# Patient Record
Sex: MALE | Race: WHITE | NOT HISPANIC OR LATINO | Employment: OTHER | URBAN - METROPOLITAN AREA
[De-identification: names, ages, dates, MRNs, and addresses within clinical notes are randomized per-mention and may not be internally consistent; named-entity substitution may affect disease eponyms.]

---

## 2018-02-23 ENCOUNTER — HOSPITAL ENCOUNTER (OUTPATIENT)
Dept: CARDIOLOGY | Facility: HOSPITAL | Age: 58
Discharge: HOME | End: 2018-02-23
Attending: INTERNAL MEDICINE | Admitting: INTERNAL MEDICINE

## 2019-03-06 ENCOUNTER — HOSPITAL ENCOUNTER (OUTPATIENT)
Dept: OTHER | Facility: HOSPITAL | Age: 59
Discharge: HOME | End: 2019-03-06
Attending: INTERNAL MEDICINE | Admitting: INTERNAL MEDICINE

## 2019-03-06 ENCOUNTER — CONVERSION ENCOUNTER (OUTPATIENT)
Dept: CARDIOLOGY | Facility: CLINIC | Age: 59
End: 2019-03-06

## 2019-03-06 LAB
ALBUMIN SERPL-MCNC: 4.1 G/DL (ref 3.6–5.1)
ALBUMIN/GLOB SERPL: ABNORMAL {RATIO} (ref 1–2.5)
ALP SERPL-CCNC: 71 UNITS/L (ref 40–115)
ALT SERPL-CCNC: 23 UNITS/L (ref 9–46)
AST SERPL-CCNC: 23 UNITS/L (ref 10–35)
BASOPHILS # BLD AUTO: ABNORMAL 10*3/MM3 (ref 0–200)
BASOPHILS NFR BLD AUTO: 1 %
BILIRUB SERPL-MCNC: 0.4 MG/DL (ref 0.2–1.2)
BNP SERPL-MCNC: 339 PG/ML
BUN SERPL-MCNC: 17 MG/DL (ref 7–25)
BUN/CREAT SERPL: ABNORMAL (ref 6–22)
CALCIUM SERPL-MCNC: 9.4 MG/DL (ref 8.6–10.3)
CHLORIDE SERPL-SCNC: 103 MMOL/L (ref 98–110)
CO2 CONTENT VENOUS: 29 MMOL/L (ref 20–32)
CONV NEUTROPHILS/100 LEUKOCYTES IN BODY FLUID BY MANUAL COUNT: 69 %
CONV TOTAL PROTEIN: 6.7 G/DL (ref 6.1–8.1)
CREAT UR-MCNC: 1.03 MG/DL (ref 0.7–1.33)
EOSINOPHIL # BLD AUTO: 1 %
EOSINOPHIL # BLD AUTO: ABNORMAL 10*3/MM3 (ref 15–500)
ERYTHROCYTE [DISTWIDTH] IN BLOOD BY AUTOMATED COUNT: 13.1 % (ref 11–15)
GLOBULIN UR ELPH-MCNC: ABNORMAL G/DL (ref 1.9–3.7)
GLUCOSE SERPL-MCNC: 95 MG/DL (ref 65–99)
HCT VFR BLD AUTO: 40.8 % (ref 38.5–50)
HGB BLD-MCNC: 13.9 G/DL (ref 13.2–17.1)
LYMPHOCYTES # BLD AUTO: ABNORMAL 10*3/MM3 (ref 850–3900)
LYMPHOCYTES NFR BLD AUTO: 19 %
MAGNESIUM SERPL-MCNC: 2 MG/DL (ref 1.5–2.5)
MCH RBC QN AUTO: 28.9 PG (ref 27–33)
MCHC RBC AUTO-ENTMCNC: ABNORMAL % (ref 32–36)
MCV RBC AUTO: 84.8 FL (ref 80–100)
MONOCYTES # BLD AUTO: ABNORMAL 10*3/MICROLITER (ref 200–950)
MONOCYTES NFR BLD AUTO: 10 %
NEUTROPHILS # BLD AUTO: ABNORMAL 10*3/MM3 (ref 1500–7800)
PLATELET # BLD AUTO: ABNORMAL 10*3/MM3 (ref 140–400)
PMV BLD AUTO: 11.6 FL (ref 7.5–12.5)
POTASSIUM SERPL-SCNC: 4.3 MMOL/L (ref 3.5–5.3)
RBC # BLD AUTO: ABNORMAL 10*6/MM3 (ref 4.2–5.8)
SODIUM SERPL-SCNC: 139 MMOL/L (ref 135–146)
WBC # BLD AUTO: ABNORMAL K/UL (ref 3.8–10.8)

## 2019-08-07 ENCOUNTER — HOSPITAL ENCOUNTER (OUTPATIENT)
Facility: HOSPITAL | Age: 59
Setting detail: OBSERVATION
Discharge: HOME | End: 2019-08-09
Attending: INTERNAL MEDICINE | Admitting: INTERNAL MEDICINE

## 2019-08-07 PROBLEM — F41.9 ANXIETY: Status: ACTIVE | Noted: 2017-09-08

## 2019-08-07 PROBLEM — I10 ESSENTIAL HYPERTENSION: Chronic | Status: ACTIVE | Noted: 2019-08-07

## 2019-08-07 PROBLEM — I25.119 CORONARY ARTERY DISEASE INVOLVING NATIVE HEART WITH ANGINA PECTORIS (HCC): Chronic | Status: ACTIVE | Noted: 2019-08-07

## 2019-08-07 PROBLEM — K21.9 GASTROESOPHAGEAL REFLUX DISEASE: Status: ACTIVE | Noted: 2019-02-28

## 2019-08-07 PROBLEM — E66.01 MORBID OBESITY (HCC): Status: ACTIVE | Noted: 2018-04-09

## 2019-08-07 PROBLEM — F32.A DEPRESSION: Status: ACTIVE | Noted: 2019-08-07

## 2019-08-07 PROBLEM — I48.0 PAROXYSMAL ATRIAL FIBRILLATION (HCC): Chronic | Status: ACTIVE | Noted: 2019-08-07

## 2019-08-07 PROBLEM — I25.119 CORONARY ARTERY DISEASE INVOLVING NATIVE HEART WITH ANGINA PECTORIS (HCC): Status: ACTIVE | Noted: 2019-08-07

## 2019-08-07 PROBLEM — G47.30 SLEEP APNEA: Status: ACTIVE | Noted: 2019-08-07

## 2019-08-07 PROBLEM — I50.9 CONGESTIVE HEART FAILURE (HCC): Status: ACTIVE | Noted: 2017-09-08

## 2019-08-07 PROBLEM — G47.30 SLEEP APNEA: Chronic | Status: ACTIVE | Noted: 2019-08-07

## 2019-08-07 PROBLEM — E78.5 DYSLIPIDEMIA: Chronic | Status: ACTIVE | Noted: 2019-08-07

## 2019-08-07 PROBLEM — I42.0 CARDIOMYOPATHY, DILATED (HCC): Status: ACTIVE | Noted: 2019-08-07

## 2019-08-07 PROBLEM — I42.0 CARDIOMYOPATHY, DILATED (HCC): Chronic | Status: ACTIVE | Noted: 2019-08-07

## 2019-08-07 PROBLEM — E78.5 DYSLIPIDEMIA: Status: ACTIVE | Noted: 2019-08-07

## 2019-08-07 PROBLEM — I10 ESSENTIAL HYPERTENSION: Status: ACTIVE | Noted: 2019-08-07

## 2019-08-07 PROBLEM — Z98.890 S/P CARDIAC CATH: Status: ACTIVE | Noted: 2017-09-08

## 2019-08-07 PROBLEM — I48.0 PAROXYSMAL ATRIAL FIBRILLATION (HCC): Status: ACTIVE | Noted: 2019-08-07

## 2019-08-07 LAB
ALBUMIN SERPL-MCNC: 3.8 G/DL (ref 3.5–4.8)
ALBUMIN/GLOB SERPL: 1.1 G/DL (ref 1–1.7)
ALP SERPL-CCNC: 57 U/L (ref 32–91)
ALT SERPL W P-5'-P-CCNC: 23 U/L (ref 17–63)
ANION GAP SERPL CALCULATED.3IONS-SCNC: 16.5 MMOL/L (ref 5–15)
AST SERPL-CCNC: 34 U/L (ref 15–41)
BILIRUB SERPL-MCNC: 0.9 MG/DL (ref 0.3–1.2)
BNP SERPL-MCNC: 360 PG/ML
BUN BLD-MCNC: 14 MG/DL (ref 8–20)
BUN/CREAT SERPL: 14 (ref 6.2–20.3)
CALCIUM SPEC-SCNC: 8.7 MG/DL (ref 8.9–10.3)
CHLORIDE SERPL-SCNC: 104 MMOL/L (ref 101–111)
CO2 SERPL-SCNC: 23 MMOL/L (ref 22–32)
CREAT BLD-MCNC: 1 MG/DL (ref 0.7–1.2)
DEPRECATED RDW RBC AUTO: 48.1 FL (ref 37–54)
ERYTHROCYTE [DISTWIDTH] IN BLOOD BY AUTOMATED COUNT: 15.4 % (ref 12.3–15.4)
GFR SERPL CREATININE-BSD FRML MDRD: 77 ML/MIN/1.73
GLOBULIN UR ELPH-MCNC: 3.4 GM/DL (ref 2.5–3.8)
GLUCOSE BLD-MCNC: 138 MG/DL (ref 65–99)
HCT VFR BLD AUTO: 44.1 % (ref 37.5–51)
HGB BLD-MCNC: 14.3 G/DL (ref 13–17.7)
MCH RBC QN AUTO: 28.7 PG (ref 26.6–33)
MCHC RBC AUTO-ENTMCNC: 32.5 G/DL (ref 31.5–35.7)
MCV RBC AUTO: 88.4 FL (ref 79–97)
PLATELET # BLD AUTO: 259 10*3/MM3 (ref 140–450)
PMV BLD AUTO: 10.3 FL (ref 6–12)
POTASSIUM BLD-SCNC: 4.5 MMOL/L (ref 3.6–5.1)
PROT SERPL-MCNC: 7.2 G/DL (ref 6.1–7.9)
RBC # BLD AUTO: 4.99 10*6/MM3 (ref 4.14–5.8)
SODIUM BLD-SCNC: 139 MMOL/L (ref 136–144)
TROPONIN I SERPL-MCNC: 0.03 NG/ML (ref 0–0.03)
WBC NRBC COR # BLD: 8.8 10*3/MM3 (ref 3.4–10.8)

## 2019-08-07 PROCEDURE — 93005 ELECTROCARDIOGRAM TRACING: CPT | Performed by: NURSE PRACTITIONER

## 2019-08-07 PROCEDURE — 96376 TX/PRO/DX INJ SAME DRUG ADON: CPT

## 2019-08-07 PROCEDURE — 96374 THER/PROPH/DIAG INJ IV PUSH: CPT

## 2019-08-07 PROCEDURE — 25010000002 METHYLPREDNISOLONE PER 125 MG: Performed by: NURSE PRACTITIONER

## 2019-08-07 PROCEDURE — 99220 PR INITIAL OBSERVATION CARE/DAY 70 MINUTES: CPT | Performed by: INTERNAL MEDICINE

## 2019-08-07 PROCEDURE — 94640 AIRWAY INHALATION TREATMENT: CPT

## 2019-08-07 PROCEDURE — 94799 UNLISTED PULMONARY SVC/PX: CPT

## 2019-08-07 PROCEDURE — 25010000002 MORPHINE PER 10 MG: Performed by: INTERNAL MEDICINE

## 2019-08-07 PROCEDURE — 96375 TX/PRO/DX INJ NEW DRUG ADDON: CPT

## 2019-08-07 PROCEDURE — 80053 COMPREHEN METABOLIC PANEL: CPT | Performed by: NURSE PRACTITIONER

## 2019-08-07 PROCEDURE — 99213 OFFICE O/P EST LOW 20 MIN: CPT | Performed by: NURSE PRACTITIONER

## 2019-08-07 PROCEDURE — 85027 COMPLETE CBC AUTOMATED: CPT | Performed by: NURSE PRACTITIONER

## 2019-08-07 PROCEDURE — 84484 ASSAY OF TROPONIN QUANT: CPT | Performed by: NURSE PRACTITIONER

## 2019-08-07 PROCEDURE — 83880 ASSAY OF NATRIURETIC PEPTIDE: CPT | Performed by: NURSE PRACTITIONER

## 2019-08-07 PROCEDURE — G0378 HOSPITAL OBSERVATION PER HR: HCPCS

## 2019-08-07 RX ORDER — CRISABOROLE 20 MG/G
OINTMENT TOPICAL
Refills: 0 | COMMUNITY
Start: 2019-05-08 | End: 2019-08-09 | Stop reason: HOSPADM

## 2019-08-07 RX ORDER — CYCLOBENZAPRINE HCL 10 MG
5 TABLET ORAL NIGHTLY
Status: DISCONTINUED | OUTPATIENT
Start: 2019-08-07 | End: 2019-08-09 | Stop reason: HOSPADM

## 2019-08-07 RX ORDER — CHOLECALCIFEROL (VITAMIN D3) 125 MCG
5 CAPSULE ORAL NIGHTLY PRN
Status: DISCONTINUED | OUTPATIENT
Start: 2019-08-07 | End: 2019-08-09 | Stop reason: HOSPADM

## 2019-08-07 RX ORDER — ACETAMINOPHEN 650 MG/1
650 SUPPOSITORY RECTAL EVERY 4 HOURS PRN
Status: DISCONTINUED | OUTPATIENT
Start: 2019-08-07 | End: 2019-08-09 | Stop reason: HOSPADM

## 2019-08-07 RX ORDER — CLOPIDOGREL BISULFATE 75 MG/1
75 TABLET ORAL DAILY
COMMUNITY
End: 2020-09-17 | Stop reason: SDUPTHER

## 2019-08-07 RX ORDER — ALBUTEROL SULFATE 2.5 MG/3ML
2.5 SOLUTION RESPIRATORY (INHALATION)
Status: ON HOLD | COMMUNITY
End: 2019-08-09 | Stop reason: SDUPTHER

## 2019-08-07 RX ORDER — RANOLAZINE 500 MG/1
1000 TABLET, EXTENDED RELEASE ORAL 2 TIMES DAILY
Status: DISCONTINUED | OUTPATIENT
Start: 2019-08-07 | End: 2019-08-09 | Stop reason: HOSPADM

## 2019-08-07 RX ORDER — SODIUM CHLORIDE 0.9 % (FLUSH) 0.9 %
3-10 SYRINGE (ML) INJECTION AS NEEDED
Status: DISCONTINUED | OUTPATIENT
Start: 2019-08-07 | End: 2019-08-09 | Stop reason: HOSPADM

## 2019-08-07 RX ORDER — GUAIFENESIN 600 MG/1
1200 TABLET, EXTENDED RELEASE ORAL EVERY 12 HOURS
Status: DISCONTINUED | OUTPATIENT
Start: 2019-08-07 | End: 2019-08-09 | Stop reason: HOSPADM

## 2019-08-07 RX ORDER — RANITIDINE 150 MG/1
150 TABLET ORAL 2 TIMES DAILY
COMMUNITY
End: 2019-10-07 | Stop reason: SDUPTHER

## 2019-08-07 RX ORDER — ROFLUMILAST 500 UG/1
500 TABLET ORAL DAILY
COMMUNITY

## 2019-08-07 RX ORDER — ASPIRIN 81 MG/1
81 TABLET, CHEWABLE ORAL DAILY
Status: DISCONTINUED | OUTPATIENT
Start: 2019-08-07 | End: 2019-08-09 | Stop reason: HOSPADM

## 2019-08-07 RX ORDER — BUDESONIDE 0.5 MG/2ML
0.5 INHALANT ORAL
Status: DISCONTINUED | OUTPATIENT
Start: 2019-08-07 | End: 2019-08-09 | Stop reason: HOSPADM

## 2019-08-07 RX ORDER — RANOLAZINE 500 MG/1
1000 TABLET, EXTENDED RELEASE ORAL 2 TIMES DAILY
COMMUNITY
End: 2020-07-31 | Stop reason: SDUPTHER

## 2019-08-07 RX ORDER — AZELASTINE HYDROCHLORIDE, FLUTICASONE PROPIONATE 137; 50 UG/1; UG/1
137 SPRAY, METERED NASAL 2 TIMES DAILY
COMMUNITY
End: 2019-08-09 | Stop reason: HOSPADM

## 2019-08-07 RX ORDER — METHYLPREDNISOLONE SODIUM SUCCINATE 40 MG/ML
40 INJECTION, POWDER, LYOPHILIZED, FOR SOLUTION INTRAMUSCULAR; INTRAVENOUS EVERY 8 HOURS
Status: DISCONTINUED | OUTPATIENT
Start: 2019-08-08 | End: 2019-08-09 | Stop reason: HOSPADM

## 2019-08-07 RX ORDER — FLUTICASONE PROPIONATE 50 MCG
SPRAY, SUSPENSION (ML) NASAL
COMMUNITY
Start: 2017-05-12 | End: 2019-10-28 | Stop reason: SDUPTHER

## 2019-08-07 RX ORDER — SPIRONOLACTONE 25 MG/1
12.5 TABLET ORAL 2 TIMES DAILY
Status: ON HOLD | COMMUNITY
End: 2019-09-23 | Stop reason: HOSPADM

## 2019-08-07 RX ORDER — SPIRONOLACTONE 25 MG/1
12.5 TABLET ORAL 2 TIMES DAILY
Status: DISCONTINUED | OUTPATIENT
Start: 2019-08-07 | End: 2019-08-09 | Stop reason: HOSPADM

## 2019-08-07 RX ORDER — MONTELUKAST SODIUM 10 MG/1
10 TABLET ORAL DAILY
COMMUNITY

## 2019-08-07 RX ORDER — MORPHINE SULFATE 4 MG/ML
2 INJECTION, SOLUTION INTRAMUSCULAR; INTRAVENOUS EVERY 4 HOURS PRN
Status: DISCONTINUED | OUTPATIENT
Start: 2019-08-07 | End: 2019-08-08

## 2019-08-07 RX ORDER — ISOSORBIDE MONONITRATE 60 MG/1
60 TABLET, EXTENDED RELEASE ORAL 3 TIMES DAILY
Status: DISCONTINUED | OUTPATIENT
Start: 2019-08-07 | End: 2019-08-09 | Stop reason: HOSPADM

## 2019-08-07 RX ORDER — SODIUM CHLORIDE 0.9 % (FLUSH) 0.9 %
3 SYRINGE (ML) INJECTION EVERY 12 HOURS SCHEDULED
Status: DISCONTINUED | OUTPATIENT
Start: 2019-08-07 | End: 2019-08-09 | Stop reason: HOSPADM

## 2019-08-07 RX ORDER — CLOPIDOGREL BISULFATE 75 MG/1
75 TABLET ORAL DAILY
Status: DISCONTINUED | OUTPATIENT
Start: 2019-08-07 | End: 2019-08-09 | Stop reason: HOSPADM

## 2019-08-07 RX ORDER — ATORVASTATIN CALCIUM 40 MG/1
80 TABLET, FILM COATED ORAL DAILY
Status: DISCONTINUED | OUTPATIENT
Start: 2019-08-07 | End: 2019-08-09 | Stop reason: HOSPADM

## 2019-08-07 RX ORDER — IPRATROPIUM BROMIDE AND ALBUTEROL SULFATE 2.5; .5 MG/3ML; MG/3ML
3 SOLUTION RESPIRATORY (INHALATION)
Status: DISCONTINUED | OUTPATIENT
Start: 2019-08-07 | End: 2019-08-09 | Stop reason: HOSPADM

## 2019-08-07 RX ORDER — METHYLPREDNISOLONE SODIUM SUCCINATE 125 MG/2ML
80 INJECTION, POWDER, LYOPHILIZED, FOR SOLUTION INTRAMUSCULAR; INTRAVENOUS EVERY 8 HOURS
Status: DISCONTINUED | OUTPATIENT
Start: 2019-08-07 | End: 2019-08-07

## 2019-08-07 RX ORDER — METOPROLOL SUCCINATE 50 MG/1
50 TABLET, EXTENDED RELEASE ORAL DAILY
COMMUNITY
End: 2019-10-28 | Stop reason: DRUGHIGH

## 2019-08-07 RX ORDER — ROFLUMILAST 500 UG/1
500 TABLET ORAL DAILY
Status: DISCONTINUED | OUTPATIENT
Start: 2019-08-07 | End: 2019-08-09 | Stop reason: HOSPADM

## 2019-08-07 RX ORDER — HYDRALAZINE HYDROCHLORIDE 20 MG/ML
10 INJECTION INTRAMUSCULAR; INTRAVENOUS EVERY 6 HOURS PRN
Status: DISCONTINUED | OUTPATIENT
Start: 2019-08-07 | End: 2019-08-09 | Stop reason: HOSPADM

## 2019-08-07 RX ORDER — BUMETANIDE 1 MG/1
1 TABLET ORAL 2 TIMES DAILY
Status: DISCONTINUED | OUTPATIENT
Start: 2019-08-07 | End: 2019-08-09 | Stop reason: HOSPADM

## 2019-08-07 RX ORDER — MONTELUKAST SODIUM 10 MG/1
10 TABLET ORAL NIGHTLY
Status: DISCONTINUED | OUTPATIENT
Start: 2019-08-07 | End: 2019-08-09 | Stop reason: HOSPADM

## 2019-08-07 RX ORDER — FAMOTIDINE 20 MG/1
20 TABLET, FILM COATED ORAL
Status: DISCONTINUED | OUTPATIENT
Start: 2019-08-07 | End: 2019-08-09 | Stop reason: HOSPADM

## 2019-08-07 RX ORDER — DIAZEPAM 5 MG/1
TABLET ORAL
COMMUNITY
End: 2019-08-09 | Stop reason: HOSPADM

## 2019-08-07 RX ORDER — ONDANSETRON 2 MG/ML
4 INJECTION INTRAMUSCULAR; INTRAVENOUS EVERY 6 HOURS PRN
Status: DISCONTINUED | OUTPATIENT
Start: 2019-08-07 | End: 2019-08-09 | Stop reason: HOSPADM

## 2019-08-07 RX ORDER — ASPIRIN 81 MG/1
81 TABLET, CHEWABLE ORAL DAILY
Status: ON HOLD | COMMUNITY
End: 2019-09-20

## 2019-08-07 RX ORDER — ATORVASTATIN CALCIUM 80 MG/1
80 TABLET, FILM COATED ORAL DAILY
COMMUNITY
End: 2020-07-31 | Stop reason: SDUPTHER

## 2019-08-07 RX ORDER — BISACODYL 10 MG
10 SUPPOSITORY, RECTAL RECTAL DAILY PRN
Status: DISCONTINUED | OUTPATIENT
Start: 2019-08-07 | End: 2019-08-09 | Stop reason: HOSPADM

## 2019-08-07 RX ORDER — DULOXETIN HYDROCHLORIDE 60 MG/1
60 CAPSULE, DELAYED RELEASE ORAL DAILY
COMMUNITY
End: 2021-01-01 | Stop reason: SDDI

## 2019-08-07 RX ORDER — METOPROLOL SUCCINATE 50 MG/1
50 TABLET, EXTENDED RELEASE ORAL DAILY
Status: DISCONTINUED | OUTPATIENT
Start: 2019-08-07 | End: 2019-08-09 | Stop reason: HOSPADM

## 2019-08-07 RX ORDER — ACETAMINOPHEN 325 MG/1
650 TABLET ORAL EVERY 4 HOURS PRN
Status: DISCONTINUED | OUTPATIENT
Start: 2019-08-07 | End: 2019-08-09 | Stop reason: HOSPADM

## 2019-08-07 RX ORDER — NITROGLYCERIN 0.4 MG/1
0.4 TABLET SUBLINGUAL
COMMUNITY

## 2019-08-07 RX ORDER — DULOXETIN HYDROCHLORIDE 30 MG/1
60 CAPSULE, DELAYED RELEASE ORAL DAILY
Status: DISCONTINUED | OUTPATIENT
Start: 2019-08-07 | End: 2019-08-09 | Stop reason: HOSPADM

## 2019-08-07 RX ORDER — BENZONATATE 100 MG/1
200 CAPSULE ORAL 3 TIMES DAILY PRN
Status: DISCONTINUED | OUTPATIENT
Start: 2019-08-07 | End: 2019-08-09 | Stop reason: HOSPADM

## 2019-08-07 RX ORDER — ISOSORBIDE MONONITRATE 60 MG/1
60 TABLET, EXTENDED RELEASE ORAL 3 TIMES DAILY PRN
COMMUNITY
End: 2020-06-29 | Stop reason: HOSPADM

## 2019-08-07 RX ORDER — NITROGLYCERIN 0.4 MG/1
0.4 TABLET SUBLINGUAL
Status: DISCONTINUED | OUTPATIENT
Start: 2019-08-07 | End: 2019-08-08

## 2019-08-07 RX ORDER — BUMETANIDE 1 MG/1
1 TABLET ORAL 2 TIMES DAILY
COMMUNITY
End: 2019-10-28 | Stop reason: SDUPTHER

## 2019-08-07 RX ORDER — ALUMINA, MAGNESIA, AND SIMETHICONE 2400; 2400; 240 MG/30ML; MG/30ML; MG/30ML
15 SUSPENSION ORAL EVERY 6 HOURS PRN
Status: DISCONTINUED | OUTPATIENT
Start: 2019-08-07 | End: 2019-08-09 | Stop reason: HOSPADM

## 2019-08-07 RX ORDER — ONDANSETRON 4 MG/1
4 TABLET, FILM COATED ORAL EVERY 6 HOURS PRN
Status: DISCONTINUED | OUTPATIENT
Start: 2019-08-07 | End: 2019-08-09 | Stop reason: HOSPADM

## 2019-08-07 RX ORDER — FLUTICASONE PROPIONATE 50 MCG
2 SPRAY, SUSPENSION (ML) NASAL DAILY
Status: DISCONTINUED | OUTPATIENT
Start: 2019-08-07 | End: 2019-08-09 | Stop reason: HOSPADM

## 2019-08-07 RX ORDER — CYCLOBENZAPRINE HCL 5 MG
TABLET ORAL
COMMUNITY
End: 2019-10-07 | Stop reason: SDUPTHER

## 2019-08-07 RX ADMIN — ONDANSETRON 4 MG: 4 TABLET, FILM COATED ORAL at 18:07

## 2019-08-07 RX ADMIN — METHYLPREDNISOLONE SODIUM SUCCINATE 80 MG: 125 INJECTION, POWDER, FOR SOLUTION INTRAMUSCULAR; INTRAVENOUS at 18:11

## 2019-08-07 RX ADMIN — MORPHINE SULFATE 2 MG: 4 INJECTION INTRAVENOUS at 23:49

## 2019-08-07 RX ADMIN — BUMETANIDE 1 MG: 1 TABLET ORAL at 21:57

## 2019-08-07 RX ADMIN — FAMOTIDINE 20 MG: 20 TABLET, FILM COATED ORAL at 18:10

## 2019-08-07 RX ADMIN — GUAIFENESIN 1200 MG: 600 TABLET, EXTENDED RELEASE ORAL at 18:11

## 2019-08-07 RX ADMIN — IPRATROPIUM BROMIDE AND ALBUTEROL SULFATE 3 ML: .5; 3 SOLUTION RESPIRATORY (INHALATION) at 19:46

## 2019-08-07 RX ADMIN — ROFLUMILAST 500 MCG: 500 TABLET ORAL at 21:57

## 2019-08-07 RX ADMIN — BUDESONIDE 0.5 MG: 0.5 INHALANT RESPIRATORY (INHALATION) at 19:45

## 2019-08-07 RX ADMIN — MORPHINE SULFATE 2 MG: 4 INJECTION INTRAVENOUS at 18:28

## 2019-08-07 RX ADMIN — METOPROLOL SUCCINATE 50 MG: 50 TABLET, EXTENDED RELEASE ORAL at 18:10

## 2019-08-07 RX ADMIN — ATORVASTATIN CALCIUM 80 MG: 40 TABLET, FILM COATED ORAL at 18:09

## 2019-08-07 RX ADMIN — ISOSORBIDE MONONITRATE 60 MG: 60 TABLET, EXTENDED RELEASE ORAL at 21:58

## 2019-08-07 RX ADMIN — DULOXETINE 60 MG: 30 CAPSULE, DELAYED RELEASE ORAL at 21:58

## 2019-08-07 RX ADMIN — Medication 3 ML: at 21:59

## 2019-08-07 RX ADMIN — SPIRONOLACTONE 12.5 MG: 25 TABLET ORAL at 18:08

## 2019-08-07 RX ADMIN — MONTELUKAST SODIUM 10 MG: 10 TABLET, COATED ORAL at 18:10

## 2019-08-07 RX ADMIN — IPRATROPIUM BROMIDE AND ALBUTEROL SULFATE 3 ML: .5; 3 SOLUTION RESPIRATORY (INHALATION) at 17:13

## 2019-08-07 RX ADMIN — CYCLOBENZAPRINE HYDROCHLORIDE 5 MG: 10 TABLET, FILM COATED ORAL at 18:09

## 2019-08-07 RX ADMIN — RANOLAZINE 1000 MG: 500 TABLET, FILM COATED, EXTENDED RELEASE ORAL at 18:10

## 2019-08-07 RX ADMIN — CLOPIDOGREL BISULFATE 75 MG: 75 TABLET ORAL at 18:11

## 2019-08-07 NOTE — H&P
[facility] HOSPITALIST       PCP:  Jaylen Moss MD      CHIEF COMPLAINT:     Left groin pain      HISTORY OF PRESENT ILLNESS:     This is a 58-year-old  male with a past medical history of CAD status post CABG, hypertension, COPD, emphysema, pacemaker/defibrillator atrial fibrillation, cardiomyopathy, and back surgery who presented to [facility] on 8/7/2019 with complaints of chest pain.  Patient states he has chronic chest pain and recently got worse.  He stated he had a stent last October and then an MI in January he stated this is been the third trip to the hospital in the last month.  He was in [place] 3 weeks ago for an MI and ventricular fibrillation.  He was in [place] 2 weeks ago for COPD/angina attack.  Patient states this chest pain feels like when he had his previous MI.  He rates it a 7/10 on pain scale.  States he is also had accompanying nausea and shortness of breath.  Patient states that his legs have been swelling lately also.  He denies any recent fever chills.  He is seen by Dr. Giles outpatient.     Per Dr. Guardado at [facility] patient had a CT which is negative for PE.  EKG was unremarkable troponin  0.05.  .  D-dimer 1.0.  Patient received Eliquis this morning.  Patient was started on Nitro drip at [place] .        Past Medical History:   Diagnosis Date   • Asthma    • Atrial fibrillation (CMS/[facility])    • CAD (coronary artery disease)    • CHF (congestive heart failure) (CMS/[facility])    • COPD (chronic obstructive pulmonary disease) (CMS/[facility])    • Depression    • Hyperlipidemia    • Hypertension    • Myocardial infarction (CMS/[facility])    • Pacemaker     pacemaker / defib   • V tach (CMS/[facility])      Past Surgical History:   Procedure Laterality Date   • CARDIAC ABLATION  11/07/2017   • CARDIAC CATHETERIZATION     • CHOLECYSTECTOMY     • CORONARY ANGIOPLASTY WITH STENT PLACEMENT      2 stents   • PACEMAKER IMPLANTATION  07/08/2016    Pacemaker/ Defib  [facility]   • SINUS SURGERY      sinus surgery x 9     Family History   Problem Relation Age of Onset   • Diabetes Mother    • Heart disease Mother         MI age 46 - CHF   • Atrial fibrillation Mother    • COPD Mother    • Atrial fibrillation Father    • Heart disease Father         CHF     Social History     Tobacco Use   • Smoking status: Never Smoker   Substance Use Topics   • Alcohol use: No     Frequency: Never   • Drug use: No       Medications Prior to Admission   Medication Sig Dispense Refill Last Dose   • apixaban (ELIQUIS) 5 MG tablet tablet Take 5 mg by mouth 2 (Two) Times a Day.   8/7/2019 at Unknown time   • aspirin 81 MG chewable tablet Chew 81 mg Daily.   8/6/2019 at Unknown time   • atorvastatin (LIPITOR) 80 MG tablet Take 80 mg by mouth Daily.   8/6/2019 at Unknown time   • bumetanide (BUMEX) 1 MG tablet Take 1 mg by mouth 2 (Two) Times a Day.   8/6/2019 at Unknown time   • clopidogrel (PLAVIX) 75 MG tablet Take 75 mg by mouth Daily.   8/6/2019 at Unknown time   • DULoxetine (CYMBALTA) 60 MG capsule Take 60 mg by mouth Daily.   8/6/2019 at Unknown time   • fluticasone (FLONASE) 50 MCG/ACT nasal spray fluticasone propionate 50 mcg/actuation nasal spray,suspension      • isosorbide mononitrate (IMDUR) 60 MG 24 hr tablet Take 60 mg by mouth 3 (Three) Times a Day.   8/6/2019 at Unknown time   • metoprolol succinate XL (TOPROL-XL) 50 MG 24 hr tablet Take 50 mg by mouth Daily.   8/6/2019 at Unknown time   • montelukast (SINGULAIR) 10 MG tablet Take 10 mg by mouth Every Night.   8/6/2019 at Unknown time   • nitroglycerin (NITROSTAT) 0.4 MG SL tablet Place 0.4 mg under the tongue Every 5 (Five) Minutes As Needed for Chest Pain. Take no more than 3 doses in 15 minutes.   8/7/2019 at Unknown time   • raNITIdine (ZANTAC) 150 MG tablet Take 150 mg by mouth 2 (Two) Times a Day.   8/6/2019 at Unknown time   • ranolazine (RANEXA) 500 MG 12 hr tablet Take 1,000 mg by mouth 2 (Two) Times a Day.   8/6/2019  at Unknown time   • roflumilast (DALIRESP) 500 MCG tablet tablet Take 500 mcg by mouth Daily.   8/6/2019 at Unknown time   • spironolactone (ALDACTONE) 25 MG tablet Take 12.5 mg by mouth 2 (Two) Times a Day.   8/6/2019 at Unknown time   • albuterol (PROVENTIL) (2.5 MG/3ML) 0.083% nebulizer solution Inhale 2.5 mg.      • Azelastine-Fluticasone 137-50 MCG/ACT suspension 137 mcg into the nostril(s) as directed by provider 2 (Two) Times a Day.      • cyclobenzaprine (FLEXERIL) 5 MG tablet cyclobenzaprine 5 mg tablet      • diazePAM (VALIUM) 5 MG tablet diazepam 5 mg tablet      • EUCRISA 2 % ointment   0        Allergies:  Tramadol and Codeine      There is no immunization history on file for this patient.      REVIEW OF SYSTEMS:    Review of Systems   Constitutional: Negative.    HENT: Negative.    Respiratory: Positive for cough and shortness of breath.    Cardiovascular: Positive for chest pain and leg swelling. Negative for palpitations.   Gastrointestinal: Positive for nausea.   Genitourinary: Negative.    Musculoskeletal: Negative.    Skin: Negative.    Neurological: Negative.    Psychiatric/Behavioral: Negative.        Vital Signs  Temp:  [98.9 °F (37.2 °C)] 98.9 °F (37.2 °C)  Heart Rate:  [86] 86  Resp:  [18] 18  BP: (115-162)/(68-88) 162/88         Physical Exam:  Physical Exam   Constitutional: He is oriented to person, place, and time and well-developed, well-nourished, and in no distress.   HENT:   Head: Normocephalic and atraumatic.   Eyes: Conjunctivae and EOM are normal. Pupils are equal, round, and reactive to light.   Neck: Normal range of motion.   Cardiovascular: Normal rate, regular rhythm and normal heart sounds.   Pulmonary/Chest: Effort normal.   Expiratory wheezes noted    Abdominal: Soft. Bowel sounds are normal.   Musculoskeletal: Normal range of motion. He exhibits edema (slight LLE edema ).   Neurological: He is alert and oriented to person, place, and time.   Skin: Skin is warm and dry.    Psychiatric: Affect normal.         Results Review:   I reviewed the patient's new clinical results.    Lab Results (most recent)     None          Imaging Results (most recent)     None            ECG/EMG Results (most recent)     None               Assessment/Plan     Acute COPD exacerbation  - Patient currently on room air. States he is suppose to wear 2L NC at home as needed, but no longer has an oxygen machine.   - Lungs have expiratory wheezing. Patient having coughing and shortness of breath.   - Inhalers, mucinex, tesslon perles, and steroid taper ordered   - Continue home daliresp     Chest pain   - Troponin 0.05, trend  - D-dimer 1.0, CT PE protocol unremarkable at [facility]   - Placed on nitro gtt at [facility], Patient still having chest pain- continue for now  - Cardiology consulted   - Continuous cardiac monitoring   - Check BNP, EKG, and trend troponin   - Continue home aspirin, imdur, and ranexa    CAD S/P CABG  - Patient has pacemaker/defibrillator  - Continue aspirin, atorvastatin, plavix, and metoprolol     CHF  - Continue bumex and spironolactone  - EF EF 25-30% (2017)     Essential hypertension   - uncontrolled  - Continue home metoprolol  - IV hydralazine PRN ordered     GERD  - Continue pepcid     Chronic atrial fibrillation   - Continuous cardiac monitoring   - Patient received his eliquis at [facility]   - Continue eliquis     History of back surgery   - Continue flexeril     Depression  - Continue cymbalta     Seasonal allergies  - Continue singulair and flonase     Morbid obesity  - Encourage lifestyle modifications     VTE Prophylaxis - eliquis

## 2019-08-07 NOTE — CONSULTS
Cardiology Consult Note      Requesting Physician    Brandon Enrique MD    PATIENT IDENTIFICATION    Name: Jose Dixon Jr. Age: 58 y.o. Sex: male :  1960 MRN: [id number]    REASON FOR CONSULTATION:  58-year-old  male well-known to our practice with cardiac history that includes coronary artery disease with small vessel disease involving a marginal branch of circumflex artery being medically managed.  He has single-chamber ICD in situ-[employer].  Last hospitalization 2019 patient did have spontaneous symptomatic wide QRS tachycardia with associated diaphoresis, lightheadedness and chest discomfort.  He has history of cardiac ablation, ischemic cardiomyopathy with last known EF of 40%.  History of chronic class III systolic heart failure, COPD, small vessel coronary disease, obesity with sleep apnea and chronic sinus issues which precludes use use of positive pressure.      CC:  This of breath  Chest pain    HISTORY OF PRESENT ILLNESS:   Patient presented to [facility] with chief complaint of shortness of breath.  Patient states he did not feel well yesterday but had no specific complaints of pain or shortness of breath.  He awoke approximately 0200 today with shortness of breath.  He used his nebulizer with no significant improvement.  His symptoms worsened and he presented to the emergency department at [facility].  There, he underwent chest CT that was negative for PE.  , EKG with no acute findings.    Upon my evaluation, patient is sitting in bedside chair and reports chest pain that is retrosternal radiating through to his back as well rated 7-8/10.  Patient reports his shortness of air has improved some.  He also reports nausea.  He is currently on nitro drip at 10 mcg/min.  Telemetry shows sinus with occasional PVCs.  Patient reports he has follow-up appointment at [facility] with Dr. Roger Garcia on .      REVIEW OF  SYSTEMS:  Pertinent items are noted in HPI, all other systems reviewed and negative    OBJECTIVE   Sitting in chair, patient is very anxious but does not appear to be in any acute distress    ASSESSMENT/PLAN    Coronary artery disease involving native heart with angina pectoris (CMS/HCC)    Cardiomyopathy, dilated (CMS/HCC)    Sleep apnea    Essential hypertension    Dyslipidemia    Paroxysmal atrial fibrillation (CMS/HCC)    Plan  Plan at this time is to rule in/out for acute coronary syndrome with serial cardiac enzymes  Monitor rhythm closely for dysrhythmias  Patient was evaluated by Dr. Lopez 7/1/2019 at [facility]: ICD at prior hospital admission was reprogrammed to a lower rate to avoid unnecessary isolated right ventricular pacing and also program to include monitoring zone to monitor for any slower VT on this patient or slower tachycardia  Further recommendations per cardiologist            Vital Signs  Visit Vitals  /88 (BP Location: Left arm, Patient Position: Sitting)   Pulse 86   Temp 98.9 °F (37.2 °C) (Oral)   Resp 18   SpO2 90%     Oxygen Therapy  SpO2: 90 %  Pulse Oximetry Type: Intermittent  Device (Oxygen Therapy): room air    Intake & Output (last 3 days)     None        Lines, Drains & Airways    Active LDAs     None                Medical History    Past Medical History:   Diagnosis Date   • Asthma    • Atrial fibrillation (CMS/HCC)    • CAD (coronary artery disease)    • CHF (congestive heart failure) (CMS/HCC)    • COPD (chronic obstructive pulmonary disease) (CMS/HCC)    • Depression    • Hyperlipidemia    • Hypertension    • Myocardial infarction (CMS/HCC)    • Pacemaker     pacemaker / defib   • V tach (CMS/HCC)         Surgical History    Past Surgical History:   Procedure Laterality Date   • CARDIAC ABLATION  11/07/2017   • CARDIAC CATHETERIZATION     • CHOLECYSTECTOMY     • CORONARY ANGIOPLASTY WITH STENT PLACEMENT      2 stents   • PACEMAKER IMPLANTATION  07/08/2016    Pacemaker/  Defib implant - [place]   • SINUS SURGERY      sinus surgery x 9        Family History    Family History   Problem Relation Age of Onset   • Diabetes Mother    • Heart disease Mother         MI age 46 - CHF   • Atrial fibrillation Mother    • COPD Mother    • Atrial fibrillation Father    • Heart disease Father         CHF       Social History    Social History     Tobacco Use   • Smoking status: Never Smoker   Substance Use Topics   • Alcohol use: No     Frequency: Never        Allergies    Allergies   Allergen Reactions   • Tramadol Hives, Other (See Comments) and Rash   • Codeine Other (See Comments) and Unknown (See Comments)     Makes me feel like I'm in another world.   Cramps                 /88 (BP Location: Left arm, Patient Position: Sitting)   Pulse 86   Temp 98.9 °F (37.2 °C) (Oral)   Resp 18   SpO2 90%   Intake/Output last 3 shifts:  No intake/output data recorded.  Intake/Output this shift:  No intake/output data recorded.    PHYSICAL EXAM:    General: Alert, cooperative, no distress, appears stated age  Head:  Normocephalic, atraumatic, mucous membranes moist  Eyes:  Conjunctiva/corneas clear, EOM's intact     Neck:  Supple,  no adenopathy;      Lungs: Clear to auscultation bilaterally, no wheezes rhonchi rales are noted  Chest wall: No tenderness  Heart::  Regular rate and rhythm, S1 and S2 normal, no murmur, rub or gallop  Abdomen: Soft, non-tender, nondistended bowel sounds active  Extremities: No cyanosis, clubbing, or edema groin soft no hematoma.  Pulses: 2+ and symmetric all extremities  Skin:  No rashes or lesions  Neuro/psych: A&O x3. CN II through XII are grossly intact with appropriate affect.  Patient is very talkative, animated, anxious      Scheduled Meds:          Continuous Infusions:         PRN Meds:            Results Review:     I reviewed the patient's new clinical results.    CBC      Cr Clearance CrCl cannot be calculated (Patient's most recent lab result is older than  the maximum 30 days allowed.).  Coag     HbA1C   Lab Results   Component Value Date    HGBA1C 5.4 09/14/2018     Blood Glucose No results found for: POCGLU  Infection     CMP     ABG      UA      TATUM  No results found for: POCMETH, POCAMPHET, POCBARBITUR, POCBENZO, POCCOCAINE, POCOPIATES, POCOXYCODO, POCPHENCYC, POCPROPOXY, POCTHC, POCTRICYC  Lysis Labs     Radiology(recent) No radiology results for the last day            Xrays, labs reviewed personally by physician.    ECG/EMG Results (most recent)     None            Medication Review:   I have reviewed the patient's current medication list  Scheduled Meds:  Continuous Infusions:   PRN Meds:.    Imaging:  Imaging Results (last 72 hours)     ** No results found for the last 72 hours. **            ABHISHEK Peña  08/07/19  5:07 PM

## 2019-08-08 ENCOUNTER — APPOINTMENT (OUTPATIENT)
Dept: OTHER | Facility: HOSPITAL | Age: 59
End: 2019-08-08

## 2019-08-08 PROBLEM — R07.9 CHEST PAIN: Status: ACTIVE | Noted: 2019-08-08

## 2019-08-08 LAB
ANION GAP SERPL CALCULATED.3IONS-SCNC: 14.8 MMOL/L (ref 5–15)
B PERT DNA SPEC QL NAA+PROBE: NOT DETECTED
BUN BLD-MCNC: 20 MG/DL (ref 8–20)
BUN/CREAT SERPL: 18.2 (ref 6.2–20.3)
C PNEUM DNA NPH QL NAA+NON-PROBE: NOT DETECTED
CALCIUM SPEC-SCNC: 8.6 MG/DL (ref 8.9–10.3)
CHLORIDE SERPL-SCNC: 103 MMOL/L (ref 101–111)
CO2 SERPL-SCNC: 25 MMOL/L (ref 22–32)
CREAT BLD-MCNC: 1.1 MG/DL (ref 0.7–1.2)
DEPRECATED RDW RBC AUTO: 49.9 FL (ref 37–54)
ERYTHROCYTE [DISTWIDTH] IN BLOOD BY AUTOMATED COUNT: 16 % (ref 12.3–15.4)
FLUAV H1 2009 PAND RNA NPH QL NAA+PROBE: NOT DETECTED
FLUAV H1 HA GENE NPH QL NAA+PROBE: NOT DETECTED
FLUAV H3 RNA NPH QL NAA+PROBE: NOT DETECTED
FLUAV SUBTYP SPEC NAA+PROBE: NOT DETECTED
FLUBV RNA ISLT QL NAA+PROBE: NOT DETECTED
GFR SERPL CREATININE-BSD FRML MDRD: 69 ML/MIN/1.73
GLUCOSE BLD-MCNC: 149 MG/DL (ref 65–99)
GLUCOSE BLDC GLUCOMTR-MCNC: 134 MG/DL (ref 70–105)
GLUCOSE BLDC GLUCOMTR-MCNC: 135 MG/DL (ref 70–105)
HADV DNA SPEC NAA+PROBE: NOT DETECTED
HCOV 229E RNA SPEC QL NAA+PROBE: NOT DETECTED
HCOV HKU1 RNA SPEC QL NAA+PROBE: NOT DETECTED
HCOV NL63 RNA SPEC QL NAA+PROBE: NOT DETECTED
HCOV OC43 RNA SPEC QL NAA+PROBE: NOT DETECTED
HCT VFR BLD AUTO: 41.4 % (ref 37.5–51)
HGB BLD-MCNC: 13.4 G/DL (ref 13–17.7)
HMPV RNA NPH QL NAA+NON-PROBE: NOT DETECTED
HPIV1 RNA SPEC QL NAA+PROBE: NOT DETECTED
HPIV2 RNA SPEC QL NAA+PROBE: NOT DETECTED
HPIV3 RNA NPH QL NAA+PROBE: NOT DETECTED
HPIV4 P GENE NPH QL NAA+PROBE: NOT DETECTED
LYMPHOCYTES # BLD MANUAL: 0.98 10*3/MM3 (ref 0.7–3.1)
LYMPHOCYTES NFR BLD MANUAL: 9 % (ref 19.6–45.3)
M PNEUMO IGG SER IA-ACNC: NOT DETECTED
MCH RBC QN AUTO: 28.8 PG (ref 26.6–33)
MCHC RBC AUTO-ENTMCNC: 32.4 G/DL (ref 31.5–35.7)
MCV RBC AUTO: 88.8 FL (ref 79–97)
NEUTROPHILS # BLD AUTO: 9.92 10*3/MM3 (ref 1.7–7)
NEUTROPHILS NFR BLD MANUAL: 73 % (ref 42.7–76)
NEUTS BAND NFR BLD MANUAL: 18 % (ref 0–5)
PLAT MORPH BLD: NORMAL
PLATELET # BLD AUTO: 249 10*3/MM3 (ref 140–450)
PMV BLD AUTO: 10 FL (ref 6–12)
POTASSIUM BLD-SCNC: 4.8 MMOL/L (ref 3.6–5.1)
RBC # BLD AUTO: 4.66 10*6/MM3 (ref 4.14–5.8)
RBC MORPH BLD: NORMAL
RHINOVIRUS RNA SPEC NAA+PROBE: NOT DETECTED
RSV RNA NPH QL NAA+NON-PROBE: NOT DETECTED
SCAN SLIDE: NORMAL
SODIUM BLD-SCNC: 138 MMOL/L (ref 136–144)
TROPONIN I SERPL-MCNC: 0.03 NG/ML (ref 0–0.03)
WBC MORPH BLD: NORMAL
WBC NRBC COR # BLD: 10.9 10*3/MM3 (ref 3.4–10.8)

## 2019-08-08 PROCEDURE — 0099U HC BIOFIRE FILMARRAY RESP PANEL 1: CPT | Performed by: INTERNAL MEDICINE

## 2019-08-08 PROCEDURE — 99213 OFFICE O/P EST LOW 20 MIN: CPT | Performed by: NURSE PRACTITIONER

## 2019-08-08 PROCEDURE — 80048 BASIC METABOLIC PNL TOTAL CA: CPT | Performed by: NURSE PRACTITIONER

## 2019-08-08 PROCEDURE — G0378 HOSPITAL OBSERVATION PER HR: HCPCS

## 2019-08-08 PROCEDURE — 25010000002 ONDANSETRON PER 1 MG: Performed by: NURSE PRACTITIONER

## 2019-08-08 PROCEDURE — 82962 GLUCOSE BLOOD TEST: CPT

## 2019-08-08 PROCEDURE — 94799 UNLISTED PULMONARY SVC/PX: CPT

## 2019-08-08 PROCEDURE — 25010000002 METHYLPREDNISOLONE PER 40 MG: Performed by: INTERNAL MEDICINE

## 2019-08-08 PROCEDURE — 96375 TX/PRO/DX INJ NEW DRUG ADDON: CPT

## 2019-08-08 PROCEDURE — 25010000002 MORPHINE PER 10 MG: Performed by: INTERNAL MEDICINE

## 2019-08-08 PROCEDURE — 99226 PR SBSQ OBSERVATION CARE/DAY 35 MINUTES: CPT | Performed by: INTERNAL MEDICINE

## 2019-08-08 PROCEDURE — 96376 TX/PRO/DX INJ SAME DRUG ADON: CPT

## 2019-08-08 PROCEDURE — 85025 COMPLETE CBC W/AUTO DIFF WBC: CPT | Performed by: NURSE PRACTITIONER

## 2019-08-08 RX ORDER — HYDROCODONE BITARTRATE AND ACETAMINOPHEN 5; 325 MG/1; MG/1
1 TABLET ORAL EVERY 6 HOURS PRN
Status: DISCONTINUED | OUTPATIENT
Start: 2019-08-08 | End: 2019-08-09 | Stop reason: HOSPADM

## 2019-08-08 RX ADMIN — Medication 10 ML: at 20:09

## 2019-08-08 RX ADMIN — IPRATROPIUM BROMIDE AND ALBUTEROL SULFATE 3 ML: .5; 3 SOLUTION RESPIRATORY (INHALATION) at 19:32

## 2019-08-08 RX ADMIN — CYCLOBENZAPRINE HYDROCHLORIDE 5 MG: 10 TABLET, FILM COATED ORAL at 20:05

## 2019-08-08 RX ADMIN — IPRATROPIUM BROMIDE AND ALBUTEROL SULFATE 3 ML: .5; 3 SOLUTION RESPIRATORY (INHALATION) at 07:09

## 2019-08-08 RX ADMIN — MONTELUKAST SODIUM 10 MG: 10 TABLET, COATED ORAL at 20:04

## 2019-08-08 RX ADMIN — ONDANSETRON 4 MG: 2 INJECTION INTRAMUSCULAR; INTRAVENOUS at 15:04

## 2019-08-08 RX ADMIN — METHYLPREDNISOLONE SODIUM SUCCINATE 40 MG: 40 INJECTION, POWDER, FOR SOLUTION INTRAMUSCULAR; INTRAVENOUS at 17:45

## 2019-08-08 RX ADMIN — SPIRONOLACTONE 12.5 MG: 25 TABLET ORAL at 20:05

## 2019-08-08 RX ADMIN — GUAIFENESIN 1200 MG: 600 TABLET, EXTENDED RELEASE ORAL at 17:45

## 2019-08-08 RX ADMIN — ATORVASTATIN CALCIUM 80 MG: 40 TABLET, FILM COATED ORAL at 08:02

## 2019-08-08 RX ADMIN — ONDANSETRON 4 MG: 2 INJECTION INTRAMUSCULAR; INTRAVENOUS at 22:30

## 2019-08-08 RX ADMIN — ASPIRIN 81 MG 81 MG: 81 TABLET ORAL at 08:00

## 2019-08-08 RX ADMIN — FAMOTIDINE 20 MG: 20 TABLET, FILM COATED ORAL at 18:03

## 2019-08-08 RX ADMIN — ONDANSETRON 4 MG: 2 INJECTION INTRAMUSCULAR; INTRAVENOUS at 10:00

## 2019-08-08 RX ADMIN — FAMOTIDINE 20 MG: 20 TABLET, FILM COATED ORAL at 07:57

## 2019-08-08 RX ADMIN — ISOSORBIDE MONONITRATE 60 MG: 60 TABLET, EXTENDED RELEASE ORAL at 20:04

## 2019-08-08 RX ADMIN — BENZONATATE 200 MG: 100 CAPSULE ORAL at 05:16

## 2019-08-08 RX ADMIN — GUAIFENESIN 1200 MG: 600 TABLET, EXTENDED RELEASE ORAL at 05:16

## 2019-08-08 RX ADMIN — METOPROLOL SUCCINATE 50 MG: 50 TABLET, EXTENDED RELEASE ORAL at 08:00

## 2019-08-08 RX ADMIN — APIXABAN 5 MG: 5 TABLET, FILM COATED ORAL at 20:04

## 2019-08-08 RX ADMIN — Medication 10 ML: at 08:04

## 2019-08-08 RX ADMIN — METHYLPREDNISOLONE SODIUM SUCCINATE 40 MG: 40 INJECTION, POWDER, FOR SOLUTION INTRAMUSCULAR; INTRAVENOUS at 02:40

## 2019-08-08 RX ADMIN — ISOSORBIDE MONONITRATE 60 MG: 60 TABLET, EXTENDED RELEASE ORAL at 08:03

## 2019-08-08 RX ADMIN — DULOXETINE 60 MG: 30 CAPSULE, DELAYED RELEASE ORAL at 08:03

## 2019-08-08 RX ADMIN — RANOLAZINE 1000 MG: 500 TABLET, FILM COATED, EXTENDED RELEASE ORAL at 08:03

## 2019-08-08 RX ADMIN — CLOPIDOGREL BISULFATE 75 MG: 75 TABLET ORAL at 08:03

## 2019-08-08 RX ADMIN — METHYLPREDNISOLONE SODIUM SUCCINATE 40 MG: 40 INJECTION, POWDER, FOR SOLUTION INTRAMUSCULAR; INTRAVENOUS at 08:04

## 2019-08-08 RX ADMIN — MORPHINE SULFATE 2 MG: 4 INJECTION INTRAVENOUS at 15:05

## 2019-08-08 RX ADMIN — HYDROCODONE BITARTRATE AND ACETAMINOPHEN 1 TABLET: 5; 325 TABLET ORAL at 20:06

## 2019-08-08 RX ADMIN — ROFLUMILAST 500 MCG: 500 TABLET ORAL at 08:02

## 2019-08-08 RX ADMIN — BUMETANIDE 1 MG: 1 TABLET ORAL at 20:06

## 2019-08-08 RX ADMIN — IPRATROPIUM BROMIDE AND ALBUTEROL SULFATE 3 ML: .5; 3 SOLUTION RESPIRATORY (INHALATION) at 11:16

## 2019-08-08 RX ADMIN — BUDESONIDE 0.5 MG: 0.5 INHALANT RESPIRATORY (INHALATION) at 07:10

## 2019-08-08 RX ADMIN — MORPHINE SULFATE 2 MG: 4 INJECTION INTRAVENOUS at 09:59

## 2019-08-08 RX ADMIN — SPIRONOLACTONE 12.5 MG: 25 TABLET ORAL at 08:03

## 2019-08-08 RX ADMIN — MORPHINE SULFATE 2 MG: 4 INJECTION INTRAVENOUS at 05:16

## 2019-08-08 RX ADMIN — APIXABAN 5 MG: 5 TABLET, FILM COATED ORAL at 15:04

## 2019-08-08 RX ADMIN — RANOLAZINE 1000 MG: 500 TABLET, FILM COATED, EXTENDED RELEASE ORAL at 20:05

## 2019-08-08 RX ADMIN — IPRATROPIUM BROMIDE AND ALBUTEROL SULFATE 3 ML: .5; 3 SOLUTION RESPIRATORY (INHALATION) at 23:12

## 2019-08-08 RX ADMIN — BUDESONIDE 0.5 MG: 0.5 INHALANT RESPIRATORY (INHALATION) at 19:32

## 2019-08-08 RX ADMIN — IPRATROPIUM BROMIDE AND ALBUTEROL SULFATE 3 ML: .5; 3 SOLUTION RESPIRATORY (INHALATION) at 14:48

## 2019-08-08 RX ADMIN — BUMETANIDE 1 MG: 1 TABLET ORAL at 08:02

## 2019-08-08 RX ADMIN — ISOSORBIDE MONONITRATE 60 MG: 60 TABLET, EXTENDED RELEASE ORAL at 17:45

## 2019-08-08 NOTE — PROGRESS NOTES
Cardiology Progress  Note      Patient Care Team:  Jaylen Moss MD as PCP - General      PATIENT IDENTIFICATION    Name: Jose Dixon Jr. Age: 58 y.o. Sex: male :  1960 MRN: [id number]    REASON FOR FOLLOW-UP:  58-year-old  male well-known to our practice with cardiac history that includes coronary artery disease with small vessel disease involving a marginal branch of circumflex artery being medically managed.  He has single-chamber ICD in situ-[employer].  Last hospitalization 2019 patient did have spontaneous symptomatic wide QRS tachycardia with associated diaphoresis, lightheadedness and chest discomfort.  He has history of cardiac ablation, ischemic cardiomyopathy with last known EF of 40%.  History of chronic class III systolic heart failure, COPD, small vessel coronary disease, obesity with sleep apnea and chronic sinus issues which precludes use use of positive pressure.    Chest pain  Shortness of breath        SUBJECTIVE    Patient reports chest pain improved, still feels short of breath with exertion      REVIEW OF SYSTEMS:  Pertinent items are noted in HPI, all other systems reviewed and negative    OBJECTIVE   Sitting in chair watching television  Appears comfortable and in no acute distress    ASSESSMENT/PLAN    Coronary artery disease involving native heart with angina pectoris (CMS/HCC)    Cardiomyopathy, dilated (CMS/HCC)    Sleep apnea    Essential hypertension    Dyslipidemia    Paroxysmal atrial fibrillation (CMS/HCC)    Chest pain      Plan:  Patient had multiple failed attempted coronary intervention to a chronic total occlusion of the OM branch  History of ventricular arrhythmia status post ICD and also ablation  Multiple hospitalizations for symptoms of chest pain  Multiple prior invasive work-ups including a recent cardiac catheterization in the last few months at [facility]  Patient is currently being evaluated at [facility] for  second opinion for further treatment options for the obstructive coronary artery disease  Presented this time with symptoms of chest pain and shortness of breath  Plan to treat for underlying CHF COPD and also ischemic coronary artery disease  Try to manage patient conservatively as much as possible at this point  Patient has ruled out for acute coronary syndrome with negative serial cardiac enzymes.  Discontinue heparin, resume Eliquis  Blood pressure stable  Limitations of further treatment options discussed with the patient  Further recommendations based on patient hospital course        Vital Signs  Visit Vitals  /69   Pulse 87   Temp 97.5 °F (36.4 °C) (Oral)   Resp 11   Wt (!) 141 kg (310 lb 13.6 oz)   SpO2 95%     Oxygen Therapy  SpO2: 95 %  Pulse Oximetry Type: Intermittent  Device (Oxygen Therapy): room air  Flowsheet Rows      First Filed Value   Admission Height  --   Admission Weight  141 kg (310 lb 13.6 oz)  (Abnormal)  Documented at 08/08/2019 0654        Intake & Output (last 3 days)     None        Lines, Drains & Airways    Active LDAs     Name:   Placement date:   Placement time:   Site:   Days:    Peripheral IV 08/07/19 1200 Anterior;Distal;Right;Upper Arm   08/07/19    1200    Arm   less than 1                       /69   Pulse 87   Temp 97.5 °F (36.4 °C) (Oral)   Resp 11   Wt (!) 141 kg (310 lb 13.6 oz)   SpO2 95%   Intake/Output last 3 shifts:  No intake/output data recorded.  Intake/Output this shift:  No intake/output data recorded.    PHYSICAL EXAM:    General: Alert, cooperative, no distress, appears stated age  Head:  Normocephalic, atraumatic, mucous membranes moist  Eyes:  Conjunctiva/corneas clear, EOM's intact     Neck:  Supple,  no adenopathy;      Lungs: Slight expiratory wheeze in bases posteriorly  Chest wall: No tenderness  Heart::  Regular rate and rhythm, S1 and S2 normal, no murmur, rub or gallop  Abdomen: Soft, non-tender, nondistended bowel sounds  active  Extremities: No cyanosis, clubbing, or edema groin soft no hematoma.  Pulses: 2+ and symmetric all extremities  Skin:  No rashes or lesions  Neuro/psych: A&O x3. CN II through XII are grossly intact with appropriate affect      Scheduled Meds:        apixaban 5 mg Oral Q12H   aspirin 81 mg Oral Daily   atorvastatin 80 mg Oral Daily   budesonide 0.5 mg Nebulization BID - RT   bumetanide 1 mg Oral BID   clopidogrel 75 mg Oral Daily   cyclobenzaprine 5 mg Oral Nightly   DULoxetine 60 mg Oral Daily   famotidine 20 mg Oral BID AC   fluticasone 2 spray Each Nare Daily   guaiFENesin 1,200 mg Oral Q12H   ipratropium-albuterol 3 mL Nebulization Q4H - RT   isosorbide mononitrate 60 mg Oral TID   methylPREDNISolone sodium succinate 40 mg Intravenous Q8H   metoprolol succinate XL 50 mg Oral Daily   montelukast 10 mg Oral Nightly   ranolazine 1,000 mg Oral BID   roflumilast 500 mcg Oral Daily   sodium chloride 3 mL Intravenous Q12H   spironolactone 12.5 mg Oral BID       Continuous Infusions:         PRN Meds:    •  acetaminophen  •  acetaminophen  •  aluminum-magnesium hydroxide-simethicone  •  benzonatate  •  bisacodyl  •  hydrALAZINE  •  ipratropium-albuterol  •  magnesium hydroxide  •  melatonin  •  Morphine  •  nitroglycerin  •  ondansetron **OR** ondansetron  •  sodium chloride        Results Review:     I reviewed the patient's new clinical results.    CBC    Results from last 7 days   Lab Units 08/08/19  0250 08/07/19  1709   WBC 10*3/mm3 10.90* 8.80   HEMOGLOBIN g/dL 13.4 14.3   PLATELETS 10*3/mm3 249 259     Cr Clearance CrCl cannot be calculated (Unknown ideal weight.).  Coag     HbA1C   Lab Results   Component Value Date    HGBA1C 5.4 09/14/2018     Blood Glucose No results found for: POCGLU  Infection     CMP   Results from last 7 days   Lab Units 08/08/19  0250 08/07/19  1709   SODIUM mmol/L 138 139   POTASSIUM mmol/L 4.8 4.5   CHLORIDE mmol/L 103 104   CO2 mmol/L 25.0 23.0   BUN mg/dL 20 14   CREATININE  mg/dL 1.10 1.00   GLUCOSE mg/dL 149* 138*   ALBUMIN g/dL  --  3.80   BILIRUBIN mg/dL  --  0.9   ALK PHOS U/L  --  57   AST (SGOT) U/L  --  34   ALT (SGPT) U/L  --  23     ABG      UA      TATUM  No results found for: POCMETH, POCAMPHET, POCBARBITUR, POCBENZO, POCCOCAINE, POCOPIATES, POCOXYCODO, POCPHENCYC, POCPROPOXY, POCTHC, POCTRICYC  Lysis Labs   Results from last 7 days   Lab Units 08/08/19  0250 08/07/19  1709   HEMOGLOBIN g/dL 13.4 14.3   PLATELETS 10*3/mm3 249 259   CREATININE mg/dL 1.10 1.00     Radiology(recent) No radiology results for the last day      Results from last 7 days   Lab Units 08/07/19  2338   TROPONIN I ng/mL 0.030       Xrays, labs reviewed personally by physician.    ECG/EMG Results (most recent)     Procedure Component Value Units Date/Time    ECG 12 Lead [[id number]] Collected:  08/07/19 1717     Updated:  08/07/19 1725    Narrative:       HEART RATE= 82  bpm  RR Interval= 728  ms  NH Interval= 155  ms  P Horizontal Axis= -18  deg  P Front Axis= 68  deg  QRSD Interval= 121  ms  QT Interval= 396  ms  QRS Axis= -28  deg  T Wave Axis= 223  deg  - ABNORMAL ECG -  Sinus rhythm  Multiple premature complexes, vent & supraven  Left bundle branch block  Electronically Signed By:   Date and Time of Study: 2019-08-07 17:17:29            Medication Review:   I have reviewed the patient's current medication list  Scheduled Meds:    apixaban 5 mg Oral Q12H   aspirin 81 mg Oral Daily   atorvastatin 80 mg Oral Daily   budesonide 0.5 mg Nebulization BID - RT   bumetanide 1 mg Oral BID   clopidogrel 75 mg Oral Daily   cyclobenzaprine 5 mg Oral Nightly   DULoxetine 60 mg Oral Daily   famotidine 20 mg Oral BID AC   fluticasone 2 spray Each Nare Daily   guaiFENesin 1,200 mg Oral Q12H   ipratropium-albuterol 3 mL Nebulization Q4H - RT   isosorbide mononitrate 60 mg Oral TID   methylPREDNISolone sodium succinate 40 mg Intravenous Q8H   metoprolol succinate XL 50 mg Oral Daily   montelukast 10 mg Oral Nightly    ranolazine 1,000 mg Oral BID   roflumilast 500 mcg Oral Daily   sodium chloride 3 mL Intravenous Q12H   spironolactone 12.5 mg Oral BID     Continuous Infusions:   PRN Meds:.•  acetaminophen  •  acetaminophen  •  aluminum-magnesium hydroxide-simethicone  •  benzonatate  •  bisacodyl  •  hydrALAZINE  •  ipratropium-albuterol  •  magnesium hydroxide  •  melatonin  •  Morphine  •  nitroglycerin  •  ondansetron **OR** ondansetron  •  sodium chloride    Imaging:  Imaging Results (last 72 hours)     Procedure Component Value Units Date/Time    CT Outside Films [[id number]] Resulted:  08/08/19 0731     Updated:  08/08/19 0731    Narrative:       This procedure was auto-finalized with no dictation required.    CT Outside Films [[id number]] Resulted:  08/08/19 0713     Updated:  08/08/19 0713    Narrative:       This procedure was auto-finalized with no dictation required.    XR Outside Films [[id number]] Resulted:  08/08/19 0642     Updated:  08/08/19 0642    Narrative:       This procedure was auto-finalized with no dictation required.            ABHISHEK Peña  08/08/19  10:23 AM

## 2019-08-08 NOTE — PLAN OF CARE
Problem: Patient Care Overview  Goal: Plan of Care Review  Outcome: Ongoing (interventions implemented as appropriate)   08/08/19 0344   Coping/Psychosocial   Plan of Care Reviewed With patient   Plan of Care Review   Progress no change   OTHER   Outcome Summary Patient states that the pain is still there but that the morphine helps, and that he is starting to be able to breathe a bit easier but still gets short of breath at times     Goal: Discharge Needs Assessment  Outcome: Ongoing (interventions implemented as appropriate)   08/08/19 0344   Discharge Needs Assessment   Readmission Within the Last 30 Days no previous admission in last 30 days   Concerns to be Addressed no discharge needs identified;denies needs/concerns at this time   Patient/Family Anticipates Transition to home with family   Patient/Family Anticipated Services at Transition none   Transportation Concerns car, none   Transportation Anticipated car, drives self;family or friend will provide   Anticipated Changes Related to Illness none   Equipment Needed After Discharge none   Disability   Equipment Currently Used at Home none       Problem: Cardiac: ACS (Acute Coronary Syndrome) (Adult)  Goal: Signs and Symptoms of Listed Potential Problems Will be Absent, Minimized or Managed (Cardiac: ACS)  Outcome: Ongoing (interventions implemented as appropriate)   08/08/19 0344   Goal/Outcome Evaluation   Problems Assessed (Acute Coronary Syndrome) chest pain (angina)   Problems Present (Acute Coronary Syn) chest pain (angina)

## 2019-08-08 NOTE — PROGRESS NOTES
Discharge Planning Assessment   [facility]     Patient Name: Jose Dixon Jr.  MRN: [id number]  Today's Date: 8/8/2019    Admit Date: 8/7/2019    Discharge Needs Assessment     Row Name 08/08/19 1323       Living Environment    Lives With  spouse    Current Living Arrangements  home/apartment/condo    Primary Care Provided by  self    Able to Return to Prior Arrangements  yes       Resource/Environmental Concerns    Resource/Environmental Concerns  none    Transportation Concerns  car, none       Transition Planning    Patient/Family Anticipates Transition to  home with family    Patient/Family Anticipated Services at Transition  none    Transportation Anticipated  car, drives self;family or friend will provide       Discharge Needs Assessment    Readmission Within the Last 30 Days  no previous admission in last 30 days HAd a recent MI at [facility] about 6 weeks ago     Concerns to be Addressed  no discharge needs identified    Equipment Currently Used at Home  -- HAs a Trilogy from [employer] -  Had oxygen in the past     Anticipated Changes Related to Illness  none    Equipment Needed After Discharge  none        Discharge Plan     Row Name 08/08/19 1326       Plan    Plan  Routine d/c to home                   Demographic Summary     Row Name 08/08/19 1322       General Information    Admission Type  inpatient    Arrived From  emergency department    Required Notices Provided  Important Message from Medicare    Referral Source  admission list    Reason for Consult  discharge planning    Preferred Language  English        Functional Status     Row Name 08/08/19 1323       Functional Status, IADL    Medications  independent    Meal Preparation  independent    Housekeeping  independent    Laundry  independent    Shopping  independent        D/C Barriers - None     Margarita Zacarias RN

## 2019-08-08 NOTE — PROGRESS NOTES
[facility]  INPATIENT PROGRESS NOTE    Patient Name: Jose Dixon Jr.  Date of Admission: 8/7/2019  Today's Date: 08/08/19  Length of Stay: 1  Primary Care Physician: Jaylen Moss MD    Subjective   Chief Complaint: Shortness of breath/chest pain    Brief synopsis of the presenting complaints     58-year-old male with multiple comorbidities including CAD status post CABG, hypertension, COPD and ischemic cardiomyopathy status post AICD/pacemaker .  Presented to [facility] ED on 08/07/2019 with complaints of chest pain associated with shortness of breath.     Patient states he has chronic chest pain and recently got worse.  He stated he had a stent last October and then an MI in January he stated this is been the third trip to the hospital in the last month.  He was in [place] 3 weeks ago for an MI and ventricular fibrillation.  He was in [place] 2 weeks ago for COPD/angina attack.  Patient states this chest pain feels like when he had his previous MI.  He rates it a 7/10 on pain scale.  States he is also had accompanying nausea and shortness of breath.  Patient states that his legs have been swelling lately also.  He denies any recent fever chills.      CT PE protocol done at the [facility] ED was negative for PE   EKG was unremarkable. troponin  0.05.  .  D-dimer 1.0.    Was transferred to [facility] for further evaluation and management                 Review of Systems     Review of Systems   Constitutional: Negative.    HENT: Negative.    Respiratory: Positive for cough and shortness of breath.    Cardiovascular: Positive for chest pain and leg swelling. Negative for palpitations.   Gastrointestinal: Positive for nausea.   Genitourinary: Negative.    Musculoskeletal: Negative.    Skin: Negative.    Neurological: Negative.    Psychiatric/Behavioral: Negative            All pertinent negatives and positives are as above. All other systems have been reviewed  and are negative unless otherwise stated.     Objective    Temp:  [97.5 °F (36.4 °C)-98.9 °F (37.2 °C)] 97.5 °F (36.4 °C)  Heart Rate:  [66-91] 87  Resp:  [11-18] 11  BP: (106-162)/() 117/69  Physical Exam     Constitutional: He is oriented to person, place, and time and  in no distress.   HENT:   Head: Normocephalic and atraumatic.   Eyes: Conjunctivae and EOM are normal. Pupils are equal, round, and reactive to light.   Neck:  Supple   cardiovascular: Normal rate, regular rhythm and normal heart sounds.   Pulmonary/Chest: Effort normal.   Expiratory wheezes noted    Abdominal: Soft. Bowel sounds are normal.   Musculoskeletal: Normal range of motion. He exhibits edema (slight LLE edema ).   Neurological: He is alert and oriented to person, place, and time.   Skin: Skin is warm and dry.   Psychiatric: Affect normal.                  Results Review:  I have reviewed the labs, radiology results, and diagnostic studies.    Laboratory Data:   Results from last 7 days   Lab Units 08/08/19  0250 08/07/19  1709   WBC 10*3/mm3 10.90* 8.80   HEMOGLOBIN g/dL 13.4 14.3   HEMATOCRIT % 41.4 44.1   PLATELETS 10*3/mm3 249 259        Results from last 7 days   Lab Units 08/08/19  0250 08/07/19  1709   SODIUM mmol/L 138 139   POTASSIUM mmol/L 4.8 4.5   CHLORIDE mmol/L 103 104   CO2 mmol/L 25.0 23.0   BUN mg/dL 20 14   CREATININE mg/dL 1.10 1.00   CALCIUM mg/dL 8.6* 8.7*   BILIRUBIN mg/dL  --  0.9   ALK PHOS U/L  --  57   ALT (SGPT) U/L  --  23   AST (SGOT) U/L  --  34   GLUCOSE mg/dL 149* 138*       Culture Data:   [unfilled]    Radiology Data:   Imaging Results (last 24 hours)     Procedure Component Value Units Date/Time    CT Outside Films [[id number]] Resulted:  08/08/19 0731     Updated:  08/08/19 0731    Narrative:       This procedure was auto-finalized with no dictation required.    CT Outside Films [[id number]] Resulted:  08/08/19 0713     Updated:  08/08/19 0713    Narrative:       This procedure was auto-finalized  with no dictation required.    XR Outside Films [[id number]] Resulted:  08/08/19 0642     Updated:  08/08/19 0642    Narrative:       This procedure was auto-finalized with no dictation required.          I have reviewed the patient's current medications.     Assessment/Plan     Active Hospital Problems    Diagnosis   • Chest pain   • Coronary artery disease involving native heart with angina pectoris (CMS/HCC)   • Cardiomyopathy, dilated (CMS/HCC)   • Sleep apnea   • Essential hypertension   • Dyslipidemia   • Paroxysmal atrial fibrillation (CMS/HCC)       COPD exacerbation  Respiratory care with bronchodilators  On Solu-Medrol  Oxygen therapy and titration  Check respiratory viral panel      Chest pain  Has a history of CAD and cardiomyopathy with a cardiomyopathy out of proportion to obstructive CAD  Per cardiologist patient had failed attempted coronary intervention to chronic totally occluded OM branch  Has had multiple hospitalization due to chest pain  Patient is currently scheduled to be evaluated in [facility]  Continue medical management  Cardiologist following    Paroxysmal atrial fibrillation   history of ventricular arrhythmia  Status post ICD and also ablation   On beta-blocker and Eliquis      Chronic systolic heart failure  Compensated  Continue on diuretic    GERD-on PPI    Essential hypertension   Blood pressure fairly controlled  Continue present management        Depression- cymbalta        Morbid obesity   lifestyle modifications  advised     VTE Prophylaxis - on Eliquis                        Discharge Planning: pending clinical progress       Brandon Enrique MD   08/08/19   9:41 AM

## 2019-08-09 VITALS
DIASTOLIC BLOOD PRESSURE: 66 MMHG | HEIGHT: 72 IN | BODY MASS INDEX: 41.85 KG/M2 | HEART RATE: 81 BPM | WEIGHT: 309 LBS | RESPIRATION RATE: 18 BRPM | OXYGEN SATURATION: 95 % | SYSTOLIC BLOOD PRESSURE: 120 MMHG | TEMPERATURE: 97.8 F

## 2019-08-09 LAB
ANION GAP SERPL CALCULATED.3IONS-SCNC: 15.8 MMOL/L (ref 5–15)
ANISOCYTOSIS BLD QL: ABNORMAL
BUN BLD-MCNC: 29 MG/DL (ref 8–20)
BUN/CREAT SERPL: 24.2 (ref 6.2–20.3)
CALCIUM SPEC-SCNC: 8.9 MG/DL (ref 8.9–10.3)
CHLORIDE SERPL-SCNC: 100 MMOL/L (ref 101–111)
CO2 SERPL-SCNC: 29 MMOL/L (ref 22–32)
CREAT BLD-MCNC: 1.2 MG/DL (ref 0.7–1.2)
DEPRECATED RDW RBC AUTO: 48.1 FL (ref 37–54)
ERYTHROCYTE [DISTWIDTH] IN BLOOD BY AUTOMATED COUNT: 15.5 % (ref 12.3–15.4)
GFR SERPL CREATININE-BSD FRML MDRD: 63 ML/MIN/1.73
GLUCOSE BLD-MCNC: 116 MG/DL (ref 65–99)
GLUCOSE BLDC GLUCOMTR-MCNC: 110 MG/DL (ref 70–105)
GLUCOSE BLDC GLUCOMTR-MCNC: 158 MG/DL (ref 70–105)
GLUCOSE BLDC GLUCOMTR-MCNC: 167 MG/DL (ref 70–105)
HCT VFR BLD AUTO: 41.1 % (ref 37.5–51)
HGB BLD-MCNC: 13.1 G/DL (ref 13–17.7)
LYMPHOCYTES # BLD MANUAL: 0.76 10*3/MM3 (ref 0.7–3.1)
LYMPHOCYTES NFR BLD MANUAL: 4 % (ref 19.6–45.3)
LYMPHOCYTES NFR BLD MANUAL: 5 % (ref 5–12)
MCH RBC QN AUTO: 28.1 PG (ref 26.6–33)
MCHC RBC AUTO-ENTMCNC: 31.9 G/DL (ref 31.5–35.7)
MCV RBC AUTO: 88.2 FL (ref 79–97)
METAMYELOCYTES NFR BLD MANUAL: 1 % (ref 0–0)
MONOCYTES # BLD AUTO: 0.95 10*3/MM3 (ref 0.1–0.9)
NEUTROPHILS # BLD AUTO: 17.01 10*3/MM3 (ref 1.7–7)
NEUTROPHILS NFR BLD MANUAL: 73 % (ref 42.7–76)
NEUTS BAND NFR BLD MANUAL: 17 % (ref 0–5)
PLAT MORPH BLD: NORMAL
PLATELET # BLD AUTO: 266 10*3/MM3 (ref 140–450)
PMV BLD AUTO: 10.3 FL (ref 6–12)
POTASSIUM BLD-SCNC: 4.8 MMOL/L (ref 3.6–5.1)
RBC # BLD AUTO: 4.67 10*6/MM3 (ref 4.14–5.8)
SCAN SLIDE: NORMAL
SODIUM BLD-SCNC: 140 MMOL/L (ref 136–144)
WBC MORPH BLD: NORMAL
WBC NRBC COR # BLD: 18.9 10*3/MM3 (ref 3.4–10.8)

## 2019-08-09 PROCEDURE — G0378 HOSPITAL OBSERVATION PER HR: HCPCS

## 2019-08-09 PROCEDURE — 85025 COMPLETE CBC W/AUTO DIFF WBC: CPT | Performed by: INTERNAL MEDICINE

## 2019-08-09 PROCEDURE — 96376 TX/PRO/DX INJ SAME DRUG ADON: CPT

## 2019-08-09 PROCEDURE — 94799 UNLISTED PULMONARY SVC/PX: CPT

## 2019-08-09 PROCEDURE — 93010 ELECTROCARDIOGRAM REPORT: CPT | Performed by: INTERNAL MEDICINE

## 2019-08-09 PROCEDURE — 99213 OFFICE O/P EST LOW 20 MIN: CPT | Performed by: NURSE PRACTITIONER

## 2019-08-09 PROCEDURE — 99217 PR OBSERVATION CARE DISCHARGE MANAGEMENT: CPT | Performed by: INTERNAL MEDICINE

## 2019-08-09 PROCEDURE — 85007 BL SMEAR W/DIFF WBC COUNT: CPT | Performed by: INTERNAL MEDICINE

## 2019-08-09 PROCEDURE — 82962 GLUCOSE BLOOD TEST: CPT

## 2019-08-09 PROCEDURE — 94760 N-INVAS EAR/PLS OXIMETRY 1: CPT

## 2019-08-09 PROCEDURE — 25010000002 METHYLPREDNISOLONE PER 40 MG: Performed by: INTERNAL MEDICINE

## 2019-08-09 PROCEDURE — 80048 BASIC METABOLIC PNL TOTAL CA: CPT | Performed by: INTERNAL MEDICINE

## 2019-08-09 RX ORDER — ALBUTEROL SULFATE 2.5 MG/3ML
2.5 SOLUTION RESPIRATORY (INHALATION) EVERY 4 HOURS PRN
Refills: 12
Start: 2019-08-09 | End: 2020-06-03

## 2019-08-09 RX ORDER — PREDNISONE 20 MG/1
20 TABLET ORAL DAILY
Qty: 5 TABLET | Refills: 0 | Status: SHIPPED | OUTPATIENT
Start: 2019-08-09 | End: 2019-10-07 | Stop reason: SDUPTHER

## 2019-08-09 RX ORDER — BUDESONIDE AND FORMOTEROL FUMARATE DIHYDRATE 160; 4.5 UG/1; UG/1
2 AEROSOL RESPIRATORY (INHALATION)
Qty: 10.2 G | Refills: 0 | Status: SHIPPED | OUTPATIENT
Start: 2019-08-09 | End: 2021-01-01

## 2019-08-09 RX ADMIN — ISOSORBIDE MONONITRATE 60 MG: 60 TABLET, EXTENDED RELEASE ORAL at 08:18

## 2019-08-09 RX ADMIN — BUMETANIDE 1 MG: 1 TABLET ORAL at 08:18

## 2019-08-09 RX ADMIN — GUAIFENESIN 1200 MG: 600 TABLET, EXTENDED RELEASE ORAL at 05:48

## 2019-08-09 RX ADMIN — ROFLUMILAST 500 MCG: 500 TABLET ORAL at 08:18

## 2019-08-09 RX ADMIN — HYDROCODONE BITARTRATE AND ACETAMINOPHEN 1 TABLET: 5; 325 TABLET ORAL at 07:48

## 2019-08-09 RX ADMIN — Medication 3 ML: at 08:21

## 2019-08-09 RX ADMIN — IPRATROPIUM BROMIDE AND ALBUTEROL SULFATE 3 ML: .5; 3 SOLUTION RESPIRATORY (INHALATION) at 12:48

## 2019-08-09 RX ADMIN — FLUTICASONE PROPIONATE 2 SPRAY: 50 SPRAY, METERED NASAL at 08:21

## 2019-08-09 RX ADMIN — SPIRONOLACTONE 12.5 MG: 25 TABLET ORAL at 08:18

## 2019-08-09 RX ADMIN — APIXABAN 5 MG: 5 TABLET, FILM COATED ORAL at 08:18

## 2019-08-09 RX ADMIN — ATORVASTATIN CALCIUM 80 MG: 40 TABLET, FILM COATED ORAL at 08:18

## 2019-08-09 RX ADMIN — FAMOTIDINE 20 MG: 20 TABLET, FILM COATED ORAL at 08:17

## 2019-08-09 RX ADMIN — DULOXETINE 60 MG: 30 CAPSULE, DELAYED RELEASE ORAL at 08:17

## 2019-08-09 RX ADMIN — ASPIRIN 81 MG 81 MG: 81 TABLET ORAL at 08:18

## 2019-08-09 RX ADMIN — CLOPIDOGREL BISULFATE 75 MG: 75 TABLET ORAL at 08:18

## 2019-08-09 RX ADMIN — METHYLPREDNISOLONE SODIUM SUCCINATE 40 MG: 40 INJECTION, POWDER, FOR SOLUTION INTRAMUSCULAR; INTRAVENOUS at 10:02

## 2019-08-09 RX ADMIN — METHYLPREDNISOLONE SODIUM SUCCINATE 40 MG: 40 INJECTION, POWDER, FOR SOLUTION INTRAMUSCULAR; INTRAVENOUS at 02:08

## 2019-08-09 RX ADMIN — RANOLAZINE 1000 MG: 500 TABLET, FILM COATED, EXTENDED RELEASE ORAL at 08:18

## 2019-08-09 RX ADMIN — IPRATROPIUM BROMIDE AND ALBUTEROL SULFATE 3 ML: .5; 3 SOLUTION RESPIRATORY (INHALATION) at 02:40

## 2019-08-09 RX ADMIN — METOPROLOL SUCCINATE 50 MG: 50 TABLET, EXTENDED RELEASE ORAL at 08:17

## 2019-08-09 NOTE — DISCHARGE SUMMARY
Date of Admission: 8/7/2019    Date of Discharge:  8/9/2019    Length of stay:  LOS: 0 days     Discharge Diagnosis:     COPD exacerbation  Respiratory care with bronchodilators  On Solu-Medrol while inpatient   oxygen therapy and titration  respiratory viral panel is significant  Discharging home on 5 days course of prednisone 20 mg daily     chest pain  Has a history of CAD and cardiomyopathy with a cardiomyopathy out of proportion to obstructive CAD  Per cardiologist patient had failed attempted coronary intervention to chronic totally occluded OM branch  Has had multiple hospitalization due to chest pain  Patient is currently scheduled to be evaluated in [facility]  Continue medical management  Cardiologist followed while inpatient     Paroxysmal atrial fibrillation   history of ventricular arrhythmia  Status post ICD and also ablation   On beta-blocker and Eliquis      Chronic systolic heart failure  Compensated   on diuretic     GERD-on PPI     Essential hypertension   Blood pressure fairly controlled  Continue present management        Depression- cymbalta         Morbid obesity   lifestyle modifications  advised        Presenting Problem/History of Present illness  Active Hospital Problems    Diagnosis  POA   • Chest pain [R07.9]  Yes   • Coronary artery disease involving native heart with angina pectoris (CMS/HCC) [I25.119]  Yes   • Cardiomyopathy, dilated (CMS/HCC) [I42.0]  Yes   • Sleep apnea [G47.30]  Yes   • Essential hypertension [I10]  Yes   • Dyslipidemia [E78.5]  Yes   • Paroxysmal atrial fibrillation (CMS/HCC) [I48.0]  Unknown      Resolved Hospital Problems   No resolved problems to display.          Hospital Course    58-year-old male with multiple comorbidities including CAD status post CABG, hypertension, COPD and ischemic cardiomyopathy status post AICD/pacemaker .  Presented to [facility] ED on 08/07/2019 with complaints of chest pain associated with shortness of breath.     Patient  states he has chronic chest pain and recently got worse.  He stated he had a stent last October and then an MI in January he stated this is been the third trip to the hospital in the last month.  He was in [place] 3 weeks ago for an MI and ventricular fibrillation.  He was in [place] 2 weeks ago for COPD/angina attack.  Patient states this chest pain feels like when he had his previous MI.  He rates it a 7/10 on pain scale.  States he is also had accompanying nausea and shortness of breath.  Patient states that his legs have been swelling lately also.  He denies any recent fever chills.      CT PE protocol done at the [facility] ED was negative for PE   EKG was unremarkable. troponin  0.05.  .  D-dimer 1.0.    Was transferred to [facility] for further evaluation and management     Conditions addressed during this observation were as stated [id number]        COPD exacerbation  Respiratory care with bronchodilators  On Solu-Medrol while inpatient   oxygen therapy and titration  respiratory viral panel is significant  Discharging home on 5 days course of prednisone 20 mg daily     chest pain  Has a history of CAD and cardiomyopathy with a cardiomyopathy out of proportion to obstructive CAD  Per cardiologist patient had failed attempted coronary intervention to chronic totally occluded OM branch  Has had multiple hospitalization due to chest pain  Patient is currently scheduled to be evaluated in [facility]  Continue medical management  Cardiologist followed while inpatient     Paroxysmal atrial fibrillation   history of ventricular arrhythmia  Status post ICD and also ablation   On beta-blocker and Eliquis      Chronic systolic heart failure  Compensated   on diuretic     GERD-on PPI     Essential hypertension   Blood pressure fairly controlled  Continue present management        Depression- cymbalta         Morbid obesity   lifestyle modifications  advised            Past Medical History:    Diagnosis Date   • Asthma    • Atrial fibrillation (CMS/[facility])    • CAD (coronary artery disease)    • CHF (congestive heart failure) (CMS/HCC)    • COPD (chronic obstructive pulmonary disease) (CMS/HCC)    • Depression    • Hyperlipidemia    • Hypertension    • Myocardial infarction (CMS/HCC)    • Pacemaker     pacemaker / defib   • V tach (CMS/HCC)        Past Surgical History:   Procedure Laterality Date   • CARDIAC ABLATION  11/07/2017   • CARDIAC CATHETERIZATION     • CHOLECYSTECTOMY     • CORONARY ANGIOPLASTY WITH STENT PLACEMENT      2 stents   • PACEMAKER IMPLANTATION  07/08/2016    Pacemaker/ Defib implant - [place]   • SINUS SURGERY      sinus surgery x 9       Social History     Socioeconomic History   • Marital status:      Spouse name: Not on file   • Number of children: Not on file   • Years of education: Not on file   • Highest education level: Not on file   Tobacco Use   • Smoking status: Never Smoker   Substance and Sexual Activity   • Alcohol use: No     Frequency: Never   • Drug use: No   • Sexual activity: Defer       Procedures Performed         Consults:   Consults     Date and Time Order Name Status Description    8/7/2019 1502 Inpatient Cardiology Consult Completed           Pertinent Test Results:     Lab Results (last 72 hours)     Procedure Component Value Units Date/Time    CBC & Differential [[id number]] Collected:  08/09/19 0250    Specimen:  Blood Updated:  08/09/19 0626    Narrative:       The following orders were created for panel order CBC & Differential.  Procedure                               Abnormality         Status                     ---------                               -----------         ------                     Scan Slide[[id number]]                                       Final result               CBC Auto Differential[[id number]]        Abnormal            Final result                 Please view results for these tests on the individual orders.    CBC Auto  Differential [[id number]]  (Abnormal) Collected:  08/09/19 0250    Specimen:  Blood Updated:  08/09/19 0626     WBC 18.90 10*3/mm3      RBC 4.67 10*6/mm3      Hemoglobin 13.1 g/dL      Hematocrit 41.1 %      MCV 88.2 fL      MCH 28.1 pg      MCHC 31.9 g/dL      RDW 15.5 %      RDW-SD 48.1 fl      MPV 10.3 fL      Platelets 266 10*3/mm3     Narrative:       The previously reported component NRBC is no longer being reported.    Scan Slide [[id number]] Collected:  08/09/19 0250    Specimen:  Blood Updated:  08/09/19 0626     Scan Slide --     Comment: See Manual Differential Results       Manual Differential [[id number]]  (Abnormal) Collected:  08/09/19 0250    Specimen:  Blood Updated:  08/09/19 0626     Neutrophil % 73.0 %      Lymphocyte % 4.0 %      Monocyte % 5.0 %      Bands %  17.0 %      Metamyelocyte % 1.0 %      Neutrophils Absolute 17.01 10*3/mm3      Lymphocytes Absolute 0.76 10*3/mm3      Monocytes Absolute 0.95 10*3/mm3      Anisocytosis Slight/1+     WBC Morphology Normal     Platelet Morphology Normal    POC Glucose Once [[id number]]  (Abnormal) Collected:  08/09/19 0551    Specimen:  Blood Updated:  08/09/19 0552     Glucose 167 mg/dL      Comment: Serial Number: [id number]:  [id number]       Basic Metabolic Panel [[id number]]  (Abnormal) Collected:  08/09/19 0250    Specimen:  Blood Updated:  08/09/19 0504     Glucose 116 mg/dL      BUN 29 mg/dL      Creatinine 1.20 mg/dL      Sodium 140 mmol/L      Potassium 4.8 mmol/L      Chloride 100 mmol/L      CO2 29.0 mmol/L      Calcium 8.9 mg/dL      eGFR Non African Amer 63 mL/min/1.73      BUN/Creatinine Ratio 24.2     Anion Gap 15.8 mmol/L     POC Glucose Once [[id number]]  (Abnormal) Collected:  08/09/19 0002    Specimen:  Blood Updated:  08/09/19 0003     Glucose 158 mg/dL      Comment: Serial Number: [id number]:  [id number]       Respiratory Panel, PCR - Swab, Nasopharynx [[id number]]  (Normal) Collected:  08/08/19 1751    Specimen:  Swab from  Nasopharynx Updated:  08/08/19 2021     ADENOVIRUS, PCR Not Detected     Coronavirus 229E Not Detected     Coronavirus HKU1 Not Detected     Coronavirus NL63 Not Detected     Coronavirus OC43 Not Detected     Human Metapneumovirus Not Detected     Human Rhinovirus/Enterovirus Not Detected     Influenza B PCR Not Detected     Parainfluenza Virus 1 Not Detected     Parainfluenza Virus 2 Not Detected     Parainfluenza Virus 3 Not Detected     Parainfluenza Virus 4 Not Detected     Bordetella pertussis pcr Not Detected     Influenza A H1 2009 PCR Not Detected     Chlamydophila pneumoniae PCR Not Detected     Mycoplasma pneumo by PCR Not Detected     Influenza A PCR Not Detected     Influenza A H3 Not Detected     Influenza A H1 Not Detected     RSV, PCR Not Detected    POC Glucose Once [[id number]]  (Abnormal) Collected:  08/08/19 1849    Specimen:  Blood Updated:  08/08/19 1850     Glucose 134 mg/dL      Comment: Serial Number: [id number]:  [id number]       POC Glucose Once [[id number]]  (Abnormal) Collected:  08/08/19 1421    Specimen:  Blood Updated:  08/08/19 1422     Glucose 135 mg/dL      Comment: Serial Number: [id number]:  [id number]       Manual Differential [[id number]]  (Abnormal) Collected:  08/08/19 0250    Specimen:  Blood Updated:  08/08/19 0404     Neutrophil % 73.0 %      Lymphocyte % 9.0 %      Bands %  18.0 %      Neutrophils Absolute 9.92 10*3/mm3      Lymphocytes Absolute 0.98 10*3/mm3      RBC Morphology Normal     WBC Morphology Normal     Platelet Morphology Normal    CBC Auto Differential [[id number]]  (Abnormal) Collected:  08/08/19 0250    Specimen:  Blood Updated:  08/08/19 0404     WBC 10.90 10*3/mm3      RBC 4.66 10*6/mm3      Hemoglobin 13.4 g/dL      Hematocrit 41.4 %      MCV 88.8 fL      MCH 28.8 pg      MCHC 32.4 g/dL      RDW 16.0 %      RDW-SD 49.9 fl      MPV 10.0 fL      Platelets 249 10*3/mm3     Narrative:       The previously reported component NRBC is no longer being  reported.    Scan Slide [[id number]] Collected:  08/08/19 0250    Specimen:  Blood Updated:  08/08/19 0404     Scan Slide --     Comment: See Manual Differential Results       Basic Metabolic Panel [[id number]]  (Abnormal) Collected:  08/08/19 0250    Specimen:  Blood Updated:  08/08/19 0346     Glucose 149 mg/dL      BUN 20 mg/dL      Creatinine 1.10 mg/dL      Sodium 138 mmol/L      Potassium 4.8 mmol/L      Chloride 103 mmol/L      CO2 25.0 mmol/L      Calcium 8.6 mg/dL      eGFR Non African Amer 69 mL/min/1.73      BUN/Creatinine Ratio 18.2     Anion Gap 14.8 mmol/L     Troponin [[id number]]  (Normal) Collected:  08/07/19 2338    Specimen:  Blood Updated:  08/08/19 0030     Troponin I 0.030 ng/mL     Narrative:       Troponin I Reference Range:    0.00-0.03  Negative.  Repeat testing in 4-6 hours if clinically indicated.    0.04-0.29  Suspicious for myocardial injury. Serial measurements and clinical  correlation may be necessary to confirm or exclude diagnosis of acute  coronary syndrome.  Repeat testing in 4-6 hours if indicated.     >0.29 Consistent with myocardial injury.  Recommend clinical and laboratory correlation.     Results my be falsely decreased if patient taking Biotin.     BNP [[id number]]  (Abnormal) Collected:  08/07/19 1709    Specimen:  Blood Updated:  08/07/19 1920     .0 pg/mL      Comment: Results may be falsely decreased if patient taking Biotin.       Troponin [[id number]]  (Normal) Collected:  08/07/19 1709    Specimen:  Blood Updated:  08/07/19 1910     Troponin I 0.030 ng/mL     Narrative:       Troponin I Reference Range:    0.00-0.03  Negative.  Repeat testing in 4-6 hours if clinically indicated.    0.04-0.29  Suspicious for myocardial injury. Serial measurements and clinical  correlation may be necessary to confirm or exclude diagnosis of acute  coronary syndrome.  Repeat testing in 4-6 hours if indicated.     >0.29 Consistent with myocardial injury.  Recommend clinical and  laboratory correlation.     Results my be falsely decreased if patient taking Biotin.     Comprehensive Metabolic Panel [[id number]]  (Abnormal) Collected:  08/07/19 1709    Specimen:  Blood Updated:  08/07/19 1908     Glucose 138 mg/dL      BUN 14 mg/dL      Creatinine 1.00 mg/dL      Sodium 139 mmol/L      Potassium 4.5 mmol/L      Chloride 104 mmol/L      CO2 23.0 mmol/L      Calcium 8.7 mg/dL      Total Protein 7.2 g/dL      Albumin 3.80 g/dL      ALT (SGPT) 23 U/L      AST (SGOT) 34 U/L      Alkaline Phosphatase 57 U/L      Total Bilirubin 0.9 mg/dL      eGFR Non African Amer 77 mL/min/1.73      Globulin 3.4 gm/dL      A/G Ratio 1.1 g/dL      BUN/Creatinine Ratio 14.0     Anion Gap 16.5 mmol/L     CBC (No Diff) [[id number]]  (Normal) Collected:  08/07/19 1709    Specimen:  Blood Updated:  08/07/19 1842     WBC 8.80 10*3/mm3      RBC 4.99 10*6/mm3      Hemoglobin 14.3 g/dL      Hematocrit 44.1 %      MCV 88.4 fL      MCH 28.7 pg      MCHC 32.5 g/dL      RDW 15.4 %      RDW-SD 48.1 fl      MPV 10.3 fL      Platelets 259 10*3/mm3                      Imaging Results (all)     Procedure Component Value Units Date/Time    CT Outside Films [[id number]] Resulted:  08/08/19 0731     Updated:  08/08/19 0731    Narrative:       This procedure was auto-finalized with no dictation required.    CT Outside Films [[id number]] Resulted:  08/08/19 0713     Updated:  08/08/19 0713    Narrative:       This procedure was auto-finalized with no dictation required.    XR Outside Films [[id number]] Resulted:  08/08/19 0642     Updated:  08/08/19 0642    Narrative:       This procedure was auto-finalized with no dictation required.            Condition on Discharge: Hemodynamically stable    Vital Signs  Temp:  [97.8 °F (36.6 °C)-98.8 °F (37.1 °C)] 97.8 °F (36.6 °C)  Heart Rate:  [84-93] 90  Resp:  [13-20] 18  BP: (100-124)/(57-74) 120/66    Physical Exam:  Physical Exam     Constitutional: He is oriented to person, place, and time and  in  no distress.   HENT:   Head: Normocephalic and atraumatic.   Eyes: Conjunctivae and EOM are normal. Pupils are equal, round, and reactive to light.   Neck:  Supple   cardiovascular: Normal rate, regular rhythm and normal heart sounds.   Pulmonary/Chest: Decreased air entry to bases  Abdominal: Soft. Bowel sounds are normal.   Musculoskeletal: Degenerative changes neurological: He is alert and oriented to person, place, and time.   Skin: Skin is warm and dry.   Psychiatric: Affect normal.                Discharge Disposition  Home or Self Care    Discharge Medications     Discharge Medications      New Medications      Instructions Start Date   budesonide-formoterol 160-4.5 MCG/ACT inhaler  Commonly known as:  SYMBICORT   2 puffs, Inhalation, 2 Times Daily - RT      predniSONE 20 MG tablet  Commonly known as:  DELTASONE   20 mg, Oral, Daily         Changes to Medications      Instructions Start Date   albuterol (2.5 MG/3ML) 0.083% nebulizer solution  Commonly known as:  PROVENTIL  What changed:    · how to take this  · when to take this  · reasons to take this   2.5 mg, Nebulization, Every 4 Hours PRN         Continue These Medications      Instructions Start Date   aspirin 81 MG chewable tablet   81 mg, Oral, Daily      atorvastatin 80 MG tablet  Commonly known as:  LIPITOR   80 mg, Oral, Daily      bumetanide 1 MG tablet  Commonly known as:  BUMEX   1 mg, Oral, 2 Times Daily      clopidogrel 75 MG tablet  Commonly known as:  PLAVIX   75 mg, Oral, Daily      cyclobenzaprine 5 MG tablet  Commonly known as:  FLEXERIL   cyclobenzaprine 5 mg tablet      DULoxetine 60 MG capsule  Commonly known as:  CYMBALTA   60 mg, Oral, Daily      ELIQUIS 5 MG tablet tablet  Generic drug:  apixaban   5 mg, Oral, 2 Times Daily      fluticasone 50 MCG/ACT nasal spray  Commonly known as:  FLONASE   fluticasone propionate 50 mcg/actuation nasal spray,suspension      isosorbide mononitrate 60 MG 24 hr tablet  Commonly known as:  IMDUR   60  mg, Oral, 3 Times Daily      metoprolol succinate XL 50 MG 24 hr tablet  Commonly known as:  TOPROL-XL   50 mg, Oral, Daily      montelukast 10 MG tablet  Commonly known as:  SINGULAIR   10 mg, Oral, Nightly      nitroglycerin 0.4 MG SL tablet  Commonly known as:  NITROSTAT   0.4 mg, Sublingual, Every 5 Minutes PRN, Take no more than 3 doses in 15 minutes.       raNITIdine 150 MG tablet  Commonly known as:  ZANTAC   150 mg, Oral, 2 Times Daily      ranolazine 500 MG 12 hr tablet  Commonly known as:  RANEXA   1,000 mg, Oral, 2 Times Daily      roflumilast 500 MCG tablet tablet  Commonly known as:  DALIRESP   500 mcg, Oral, Daily      spironolactone 25 MG tablet  Commonly known as:  ALDACTONE   12.5 mg, Oral, 2 Times Daily         Stop These Medications    Azelastine-Fluticasone 137-50 MCG/ACT suspension     diazePAM 5 MG tablet  Commonly known as:  VALIUM     EUCRISA 2 % ointment  Generic drug:  Crisaborole            Discharge Diet:   Diet Instructions     Diet: Cardiac      Discharge Diet:  Cardiac          Activity at Discharge:   Activity Instructions     Activity as Tolerated            Follow-up Appointments  No future appointments.      Test Results Pending at Discharge       Risk for Readmission (LACE) Score: 7 (8/9/2019  6:01 AM)          Brandon Enrique MD  08/09/19  11:45 AM    Time: Discharge 32 min        z

## 2019-08-09 NOTE — PLAN OF CARE
Problem: Patient Care Overview  Goal: Plan of Care Review  Outcome: Ongoing (interventions implemented as appropriate)   08/09/19 4481   Coping/Psychosocial   Plan of Care Reviewed With patient   Plan of Care Review   Progress improving   OTHER   Outcome Summary Pt still complains of chest wall and scapula pain relieved with medication. Nausea relieved with medication See MAR        Problem: Cardiac: ACS (Acute Coronary Syndrome) (Adult)  Goal: Signs and Symptoms of Listed Potential Problems Will be Absent, Minimized or Managed (Cardiac: ACS)  Outcome: Ongoing (interventions implemented as appropriate)

## 2019-08-09 NOTE — PROGRESS NOTES
Cardiology Progress  Note      Patient Care Team:  Jaylen Moss MD as PCP - General      PATIENT IDENTIFICATION    Name: Jose Dixon Jr. Age: 58 y.o. Sex: male :  1960 MRN: [id number]    REASON FOR FOLLOW-UP:  58-year-old  male well-known to our practice with cardiac history that includes coronary artery disease with small vessel disease involving a marginal branch of circumflex artery being medically managed.  He has single-chamber ICD in situ-[employer].  Last hospitalization 2019 patient did have spontaneous symptomatic wide QRS tachycardia with associated diaphoresis, lightheadedness and chest discomfort.  He has history of cardiac ablation, ischemic cardiomyopathy with last known EF of 40%.  History of chronic class III systolic heart failure, COPD, small vessel coronary disease, obesity with sleep apnea and chronic sinus issues which precludes use use of positive pressure.    Chest pain  Shortness of breath        SUBJECTIVE    Patient reports chest pain improved, still feels short of breath with exertion      REVIEW OF SYSTEMS:  Pertinent items are noted in HPI, all other systems reviewed and negative    OBJECTIVE   In bed supine sleeping, appears comfortable and in no acute distress  Patient has ruled out for MI    ASSESSMENT/PLAN    Coronary artery disease involving native heart with angina pectoris (CMS/HCC)    Cardiomyopathy, dilated (CMS/HCC)    Sleep apnea    Essential hypertension    Dyslipidemia    Paroxysmal atrial fibrillation (CMS/HCC)    Chest pain      Plan:  Patient had multiple failed attempted coronary intervention to a chronic total occlusion of the OM branch  History of ventricular arrhythmia status post ICD and also ablation  Multiple hospitalizations for symptoms of chest pain  Multiple prior invasive work-ups including a recent cardiac catheterization in the last few months at [facility]  Patient is currently being evaluated at [facility] for second opinion for further treatment options for the obstructive coronary artery disease  Presented this time with symptoms of chest pain and shortness of breath  Plan to treat for underlying CHF COPD and also ischemic coronary artery disease  Try to manage patient conservatively as much as possible at this point  Patient has ruled out for acute coronary syndrome with negative serial cardiac enzymes.  Discontinue heparin, resume Eliquis  Blood pressure stable  Limitations of further treatment options discussed with the patient  CV status is stable for discharge home  Follow-up our office 2-3 weeks        Vital Signs  Visit Vitals  /66 (BP Location: Left arm, Patient Position: Sitting)   Pulse 90   Temp 97.8 °F (36.6 °C) (Oral)   Resp 18   Ht 182.9 cm (72\")   Wt (!) 140 kg (309 lb)   SpO2 93%   BMI 41.91 kg/m²     Oxygen Therapy  SpO2: 93 %  Pulse Oximetry Type: Intermittent  Device (Oxygen Therapy): room air  Flowsheet Rows      First Filed Value   Admission Height  --   Admission Weight  141 kg (310 lb 13.6 oz)  (Abnormal)  Documented at 08/08/2019 0654        Intake & Output (last 3 days)       08/06 0701 - 08/07 0700 08/07 0701 - 08/08 0700 08/08 0701 - 08/09 0700 08/09 0701 - 08/10 0700    P.O.    520    Total Intake(mL/kg)    520 (3.7)    Net    +520                Lines, Drains & Airways    Active LDAs     Name:   Placement date:   Placement time:   Site:   Days:    Peripheral IV 08/07/19 1200 Anterior;Distal;Right;Upper Arm   08/07/19    1200    Arm   less than 1                       /66 (BP Location: Left arm, Patient Position: Sitting)   Pulse 90   Temp 97.8 °F (36.6 °C) (Oral)   Resp 18   Ht 182.9 cm (72\")   Wt (!) 140 kg (309 lb)   SpO2 93%   BMI 41.91 kg/m²   Intake/Output last 3 shifts:  No intake/output data recorded.  Intake/Output this shift:  I/O this shift:  In: 520 [P.O.:520]  Out: -     PHYSICAL EXAM:    General: Well-developed, morbidly obese 57-year-old  " male with BMI 42 who is alert, cooperative, anxious, appears stated age  Head:  Normocephalic, atraumatic, mucous membranes moist  Eyes:  Conjunctiva/corneas clear, EOM's intact     Neck:  Supple,  no adenopathy;      Lungs: Slight expiratory wheeze in bases posteriorly  Chest wall: No tenderness  Heart::  Regular rate and rhythm, S1 and S2 normal, no murmur, rub or gallop  Abdomen: Soft, non-tender, nondistended bowel sounds active  Extremities: No cyanosis, clubbing, or edema groin soft no hematoma.  Pulses: 2+ and symmetric all extremities  Skin:  No rashes or lesions  Neuro/psych: A&O x3. CN II through XII are grossly intact with appropriate affect.      Scheduled Meds:        apixaban 5 mg Oral Q12H   aspirin 81 mg Oral Daily   atorvastatin 80 mg Oral Daily   budesonide 0.5 mg Nebulization BID - RT   bumetanide 1 mg Oral BID   clopidogrel 75 mg Oral Daily   cyclobenzaprine 5 mg Oral Nightly   DULoxetine 60 mg Oral Daily   famotidine 20 mg Oral BID AC   fluticasone 2 spray Each Nare Daily   guaiFENesin 1,200 mg Oral Q12H   ipratropium-albuterol 3 mL Nebulization Q4H - RT   isosorbide mononitrate 60 mg Oral TID   methylPREDNISolone sodium succinate 40 mg Intravenous Q8H   metoprolol succinate XL 50 mg Oral Daily   montelukast 10 mg Oral Nightly   ranolazine 1,000 mg Oral BID   roflumilast 500 mcg Oral Daily   sodium chloride 3 mL Intravenous Q12H   spironolactone 12.5 mg Oral BID       Continuous Infusions:         PRN Meds:    •  acetaminophen  •  acetaminophen  •  aluminum-magnesium hydroxide-simethicone  •  benzonatate  •  bisacodyl  •  hydrALAZINE  •  HYDROcodone-acetaminophen  •  ipratropium-albuterol  •  magnesium hydroxide  •  melatonin  •  ondansetron **OR** ondansetron  •  sodium chloride        Results Review:     I reviewed the patient's new clinical results.    CBC    Results from last 7 days   Lab Units 08/09/19  0250 08/08/19  0250 08/07/19  1709   WBC 10*3/mm3 18.90* 10.90* 8.80   HEMOGLOBIN g/dL  13.1 13.4 14.3   PLATELETS 10*3/mm3 266 249 259     Cr Clearance Estimated Creatinine Clearance: 100.1 mL/min (by C-G formula based on SCr of 1.2 mg/dL).  Coag     HbA1C   Lab Results   Component Value Date    HGBA1C 5.4 09/14/2018     Blood Glucose   Glucose   Date/Time Value Ref Range Status   08/09/2019 0551 167 (H) 70 - 105 mg/dL Final     Comment:     Serial Number: [id number]:  [id number]   08/09/2019 0002 158 (H) 70 - 105 mg/dL Final     Comment:     Serial Number: [id number]:  [id number]   08/08/2019 1849 134 (H) 70 - 105 mg/dL Final     Comment:     Serial Number: [id number]:  [id number]   08/08/2019 1421 135 (H) 70 - 105 mg/dL Final     Comment:     Serial Number: [id number]:  [id number]     Infection     CMP   Results from last 7 days   Lab Units 08/09/19  0250 08/08/19  0250 08/07/19  1709   SODIUM mmol/L 140 138 139   POTASSIUM mmol/L 4.8 4.8 4.5   CHLORIDE mmol/L 100* 103 104   CO2 mmol/L 29.0 25.0 23.0   BUN mg/dL 29* 20 14   CREATININE mg/dL 1.20 1.10 1.00   GLUCOSE mg/dL 116* 149* 138*   ALBUMIN g/dL  --   --  3.80   BILIRUBIN mg/dL  --   --  0.9   ALK PHOS U/L  --   --  57   AST (SGOT) U/L  --   --  34   ALT (SGPT) U/L  --   --  23     ABG      UA      TATUM  No results found for: POCMETH, POCAMPHET, POCBARBITUR, POCBENZO, POCCOCAINE, POCOPIATES, POCOXYCODO, POCPHENCYC, POCPROPOXY, POCTHC, POCTRICYC  Lysis Labs   Results from last 7 days   Lab Units 08/09/19  0250 08/08/19  0250 08/07/19  1709   HEMOGLOBIN g/dL 13.1 13.4 14.3   PLATELETS 10*3/mm3 266 249 259   CREATININE mg/dL 1.20 1.10 1.00     Radiology(recent) No radiology results for the last day      Results from last 7 days   Lab Units 08/07/19  2338   TROPONIN I ng/mL 0.030       Xrays, labs reviewed personally by physician.    ECG/EMG Results (most recent)     Procedure Component Value Units Date/Time    ECG 12 Lead [[id number]] Collected:  08/07/19 1717     Updated:  08/07/19 1725    Narrative:       HEART RATE= 82   bpm  RR Interval= 728  ms  CO Interval= 155  ms  P Horizontal Axis= -18  deg  P Front Axis= 68  deg  QRSD Interval= 121  ms  QT Interval= 396  ms  QRS Axis= -28  deg  T Wave Axis= 223  deg  - ABNORMAL ECG -  Sinus rhythm  Multiple premature complexes, vent & supraven  Left bundle branch block  Electronically Signed By:   Date and Time of Study: 2019-08-07 17:17:29            Medication Review:   I have reviewed the patient's current medication list  Scheduled Meds:    apixaban 5 mg Oral Q12H   aspirin 81 mg Oral Daily   atorvastatin 80 mg Oral Daily   budesonide 0.5 mg Nebulization BID - RT   bumetanide 1 mg Oral BID   clopidogrel 75 mg Oral Daily   cyclobenzaprine 5 mg Oral Nightly   DULoxetine 60 mg Oral Daily   famotidine 20 mg Oral BID AC   fluticasone 2 spray Each Nare Daily   guaiFENesin 1,200 mg Oral Q12H   ipratropium-albuterol 3 mL Nebulization Q4H - RT   isosorbide mononitrate 60 mg Oral TID   methylPREDNISolone sodium succinate 40 mg Intravenous Q8H   metoprolol succinate XL 50 mg Oral Daily   montelukast 10 mg Oral Nightly   ranolazine 1,000 mg Oral BID   roflumilast 500 mcg Oral Daily   sodium chloride 3 mL Intravenous Q12H   spironolactone 12.5 mg Oral BID     Continuous Infusions:   PRN Meds:.•  acetaminophen  •  acetaminophen  •  aluminum-magnesium hydroxide-simethicone  •  benzonatate  •  bisacodyl  •  hydrALAZINE  •  HYDROcodone-acetaminophen  •  ipratropium-albuterol  •  magnesium hydroxide  •  melatonin  •  ondansetron **OR** ondansetron  •  sodium chloride    Imaging:  Imaging Results (last 72 hours)     Procedure Component Value Units Date/Time    CT Outside Films [[id number]] Resulted:  08/08/19 0731     Updated:  08/08/19 0731    Narrative:       This procedure was auto-finalized with no dictation required.    CT Outside Films [[id number]] Resulted:  08/08/19 0713     Updated:  08/08/19 0713    Narrative:       This procedure was auto-finalized with no dictation required.    XR Outside Films  [[id number]] Resulted:  08/08/19 0642     Updated:  08/08/19 0642    Narrative:       This procedure was auto-finalized with no dictation required.            ABHISHEK Peña  08/09/19  11:17 AM

## 2019-08-10 ENCOUNTER — READMISSION MANAGEMENT (OUTPATIENT)
Dept: CALL CENTER | Facility: HOSPITAL | Age: 59
End: 2019-08-10

## 2019-08-10 NOTE — OUTREACH NOTE
Prep Survey      Responses   Facility patient discharged from?  [facility]   Is patient eligible?  Yes   Discharge diagnosis  COPD exacerbation, Chest Pain, Paroxysmal atrial fibrillation, Chronic systolic heart failure   Does the patient have one of the following disease processes/diagnoses(primary or secondary)?  COPD/Pneumonia   Does the patient have Home health ordered?  No   Is there a DME ordered?  No   Prep survey completed?  Yes          Tara Siu RN

## 2019-08-12 ENCOUNTER — READMISSION MANAGEMENT (OUTPATIENT)
Dept: CALL CENTER | Facility: HOSPITAL | Age: 59
End: 2019-08-12

## 2019-08-12 NOTE — OUTREACH NOTE
COPD/PN Week 1 Survey      Responses   Facility patient discharged from?  [facility]   Does the patient have one of the following disease processes/diagnoses(primary or secondary)?  COPD/Pneumonia   Is there a successful TCM telephone encounter documented?  No   Week 1 attempt successful?  Yes   Call start time  1620   Call end time  1629   Discharge diagnosis  COPD exacerbation, Chest Pain, Paroxysmal atrial fibrillation, Chronic systolic heart failure   Meds reviewed with patient/caregiver?  Yes   Is the patient having any side effects they believe may be caused by any medication additions or changes?  No   Does the patient have all medications ordered at discharge?  Yes   Is the patient taking all medications as directed (includes completed medication regime)?  Yes   Does the patient have a primary care provider?   Yes   Does the patient have an appointment with their PCP or pulmonologist within 7 days of discharge?  Yes   Has the patient kept scheduled appointments due by today?  Yes   Has home health visited the patient within 72 hours of discharge?  N/A   Psychosocial issues?  No   Comments  Recovering. Will go to [facility] soon next wk.   Nursing interventions  Reviewed instructions with patient   What is the patient's perception of their health status since discharge?  Improving   Nursing Interventions  Nurse provided patient education   Are the patient's immunizations up to date?   Yes   Is the patient/caregiver able to teach back the hierarchy of who to call/visit for symptoms/problems? PCP, Specialist, Home health nurse, Urgent Care, ED, 911  Yes   Is the patient able to teach back COPD zones?  Yes   Patient reports what zone on this call?  Green Zone   Green Zone  Reports doing well, Breathing without shortness of breath, Sleeping well, Appetite is good, Usual activity and exercise level   Green Zone interventions:  Take daily medications, Continue regular exercise/diet plan, Avoid indoor/outdoor  triggers   Week 1 call completed?  Yes          Jaylen Kelley RN

## 2019-08-20 ENCOUNTER — READMISSION MANAGEMENT (OUTPATIENT)
Dept: CALL CENTER | Facility: HOSPITAL | Age: 59
End: 2019-08-20

## 2019-08-20 NOTE — OUTREACH NOTE
COPD/PN Week 2 Survey      Responses   Facility patient discharged from?  [facility]   Does the patient have one of the following disease processes/diagnoses(primary or secondary)?  COPD/Pneumonia   Was the primary reason for admission:  COPD exacerbation   Week 2 attempt successful?  No   Unsuccessful attempts  Attempt 1          Bhavani Del Cid RN

## 2019-08-21 ENCOUNTER — READMISSION MANAGEMENT (OUTPATIENT)
Dept: CALL CENTER | Facility: HOSPITAL | Age: 59
End: 2019-08-21

## 2019-08-21 NOTE — OUTREACH NOTE
COPD/PN Week 2 Survey      Responses   Facility patient discharged from?  [facility]   Does the patient have one of the following disease processes/diagnoses(primary or secondary)?  COPD/Pneumonia   Was the primary reason for admission:  COPD exacerbation   Week 2 attempt successful?  No   Unsuccessful attempts  Attempt 2 [No answer/Left voicemail]          Shikha Braswell LPN

## 2019-08-22 ENCOUNTER — READMISSION MANAGEMENT (OUTPATIENT)
Dept: CALL CENTER | Facility: HOSPITAL | Age: 59
End: 2019-08-22

## 2019-08-22 NOTE — OUTREACH NOTE
COPD/PN Week 2 Survey      Responses   Facility patient discharged from?  [facility]   Does the patient have one of the following disease processes/diagnoses(primary or secondary)?  COPD/Pneumonia   Was the primary reason for admission:  COPD exacerbation   Week 2 attempt successful?  Yes   Call start time  1546   Call end time  1557   Discharge diagnosis  COPD exacerbation, Chest Pain, Paroxysmal atrial fibrillation, Chronic systolic heart failure   Is patient permission given to speak with other caregiver?  No   Meds reviewed with patient/caregiver?  Yes   Is the patient having any side effects they believe may be caused by any medication additions or changes?  No   Does the patient have all medications ordered at discharge?  Yes   Is the patient taking all medications as directed (includes completed medication regime)?  Yes   Does the patient have a primary care provider?   Yes   Comments regarding PCP   Jaylen Moss MD   Has the patient kept scheduled appointments due by today?  Yes   Comments  Patient states that he is being seen at [facility].    Has home health visited the patient within 72 hours of discharge?  N/A   Psychosocial issues?  No   Did the patient receive a copy of their discharge instructions?  Yes   Nursing interventions  Reviewed instructions with patient   What is the patient's perception of their health status since discharge?  Improving   Nursing Interventions  Nurse provided patient education   Is the patient/caregiver able to teach back the hierarchy of who to call/visit for symptoms/problems? PCP, Specialist, Home health nurse, Urgent Care, ED, 911  Yes   Is the patient able to teach back COPD zones?  Yes   Patient reports what zone on this call?  Green Zone   Green Zone  Usual activity and exercise level, Appetite is good   Green Zone interventions:  Take daily medications   Week 2 call completed?  Yes          Bhavani Del Cid RN

## 2019-08-29 ENCOUNTER — READMISSION MANAGEMENT (OUTPATIENT)
Dept: CALL CENTER | Facility: HOSPITAL | Age: 59
End: 2019-08-29

## 2019-08-29 NOTE — OUTREACH NOTE
COPD/PN Week 3 Survey      Responses   Facility patient discharged from?  [facility]   Does the patient have one of the following disease processes/diagnoses(primary or secondary)?  COPD/Pneumonia   Was the primary reason for admission:  COPD exacerbation   Week 3 attempt successful?  Yes   Call start time  1438   Call end time  1442   Discharge diagnosis  COPD exacerbation, Chest Pain, Paroxysmal atrial fibrillation, Chronic systolic heart failure   Meds reviewed with patient/caregiver?  Yes   Is the patient having any side effects they believe may be caused by any medication additions or changes?  No   Does the patient have all medications ordered at discharge?  Yes   Is the patient taking all medications as directed (includes completed medication regime)?  Yes   Does the patient have a primary care provider?   Yes   Does the patient have an appointment with their PCP or pulmonologist within 7 days of discharge?  Yes   Comments regarding PCP   Jaylen Moss MD   Has the patient kept scheduled appointments due by today?  Yes   Comments  PATIENT VOICES HAVING AN APPOINTMENT AT [facility] FOR AN MRI AND HEART CATH ON 9/9/19   Has home health visited the patient within 72 hours of discharge?  N/A   Did the patient receive a copy of their discharge instructions?  Yes   Nursing interventions  Reviewed instructions with patient   What is the patient's perception of their health status since discharge?  Improving   Nursing Interventions  Nurse provided patient education   Is the patient/caregiver able to teach back the hierarchy of who to call/visit for symptoms/problems? PCP, Specialist, Home health nurse, Urgent Care, ED, 911  Yes   Is the patient able to teach back COPD zones?  Yes   Patient reports what zone on this call?  Green Zone   Green Zone  Reports doing well, Breathing without shortness of breath, Usual activity and exercise level, Usual amount of phlegm/mucus without difficulty coughing up, Sleeping well,  Appetite is good   Green Zone interventions:  Take daily medications, Use oxygen as prescribed, Avoid indoor/outdoor triggers   Week 3 call completed?  Yes   Revoked  No further contact(revokes)-requires comment   Graduated/Revoked comments  [facility] CONTINUING FOLLOW UP AT [facility]          Lisa Lopez LPN

## 2019-09-20 ENCOUNTER — HOSPITAL ENCOUNTER (INPATIENT)
Facility: HOSPITAL | Age: 59
LOS: 7 days | Discharge: HOME | End: 2019-09-27
Attending: HOSPITALIST | Admitting: INTERNAL MEDICINE

## 2019-09-20 ENCOUNTER — APPOINTMENT (OUTPATIENT)
Dept: GENERAL RADIOLOGY | Facility: HOSPITAL | Age: 59
End: 2019-09-20

## 2019-09-20 DIAGNOSIS — I48.0 PAROXYSMAL ATRIAL FIBRILLATION (HCC): ICD-10-CM

## 2019-09-20 DIAGNOSIS — Z95.810 ICD (IMPLANTABLE CARDIOVERTER-DEFIBRILLATOR) IN PLACE: ICD-10-CM

## 2019-09-20 DIAGNOSIS — I42.0 CARDIOMYOPATHY, DILATED (HCC): ICD-10-CM

## 2019-09-20 DIAGNOSIS — I50.22 CHRONIC SYSTOLIC CONGESTIVE HEART FAILURE (HCC): ICD-10-CM

## 2019-09-20 DIAGNOSIS — I48.91 ATRIAL FIBRILLATION WITH RVR (HCC): Primary | ICD-10-CM

## 2019-09-20 LAB
ALBUMIN SERPL-MCNC: 3.3 G/DL (ref 3.5–4.8)
ALBUMIN/GLOB SERPL: 1.3 G/DL (ref 1–1.7)
ALP SERPL-CCNC: 44 U/L (ref 32–91)
ALT SERPL W P-5'-P-CCNC: 31 U/L (ref 17–63)
ANION GAP SERPL CALCULATED.3IONS-SCNC: 11.4 MMOL/L (ref 5–15)
AST SERPL-CCNC: 24 U/L (ref 15–41)
BILIRUB SERPL-MCNC: 1.1 MG/DL (ref 0.3–1.2)
BUN BLD-MCNC: 18 MG/DL (ref 8–20)
BUN/CREAT SERPL: 13.8 (ref 6.2–20.3)
CALCIUM SPEC-SCNC: 8.2 MG/DL (ref 8.9–10.3)
CHLORIDE SERPL-SCNC: 97 MMOL/L (ref 101–111)
CO2 SERPL-SCNC: 38 MMOL/L (ref 22–32)
CREAT BLD-MCNC: 1.3 MG/DL (ref 0.7–1.2)
D-LACTATE SERPL-SCNC: 1.1 MMOL/L (ref 0.5–2.2)
DEPRECATED RDW RBC AUTO: 50.8 FL (ref 37–54)
EOSINOPHIL # BLD MANUAL: 0.7 10*3/MM3 (ref 0–0.4)
EOSINOPHIL NFR BLD MANUAL: 5 % (ref 0.3–6.2)
ERYTHROCYTE [DISTWIDTH] IN BLOOD BY AUTOMATED COUNT: 16 % (ref 12.3–15.4)
GFR SERPL CREATININE-BSD FRML MDRD: 57 ML/MIN/1.73
GLOBULIN UR ELPH-MCNC: 2.6 GM/DL (ref 2.5–3.8)
GLUCOSE BLD-MCNC: 114 MG/DL (ref 65–99)
HCT VFR BLD AUTO: 42.3 % (ref 37.5–51)
HGB BLD-MCNC: 13.6 G/DL (ref 13–17.7)
LYMPHOCYTES # BLD MANUAL: 1.82 10*3/MM3 (ref 0.7–3.1)
LYMPHOCYTES NFR BLD MANUAL: 13 % (ref 19.6–45.3)
LYMPHOCYTES NFR BLD MANUAL: 5 % (ref 5–12)
MAGNESIUM SERPL-MCNC: 2 MG/DL (ref 1.8–2.5)
MCH RBC QN AUTO: 28.6 PG (ref 26.6–33)
MCHC RBC AUTO-ENTMCNC: 32 G/DL (ref 31.5–35.7)
MCV RBC AUTO: 89.4 FL (ref 79–97)
MONOCYTES # BLD AUTO: 0.7 10*3/MM3 (ref 0.1–0.9)
NEUTROPHILS # BLD AUTO: 10.5 10*3/MM3 (ref 1.7–7)
NEUTROPHILS NFR BLD MANUAL: 75 % (ref 42.7–76)
PHOSPHATE SERPL-MCNC: 5.4 MG/DL (ref 2.4–4.7)
PLAT MORPH BLD: NORMAL
PLATELET # BLD AUTO: 223 10*3/MM3 (ref 140–450)
PMV BLD AUTO: 9.5 FL (ref 6–12)
POTASSIUM BLD-SCNC: 4.4 MMOL/L (ref 3.6–5.1)
PROT SERPL-MCNC: 5.9 G/DL (ref 6.1–7.9)
RBC # BLD AUTO: 4.74 10*6/MM3 (ref 4.14–5.8)
RBC MORPH BLD: NORMAL
SODIUM BLD-SCNC: 142 MMOL/L (ref 136–144)
VARIANT LYMPHS NFR BLD MANUAL: 2 % (ref 0–5)
WBC MORPH BLD: NORMAL
WBC NRBC COR # BLD: 14 10*3/MM3 (ref 3.4–10.8)

## 2019-09-20 PROCEDURE — 85007 BL SMEAR W/DIFF WBC COUNT: CPT | Performed by: HOSPITALIST

## 2019-09-20 PROCEDURE — 94640 AIRWAY INHALATION TREATMENT: CPT

## 2019-09-20 PROCEDURE — 83735 ASSAY OF MAGNESIUM: CPT | Performed by: HOSPITALIST

## 2019-09-20 PROCEDURE — 99223 1ST HOSP IP/OBS HIGH 75: CPT | Performed by: HOSPITALIST

## 2019-09-20 PROCEDURE — 80053 COMPREHEN METABOLIC PANEL: CPT | Performed by: HOSPITALIST

## 2019-09-20 PROCEDURE — 84100 ASSAY OF PHOSPHORUS: CPT | Performed by: HOSPITALIST

## 2019-09-20 PROCEDURE — 25010000002 LORAZEPAM PER 2 MG

## 2019-09-20 PROCEDURE — 85027 COMPLETE CBC AUTOMATED: CPT | Performed by: HOSPITALIST

## 2019-09-20 PROCEDURE — 71045 X-RAY EXAM CHEST 1 VIEW: CPT

## 2019-09-20 PROCEDURE — 83605 ASSAY OF LACTIC ACID: CPT | Performed by: HOSPITALIST

## 2019-09-20 RX ORDER — DILTIAZEM HCL IN NACL,ISO-OSM 125 MG/125
5 PLASTIC BAG, INJECTION (ML) INTRAVENOUS
Status: DISCONTINUED | OUTPATIENT
Start: 2019-09-20 | End: 2019-09-26

## 2019-09-20 RX ORDER — DILTIAZEM HCL IN NACL,ISO-OSM 125 MG/125
5 PLASTIC BAG, INJECTION (ML) INTRAVENOUS
Status: CANCELLED | OUTPATIENT
Start: 2019-09-20

## 2019-09-20 RX ORDER — ACETAMINOPHEN 650 MG/1
650 SUPPOSITORY RECTAL EVERY 4 HOURS PRN
Status: DISCONTINUED | OUTPATIENT
Start: 2019-09-20 | End: 2019-09-27 | Stop reason: HOSPADM

## 2019-09-20 RX ORDER — NITROGLYCERIN 0.4 MG/1
0.4 TABLET SUBLINGUAL
Status: DISCONTINUED | OUTPATIENT
Start: 2019-09-20 | End: 2019-09-27 | Stop reason: HOSPADM

## 2019-09-20 RX ORDER — CYCLOBENZAPRINE HCL 10 MG
10 TABLET ORAL 3 TIMES DAILY PRN
Status: DISCONTINUED | OUTPATIENT
Start: 2019-09-20 | End: 2019-09-27 | Stop reason: HOSPADM

## 2019-09-20 RX ORDER — LORAZEPAM 1 MG/1
1 TABLET ORAL EVERY 8 HOURS PRN
Status: DISCONTINUED | OUTPATIENT
Start: 2019-09-20 | End: 2019-09-27 | Stop reason: HOSPADM

## 2019-09-20 RX ORDER — FAMOTIDINE 20 MG/1
20 TABLET, FILM COATED ORAL DAILY
Status: DISCONTINUED | OUTPATIENT
Start: 2019-09-20 | End: 2019-09-27 | Stop reason: HOSPADM

## 2019-09-20 RX ORDER — CLOPIDOGREL BISULFATE 75 MG/1
75 TABLET ORAL DAILY
Status: DISCONTINUED | OUTPATIENT
Start: 2019-09-20 | End: 2019-09-27 | Stop reason: HOSPADM

## 2019-09-20 RX ORDER — ACETAMINOPHEN 160 MG/5ML
650 SOLUTION ORAL EVERY 4 HOURS PRN
Status: DISCONTINUED | OUTPATIENT
Start: 2019-09-20 | End: 2019-09-27 | Stop reason: HOSPADM

## 2019-09-20 RX ORDER — BUMETANIDE 1 MG/1
1 TABLET ORAL 2 TIMES DAILY
Status: DISCONTINUED | OUTPATIENT
Start: 2019-09-20 | End: 2019-09-27 | Stop reason: HOSPADM

## 2019-09-20 RX ORDER — ATORVASTATIN CALCIUM 40 MG/1
80 TABLET, FILM COATED ORAL DAILY
Status: DISCONTINUED | OUTPATIENT
Start: 2019-09-20 | End: 2019-09-20

## 2019-09-20 RX ORDER — LORAZEPAM 2 MG/ML
1 INJECTION INTRAMUSCULAR ONCE
Status: COMPLETED | OUTPATIENT
Start: 2019-09-20 | End: 2019-09-20

## 2019-09-20 RX ORDER — ROFLUMILAST 500 UG/1
500 TABLET ORAL DAILY
Status: DISCONTINUED | OUTPATIENT
Start: 2019-09-20 | End: 2019-09-27 | Stop reason: HOSPADM

## 2019-09-20 RX ORDER — IPRATROPIUM BROMIDE AND ALBUTEROL SULFATE 2.5; .5 MG/3ML; MG/3ML
3 SOLUTION RESPIRATORY (INHALATION) EVERY 4 HOURS PRN
Status: DISCONTINUED | OUTPATIENT
Start: 2019-09-20 | End: 2019-09-27 | Stop reason: HOSPADM

## 2019-09-20 RX ORDER — MONTELUKAST SODIUM 10 MG/1
10 TABLET ORAL NIGHTLY
Status: DISCONTINUED | OUTPATIENT
Start: 2019-09-20 | End: 2019-09-27 | Stop reason: HOSPADM

## 2019-09-20 RX ORDER — DULOXETIN HYDROCHLORIDE 30 MG/1
60 CAPSULE, DELAYED RELEASE ORAL DAILY
Status: DISCONTINUED | OUTPATIENT
Start: 2019-09-20 | End: 2019-09-27 | Stop reason: HOSPADM

## 2019-09-20 RX ORDER — SPIRONOLACTONE 25 MG/1
12.5 TABLET ORAL 2 TIMES DAILY
Status: DISCONTINUED | OUTPATIENT
Start: 2019-09-20 | End: 2019-09-23

## 2019-09-20 RX ORDER — ACETAMINOPHEN 325 MG/1
650 TABLET ORAL EVERY 4 HOURS PRN
Status: DISCONTINUED | OUTPATIENT
Start: 2019-09-20 | End: 2019-09-27 | Stop reason: HOSPADM

## 2019-09-20 RX ORDER — ISOSORBIDE MONONITRATE 60 MG/1
60 TABLET, EXTENDED RELEASE ORAL 3 TIMES DAILY
Status: DISCONTINUED | OUTPATIENT
Start: 2019-09-20 | End: 2019-09-27 | Stop reason: HOSPADM

## 2019-09-20 RX ORDER — DOCUSATE SODIUM 100 MG/1
100 CAPSULE, LIQUID FILLED ORAL DAILY
Status: DISCONTINUED | OUTPATIENT
Start: 2019-09-20 | End: 2019-09-27 | Stop reason: HOSPADM

## 2019-09-20 RX ORDER — ATORVASTATIN CALCIUM 40 MG/1
80 TABLET, FILM COATED ORAL NIGHTLY
Status: DISCONTINUED | OUTPATIENT
Start: 2019-09-20 | End: 2019-09-27 | Stop reason: HOSPADM

## 2019-09-20 RX ORDER — FLUTICASONE PROPIONATE 50 MCG
1 SPRAY, SUSPENSION (ML) NASAL DAILY
Status: DISCONTINUED | OUTPATIENT
Start: 2019-09-20 | End: 2019-09-27 | Stop reason: HOSPADM

## 2019-09-20 RX ORDER — RANOLAZINE 500 MG/1
1000 TABLET, EXTENDED RELEASE ORAL 2 TIMES DAILY
Status: DISCONTINUED | OUTPATIENT
Start: 2019-09-20 | End: 2019-09-27 | Stop reason: HOSPADM

## 2019-09-20 RX ORDER — METOPROLOL SUCCINATE 50 MG/1
50 TABLET, EXTENDED RELEASE ORAL DAILY
Status: DISCONTINUED | OUTPATIENT
Start: 2019-09-20 | End: 2019-09-27 | Stop reason: HOSPADM

## 2019-09-20 RX ORDER — ONDANSETRON 4 MG/1
4 TABLET, FILM COATED ORAL EVERY 6 HOURS PRN
Status: DISCONTINUED | OUTPATIENT
Start: 2019-09-20 | End: 2019-09-27 | Stop reason: HOSPADM

## 2019-09-20 RX ORDER — LORAZEPAM 2 MG/ML
INJECTION INTRAMUSCULAR
Status: COMPLETED
Start: 2019-09-20 | End: 2019-09-20

## 2019-09-20 RX ORDER — SODIUM CHLORIDE 0.9 % (FLUSH) 0.9 %
10 SYRINGE (ML) INJECTION EVERY 12 HOURS SCHEDULED
Status: DISCONTINUED | OUTPATIENT
Start: 2019-09-20 | End: 2019-09-27 | Stop reason: SDUPTHER

## 2019-09-20 RX ORDER — BUDESONIDE AND FORMOTEROL FUMARATE DIHYDRATE 160; 4.5 UG/1; UG/1
2 AEROSOL RESPIRATORY (INHALATION)
Status: DISCONTINUED | OUTPATIENT
Start: 2019-09-20 | End: 2019-09-27 | Stop reason: HOSPADM

## 2019-09-20 RX ORDER — SODIUM CHLORIDE 0.9 % (FLUSH) 0.9 %
10 SYRINGE (ML) INJECTION AS NEEDED
Status: DISCONTINUED | OUTPATIENT
Start: 2019-09-20 | End: 2019-09-27 | Stop reason: SDUPTHER

## 2019-09-20 RX ADMIN — ACETAMINOPHEN 650 MG: 325 TABLET ORAL at 20:34

## 2019-09-20 RX ADMIN — LORAZEPAM 1 MG: 2 INJECTION INTRAMUSCULAR at 15:44

## 2019-09-20 RX ADMIN — LORAZEPAM 1 MG: 2 INJECTION INTRAMUSCULAR; INTRAVENOUS at 15:44

## 2019-09-20 RX ADMIN — CYCLOBENZAPRINE HYDROCHLORIDE 10 MG: 10 TABLET, FILM COATED ORAL at 20:34

## 2019-09-20 RX ADMIN — Medication 10 ML: at 20:40

## 2019-09-20 RX ADMIN — ISOSORBIDE MONONITRATE 60 MG: 60 TABLET, EXTENDED RELEASE ORAL at 20:43

## 2019-09-20 RX ADMIN — DILTIAZEM HYDROCHLORIDE 5 MG/HR: 5 INJECTION INTRAVENOUS at 15:54

## 2019-09-20 RX ADMIN — MONTELUKAST SODIUM 10 MG: 10 TABLET, FILM COATED ORAL at 20:38

## 2019-09-20 RX ADMIN — BUMETANIDE 1 MG: 1 TABLET ORAL at 20:38

## 2019-09-20 RX ADMIN — BUDESONIDE AND FORMOTEROL FUMARATE DIHYDRATE 2 PUFF: 160; 4.5 AEROSOL RESPIRATORY (INHALATION) at 19:47

## 2019-09-20 RX ADMIN — ONDANSETRON HYDROCHLORIDE 4 MG: 4 TABLET, FILM COATED ORAL at 20:36

## 2019-09-20 RX ADMIN — RANOLAZINE 1000 MG: 500 TABLET, FILM COATED, EXTENDED RELEASE ORAL at 20:37

## 2019-09-20 RX ADMIN — APIXABAN 5 MG: 5 TABLET, FILM COATED ORAL at 20:37

## 2019-09-20 RX ADMIN — LORAZEPAM 1 MG: 1 TABLET ORAL at 20:38

## 2019-09-20 RX ADMIN — ATORVASTATIN CALCIUM 80 MG: 40 TABLET, FILM COATED ORAL at 20:38

## 2019-09-21 LAB
ANION GAP SERPL CALCULATED.3IONS-SCNC: 10.2 MMOL/L (ref 5–15)
BUN BLD-MCNC: 18 MG/DL (ref 8–20)
BUN/CREAT SERPL: 15 (ref 6.2–20.3)
CALCIUM SPEC-SCNC: 7.9 MG/DL (ref 8.9–10.3)
CHLORIDE SERPL-SCNC: 97 MMOL/L (ref 101–111)
CO2 SERPL-SCNC: 39 MMOL/L (ref 22–32)
CREAT BLD-MCNC: 1.2 MG/DL (ref 0.7–1.2)
DEPRECATED RDW RBC AUTO: 48.6 FL (ref 37–54)
EOSINOPHIL # BLD MANUAL: 0.7 10*3/MM3 (ref 0–0.4)
EOSINOPHIL NFR BLD MANUAL: 6 % (ref 0.3–6.2)
ERYTHROCYTE [DISTWIDTH] IN BLOOD BY AUTOMATED COUNT: 15.7 % (ref 12.3–15.4)
GFR SERPL CREATININE-BSD FRML MDRD: 63 ML/MIN/1.73
GLUCOSE BLD-MCNC: 107 MG/DL (ref 65–99)
GLUCOSE BLDC GLUCOMTR-MCNC: 123 MG/DL (ref 70–105)
GLUCOSE BLDC GLUCOMTR-MCNC: 140 MG/DL (ref 70–105)
HCT VFR BLD AUTO: 38.9 % (ref 37.5–51)
HGB BLD-MCNC: 13.2 G/DL (ref 13–17.7)
LYMPHOCYTES # BLD MANUAL: 1.74 10*3/MM3 (ref 0.7–3.1)
LYMPHOCYTES NFR BLD MANUAL: 10 % (ref 5–12)
LYMPHOCYTES NFR BLD MANUAL: 15 % (ref 19.6–45.3)
MAGNESIUM SERPL-MCNC: 2 MG/DL (ref 1.8–2.5)
MCH RBC QN AUTO: 30 PG (ref 26.6–33)
MCHC RBC AUTO-ENTMCNC: 34 G/DL (ref 31.5–35.7)
MCV RBC AUTO: 88.3 FL (ref 79–97)
MONOCYTES # BLD AUTO: 1.16 10*3/MM3 (ref 0.1–0.9)
MYELOCYTES NFR BLD MANUAL: 1 % (ref 0–0)
NEUTROPHILS # BLD AUTO: 7.89 10*3/MM3 (ref 1.7–7)
NEUTROPHILS NFR BLD MANUAL: 67 % (ref 42.7–76)
NEUTS BAND NFR BLD MANUAL: 1 % (ref 0–5)
PHOSPHATE SERPL-MCNC: 5 MG/DL (ref 2.4–4.7)
PLAT MORPH BLD: NORMAL
PLATELET # BLD AUTO: 215 10*3/MM3 (ref 140–450)
PMV BLD AUTO: 8.6 FL (ref 6–12)
POTASSIUM BLD-SCNC: 4.2 MMOL/L (ref 3.6–5.1)
RBC # BLD AUTO: 4.41 10*6/MM3 (ref 4.14–5.8)
RBC MORPH BLD: NORMAL
SCAN SLIDE: NORMAL
SODIUM BLD-SCNC: 142 MMOL/L (ref 136–144)
WBC MORPH BLD: NORMAL
WBC NRBC COR # BLD: 11.6 10*3/MM3 (ref 3.4–10.8)

## 2019-09-21 PROCEDURE — 83735 ASSAY OF MAGNESIUM: CPT | Performed by: HOSPITALIST

## 2019-09-21 PROCEDURE — 99233 SBSQ HOSP IP/OBS HIGH 50: CPT | Performed by: INTERNAL MEDICINE

## 2019-09-21 PROCEDURE — 84100 ASSAY OF PHOSPHORUS: CPT | Performed by: HOSPITALIST

## 2019-09-21 PROCEDURE — 80048 BASIC METABOLIC PNL TOTAL CA: CPT | Performed by: HOSPITALIST

## 2019-09-21 PROCEDURE — 99232 SBSQ HOSP IP/OBS MODERATE 35: CPT | Performed by: HOSPITALIST

## 2019-09-21 PROCEDURE — 94799 UNLISTED PULMONARY SVC/PX: CPT

## 2019-09-21 PROCEDURE — 82962 GLUCOSE BLOOD TEST: CPT

## 2019-09-21 PROCEDURE — 85025 COMPLETE CBC W/AUTO DIFF WBC: CPT | Performed by: HOSPITALIST

## 2019-09-21 RX ADMIN — MONTELUKAST SODIUM 10 MG: 10 TABLET, FILM COATED ORAL at 20:52

## 2019-09-21 RX ADMIN — CLOPIDOGREL BISULFATE 75 MG: 75 TABLET ORAL at 09:15

## 2019-09-21 RX ADMIN — ISOSORBIDE MONONITRATE 60 MG: 60 TABLET, EXTENDED RELEASE ORAL at 09:15

## 2019-09-21 RX ADMIN — DOCUSATE SODIUM 100 MG: 100 CAPSULE, LIQUID FILLED ORAL at 09:15

## 2019-09-21 RX ADMIN — LORAZEPAM 1 MG: 1 TABLET ORAL at 09:18

## 2019-09-21 RX ADMIN — ROFLUMILAST 500 MCG: 500 TABLET ORAL at 09:15

## 2019-09-21 RX ADMIN — Medication 10 ML: at 09:18

## 2019-09-21 RX ADMIN — ONDANSETRON HYDROCHLORIDE 4 MG: 4 TABLET, FILM COATED ORAL at 23:18

## 2019-09-21 RX ADMIN — METOPROLOL SUCCINATE 50 MG: 50 TABLET, EXTENDED RELEASE ORAL at 09:15

## 2019-09-21 RX ADMIN — CYCLOBENZAPRINE HYDROCHLORIDE 10 MG: 10 TABLET, FILM COATED ORAL at 09:18

## 2019-09-21 RX ADMIN — RANOLAZINE 1000 MG: 500 TABLET, FILM COATED, EXTENDED RELEASE ORAL at 09:18

## 2019-09-21 RX ADMIN — APIXABAN 5 MG: 5 TABLET, FILM COATED ORAL at 20:52

## 2019-09-21 RX ADMIN — ACETAMINOPHEN 650 MG: 325 TABLET ORAL at 23:18

## 2019-09-21 RX ADMIN — ATORVASTATIN CALCIUM 80 MG: 40 TABLET, FILM COATED ORAL at 20:53

## 2019-09-21 RX ADMIN — FAMOTIDINE 20 MG: 20 TABLET ORAL at 09:15

## 2019-09-21 RX ADMIN — ISOSORBIDE MONONITRATE 60 MG: 60 TABLET, EXTENDED RELEASE ORAL at 20:53

## 2019-09-21 RX ADMIN — BUDESONIDE AND FORMOTEROL FUMARATE DIHYDRATE 2 PUFF: 160; 4.5 AEROSOL RESPIRATORY (INHALATION) at 20:35

## 2019-09-21 RX ADMIN — CYCLOBENZAPRINE HYDROCHLORIDE 10 MG: 10 TABLET, FILM COATED ORAL at 16:28

## 2019-09-21 RX ADMIN — RANOLAZINE 1000 MG: 500 TABLET, FILM COATED, EXTENDED RELEASE ORAL at 20:54

## 2019-09-21 RX ADMIN — DILTIAZEM HYDROCHLORIDE 5 MG/HR: 5 INJECTION INTRAVENOUS at 11:42

## 2019-09-21 RX ADMIN — CYCLOBENZAPRINE HYDROCHLORIDE 10 MG: 10 TABLET, FILM COATED ORAL at 23:17

## 2019-09-21 RX ADMIN — Medication 10 ML: at 20:54

## 2019-09-21 RX ADMIN — DULOXETINE 60 MG: 30 CAPSULE, DELAYED RELEASE ORAL at 09:15

## 2019-09-21 RX ADMIN — SPIRONOLACTONE 12.5 MG: 25 TABLET ORAL at 20:54

## 2019-09-21 RX ADMIN — LORAZEPAM 1 MG: 1 TABLET ORAL at 16:28

## 2019-09-21 RX ADMIN — LORAZEPAM 1 MG: 1 TABLET ORAL at 23:17

## 2019-09-21 RX ADMIN — BUMETANIDE 1 MG: 1 TABLET ORAL at 20:53

## 2019-09-21 RX ADMIN — BUMETANIDE 1 MG: 1 TABLET ORAL at 09:15

## 2019-09-21 RX ADMIN — APIXABAN 5 MG: 5 TABLET, FILM COATED ORAL at 09:15

## 2019-09-21 RX ADMIN — FLUTICASONE PROPIONATE 1 SPRAY: 50 SPRAY, METERED NASAL at 09:20

## 2019-09-21 RX ADMIN — BUDESONIDE AND FORMOTEROL FUMARATE DIHYDRATE 2 PUFF: 160; 4.5 AEROSOL RESPIRATORY (INHALATION) at 06:55

## 2019-09-22 LAB
ANION GAP SERPL CALCULATED.3IONS-SCNC: 12.1 MMOL/L (ref 5–15)
BUN BLD-MCNC: 13 MG/DL (ref 8–20)
BUN/CREAT SERPL: 11.8 (ref 6.2–20.3)
CALCIUM SPEC-SCNC: 8.5 MG/DL (ref 8.9–10.3)
CHLORIDE SERPL-SCNC: 99 MMOL/L (ref 101–111)
CO2 SERPL-SCNC: 33 MMOL/L (ref 22–32)
CREAT BLD-MCNC: 1.1 MG/DL (ref 0.7–1.2)
DEPRECATED RDW RBC AUTO: 48.1 FL (ref 37–54)
EOSINOPHIL # BLD MANUAL: 0.13 10*3/MM3 (ref 0–0.4)
EOSINOPHIL NFR BLD MANUAL: 1 % (ref 0.3–6.2)
ERYTHROCYTE [DISTWIDTH] IN BLOOD BY AUTOMATED COUNT: 15.5 % (ref 12.3–15.4)
GFR SERPL CREATININE-BSD FRML MDRD: 69 ML/MIN/1.73
GLUCOSE BLD-MCNC: 132 MG/DL (ref 65–99)
HCT VFR BLD AUTO: 40.9 % (ref 37.5–51)
HGB BLD-MCNC: 13.7 G/DL (ref 13–17.7)
LYMPHOCYTES # BLD MANUAL: 1.55 10*3/MM3 (ref 0.7–3.1)
LYMPHOCYTES NFR BLD MANUAL: 12 % (ref 19.6–45.3)
LYMPHOCYTES NFR BLD MANUAL: 7 % (ref 5–12)
MAGNESIUM SERPL-MCNC: 2.1 MG/DL (ref 1.8–2.5)
MCH RBC QN AUTO: 29.9 PG (ref 26.6–33)
MCHC RBC AUTO-ENTMCNC: 33.6 G/DL (ref 31.5–35.7)
MCV RBC AUTO: 89.1 FL (ref 79–97)
MONOCYTES # BLD AUTO: 0.9 10*3/MM3 (ref 0.1–0.9)
NEUTROPHILS # BLD AUTO: 10.19 10*3/MM3 (ref 1.7–7)
NEUTROPHILS NFR BLD MANUAL: 76 % (ref 42.7–76)
NEUTS BAND NFR BLD MANUAL: 3 % (ref 0–5)
PHOSPHATE SERPL-MCNC: 3 MG/DL (ref 2.4–4.7)
PLAT MORPH BLD: NORMAL
PLATELET # BLD AUTO: 235 10*3/MM3 (ref 140–450)
PMV BLD AUTO: 9.2 FL (ref 6–12)
POTASSIUM BLD-SCNC: 4.1 MMOL/L (ref 3.6–5.1)
RBC # BLD AUTO: 4.59 10*6/MM3 (ref 4.14–5.8)
RBC MORPH BLD: NORMAL
SCAN SLIDE: NORMAL
SODIUM BLD-SCNC: 140 MMOL/L (ref 136–144)
VARIANT LYMPHS NFR BLD MANUAL: 1 % (ref 0–5)
WBC MORPH BLD: NORMAL
WBC NRBC COR # BLD: 12.9 10*3/MM3 (ref 3.4–10.8)

## 2019-09-22 PROCEDURE — 84100 ASSAY OF PHOSPHORUS: CPT | Performed by: HOSPITALIST

## 2019-09-22 PROCEDURE — 83735 ASSAY OF MAGNESIUM: CPT | Performed by: HOSPITALIST

## 2019-09-22 PROCEDURE — 94799 UNLISTED PULMONARY SVC/PX: CPT

## 2019-09-22 PROCEDURE — 99232 SBSQ HOSP IP/OBS MODERATE 35: CPT | Performed by: HOSPITALIST

## 2019-09-22 PROCEDURE — 99232 SBSQ HOSP IP/OBS MODERATE 35: CPT | Performed by: INTERNAL MEDICINE

## 2019-09-22 PROCEDURE — 85007 BL SMEAR W/DIFF WBC COUNT: CPT | Performed by: HOSPITALIST

## 2019-09-22 PROCEDURE — 25010000002 ENOXAPARIN PER 10 MG: Performed by: HOSPITALIST

## 2019-09-22 PROCEDURE — 85025 COMPLETE CBC W/AUTO DIFF WBC: CPT | Performed by: HOSPITALIST

## 2019-09-22 PROCEDURE — 80048 BASIC METABOLIC PNL TOTAL CA: CPT | Performed by: HOSPITALIST

## 2019-09-22 RX ORDER — CETIRIZINE HYDROCHLORIDE 10 MG/1
10 TABLET ORAL DAILY
Status: DISCONTINUED | OUTPATIENT
Start: 2019-09-22 | End: 2019-09-27 | Stop reason: HOSPADM

## 2019-09-22 RX ORDER — HYDROXYZINE HYDROCHLORIDE 25 MG/1
50 TABLET, FILM COATED ORAL ONCE
Status: COMPLETED | OUTPATIENT
Start: 2019-09-22 | End: 2019-09-22

## 2019-09-22 RX ADMIN — RANOLAZINE 1000 MG: 500 TABLET, FILM COATED, EXTENDED RELEASE ORAL at 09:53

## 2019-09-22 RX ADMIN — DULOXETINE 60 MG: 30 CAPSULE, DELAYED RELEASE ORAL at 09:53

## 2019-09-22 RX ADMIN — ISOSORBIDE MONONITRATE 60 MG: 60 TABLET, EXTENDED RELEASE ORAL at 15:47

## 2019-09-22 RX ADMIN — HYDROXYZINE HYDROCHLORIDE 50 MG: 25 TABLET, FILM COATED ORAL at 05:37

## 2019-09-22 RX ADMIN — METOPROLOL SUCCINATE 50 MG: 50 TABLET, EXTENDED RELEASE ORAL at 09:52

## 2019-09-22 RX ADMIN — CETIRIZINE HYDROCHLORIDE 10 MG: 10 TABLET, FILM COATED ORAL at 18:03

## 2019-09-22 RX ADMIN — LORAZEPAM 1 MG: 1 TABLET ORAL at 05:36

## 2019-09-22 RX ADMIN — ENOXAPARIN SODIUM 140 MG: 150 INJECTION SUBCUTANEOUS at 21:48

## 2019-09-22 RX ADMIN — LORAZEPAM 1 MG: 1 TABLET ORAL at 14:56

## 2019-09-22 RX ADMIN — BUDESONIDE AND FORMOTEROL FUMARATE DIHYDRATE 2 PUFF: 160; 4.5 AEROSOL RESPIRATORY (INHALATION) at 18:40

## 2019-09-22 RX ADMIN — DILTIAZEM HYDROCHLORIDE 5 MG/HR: 5 INJECTION INTRAVENOUS at 15:47

## 2019-09-22 RX ADMIN — RANOLAZINE 1000 MG: 500 TABLET, FILM COATED, EXTENDED RELEASE ORAL at 21:48

## 2019-09-22 RX ADMIN — CLOPIDOGREL BISULFATE 75 MG: 75 TABLET ORAL at 09:52

## 2019-09-22 RX ADMIN — ATORVASTATIN CALCIUM 80 MG: 40 TABLET, FILM COATED ORAL at 21:48

## 2019-09-22 RX ADMIN — ROFLUMILAST 500 MCG: 500 TABLET ORAL at 09:52

## 2019-09-22 RX ADMIN — DOCUSATE SODIUM 100 MG: 100 CAPSULE, LIQUID FILLED ORAL at 09:52

## 2019-09-22 RX ADMIN — ISOSORBIDE MONONITRATE 60 MG: 60 TABLET, EXTENDED RELEASE ORAL at 09:53

## 2019-09-22 RX ADMIN — Medication 10 ML: at 09:53

## 2019-09-22 RX ADMIN — FAMOTIDINE 20 MG: 20 TABLET ORAL at 09:53

## 2019-09-22 RX ADMIN — BUDESONIDE AND FORMOTEROL FUMARATE DIHYDRATE 2 PUFF: 160; 4.5 AEROSOL RESPIRATORY (INHALATION) at 06:37

## 2019-09-22 RX ADMIN — CYCLOBENZAPRINE HYDROCHLORIDE 10 MG: 10 TABLET, FILM COATED ORAL at 14:56

## 2019-09-22 RX ADMIN — LORAZEPAM 1 MG: 1 TABLET ORAL at 23:26

## 2019-09-22 RX ADMIN — BUMETANIDE 1 MG: 1 TABLET ORAL at 21:48

## 2019-09-22 RX ADMIN — ISOSORBIDE MONONITRATE 60 MG: 60 TABLET, EXTENDED RELEASE ORAL at 21:48

## 2019-09-22 RX ADMIN — BUMETANIDE 1 MG: 1 TABLET ORAL at 09:52

## 2019-09-22 RX ADMIN — FLUTICASONE PROPIONATE 1 SPRAY: 50 SPRAY, METERED NASAL at 09:54

## 2019-09-22 RX ADMIN — APIXABAN 5 MG: 5 TABLET, FILM COATED ORAL at 09:52

## 2019-09-22 RX ADMIN — CYCLOBENZAPRINE HYDROCHLORIDE 10 MG: 10 TABLET, FILM COATED ORAL at 23:25

## 2019-09-22 RX ADMIN — MONTELUKAST SODIUM 10 MG: 10 TABLET, FILM COATED ORAL at 21:48

## 2019-09-23 LAB
ANION GAP SERPL CALCULATED.3IONS-SCNC: 13.9 MMOL/L (ref 5–15)
BUN BLD-MCNC: 17 MG/DL (ref 8–20)
BUN/CREAT SERPL: 13.1 (ref 6.2–20.3)
CALCIUM SPEC-SCNC: 8 MG/DL (ref 8.9–10.3)
CHLORIDE SERPL-SCNC: 97 MMOL/L (ref 101–111)
CO2 SERPL-SCNC: 29 MMOL/L (ref 22–32)
CREAT BLD-MCNC: 1.3 MG/DL (ref 0.7–1.2)
DEPRECATED RDW RBC AUTO: 47.3 FL (ref 37–54)
EOSINOPHIL # BLD MANUAL: 0.3 10*3/MM3 (ref 0–0.4)
EOSINOPHIL NFR BLD MANUAL: 2 % (ref 0.3–6.2)
ERYTHROCYTE [DISTWIDTH] IN BLOOD BY AUTOMATED COUNT: 15.3 % (ref 12.3–15.4)
GFR SERPL CREATININE-BSD FRML MDRD: 57 ML/MIN/1.73
GLUCOSE BLD-MCNC: 111 MG/DL (ref 65–99)
HCT VFR BLD AUTO: 40 % (ref 37.5–51)
HGB BLD-MCNC: 13.3 G/DL (ref 13–17.7)
LYMPHOCYTES # BLD MANUAL: 2.52 10*3/MM3 (ref 0.7–3.1)
LYMPHOCYTES NFR BLD MANUAL: 1 % (ref 5–12)
LYMPHOCYTES NFR BLD MANUAL: 17 % (ref 19.6–45.3)
MAGNESIUM SERPL-MCNC: 1.8 MG/DL (ref 1.8–2.5)
MCH RBC QN AUTO: 29.4 PG (ref 26.6–33)
MCHC RBC AUTO-ENTMCNC: 33.3 G/DL (ref 31.5–35.7)
MCV RBC AUTO: 88.3 FL (ref 79–97)
MONOCYTES # BLD AUTO: 0.15 10*3/MM3 (ref 0.1–0.9)
NEUTROPHILS # BLD AUTO: 11.84 10*3/MM3 (ref 1.7–7)
NEUTROPHILS NFR BLD MANUAL: 76 % (ref 42.7–76)
NEUTS BAND NFR BLD MANUAL: 4 % (ref 0–5)
PHOSPHATE SERPL-MCNC: 4.3 MG/DL (ref 2.4–4.7)
PLATELET # BLD AUTO: 231 10*3/MM3 (ref 140–450)
PMV BLD AUTO: 9.3 FL (ref 6–12)
POTASSIUM BLD-SCNC: 3.9 MMOL/L (ref 3.6–5.1)
RBC # BLD AUTO: 4.53 10*6/MM3 (ref 4.14–5.8)
RBC MORPH BLD: NORMAL
SCAN SLIDE: NORMAL
SMALL PLATELETS BLD QL SMEAR: ADEQUATE
SODIUM BLD-SCNC: 136 MMOL/L (ref 136–144)
WBC MORPH BLD: NORMAL
WBC NRBC COR # BLD: 14.8 10*3/MM3 (ref 3.4–10.8)

## 2019-09-23 PROCEDURE — 85007 BL SMEAR W/DIFF WBC COUNT: CPT | Performed by: HOSPITALIST

## 2019-09-23 PROCEDURE — 25010000002 ENOXAPARIN PER 10 MG: Performed by: HOSPITALIST

## 2019-09-23 PROCEDURE — 94799 UNLISTED PULMONARY SVC/PX: CPT

## 2019-09-23 PROCEDURE — 83735 ASSAY OF MAGNESIUM: CPT | Performed by: HOSPITALIST

## 2019-09-23 PROCEDURE — 25010000002 CEFTRIAXONE PER 250 MG: Performed by: INTERNAL MEDICINE

## 2019-09-23 PROCEDURE — 99232 SBSQ HOSP IP/OBS MODERATE 35: CPT | Performed by: INTERNAL MEDICINE

## 2019-09-23 PROCEDURE — 80048 BASIC METABOLIC PNL TOTAL CA: CPT | Performed by: HOSPITALIST

## 2019-09-23 PROCEDURE — 99222 1ST HOSP IP/OBS MODERATE 55: CPT | Performed by: INTERNAL MEDICINE

## 2019-09-23 PROCEDURE — 84100 ASSAY OF PHOSPHORUS: CPT | Performed by: HOSPITALIST

## 2019-09-23 PROCEDURE — 85025 COMPLETE CBC W/AUTO DIFF WBC: CPT | Performed by: HOSPITALIST

## 2019-09-23 RX ADMIN — Medication 10 ML: at 11:35

## 2019-09-23 RX ADMIN — ENOXAPARIN SODIUM 140 MG: 150 INJECTION SUBCUTANEOUS at 21:46

## 2019-09-23 RX ADMIN — CLOPIDOGREL BISULFATE 75 MG: 75 TABLET ORAL at 11:18

## 2019-09-23 RX ADMIN — CYCLOBENZAPRINE HYDROCHLORIDE 10 MG: 10 TABLET, FILM COATED ORAL at 23:10

## 2019-09-23 RX ADMIN — BUDESONIDE AND FORMOTEROL FUMARATE DIHYDRATE 2 PUFF: 160; 4.5 AEROSOL RESPIRATORY (INHALATION) at 06:53

## 2019-09-23 RX ADMIN — MONTELUKAST SODIUM 10 MG: 10 TABLET, FILM COATED ORAL at 21:47

## 2019-09-23 RX ADMIN — ROFLUMILAST 500 MCG: 500 TABLET ORAL at 11:34

## 2019-09-23 RX ADMIN — FAMOTIDINE 20 MG: 20 TABLET ORAL at 11:17

## 2019-09-23 RX ADMIN — FLUTICASONE PROPIONATE 1 SPRAY: 50 SPRAY, METERED NASAL at 11:26

## 2019-09-23 RX ADMIN — DOCUSATE SODIUM 100 MG: 100 CAPSULE, LIQUID FILLED ORAL at 11:21

## 2019-09-23 RX ADMIN — ENOXAPARIN SODIUM 140 MG: 150 INJECTION SUBCUTANEOUS at 11:26

## 2019-09-23 RX ADMIN — DILTIAZEM HYDROCHLORIDE 5 MG/HR: 5 INJECTION INTRAVENOUS at 15:39

## 2019-09-23 RX ADMIN — LORAZEPAM 1 MG: 1 TABLET ORAL at 15:04

## 2019-09-23 RX ADMIN — CEFTRIAXONE SODIUM 1 G: 1 INJECTION, POWDER, FOR SOLUTION INTRAMUSCULAR; INTRAVENOUS at 23:10

## 2019-09-23 RX ADMIN — CETIRIZINE HYDROCHLORIDE 10 MG: 10 TABLET, FILM COATED ORAL at 11:18

## 2019-09-23 RX ADMIN — RANOLAZINE 1000 MG: 500 TABLET, FILM COATED, EXTENDED RELEASE ORAL at 11:20

## 2019-09-23 RX ADMIN — RANOLAZINE 1000 MG: 500 TABLET, FILM COATED, EXTENDED RELEASE ORAL at 21:47

## 2019-09-23 RX ADMIN — ISOSORBIDE MONONITRATE 60 MG: 60 TABLET, EXTENDED RELEASE ORAL at 21:47

## 2019-09-23 RX ADMIN — ATORVASTATIN CALCIUM 80 MG: 40 TABLET, FILM COATED ORAL at 21:47

## 2019-09-23 RX ADMIN — METOPROLOL SUCCINATE 50 MG: 50 TABLET, EXTENDED RELEASE ORAL at 11:18

## 2019-09-23 RX ADMIN — ISOSORBIDE MONONITRATE 60 MG: 60 TABLET, EXTENDED RELEASE ORAL at 11:17

## 2019-09-23 RX ADMIN — BUMETANIDE 1 MG: 1 TABLET ORAL at 21:47

## 2019-09-23 RX ADMIN — LORAZEPAM 1 MG: 1 TABLET ORAL at 23:10

## 2019-09-23 RX ADMIN — ISOSORBIDE MONONITRATE 60 MG: 60 TABLET, EXTENDED RELEASE ORAL at 15:04

## 2019-09-23 RX ADMIN — CYCLOBENZAPRINE HYDROCHLORIDE 10 MG: 10 TABLET, FILM COATED ORAL at 15:04

## 2019-09-23 RX ADMIN — Medication 10 ML: at 23:11

## 2019-09-23 RX ADMIN — BUMETANIDE 1 MG: 1 TABLET ORAL at 11:16

## 2019-09-23 RX ADMIN — DULOXETINE 60 MG: 30 CAPSULE, DELAYED RELEASE ORAL at 11:18

## 2019-09-23 RX ADMIN — BUDESONIDE AND FORMOTEROL FUMARATE DIHYDRATE 2 PUFF: 160; 4.5 AEROSOL RESPIRATORY (INHALATION) at 20:13

## 2019-09-24 LAB
ANION GAP SERPL CALCULATED.3IONS-SCNC: 13.7 MMOL/L (ref 5–15)
BUN BLD-MCNC: 14 MG/DL (ref 8–20)
BUN/CREAT SERPL: 12.7 (ref 6.2–20.3)
CALCIUM SPEC-SCNC: 8.1 MG/DL (ref 8.9–10.3)
CHLORIDE SERPL-SCNC: 100 MMOL/L (ref 101–111)
CO2 SERPL-SCNC: 28 MMOL/L (ref 22–32)
CREAT BLD-MCNC: 1.1 MG/DL (ref 0.7–1.2)
DACRYOCYTES BLD QL SMEAR: ABNORMAL
DEPRECATED RDW RBC AUTO: 47.7 FL (ref 37–54)
EOSINOPHIL # BLD MANUAL: 0.38 10*3/MM3 (ref 0–0.4)
EOSINOPHIL NFR BLD MANUAL: 3 % (ref 0.3–6.2)
ERYTHROCYTE [DISTWIDTH] IN BLOOD BY AUTOMATED COUNT: 15.6 % (ref 12.3–15.4)
GFR SERPL CREATININE-BSD FRML MDRD: 69 ML/MIN/1.73
GLUCOSE BLD-MCNC: 136 MG/DL (ref 65–99)
HCT VFR BLD AUTO: 38 % (ref 37.5–51)
HGB BLD-MCNC: 12.8 G/DL (ref 13–17.7)
LARGE PLATELETS: ABNORMAL
LYMPHOCYTES # BLD MANUAL: 1.38 10*3/MM3 (ref 0.7–3.1)
LYMPHOCYTES NFR BLD MANUAL: 11 % (ref 19.6–45.3)
LYMPHOCYTES NFR BLD MANUAL: 8 % (ref 5–12)
MAGNESIUM SERPL-MCNC: 1.9 MG/DL (ref 1.8–2.5)
MCH RBC QN AUTO: 29.6 PG (ref 26.6–33)
MCHC RBC AUTO-ENTMCNC: 33.7 G/DL (ref 31.5–35.7)
MCV RBC AUTO: 87.9 FL (ref 79–97)
METAMYELOCYTES NFR BLD MANUAL: 6 % (ref 0–0)
MONOCYTES # BLD AUTO: 1 10*3/MM3 (ref 0.1–0.9)
MYELOCYTES NFR BLD MANUAL: 1 % (ref 0–0)
NEUTROPHILS # BLD AUTO: 8.88 10*3/MM3 (ref 1.7–7)
NEUTROPHILS NFR BLD MANUAL: 68 % (ref 42.7–76)
NEUTS BAND NFR BLD MANUAL: 3 % (ref 0–5)
PHOSPHATE SERPL-MCNC: 3.9 MG/DL (ref 2.4–4.7)
PLATELET # BLD AUTO: 245 10*3/MM3 (ref 140–450)
PMV BLD AUTO: 9.2 FL (ref 6–12)
POIKILOCYTOSIS BLD QL SMEAR: ABNORMAL
POLYCHROMASIA BLD QL SMEAR: ABNORMAL
POTASSIUM BLD-SCNC: 3.7 MMOL/L (ref 3.6–5.1)
PROCALCITONIN SERPL-MCNC: <0.05 NG/ML (ref 0–0.5)
RBC # BLD AUTO: 4.32 10*6/MM3 (ref 4.14–5.8)
SCAN SLIDE: NORMAL
SMALL PLATELETS BLD QL SMEAR: ADEQUATE
SODIUM BLD-SCNC: 138 MMOL/L (ref 136–144)
WBC MORPH BLD: NORMAL
WBC NRBC COR # BLD: 12.5 10*3/MM3 (ref 3.4–10.8)

## 2019-09-24 PROCEDURE — 25010000002 ENOXAPARIN PER 10 MG: Performed by: HOSPITALIST

## 2019-09-24 PROCEDURE — 84145 PROCALCITONIN (PCT): CPT | Performed by: INTERNAL MEDICINE

## 2019-09-24 PROCEDURE — 99232 SBSQ HOSP IP/OBS MODERATE 35: CPT | Performed by: INTERNAL MEDICINE

## 2019-09-24 PROCEDURE — 85025 COMPLETE CBC W/AUTO DIFF WBC: CPT | Performed by: HOSPITALIST

## 2019-09-24 PROCEDURE — 94799 UNLISTED PULMONARY SVC/PX: CPT

## 2019-09-24 PROCEDURE — 80048 BASIC METABOLIC PNL TOTAL CA: CPT | Performed by: HOSPITALIST

## 2019-09-24 PROCEDURE — 84100 ASSAY OF PHOSPHORUS: CPT | Performed by: HOSPITALIST

## 2019-09-24 PROCEDURE — 83735 ASSAY OF MAGNESIUM: CPT | Performed by: HOSPITALIST

## 2019-09-24 PROCEDURE — 85007 BL SMEAR W/DIFF WBC COUNT: CPT | Performed by: HOSPITALIST

## 2019-09-24 PROCEDURE — 25010000002 CEFTRIAXONE PER 250 MG: Performed by: INTERNAL MEDICINE

## 2019-09-24 RX ADMIN — METOPROLOL SUCCINATE 50 MG: 50 TABLET, EXTENDED RELEASE ORAL at 09:47

## 2019-09-24 RX ADMIN — CETIRIZINE HYDROCHLORIDE 10 MG: 10 TABLET, FILM COATED ORAL at 09:47

## 2019-09-24 RX ADMIN — ONDANSETRON HYDROCHLORIDE 4 MG: 4 TABLET, FILM COATED ORAL at 21:04

## 2019-09-24 RX ADMIN — DOCUSATE SODIUM 100 MG: 100 CAPSULE, LIQUID FILLED ORAL at 09:47

## 2019-09-24 RX ADMIN — BUDESONIDE AND FORMOTEROL FUMARATE DIHYDRATE 2 PUFF: 160; 4.5 AEROSOL RESPIRATORY (INHALATION) at 18:43

## 2019-09-24 RX ADMIN — LORAZEPAM 1 MG: 1 TABLET ORAL at 20:57

## 2019-09-24 RX ADMIN — DULOXETINE 60 MG: 30 CAPSULE, DELAYED RELEASE ORAL at 09:47

## 2019-09-24 RX ADMIN — Medication 10 ML: at 20:57

## 2019-09-24 RX ADMIN — FLUTICASONE PROPIONATE 1 SPRAY: 50 SPRAY, METERED NASAL at 09:48

## 2019-09-24 RX ADMIN — BUMETANIDE 1 MG: 1 TABLET ORAL at 09:47

## 2019-09-24 RX ADMIN — FAMOTIDINE 20 MG: 20 TABLET ORAL at 09:47

## 2019-09-24 RX ADMIN — RANOLAZINE 1000 MG: 500 TABLET, FILM COATED, EXTENDED RELEASE ORAL at 09:47

## 2019-09-24 RX ADMIN — ISOSORBIDE MONONITRATE 60 MG: 60 TABLET, EXTENDED RELEASE ORAL at 17:10

## 2019-09-24 RX ADMIN — DILTIAZEM HYDROCHLORIDE 5 MG/HR: 5 INJECTION INTRAVENOUS at 17:10

## 2019-09-24 RX ADMIN — LORAZEPAM 1 MG: 1 TABLET ORAL at 13:16

## 2019-09-24 RX ADMIN — RANOLAZINE 1000 MG: 500 TABLET, FILM COATED, EXTENDED RELEASE ORAL at 20:57

## 2019-09-24 RX ADMIN — CYCLOBENZAPRINE HYDROCHLORIDE 10 MG: 10 TABLET, FILM COATED ORAL at 20:57

## 2019-09-24 RX ADMIN — CYCLOBENZAPRINE HYDROCHLORIDE 10 MG: 10 TABLET, FILM COATED ORAL at 13:16

## 2019-09-24 RX ADMIN — ATORVASTATIN CALCIUM 80 MG: 40 TABLET, FILM COATED ORAL at 20:57

## 2019-09-24 RX ADMIN — CEFTRIAXONE SODIUM 1 G: 1 INJECTION, POWDER, FOR SOLUTION INTRAMUSCULAR; INTRAVENOUS at 21:05

## 2019-09-24 RX ADMIN — MONTELUKAST SODIUM 10 MG: 10 TABLET, FILM COATED ORAL at 20:59

## 2019-09-24 RX ADMIN — ENOXAPARIN SODIUM 140 MG: 150 INJECTION SUBCUTANEOUS at 09:47

## 2019-09-24 RX ADMIN — BUDESONIDE AND FORMOTEROL FUMARATE DIHYDRATE 2 PUFF: 160; 4.5 AEROSOL RESPIRATORY (INHALATION) at 07:24

## 2019-09-24 RX ADMIN — ISOSORBIDE MONONITRATE 60 MG: 60 TABLET, EXTENDED RELEASE ORAL at 09:47

## 2019-09-24 RX ADMIN — ENOXAPARIN SODIUM 140 MG: 150 INJECTION SUBCUTANEOUS at 21:04

## 2019-09-24 RX ADMIN — Medication 10 ML: at 09:48

## 2019-09-24 RX ADMIN — CLOPIDOGREL BISULFATE 75 MG: 75 TABLET ORAL at 09:47

## 2019-09-24 RX ADMIN — ROFLUMILAST 500 MCG: 500 TABLET ORAL at 09:47

## 2019-09-24 RX ADMIN — ONDANSETRON HYDROCHLORIDE 4 MG: 4 TABLET, FILM COATED ORAL at 13:17

## 2019-09-24 RX ADMIN — BUMETANIDE 1 MG: 1 TABLET ORAL at 20:57

## 2019-09-24 RX ADMIN — ISOSORBIDE MONONITRATE 60 MG: 60 TABLET, EXTENDED RELEASE ORAL at 21:09

## 2019-09-25 LAB
ANION GAP SERPL CALCULATED.3IONS-SCNC: 15 MMOL/L (ref 5–15)
BUN BLD-MCNC: 13 MG/DL (ref 8–20)
BUN/CREAT SERPL: 11.8 (ref 6.2–20.3)
CALCIUM SPEC-SCNC: 8.3 MG/DL (ref 8.9–10.3)
CHLORIDE SERPL-SCNC: 99 MMOL/L (ref 101–111)
CO2 SERPL-SCNC: 28 MMOL/L (ref 22–32)
CREAT BLD-MCNC: 1.1 MG/DL (ref 0.7–1.2)
DEPRECATED RDW RBC AUTO: 47.7 FL (ref 37–54)
EOSINOPHIL # BLD MANUAL: 0.11 10*3/MM3 (ref 0–0.4)
EOSINOPHIL NFR BLD MANUAL: 1 % (ref 0.3–6.2)
ERYTHROCYTE [DISTWIDTH] IN BLOOD BY AUTOMATED COUNT: 15.4 % (ref 12.3–15.4)
GFR SERPL CREATININE-BSD FRML MDRD: 69 ML/MIN/1.73
GLUCOSE BLD-MCNC: 95 MG/DL (ref 65–99)
HCT VFR BLD AUTO: 39.3 % (ref 37.5–51)
HGB BLD-MCNC: 13.1 G/DL (ref 13–17.7)
LYMPHOCYTES # BLD MANUAL: 3.05 10*3/MM3 (ref 0.7–3.1)
LYMPHOCYTES NFR BLD MANUAL: 27 % (ref 19.6–45.3)
LYMPHOCYTES NFR BLD MANUAL: 3 % (ref 5–12)
MAGNESIUM SERPL-MCNC: 1.9 MG/DL (ref 1.8–2.5)
MCH RBC QN AUTO: 29.4 PG (ref 26.6–33)
MCHC RBC AUTO-ENTMCNC: 33.4 G/DL (ref 31.5–35.7)
MCV RBC AUTO: 88 FL (ref 79–97)
MONOCYTES # BLD AUTO: 0.34 10*3/MM3 (ref 0.1–0.9)
NEUTROPHILS # BLD AUTO: 7.8 10*3/MM3 (ref 1.7–7)
NEUTROPHILS NFR BLD MANUAL: 62 % (ref 42.7–76)
NEUTS BAND NFR BLD MANUAL: 7 % (ref 0–5)
PHOSPHATE SERPL-MCNC: 4.7 MG/DL (ref 2.4–4.7)
PLATELET # BLD AUTO: 228 10*3/MM3 (ref 140–450)
PMV BLD AUTO: 9.3 FL (ref 6–12)
POTASSIUM BLD-SCNC: 4 MMOL/L (ref 3.6–5.1)
RBC # BLD AUTO: 4.46 10*6/MM3 (ref 4.14–5.8)
RBC MORPH BLD: NORMAL
SCAN SLIDE: NORMAL
SMALL PLATELETS BLD QL SMEAR: ADEQUATE
SODIUM BLD-SCNC: 138 MMOL/L (ref 136–144)
WBC MORPH BLD: NORMAL
WBC NRBC COR # BLD: 11.3 10*3/MM3 (ref 3.4–10.8)

## 2019-09-25 PROCEDURE — 94799 UNLISTED PULMONARY SVC/PX: CPT

## 2019-09-25 PROCEDURE — 25010000002 ENOXAPARIN PER 10 MG: Performed by: INTERNAL MEDICINE

## 2019-09-25 PROCEDURE — 99024 POSTOP FOLLOW-UP VISIT: CPT | Performed by: INTERNAL MEDICINE

## 2019-09-25 PROCEDURE — 85007 BL SMEAR W/DIFF WBC COUNT: CPT | Performed by: HOSPITALIST

## 2019-09-25 PROCEDURE — 80048 BASIC METABOLIC PNL TOTAL CA: CPT | Performed by: HOSPITALIST

## 2019-09-25 PROCEDURE — 85025 COMPLETE CBC W/AUTO DIFF WBC: CPT | Performed by: HOSPITALIST

## 2019-09-25 PROCEDURE — 84100 ASSAY OF PHOSPHORUS: CPT | Performed by: HOSPITALIST

## 2019-09-25 PROCEDURE — 25010000002 ENOXAPARIN PER 10 MG: Performed by: HOSPITALIST

## 2019-09-25 PROCEDURE — 83735 ASSAY OF MAGNESIUM: CPT | Performed by: HOSPITALIST

## 2019-09-25 PROCEDURE — 94760 N-INVAS EAR/PLS OXIMETRY 1: CPT

## 2019-09-25 PROCEDURE — 99231 SBSQ HOSP IP/OBS SF/LOW 25: CPT | Performed by: INTERNAL MEDICINE

## 2019-09-25 RX ADMIN — ATORVASTATIN CALCIUM 80 MG: 40 TABLET, FILM COATED ORAL at 21:22

## 2019-09-25 RX ADMIN — BUDESONIDE AND FORMOTEROL FUMARATE DIHYDRATE 2 PUFF: 160; 4.5 AEROSOL RESPIRATORY (INHALATION) at 07:06

## 2019-09-25 RX ADMIN — FLUTICASONE PROPIONATE 1 SPRAY: 50 SPRAY, METERED NASAL at 08:59

## 2019-09-25 RX ADMIN — RANOLAZINE 1000 MG: 500 TABLET, FILM COATED, EXTENDED RELEASE ORAL at 10:06

## 2019-09-25 RX ADMIN — Medication 10 ML: at 21:24

## 2019-09-25 RX ADMIN — RANOLAZINE 1000 MG: 500 TABLET, FILM COATED, EXTENDED RELEASE ORAL at 21:23

## 2019-09-25 RX ADMIN — Medication 10 ML: at 09:01

## 2019-09-25 RX ADMIN — CYCLOBENZAPRINE HYDROCHLORIDE 10 MG: 10 TABLET, FILM COATED ORAL at 14:51

## 2019-09-25 RX ADMIN — DOCUSATE SODIUM 100 MG: 100 CAPSULE, LIQUID FILLED ORAL at 09:01

## 2019-09-25 RX ADMIN — FAMOTIDINE 20 MG: 20 TABLET ORAL at 09:01

## 2019-09-25 RX ADMIN — ONDANSETRON HYDROCHLORIDE 4 MG: 4 TABLET, FILM COATED ORAL at 14:50

## 2019-09-25 RX ADMIN — CYCLOBENZAPRINE HYDROCHLORIDE 10 MG: 10 TABLET, FILM COATED ORAL at 09:07

## 2019-09-25 RX ADMIN — BUDESONIDE AND FORMOTEROL FUMARATE DIHYDRATE 2 PUFF: 160; 4.5 AEROSOL RESPIRATORY (INHALATION) at 18:30

## 2019-09-25 RX ADMIN — LORAZEPAM 1 MG: 1 TABLET ORAL at 21:24

## 2019-09-25 RX ADMIN — MONTELUKAST SODIUM 10 MG: 10 TABLET, FILM COATED ORAL at 21:23

## 2019-09-25 RX ADMIN — ISOSORBIDE MONONITRATE 60 MG: 60 TABLET, EXTENDED RELEASE ORAL at 09:01

## 2019-09-25 RX ADMIN — BUMETANIDE 1 MG: 1 TABLET ORAL at 09:01

## 2019-09-25 RX ADMIN — ONDANSETRON HYDROCHLORIDE 4 MG: 4 TABLET, FILM COATED ORAL at 21:25

## 2019-09-25 RX ADMIN — ENOXAPARIN SODIUM 140 MG: 150 INJECTION SUBCUTANEOUS at 21:23

## 2019-09-25 RX ADMIN — CYCLOBENZAPRINE HYDROCHLORIDE 10 MG: 10 TABLET, FILM COATED ORAL at 21:26

## 2019-09-25 RX ADMIN — ISOSORBIDE MONONITRATE 60 MG: 60 TABLET, EXTENDED RELEASE ORAL at 15:03

## 2019-09-25 RX ADMIN — METOPROLOL SUCCINATE 50 MG: 50 TABLET, EXTENDED RELEASE ORAL at 09:01

## 2019-09-25 RX ADMIN — BUMETANIDE 1 MG: 1 TABLET ORAL at 21:22

## 2019-09-25 RX ADMIN — ONDANSETRON HYDROCHLORIDE 4 MG: 4 TABLET, FILM COATED ORAL at 09:07

## 2019-09-25 RX ADMIN — ENOXAPARIN SODIUM 140 MG: 150 INJECTION SUBCUTANEOUS at 09:00

## 2019-09-25 RX ADMIN — ISOSORBIDE MONONITRATE 60 MG: 60 TABLET, EXTENDED RELEASE ORAL at 21:23

## 2019-09-25 RX ADMIN — ROFLUMILAST 500 MCG: 500 TABLET ORAL at 09:01

## 2019-09-25 RX ADMIN — LORAZEPAM 1 MG: 1 TABLET ORAL at 09:07

## 2019-09-25 RX ADMIN — CLOPIDOGREL BISULFATE 75 MG: 75 TABLET ORAL at 09:01

## 2019-09-25 RX ADMIN — DULOXETINE 60 MG: 30 CAPSULE, DELAYED RELEASE ORAL at 09:01

## 2019-09-25 RX ADMIN — CETIRIZINE HYDROCHLORIDE 10 MG: 10 TABLET, FILM COATED ORAL at 09:01

## 2019-09-26 ENCOUNTER — ANESTHESIA EVENT (OUTPATIENT)
Dept: CARDIOLOGY | Facility: HOSPITAL | Age: 59
End: 2019-09-26

## 2019-09-26 ENCOUNTER — ANESTHESIA (OUTPATIENT)
Dept: CARDIOLOGY | Facility: HOSPITAL | Age: 59
End: 2019-09-26

## 2019-09-26 ENCOUNTER — APPOINTMENT (OUTPATIENT)
Dept: GENERAL RADIOLOGY | Facility: HOSPITAL | Age: 59
End: 2019-09-26

## 2019-09-26 VITALS
HEART RATE: 79 BPM | OXYGEN SATURATION: 91 % | DIASTOLIC BLOOD PRESSURE: 70 MMHG | SYSTOLIC BLOOD PRESSURE: 104 MMHG | RESPIRATION RATE: 7 BRPM

## 2019-09-26 LAB
ACT BLD: 147 SECONDS (ref 89–137)
ANION GAP SERPL CALCULATED.3IONS-SCNC: 11.8 MMOL/L (ref 5–15)
APTT PPP: 28.8 SECONDS (ref 24–31)
BUN BLD-MCNC: 14 MG/DL (ref 8–20)
BUN/CREAT SERPL: 10 (ref 6.2–20.3)
CALCIUM SPEC-SCNC: 8.6 MG/DL (ref 8.9–10.3)
CHLORIDE SERPL-SCNC: 98 MMOL/L (ref 101–111)
CO2 SERPL-SCNC: 33 MMOL/L (ref 22–32)
CREAT BLD-MCNC: 1.4 MG/DL (ref 0.7–1.2)
DEPRECATED RDW RBC AUTO: 46.4 FL (ref 37–54)
EOSINOPHIL # BLD MANUAL: 0.57 10*3/MM3 (ref 0–0.4)
EOSINOPHIL NFR BLD MANUAL: 5 % (ref 0.3–6.2)
ERYTHROCYTE [DISTWIDTH] IN BLOOD BY AUTOMATED COUNT: 15.1 % (ref 12.3–15.4)
GFR SERPL CREATININE-BSD FRML MDRD: 52 ML/MIN/1.73
GLUCOSE BLD-MCNC: 99 MG/DL (ref 65–99)
HCT VFR BLD AUTO: 40.7 % (ref 37.5–51)
HGB BLD-MCNC: 13.5 G/DL (ref 13–17.7)
INR PPP: 1.03 (ref 0.9–1.1)
LYMPHOCYTES # BLD MANUAL: 2.94 10*3/MM3 (ref 0.7–3.1)
LYMPHOCYTES NFR BLD MANUAL: 26 % (ref 19.6–45.3)
LYMPHOCYTES NFR BLD MANUAL: 7 % (ref 5–12)
MAGNESIUM SERPL-MCNC: 1.9 MG/DL (ref 1.8–2.5)
MCH RBC QN AUTO: 29 PG (ref 26.6–33)
MCHC RBC AUTO-ENTMCNC: 33.1 G/DL (ref 31.5–35.7)
MCV RBC AUTO: 87.5 FL (ref 79–97)
MONOCYTES # BLD AUTO: 0.79 10*3/MM3 (ref 0.1–0.9)
NEUTROPHILS # BLD AUTO: 7.01 10*3/MM3 (ref 1.7–7)
NEUTROPHILS NFR BLD MANUAL: 54 % (ref 42.7–76)
NEUTS BAND NFR BLD MANUAL: 8 % (ref 0–5)
PHOSPHATE SERPL-MCNC: 4.3 MG/DL (ref 2.4–4.7)
PLAT MORPH BLD: NORMAL
PLATELET # BLD AUTO: 246 10*3/MM3 (ref 140–450)
PMV BLD AUTO: 9.2 FL (ref 6–12)
POIKILOCYTOSIS BLD QL SMEAR: ABNORMAL
POTASSIUM BLD-SCNC: 3.8 MMOL/L (ref 3.6–5.1)
PROTHROMBIN TIME: 10.7 SECONDS (ref 9.6–11.7)
RBC # BLD AUTO: 4.65 10*6/MM3 (ref 4.14–5.8)
SCAN SLIDE: NORMAL
SODIUM BLD-SCNC: 139 MMOL/L (ref 136–144)
WBC MORPH BLD: NORMAL
WBC NRBC COR # BLD: 11.3 10*3/MM3 (ref 3.4–10.8)

## 2019-09-26 PROCEDURE — 4A0234Z MEASUREMENT OF CARDIAC ELECTRICAL ACTIVITY, PERCUTANEOUS APPROACH: ICD-10-PCS | Performed by: INTERNAL MEDICINE

## 2019-09-26 PROCEDURE — C1882 AICD, OTHER THAN SING/DUAL: HCPCS | Performed by: INTERNAL MEDICINE

## 2019-09-26 PROCEDURE — C1732 CATH, EP, DIAG/ABL, 3D/VECT: HCPCS | Performed by: INTERNAL MEDICINE

## 2019-09-26 PROCEDURE — 33225 L VENTRIC PACING LEAD ADD-ON: CPT | Performed by: INTERNAL MEDICINE

## 2019-09-26 PROCEDURE — C1894 INTRO/SHEATH, NON-LASER: HCPCS | Performed by: INTERNAL MEDICINE

## 2019-09-26 PROCEDURE — 99232 SBSQ HOSP IP/OBS MODERATE 35: CPT | Performed by: INTERNAL MEDICINE

## 2019-09-26 PROCEDURE — 93640 EP EVAL 1/2CHMBR PACG CVDFB: CPT | Performed by: INTERNAL MEDICINE

## 2019-09-26 PROCEDURE — C1892 INTRO/SHEATH,FIXED,PEEL-AWAY: HCPCS | Performed by: INTERNAL MEDICINE

## 2019-09-26 PROCEDURE — 33264 RMVL & RPLCMT DFB GEN MLT LD: CPT | Performed by: INTERNAL MEDICINE

## 2019-09-26 PROCEDURE — C1769 GUIDE WIRE: HCPCS | Performed by: INTERNAL MEDICINE

## 2019-09-26 PROCEDURE — 85025 COMPLETE CBC W/AUTO DIFF WBC: CPT | Performed by: HOSPITALIST

## 2019-09-26 PROCEDURE — 71045 X-RAY EXAM CHEST 1 VIEW: CPT

## 2019-09-26 PROCEDURE — 4A023FZ MEASUREMENT OF CARDIAC RHYTHM, PERCUTANEOUS APPROACH: ICD-10-PCS | Performed by: INTERNAL MEDICINE

## 2019-09-26 PROCEDURE — 0JPT0PZ REMOVAL OF CARDIAC RHYTHM RELATED DEVICE FROM TRUNK SUBCUTANEOUS TISSUE AND FASCIA, OPEN APPROACH: ICD-10-PCS | Performed by: INTERNAL MEDICINE

## 2019-09-26 PROCEDURE — 0JH609Z INSERTION OF CARDIAC RESYNCHRONIZATION DEFIBRILLATOR PULSE GENERATOR INTO CHEST SUBCUTANEOUS TISSUE AND FASCIA, OPEN APPROACH: ICD-10-PCS | Performed by: INTERNAL MEDICINE

## 2019-09-26 PROCEDURE — 25010000002 ONDANSETRON PER 1 MG: Performed by: INTERNAL MEDICINE

## 2019-09-26 PROCEDURE — C1900 LEAD, CORONARY VENOUS: HCPCS | Performed by: INTERNAL MEDICINE

## 2019-09-26 PROCEDURE — 25010000002 HEPARIN (PORCINE) PER 1000 UNITS: Performed by: ANESTHESIOLOGY

## 2019-09-26 PROCEDURE — 85610 PROTHROMBIN TIME: CPT | Performed by: INTERNAL MEDICINE

## 2019-09-26 PROCEDURE — 02583ZZ DESTRUCTION OF CONDUCTION MECHANISM, PERCUTANEOUS APPROACH: ICD-10-PCS | Performed by: INTERNAL MEDICINE

## 2019-09-26 PROCEDURE — 93650 ICAR CATH ABLTJ AV NODE FUNC: CPT | Performed by: INTERNAL MEDICINE

## 2019-09-26 PROCEDURE — 85730 THROMBOPLASTIN TIME PARTIAL: CPT | Performed by: INTERNAL MEDICINE

## 2019-09-26 PROCEDURE — 25010000002 MORPHINE PER 10 MG: Performed by: INTERNAL MEDICINE

## 2019-09-26 PROCEDURE — 25010000002 VANCOMYCIN 10 G RECONSTITUTED SOLUTION: Performed by: NURSE PRACTITIONER

## 2019-09-26 PROCEDURE — 84100 ASSAY OF PHOSPHORUS: CPT | Performed by: HOSPITALIST

## 2019-09-26 PROCEDURE — 85347 COAGULATION TIME ACTIVATED: CPT

## 2019-09-26 PROCEDURE — 83735 ASSAY OF MAGNESIUM: CPT | Performed by: HOSPITALIST

## 2019-09-26 PROCEDURE — 25010000002 MIDAZOLAM PER 1 MG: Performed by: ANESTHESIOLOGY

## 2019-09-26 PROCEDURE — 25010000002 PROPOFOL 10 MG/ML EMULSION: Performed by: ANESTHESIOLOGY

## 2019-09-26 PROCEDURE — 02K83ZZ MAP CONDUCTION MECHANISM, PERCUTANEOUS APPROACH: ICD-10-PCS | Performed by: INTERNAL MEDICINE

## 2019-09-26 PROCEDURE — 80048 BASIC METABOLIC PNL TOTAL CA: CPT | Performed by: HOSPITALIST

## 2019-09-26 PROCEDURE — 02HL3KZ INSERTION OF DEFIBRILLATOR LEAD INTO LEFT VENTRICLE, PERCUTANEOUS APPROACH: ICD-10-PCS | Performed by: INTERNAL MEDICINE

## 2019-09-26 PROCEDURE — 94640 AIRWAY INHALATION TREATMENT: CPT

## 2019-09-26 PROCEDURE — 85007 BL SMEAR W/DIFF WBC COUNT: CPT | Performed by: HOSPITALIST

## 2019-09-26 DEVICE — CARDIAC RESYNCHRONIZATION THERAPY DEFIBRILLATOR
Type: IMPLANTABLE DEVICE | Status: FUNCTIONAL
Brand: VIGILANT™ X4 CRT-D

## 2019-09-26 DEVICE — PACE/SENSE LEAD
Type: IMPLANTABLE DEVICE | Status: FUNCTIONAL
Brand: ACUITY™ X4 STRAIGHT

## 2019-09-26 RX ORDER — ONDANSETRON 2 MG/ML
4 INJECTION INTRAMUSCULAR; INTRAVENOUS EVERY 6 HOURS PRN
Status: DISCONTINUED | OUTPATIENT
Start: 2019-09-26 | End: 2019-09-27 | Stop reason: HOSPADM

## 2019-09-26 RX ORDER — HEPARIN SODIUM 1000 [USP'U]/ML
INJECTION, SOLUTION INTRAVENOUS; SUBCUTANEOUS AS NEEDED
Status: DISCONTINUED | OUTPATIENT
Start: 2019-09-26 | End: 2019-09-26 | Stop reason: SURG

## 2019-09-26 RX ORDER — KETAMINE HYDROCHLORIDE 10 MG/ML
INJECTION INTRAMUSCULAR; INTRAVENOUS AS NEEDED
Status: DISCONTINUED | OUTPATIENT
Start: 2019-09-26 | End: 2019-09-26 | Stop reason: SURG

## 2019-09-26 RX ORDER — MORPHINE SULFATE 4 MG/ML
1 INJECTION, SOLUTION INTRAMUSCULAR; INTRAVENOUS EVERY 4 HOURS PRN
Status: DISCONTINUED | OUTPATIENT
Start: 2019-09-26 | End: 2019-09-27 | Stop reason: HOSPADM

## 2019-09-26 RX ORDER — SODIUM CHLORIDE 0.9 % (FLUSH) 0.9 %
3-10 SYRINGE (ML) INJECTION AS NEEDED
Status: CANCELLED | OUTPATIENT
Start: 2019-09-26

## 2019-09-26 RX ORDER — SODIUM CHLORIDE 0.9 % (FLUSH) 0.9 %
10 SYRINGE (ML) INJECTION AS NEEDED
Status: DISCONTINUED | OUTPATIENT
Start: 2019-09-26 | End: 2019-09-27 | Stop reason: HOSPADM

## 2019-09-26 RX ORDER — SODIUM CHLORIDE 9 MG/ML
9 INJECTION, SOLUTION INTRAVENOUS CONTINUOUS PRN
Status: CANCELLED | OUTPATIENT
Start: 2019-09-26

## 2019-09-26 RX ORDER — PHENYLEPHRINE HCL IN 0.9% NACL 0.5 MG/5ML
SYRINGE (ML) INTRAVENOUS AS NEEDED
Status: DISCONTINUED | OUTPATIENT
Start: 2019-09-26 | End: 2019-09-26 | Stop reason: SURG

## 2019-09-26 RX ORDER — SODIUM CHLORIDE 9 MG/ML
INJECTION, SOLUTION INTRAVENOUS CONTINUOUS PRN
Status: DISCONTINUED | OUTPATIENT
Start: 2019-09-26 | End: 2019-09-26 | Stop reason: SURG

## 2019-09-26 RX ORDER — VANCOMYCIN HYDROCHLORIDE
1500 ONCE
Status: COMPLETED | OUTPATIENT
Start: 2019-09-27 | End: 2019-09-27

## 2019-09-26 RX ORDER — SODIUM CHLORIDE 0.9 % (FLUSH) 0.9 %
3 SYRINGE (ML) INJECTION EVERY 12 HOURS SCHEDULED
Status: CANCELLED | OUTPATIENT
Start: 2019-09-26

## 2019-09-26 RX ORDER — VANCOMYCIN HYDROCHLORIDE
15 ONCE
Status: COMPLETED | OUTPATIENT
Start: 2019-09-26 | End: 2019-09-26

## 2019-09-26 RX ORDER — MIDAZOLAM HYDROCHLORIDE 1 MG/ML
INJECTION INTRAMUSCULAR; INTRAVENOUS AS NEEDED
Status: DISCONTINUED | OUTPATIENT
Start: 2019-09-26 | End: 2019-09-26 | Stop reason: SURG

## 2019-09-26 RX ORDER — SODIUM CHLORIDE 9 MG/ML
250 INJECTION, SOLUTION INTRAVENOUS ONCE AS NEEDED
Status: DISCONTINUED | OUTPATIENT
Start: 2019-09-26 | End: 2019-09-27 | Stop reason: HOSPADM

## 2019-09-26 RX ORDER — SODIUM CHLORIDE 0.9 % (FLUSH) 0.9 %
3 SYRINGE (ML) INJECTION EVERY 12 HOURS SCHEDULED
Status: DISCONTINUED | OUTPATIENT
Start: 2019-09-26 | End: 2019-09-27 | Stop reason: HOSPADM

## 2019-09-26 RX ORDER — ATROPINE SULFATE 1 MG/ML
.5-1 INJECTION, SOLUTION INTRAMUSCULAR; INTRAVENOUS; SUBCUTANEOUS
Status: DISCONTINUED | OUTPATIENT
Start: 2019-09-26 | End: 2019-09-27 | Stop reason: HOSPADM

## 2019-09-26 RX ORDER — NALOXONE HCL 0.4 MG/ML
0.4 VIAL (ML) INJECTION
Status: DISCONTINUED | OUTPATIENT
Start: 2019-09-26 | End: 2019-09-27 | Stop reason: HOSPADM

## 2019-09-26 RX ORDER — LIDOCAINE HYDROCHLORIDE 20 MG/ML
INJECTION, SOLUTION INFILTRATION; PERINEURAL AS NEEDED
Status: DISCONTINUED | OUTPATIENT
Start: 2019-09-26 | End: 2019-09-26 | Stop reason: HOSPADM

## 2019-09-26 RX ADMIN — KETAMINE HYDROCHLORIDE 10 MG: 10 INJECTION INTRAMUSCULAR; INTRAVENOUS at 18:45

## 2019-09-26 RX ADMIN — MIDAZOLAM HYDROCHLORIDE 2 MG: 1 INJECTION, SOLUTION INTRAMUSCULAR; INTRAVENOUS at 18:26

## 2019-09-26 RX ADMIN — MORPHINE SULFATE 1 MG: 4 INJECTION INTRAVENOUS at 22:31

## 2019-09-26 RX ADMIN — DOCUSATE SODIUM 100 MG: 100 CAPSULE, LIQUID FILLED ORAL at 09:37

## 2019-09-26 RX ADMIN — PROPOFOL 50 MCG/KG/MIN: 10 INJECTION, EMULSION INTRAVENOUS at 18:37

## 2019-09-26 RX ADMIN — DULOXETINE 60 MG: 30 CAPSULE, DELAYED RELEASE ORAL at 09:37

## 2019-09-26 RX ADMIN — FAMOTIDINE 20 MG: 20 TABLET ORAL at 09:37

## 2019-09-26 RX ADMIN — FLUTICASONE PROPIONATE 1 SPRAY: 50 SPRAY, METERED NASAL at 09:38

## 2019-09-26 RX ADMIN — HEPARIN SODIUM 2000 UNITS: 1000 INJECTION, SOLUTION INTRAVENOUS; SUBCUTANEOUS at 20:16

## 2019-09-26 RX ADMIN — KETAMINE HYDROCHLORIDE 10 MG: 10 INJECTION INTRAMUSCULAR; INTRAVENOUS at 18:40

## 2019-09-26 RX ADMIN — VANCOMYCIN HYDROCHLORIDE 1500 MG: 10 INJECTION, POWDER, LYOPHILIZED, FOR SOLUTION INTRAVENOUS at 18:12

## 2019-09-26 RX ADMIN — KETAMINE HYDROCHLORIDE 10 MG: 10 INJECTION INTRAMUSCULAR; INTRAVENOUS at 18:30

## 2019-09-26 RX ADMIN — KETAMINE HYDROCHLORIDE 10 MG: 10 INJECTION INTRAMUSCULAR; INTRAVENOUS at 18:50

## 2019-09-26 RX ADMIN — CLOPIDOGREL BISULFATE 75 MG: 75 TABLET ORAL at 09:37

## 2019-09-26 RX ADMIN — CETIRIZINE HYDROCHLORIDE 10 MG: 10 TABLET, FILM COATED ORAL at 09:37

## 2019-09-26 RX ADMIN — Medication 10 ML: at 09:36

## 2019-09-26 RX ADMIN — MIDAZOLAM HYDROCHLORIDE 2 MG: 1 INJECTION, SOLUTION INTRAMUSCULAR; INTRAVENOUS at 18:31

## 2019-09-26 RX ADMIN — CYCLOBENZAPRINE HYDROCHLORIDE 10 MG: 10 TABLET, FILM COATED ORAL at 22:31

## 2019-09-26 RX ADMIN — LORAZEPAM 1 MG: 1 TABLET ORAL at 22:31

## 2019-09-26 RX ADMIN — IPRATROPIUM BROMIDE AND ALBUTEROL SULFATE 3 ML: .5; 3 SOLUTION RESPIRATORY (INHALATION) at 16:38

## 2019-09-26 RX ADMIN — ONDANSETRON 4 MG: 2 INJECTION INTRAMUSCULAR; INTRAVENOUS at 18:04

## 2019-09-26 RX ADMIN — METOPROLOL SUCCINATE 50 MG: 50 TABLET, EXTENDED RELEASE ORAL at 09:37

## 2019-09-26 RX ADMIN — KETAMINE HYDROCHLORIDE 10 MG: 10 INJECTION INTRAMUSCULAR; INTRAVENOUS at 18:35

## 2019-09-26 RX ADMIN — SODIUM CHLORIDE: 0.9 INJECTION, SOLUTION INTRAVENOUS at 18:39

## 2019-09-26 RX ADMIN — ROFLUMILAST 500 MCG: 500 TABLET ORAL at 09:37

## 2019-09-26 RX ADMIN — ISOSORBIDE MONONITRATE 60 MG: 60 TABLET, EXTENDED RELEASE ORAL at 09:37

## 2019-09-26 RX ADMIN — MIDAZOLAM HYDROCHLORIDE 1 MG: 1 INJECTION, SOLUTION INTRAMUSCULAR; INTRAVENOUS at 18:36

## 2019-09-26 RX ADMIN — RANOLAZINE 1000 MG: 500 TABLET, FILM COATED, EXTENDED RELEASE ORAL at 09:37

## 2019-09-26 RX ADMIN — PHENYLEPHRINE HYDROCHLORIDE 100 MCG: 10 INJECTION INTRAVENOUS at 19:10

## 2019-09-26 NOTE — ANESTHESIA PREPROCEDURE EVALUATION
Anesthesia Evaluation     Patient summary reviewed and Nursing notes reviewed   NPO Solid Status: > 6 hours  NPO Liquid Status: > 6 hours           Airway   Mallampati: II  TM distance: >3 FB  Neck ROM: full  No difficulty expected and Possible difficult intubation  Dental    (+) poor dentition    Pulmonary - normal exam    breath sounds clear to auscultation  (+) sleep apnea,   Cardiovascular     ECG reviewed  Rhythm: irregular  Rate: abnormal    (+) hypertension, past MI , CAD, dysrhythmias, angina, CHF, hyperlipidemia,       Neuro/Psych- negative ROS  GI/Hepatic/Renal/Endo    (+) obesity,       Musculoskeletal (-) negative ROS    Abdominal  - normal exam   Substance History - negative use     OB/GYN          Other                 Cardiology Consult-EP      Requesting PROVIDER  No att. providers found      PATIENT IDENTIFICATION    Name: Jose Dixon Jr. Age: 58 y.o. Sex: male :  1960 MRN: [id number]    REASON  FOR  CONSULTATION  58-year-old  male with known history of coronary occlusive disease status post PCI x2 stents total, persistent and uncontrollable  atrial fibrillation status post prior ablation, history of MI, nonsustained VT, frequent PVCs, congestive heart failure, hypertension, dyslipidemia, COPD, permanent pacemaker implant 2016-[employer].    CC:  Atrial fibrillation with rapid ventricular response  ICD discharge    HPI:  Patient was recently seen and [facility]--for recurrent chest pain and recurrent congestive heart failure with persistent atrial fibrillation and was recommended biventricular ICD upgrade with AV node ablation to optimize therapy for congestive heart failure atrial arrhythmia rate control and underlying coronary artery disease--patient was discharged home and had multiple ICD therapies for conducted atrial fibrillation--placed on intravenous Cardizem drip and my consultation is requested for further AV node ablation and biventricular ICD  upgrade  Currently chest pain-free and has chronic class III shortness of breath and palpitations without any syncope  Has elevated white count without fever  Chronic medical problems include ischemic cardio myopathy chronic class III systolic heart failure, persistent atrial fibrillation and morbid obesity with single-chamber ICD in situ        Past Medical History:   Diagnosis Date   • Asthma    • Atrial fibrillation (CMS/[facility])    • CAD (coronary artery disease)    • CHF (congestive heart failure) (CMS/[facility])    • COPD (chronic obstructive pulmonary disease) (CMS/[facility])    • Depression    • Hyperlipidemia    • Hypertension    • Myocardial infarction (CMS/[facility])    • Pacemaker     pacemaker / defib   • V tach (CMS/[facility])      Past Surgical History:   Procedure Laterality Date   • CARDIAC ABLATION  11/07/2017   • CARDIAC CATHETERIZATION     • CHOLECYSTECTOMY     • CORONARY ANGIOPLASTY WITH STENT PLACEMENT      2 stents   • PACEMAKER IMPLANTATION  07/08/2016    Pacemaker/ Defib implant - [place]   • SINUS SURGERY      sinus surgery x 9     Family History   Problem Relation Age of Onset   • Diabetes Mother    • Heart disease Mother         MI age 46 - CHF   • Atrial fibrillation Mother    • COPD Mother    • Atrial fibrillation Father    • Heart disease Father         CHF     Social History     Tobacco Use   • Smoking status: Never Smoker   Substance Use Topics   • Alcohol use: No     Frequency: Never   • Drug use: No       (Not in a hospital admission)  Allergies:  Tramadol and Codeine    Review of Systems   General:  positive for fatigue and tiredness  Eyes: No redness  Cardiovascular: No chest pain, no palpitations  Respiratory:   positive for class 3 shortness of breath  Gastrointestinal: No nausea or vomiting, bleeding  Genitourinary: no hematuria or dysuria  Musculoskeletal: No arthralgia or myalgia  Skin: No rash  Neurologic: No numbness, tingling, syncope  Hematologic/Lymphatic: No abnormal bleeding      Physical  Exam  VITALS REVIEWED--blood pressure 123/70 pulse rate is 19 atrial fibrillation patient is afebrile respiration 12 times a minute    General:      well developed, well nourished, in no acute distress.    Head:      normocephalic and atraumatic.    Eyes:      PERRL/EOM intact, conjunctiva and sclera clear with out nystagmus.    Neck:      no masses, thyromegaly,  trachea central with normal respiratory effort and PMI displaced laterally  Lungs:      clear bilaterally to auscultation.    Heart:       underlying  atrial fibrillation with irregularly irregular rhythm and  without any murmurs gallops or rubs  Msk:      no deformity or scoliosis noted of thoracic or lumbar spine.    Pulses:      pulses normal in all 4 extremities.    Extremities:       no cyanosis or clubbing--trace left pedal edema and trace right pedal edema.    Neurologic:      no focal deficits.   alert oriented x3  Skin:      intact without lesions or rashes.    Psych:      alert and cooperative; normal mood and affect; normal attention span and concentration.          CBC    Results from last 7 days   Lab Units 09/26/19  0341 09/25/19  0217 09/24/19  0228 09/23/19  0214 09/22/19  1004 09/21/19  0416 09/20/19  1927   WBC 10*3/mm3 11.30* 11.30* 12.50* 14.80* 12.90* 11.60* 14.00*   HEMOGLOBIN g/dL 13.5 13.1 12.8* 13.3 13.7 13.2 13.6   PLATELETS 10*3/mm3 246 228 245 231 235 215 223     BMP   Results from last 7 days   Lab Units 09/26/19  0341 09/25/19  0217 09/24/19  0228 09/23/19  0214 09/22/19  1004 09/21/19  0416 09/20/19  1926   SODIUM mmol/L 139 138 138 136 140 142 142   POTASSIUM mmol/L 3.8 4.0 3.7 3.9 4.1 4.2 4.4   CHLORIDE mmol/L 98* 99* 100* 97* 99* 97* 97*   CO2 mmol/L 33.0* 28.0 28.0 29.0 33.0* 39.0* 38.0*   BUN mg/dL 14 13 14 17 13 18 18   CREATININE mg/dL 1.40* 1.10 1.10 1.30* 1.10 1.20 1.30*   GLUCOSE mg/dL 99 95 136* 111* 132* 107* 114*   MAGNESIUM mg/dL 1.9 1.9 1.9 1.8 2.1 2.0 2.0   PHOSPHORUS mg/dL 4.3 4.7 3.9 4.3 3.0 5.0* 5.4*      CMP   Results from last 7 days   Lab Units 09/26/19  0341 09/25/19  0217 09/24/19  0228 09/23/19  0214 09/22/19  1004 09/21/19  0416 09/20/19  1926   SODIUM mmol/L 139 138 138 136 140 142 142   POTASSIUM mmol/L 3.8 4.0 3.7 3.9 4.1 4.2 4.4   CHLORIDE mmol/L 98* 99* 100* 97* 99* 97* 97*   CO2 mmol/L 33.0* 28.0 28.0 29.0 33.0* 39.0* 38.0*   BUN mg/dL 14 13 14 17 13 18 18   CREATININE mg/dL 1.40* 1.10 1.10 1.30* 1.10 1.20 1.30*   GLUCOSE mg/dL 99 95 136* 111* 132* 107* 114*   ALBUMIN g/dL  --   --   --   --   --   --  3.30*   BILIRUBIN mg/dL  --   --   --   --   --   --  1.1   ALK PHOS U/L  --   --   --   --   --   --  44   AST (SGOT) U/L  --   --   --   --   --   --  24   ALT (SGPT) U/L  --   --   --   --   --   --  31     Radiology(recent) No radiology results for the last day    EKG shows atrial fibrillation with nonspecific ST-T wave changes      Assessment plan    Uncontrollable rapid atrial fibrillation  Single-chamber ICD in situ  Chronic class III systolic heart failure  Iatrogenic left bundle branch block  Ischemic cardiomyopathy with coronary artery disease  Elevated white count    Recommendations    Patient will benefit with ICD upgrade to biventricular system with AV node ablation  Elevated white count need to be evaluated prior to the procedure  Patient  and family educated      Kindly note the entire note from the nurse practitioner has been modified and rewritten personally               Anesthesia Plan    ASA 4     MAC     intravenous induction   Anesthetic plan, all risks, benefits, and alternatives have been provided, discussed and informed consent has been obtained with: patient.

## 2019-09-27 VITALS
OXYGEN SATURATION: 95 % | TEMPERATURE: 98.2 F | BODY MASS INDEX: 43.02 KG/M2 | DIASTOLIC BLOOD PRESSURE: 77 MMHG | SYSTOLIC BLOOD PRESSURE: 109 MMHG | HEART RATE: 85 BPM | WEIGHT: 300.49 LBS | RESPIRATION RATE: 15 BRPM | HEIGHT: 70 IN

## 2019-09-27 LAB
ANION GAP SERPL CALCULATED.3IONS-SCNC: 14.3 MMOL/L (ref 5–15)
BUN BLD-MCNC: 16 MG/DL (ref 8–20)
BUN/CREAT SERPL: 12.3 (ref 6.2–20.3)
CALCIUM SPEC-SCNC: 7.9 MG/DL (ref 8.9–10.3)
CHLORIDE SERPL-SCNC: 101 MMOL/L (ref 101–111)
CO2 SERPL-SCNC: 27 MMOL/L (ref 22–32)
CREAT BLD-MCNC: 1.3 MG/DL (ref 0.7–1.2)
DEPRECATED RDW RBC AUTO: 48.1 FL (ref 37–54)
EOSINOPHIL # BLD MANUAL: 0.13 10*3/MM3 (ref 0–0.4)
EOSINOPHIL NFR BLD MANUAL: 1 % (ref 0.3–6.2)
ERYTHROCYTE [DISTWIDTH] IN BLOOD BY AUTOMATED COUNT: 15.4 % (ref 12.3–15.4)
GFR SERPL CREATININE-BSD FRML MDRD: 57 ML/MIN/1.73
GLUCOSE BLD-MCNC: 102 MG/DL (ref 65–99)
HCT VFR BLD AUTO: 37.8 % (ref 37.5–51)
HGB BLD-MCNC: 12.2 G/DL (ref 13–17.7)
LYMPHOCYTES # BLD MANUAL: 2.16 10*3/MM3 (ref 0.7–3.1)
LYMPHOCYTES NFR BLD MANUAL: 13 % (ref 5–12)
LYMPHOCYTES NFR BLD MANUAL: 17 % (ref 19.6–45.3)
MAGNESIUM SERPL-MCNC: 1.9 MG/DL (ref 1.8–2.5)
MCH RBC QN AUTO: 28.4 PG (ref 26.6–33)
MCHC RBC AUTO-ENTMCNC: 32.2 G/DL (ref 31.5–35.7)
MCV RBC AUTO: 88.2 FL (ref 79–97)
MONOCYTES # BLD AUTO: 1.65 10*3/MM3 (ref 0.1–0.9)
MYELOCYTES NFR BLD MANUAL: 3 % (ref 0–0)
NEUTROPHILS # BLD AUTO: 8.38 10*3/MM3 (ref 1.7–7)
NEUTROPHILS NFR BLD MANUAL: 56 % (ref 42.7–76)
NEUTS BAND NFR BLD MANUAL: 10 % (ref 0–5)
PHOSPHATE SERPL-MCNC: 3.8 MG/DL (ref 2.4–4.7)
PLAT MORPH BLD: NORMAL
PLATELET # BLD AUTO: 199 10*3/MM3 (ref 140–450)
PMV BLD AUTO: 9.1 FL (ref 6–12)
POTASSIUM BLD-SCNC: 4.3 MMOL/L (ref 3.6–5.1)
RBC # BLD AUTO: 4.28 10*6/MM3 (ref 4.14–5.8)
RBC MORPH BLD: NORMAL
SCAN SLIDE: NORMAL
SODIUM BLD-SCNC: 138 MMOL/L (ref 136–144)
TOXIC GRANULATION: ABNORMAL
WBC NRBC COR # BLD: 12.7 10*3/MM3 (ref 3.4–10.8)

## 2019-09-27 PROCEDURE — 99024 POSTOP FOLLOW-UP VISIT: CPT | Performed by: NURSE PRACTITIONER

## 2019-09-27 PROCEDURE — 83735 ASSAY OF MAGNESIUM: CPT | Performed by: HOSPITALIST

## 2019-09-27 PROCEDURE — 99239 HOSP IP/OBS DSCHRG MGMT >30: CPT | Performed by: INTERNAL MEDICINE

## 2019-09-27 PROCEDURE — 93010 ELECTROCARDIOGRAM REPORT: CPT | Performed by: INTERNAL MEDICINE

## 2019-09-27 PROCEDURE — 93005 ELECTROCARDIOGRAM TRACING: CPT | Performed by: INTERNAL MEDICINE

## 2019-09-27 PROCEDURE — 94799 UNLISTED PULMONARY SVC/PX: CPT

## 2019-09-27 PROCEDURE — 25010000002 MORPHINE PER 10 MG: Performed by: INTERNAL MEDICINE

## 2019-09-27 PROCEDURE — 85025 COMPLETE CBC W/AUTO DIFF WBC: CPT | Performed by: HOSPITALIST

## 2019-09-27 PROCEDURE — 85007 BL SMEAR W/DIFF WBC COUNT: CPT | Performed by: HOSPITALIST

## 2019-09-27 PROCEDURE — 84100 ASSAY OF PHOSPHORUS: CPT | Performed by: HOSPITALIST

## 2019-09-27 PROCEDURE — 25010000002 VANCOMYCIN 10 G RECONSTITUTED SOLUTION: Performed by: INTERNAL MEDICINE

## 2019-09-27 PROCEDURE — 80048 BASIC METABOLIC PNL TOTAL CA: CPT | Performed by: HOSPITALIST

## 2019-09-27 RX ORDER — CETIRIZINE HYDROCHLORIDE 10 MG/1
10 TABLET ORAL DAILY
Qty: 30 TABLET | Refills: 0 | Status: SHIPPED | OUTPATIENT
Start: 2019-09-28 | End: 2019-10-28 | Stop reason: SDUPTHER

## 2019-09-27 RX ORDER — HYDROCODONE BITARTRATE AND ACETAMINOPHEN 5; 325 MG/1; MG/1
1 TABLET ORAL EVERY 6 HOURS PRN
Qty: 30 TABLET | Refills: 0 | Status: SHIPPED | OUTPATIENT
Start: 2019-09-27 | End: 2019-10-07 | Stop reason: SDUPTHER

## 2019-09-27 RX ORDER — AMOXICILLIN AND CLAVULANATE POTASSIUM 875; 125 MG/1; MG/1
1 TABLET, FILM COATED ORAL 2 TIMES DAILY
Qty: 10 TABLET | Refills: 0 | Status: SHIPPED | OUTPATIENT
Start: 2019-09-27 | End: 2019-10-02

## 2019-09-27 RX ADMIN — VANCOMYCIN HYDROCHLORIDE 1500 MG: 10 INJECTION, POWDER, LYOPHILIZED, FOR SOLUTION INTRAVENOUS at 06:13

## 2019-09-27 RX ADMIN — MORPHINE SULFATE 1 MG: 4 INJECTION INTRAVENOUS at 06:12

## 2019-09-27 RX ADMIN — BUDESONIDE AND FORMOTEROL FUMARATE DIHYDRATE 2 PUFF: 160; 4.5 AEROSOL RESPIRATORY (INHALATION) at 07:01

## 2019-09-27 RX ADMIN — DOCUSATE SODIUM 100 MG: 100 CAPSULE, LIQUID FILLED ORAL at 09:41

## 2019-09-27 RX ADMIN — CYCLOBENZAPRINE HYDROCHLORIDE 10 MG: 10 TABLET, FILM COATED ORAL at 06:12

## 2019-09-27 RX ADMIN — CLOPIDOGREL BISULFATE 75 MG: 75 TABLET ORAL at 09:41

## 2019-09-27 RX ADMIN — METOPROLOL SUCCINATE 50 MG: 50 TABLET, EXTENDED RELEASE ORAL at 09:41

## 2019-09-27 RX ADMIN — FAMOTIDINE 20 MG: 20 TABLET ORAL at 09:41

## 2019-09-27 RX ADMIN — BUMETANIDE 1 MG: 1 TABLET ORAL at 09:41

## 2019-09-27 RX ADMIN — ROFLUMILAST 500 MCG: 500 TABLET ORAL at 09:41

## 2019-09-27 RX ADMIN — Medication 3 ML: at 09:42

## 2019-09-27 RX ADMIN — DULOXETINE 60 MG: 30 CAPSULE, DELAYED RELEASE ORAL at 09:41

## 2019-09-27 RX ADMIN — ACETAMINOPHEN 650 MG: 325 TABLET ORAL at 01:48

## 2019-09-27 RX ADMIN — RANOLAZINE 1000 MG: 500 TABLET, FILM COATED, EXTENDED RELEASE ORAL at 09:41

## 2019-09-27 RX ADMIN — MORPHINE SULFATE 1 MG: 4 INJECTION INTRAVENOUS at 02:32

## 2019-09-27 RX ADMIN — LORAZEPAM 1 MG: 1 TABLET ORAL at 06:12

## 2019-09-27 RX ADMIN — ISOSORBIDE MONONITRATE 60 MG: 60 TABLET, EXTENDED RELEASE ORAL at 09:41

## 2019-09-27 RX ADMIN — FLUTICASONE PROPIONATE 1 SPRAY: 50 SPRAY, METERED NASAL at 09:42

## 2019-09-27 RX ADMIN — CETIRIZINE HYDROCHLORIDE 10 MG: 10 TABLET, FILM COATED ORAL at 09:41

## 2019-09-27 NOTE — ANESTHESIA POSTPROCEDURE EVALUATION
Patient: Jose Dixon Jr.    Procedure Summary     Date:  09/26/19 Room / Location:  [facility] [address] [place] [address] CATH INVASIVE LOCATION    Anesthesia Start:  1826 Anesthesia Stop:  2042    Procedures:       BIVENTRICULAR DEVICE UPGRADE (N/A )      EP/Ablation AV Node ablation (N/A ) Diagnosis:       Atrial fibrillation with RVR (CMS/HCC)      Cardiomyopathy, dilated (CMS/HCC)      Paroxysmal atrial fibrillation (CMS/HCC)      ICD (implantable cardioverter-defibrillator) in place      (Post biventricular ICD up gradation without complications  Successful radiofrequency ablation of compact AV node without complications)    Provider:  Jose Lopez MD Provider:  Harry Gonzalez MD    Anesthesia Type:  MAC ASA Status:  4          Anesthesia Type: MAC  Last vitals  BP   122/70 (09/26/19 1900)   Temp   97.9 °F (36.6 °C) (09/26/19 1900)   Pulse   95 (09/26/19 1900)   Resp   16 (09/26/19 1900)     SpO2   96 % (09/26/19 1900)     Post Anesthesia Care and Evaluation    Patient location during evaluation: bedside  Patient participation: complete - patient participated  Level of consciousness: awake and alert  Pain score: 1  Pain management: adequate  Airway patency: patent  Anesthetic complications: No anesthetic complications  PONV Status: none  Cardiovascular status: acceptable  Respiratory status: acceptable  Hydration status: acceptable  Post Neuraxial Block status: Motor and sensory function returned to baseline

## 2019-09-28 ENCOUNTER — READMISSION MANAGEMENT (OUTPATIENT)
Dept: CALL CENTER | Facility: HOSPITAL | Age: 59
End: 2019-09-28

## 2019-09-28 NOTE — OUTREACH NOTE
Prep Survey      Responses   Facility patient discharged from?  [facility]   Is patient eligible?  Yes   Discharge diagnosis  Paroxysmal atrial fibrillation    Does the patient have one of the following disease processes/diagnoses(primary or secondary)?  Other   Does the patient have Home health ordered?  No   Is there a DME ordered?  No   Prep survey completed?  Yes          Swapna Clancy RN

## 2019-10-01 ENCOUNTER — READMISSION MANAGEMENT (OUTPATIENT)
Dept: CALL CENTER | Facility: HOSPITAL | Age: 59
End: 2019-10-01

## 2019-10-01 NOTE — OUTREACH NOTE
Medical Week 1 Survey      Responses   Facility patient discharged from?  [facility]   Does the patient have one of the following disease processes/diagnoses(primary or secondary)?  Other   Is there a successful TCM telephone encounter documented?  No   Week 1 attempt successful?  No   Unsuccessful attempts  Attempt 4 [NO ANSWER, NO VM]          Lisa Lopez LPN

## 2019-10-07 ENCOUNTER — OFFICE VISIT (OUTPATIENT)
Dept: CARDIOLOGY | Facility: CLINIC | Age: 59
End: 2019-10-07

## 2019-10-07 VITALS
DIASTOLIC BLOOD PRESSURE: 77 MMHG | HEART RATE: 111 BPM | WEIGHT: 307.8 LBS | SYSTOLIC BLOOD PRESSURE: 134 MMHG | BODY MASS INDEX: 44.16 KG/M2 | OXYGEN SATURATION: 89 %

## 2019-10-07 DIAGNOSIS — I48.21 PERMANENT ATRIAL FIBRILLATION (HCC): ICD-10-CM

## 2019-10-07 DIAGNOSIS — I50.22 CHRONIC SYSTOLIC CONGESTIVE HEART FAILURE (HCC): Primary | ICD-10-CM

## 2019-10-07 DIAGNOSIS — Z95.810 PRESENCE OF BIVENTRICULAR IMPLANTABLE CARDIOVERTER-DEFIBRILLATOR (ICD): ICD-10-CM

## 2019-10-07 DIAGNOSIS — I25.5 ISCHEMIC CARDIOMYOPATHY: ICD-10-CM

## 2019-10-07 PROCEDURE — 93284 PRGRMG EVAL IMPLANTABLE DFB: CPT | Performed by: INTERNAL MEDICINE

## 2019-10-07 PROCEDURE — 93000 ELECTROCARDIOGRAM COMPLETE: CPT | Performed by: INTERNAL MEDICINE

## 2019-10-07 PROCEDURE — 99214 OFFICE O/P EST MOD 30 MIN: CPT | Performed by: INTERNAL MEDICINE

## 2019-10-07 RX ORDER — HYDROCHLOROTHIAZIDE 25 MG/1
25 TABLET ORAL DAILY
Qty: 30 TABLET | Refills: 3 | Status: SHIPPED | OUTPATIENT
Start: 2019-10-07 | End: 2019-10-28 | Stop reason: SDUPTHER

## 2019-10-07 RX ORDER — POTASSIUM CHLORIDE 750 MG/1
10 TABLET, FILM COATED, EXTENDED RELEASE ORAL DAILY
Qty: 30 TABLET | Refills: 3 | Status: SHIPPED | OUTPATIENT
Start: 2019-10-07 | End: 2020-07-31 | Stop reason: SDUPTHER

## 2019-10-07 RX ORDER — TAMSULOSIN HYDROCHLORIDE 0.4 MG/1
1 CAPSULE ORAL EVERY EVENING
Refills: 0 | COMMUNITY
Start: 2019-09-17 | End: 2020-07-15

## 2019-10-07 RX ORDER — LISINOPRIL 5 MG/1
5 TABLET ORAL DAILY
Qty: 30 TABLET | Refills: 3 | Status: SHIPPED | OUTPATIENT
Start: 2019-10-07 | End: 2020-07-21 | Stop reason: HOSPADM

## 2019-10-07 NOTE — PROGRESS NOTES
CC--worsening dyspnea with increasing symptoms of heart failure    Sub    [facility] was [facility]--for recurrent chest pain and recurrent congestive heart failure with persistent atrial fibrillation and was recommended biventricular ICD upgrade with AV node ablation to optimize therapy for congestive heart failure atrial arrhythmia rate control and underlying coronary artery disease--patient underwent biventricular ICD upgrade with AV node ablation less than 2 weeks ago --after having multiple inappropriate ICD therapies for conducted atrial fibrillation --presented to the office with increasing shortness of breath with class III dyspnea with mild wheezing without any significant help from his inhalers also with weight gain and intermittent leg edema --he also complains orthopnea without any angina   Chronic medical problems include ischemic cardio myopathy chronic class III systolic heart failure, persistent atrial fibrillation and morbid obesity with single-chamber ICD in situ        Past Medical History:   Diagnosis Date   • Asthma    • Atrial fibrillation (CMS/[facility])    • CAD (coronary artery disease)    • CHF (congestive heart failure) (CMS/[facility])    • COPD (chronic obstructive pulmonary disease) (CMS/[facility])    • Depression    • Hyperlipidemia    • Hypertension    • Myocardial infarction (CMS/[facility])    • Pacemaker     pacemaker / defib   • V tach (CMS/[facility])      Past Surgical History:   Procedure Laterality Date   • CARDIAC ABLATION  11/07/2017   • CARDIAC CATHETERIZATION     • CARDIAC ELECTROPHYSIOLOGY PROCEDURE N/A 9/26/2019    Procedure: BIVENTRICULAR DEVICE UPGRADE;  Surgeon: Jose Lopez MD;  Location: [facility] CATH INVASIVE LOCATION;  Service: Cardiovascular   • CARDIAC ELECTROPHYSIOLOGY PROCEDURE N/A 9/26/2019    Procedure: EP/Ablation AV Node ablation;  Surgeon: Jose Lopez MD;  Location: [facility] CATH INVASIVE LOCATION;  Service: Cardiology   • CHOLECYSTECTOMY     •  CORONARY ANGIOPLASTY WITH STENT PLACEMENT      2 stents   • PACEMAKER IMPLANTATION  07/08/2016    Pacemaker/ Defib implant - [place]   • SINUS SURGERY      sinus surgery x 9     Family History   Problem Relation Age of Onset   • Diabetes Mother    • Heart disease Mother         MI age 46 - CHF   • Atrial fibrillation Mother    • COPD Mother    • Atrial fibrillation Father    • Heart disease Father         CHF     Social History     Tobacco Use   • Smoking status: Never Smoker   Substance Use Topics   • Alcohol use: No     Frequency: Never   • Drug use: No       (Not in a hospital admission)  Allergies:  Tramadol and Codeine    Review of Systems   General:  positive for fatigue and tiredness  Eyes: No redness  Cardiovascular: No chest pain, no palpitations  Respiratory:   positive for class 3 shortness of breath  Gastrointestinal: No nausea or vomiting, bleeding  Genitourinary: no hematuria or dysuria  Musculoskeletal: No arthralgia or myalgia  Skin: No rash  Neurologic: No numbness, tingling, syncope  Hematologic/Lymphatic: No abnormal bleeding      Physical Exam  VITALS REVIEWED--blood pressure 134/77 pulse rate is 80 with intermittent PVCs patient is afebrile respiration 12 times a minute  ICD site is clean    General:      well developed, well nourished, in no acute distress.    Head:      normocephalic and atraumatic.    Eyes:      PERRL/EOM intact, conjunctiva and sclera clear with out nystagmus.    Neck:      no masses, thyromegaly,  trachea central with normal respiratory effort and PMI displaced laterally  Lungs:      clear bilaterally to auscultation.  Minimal expiratory wheezing noted heart:       Paced rhythm with PVCs  without any murmurs gallops or rubs  Msk:      no deformity or scoliosis noted of thoracic or lumbar spine.    Pulses:      pulses normal in all 4 extremities.    Extremities:       no cyanosis or clubbing--trace left pedal edema and trace right pedal edema.    Neurologic:      no focal  deficits.   alert oriented x3  Skin:      intact without lesions or rashes.    Psych:      alert and cooperative; normal mood and affect; normal attention span and concentration.      EKG shows underlying atrial fibrillation with paced rhythm with intermittent PVCs and pacing is consistent with biventricular pacing and no significant EKG changes prior to prior ECG with a heart rate of 80 and indication for EKG includes chronic worsening of his congestive heart failure    Assessment plan    Acute presentation to the clinic with worsening symptoms of congestive heart failure--biventricular ICD reprogrammed to the rate of 70 bpm and left ventricular offset 40 mm seconds earlier than right ventricular offset and patient was made to ambulate felt slightly better  Start low-dose of ACE inhibitors at nighttime  Add additional diuretic in the form of hydrochlorothiazide and low-dose of potassium to supplement with it  Follow renal panel closely  Severe ischemic cardiomyopathy with EF less than 20%  Frequent PVCs  Prior history of mild cardiorenal syndrome with a last creatinine of 1.3  We will follow renal functions closely  Follow-up in office closely in 2 weeks to optimize heart failure therapy  Not indicated to increase beta-blocker dosing at this point because of mild wheezing  Stop Cardizem  Patient extensively educated

## 2019-10-09 ENCOUNTER — READMISSION MANAGEMENT (OUTPATIENT)
Dept: CALL CENTER | Facility: HOSPITAL | Age: 59
End: 2019-10-09

## 2019-10-09 NOTE — OUTREACH NOTE
Medical Week 2 Survey      Responses   Facility patient discharged from?  [facility]   Does the patient have one of the following disease processes/diagnoses(primary or secondary)?  Other   Week 2 attempt successful?  No   Rescheduled  Revoked   Revoke  Decline to participate [NO ANSWER, NO VM]          Lisa Lopez LPN

## 2019-10-21 ENCOUNTER — OUTSIDE FACILITY SERVICE (OUTPATIENT)
Dept: CARDIOLOGY | Facility: CLINIC | Age: 59
End: 2019-10-21

## 2019-10-21 PROCEDURE — 99222 1ST HOSP IP/OBS MODERATE 55: CPT | Performed by: INTERNAL MEDICINE

## 2019-10-22 ENCOUNTER — OUTSIDE FACILITY SERVICE (OUTPATIENT)
Dept: CARDIOLOGY | Facility: CLINIC | Age: 59
End: 2019-10-22

## 2019-10-22 PROCEDURE — 99232 SBSQ HOSP IP/OBS MODERATE 35: CPT | Performed by: INTERNAL MEDICINE

## 2019-10-28 ENCOUNTER — CLINICAL SUPPORT NO REQUIREMENTS (OUTPATIENT)
Dept: CARDIOLOGY | Facility: CLINIC | Age: 59
End: 2019-10-28

## 2019-10-28 ENCOUNTER — OFFICE VISIT (OUTPATIENT)
Dept: CARDIOLOGY | Facility: CLINIC | Age: 59
End: 2019-10-28

## 2019-10-28 VITALS
DIASTOLIC BLOOD PRESSURE: 87 MMHG | WEIGHT: 299 LBS | HEART RATE: 70 BPM | OXYGEN SATURATION: 95 % | BODY MASS INDEX: 42.9 KG/M2 | SYSTOLIC BLOOD PRESSURE: 162 MMHG

## 2019-10-28 DIAGNOSIS — I10 ESSENTIAL HYPERTENSION: ICD-10-CM

## 2019-10-28 DIAGNOSIS — E11.22 TYPE 2 DIABETES MELLITUS WITH CHRONIC KIDNEY DISEASE, WITHOUT LONG-TERM CURRENT USE OF INSULIN, UNSPECIFIED CKD STAGE (HCC): Primary | ICD-10-CM

## 2019-10-28 DIAGNOSIS — I42.0 CARDIOMYOPATHY, DILATED (HCC): Primary | Chronic | ICD-10-CM

## 2019-10-28 DIAGNOSIS — I42.0 CARDIOMYOPATHY, DILATED (HCC): Chronic | ICD-10-CM

## 2019-10-28 PROCEDURE — 99024 POSTOP FOLLOW-UP VISIT: CPT | Performed by: NURSE PRACTITIONER

## 2019-10-28 PROCEDURE — 93000 ELECTROCARDIOGRAM COMPLETE: CPT | Performed by: NURSE PRACTITIONER

## 2019-10-28 RX ORDER — METOPROLOL SUCCINATE 100 MG/1
100 TABLET, EXTENDED RELEASE ORAL DAILY
Qty: 30 TABLET | Refills: 5 | Status: SHIPPED | OUTPATIENT
Start: 2019-10-28 | End: 2020-06-29 | Stop reason: HOSPADM

## 2019-10-28 RX ORDER — CETIRIZINE HYDROCHLORIDE 10 MG/1
10 TABLET ORAL DAILY
Qty: 30 TABLET | Refills: 11 | Status: SHIPPED | OUTPATIENT
Start: 2019-10-28 | End: 2020-09-17 | Stop reason: SDUPTHER

## 2019-10-28 RX ORDER — HYDROCHLOROTHIAZIDE 25 MG/1
25 TABLET ORAL DAILY
Qty: 30 TABLET | Refills: 6 | Status: ON HOLD | OUTPATIENT
Start: 2019-10-28 | End: 2020-06-29 | Stop reason: SDUPTHER

## 2019-10-28 RX ORDER — FLUTICASONE PROPIONATE 50 MCG
2 SPRAY, SUSPENSION (ML) NASAL DAILY
Qty: 1 BOTTLE | Refills: 11 | Status: SHIPPED | OUTPATIENT
Start: 2019-10-28 | End: 2021-01-01 | Stop reason: SDDI

## 2019-10-28 RX ORDER — BUMETANIDE 1 MG/1
1 TABLET ORAL 3 TIMES DAILY
Qty: 90 TABLET | Refills: 5 | Status: ON HOLD | OUTPATIENT
Start: 2019-10-28 | End: 2020-07-21 | Stop reason: SDUPTHER

## 2019-10-28 RX ORDER — ALBUTEROL SULFATE 90 UG/1
2 AEROSOL, METERED RESPIRATORY (INHALATION) EVERY 4 HOURS PRN
Qty: 1 INHALER | Refills: 11 | Status: SHIPPED | OUTPATIENT
Start: 2019-10-28 | End: 2021-01-01

## 2019-10-28 RX ORDER — PREDNISONE 10 MG/1
10 TABLET ORAL DAILY
COMMUNITY
End: 2020-01-20

## 2019-10-28 NOTE — PROGRESS NOTES
Cardiology Office Follow Up Visit      Primary Care Provider:  Jaylen Moss MD    Reason for f/u: Dilated cardiomyopathy, 1 month post BiV implant and AV node ablation       Jaylen Moss MD    History of Present Illness     It was nice to see Jose Dixon JrGianluca in follow-up for ischemic cardiomyopathy, persistent atrial fibrillation.  He is a 58 y.o. male with a history of multiple inappropriate ICD therapies for conducted atrial fibrillation.  He underwent AV node ablation, explant of old single-chamber ICD generator and implant of [place] scientific BiV ICD generator with implant of new left ventricular lead, old RV lead connected to the new generator on 9/26/2019.  He comes in today for routine follow-up for 1 month post implant and AV node ablation.  The device was interrogated and has normal functionality, see attached for specifics.  He denies shortness of breath, chest/back pain, syncopal or near syncopal events since the last interrogation.  In fact, he states that he is feeling better than he has in a long time.    Primary history: Ischemic cardiomyopathy, chronic class III systolic heart failure, persistent atrial fibrillation status post AV node ablation 9/26/2019, morbid obesity, COPD, hypertension, CAD status post PCI 7/2016, previous cardiorenal syndrome      Past Medical History:   Diagnosis Date   • Asthma    • Atrial fibrillation (CMS/HCC)    • CAD (coronary artery disease)    • CHF (congestive heart failure) (CMS/HCC)    • COPD (chronic obstructive pulmonary disease) (CMS/HCC)    • Depression    • Hyperlipidemia    • Hypertension    • Myocardial infarction (CMS/HCC)    • Pacemaker     pacemaker / defib   • V tach (CMS/HCC)        Past Surgical History:   Procedure Laterality Date   • CARDIAC ABLATION  11/07/2017   • CARDIAC CATHETERIZATION     • CARDIAC ELECTROPHYSIOLOGY PROCEDURE N/A 9/26/2019    Procedure: BIVENTRICULAR DEVICE UPGRADE;  Surgeon: Jose Lopez MD;  Location: [facility]  Patient presents for a reading of [place] Tuberculin Skin Test INVASIVE LOCATION;  Service: Cardiovascular   • CARDIAC ELECTROPHYSIOLOGY PROCEDURE N/A 9/26/2019    Procedure: EP/Ablation AV Node ablation;  Surgeon: Jose Lopez MD;  Location:  INVASIVE LOCATION;  Service: Cardiology   • CHOLECYSTECTOMY     • CORONARY ANGIOPLASTY WITH STENT PLACEMENT      2 stents   • PACEMAKER IMPLANTATION  07/08/2016    Pacemaker/ Defib implant - [place]   • SINUS SURGERY      sinus surgery x 9         Current Outpatient Medications:   •  albuterol (PROVENTIL) (2.5 MG/3ML) 0.083% nebulizer solution, Take 2.5 mg by nebulization Every 4 (Four) Hours As Needed for Wheezing., Disp: , Rfl: 12  •  apixaban (ELIQUIS) 5 MG tablet tablet, Take 1 tablet by mouth 2 (Two) Times a Day., Disp: 60 tablet, Rfl: 5  •  atorvastatin (LIPITOR) 80 MG tablet, Take 80 mg by mouth Daily., Disp: , Rfl:   •  budesonide-formoterol (SYMBICORT) 160-4.5 MCG/ACT inhaler, Inhale 2 puffs 2 (Two) Times a Day., Disp: 10.2 g, Rfl: 0  •  bumetanide (BUMEX) 1 MG tablet, Take 1 tablet by mouth 3 (Three) Times a Day., Disp: 90 tablet, Rfl: 5  •  cetirizine (zyrTEC) 10 MG tablet, Take 1 tablet by mouth Daily., Disp: 30 tablet, Rfl: 11  •  clopidogrel (PLAVIX) 75 MG tablet, Take 75 mg by mouth Daily., Disp: , Rfl:   •  DULoxetine (CYMBALTA) 60 MG capsule, Take 60 mg by mouth Daily., Disp: , Rfl:   •  fluticasone (FLONASE) 50 MCG/ACT nasal spray, 2 sprays into the nostril(s) as directed by provider Daily., Disp: 1 bottle, Rfl: 11  •  hydroCHLOROthiazide (HYDRODIURIL) 25 MG tablet, Take 1 tablet by mouth Daily., Disp: 30 tablet, Rfl: 6  •  isosorbide mononitrate (IMDUR) 60 MG 24 hr tablet, Take 60 mg by mouth 3 (Three) Times a Day., Disp: , Rfl:   •  lisinopril (PRINIVIL,ZESTRIL) 5 MG tablet, Take 1 tablet by mouth Daily., Disp: 30 tablet, Rfl: 3  •  montelukast (SINGULAIR) 10 MG tablet, Take 10 mg by mouth Every Night., Disp: , Rfl:   •  nitroglycerin (NITROSTAT) 0.4 MG SL tablet, Place 0.4 mg under the tongue Every 5  (Five) Minutes As Needed for Chest Pain. Take no more than 3 doses in 15 minutes., Disp: , Rfl:   •  potassium chloride (K-DUR) 10 MEQ CR tablet, Take 1 tablet by mouth Daily., Disp: 30 tablet, Rfl: 3  •  predniSONE (DELTASONE) 10 MG tablet, Take 10 mg by mouth Daily., Disp: , Rfl:   •  ranolazine (RANEXA) 500 MG 12 hr tablet, Take 1,000 mg by mouth 2 (Two) Times a Day., Disp: , Rfl:   •  roflumilast (DALIRESP) 500 MCG tablet tablet, Take 500 mcg by mouth Daily., Disp: , Rfl:   •  tamsulosin (FLOMAX) 0.4 MG capsule 24 hr capsule, Take 1 capsule by mouth Every Evening., Disp: , Rfl: 0  •  albuterol sulfate  (90 Base) MCG/ACT inhaler, Inhale 2 puffs Every 4 (Four) Hours As Needed for Wheezing., Disp: 1 inhaler, Rfl: 11  •  metoprolol succinate XL (TOPROL XL) 100 MG 24 hr tablet, Take 1 tablet by mouth Daily., Disp: 30 tablet, Rfl: 5    Social History     Socioeconomic History   • Marital status:      Spouse name: Not on file   • Number of children: Not on file   • Years of education: Not on file   • Highest education level: Not on file   Tobacco Use   • Smoking status: Never Smoker   Substance and Sexual Activity   • Alcohol use: No     Frequency: Never   • Drug use: No   • Sexual activity: Defer       Family History   Problem Relation Age of Onset   • Diabetes Mother    • Heart disease Mother         MI age 46 - CHF   • Atrial fibrillation Mother    • COPD Mother    • Atrial fibrillation Father    • Heart disease Father         CHF       The following portions of the patient's history were reviewed and updated as appropriate: allergies, current medications, past family history, past medical history, past social history, past surgical history and problem list.        Review of Systems   Constitution: Negative for diaphoresis, malaise/fatigue, weight gain and weight loss.   Cardiovascular: Negative for chest pain, dyspnea on exertion, leg swelling, near-syncope, orthopnea, palpitations and syncope.    Respiratory: Negative for hemoptysis, shortness of breath, sputum production and wheezing.    Skin: Negative for rash.   Gastrointestinal: Negative for abdominal pain, hematemesis, hematochezia, nausea and vomiting.   Neurological: Negative for dizziness, light-headedness, numbness and seizures.   Psychiatric/Behavioral: Negative.    All other systems reviewed and are negative.    /87   Pulse 70   Wt 136 kg (299 lb)   SpO2 95%   BMI 42.90 kg/m² .  Objective     Physical Exam   Constitutional: He is oriented to person, place, and time. He appears well-developed and well-nourished. He is cooperative.   Neck: Normal carotid pulses and no JVD present. Carotid bruit is not present.   Cardiovascular: Normal rate, regular rhythm, normal heart sounds and intact distal pulses. Exam reveals no gallop and no friction rub.   No murmur heard.  Pulses:       Carotid pulses are 2+ on the right side, and 2+ on the left side.       Radial pulses are 2+ on the right side, and 2+ on the left side.        Posterior tibial pulses are 2+ on the right side, and 2+ on the left side.   Pulmonary/Chest: Effort normal and breath sounds normal. He has no decreased breath sounds. He has no wheezes. He has no rhonchi. He has no rales.   Abdominal: Soft. Bowel sounds are normal. He exhibits no distension and no mass. There is no hepatosplenomegaly. There is no tenderness. There is no guarding.   Musculoskeletal: He exhibits edema (trace pretibial).   Neurological: He is alert and oriented to person, place, and time.   Skin: Skin is warm and dry. No rash noted. No erythema. No pallor.   Psychiatric: He has a normal mood and affect. His speech is normal and behavior is normal. Judgment and thought content normal.           ECG 12 Lead  Date/Time: 10/28/2019 12:01 PM  Performed by: Juli Boudreaux APRN  Authorized by: Juli Boudreaux APRN   Comparison: not compared with previous ECG   Rhythm: sinus rhythm and  paced            Assessment/Plan:    1.  Atrial fibrillation with RVR status post AV node ablation 9/26/2019-----intermittent episodes lasting less than 20 seconds by interrogation today, no therapies delivered, continue Eliquis  2.  Ischemic cardiomyopathy status post single generator explant and BiV generator implant with LV lead 9/26/2019----normal function by interrogation today   3.  Hypertension-----not optimally controlled  4.  Chronic class III systolic heart failure, EF 20%----stable, continue Bumex 3 times daily  4.  COPD----no acute exacerbation, on steroids  5.  CAD, history PCI 2016--- on Plavix, ACE/BB/statin    6.  Cardiorenal syndrome----creatinine 0.9 and potassium 4.8 on 10/17/2019 labs    Increase Toprol to 100 mg daily, repeat BMP in 2 weeks.  Discussion regarding decreasing salt intake to assist with blood pressure control.  Patient states that he cannot get his remote monitor to work even with the assistance of [employer] help desk, patient will need to be seen in the office every 3 months for biventricular ICD surveillance.  Potential patient for cardiac rehab, will discuss at next visit.    ABHISHEK Cordero  EP cardiology

## 2019-10-30 PROCEDURE — 93283 PRGRMG EVAL IMPLANTABLE DFB: CPT | Performed by: INTERNAL MEDICINE

## 2019-11-14 ENCOUNTER — TELEPHONE (OUTPATIENT)
Dept: CARDIOLOGY | Facility: CLINIC | Age: 59
End: 2019-11-14

## 2019-11-22 ENCOUNTER — TELEPHONE (OUTPATIENT)
Dept: CARDIOLOGY | Facility: CLINIC | Age: 59
End: 2019-11-22

## 2019-11-22 NOTE — TELEPHONE ENCOUNTER
Spoke with Raissa at [facility], requested pt bmp results.  She stated she would send asap.

## 2019-12-07 PROCEDURE — 93010 ELECTROCARDIOGRAM REPORT: CPT | Performed by: INTERNAL MEDICINE

## 2019-12-08 ENCOUNTER — OUTSIDE FACILITY SERVICE (OUTPATIENT)
Dept: CARDIOLOGY | Facility: CLINIC | Age: 59
End: 2019-12-08

## 2019-12-08 PROCEDURE — 99232 SBSQ HOSP IP/OBS MODERATE 35: CPT | Performed by: INTERNAL MEDICINE

## 2019-12-09 PROCEDURE — 99232 SBSQ HOSP IP/OBS MODERATE 35: CPT | Performed by: INTERNAL MEDICINE

## 2019-12-10 ENCOUNTER — OUTSIDE FACILITY SERVICE (OUTPATIENT)
Dept: CARDIOLOGY | Facility: CLINIC | Age: 59
End: 2019-12-10

## 2019-12-10 PROCEDURE — 99232 SBSQ HOSP IP/OBS MODERATE 35: CPT | Performed by: INTERNAL MEDICINE

## 2020-01-20 ENCOUNTER — OFFICE VISIT (OUTPATIENT)
Dept: CARDIOLOGY | Facility: CLINIC | Age: 60
End: 2020-01-20

## 2020-01-20 ENCOUNTER — CLINICAL SUPPORT NO REQUIREMENTS (OUTPATIENT)
Dept: CARDIOLOGY | Facility: CLINIC | Age: 60
End: 2020-01-20

## 2020-01-20 VITALS
HEIGHT: 70 IN | DIASTOLIC BLOOD PRESSURE: 75 MMHG | BODY MASS INDEX: 43.86 KG/M2 | OXYGEN SATURATION: 98 % | HEART RATE: 70 BPM | WEIGHT: 306.4 LBS | SYSTOLIC BLOOD PRESSURE: 136 MMHG

## 2020-01-20 DIAGNOSIS — Z95.810 PRESENCE OF BIVENTRICULAR IMPLANTABLE CARDIOVERTER-DEFIBRILLATOR (ICD): ICD-10-CM

## 2020-01-20 DIAGNOSIS — I48.21 PERMANENT ATRIAL FIBRILLATION (HCC): Primary | ICD-10-CM

## 2020-01-20 DIAGNOSIS — I42.0 CARDIOMYOPATHY, DILATED (HCC): Primary | Chronic | ICD-10-CM

## 2020-01-20 DIAGNOSIS — I50.22 CHRONIC SYSTOLIC CONGESTIVE HEART FAILURE (HCC): ICD-10-CM

## 2020-01-20 DIAGNOSIS — I42.0 CARDIOMYOPATHY, DILATED (HCC): ICD-10-CM

## 2020-01-20 DIAGNOSIS — I10 ESSENTIAL HYPERTENSION: ICD-10-CM

## 2020-01-20 PROCEDURE — 93000 ELECTROCARDIOGRAM COMPLETE: CPT | Performed by: INTERNAL MEDICINE

## 2020-01-20 PROCEDURE — 99214 OFFICE O/P EST MOD 30 MIN: CPT | Performed by: INTERNAL MEDICINE

## 2020-01-20 NOTE — PROGRESS NOTES
CC--systolic heart failure and permanent atrial fibrillation with biventricular ICD in situ     Sub--59-year-old male patient came for follow-up appointment.  Patient had uncontrollable atrial fibrillation and underwent AV node ablation with biventricular ICD upgrade--unfortunately coronary sinus lead was placed in anterobasal vein since there was no posterior and lateral veins for placement of coronary sinus lead--he was in refractory heart failure which has improved to class III chronic systolic heart failure and is been out of hospital without any admissions for heart failure in the last 2 months.  Patient has known coronary artery disease with ischemic cardiomyopathy and has significant issues with obesity and prior history of COPD.  Patient had prior multiple ICD therapies for conducted atrial fibrillation needing AV node ablation .  He remains in functional class III and denies any leg edema  Chronic medical problems include ischemic cardio myopathy chronic class III systolic heart failure, persistent atrial fibrillation and morbid obesity       Past Medical History:   Diagnosis Date   • Asthma    • Atrial fibrillation (CMS/[facility])    • CAD (coronary artery disease)    • CHF (congestive heart failure) (CMS/[facility])    • COPD (chronic obstructive pulmonary disease) (CMS/[facility])    • Depression    • Hyperlipidemia    • Hypertension    • Myocardial infarction (CMS/[facility])    • Pacemaker     pacemaker / defib   • V tach (CMS/[facility])      Past Surgical History:   Procedure Laterality Date   • CARDIAC ABLATION  11/07/2017   • CARDIAC CATHETERIZATION     • CARDIAC ELECTROPHYSIOLOGY PROCEDURE N/A 9/26/2019    Procedure: BIVENTRICULAR DEVICE UPGRADE;  Surgeon: Jose Lopez MD;  Location: [facility] CATH INVASIVE LOCATION;  Service: Cardiovascular   • CARDIAC ELECTROPHYSIOLOGY PROCEDURE N/A 9/26/2019    Procedure: EP/Ablation AV Node ablation;  Surgeon: Jose Lopez MD;  Location: [facility] CATH INVASIVE LOCATION;  Service:  Cardiology   • CHOLECYSTECTOMY     • CORONARY ANGIOPLASTY WITH STENT PLACEMENT      2 stents   • PACEMAKER IMPLANTATION  07/08/2016    Pacemaker/ Defib implant - [place]   • SINUS SURGERY      sinus surgery x 9     Family History   Problem Relation Age of Onset   • Diabetes Mother    • Heart disease Mother         MI age 46 - CHF   • Atrial fibrillation Mother    • COPD Mother    • Atrial fibrillation Father    • Heart disease Father         CHF     Social History     Tobacco Use   • Smoking status: Never Smoker   • Smokeless tobacco: Never Used   Substance Use Topics   • Alcohol use: No     Frequency: Never   • Drug use: No       (Not in a hospital admission)  Allergies:  Tramadol and Codeine    Review of Systems   General:  positive for fatigue and tiredness  Eyes: No redness  Cardiovascular: No chest pain, no palpitations  Respiratory:   positive for class 3 shortness of breath  Gastrointestinal: No nausea or vomiting, bleeding  Genitourinary: no hematuria or dysuria  Musculoskeletal: No arthralgia or myalgia  Skin: No rash  Neurologic: No numbness, tingling, syncope  Hematologic/Lymphatic: No abnormal bleeding      Physical Exam  VITALS REVIEWED--blood pressure 136/75 pulse rate is 70  afebrile respiration 12 times a minute  ICD site is clean    General:      well developed, well nourished, in no acute distress.    Head:      normocephalic and atraumatic.    Eyes:      PERRL/EOM intact, conjunctiva and sclera clear with out nystagmus.    Neck:      no masses, thyromegaly,  trachea central with normal respiratory effort and PMI displaced laterally  Lungs:      clear bilaterally to auscultation.  No wheezing    heart:       Paced rhythm   without any murmurs gallops or rubs  Msk:      no deformity or scoliosis noted of thoracic or lumbar spine.    Pulses:      pulses normal in all 4 extremities.    Extremities:       no cyanosis or clubbing--trace left pedal edema and trace right pedal edema.    Neurologic:      no  focal deficits.   alert oriented x3  Skin:      intact without lesions or rashes.    Psych:      alert and cooperative; normal mood and affect; normal attention span and concentration.      EKG shows underlying atrial fibrillation with paced rhythm and pacing is consistent with biventricular pacing and no significant EKG changes prior to prior ECG with a heart rate of 70 and indication for EKG includes ventricular and atrial arrhythmias     Assessment plan    Severe ischemic cardiomyopathy with EF less than 20%  Frequent PVCs and episodes of nonsustained VT with appropriate therapy  Prior history of mild cardiorenal syndrome with a last creatinine of 1.3  Check CMP  Chronic class III systolic heart failure  Chronic coronary artery disease and chronic chest pain in the past  Permanent atrial fibrillation status post AV node ablation  Follow-up closely in few months and patient was educated about his ICD status  Biventricular ICD in situ which was interrogated and interrogation attached to chart    Electronically signed by Jose Lopez MD, 01/20/20, 5:48 PM.

## 2020-01-21 PROCEDURE — 93284 PRGRMG EVAL IMPLANTABLE DFB: CPT | Performed by: INTERNAL MEDICINE

## 2020-04-22 ENCOUNTER — OFFICE VISIT (OUTPATIENT)
Dept: CARDIOLOGY | Facility: CLINIC | Age: 60
End: 2020-04-22

## 2020-04-22 ENCOUNTER — CLINICAL SUPPORT NO REQUIREMENTS (OUTPATIENT)
Dept: CARDIOLOGY | Facility: CLINIC | Age: 60
End: 2020-04-22

## 2020-04-22 VITALS — WEIGHT: 300 LBS | BODY MASS INDEX: 43.05 KG/M2

## 2020-04-22 DIAGNOSIS — I48.0 PAROXYSMAL ATRIAL FIBRILLATION (HCC): Chronic | ICD-10-CM

## 2020-04-22 DIAGNOSIS — I50.22 CHRONIC SYSTOLIC CONGESTIVE HEART FAILURE (HCC): ICD-10-CM

## 2020-04-22 DIAGNOSIS — I10 ESSENTIAL HYPERTENSION: ICD-10-CM

## 2020-04-22 DIAGNOSIS — Z95.810 PRESENCE OF BIVENTRICULAR IMPLANTABLE CARDIOVERTER-DEFIBRILLATOR (ICD): ICD-10-CM

## 2020-04-22 DIAGNOSIS — I48.91 ATRIAL FIBRILLATION WITH RVR (HCC): ICD-10-CM

## 2020-04-22 DIAGNOSIS — I47.20 VT (VENTRICULAR TACHYCARDIA) (HCC): ICD-10-CM

## 2020-04-22 DIAGNOSIS — I48.21 PERMANENT ATRIAL FIBRILLATION (HCC): ICD-10-CM

## 2020-04-22 DIAGNOSIS — I42.0 CARDIOMYOPATHY, DILATED (HCC): Primary | Chronic | ICD-10-CM

## 2020-04-22 DIAGNOSIS — I25.5 ISCHEMIC CARDIOMYOPATHY: Primary | ICD-10-CM

## 2020-04-22 PROCEDURE — 99215 OFFICE O/P EST HI 40 MIN: CPT | Performed by: INTERNAL MEDICINE

## 2020-04-22 PROCEDURE — 93284 PRGRMG EVAL IMPLANTABLE DFB: CPT | Performed by: INTERNAL MEDICINE

## 2020-04-22 RX ORDER — AMIODARONE HYDROCHLORIDE 200 MG/1
200 TABLET ORAL DAILY
Qty: 100 TABLET | Refills: 1 | Status: SHIPPED | OUTPATIENT
Start: 2020-04-22 | End: 2020-07-21 | Stop reason: HOSPADM

## 2020-04-22 RX ORDER — GABAPENTIN 300 MG/1
300 CAPSULE ORAL 2 TIMES DAILY
COMMUNITY
End: 2021-01-01

## 2020-04-22 NOTE — PROGRESS NOTES
CC--systolic heart failure and permanent atrial fibrillation with biventricular ICD in situ     Sub--59-year-old male patient came for follow-up with recurrent symptoms of palpitations and intermittent chest pain .  Patient had uncontrollable atrial fibrillation and underwent AV node ablation with biventricular ICD upgrade--unfortunately coronary sinus lead was placed in anterobasal vein since there was no posterior and lateral veins for placement of coronary sinus lead--he was in refractory heart failure which has improved to class III chronic systolic heart failure and is been out of hospital without any admissions for heart failure in the last 2 months.  Patient has known coronary artery disease with ischemic cardiomyopathy and has significant issues with obesity and prior history of COPD.  Patient had prior multiple ICD therapies for conducted atrial fibrillation needing AV node ablation .  He remains in functional class III and denies any leg edema  Patient has noticed increased number of palpitations associated with chest discomfort in the last few weeks  He continues to remain in class III functional class without any syncope  Chronic medical problems include ischemic cardio myopathy chronic class III systolic heart failure, persistent atrial fibrillation and morbid obesity       Past Medical History:   Diagnosis Date   • Asthma    • Atrial fibrillation (CMS/[facility])    • CAD (coronary artery disease)    • CHF (congestive heart failure) (CMS/[facility])    • COPD (chronic obstructive pulmonary disease) (CMS/[facility])    • Depression    • Hyperlipidemia    • Hypertension    • Myocardial infarction (CMS/HCC)    • Pacemaker     pacemaker / defib   • V tach (CMS/[facility])      Past Surgical History:   Procedure Laterality Date   • CARDIAC ABLATION  11/07/2017   • CARDIAC CATHETERIZATION     • CARDIAC ELECTROPHYSIOLOGY PROCEDURE N/A 9/26/2019    Procedure: BIVENTRICULAR DEVICE UPGRADE;  Surgeon: Jose Lopez MD;  Location:   [facility] CATH INVASIVE LOCATION;  Service: Cardiovascular   • CARDIAC ELECTROPHYSIOLOGY PROCEDURE N/A 9/26/2019    Procedure: EP/Ablation AV Node ablation;  Surgeon: Jose Lopez MD;  Location: [facility] CATH INVASIVE LOCATION;  Service: Cardiology   • CHOLECYSTECTOMY     • CORONARY ANGIOPLASTY WITH STENT PLACEMENT      2 stents   • PACEMAKER IMPLANTATION  07/08/2016    Pacemaker/ Defib implant - [place]   • SINUS SURGERY      sinus surgery x 9     Family History   Problem Relation Age of Onset   • Diabetes Mother    • Heart disease Mother         MI age 46 - CHF   • Atrial fibrillation Mother    • COPD Mother    • Atrial fibrillation Father    • Heart disease Father         CHF     Social History     Tobacco Use   • Smoking status: Never Smoker   • Smokeless tobacco: Never Used   Substance Use Topics   • Alcohol use: No     Frequency: Never   • Drug use: No     Allergies:  Tramadol and Codeine    Review of Systems   General:  positive for fatigue and tiredness  Eyes: No redness  Cardiovascular: Positive for chest pain and palpitations   Respiratory:   positive for class 3 shortness of breath  Gastrointestinal: No nausea or vomiting, bleeding  Genitourinary: no hematuria or dysuria  Musculoskeletal: No arthralgia or myalgia  Skin: No rash  Neurologic: No numbness, tingling, syncope  Hematologic/Lymphatic: No abnormal bleeding      Physical Exam  VITALS REVIEWED-- pulse rate is 70  afebrile respiration 12 times a minute  ICD site is clean    General:      well developed, well nourished, in no acute distress.    Head:      normocephalic and atraumatic.    Eyes:      PERRL/EOM intact, conjunctiva and sclera clear with out nystagmus.    Neck:    thyromegaly,  trachea central with normal respiratory effort    heart:       Paced rhythm     Msk:      no deformity or scoliosis noted of thoracic or lumbar spine.    Pulses:      pulses normal in all 4 extremities.    Extremities:       no cyanosis or clubbing--trace left pedal  edema and trace right pedal edema.    Neurologic:      no focal deficits.   alert oriented x3  Skin:      intact without lesions or rashes.    Psych:      alert and cooperative; normal mood and affect; normal attention span and concentration.      Assessment plan  New clinical problem of multiple episodes of VT--noted on ICD interrogation correlating to patient's symptoms in the last few weeks terminated by antitachycardia pacing--check CMP, thyroid panel and magnesium--different therapeutic options for VT educated--start amiodarone 200 mg 3 times a day for 10 days followed by once daily dosing--close clinical follow-up in 10 days--if patient continues to have refractory arrhythmias despite medical treatment VT ablation will be warranted and patient is educated  Patient symptom correlation is consistent with intermittent fast VT  Severe ischemic cardiomyopathy with EF less than 20%  Prior history of mild cardiorenal syndrome   Chronic class III systolic heart failure  Chronic coronary artery disease and chronic chest pain in the past--no other further coronary intervention is warranted as per Dr. Frye  Permanent atrial fibrillation status post AV node ablation  Biventricular ICD in situ which was interrogated and interrogation attached to chart    Electronically signed by Jose Lopez MD, 04/22/20, 11:01 AM.

## 2020-05-13 ENCOUNTER — PREP FOR SURGERY (OUTPATIENT)
Dept: OTHER | Facility: HOSPITAL | Age: 60
End: 2020-05-13

## 2020-05-13 ENCOUNTER — OFFICE VISIT (OUTPATIENT)
Dept: CARDIOLOGY | Facility: CLINIC | Age: 60
End: 2020-05-13

## 2020-05-13 ENCOUNTER — CLINICAL SUPPORT NO REQUIREMENTS (OUTPATIENT)
Dept: CARDIOLOGY | Facility: CLINIC | Age: 60
End: 2020-05-13

## 2020-05-13 VITALS
SYSTOLIC BLOOD PRESSURE: 143 MMHG | HEART RATE: 70 BPM | WEIGHT: 315 LBS | BODY MASS INDEX: 45.48 KG/M2 | OXYGEN SATURATION: 96 % | DIASTOLIC BLOOD PRESSURE: 84 MMHG

## 2020-05-13 DIAGNOSIS — Z95.810 PRESENCE OF BIVENTRICULAR IMPLANTABLE CARDIOVERTER-DEFIBRILLATOR (ICD): ICD-10-CM

## 2020-05-13 DIAGNOSIS — I50.22 CHRONIC SYSTOLIC CONGESTIVE HEART FAILURE (HCC): ICD-10-CM

## 2020-05-13 DIAGNOSIS — I48.91 ATRIAL FIBRILLATION WITH RVR (HCC): ICD-10-CM

## 2020-05-13 DIAGNOSIS — I47.20 VT (VENTRICULAR TACHYCARDIA) (HCC): Primary | ICD-10-CM

## 2020-05-13 DIAGNOSIS — I25.5 ISCHEMIC CARDIOMYOPATHY: ICD-10-CM

## 2020-05-13 DIAGNOSIS — I48.0 PAROXYSMAL ATRIAL FIBRILLATION (HCC): Primary | Chronic | ICD-10-CM

## 2020-05-13 DIAGNOSIS — I42.0 CARDIOMYOPATHY, DILATED (HCC): Chronic | ICD-10-CM

## 2020-05-13 PROCEDURE — 93000 ELECTROCARDIOGRAM COMPLETE: CPT | Performed by: INTERNAL MEDICINE

## 2020-05-13 PROCEDURE — 99215 OFFICE O/P EST HI 40 MIN: CPT | Performed by: INTERNAL MEDICINE

## 2020-05-13 PROCEDURE — 93284 PRGRMG EVAL IMPLANTABLE DFB: CPT | Performed by: INTERNAL MEDICINE

## 2020-05-13 RX ORDER — SODIUM CHLORIDE 0.9 % (FLUSH) 0.9 %
3 SYRINGE (ML) INJECTION EVERY 12 HOURS SCHEDULED
Status: CANCELLED | OUTPATIENT
Start: 2020-05-13

## 2020-05-13 RX ORDER — SODIUM CHLORIDE 0.9 % (FLUSH) 0.9 %
10 SYRINGE (ML) INJECTION AS NEEDED
Status: CANCELLED | OUTPATIENT
Start: 2020-05-13

## 2020-05-13 NOTE — PROGRESS NOTES
CC--systolic heart failure and permanent atrial fibrillation with biventricular ICD in situ     Sub--59-year-old male patient came for follow-up with recurrent symptoms of palpitations and intermittent chest pain .  Patient had uncontrollable atrial fibrillation and underwent AV node ablation with biventricular ICD upgrade--unfortunately coronary sinus lead was placed in anterobasal vein since there was no posterior and lateral veins for placement of coronary sinus lead--he was in refractory heart failure which has improved to class III chronic systolic heart failure and is been out of hospital without any admissions for heart failure in the last 2 months.  Patient has known coronary artery disease with ischemic cardiomyopathy and has significant issues with obesity and prior history of COPD.  Patient had prior multiple ICD therapies for conducted atrial fibrillation needing AV node ablation .  He remains in functional class III and denies any leg edema  Patient has noticed increased number of palpitations associated with chest discomfort in the last few weeks  He continues to remain in class III functional class without any syncope  Chronic medical problems include ischemic cardio myopathy chronic class III systolic heart failure, persistent atrial fibrillation and morbid obesity   Was started on amiodarone with last visit--continues to have episodes of VT needing antitachycardia pacing  Back in 2017 patient underwent an unsuccessful VT ablation with PVC ablation coming from outflow tract which was felt to be possible epicardial origin--ablation from RVOT site was unsuccessful--VT during the time was inferior axis with left bundle and transition V3  Continues to feel poorly with class III systolic heart failure with multiple episodes of VT since last visit    Past history is reviewed below and attached and unchanged  Past Medical History:   Diagnosis Date   • Asthma    • Atrial fibrillation (CMS/HCC)    • CAD  (coronary artery disease)    • CHF (congestive heart failure) (CMS/[facility])    • COPD (chronic obstructive pulmonary disease) (CMS/[facility])    • Depression    • Hyperlipidemia    • Hypertension    • Myocardial infarction (CMS/[facility])    • Pacemaker     pacemaker / defib   • V tach (CMS/[facility])      Past Surgical History:   Procedure Laterality Date   • CARDIAC ABLATION  11/07/2017   • CARDIAC CATHETERIZATION     • CARDIAC ELECTROPHYSIOLOGY PROCEDURE N/A 9/26/2019    Procedure: BIVENTRICULAR DEVICE UPGRADE;  Surgeon: Jose Lopez MD;  Location: [facility] CATH INVASIVE LOCATION;  Service: Cardiovascular   • CARDIAC ELECTROPHYSIOLOGY PROCEDURE N/A 9/26/2019    Procedure: EP/Ablation AV Node ablation;  Surgeon: Jose Lopez MD;  Location:  [facility] CATH INVASIVE LOCATION;  Service: Cardiology   • CHOLECYSTECTOMY     • CORONARY ANGIOPLASTY WITH STENT PLACEMENT      2 stents   • PACEMAKER IMPLANTATION  07/08/2016    Pacemaker/ Defib implant - [place]   • SINUS SURGERY      sinus surgery x 9     Family History   Problem Relation Age of Onset   • Diabetes Mother    • Heart disease Mother         MI age 46 - CHF   • Atrial fibrillation Mother    • COPD Mother    • Atrial fibrillation Father    • Heart disease Father         CHF     Social History     Tobacco Use   • Smoking status: Never Smoker   • Smokeless tobacco: Never Used   Substance Use Topics   • Alcohol use: No     Frequency: Never   • Drug use: No     Allergies:  Tramadol and Codeine    Review of Systems   General:  positive for fatigue and tiredness  Eyes: No redness  Cardiovascular: Positive for chest pain and palpitations   Respiratory:   positive for class 3 shortness of breath  Gastrointestinal: No nausea or vomiting, bleeding  Genitourinary: no hematuria or dysuria  Musculoskeletal: No arthralgia or myalgia  Skin: No rash  Neurologic: No numbness, tingling, syncope  Hematologic/Lymphatic: No abnormal bleeding      Physical Exam  VITALS REVIEWED-- pulse rate is 70   afebrile respiration 12 times a minute, blood pressure 143/84  ICD site is clean    General:      well developed, well nourished, in no acute distress.    Head:      normocephalic and atraumatic.    Eyes:      PERRL/EOM intact, conjunctiva and sclera clear with out nystagmus.    Neck:    thyromegaly,  trachea central with normal respiratory effort    heart:       Paced rhythm     Msk:      no deformity or scoliosis noted of thoracic or lumbar spine.    Pulses:      pulses normal in all 4 extremities.    Extremities:       no cyanosis or clubbing--trace left pedal edema and trace right pedal edema.    Neurologic:      no focal deficits.   alert oriented x3  Skin:      intact without lesions or rashes.    Psych:      alert and cooperative; normal mood and affect; normal attention span and concentration.    Lung exam shows reduced breath sounds in bilateral bases without wheezing    Assessment plan  Recurrent and symptomatic  episodes of VT--noted on ICD interrogation despite amiodarone --schedule patient for EP study and possible VT ablation --prior VT ablation notes reviewed from [facility] in 2017 --patient is having worsening heart failure symptoms--will need transplant evaluation which was discussed with the patient  Overall prognosis guarded  Orders placed  Patient symptom correlation is consistent with intermittent fast VT  Severe ischemic cardiomyopathy with EF less than 20%  Prior history of mild cardiorenal syndrome   Chronic class III systolic heart failure  Chronic coronary artery disease and chronic chest pain in the past--no other further coronary intervention is warranted as per Dr. Frye  Permanent atrial fibrillation status post AV node ablation  Biventricular ICD in situ which was interrogated and interrogation attached to chart    EKG shows underlying atrial fibrillation with biventricular pacing with a heart rate of 78 no significant EKG changes compared to prior EKG and indication for EKG  includes VT    Electronically signed by Jose Lopez MD, 05/13/20, 9:39 AM.    Electronically signed by Jose Lopez MD, 04/22/20, 11:01 AM.

## 2020-05-15 PROBLEM — I25.5 ISCHEMIC CARDIOMYOPATHY: Status: ACTIVE | Noted: 2020-05-15

## 2020-05-15 PROBLEM — I47.20 VT (VENTRICULAR TACHYCARDIA) (HCC): Status: ACTIVE | Noted: 2020-05-15

## 2020-05-21 ENCOUNTER — TELEPHONE (OUTPATIENT)
Dept: CARDIOLOGY | Facility: CLINIC | Age: 60
End: 2020-05-21

## 2020-05-21 NOTE — TELEPHONE ENCOUNTER
Called patient back regarding questions about pre-procedure steps for his ablation on the 27th. Patient did not answer so a VM was left to call the office.

## 2020-05-23 ENCOUNTER — LAB (OUTPATIENT)
Dept: LAB | Facility: HOSPITAL | Age: 60
End: 2020-05-23
Attending: INTERNAL MEDICINE

## 2020-05-23 DIAGNOSIS — Z01.818 PRE-OP TESTING: Primary | ICD-10-CM

## 2020-05-23 DIAGNOSIS — I25.5 ISCHEMIC CARDIOMYOPATHY: ICD-10-CM

## 2020-05-23 DIAGNOSIS — I47.20 VT (VENTRICULAR TACHYCARDIA) (HCC): ICD-10-CM

## 2020-05-23 DIAGNOSIS — I50.22 CHRONIC SYSTOLIC CONGESTIVE HEART FAILURE (HCC): ICD-10-CM

## 2020-05-23 LAB
ALBUMIN SERPL-MCNC: 4.2 G/DL (ref 3.5–5.2)
ALBUMIN/GLOB SERPL: 1.4 G/DL
ALP SERPL-CCNC: 70 U/L (ref 39–117)
ALT SERPL W P-5'-P-CCNC: 20 U/L (ref 1–41)
ANION GAP SERPL CALCULATED.3IONS-SCNC: 10.9 MMOL/L (ref 5–15)
AST SERPL-CCNC: 27 U/L (ref 1–40)
BASOPHILS # BLD AUTO: 0.1 10*3/MM3 (ref 0–0.2)
BASOPHILS NFR BLD AUTO: 1.1 % (ref 0–1.5)
BILIRUB SERPL-MCNC: 0.5 MG/DL (ref 0.2–1.2)
BUN BLD-MCNC: 16 MG/DL (ref 6–20)
BUN/CREAT SERPL: 13.8 (ref 7–25)
CALCIUM SPEC-SCNC: 9.1 MG/DL (ref 8.6–10.5)
CHLORIDE SERPL-SCNC: 100 MMOL/L (ref 98–107)
CO2 SERPL-SCNC: 30.1 MMOL/L (ref 22–29)
CREAT BLD-MCNC: 1.16 MG/DL (ref 0.76–1.27)
DEPRECATED RDW RBC AUTO: 44.9 FL (ref 37–54)
EOSINOPHIL # BLD AUTO: 0.19 10*3/MM3 (ref 0–0.4)
EOSINOPHIL NFR BLD AUTO: 2 % (ref 0.3–6.2)
ERYTHROCYTE [DISTWIDTH] IN BLOOD BY AUTOMATED COUNT: 14.6 % (ref 12.3–15.4)
GFR SERPL CREATININE-BSD FRML MDRD: 65 ML/MIN/1.73
GLOBULIN UR ELPH-MCNC: 2.9 GM/DL
GLUCOSE BLD-MCNC: 99 MG/DL (ref 65–99)
HCT VFR BLD AUTO: 45.3 % (ref 37.5–51)
HGB BLD-MCNC: 15.3 G/DL (ref 13–17.7)
IMM GRANULOCYTES # BLD AUTO: 0.09 10*3/MM3 (ref 0–0.05)
IMM GRANULOCYTES NFR BLD AUTO: 1 % (ref 0–0.5)
LYMPHOCYTES # BLD AUTO: 1.63 10*3/MM3 (ref 0.7–3.1)
LYMPHOCYTES NFR BLD AUTO: 17.5 % (ref 19.6–45.3)
MAGNESIUM SERPL-MCNC: 2.2 MG/DL (ref 1.6–2.6)
MCH RBC QN AUTO: 28.7 PG (ref 26.6–33)
MCHC RBC AUTO-ENTMCNC: 33.8 G/DL (ref 31.5–35.7)
MCV RBC AUTO: 85 FL (ref 79–97)
MONOCYTES # BLD AUTO: 1.31 10*3/MM3 (ref 0.1–0.9)
MONOCYTES NFR BLD AUTO: 14.1 % (ref 5–12)
NEUTROPHILS # BLD AUTO: 5.98 10*3/MM3 (ref 1.7–7)
NEUTROPHILS NFR BLD AUTO: 64.3 % (ref 42.7–76)
NRBC BLD AUTO-RTO: 0 /100 WBC (ref 0–0.2)
PLATELET # BLD AUTO: 256 10*3/MM3 (ref 140–450)
PMV BLD AUTO: 12 FL (ref 6–12)
POTASSIUM BLD-SCNC: 4.7 MMOL/L (ref 3.5–5.2)
PROT SERPL-MCNC: 7.1 G/DL (ref 6–8.5)
RBC # BLD AUTO: 5.33 10*6/MM3 (ref 4.14–5.8)
SODIUM BLD-SCNC: 141 MMOL/L (ref 136–145)
T-UPTAKE NFR SERPL: 1.01 TBI (ref 0.8–1.3)
T4 SERPL-MCNC: 7.7 MCG/DL (ref 4.5–11.7)
TSH SERPL DL<=0.05 MIU/L-ACNC: 1.19 UIU/ML (ref 0.27–4.2)
WBC NRBC COR # BLD: 9.3 10*3/MM3 (ref 3.4–10.8)

## 2020-05-23 PROCEDURE — 83735 ASSAY OF MAGNESIUM: CPT

## 2020-05-23 PROCEDURE — 84436 ASSAY OF TOTAL THYROXINE: CPT

## 2020-05-23 PROCEDURE — 80053 COMPREHEN METABOLIC PANEL: CPT

## 2020-05-23 PROCEDURE — U0004 COV-19 TEST NON-CDC HGH THRU: HCPCS

## 2020-05-23 PROCEDURE — 36415 COLL VENOUS BLD VENIPUNCTURE: CPT

## 2020-05-23 PROCEDURE — 84443 ASSAY THYROID STIM HORMONE: CPT

## 2020-05-23 PROCEDURE — 84479 ASSAY OF THYROID (T3 OR T4): CPT

## 2020-05-23 PROCEDURE — 85025 COMPLETE CBC W/AUTO DIFF WBC: CPT

## 2020-05-25 LAB
REF LAB TEST METHOD: NORMAL
SARS-COV-2 RNA RESP QL NAA+PROBE: NOT DETECTED

## 2020-05-26 RX ORDER — SODIUM CHLORIDE 0.9 % (FLUSH) 0.9 %
10 SYRINGE (ML) INJECTION EVERY 12 HOURS SCHEDULED
Status: CANCELLED | OUTPATIENT
Start: 2020-05-26

## 2020-05-26 RX ORDER — SODIUM CHLORIDE 0.9 % (FLUSH) 0.9 %
10 SYRINGE (ML) INJECTION AS NEEDED
Status: CANCELLED | OUTPATIENT
Start: 2020-05-26

## 2020-05-26 RX ORDER — SODIUM CHLORIDE 9 MG/ML
9 INJECTION, SOLUTION INTRAVENOUS CONTINUOUS PRN
Status: CANCELLED | OUTPATIENT
Start: 2020-05-26

## 2020-06-03 ENCOUNTER — APPOINTMENT (OUTPATIENT)
Dept: GENERAL RADIOLOGY | Facility: HOSPITAL | Age: 60
End: 2020-06-03

## 2020-06-03 ENCOUNTER — HOSPITAL ENCOUNTER (INPATIENT)
Facility: HOSPITAL | Age: 60
LOS: 2 days | Discharge: HOME | End: 2020-06-06
Attending: INTERNAL MEDICINE | Admitting: INTERNAL MEDICINE

## 2020-06-03 DIAGNOSIS — R07.9 CHEST PAIN, UNSPECIFIED TYPE: Primary | ICD-10-CM

## 2020-06-03 DIAGNOSIS — R06.02 SHORTNESS OF BREATH: ICD-10-CM

## 2020-06-03 PROBLEM — I25.10 CORONARY ARTERY DISEASE: Status: ACTIVE | Noted: 2020-06-03

## 2020-06-03 LAB
ALBUMIN SERPL-MCNC: 4.1 G/DL (ref 3.5–5.2)
ALBUMIN/GLOB SERPL: 1.3 G/DL
ALP SERPL-CCNC: 71 U/L (ref 39–117)
ALT SERPL W P-5'-P-CCNC: 25 U/L (ref 1–41)
ANION GAP SERPL CALCULATED.3IONS-SCNC: 10 MMOL/L (ref 5–15)
APTT PPP: 23.6 SECONDS (ref 61–76.5)
AST SERPL-CCNC: 34 U/L (ref 1–40)
BASOPHILS # BLD AUTO: 0.1 10*3/MM3 (ref 0–0.2)
BASOPHILS NFR BLD AUTO: 1.2 % (ref 0–1.5)
BILIRUB SERPL-MCNC: 0.5 MG/DL (ref 0.2–1.2)
BILIRUB UR QL STRIP: NEGATIVE
BUN BLD-MCNC: 16 MG/DL (ref 6–20)
BUN BLD-MCNC: ABNORMAL MG/DL
BUN/CREAT SERPL: ABNORMAL
CALCIUM SPEC-SCNC: 9.2 MG/DL (ref 8.6–10.5)
CHLORIDE SERPL-SCNC: 101 MMOL/L (ref 98–107)
CLARITY UR: CLEAR
CO2 SERPL-SCNC: 28 MMOL/L (ref 22–29)
COLOR UR: YELLOW
CREAT BLD-MCNC: 0.89 MG/DL (ref 0.76–1.27)
DEPRECATED RDW RBC AUTO: 48.6 FL (ref 37–54)
EOSINOPHIL # BLD AUTO: 0.2 10*3/MM3 (ref 0–0.4)
EOSINOPHIL NFR BLD AUTO: 1.9 % (ref 0.3–6.2)
ERYTHROCYTE [DISTWIDTH] IN BLOOD BY AUTOMATED COUNT: 16.2 % (ref 12.3–15.4)
GFR SERPL CREATININE-BSD FRML MDRD: 88 ML/MIN/1.73
GLOBULIN UR ELPH-MCNC: 3.1 GM/DL
GLUCOSE BLD-MCNC: 121 MG/DL (ref 65–99)
GLUCOSE UR STRIP-MCNC: NEGATIVE MG/DL
HCT VFR BLD AUTO: 44.3 % (ref 37.5–51)
HGB BLD-MCNC: 14.6 G/DL (ref 13–17.7)
HGB UR QL STRIP.AUTO: NEGATIVE
HOLD SPECIMEN: NORMAL
INR PPP: 1.01 (ref 0.9–1.1)
KETONES UR QL STRIP: NEGATIVE
LEUKOCYTE ESTERASE UR QL STRIP.AUTO: NEGATIVE
LIPASE SERPL-CCNC: 23 U/L (ref 13–60)
LYMPHOCYTES # BLD AUTO: 1.6 10*3/MM3 (ref 0.7–3.1)
LYMPHOCYTES NFR BLD AUTO: 16 % (ref 19.6–45.3)
MCH RBC QN AUTO: 27.9 PG (ref 26.6–33)
MCHC RBC AUTO-ENTMCNC: 33 G/DL (ref 31.5–35.7)
MCV RBC AUTO: 84.4 FL (ref 79–97)
MONOCYTES # BLD AUTO: 1 10*3/MM3 (ref 0.1–0.9)
MONOCYTES NFR BLD AUTO: 10.1 % (ref 5–12)
NEUTROPHILS # BLD AUTO: 7 10*3/MM3 (ref 1.7–7)
NEUTROPHILS NFR BLD AUTO: 70.8 % (ref 42.7–76)
NITRITE UR QL STRIP: NEGATIVE
NRBC BLD AUTO-RTO: 0.1 /100 WBC (ref 0–0.2)
NT-PROBNP SERPL-MCNC: 2137 PG/ML (ref 5–900)
PH UR STRIP.AUTO: 5.5 [PH] (ref 5–8)
PLATELET # BLD AUTO: 222 10*3/MM3 (ref 140–450)
PMV BLD AUTO: 9.6 FL (ref 6–12)
POTASSIUM BLD-SCNC: 4.5 MMOL/L (ref 3.5–5.2)
PROT SERPL-MCNC: 7.2 G/DL (ref 6–8.5)
PROT UR QL STRIP: NEGATIVE
PROTHROMBIN TIME: 10.6 SECONDS (ref 9.6–11.7)
RBC # BLD AUTO: 5.25 10*6/MM3 (ref 4.14–5.8)
SODIUM BLD-SCNC: 139 MMOL/L (ref 136–145)
SP GR UR STRIP: 1.02 (ref 1–1.03)
TROPONIN T SERPL-MCNC: <0.01 NG/ML (ref 0–0.03)
UROBILINOGEN UR QL STRIP: NORMAL
WBC NRBC COR # BLD: 9.9 10*3/MM3 (ref 3.4–10.8)
WHOLE BLOOD HOLD SPECIMEN: NORMAL

## 2020-06-03 PROCEDURE — 99220 PR INITIAL OBSERVATION CARE/DAY 70 MINUTES: CPT | Performed by: STUDENT IN AN ORGANIZED HEALTH CARE EDUCATION/TRAINING PROGRAM

## 2020-06-03 PROCEDURE — 25010000002 MORPHINE PER 10 MG: Performed by: INTERNAL MEDICINE

## 2020-06-03 PROCEDURE — 71045 X-RAY EXAM CHEST 1 VIEW: CPT

## 2020-06-03 PROCEDURE — 84484 ASSAY OF TROPONIN QUANT: CPT | Performed by: PHYSICIAN ASSISTANT

## 2020-06-03 PROCEDURE — 80053 COMPREHEN METABOLIC PANEL: CPT | Performed by: PHYSICIAN ASSISTANT

## 2020-06-03 PROCEDURE — 93005 ELECTROCARDIOGRAM TRACING: CPT | Performed by: INTERNAL MEDICINE

## 2020-06-03 PROCEDURE — 81003 URINALYSIS AUTO W/O SCOPE: CPT | Performed by: PHYSICIAN ASSISTANT

## 2020-06-03 PROCEDURE — 94640 AIRWAY INHALATION TREATMENT: CPT

## 2020-06-03 PROCEDURE — 99284 EMERGENCY DEPT VISIT MOD MDM: CPT

## 2020-06-03 PROCEDURE — 93005 ELECTROCARDIOGRAM TRACING: CPT

## 2020-06-03 PROCEDURE — 93005 ELECTROCARDIOGRAM TRACING: CPT | Performed by: PHYSICIAN ASSISTANT

## 2020-06-03 PROCEDURE — 83690 ASSAY OF LIPASE: CPT | Performed by: PHYSICIAN ASSISTANT

## 2020-06-03 PROCEDURE — 85610 PROTHROMBIN TIME: CPT | Performed by: INTERNAL MEDICINE

## 2020-06-03 PROCEDURE — 85025 COMPLETE CBC W/AUTO DIFF WBC: CPT | Performed by: PHYSICIAN ASSISTANT

## 2020-06-03 PROCEDURE — 85730 THROMBOPLASTIN TIME PARTIAL: CPT | Performed by: INTERNAL MEDICINE

## 2020-06-03 PROCEDURE — G0378 HOSPITAL OBSERVATION PER HR: HCPCS

## 2020-06-03 PROCEDURE — 83880 ASSAY OF NATRIURETIC PEPTIDE: CPT | Performed by: PHYSICIAN ASSISTANT

## 2020-06-03 PROCEDURE — 25010000002 ONDANSETRON PER 1 MG: Performed by: INTERNAL MEDICINE

## 2020-06-03 PROCEDURE — 25010000002 FUROSEMIDE PER 20 MG: Performed by: PHYSICIAN ASSISTANT

## 2020-06-03 PROCEDURE — 25010000002 ONDANSETRON PER 1 MG: Performed by: EMERGENCY MEDICINE

## 2020-06-03 PROCEDURE — 94799 UNLISTED PULMONARY SVC/PX: CPT

## 2020-06-03 RX ORDER — BUDESONIDE AND FORMOTEROL FUMARATE DIHYDRATE 160; 4.5 UG/1; UG/1
2 AEROSOL RESPIRATORY (INHALATION)
Status: DISCONTINUED | OUTPATIENT
Start: 2020-06-03 | End: 2020-06-06 | Stop reason: HOSPADM

## 2020-06-03 RX ORDER — ONDANSETRON 4 MG/1
4 TABLET, FILM COATED ORAL EVERY 6 HOURS PRN
Status: DISCONTINUED | OUTPATIENT
Start: 2020-06-03 | End: 2020-06-06 | Stop reason: HOSPADM

## 2020-06-03 RX ORDER — ROFLUMILAST 500 UG/1
500 TABLET ORAL DAILY
Status: DISCONTINUED | OUTPATIENT
Start: 2020-06-04 | End: 2020-06-06 | Stop reason: HOSPADM

## 2020-06-03 RX ORDER — ACETAMINOPHEN 325 MG/1
650 TABLET ORAL EVERY 4 HOURS PRN
Status: DISCONTINUED | OUTPATIENT
Start: 2020-06-03 | End: 2020-06-06 | Stop reason: HOSPADM

## 2020-06-03 RX ORDER — CLOPIDOGREL BISULFATE 75 MG/1
75 TABLET ORAL DAILY
Status: DISCONTINUED | OUTPATIENT
Start: 2020-06-04 | End: 2020-06-04

## 2020-06-03 RX ORDER — CHOLECALCIFEROL (VITAMIN D3) 125 MCG
5 CAPSULE ORAL NIGHTLY PRN
Status: DISCONTINUED | OUTPATIENT
Start: 2020-06-03 | End: 2020-06-06 | Stop reason: HOSPADM

## 2020-06-03 RX ORDER — TAMSULOSIN HYDROCHLORIDE 0.4 MG/1
0.4 CAPSULE ORAL EVERY EVENING
Status: DISCONTINUED | OUTPATIENT
Start: 2020-06-03 | End: 2020-06-06 | Stop reason: HOSPADM

## 2020-06-03 RX ORDER — ASPIRIN 81 MG/1
324 TABLET, CHEWABLE ORAL ONCE
Status: DISCONTINUED | OUTPATIENT
Start: 2020-06-03 | End: 2020-06-06 | Stop reason: HOSPADM

## 2020-06-03 RX ORDER — RANOLAZINE 500 MG/1
1000 TABLET, EXTENDED RELEASE ORAL 2 TIMES DAILY
Status: DISCONTINUED | OUTPATIENT
Start: 2020-06-03 | End: 2020-06-06 | Stop reason: HOSPADM

## 2020-06-03 RX ORDER — DULOXETIN HYDROCHLORIDE 30 MG/1
60 CAPSULE, DELAYED RELEASE ORAL DAILY
Status: DISCONTINUED | OUTPATIENT
Start: 2020-06-04 | End: 2020-06-06 | Stop reason: HOSPADM

## 2020-06-03 RX ORDER — CETIRIZINE HYDROCHLORIDE 10 MG/1
10 TABLET ORAL DAILY
Status: DISCONTINUED | OUTPATIENT
Start: 2020-06-04 | End: 2020-06-06 | Stop reason: HOSPADM

## 2020-06-03 RX ORDER — AMIODARONE HYDROCHLORIDE 200 MG/1
200 TABLET ORAL DAILY
Status: DISCONTINUED | OUTPATIENT
Start: 2020-06-04 | End: 2020-06-06 | Stop reason: HOSPADM

## 2020-06-03 RX ORDER — ALBUTEROL SULFATE 2.5 MG/3ML
2.5 SOLUTION RESPIRATORY (INHALATION)
Status: DISCONTINUED | OUTPATIENT
Start: 2020-06-03 | End: 2020-06-06 | Stop reason: HOSPADM

## 2020-06-03 RX ORDER — BUMETANIDE 1 MG/1
1 TABLET ORAL EVERY 8 HOURS SCHEDULED
Status: DISCONTINUED | OUTPATIENT
Start: 2020-06-03 | End: 2020-06-04

## 2020-06-03 RX ORDER — BISACODYL 10 MG
10 SUPPOSITORY, RECTAL RECTAL DAILY PRN
Status: DISCONTINUED | OUTPATIENT
Start: 2020-06-03 | End: 2020-06-06 | Stop reason: HOSPADM

## 2020-06-03 RX ORDER — ONDANSETRON 2 MG/ML
4 INJECTION INTRAMUSCULAR; INTRAVENOUS ONCE
Status: COMPLETED | OUTPATIENT
Start: 2020-06-03 | End: 2020-06-03

## 2020-06-03 RX ORDER — ACETAMINOPHEN 160 MG/5ML
650 SOLUTION ORAL EVERY 4 HOURS PRN
Status: DISCONTINUED | OUTPATIENT
Start: 2020-06-03 | End: 2020-06-06 | Stop reason: HOSPADM

## 2020-06-03 RX ORDER — ASPIRIN 81 MG/1
81 TABLET ORAL DAILY
Status: DISCONTINUED | OUTPATIENT
Start: 2020-06-04 | End: 2020-06-06 | Stop reason: HOSPADM

## 2020-06-03 RX ORDER — LISINOPRIL 5 MG/1
5 TABLET ORAL DAILY
Status: DISCONTINUED | OUTPATIENT
Start: 2020-06-04 | End: 2020-06-06 | Stop reason: HOSPADM

## 2020-06-03 RX ORDER — NITROGLYCERIN 20 MG/100ML
10-50 INJECTION INTRAVENOUS
Status: DISCONTINUED | OUTPATIENT
Start: 2020-06-03 | End: 2020-06-04

## 2020-06-03 RX ORDER — SODIUM CHLORIDE 0.9 % (FLUSH) 0.9 %
10 SYRINGE (ML) INJECTION AS NEEDED
Status: DISCONTINUED | OUTPATIENT
Start: 2020-06-03 | End: 2020-06-06 | Stop reason: HOSPADM

## 2020-06-03 RX ORDER — HYDROCHLOROTHIAZIDE 25 MG/1
25 TABLET ORAL DAILY
Status: DISCONTINUED | OUTPATIENT
Start: 2020-06-04 | End: 2020-06-04

## 2020-06-03 RX ORDER — ACETAMINOPHEN 650 MG/1
650 SUPPOSITORY RECTAL EVERY 4 HOURS PRN
Status: DISCONTINUED | OUTPATIENT
Start: 2020-06-03 | End: 2020-06-06 | Stop reason: HOSPADM

## 2020-06-03 RX ORDER — FUROSEMIDE 10 MG/ML
40 INJECTION INTRAMUSCULAR; INTRAVENOUS ONCE
Status: COMPLETED | OUTPATIENT
Start: 2020-06-03 | End: 2020-06-03

## 2020-06-03 RX ORDER — HEPARIN SODIUM 10000 [USP'U]/100ML
6.9 INJECTION, SOLUTION INTRAVENOUS
Status: DISCONTINUED | OUTPATIENT
Start: 2020-06-03 | End: 2020-06-04

## 2020-06-03 RX ORDER — ATORVASTATIN CALCIUM 40 MG/1
80 TABLET, FILM COATED ORAL DAILY
Status: DISCONTINUED | OUTPATIENT
Start: 2020-06-04 | End: 2020-06-06 | Stop reason: HOSPADM

## 2020-06-03 RX ORDER — ONDANSETRON 2 MG/ML
4 INJECTION INTRAMUSCULAR; INTRAVENOUS EVERY 6 HOURS PRN
Status: DISCONTINUED | OUTPATIENT
Start: 2020-06-03 | End: 2020-06-06 | Stop reason: HOSPADM

## 2020-06-03 RX ORDER — DOCUSATE SODIUM 100 MG/1
100 CAPSULE, LIQUID FILLED ORAL 2 TIMES DAILY PRN
Status: DISCONTINUED | OUTPATIENT
Start: 2020-06-03 | End: 2020-06-06 | Stop reason: HOSPADM

## 2020-06-03 RX ORDER — MORPHINE SULFATE 4 MG/ML
2 INJECTION, SOLUTION INTRAMUSCULAR; INTRAVENOUS ONCE
Status: COMPLETED | OUTPATIENT
Start: 2020-06-03 | End: 2020-06-03

## 2020-06-03 RX ORDER — MONTELUKAST SODIUM 10 MG/1
10 TABLET ORAL NIGHTLY
Status: DISCONTINUED | OUTPATIENT
Start: 2020-06-03 | End: 2020-06-06 | Stop reason: HOSPADM

## 2020-06-03 RX ADMIN — ONDANSETRON 4 MG: 2 INJECTION INTRAMUSCULAR; INTRAVENOUS at 20:00

## 2020-06-03 RX ADMIN — ALBUTEROL SULFATE 2.5 MG: 2.5 SOLUTION RESPIRATORY (INHALATION) at 23:55

## 2020-06-03 RX ADMIN — MORPHINE SULFATE 2 MG: 4 INJECTION INTRAVENOUS at 23:41

## 2020-06-03 RX ADMIN — MONTELUKAST SODIUM 10 MG: 10 TABLET, COATED ORAL at 23:38

## 2020-06-03 RX ADMIN — NITROGLYCERIN 1 INCH: 20 OINTMENT TOPICAL at 20:00

## 2020-06-03 RX ADMIN — BUDESONIDE AND FORMOTEROL FUMARATE DIHYDRATE 2 PUFF: 160; 4.5 AEROSOL RESPIRATORY (INHALATION) at 23:55

## 2020-06-03 RX ADMIN — RANOLAZINE 1000 MG: 500 TABLET, FILM COATED, EXTENDED RELEASE ORAL at 23:38

## 2020-06-03 RX ADMIN — BUMETANIDE 1 MG: 1 TABLET ORAL at 23:38

## 2020-06-03 RX ADMIN — ACETAMINOPHEN 650 MG: 325 TABLET, FILM COATED ORAL at 22:53

## 2020-06-03 RX ADMIN — FUROSEMIDE 40 MG: 10 INJECTION, SOLUTION INTRAMUSCULAR; INTRAVENOUS at 19:51

## 2020-06-03 RX ADMIN — ONDANSETRON 4 MG: 2 INJECTION INTRAMUSCULAR; INTRAVENOUS at 23:41

## 2020-06-03 RX ADMIN — NITROGLYCERIN 10 MCG/MIN: 20 INJECTION INTRAVENOUS at 23:48

## 2020-06-03 NOTE — ED PROVIDER NOTES
Subjective   History:  59-year-old male with a past medical history significant for CHF and CAD presents to the ER with worsening chest pain and dyspnea.  He reports it became significantly worse over the last 24 hours he is been taking extra Bumex daily for CHF but does not believe it is helping.  He is scheduled for an ablation later this month.    Onset: several days  Location: chest and legs  Duration: constant  Character: dyspnea and edema, chest pain  Aggravating/Alleviating factors: None  Radiation None  Severity: moderate            Review of Systems   Constitutional: Negative for chills, diaphoresis, fatigue and fever.   HENT: Negative.    Respiratory: Positive for shortness of breath. Negative for cough and choking.    Cardiovascular: Positive for chest pain and leg swelling. Negative for palpitations.   Gastrointestinal: Negative for abdominal pain, nausea and vomiting.   Genitourinary: Negative.    Musculoskeletal: Negative.    Neurological: Negative.    Psychiatric/Behavioral: Negative.        Past Medical History:   Diagnosis Date   • Asthma    • Atrial fibrillation (CMS/HCC)    • CAD (coronary artery disease)    • CHF (congestive heart failure) (CMS/[facility])    • COPD (chronic obstructive pulmonary disease) (CMS/HCC)    • Depression    • Hyperlipidemia    • Hypertension    • Myocardial infarction (CMS/HCC)    • Pacemaker 2019    [employer]    • V tach (CMS/[facility])        Allergies   Allergen Reactions   • Codeine Anaphylaxis          • Tramadol Anaphylaxis and Hives       Past Surgical History:   Procedure Laterality Date   • CARDIAC ABLATION  11/07/2017   • CARDIAC CATHETERIZATION      6-8 stents, last heart cath 2019   • CARDIAC ELECTROPHYSIOLOGY PROCEDURE N/A 9/26/2019    Procedure: BIVENTRICULAR DEVICE UPGRADE;  Surgeon: Jose Lopez MD;  Location: [facility] CATH INVASIVE LOCATION;  Service: Cardiovascular   • CARDIAC ELECTROPHYSIOLOGY PROCEDURE N/A 9/26/2019    Procedure: EP/Ablation AV Node  ablation;  Surgeon: Jose Lopez MD;  Location: [facility] CATH INVASIVE LOCATION;  Service: Cardiology   • CHOLECYSTECTOMY     • CORONARY ANGIOPLASTY WITH STENT PLACEMENT      2 stents   • PACEMAKER IMPLANTATION  07/08/2016    Pacemaker/ Defib implant - [place]   • SINUS SURGERY      sinus surgery x 9       Family History   Problem Relation Age of Onset   • Diabetes Mother    • Heart disease Mother         MI age 46 - CHF   • Atrial fibrillation Mother    • COPD Mother    • Atrial fibrillation Father    • Heart disease Father         CHF       Social History     Socioeconomic History   • Marital status:      Spouse name: Not on file   • Number of children: Not on file   • Years of education: Not on file   • Highest education level: Not on file   Tobacco Use   • Smoking status: Never Smoker   • Smokeless tobacco: Never Used   Substance and Sexual Activity   • Alcohol use: Yes     Frequency: Never     Comment: socially   • Drug use: No   • Sexual activity: Yes     Partners: Female           Objective   Physical Exam   Constitutional: He is oriented to person, place, and time. He appears well-developed and well-nourished.   HENT:   Head: Normocephalic and atraumatic.   Eyes: Pupils are equal, round, and reactive to light.   Neck: Normal range of motion.   Cardiovascular: Normal rate and regular rhythm.   Pulmonary/Chest: Effort normal and breath sounds normal.   Musculoskeletal: Normal range of motion.        Right lower leg: He exhibits edema.        Left lower leg: He exhibits edema.   Mild edema noted to patients ankles   Neurological: He is alert and oriented to person, place, and time.   Skin: Skin is warm and dry.   Psychiatric: He has a normal mood and affect. His behavior is normal.       Procedures           ED Course  ED Course as of Jun 03 1950 Wed Jun 03, 2020   1827 EKG interpreted by ER physician reviewed myself.  Rate of 70 A. fib/flutter with ventricular paced rhythm.  No significant change  from previous.    [MG]      ED Course User Index  [MG] Bhavani Nicole CLARITA, CECILIA           Xr Chest 1 View    Result Date: 6/3/2020  1. Stable exam, with cardiomegaly and no evidence of active lung disease.  Electronically Signed By-Delicia Blakely On:6/3/2020 7:07 PM This report was finalized on [id number] by  Delicia Blakely, .    Labs Reviewed   COMPREHENSIVE METABOLIC PANEL - Abnormal; Notable for the following components:       Result Value    Glucose 121 (*)     All other components within normal limits    Narrative:     GFR Normal >60  Chronic Kidney Disease <60  Kidney Failure <15     BNP (IN-HOUSE) - Abnormal; Notable for the following components:    proBNP 2,137.0 (*)     All other components within normal limits    Narrative:     Among patients with dyspnea, NT-proBNP is highly sensitive for the detection of acute congestive heart failure. In addition NT-proBNP of <300 pg/ml effectively rules out acute congestive heart failure with 99% negative predictive value.    Results may be falsely decreased if patient taking Biotin.     CBC WITH AUTO DIFFERENTIAL - Abnormal; Notable for the following components:    RDW 16.2 (*)     Lymphocyte % 16.0 (*)     Monocytes, Absolute 1.00 (*)     All other components within normal limits   LIPASE - Normal   TROPONIN (IN-HOUSE) - Normal    Narrative:     Troponin T Reference Range:  <= 0.03 ng/mL-   Negative for AMI  >0.03 ng/mL-     Abnormal for myocardial necrosis.  Clinicians would have to utilize clinical acumen, EKG, Troponin and serial changes to determine if it is an Acute Myocardial Infarction or myocardial injury due to an underlying chronic condition.       Results may be falsely decreased if patient taking Biotin.     BUN - Normal   URINALYSIS W/ CULTURE IF INDICATED   CBC AND DIFFERENTIAL    Narrative:     The following orders were created for panel order CBC & Differential.  Procedure                               Abnormality         Status                      ---------                               -----------         ------                     CBC Auto Differential[[id number]]        Abnormal            Final result                 Please view results for these tests on the individual orders.   EXTRA TUBES    Narrative:     The following orders were created for panel order Extra Tubes.  Procedure                               Abnormality         Status                     ---------                               -----------         ------                     Light Blue Top[[id number]]                                   In process                 Gold Top - SST[[id number]]                                   In process                   Please view results for these tests on the individual orders.   LIGHT BLUE TOP   GOLD TOP - SST     Medications   sodium chloride 0.9 % flush 10 mL (has no administration in time range)   furosemide (LASIX) injection 40 mg (has no administration in time range)   ondansetron (ZOFRAN) injection 4 mg (has no administration in time range)   nitroglycerin (NITROSTAT) ointment 1 inch (has no administration in time range)                                     MDM  Number of Diagnoses or Management Options  Chest pain, unspecified type:   Shortness of breath:   Diagnosis management comments: I examined the patient using the appropriate personal protective equipment.      DISPOSITION:   Chart Review:  Comorbidity:  has a past medical history of Asthma, Atrial fibrillation (CMS/[facility]), CAD (coronary artery disease), CHF (congestive heart failure) (CMS/[facility]), COPD (chronic obstructive pulmonary disease) (CMS/[facility]), Depression, Hyperlipidemia, Hypertension, Myocardial infarction (CMS/[facility]), Pacemaker (2019), and V tach (CMS/[facility]).  Differentials:this list is not all inclusive and does not constitute the entirety of considered causes --> CHF, CAD, pneumonia, COPD  ECG: interpreted by ER physician and reviewed by myself: A. fib/flutter rate of 70  Labs: CMP fairly  unremarkable, lipase normal, troponin negative, BNP 2000    Imaging: Was interpreted by physician and reviewed by myself:  Xr Chest 1 View    Result Date: 6/3/2020  1. Stable exam, with cardiomegaly and no evidence of active lung disease.  Electronically Signed By-Delicia Blakely On:6/3/2020 7:07 PM This report was finalized on [id number] by  Delicia Blakely, .      Disposition/Treatment:  Patient is a 59-year-old male presents to the ER with chest pain shortness of breath leg edema.  He reports he is CHF and his worries exacerbation.  He is increased his Bumex to 3 times a day when normally takes twice a day.  He sees cardiology.  Reports that over the last 24 hours his chest pain shortness breath has become acutely worse.  He was given Lasix and a slightly elevated BNP.  He was also given Nitropaste.  He was admitted to the hospitalist for further management he was stable and in agreement with plan.         Amount and/or Complexity of Data Reviewed  Clinical lab tests: reviewed  Tests in the radiology section of CPT®: reviewed  Tests in the medicine section of CPT®: reviewed    Patient Progress  Patient progress: stable      Final diagnoses:   Chest pain, unspecified type   Shortness of breath            Bhavani Nicole PA-C  06/03/20 1953

## 2020-06-03 NOTE — H&P
[facility] Medicine Services      Patient Name: Jose Dixon Jr.  : 1960  MRN: [id number]  Primary Care Physician: Jaylen Moss MD  Date of admission: 6/3/2020    Patient Care Team:  Jaylen Moss MD as PCP - General          Subjective   History Present Illness     Chief Complaint:   Chief Complaint   Patient presents with   • Shortness of Breath       Mr. Dixon is a 59 y.o. male who presents to [facility] ED with a history of ischemic cardiomyopathy, systolic heart failure, permanent atrial fibrillation, episodic ventricular tachycardia, COPD, sleep apnea, and morbid obesity complaining of left-sided chest pain and shortness of breath.         Mr. Dixon is a 59 y.o. male who presents to [facility] ED with a history of ischemic cardiomyopathy, systolic heart failure, permanent atrial fibrillation, episodic ventricular tachycardia, COPD, sleep apnea, and morbid obesity complaining of left-sided chest pain and shortness of breath.  Patient states he has had continuous chest pain for about 2 years that is normally a dull ache at a 3/10.  Yesterday afternoon, he began to have left-sided, sharp, stabbing, pain between an 8 and 9 out of 10 while he was at rest.  He took sublingual nitroglycerin 4 times with no relief. Patient denies diaphoresis, nausea, or vomiting.  He states he can feel when his heart is in ventricular tachycardia which is what it felt like all day yesterday and up until the point he came to the ER today.  He states the shortness of breath also started today and his feet are much more swollen than normal.  He states he took his home Bumex 3 times prior to coming to the ER.  Patient denies any relief.    In the ED,Troponin negative, BNP 2137.0, creatinine 0.89, BUN 16, lipase 23, PT 10.6, INR 1.01, PTT 23.6.  EKG shows rate of 70, atrial fibrillation and ventricular paced.  Chest x-ray shows stable cardiomegaly no evidence of active lung  disease.  UA negative.  Patient admitted for further evaluation and treatment.      Review of Systems   Constitution: Negative for chills, diaphoresis and fever.   Cardiovascular: Positive for chest pain, dyspnea on exertion, irregular heartbeat and leg swelling.   Respiratory: Positive for shortness of breath. Negative for cough.    Skin:        Eczema flareup   Gastrointestinal: Negative for diarrhea, nausea and vomiting.   Genitourinary: Negative for dysuria, frequency and urgency.   All other systems reviewed and are negative.        Personal History     Past Medical History:   Past Medical History:   Diagnosis Date   • Asthma    • Atrial fibrillation (CMS/[facility])    • CAD (coronary artery disease)    • CHF (congestive heart failure) (CMS/HCC)    • COPD (chronic obstructive pulmonary disease) (CMS/[facility])    • Depression    • Hyperlipidemia    • Hypertension    • Myocardial infarction (CMS/HCC)    • Pacemaker 2019    [employer]    • V tach (CMS/[facility])        Surgical History:      Past Surgical History:   Procedure Laterality Date   • CARDIAC ABLATION  11/07/2017   • CARDIAC CATHETERIZATION      6-8 stents, last heart cath 2019   • CARDIAC ELECTROPHYSIOLOGY PROCEDURE N/A 9/26/2019    Procedure: BIVENTRICULAR DEVICE UPGRADE;  Surgeon: Jose Lopez MD;  Location: [facility] CATH INVASIVE LOCATION;  Service: Cardiovascular   • CARDIAC ELECTROPHYSIOLOGY PROCEDURE N/A 9/26/2019    Procedure: EP/Ablation AV Node ablation;  Surgeon: Jose Lopez MD;  Location: [facility] CATH INVASIVE LOCATION;  Service: Cardiology   • CHOLECYSTECTOMY     • CORONARY ANGIOPLASTY WITH STENT PLACEMENT      2 stents   • PACEMAKER IMPLANTATION  07/08/2016    Pacemaker/ Defib implant - [place]   • SINUS SURGERY      sinus surgery x 9       Family History: family history includes Atrial fibrillation in his father and mother; COPD in his mother; Diabetes in his mother; Heart disease in his father and mother. Otherwise pertinent FHx was  reviewed and unremarkable.     Social History:  reports that he has never smoked. He has never used smokeless tobacco. He reports that he drinks alcohol. He reports that he does not use drugs.      Medications:  Prior to Admission medications    Medication Sig Start Date End Date Taking? Authorizing Provider   albuterol (PROVENTIL) (2.5 MG/3ML) 0.083% nebulizer solution Take 2.5 mg by nebulization Every 4 (Four) Hours As Needed for Wheezing. 8/9/19   Brandon Enrique MD   albuterol sulfate  (90 Base) MCG/ACT inhaler Inhale 2 puffs Every 4 (Four) Hours As Needed for Wheezing. 10/28/19   Juli Boudreaux APRN   amiodarone (PACERONE) 200 MG tablet Take 1 tablet by mouth Daily. 4/22/20   Jose Lopez MD   apixaban (ELIQUIS) 5 MG tablet tablet Take 1 tablet by mouth 2 (Two) Times a Day. 10/28/19   Juli Boudreaux APRN   atorvastatin (LIPITOR) 80 MG tablet Take 80 mg by mouth Daily.    ProviderKian MD   budesonide-formoterol (SYMBICORT) 160-4.5 MCG/ACT inhaler Inhale 2 puffs 2 (Two) Times a Day. 8/9/19   Brandon Enrique MD   bumetanide (BUMEX) 1 MG tablet Take 1 tablet by mouth 3 (Three) Times a Day. 10/28/19   Juli Boudreaux APRN   cetirizine (zyrTEC) 10 MG tablet Take 1 tablet by mouth Daily. 10/28/19   Juli Boudreaux APRN   clopidogrel (PLAVIX) 75 MG tablet Take 75 mg by mouth Daily.    ProviderKian MD   DULoxetine (CYMBALTA) 60 MG capsule Take 60 mg by mouth Daily.    Kian Kraus MD   fluticasone (FLONASE) 50 MCG/ACT nasal spray 2 sprays into the nostril(s) as directed by provider Daily. 10/28/19   Juli Boudreaux APRN   gabapentin (NEURONTIN) 300 MG capsule Take 300 mg by mouth 2 (Two) Times a Day.    Kian Kraus MD   hydroCHLOROthiazide (HYDRODIURIL) 25 MG tablet Take 1 tablet by mouth Daily. 10/28/19   Juli Boudreaux APRN   isosorbide mononitrate (IMDUR) 60 MG 24 hr tablet Take 60 mg by mouth 3 (Three) Times a Day.    Provider, MD Kian   lisinopril  (PRINIVIL,ZESTRIL) 5 MG tablet Take 1 tablet by mouth Daily. 10/7/19   Jose Lopez MD   metoprolol succinate XL (TOPROL XL) 100 MG 24 hr tablet Take 1 tablet by mouth Daily. 10/28/19   Juli Boudreaux APRN   montelukast (SINGULAIR) 10 MG tablet Take 10 mg by mouth Daily.    Kian Kraus MD   nitroglycerin (NITROSTAT) 0.4 MG SL tablet Place 0.4 mg under the tongue Every 5 (Five) Minutes As Needed for Chest Pain. Take no more than 3 doses in 15 minutes.    Kian Kraus MD   potassium chloride (K-DUR) 10 MEQ CR tablet Take 1 tablet by mouth Daily. 10/7/19   Jose Lopez MD   ranolazine (RANEXA) 500 MG 12 hr tablet Take 1,000 mg by mouth 2 (Two) Times a Day.    Kian Kraus MD   roflumilast (DALIRESP) 500 MCG tablet tablet Take 500 mcg by mouth Daily.    Kian Kraus MD   tamsulosin (FLOMAX) 0.4 MG capsule 24 hr capsule Take 1 capsule by mouth Every Evening. 9/17/19   Kian Kraus MD       Allergies:    Allergies   Allergen Reactions   • Codeine Anaphylaxis          • Tramadol Anaphylaxis and Hives       Objective   Objective     Vital Signs  Temp:  [98.2 °F (36.8 °C)] 98.2 °F (36.8 °C)  Heart Rate:  [72-82] 75  Resp:  [24] 24  BP: (166-187)/(84-97) 166/97  SpO2:  [93 %-99 %] 98 %  on  Flow (L/min):  [2] 2;   Device (Oxygen Therapy): nasal cannula  Body mass index is 47.27 kg/m².    Physical Exam   Constitutional: He is oriented to person, place, and time. He appears well-developed. He appears distressed.   HENT:   Head: Normocephalic and atraumatic.   Mouth/Throat: Oropharynx is clear and moist.   Eyes: Pupils are equal, round, and reactive to light. Conjunctivae and EOM are normal.   Neck: Normal range of motion. Neck supple.   Cardiovascular: Normal rate.   Irregular rhythm, heart sounds soft, distal pulses weak   Pulmonary/Chest: Breath sounds normal. He is in respiratory distress.   Mild accessory muscle use, better with nasal cannula in place   Abdominal:  Soft. Bowel sounds are normal. He exhibits no distension.   Musculoskeletal: Normal range of motion. He exhibits edema.   3+ edema to lower extremities   Neurological: He is alert and oriented to person, place, and time.   Skin: Skin is warm and dry. Capillary refill takes 2 to 3 seconds.   Scattered erythematous cracked rough areas noted to upper arms chest face and head   Psychiatric: His behavior is normal. Judgment and thought content normal.   Upset at situation, pssimistic       Results Review:  I have personally reviewed most recent cardiac tracings, lab results and radiology images and interpretations and agree with findings, most notably: BNP.    Results from last 7 days   Lab Units 06/03/20  1849   WBC 10*3/mm3 9.90   HEMOGLOBIN g/dL 14.6   HEMATOCRIT % 44.3   PLATELETS 10*3/mm3 222     Results from last 7 days   Lab Units 06/03/20  1849   SODIUM mmol/L 139   POTASSIUM mmol/L 4.5   CHLORIDE mmol/L 101   CO2 mmol/L 28.0   BUN  16   CREATININE mg/dL 0.89   GLUCOSE mg/dL 121*   CALCIUM mg/dL 9.2   ALT (SGPT) U/L 25   AST (SGOT) U/L 34   TROPONIN T ng/mL <0.010   PROBNP pg/mL 2,137.0*     Estimated Creatinine Clearance: 129.5 mL/min (by C-G formula based on SCr of 0.89 mg/dL).  Brief Urine Lab Results     None          Microbiology Results (last 10 days)     ** No results found for the last 240 hours. **          ECG/EMG Results (most recent)     Procedure Component Value Units Date/Time    ECG 12 Lead [[id number]] Collected:  06/03/20 1823     Updated:  06/03/20 1825    Narrative:       HEART RATE= 70  bpm  RR Interval= 856  ms  NC Interval=   ms  P Horizontal Axis= 239  deg  P Front Axis=   deg  QRSD Interval= 162  ms  QT Interval= 435  ms  QRS Axis= 145  deg  T Wave Axis= -49  deg  - ABNORMAL ECG -  Afib/flutter and ventricular-paced rhythm  Biventricular paced rhythm  Electronically Signed By:   Date and Time of Study: 2020-06-03 18:23:21              Xr Chest 1 View    Result Date: 6/3/2020  1. Stable  exam, with cardiomegaly and no evidence of active lung disease.  Electronically Signed By-Delicia Blakely On:6/3/2020 7:07 PM This report was finalized on [id number] by  Delicia Blakely, .        Estimated Creatinine Clearance: 129.5 mL/min (by C-G formula based on SCr of 0.89 mg/dL).    Assessment/Plan   Assessment/Plan       Active Hospital Problems    Diagnosis  POA   • **Chest pain [R07.9]  Yes     Priority: High   • Ischemic cardiomyopathy [I25.5]  Yes     Priority: Medium   • Paroxysmal atrial fibrillation (CMS/HCC) [I48.0]  Yes     Priority: Medium   • Congestive heart failure (CMS/HCC) [I50.9]  Yes     Priority: Medium   • ICD (implantable cardioverter-defibrillator) in place [Z95.810]  Yes     Priority: Medium   • Sleep apnea [G47.30]  Yes   • Essential hypertension [I10]  Yes   • Morbid obesity (CMS/HCC) [E66.01]  Yes   • Chronic obstructive pulmonary disease (CMS/HCC) [J44.9]  Yes   • Hyperlipidemia [E78.5]  Yes      Resolved Hospital Problems   No resolved problems to display.       Chest pain, HFr EF,  chronic with ischemic cardiomyopathy, permanent atrial fibrillation post AV node ablation and episodic V. tach with EP ablation scheduled June 18, 2020    --Chronic coronary artery disease       -Received nitro paste ER, with no relief    - EF < 20%, per cardiology note 10/2019  -CXR and EKG reviewed  -Troponin negative, trend  -proBNP 2137.0  -Nitro gtt  -Remove nitro paste, before gtt started  -Heparin gtt  -Morphine X 1 for pain  -Continue cardiac monitoring  -Cardiology Consult  -Continuous Pulse Ox  -NPO at MN  -Continue NTG SL PRN for CP if systolic bp > 90  -Continue ASA  -IV Lasix 40 mg BID X 3 doses  -Continue home diuretics when ok by cardiology  -Monitor I&O q shift  -Daily weights  -Continue statin, amiodarone, Ranexa when appropriate  -Hold plavix for now    COPD, not in exacerbation  -Continuous pulse ox  -Continue albuterol, Brio Ellipta with hospital substitution of Symbicort  -Continue  Singulair and Daliresp when appropriate    Essential Hypertension, Chronic, Controlled  -Continue home lisinopril  -Hold   - Monitor with routine vital signs     DON  -Okay to use home or hospital CPAP for sleep    Depression  -Continue Cymbalta when appropriate    Urinary retention  - Continue Flomax when appropriate    Seasonal allergies/allergic rhinitis  -Continue cetirizine when appropriate  -Hold Flonase    Dietary supplementation  -Hold dietary supplements for now    Obesity, morbid  -encourage lifestyle modifications      VTE Prophylaxis - heparin gtt  Mechanical Order History:     None      Pharmalogical Order History:     None          CODE STATUS:    Code Status and Medical Interventions:   Ordered at: 06/03/20 2211     Code Status:    CPR     Medical Interventions (Level of Support Prior to Arrest):    Full       This patient has been examined wearing appropriate Personal Protective Equipment. 06/03/20      I discussed the patient's findings and my recommendations with patient and nursing staff.        Electronically signed by ABHISHEK Reyna, 06/03/20, 8:00 PM.  [facility] Hospitalist Team

## 2020-06-04 PROBLEM — E66.01 MORBID OBESITY (HCC): Chronic | Status: ACTIVE | Noted: 2018-04-09

## 2020-06-04 PROBLEM — I25.5 ISCHEMIC CARDIOMYOPATHY: Chronic | Status: ACTIVE | Noted: 2020-05-15

## 2020-06-04 PROBLEM — I50.9 CONGESTIVE HEART FAILURE (HCC): Chronic | Status: ACTIVE | Noted: 2017-09-08

## 2020-06-04 LAB
ANION GAP SERPL CALCULATED.3IONS-SCNC: 14 MMOL/L (ref 5–15)
ANISOCYTOSIS BLD QL: ABNORMAL
APTT PPP: 24.2 SECONDS (ref 61–76.5)
BUN BLD-MCNC: 15 MG/DL (ref 6–20)
BUN BLD-MCNC: ABNORMAL MG/DL
BUN/CREAT SERPL: ABNORMAL
CALCIUM SPEC-SCNC: 9.1 MG/DL (ref 8.6–10.5)
CHLORIDE SERPL-SCNC: 97 MMOL/L (ref 98–107)
CO2 SERPL-SCNC: 28 MMOL/L (ref 22–29)
CREAT BLD-MCNC: 0.93 MG/DL (ref 0.76–1.27)
DEPRECATED RDW RBC AUTO: 48.6 FL (ref 37–54)
EOSINOPHIL # BLD MANUAL: 0.36 10*3/MM3 (ref 0–0.4)
EOSINOPHIL NFR BLD MANUAL: 4 % (ref 0.3–6.2)
ERYTHROCYTE [DISTWIDTH] IN BLOOD BY AUTOMATED COUNT: 16.2 % (ref 12.3–15.4)
GFR SERPL CREATININE-BSD FRML MDRD: 84 ML/MIN/1.73
GLUCOSE BLD-MCNC: 111 MG/DL (ref 65–99)
HCT VFR BLD AUTO: 45.2 % (ref 37.5–51)
HGB BLD-MCNC: 15 G/DL (ref 13–17.7)
LARGE PLATELETS: ABNORMAL
LYMPHOCYTES # BLD MANUAL: 1.18 10*3/MM3 (ref 0.7–3.1)
LYMPHOCYTES NFR BLD MANUAL: 10 % (ref 5–12)
LYMPHOCYTES NFR BLD MANUAL: 13 % (ref 19.6–45.3)
MCH RBC QN AUTO: 28.1 PG (ref 26.6–33)
MCHC RBC AUTO-ENTMCNC: 33.1 G/DL (ref 31.5–35.7)
MCV RBC AUTO: 84.7 FL (ref 79–97)
MONOCYTES # BLD AUTO: 0.91 10*3/MM3 (ref 0.1–0.9)
NEUTROPHILS # BLD AUTO: 6.64 10*3/MM3 (ref 1.7–7)
NEUTROPHILS NFR BLD MANUAL: 71 % (ref 42.7–76)
NEUTS BAND NFR BLD MANUAL: 2 % (ref 0–5)
PLATELET # BLD AUTO: 209 10*3/MM3 (ref 140–450)
PMV BLD AUTO: 9.3 FL (ref 6–12)
POTASSIUM BLD-SCNC: 4 MMOL/L (ref 3.5–5.2)
RBC # BLD AUTO: 5.34 10*6/MM3 (ref 4.14–5.8)
SCAN SLIDE: NORMAL
SODIUM BLD-SCNC: 139 MMOL/L (ref 136–145)
TROPONIN T SERPL-MCNC: <0.01 NG/ML (ref 0–0.03)
TROPONIN T SERPL-MCNC: <0.01 NG/ML (ref 0–0.03)
WBC MORPH BLD: NORMAL
WBC NRBC COR # BLD: 9.1 10*3/MM3 (ref 3.4–10.8)

## 2020-06-04 PROCEDURE — 25010000002 FUROSEMIDE PER 20 MG: Performed by: STUDENT IN AN ORGANIZED HEALTH CARE EDUCATION/TRAINING PROGRAM

## 2020-06-04 PROCEDURE — 85730 THROMBOPLASTIN TIME PARTIAL: CPT | Performed by: INTERNAL MEDICINE

## 2020-06-04 PROCEDURE — 85007 BL SMEAR W/DIFF WBC COUNT: CPT | Performed by: INTERNAL MEDICINE

## 2020-06-04 PROCEDURE — 85025 COMPLETE CBC W/AUTO DIFF WBC: CPT | Performed by: INTERNAL MEDICINE

## 2020-06-04 PROCEDURE — 99223 1ST HOSP IP/OBS HIGH 75: CPT | Performed by: INTERNAL MEDICINE

## 2020-06-04 PROCEDURE — 94799 UNLISTED PULMONARY SVC/PX: CPT

## 2020-06-04 PROCEDURE — 94640 AIRWAY INHALATION TREATMENT: CPT

## 2020-06-04 PROCEDURE — 80048 BASIC METABOLIC PNL TOTAL CA: CPT | Performed by: INTERNAL MEDICINE

## 2020-06-04 PROCEDURE — 84484 ASSAY OF TROPONIN QUANT: CPT | Performed by: INTERNAL MEDICINE

## 2020-06-04 PROCEDURE — 99222 1ST HOSP IP/OBS MODERATE 55: CPT | Performed by: THORACIC SURGERY (CARDIOTHORACIC VASCULAR SURGERY)

## 2020-06-04 PROCEDURE — 84520 ASSAY OF UREA NITROGEN: CPT | Performed by: INTERNAL MEDICINE

## 2020-06-04 PROCEDURE — 25010000002 MORPHINE PER 10 MG: Performed by: STUDENT IN AN ORGANIZED HEALTH CARE EDUCATION/TRAINING PROGRAM

## 2020-06-04 PROCEDURE — 83036 HEMOGLOBIN GLYCOSYLATED A1C: CPT | Performed by: STUDENT IN AN ORGANIZED HEALTH CARE EDUCATION/TRAINING PROGRAM

## 2020-06-04 PROCEDURE — 25010000002 ONDANSETRON PER 1 MG: Performed by: INTERNAL MEDICINE

## 2020-06-04 PROCEDURE — 25010000002 HEPARIN (PORCINE) PER 1000 UNITS: Performed by: INTERNAL MEDICINE

## 2020-06-04 PROCEDURE — 99232 SBSQ HOSP IP/OBS MODERATE 35: CPT | Performed by: INTERNAL MEDICINE

## 2020-06-04 RX ORDER — MORPHINE SULFATE 4 MG/ML
2 INJECTION, SOLUTION INTRAMUSCULAR; INTRAVENOUS ONCE
Status: COMPLETED | OUTPATIENT
Start: 2020-06-04 | End: 2020-06-04

## 2020-06-04 RX ORDER — ECHINACEA PURPUREA EXTRACT 125 MG
1 TABLET ORAL AS NEEDED
Status: DISCONTINUED | OUTPATIENT
Start: 2020-06-04 | End: 2020-06-06 | Stop reason: HOSPADM

## 2020-06-04 RX ORDER — FUROSEMIDE 10 MG/ML
40 INJECTION INTRAMUSCULAR; INTRAVENOUS EVERY 12 HOURS
Status: COMPLETED | OUTPATIENT
Start: 2020-06-04 | End: 2020-06-05

## 2020-06-04 RX ORDER — METOPROLOL SUCCINATE 50 MG/1
100 TABLET, EXTENDED RELEASE ORAL
Status: DISCONTINUED | OUTPATIENT
Start: 2020-06-04 | End: 2020-06-06 | Stop reason: HOSPADM

## 2020-06-04 RX ADMIN — METOPROLOL SUCCINATE 100 MG: 50 TABLET, EXTENDED RELEASE ORAL at 13:44

## 2020-06-04 RX ADMIN — MELATONIN TAB 5 MG 5 MG: 5 TAB at 21:05

## 2020-06-04 RX ADMIN — ACETAMINOPHEN 650 MG: 325 TABLET, FILM COATED ORAL at 07:26

## 2020-06-04 RX ADMIN — FUROSEMIDE 40 MG: 10 INJECTION, SOLUTION INTRAMUSCULAR; INTRAVENOUS at 05:10

## 2020-06-04 RX ADMIN — ONDANSETRON 4 MG: 2 INJECTION INTRAMUSCULAR; INTRAVENOUS at 09:27

## 2020-06-04 RX ADMIN — CETIRIZINE HYDROCHLORIDE 10 MG: 10 TABLET, FILM COATED ORAL at 11:35

## 2020-06-04 RX ADMIN — AMIODARONE HYDROCHLORIDE 200 MG: 200 TABLET ORAL at 11:34

## 2020-06-04 RX ADMIN — HEPARIN SODIUM AND DEXTROSE 6.9 UNITS/KG/HR: 10000; 5 INJECTION INTRAVENOUS at 00:04

## 2020-06-04 RX ADMIN — ALBUTEROL SULFATE 2.5 MG: 2.5 SOLUTION RESPIRATORY (INHALATION) at 18:41

## 2020-06-04 RX ADMIN — MORPHINE SULFATE 2 MG: 4 INJECTION INTRAVENOUS at 04:35

## 2020-06-04 RX ADMIN — BUDESONIDE AND FORMOTEROL FUMARATE DIHYDRATE 2 PUFF: 160; 4.5 AEROSOL RESPIRATORY (INHALATION) at 06:54

## 2020-06-04 RX ADMIN — NITROGLYCERIN 50 MCG/MIN: 20 INJECTION INTRAVENOUS at 09:01

## 2020-06-04 RX ADMIN — Medication 10 ML: at 21:05

## 2020-06-04 RX ADMIN — ALBUTEROL SULFATE 2.5 MG: 2.5 SOLUTION RESPIRATORY (INHALATION) at 15:01

## 2020-06-04 RX ADMIN — ALBUTEROL SULFATE 2.5 MG: 2.5 SOLUTION RESPIRATORY (INHALATION) at 11:09

## 2020-06-04 RX ADMIN — ATORVASTATIN CALCIUM 80 MG: 40 TABLET, FILM COATED ORAL at 11:35

## 2020-06-04 RX ADMIN — TAMSULOSIN HYDROCHLORIDE 0.4 MG: 0.4 CAPSULE ORAL at 17:48

## 2020-06-04 RX ADMIN — ALBUTEROL SULFATE 2.5 MG: 2.5 SOLUTION RESPIRATORY (INHALATION) at 03:49

## 2020-06-04 RX ADMIN — FUROSEMIDE 40 MG: 10 INJECTION, SOLUTION INTRAMUSCULAR; INTRAVENOUS at 17:48

## 2020-06-04 RX ADMIN — LISINOPRIL 5 MG: 5 TABLET ORAL at 11:35

## 2020-06-04 RX ADMIN — ROFLUMILAST 500 MCG: 500 TABLET ORAL at 11:35

## 2020-06-04 RX ADMIN — ALBUTEROL SULFATE 2.5 MG: 2.5 SOLUTION RESPIRATORY (INHALATION) at 06:54

## 2020-06-04 RX ADMIN — BUDESONIDE AND FORMOTEROL FUMARATE DIHYDRATE 2 PUFF: 160; 4.5 AEROSOL RESPIRATORY (INHALATION) at 18:41

## 2020-06-04 RX ADMIN — APIXABAN 5 MG: 5 TABLET, FILM COATED ORAL at 21:05

## 2020-06-04 RX ADMIN — MONTELUKAST SODIUM 10 MG: 10 TABLET, COATED ORAL at 21:05

## 2020-06-04 RX ADMIN — RANOLAZINE 1000 MG: 500 TABLET, FILM COATED, EXTENDED RELEASE ORAL at 11:35

## 2020-06-04 RX ADMIN — RANOLAZINE 1000 MG: 500 TABLET, FILM COATED, EXTENDED RELEASE ORAL at 21:05

## 2020-06-04 RX ADMIN — ASPIRIN 81 MG: 81 TABLET, COATED ORAL at 11:35

## 2020-06-04 RX ADMIN — DULOXETINE HYDROCHLORIDE 60 MG: 30 CAPSULE, DELAYED RELEASE ORAL at 11:35

## 2020-06-04 NOTE — H&P (VIEW-ONLY)
Consult requested by hospitalist    Indication for consultation chest pain and worsening heart failure      Sub--59-year-old male patient came for follow-up with recurrent symptoms of palpitations and  chest pain .  Patient had uncontrollable atrial fibrillation and underwent AV node ablation with biventricular ICD upgrade--unfortunately coronary sinus lead was placed in anterobasal vein since there was no posterior and lateral veins for placement of coronary sinus lead--he was in refractory heart failure which has improved to class III chronic systolic heart failure and is been out of hospital without any admissions for heart failure in the last few months.  Patient has known coronary artery disease with ischemic cardiomyopathy and has significant issues with obesity and prior history of COPD.  Patient had prior multiple ICD therapies for conducted atrial fibrillation needing AV node ablation .  He remains in functional class III .Patient has noticed increased number of palpitations associated with chest discomfort in the last few weeks  He continues to remain in class III functional class without any syncope  Chronic medical problems include ischemic cardio myopathy chronic class III systolic heart failure, persistent atrial fibrillation and morbid obesity   Was started on amiodarone with last visit--continues to have episodes of VT needing antitachycardia pacing  Back in 2017 patient underwent an unsuccessful VT ablation with PVC ablation coming from outflow tract which was felt to be possible epicardial origin--ablation from RVOT site was unsuccessful--VT during the time was inferior axis with left bundle and transition V3  Continues to feel poorly with class III systolic heart failure with multiple episodes of VT   Patient presented to the hospital with chronic angina and chronic class III systolic heart failure symptoms with  Decompensation  Patient started on intravenous heparin and nitroglycerin and my  consultation requested  Patient comfortably sitting in the chair when I saw the patient and complains of chronic angina without syncope        Medical History        Past Medical History:   Diagnosis Date   • Asthma     • Atrial fibrillation (CMS/[facility])     • CAD (coronary artery disease)     • CHF (congestive heart failure) (CMS/[facility])     • COPD (chronic obstructive pulmonary disease) (CMS/[facility])     • Depression     • Hyperlipidemia     • Hypertension     • Myocardial infarction (CMS/[facility])     • Pacemaker       pacemaker / defib   • V tach (CMS/[facility])           Surgical History         Past Surgical History:   Procedure Laterality Date   • CARDIAC ABLATION   11/07/2017   • CARDIAC CATHETERIZATION       • CARDIAC ELECTROPHYSIOLOGY PROCEDURE N/A 9/26/2019     Procedure: BIVENTRICULAR DEVICE UPGRADE;  Surgeon: Jose Lopez MD;  Location:  [facility] CATH INVASIVE LOCATION;  Service: Cardiovascular   • CARDIAC ELECTROPHYSIOLOGY PROCEDURE N/A 9/26/2019     Procedure: EP/Ablation AV Node ablation;  Surgeon: Jose Lopez MD;  Location:  [facility] CATH INVASIVE LOCATION;  Service: Cardiology   • CHOLECYSTECTOMY       • CORONARY ANGIOPLASTY WITH STENT PLACEMENT         2 stents   • PACEMAKER IMPLANTATION   07/08/2016     Pacemaker/ Defib implant - [employer]   • SINUS SURGERY         sinus surgery x 9               Family History   Problem Relation Age of Onset   • Diabetes Mother     • Heart disease Mother           MI age 46 - CHF   • Atrial fibrillation Mother     • COPD Mother     • Atrial fibrillation Father     • Heart disease Father           CHF      Social History            Tobacco Use   • Smoking status: Never Smoker   • Smokeless tobacco: Never Used   Substance Use Topics   • Alcohol use: No       Frequency: Never   • Drug use: No      Allergies:  Tramadol and Codeine     Review of Systems   General:  positive for fatigue and tiredness  Eyes: No redness  Cardiovascular: Positive for chest pain and palpitations    Respiratory:   positive for class 3 shortness of breath  Gastrointestinal: No nausea or vomiting, bleeding  Genitourinary: no hematuria or dysuria  Musculoskeletal: No arthralgia or myalgia  Skin: No rash  Neurologic: No numbness, tingling, syncope  Hematologic/Lymphatic: No abnormal bleeding        Physical Exam  VITALS REVIEWED-- pulse rate is 70  afebrile respiration 12 times a minute, blood pressure 123/84  ICD site is clean     General:      well developed, well nourished, in no acute distress.    Head:      normocephalic and atraumatic.    Eyes:      PERRL/EOM intact, conjunctiva and sclera clear with out nystagmus.    Neck:    thyromegaly,  trachea central with normal respiratory effort    heart:       Paced rhythm     Msk:      no deformity or scoliosis noted of thoracic or lumbar spine.    Pulses:      pulses normal in all 4 extremities.    Extremities:       no cyanosis or clubbing--mild 1+ edema noted bilaterally     Neurologic:      no focal deficits.   alert oriented x3  Skin:      intact without lesions or rashes.    Psych:      alert and cooperative; normal mood and affect; normal attention span and concentration.    Lung exam shows reduced breath sounds in bilateral bases without wheezing      CBC    Results from last 7 days   Lab Units 06/04/20  0616 06/03/20  1849   WBC 10*3/mm3 9.10 9.90   HEMOGLOBIN g/dL 15.0 14.6   PLATELETS 10*3/mm3 209 222     BMP   Results from last 7 days   Lab Units 06/04/20  0616 06/03/20  1849   SODIUM mmol/L 139 139   POTASSIUM mmol/L 4.0 4.5   CHLORIDE mmol/L 97* 101   CO2 mmol/L 28.0 28.0   BUN  15 16   CREATININE mg/dL 0.93 0.89   GLUCOSE mg/dL 111* 121*     Infection     CMP   Results from last 7 days   Lab Units 06/04/20  0616 06/03/20  1849   SODIUM mmol/L 139 139   POTASSIUM mmol/L 4.0 4.5   CHLORIDE mmol/L 97* 101   CO2 mmol/L 28.0 28.0   BUN  15 16   CREATININE mg/dL 0.93 0.89   GLUCOSE mg/dL 111* 121*   ALBUMIN g/dL  --  4.10   BILIRUBIN mg/dL  --  0.5   ALK  PHOS U/L  --  71   AST (SGOT) U/L  --  34   ALT (SGPT) U/L  --  25   LIPASE U/L  --  23     Radiology(recent) Xr Chest 1 View    Result Date: 6/3/2020  1. Stable exam, with cardiomegaly and no evidence of active lung disease.  Electronically Signed By-Delicia Blakely On:6/3/2020 7:07 PM This report was finalized on [id number] by  Delicia Blakely, .    EKG reviewed shows underlying atrial fibrillation with biventricular pacing which is appropriate    Assessment plan  Recurrent and symptomatic  episodes of VT--noted on ICD interrogation despite amiodarone --multiple episodes of VT noted terminated by antitachycardia pacing on ICD interrogation with appropriate ICD function  --prior VT ablation notes reviewed from [facility] in 2017 --patient is having worsening heart failure symptoms--VT ablation scheduled in the next few weeks and because of increasing episodes of VT, VT ablation to be preformed early next week   Patient will benefit with CT surgery consultation for placement of left ventricle lead in the mid posterior lateral wall for optimization of biventricular pacing to alleviate symptoms of chronically decompensated class III heart failure symptoms--- discussed with CT surgery  if the symptoms of heart failure remain refractory despite VT ablation and epicardial LV lead placement patient may end up needing  need transplant evaluation which was discussed with the patient  Overall prognosis guarded  Severe ischemic cardiomyopathy with EF less than 20%  Prior history of mild cardiorenal syndrome   Chronic class III systolic heart failure with a decompensation with recurrent VT  Chronic coronary artery disease and chronic chest pain in the past--no other further coronary intervention is warranted as per Dr. Frye  Permanent atrial fibrillation status post AV node ablation  Biventricular ICD in situ which was interrogated        Plan    Stop IV heparin  Stop nitroglycerin  Start metoprolol 100 mg a  day  Continue intravenous Lasix for few doses to alleviate fluid accumulation  EP study and VT ablation scheduled on June 9  CT surgery consultation  Potential discharge in the morning  Avoid opiates for chronic chest pain  Continue current medical treatment  Discussed with the patient and the nurse and CT surgery

## 2020-06-04 NOTE — PROGRESS NOTES
"Heart Failure Program  Nurse Navigator  Discharge Planning    Patient Name:Jose Dixon Jr.  :1960  Cardiologist: Shane  Current Admission Date: 6/3/2020   Previous Admission:   Admission frequency: 1admissions in 6 months    Heart Failure history per record:    Symptoms on admission:Chest pain, SOA, edema to feet and legs       Admission Weight:  Flowsheet Rows      First Filed Value   Admission Height  175.3 cm (69\") Documented at 2020   Admission Weight  (!) 145 kg (320 lb 1.7 oz) Documented at 2020            Current Home Medications:  Prior to Admission medications    Medication Sig Start Date End Date Taking? Authorizing Provider   albuterol sulfate  (90 Base) MCG/ACT inhaler Inhale 2 puffs Every 4 (Four) Hours As Needed for Wheezing. 10/28/19  Yes Juli Boudreaux APRN   amiodarone (PACERONE) 200 MG tablet Take 1 tablet by mouth Daily. 20  Yes Jose Lopez MD   apixaban (ELIQUIS) 5 MG tablet tablet Take 1 tablet by mouth 2 (Two) Times a Day. 10/28/19  Yes Juli Boudreaux APRN   atorvastatin (LIPITOR) 80 MG tablet Take 80 mg by mouth Daily.   Yes Kian Kraus MD   budesonide-formoterol (SYMBICORT) 160-4.5 MCG/ACT inhaler Inhale 2 puffs 2 (Two) Times a Day. 19  Yes Brandon Enrique MD   bumetanide (BUMEX) 1 MG tablet Take 1 tablet by mouth 3 (Three) Times a Day. 10/28/19  Yes Juli Boudreaux APRN   cetirizine (zyrTEC) 10 MG tablet Take 1 tablet by mouth Daily. 10/28/19  Yes Juli Boudreaux APRN   clopidogrel (PLAVIX) 75 MG tablet Take 75 mg by mouth Daily.   Yes Kian Kraus MD   DULoxetine (CYMBALTA) 60 MG capsule Take 60 mg by mouth Daily.   Yes Kian Kraus MD   fluticasone (FLONASE) 50 MCG/ACT nasal spray 2 sprays into the nostril(s) as directed by provider Daily. 10/28/19  Yes Juli Boudreaux APRN   gabapentin (NEURONTIN) 300 MG capsule Take 300 mg by mouth 2 (Two) Times a Day.   Yes Provider, MD Kian "   hydroCHLOROthiazide (HYDRODIURIL) 25 MG tablet Take 1 tablet by mouth Daily. 10/28/19  Yes Juli Boudreaux APRN   isosorbide mononitrate (IMDUR) 60 MG 24 hr tablet Take 60 mg by mouth 3 (Three) Times a Day As Needed (Patient takes at least one QD up to 2 additional as needed).   Yes Kian Kraus MD   lisinopril (PRINIVIL,ZESTRIL) 5 MG tablet Take 1 tablet by mouth Daily. 10/7/19  Yes Jose Lopez MD   metoprolol succinate XL (TOPROL XL) 100 MG 24 hr tablet Take 1 tablet by mouth Daily. 10/28/19  Yes Juli Boudreaux APRN   montelukast (SINGULAIR) 10 MG tablet Take 10 mg by mouth Daily.   Yes Kian Kraus MD   nitroglycerin (NITROSTAT) 0.4 MG SL tablet Place 0.4 mg under the tongue Every 5 (Five) Minutes As Needed for Chest Pain. Take no more than 3 doses in 15 minutes.   Yes Kian Kraus MD   potassium chloride (K-DUR) 10 MEQ CR tablet Take 1 tablet by mouth Daily. 10/7/19  Yes Jose Lopez MD   ranolazine (RANEXA) 500 MG 12 hr tablet Take 1,000 mg by mouth 2 (Two) Times a Day.   Yes Kian Kraus MD   roflumilast (DALIRESP) 500 MCG tablet tablet Take 500 mcg by mouth Daily.   Yes Kian Kraus MD   tamsulosin (FLOMAX) 0.4 MG capsule 24 hr capsule Take 1 capsule by mouth Every Evening. 9/17/19  Yes Kian Kraus MD       Social history:   Patient lives with wife, he reports that he has been to [facility] and a [facility] in [place] for his HF,  He report that he follows low sodium diet, fluid restriction, and takes medications as ordered,     Smoking status: NA     Diagnostics Testing:  proBNP level: 2137    Echocardiogram:       Patient Assessment:   Patient was resting in the chair he is complaining of pain in his chest pain that he has been dealing with for a while now he reports that he his swelling in his feet and legs has decreased some but still swollen     Current O2:2L NC   Home O2: RA     Education provided to patient:  yes-  Heart Failure disease education  yes -Symptom identification/management  yes -Daily Weights  yes- Diet education  yes- Fluid restriction (if ordered)  yes- Medication education  NA- Smoking cessation    Acceptance of learning:  Patient was accepting of information provided     Identified needs/barriers:       Intervention:   Education provided     Patient goal:

## 2020-06-04 NOTE — CONSULTS
Patient Care Team:  Jaylen Moss MD as PCP - General  Referring Provider:  Dr. Lopez  Reason for consultation:  LV lead placement    Chief complaint:  SOA/ left chest pain    Subjective     History of Present Illness:  58 y/o gentleman presents to [facility] ED with c/o SOA and left sided chest pain for couple days.  PMHx is significant for ICM, HFrEF, CAD--s/p coronary stenting, permanent atrial fib, VT, COPD, DON, and morbid obesity.  He reports several year hx of chest pains but day before admission it changed in nature.  He took several SL NTG without relief.  Associated symptoms included worsening SOA, worsening pedal edema, diaphoresis, n/v, and palpitations.  He increased his diuretics without relief as well.  Notable labs in ED--BNP 2137, cr .89.  EKG was atrial fib with pacing.  He reports feeling much better since coming to the hospital and that his edema has greatly improved.  In Sept 2019, he underwent upgrage to biV ICD but the LV lead was not able to be placed.  He is having VT and reports plans for VT ablation next week.  He has been evaluated at [facility] and [facility].  Dr. Richmond was consulted for surgical evaluation of LV lead placement.    Review of Systems   Constitutional: Positive for fatigue.   HENT: Positive for postnasal drip. Negative for rhinorrhea.    Respiratory: Positive for cough, shortness of breath and wheezing.    Cardiovascular: Positive for chest pain, palpitations and leg swelling.   Gastrointestinal: Positive for constipation, nausea and vomiting. Negative for abdominal pain and diarrhea.   Musculoskeletal: Positive for arthralgias and back pain.   Skin: Negative for rash and wound.        Dry skin   Allergic/Immunologic: Negative for immunocompromised state.   Neurological: Negative for dizziness, seizures, syncope, speech difficulty, weakness, light-headedness, numbness and headaches.   Hematological: Does not bruise/bleed easily.   Psychiatric/Behavioral: Negative for  sleep disturbance. The patient is not nervous/anxious.    All other systems reviewed and are negative.       Past Medical History:   Diagnosis Date   • Asthma    • Atrial fibrillation (CMS/[facility])    • CAD (coronary artery disease)    • CHF (congestive heart failure) (CMS/[facility])    • COPD (chronic obstructive pulmonary disease) (CMS/[facility])    • Depression    • Hyperlipidemia    • Hypertension    • Myocardial infarction (CMS/HCC)    • Pacemaker 2019    [employer]    • V tach (CMS/[facility])      Past Surgical History:   Procedure Laterality Date   • CARDIAC ABLATION  11/07/2017   • CARDIAC CATHETERIZATION      6-8 stents, last heart cath 2019   • CARDIAC ELECTROPHYSIOLOGY PROCEDURE N/A 9/26/2019    Procedure: BIVENTRICULAR DEVICE UPGRADE;  Surgeon: Jose Lopez MD;  Location:  [facility] CATH INVASIVE LOCATION;  Service: Cardiovascular   • CARDIAC ELECTROPHYSIOLOGY PROCEDURE N/A 9/26/2019    Procedure: EP/Ablation AV Node ablation;  Surgeon: Jose Lopez MD;  Location:  [facility] CATH INVASIVE LOCATION;  Service: Cardiology   • CHOLECYSTECTOMY     • CORONARY ANGIOPLASTY WITH STENT PLACEMENT      2 stents   • PACEMAKER IMPLANTATION  07/08/2016    Pacemaker/ Defib implant - [employer]   • SINUS SURGERY      sinus surgery x 9     Family History   Problem Relation Age of Onset   • Diabetes Mother    • Heart disease Mother         MI age 46 - CHF   • Atrial fibrillation Mother    • COPD Mother    • Atrial fibrillation Father    • Heart disease Father         CHF     Social History     Tobacco Use   • Smoking status: Never Smoker   • Smokeless tobacco: Never Used   Substance Use Topics   • Alcohol use: Yes     Frequency: Never     Comment: socially   • Drug use: No     Medications Prior to Admission   Medication Sig Dispense Refill Last Dose   • albuterol sulfate  (90 Base) MCG/ACT inhaler Inhale 2 puffs Every 4 (Four) Hours As Needed for Wheezing. 1 inhaler 11 Taking   • amiodarone (PACERONE) 200 MG tablet Take 1 tablet  by mouth Daily. 100 tablet 1 Taking   • apixaban (ELIQUIS) 5 MG tablet tablet Take 1 tablet by mouth 2 (Two) Times a Day. 60 tablet 5 Taking   • atorvastatin (LIPITOR) 80 MG tablet Take 80 mg by mouth Daily.   Taking   • budesonide-formoterol (SYMBICORT) 160-4.5 MCG/ACT inhaler Inhale 2 puffs 2 (Two) Times a Day. 10.2 g 0 Taking   • bumetanide (BUMEX) 1 MG tablet Take 1 tablet by mouth 3 (Three) Times a Day. 90 tablet 5 Taking   • cetirizine (zyrTEC) 10 MG tablet Take 1 tablet by mouth Daily. 30 tablet 11 Taking   • clopidogrel (PLAVIX) 75 MG tablet Take 75 mg by mouth Daily.   Taking   • DULoxetine (CYMBALTA) 60 MG capsule Take 60 mg by mouth Daily.   Taking   • fluticasone (FLONASE) 50 MCG/ACT nasal spray 2 sprays into the nostril(s) as directed by provider Daily. 1 bottle 11 Taking   • gabapentin (NEURONTIN) 300 MG capsule Take 300 mg by mouth 2 (Two) Times a Day.   Taking   • hydroCHLOROthiazide (HYDRODIURIL) 25 MG tablet Take 1 tablet by mouth Daily. 30 tablet 6 Taking   • isosorbide mononitrate (IMDUR) 60 MG 24 hr tablet Take 60 mg by mouth 3 (Three) Times a Day As Needed (Patient takes at least one QD up to 2 additional as needed).   Taking   • lisinopril (PRINIVIL,ZESTRIL) 5 MG tablet Take 1 tablet by mouth Daily. 30 tablet 3 Taking   • metoprolol succinate XL (TOPROL XL) 100 MG 24 hr tablet Take 1 tablet by mouth Daily. 30 tablet 5 Taking   • montelukast (SINGULAIR) 10 MG tablet Take 10 mg by mouth Daily.   Taking   • nitroglycerin (NITROSTAT) 0.4 MG SL tablet Place 0.4 mg under the tongue Every 5 (Five) Minutes As Needed for Chest Pain. Take no more than 3 doses in 15 minutes.   Taking   • potassium chloride (K-DUR) 10 MEQ CR tablet Take 1 tablet by mouth Daily. 30 tablet 3 Taking   • ranolazine (RANEXA) 500 MG 12 hr tablet Take 1,000 mg by mouth 2 (Two) Times a Day.   Taking   • roflumilast (DALIRESP) 500 MCG tablet tablet Take 500 mcg by mouth Daily.   Taking   • tamsulosin (FLOMAX) 0.4 MG capsule 24 hr  "capsule Take 1 capsule by mouth Every Evening.  0 Taking       albuterol 2.5 mg Nebulization Q4H - RT   amiodarone 200 mg Oral Daily   apixaban 5 mg Oral Q12H   aspirin 324 mg Oral Once   And      aspirin 81 mg Oral Daily   atorvastatin 80 mg Oral Daily   budesonide-formoterol 2 puff Inhalation BID - RT   cetirizine 10 mg Oral Daily   DULoxetine 60 mg Oral Daily   furosemide 40 mg Intravenous Q12H   lisinopril 5 mg Oral Daily   metoprolol succinate  mg Oral Q24H   montelukast 10 mg Oral Nightly   ranolazine 1,000 mg Oral BID   roflumilast 500 mcg Oral Daily   tamsulosin 0.4 mg Oral Q PM     Allergies:  Codeine and Tramadol    Objective      Vital Signs  Temp:  [97.3 °F (36.3 °C)-98.3 °F (36.8 °C)] 97.8 °F (36.6 °C)  Heart Rate:  [70-82] 70  Resp:  [16-24] 18  BP: (118-187)/() 118/62    Flowsheet Rows      First Filed Value   Admission Height  175.3 cm (69\") Documented at 06/03/2020 1812   Admission Weight  (!) 145 kg (320 lb 1.7 oz) Documented at 06/03/2020 1812        176.5 cm (69.5\")    Physical Exam   Constitutional: He is oriented to person, place, and time. Vital signs are normal. He appears well-developed and well-nourished. He is active and cooperative.   Seen in U 2105, no family present   HENT:   Head: Normocephalic and atraumatic.   Nose: Nose normal.   Mouth/Throat: Uvula is midline, oropharynx is clear and moist and mucous membranes are normal.   Eyes: Pupils are equal, round, and reactive to light. Conjunctivae, EOM and lids are normal. No scleral icterus.   Neck: Trachea normal. Neck supple. Normal carotid pulses, no hepatojugular reflux and no JVD present. Carotid bruit is not present. No thyroid mass and no thyromegaly present.   Cardiovascular: Normal rate, normal heart sounds and intact distal pulses. An irregularly irregular rhythm present.   No murmur heard.  Pulses:       Carotid pulses are 1+ on the right side, and 1+ on the left side.       Radial pulses are 1+ on the right side, " and 1+ on the left side.        Femoral pulses are 1+ on the right side, and 1+ on the left side.       Popliteal pulses are 1+ on the right side, and 1+ on the left side.        Dorsalis pedis pulses are 1+ on the right side, and 1+ on the left side.        Posterior tibial pulses are 1+ on the right side, and 1+ on the left side.   Tele: afib, paced   Pulmonary/Chest: Effort normal and breath sounds normal.   Noted upper airway audible wheezing with deep breath   Abdominal: Normal appearance and bowel sounds are normal. There is no hepatosplenomegaly. There is no tenderness.   abd slightly tight   Musculoskeletal:   Gait steady and strong without use of assistive devices   Lymphadenopathy:     He has no cervical adenopathy.        Right: No supraclavicular adenopathy present.        Left: No supraclavicular adenopathy present.   Neurological: He is alert and oriented to person, place, and time. GCS eye subscore is 4. GCS verbal subscore is 5. GCS motor subscore is 6.   Skin: Skin is warm, dry and intact. Capillary refill takes less than 2 seconds. No rash noted. No cyanosis or erythema. Nails show no clubbing.        Device noted   Psychiatric: He has a normal mood and affect. His speech is normal and behavior is normal. Judgment and thought content normal. Cognition and memory are normal.   Nursing note and vitals reviewed.      Results Review:   Lab Results (last 24 hours)     Procedure Component Value Units Date/Time    BUN [[id number]]  (Normal) Collected:  06/04/20 0616    Specimen:  Blood Updated:  06/04/20 1103     BUN 15 mg/dL     Manual Differential [[id number]]  (Abnormal) Collected:  06/04/20 0616    Specimen:  Blood Updated:  06/04/20 0737     Neutrophil % 71.0 %      Lymphocyte % 13.0 %      Monocyte % 10.0 %      Eosinophil % 4.0 %      Bands %  2.0 %      Neutrophils Absolute 6.64 10*3/mm3      Lymphocytes Absolute 1.18 10*3/mm3      Monocytes Absolute 0.91 10*3/mm3      Eosinophils Absolute 0.36  10*3/mm3      Anisocytosis Slight/1+     WBC Morphology Normal     Large Platelets Slight/1+    CBC Auto Differential [[id number]]  (Abnormal) Collected:  06/04/20 0616    Specimen:  Blood Updated:  06/04/20 0737     WBC 9.10 10*3/mm3      RBC 5.34 10*6/mm3      Hemoglobin 15.0 g/dL      Hematocrit 45.2 %      MCV 84.7 fL      MCH 28.1 pg      MCHC 33.1 g/dL      RDW 16.2 %      RDW-SD 48.6 fl      MPV 9.3 fL      Platelets 209 10*3/mm3     Scan Slide [[id number]] Collected:  06/04/20 0616    Specimen:  Blood Updated:  06/04/20 0737     Scan Slide --     Comment: See Manual Differential Results       Troponin [[id number]]  (Normal) Collected:  06/04/20 0616    Specimen:  Blood Updated:  06/04/20 0710     Troponin T <0.010 ng/mL     Narrative:       Troponin T Reference Range:  <= 0.03 ng/mL-   Negative for AMI  >0.03 ng/mL-     Abnormal for myocardial necrosis.  Clinicians would have to utilize clinical acumen, EKG, Troponin and serial changes to determine if it is an Acute Myocardial Infarction or myocardial injury due to an underlying chronic condition.       Results may be falsely decreased if patient taking Biotin.      Basic Metabolic Panel [[id number]]  (Abnormal) Collected:  06/04/20 0616    Specimen:  Blood Updated:  06/04/20 0710     Glucose 111 mg/dL      BUN --     Comment: Testing performed by alternate method        Creatinine 0.93 mg/dL      Sodium 139 mmol/L      Potassium 4.0 mmol/L      Chloride 97 mmol/L      CO2 28.0 mmol/L      Calcium 9.1 mg/dL      eGFR Non African Amer 84 mL/min/1.73      BUN/Creatinine Ratio --     Comment: Testing not performed.        Anion Gap 14.0 mmol/L     Narrative:       GFR Normal >60  Chronic Kidney Disease <60  Kidney Failure <15      aPTT [[id number]]  (Abnormal) Collected:  06/04/20 0616    Specimen:  Blood Updated:  06/04/20 0700     PTT 24.2 seconds     Hemoglobin A1c [[id number]] Collected:  06/04/20 0616    Specimen:  Blood Updated:  06/04/20 0645    Troponin  [[id number]]  (Normal) Collected:  06/04/20 0038    Specimen:  Blood Updated:  06/04/20 0113     Troponin T <0.010 ng/mL     Narrative:       Troponin T Reference Range:  <= 0.03 ng/mL-   Negative for AMI  >0.03 ng/mL-     Abnormal for myocardial necrosis.  Clinicians would have to utilize clinical acumen, EKG, Troponin and serial changes to determine if it is an Acute Myocardial Infarction or myocardial injury due to an underlying chronic condition.       Results may be falsely decreased if patient taking Biotin.      Protime-INR [[id number]]  (Normal) Collected:  06/03/20 1849    Specimen:  Blood Updated:  06/03/20 2211     Protime 10.6 Seconds      INR 1.01    aPTT [[id number]]  (Abnormal) Collected:  06/03/20 1849    Specimen:  Blood Updated:  06/03/20 2211     PTT 23.6 seconds     Urinalysis With Culture If Indicated - Urine, Clean Catch [[id number]]  (Normal) Collected:  06/03/20 1951    Specimen:  Urine, Clean Catch Updated:  06/03/20 2003     Color, UA Yellow     Appearance, UA Clear     pH, UA 5.5     Specific Gravity, UA 1.018     Glucose, UA Negative     Ketones, UA Negative     Bilirubin, UA Negative     Blood, UA Negative     Protein, UA Negative     Leuk Esterase, UA Negative     Nitrite, UA Negative     Urobilinogen, UA 0.2 E.U./dL    Narrative:       Urine microscopic not indicated.    Extra Tubes [[id number]] Collected:  06/03/20 1849    Specimen:  Blood Updated:  06/03/20 2001    Narrative:       The following orders were created for panel order Extra Tubes.  Procedure                               Abnormality         Status                     ---------                               -----------         ------                     Light Blue Top[[id number]]                                   Final result               Gold Top - SST[[id number]]                                   Final result                 Please view results for these tests on the individual orders.    Light Blue Top [[id number]] Collected:   06/03/20 1849    Specimen:  Blood Updated:  06/03/20 2001     Extra Tube hold for add-on     Comment: Auto resulted       Gold Top - SST [[id number]] Collected:  06/03/20 1849    Specimen:  Blood Updated:  06/03/20 2001     Extra Tube Hold for add-ons.     Comment: Auto resulted.       BUN [[id number]]  (Normal) Collected:  06/03/20 1849    Specimen:  Blood Updated:  06/03/20 1937     BUN 16 mg/dL     Comprehensive Metabolic Panel [[id number]]  (Abnormal) Collected:  06/03/20 1849    Specimen:  Blood Updated:  06/03/20 1932     Glucose 121 mg/dL      BUN --     Comment: Testing performed by alternate method        Creatinine 0.89 mg/dL      Sodium 139 mmol/L      Potassium 4.5 mmol/L      Chloride 101 mmol/L      CO2 28.0 mmol/L      Calcium 9.2 mg/dL      Total Protein 7.2 g/dL      Albumin 4.10 g/dL      ALT (SGPT) 25 U/L      AST (SGOT) 34 U/L      Alkaline Phosphatase 71 U/L      Total Bilirubin 0.5 mg/dL      eGFR Non African Amer 88 mL/min/1.73      Globulin 3.1 gm/dL      A/G Ratio 1.3 g/dL      BUN/Creatinine Ratio --     Comment: Testing not performed.        Anion Gap 10.0 mmol/L     Narrative:       GFR Normal >60  Chronic Kidney Disease <60  Kidney Failure <15      Lipase [[id number]]  (Normal) Collected:  06/03/20 1849    Specimen:  Blood Updated:  06/03/20 1932     Lipase 23 U/L     Troponin [[id number]]  (Normal) Collected:  06/03/20 1849    Specimen:  Blood Updated:  06/03/20 1932     Troponin T <0.010 ng/mL     Narrative:       Troponin T Reference Range:  <= 0.03 ng/mL-   Negative for AMI  >0.03 ng/mL-     Abnormal for myocardial necrosis.  Clinicians would have to utilize clinical acumen, EKG, Troponin and serial changes to determine if it is an Acute Myocardial Infarction or myocardial injury due to an underlying chronic condition.       Results may be falsely decreased if patient taking Biotin.      BNP [[id number]]  (Abnormal) Collected:  06/03/20 1849    Specimen:  Blood Updated:  06/03/20 1931      proBNP 2,137.0 pg/mL     Narrative:       Among patients with dyspnea, NT-proBNP is highly sensitive for the detection of acute congestive heart failure. In addition NT-proBNP of <300 pg/ml effectively rules out acute congestive heart failure with 99% negative predictive value.    Results may be falsely decreased if patient taking Biotin.      CBC & Differential [[id number]] Collected:  06/03/20 1849    Specimen:  Blood Updated:  06/03/20 1901    Narrative:       The following orders were created for panel order CBC & Differential.  Procedure                               Abnormality         Status                     ---------                               -----------         ------                     CBC Auto Differential[[id number]]        Abnormal            Final result                 Please view results for these tests on the individual orders.    CBC Auto Differential [[id number]]  (Abnormal) Collected:  06/03/20 1849    Specimen:  Blood Updated:  06/03/20 1901     WBC 9.90 10*3/mm3      RBC 5.25 10*6/mm3      Hemoglobin 14.6 g/dL      Hematocrit 44.3 %      MCV 84.4 fL      MCH 27.9 pg      MCHC 33.0 g/dL      RDW 16.2 %      RDW-SD 48.6 fl      MPV 9.6 fL      Platelets 222 10*3/mm3      Neutrophil % 70.8 %      Lymphocyte % 16.0 %      Monocyte % 10.1 %      Eosinophil % 1.9 %      Basophil % 1.2 %      Neutrophils, Absolute 7.00 10*3/mm3      Lymphocytes, Absolute 1.60 10*3/mm3      Monocytes, Absolute 1.00 10*3/mm3      Eosinophils, Absolute 0.20 10*3/mm3      Basophils, Absolute 0.10 10*3/mm3      nRBC 0.1 /100 WBC               Assessment/Plan       Chest pain    Sleep apnea    Essential hypertension    Paroxysmal atrial fibrillation (CMS/HCC)    Congestive heart failure (CMS/HCC)    Chronic obstructive pulmonary disease (CMS/HCC)    Hyperlipidemia    ICD (implantable cardioverter-defibrillator) in place    Morbid obesity (CMS/[facility])    Ischemic cardiomyopathy      Assessment & Plan     ICM/HFrEF, EF 18%,  NYHA class III--optimize meds as can  Biventricular ICD upgrade ([employer]) and AV node ablation--Sept 2019  Permanent atrial fib s/p AV zurdo ablation/ multiple episodes of VT--on po amio, Eliquis  HTN--stable  HLD--statin  DON on trilogy--[facility] (Dr. Gutierrez)  Morbid obesity, stage 3--BMI 46.5  Premature CAD, s/p stenting and chronic refractory chest pain--first stents at age 48, on Ranexa  Strong family hx of CAD/HF      Thank you for allowing us to participate in the care of this patient.    Nice gentleman with long standing hx of ICM and arrhythmias.  HF class III despite optimal medical mmgt.  Failed LV lead placement in September.    Maya Dawson, ABHISHEK  06/04/20  15:49    **all problems new to this examiner  **EKG and CXR independently reviewed and interpreted    Addendum  Patient was seen and examined, note was verified by me, agree with findings.  He has congestive heart failure and advanced cardiomyopathy.  He has aggressive atrial and ventricular arrhythmias.  He is awaiting LV ablation.  I would favor completion of his procedure and recovery of ventricular injuries before performing a procedure on potential retained ventricular walls.  I recommend an open LV placement at a later time hopefully in the next 2 weeks.  I discussed the findings with the patient and with Dr. Lopez.  Christiano Richmond M.D.

## 2020-06-04 NOTE — PLAN OF CARE
Pt continues to complain of CP which had increased since moved to PCU and received MS. NTG infusion at 40 mcg/min having little effect on CP. NP for hospitalist gave orders to repeat 2mg MS one time which was effective in relieving pain. Will monitor.   Problem: Patient Care Overview  Goal: Plan of Care Review  6/4/2020 0457 by Estrada Parrish RN  Outcome: Ongoing (interventions implemented as appropriate)  6/4/2020 0456 by Estrada Parrish RN  Outcome: Ongoing (interventions implemented as appropriate)  Goal: Individualization and Mutuality  6/4/2020 0457 by Estrada Parrish RN  Outcome: Ongoing (interventions implemented as appropriate)  6/4/2020 0456 by Estrada Parrish RN  Outcome: Ongoing (interventions implemented as appropriate)  Goal: Discharge Needs Assessment  6/4/2020 0457 by Estrada Parrish RN  Outcome: Ongoing (interventions implemented as appropriate)  6/4/2020 0456 by Estrada Parrish RN  Outcome: Ongoing (interventions implemented as appropriate)  Goal: Interprofessional Rounds/Family Conf  6/4/2020 0457 by Estrada Parrish RN  Outcome: Ongoing (interventions implemented as appropriate)  6/4/2020 0456 by Estrada Parrish RN  Outcome: Ongoing (interventions implemented as appropriate)     Problem: Pain, Chronic (Adult)  Goal: Identify Related Risk Factors and Signs and Symptoms  6/4/2020 0457 by Estrada Parrish RN  Outcome: Ongoing (interventions implemented as appropriate)  6/4/2020 0456 by Estrada Parrish RN  Outcome: Ongoing (interventions implemented as appropriate)  Goal: Acceptable Pain/Comfort Level and Functional Ability  6/4/2020 0457 by Estrada Parrish RN  Outcome: Ongoing (interventions implemented as appropriate)  6/4/2020 0456 by Estrada Parrish RN  Outcome: Ongoing (interventions implemented as appropriate)     Problem: Cardiac: ACS (Acute Coronary Syndrome) (Adult)  Goal: Signs and Symptoms of Listed Potential Problems Will be Absent, Minimized  or Managed (Cardiac: ACS)  6/4/2020 0457 by Estrada Parrish RN  Outcome: Ongoing (interventions implemented as appropriate)  6/4/2020 0456 by Estrada Parrish RN  Outcome: Ongoing (interventions implemented as appropriate)

## 2020-06-04 NOTE — NURSING NOTE
Orders received to start patient on nitro drip and transfer to PCU. Report called to JM Dorado. Transport notified. Will continue to monitor.

## 2020-06-04 NOTE — NURSING NOTE
Pt transferred to PCU from [facility] d/t CP with orders for NTG and Heparin infusion. Pt arrived to unit via wheel chair A&Ox4 c/o substernal CP rated 9/10 radiating to neck and back. Pt states he has had this pain since coming to ED. Recent EKG showing Afib/Flutter paced and troponin negative. No IV in place. IV established and pt given MS as ordered and NTG and Heparin initiated. With medications CP down to 5/10. Consult called for Dr. Lopez. Will monitor.

## 2020-06-05 LAB
ANION GAP SERPL CALCULATED.3IONS-SCNC: 11 MMOL/L (ref 5–15)
BASOPHILS # BLD AUTO: 0.1 10*3/MM3 (ref 0–0.2)
BASOPHILS NFR BLD AUTO: 1.2 % (ref 0–1.5)
BUN BLD-MCNC: 23 MG/DL (ref 6–20)
BUN BLD-MCNC: ABNORMAL MG/DL
BUN/CREAT SERPL: ABNORMAL
CALCIUM SPEC-SCNC: 8.8 MG/DL (ref 8.6–10.5)
CHLORIDE SERPL-SCNC: 96 MMOL/L (ref 98–107)
CO2 SERPL-SCNC: 30 MMOL/L (ref 22–29)
CREAT BLD-MCNC: 0.89 MG/DL (ref 0.76–1.27)
DEPRECATED RDW RBC AUTO: 46.4 FL (ref 37–54)
EOSINOPHIL # BLD AUTO: 0.3 10*3/MM3 (ref 0–0.4)
EOSINOPHIL NFR BLD AUTO: 3 % (ref 0.3–6.2)
ERYTHROCYTE [DISTWIDTH] IN BLOOD BY AUTOMATED COUNT: 15.8 % (ref 12.3–15.4)
GFR SERPL CREATININE-BSD FRML MDRD: 88 ML/MIN/1.73
GLUCOSE BLD-MCNC: 117 MG/DL (ref 65–99)
HBA1C MFR BLD: 5.9 % (ref 3.5–5.6)
HCT VFR BLD AUTO: 42.2 % (ref 37.5–51)
HGB BLD-MCNC: 14.1 G/DL (ref 13–17.7)
LARGE PLATELETS: NORMAL
LYMPHOCYTES # BLD AUTO: 1.5 10*3/MM3 (ref 0.7–3.1)
LYMPHOCYTES NFR BLD AUTO: 15.3 % (ref 19.6–45.3)
MCH RBC QN AUTO: 28.4 PG (ref 26.6–33)
MCHC RBC AUTO-ENTMCNC: 33.5 G/DL (ref 31.5–35.7)
MCV RBC AUTO: 84.7 FL (ref 79–97)
MONOCYTES # BLD AUTO: 1.3 10*3/MM3 (ref 0.1–0.9)
MONOCYTES NFR BLD AUTO: 13.2 % (ref 5–12)
NEUTROPHILS # BLD AUTO: 6.5 10*3/MM3 (ref 1.7–7)
NEUTROPHILS NFR BLD AUTO: 67.3 % (ref 42.7–76)
NRBC BLD AUTO-RTO: 0.3 /100 WBC (ref 0–0.2)
PLATELET # BLD AUTO: 228 10*3/MM3 (ref 140–450)
PMV BLD AUTO: 10.3 FL (ref 6–12)
POTASSIUM BLD-SCNC: 4.5 MMOL/L (ref 3.5–5.2)
RBC # BLD AUTO: 4.98 10*6/MM3 (ref 4.14–5.8)
RBC MORPH BLD: NORMAL
SODIUM BLD-SCNC: 137 MMOL/L (ref 136–145)
WBC MORPH BLD: NORMAL
WBC NRBC COR # BLD: 9.7 10*3/MM3 (ref 3.4–10.8)

## 2020-06-05 PROCEDURE — 85007 BL SMEAR W/DIFF WBC COUNT: CPT | Performed by: INTERNAL MEDICINE

## 2020-06-05 PROCEDURE — 99232 SBSQ HOSP IP/OBS MODERATE 35: CPT | Performed by: NURSE PRACTITIONER

## 2020-06-05 PROCEDURE — 94799 UNLISTED PULMONARY SVC/PX: CPT

## 2020-06-05 PROCEDURE — 99232 SBSQ HOSP IP/OBS MODERATE 35: CPT | Performed by: INTERNAL MEDICINE

## 2020-06-05 PROCEDURE — 80048 BASIC METABOLIC PNL TOTAL CA: CPT | Performed by: INTERNAL MEDICINE

## 2020-06-05 PROCEDURE — 85025 COMPLETE CBC W/AUTO DIFF WBC: CPT | Performed by: INTERNAL MEDICINE

## 2020-06-05 PROCEDURE — 25010000002 FUROSEMIDE PER 20 MG: Performed by: STUDENT IN AN ORGANIZED HEALTH CARE EDUCATION/TRAINING PROGRAM

## 2020-06-05 PROCEDURE — 99232 SBSQ HOSP IP/OBS MODERATE 35: CPT | Performed by: THORACIC SURGERY (CARDIOTHORACIC VASCULAR SURGERY)

## 2020-06-05 RX ORDER — ZOLPIDEM TARTRATE 5 MG/1
5 TABLET ORAL NIGHTLY PRN
Status: CANCELLED | OUTPATIENT
Start: 2020-06-05 | End: 2020-06-15

## 2020-06-05 RX ORDER — ZOLPIDEM TARTRATE 5 MG/1
5 TABLET ORAL NIGHTLY
Status: DISCONTINUED | OUTPATIENT
Start: 2020-06-05 | End: 2020-06-06 | Stop reason: HOSPADM

## 2020-06-05 RX ADMIN — BUDESONIDE AND FORMOTEROL FUMARATE DIHYDRATE 2 PUFF: 160; 4.5 AEROSOL RESPIRATORY (INHALATION) at 18:56

## 2020-06-05 RX ADMIN — ATORVASTATIN CALCIUM 80 MG: 40 TABLET, FILM COATED ORAL at 08:38

## 2020-06-05 RX ADMIN — FUROSEMIDE 40 MG: 10 INJECTION, SOLUTION INTRAMUSCULAR; INTRAVENOUS at 06:23

## 2020-06-05 RX ADMIN — ALBUTEROL SULFATE 2.5 MG: 2.5 SOLUTION RESPIRATORY (INHALATION) at 00:08

## 2020-06-05 RX ADMIN — DULOXETINE HYDROCHLORIDE 60 MG: 30 CAPSULE, DELAYED RELEASE ORAL at 08:38

## 2020-06-05 RX ADMIN — RANOLAZINE 1000 MG: 500 TABLET, FILM COATED, EXTENDED RELEASE ORAL at 08:38

## 2020-06-05 RX ADMIN — METOPROLOL SUCCINATE 100 MG: 50 TABLET, EXTENDED RELEASE ORAL at 08:38

## 2020-06-05 RX ADMIN — LISINOPRIL 5 MG: 5 TABLET ORAL at 08:38

## 2020-06-05 RX ADMIN — BUDESONIDE AND FORMOTEROL FUMARATE DIHYDRATE 2 PUFF: 160; 4.5 AEROSOL RESPIRATORY (INHALATION) at 07:00

## 2020-06-05 RX ADMIN — CETIRIZINE HYDROCHLORIDE 10 MG: 10 TABLET, FILM COATED ORAL at 08:38

## 2020-06-05 RX ADMIN — ASPIRIN 81 MG: 81 TABLET, COATED ORAL at 08:38

## 2020-06-05 RX ADMIN — APIXABAN 5 MG: 5 TABLET, FILM COATED ORAL at 20:43

## 2020-06-05 RX ADMIN — ROFLUMILAST 500 MCG: 500 TABLET ORAL at 08:38

## 2020-06-05 RX ADMIN — ZOLPIDEM TARTRATE 5 MG: 5 TABLET, COATED ORAL at 20:53

## 2020-06-05 RX ADMIN — ALBUTEROL SULFATE 2.5 MG: 2.5 SOLUTION RESPIRATORY (INHALATION) at 07:00

## 2020-06-05 RX ADMIN — ALBUTEROL SULFATE 2.5 MG: 2.5 SOLUTION RESPIRATORY (INHALATION) at 23:00

## 2020-06-05 RX ADMIN — ALBUTEROL SULFATE 2.5 MG: 2.5 SOLUTION RESPIRATORY (INHALATION) at 11:10

## 2020-06-05 RX ADMIN — ALBUTEROL SULFATE 2.5 MG: 2.5 SOLUTION RESPIRATORY (INHALATION) at 18:51

## 2020-06-05 RX ADMIN — ALBUTEROL SULFATE 2.5 MG: 2.5 SOLUTION RESPIRATORY (INHALATION) at 04:08

## 2020-06-05 RX ADMIN — RANOLAZINE 1000 MG: 500 TABLET, FILM COATED, EXTENDED RELEASE ORAL at 20:41

## 2020-06-05 RX ADMIN — TAMSULOSIN HYDROCHLORIDE 0.4 MG: 0.4 CAPSULE ORAL at 16:44

## 2020-06-05 RX ADMIN — APIXABAN 5 MG: 5 TABLET, FILM COATED ORAL at 08:38

## 2020-06-05 RX ADMIN — MONTELUKAST SODIUM 10 MG: 10 TABLET, COATED ORAL at 20:43

## 2020-06-05 RX ADMIN — AMIODARONE HYDROCHLORIDE 200 MG: 200 TABLET ORAL at 08:38

## 2020-06-05 NOTE — PROGRESS NOTES
Heart Failure Program  Nurse Navigator  Discharge Planning: Follow-up Note    Patient Name:Jose Dixon Jr.  :1960  Current Admission Date: 6/3/2020       Last 3 Weights:  Wt Readings from Last 3 Encounters:   20 (!) 143 kg (315 lb 7.7 oz)   20 (!) 144 kg (317 lb)   20 136 kg (300 lb)       Intake and Output totals: I/O last 3 completed shifts:  In: 240 [P.O.:240]  Out: 1100 [Urine:1100]  I/O this shift:  In: 720 [P.O.:720]  Out: -                 Patient Assessment:  Patient looks and feels much better today he reports that his swelling has gone down some and he was able to get some sleep and the pain has resolved     Patient Education:   reviewed with patient the plan and education provided yesterday       Acceptance of learning:  Patient was accepting       Identified needs/barriers:       Intervention follow-up:

## 2020-06-05 NOTE — PLAN OF CARE
Problem: Patient Care Overview  Goal: Plan of Care Review  Outcome: Ongoing (interventions implemented as appropriate)  Flowsheets (Taken 6/4/2020 1400)  Progress: no change  Outcome Summary: Patient admitted 6/3 with complaints of chest pain.  Patient with extensive cardiac hx; positive for CAD, HTN, A-fib with permanent pacemaker placement, CHF; EF < 20%; MI, and ischemic cardiomyopathy.  Patient was on a Nitroglycerin and Heparin gtt which were both discontinued after seen by Dr. Lopez.  Patient follows with Dr. Lopez outpatient.  Ablation previously planned for later this month.  Per Dr. Lopez changed to 6/9.  Dr. Lopez to see tomorrow and discuss further with patient.  Patient informed.  BNP slightly elevated on admission.  2137, IV Lasix administered per MD order.  CXR obtained and showed cardiomegaly.  Cardiothoracic sx consulted.  Patient aware with current plan.  May possibly discharge tomorrow.

## 2020-06-05 NOTE — PROGRESS NOTES
"            Cardiology Progress Note-EP      Patient Care Team:  Jaylen Moss MD as PCP - General    PATIENT IDENTIFICATION  Name: Jose Dixon Jr.  Age: 59 y.o.  Sex: male  :  1960  MRN: [id number]             REASON FOR FOLLOW-UP  Chest pain  Palpitations  Class III chronic systolic heart failure      SUBJECTIVE    Patient reports breathing is better, edema has improved.  He denies any chest discomfort      REVIEW OF SYSTEMS:  Pertinent items are noted in HPI, all other systems reviewed and negative    OBJECTIVE   Sitting in chair, talkative with no conversational dyspnea.  Rhythm is paced.    ASSESSMENT/PLAN    Chest pain    Sleep apnea    Essential hypertension    Paroxysmal atrial fibrillation (CMS/HCC)    Congestive heart failure (CMS/HCC)    Chronic obstructive pulmonary disease (CMS/HCC)    Hyperlipidemia    ICD (implantable cardioverter-defibrillator) in place    Morbid obesity (CMS/HCC)    Ischemic cardiomyopathy    Plan  Plan for VT ablation Tuesday of next week.  Continue amiodarone, Eliquis, Ranexa, lisinopril, beta-blocker, aspirin.  Okay to discharge patient per EP  We will arrange for procedure through our office.      Vital Signs  Visit Vitals  /56   Pulse 70   Temp 97.5 °F (36.4 °C) (Oral)   Resp 16   Ht 176.5 cm (69.5\")   Wt (!) 143 kg (315 lb 7.7 oz)   SpO2 100%   BMI 45.92 kg/m²     Oxygen Therapy  SpO2: 100 %  Pulse Oximetry Type: Continuous  Device (Oxygen Therapy): room air  Flow (L/min): 2  Oximetry Probe Site Changed: No  Flowsheet Rows      First Filed Value   Admission Height  175.3 cm (69\") Documented at 2020 1812   Admission Weight  (!) 145 kg (320 lb 1.7 oz) Documented at 2020 1812        Intake & Output (last 3 days)        07 -  0700  07 -  0700 / 07 -  0700 / 07 -  0700    P.O.   240 720    Total Intake(mL/kg)   240 (1.7) 720 (5)    Urine (mL/kg/hr)  900 200 (0.1)     Total Output  900 200     Net  -900 +40 " "+720            Urine Unmeasured Occurrence  1 x          Lines, Drains & Airways    Active LDAs     Name:   Placement date:   Placement time:   Site:   Days:    Peripheral IV 06/03/20 2330 Anterior;Distal;Left Wrist   06/03/20 2330    Wrist   1    Peripheral IV 06/03/20 2330 Anterior;Right Forearm   06/03/20 2330    Forearm   1                       /56   Pulse 70   Temp 97.5 °F (36.4 °C) (Oral)   Resp 16   Ht 176.5 cm (69.5\")   Wt (!) 143 kg (315 lb 7.7 oz)   SpO2 100%   BMI 45.92 kg/m²   Intake/Output last 3 shifts:  I/O last 3 completed shifts:  In: 240 [P.O.:240]  Out: 1100 [Urine:1100]  Intake/Output this shift:  I/O this shift:  In: 720 [P.O.:720]  Out: -     PHYSICAL EXAM:    General: Alert, cooperative, no distress, appears stated age  Head:  Normocephalic, atraumatic, mucous membranes moist  Eyes:  Conjunctiva/corneas clear, EOM's intact     Neck:  Supple,  no adenopathy;      Lungs: Clear to auscultation bilaterally, no wheezes rhonchi rales are noted  Chest wall: No tenderness  Heart::  Regular rate and rhythm, S1 and S2 normal, no murmur, rub or gallop  Abdomen: Soft, non-tender, nondistended bowel sounds active  Extremities: No cyanosis, clubbing 1+ pitting edema to lower extremities   pulses: 2+ and symmetric all extremities  Skin:  No rashes or lesions  Neuro/psych: A&O x3. CN II through XII are grossly intact with appropriate affect      Scheduled Meds:        albuterol 2.5 mg Nebulization Q4H - RT   amiodarone 200 mg Oral Daily   apixaban 5 mg Oral Q12H   aspirin 324 mg Oral Once   And      aspirin 81 mg Oral Daily   atorvastatin 80 mg Oral Daily   budesonide-formoterol 2 puff Inhalation BID - RT   cetirizine 10 mg Oral Daily   DULoxetine 60 mg Oral Daily   lisinopril 5 mg Oral Daily   metoprolol succinate  mg Oral Q24H   montelukast 10 mg Oral Nightly   ranolazine 1,000 mg Oral BID   roflumilast 500 mcg Oral Daily   tamsulosin 0.4 mg Oral Q PM       Continuous " Infusions:         PRN Meds:    •  acetaminophen **OR** acetaminophen **OR** acetaminophen  •  bisacodyl  •  docusate sodium  •  melatonin  •  ondansetron **OR** ondansetron  •  [COMPLETED] Insert peripheral IV **AND** sodium chloride  •  sodium chloride        Results Review:     I reviewed the patient's new clinical results.    CBC    Results from last 7 days   Lab Units 06/05/20  0301 06/04/20  0616 06/03/20  1849   WBC 10*3/mm3 9.70 9.10 9.90   HEMOGLOBIN g/dL 14.1 15.0 14.6   PLATELETS 10*3/mm3 228 209 222     Cr Clearance Estimated Creatinine Clearance: 129.5 mL/min (by C-G formula based on SCr of 0.89 mg/dL).  Coag   Results from last 7 days   Lab Units 06/04/20  0616 06/03/20  1849   INR   --  1.01   APTT seconds 24.2* 23.6*     HbA1C   Lab Results   Component Value Date    HGBA1C 5.9 (H) 06/04/2020    HGBA1C 5.4 09/14/2018     Blood Glucose No results found for: POCGLU  Infection     CMP Results from last 7 days   Lab Units 06/05/20  0428 06/04/20  0616 06/03/20  1849   SODIUM mmol/L 137 139 139   POTASSIUM mmol/L 4.5 4.0 4.5   CHLORIDE mmol/L 96* 97* 101   CO2 mmol/L 30.0* 28.0 28.0   BUN  23* 15 16   CREATININE mg/dL 0.89 0.93 0.89   GLUCOSE mg/dL 117* 111* 121*   ALBUMIN g/dL  --   --  4.10   BILIRUBIN mg/dL  --   --  0.5   ALK PHOS U/L  --   --  71   AST (SGOT) U/L  --   --  34   ALT (SGPT) U/L  --   --  25   LIPASE U/L  --   --  23     ABG      UA  Results from last 7 days   Lab Units 06/03/20 1951   NITRITE UA  Negative     TATUM  No results found for: POCMETH, POCAMPHET, POCBARBITUR, POCBENZO, POCCOCAINE, POCOPIATES, POCOXYCODO, POCPHENCYC, POCPROPOXY, POCTHC, POCTRICYC  Lysis Labs Results from last 7 days   Lab Units 06/05/20  0428 06/05/20  0301 06/04/20  0616 06/03/20  1849   INR   --   --   --  1.01   APTT seconds  --   --  24.2* 23.6*   HEMOGLOBIN g/dL  --  14.1 15.0 14.6   PLATELETS 10*3/mm3  --  228 209 222   CREATININE mg/dL 0.89  --  0.93 0.89     Radiology(recent) Xr Chest 1 View    Result  Date: 6/3/2020  1. Stable exam, with cardiomegaly and no evidence of active lung disease.  Electronically Signed By-Delicia Blakely On:6/3/2020 7:07 PM This report was finalized on [id number] by  Delicia Blakely, .        Results from last 7 days   Lab Units 06/04/20  0616   TROPONIN T ng/mL <0.010       Xrays, labs reviewed personally by physician.    ECG/EMG Results (most recent)     Procedure Component Value Units Date/Time    ECG 12 Lead [[id number]] Collected:  06/03/20 1823     Updated:  06/03/20 1825    Narrative:       HEART RATE= 70  bpm  RR Interval= 856  ms  KS Interval=   ms  P Horizontal Axis= 239  deg  P Front Axis=   deg  QRSD Interval= 162  ms  QT Interval= 435  ms  QRS Axis= 145  deg  T Wave Axis= -49  deg  - ABNORMAL ECG -  Afib/flutter and ventricular-paced rhythm  Biventricular paced rhythm  Electronically Signed By:   Date and Time of Study: 2020-06-03 18:23:21            Medication Review:   I have reviewed the patient's current medication list  Scheduled Meds:  albuterol 2.5 mg Nebulization Q4H - RT   amiodarone 200 mg Oral Daily   apixaban 5 mg Oral Q12H   aspirin 324 mg Oral Once   And      aspirin 81 mg Oral Daily   atorvastatin 80 mg Oral Daily   budesonide-formoterol 2 puff Inhalation BID - RT   cetirizine 10 mg Oral Daily   DULoxetine 60 mg Oral Daily   lisinopril 5 mg Oral Daily   metoprolol succinate  mg Oral Q24H   montelukast 10 mg Oral Nightly   ranolazine 1,000 mg Oral BID   roflumilast 500 mcg Oral Daily   tamsulosin 0.4 mg Oral Q PM     Continuous Infusions:   PRN Meds:.•  acetaminophen **OR** acetaminophen **OR** acetaminophen  •  bisacodyl  •  docusate sodium  •  melatonin  •  ondansetron **OR** ondansetron  •  [COMPLETED] Insert peripheral IV **AND** sodium chloride  •  sodium chloride    Imaging:  Imaging Results (Last 72 Hours)     Procedure Component Value Units Date/Time    XR Chest 1 View [[id number]] Collected:  06/03/20 1905     Updated:  06/03/20 1909    Narrative:    "    Examination: XR CHEST 1 VW-     Date of Exam: 6/3/2020 6:35 PM     Indication: dyspnea.       Comparison: 09/26/2019     Technique: 1 view of the chest      FINDINGS:  There is mild cardiomegaly. There is a pacer,. There is what is probably  a pacer wire with its tip in the SVC, unchanged. There is a stable  bandlike opacity in the left midlung. There is no airspace consolidation  or pleural effusion. No bone lesion is seen.       Impression:       1. Stable exam, with cardiomegaly and no evidence of active lung  disease.     Electronically Signed By-Delicia Blakely On:6/3/2020 7:07 PM  This report was finalized on [id number] by  Delicia Blakely, .            ABHISHEK Peña  06/05/20  11:09      EMR Dragon/Transcription:   \"Dictated utilizing Dragon dictation\".            Electronically signed by ABHISHEK Peña, 06/05/20, 11:09 AM.    "

## 2020-06-05 NOTE — PLAN OF CARE
Problem: Patient Care Overview  Goal: Plan of Care Review  Outcome: Ongoing (interventions implemented as appropriate)  Flowsheets (Taken 6/5/2020 0321)  Progress: improving  Plan of Care Reviewed With: patient  Note:   Pt on 2liters of oxygen. EP study and ablation planned for Tuesday. Off of IV nitro and heparin. Basilio will see him today. Will cont. To monitor  Goal: Individualization and Mutuality  Outcome: Ongoing (interventions implemented as appropriate)  Goal: Discharge Needs Assessment  Outcome: Ongoing (interventions implemented as appropriate)  Goal: Interprofessional Rounds/Family Conf  Outcome: Ongoing (interventions implemented as appropriate)     Problem: Pain, Chronic (Adult)  Goal: Identify Related Risk Factors and Signs and Symptoms  Outcome: Ongoing (interventions implemented as appropriate)  Goal: Acceptable Pain/Comfort Level and Functional Ability  Outcome: Ongoing (interventions implemented as appropriate)     Problem: Cardiac: ACS (Acute Coronary Syndrome) (Adult)  Goal: Signs and Symptoms of Listed Potential Problems Will be Absent, Minimized or Managed (Cardiac: ACS)  Outcome: Ongoing (interventions implemented as appropriate)

## 2020-06-05 NOTE — PLAN OF CARE
Problem: Patient Care Overview  Goal: Plan of Care Review  Outcome: Ongoing (interventions implemented as appropriate)  Flowsheets  Taken 6/5/2020 0321 by Jessie Leung RN  Progress: improving  Taken 6/5/2020 1240 by Marlyn Marquez RN  Plan of Care Reviewed With: patient  Taken 6/5/2020 1807 by Marlyn Marquez RN  Outcome Summary: Patient admitted 6/3 with complaints of chest pain.  Since admission chest pain has been determined to be refractory s/p stenting r/t premature CAD.  Patient with decreased complaints of pain today.  Hx positive for CHF, EF 18%; patient diuresed during course of admission.  Patient felt chest pain may in part be related to fluid secondary to CHF.  PLan is for patient to remain in hospital one more night, receive Covid testing for ablation scheduled 6/9 and then discharge home; return 6/9 for ablation.

## 2020-06-05 NOTE — PROGRESS NOTES
F/U--LV lead placement Basilio  EF 18% (echo)    Subjective:  Reports he feels some better today in regards to edema    No events overnight  Plans for d/c home today with return for VT ablation on Tues      Intake/Output Summary (Last 24 hours) at 6/5/2020 1235  Last data filed at 6/5/2020 1200  Gross per 24 hour   Intake 1440 ml   Output 200 ml   Net 1240 ml     Temp:  [97.3 °F (36.3 °C)-98 °F (36.7 °C)] 98 °F (36.7 °C)  Heart Rate:  [70-75] 70  Resp:  [12-20] 16  BP: ()/(53-73) 96/60      Results from last 7 days   Lab Units 06/05/20  0301 06/04/20  0616 06/03/20  1849   WBC 10*3/mm3 9.70 9.10 9.90   HEMOGLOBIN g/dL 14.1 15.0 14.6   HEMATOCRIT % 42.2 45.2 44.3   PLATELETS 10*3/mm3 228 209 222   INR   --   --  1.01     Results from last 7 days   Lab Units 06/05/20  0428   CREATININE mg/dL 0.89   POTASSIUM mmol/L 4.5   SODIUM mmol/L 137       Physical Exam:  Neuro intact, nad, up in chair  Tele:  Paced 70  Trace edema    Assessment/Plan:  Principal Problem:    Chest pain  Active Problems:    Sleep apnea    Essential hypertension    Paroxysmal atrial fibrillation (CMS/HCC)    Congestive heart failure (CMS/HCC)    Chronic obstructive pulmonary disease (CMS/HCC)    Hyperlipidemia    ICD (implantable cardioverter-defibrillator) in place    Morbid obesity (CMS/HCC)    Ischemic cardiomyopathy    ICM/HFrEF, EF 18%, NYHA class III  Biventricular ICD upgrade ([employer]) and AV node ablation--Sept 2019  Permanent atrial fib s/p AV zurdo ablation/ multiple episodes of VT--on po amio, Eliquis  HTN--stable  HLD--statin  DON on trilogy--[facility] (Dr. Gutierrez)  Morbid obesity, stage 3--BMI 46.5  Premature CAD, s/p stenting and chronic refractory chest pain--first stents at age 48, on Ranexa  Strong family hx of CAD/HF    Plans for discharge today and return for VT ablation on Tues with Dr. Lopez.  Dr. Richmond evaluated pt.  Plans for pt to have ablation and return at later date for LV lead placement.  Pt had been on  Eliquis and Plavix upon this admission.    Maya Dawson, ABHISHEK  6/5/2020  12:35

## 2020-06-05 NOTE — NURSING NOTE
Received patient sitting on chair. No voiced complaint. Call light in reach, will continue to monitor.

## 2020-06-06 VITALS
HEIGHT: 70 IN | TEMPERATURE: 97.8 F | DIASTOLIC BLOOD PRESSURE: 62 MMHG | SYSTOLIC BLOOD PRESSURE: 94 MMHG | BODY MASS INDEX: 45.1 KG/M2 | HEART RATE: 80 BPM | RESPIRATION RATE: 12 BRPM | WEIGHT: 315 LBS | OXYGEN SATURATION: 98 %

## 2020-06-06 PROBLEM — R07.9 CHEST PAIN: Status: RESOLVED | Noted: 2019-08-08 | Resolved: 2020-06-06

## 2020-06-06 LAB
ANION GAP SERPL CALCULATED.3IONS-SCNC: 8 MMOL/L (ref 5–15)
BASOPHILS # BLD AUTO: 0.1 10*3/MM3 (ref 0–0.2)
BASOPHILS NFR BLD AUTO: 0.9 % (ref 0–1.5)
BUN BLD-MCNC: 24 MG/DL (ref 6–20)
BUN BLD-MCNC: ABNORMAL MG/DL
BUN/CREAT SERPL: ABNORMAL
CALCIUM SPEC-SCNC: 9.3 MG/DL (ref 8.6–10.5)
CHLORIDE SERPL-SCNC: 95 MMOL/L (ref 98–107)
CO2 SERPL-SCNC: 30 MMOL/L (ref 22–29)
CREAT BLD-MCNC: 0.94 MG/DL (ref 0.76–1.27)
DEPRECATED RDW RBC AUTO: 47.7 FL (ref 37–54)
EOSINOPHIL # BLD AUTO: 0.2 10*3/MM3 (ref 0–0.4)
EOSINOPHIL NFR BLD AUTO: 2.1 % (ref 0.3–6.2)
ERYTHROCYTE [DISTWIDTH] IN BLOOD BY AUTOMATED COUNT: 16 % (ref 12.3–15.4)
GFR SERPL CREATININE-BSD FRML MDRD: 83 ML/MIN/1.73
GLUCOSE BLD-MCNC: 95 MG/DL (ref 65–99)
HCT VFR BLD AUTO: 43.4 % (ref 37.5–51)
HGB BLD-MCNC: 14.5 G/DL (ref 13–17.7)
LYMPHOCYTES # BLD AUTO: 1.6 10*3/MM3 (ref 0.7–3.1)
LYMPHOCYTES NFR BLD AUTO: 16.6 % (ref 19.6–45.3)
MAGNESIUM SERPL-MCNC: 2.1 MG/DL (ref 1.6–2.6)
MCH RBC QN AUTO: 28.2 PG (ref 26.6–33)
MCHC RBC AUTO-ENTMCNC: 33.3 G/DL (ref 31.5–35.7)
MCV RBC AUTO: 84.5 FL (ref 79–97)
MONOCYTES # BLD AUTO: 1 10*3/MM3 (ref 0.1–0.9)
MONOCYTES NFR BLD AUTO: 10.9 % (ref 5–12)
NEUTROPHILS # BLD AUTO: 6.6 10*3/MM3 (ref 1.7–7)
NEUTROPHILS NFR BLD AUTO: 69.5 % (ref 42.7–76)
NRBC BLD AUTO-RTO: 0.1 /100 WBC (ref 0–0.2)
PLATELET # BLD AUTO: 227 10*3/MM3 (ref 140–450)
PMV BLD AUTO: 9.3 FL (ref 6–12)
POTASSIUM BLD-SCNC: 4.4 MMOL/L (ref 3.5–5.2)
RBC # BLD AUTO: 5.14 10*6/MM3 (ref 4.14–5.8)
SARS-COV-2 RNA PNL SPEC NAA+PROBE: NOT DETECTED
SODIUM BLD-SCNC: 133 MMOL/L (ref 136–145)
WBC NRBC COR # BLD: 9.6 10*3/MM3 (ref 3.4–10.8)

## 2020-06-06 PROCEDURE — 85025 COMPLETE CBC W/AUTO DIFF WBC: CPT | Performed by: INTERNAL MEDICINE

## 2020-06-06 PROCEDURE — 99239 HOSP IP/OBS DSCHRG MGMT >30: CPT | Performed by: INTERNAL MEDICINE

## 2020-06-06 PROCEDURE — 94799 UNLISTED PULMONARY SVC/PX: CPT

## 2020-06-06 PROCEDURE — 80048 BASIC METABOLIC PNL TOTAL CA: CPT | Performed by: INTERNAL MEDICINE

## 2020-06-06 PROCEDURE — 87635 SARS-COV-2 COVID-19 AMP PRB: CPT | Performed by: INTERNAL MEDICINE

## 2020-06-06 PROCEDURE — 83735 ASSAY OF MAGNESIUM: CPT | Performed by: STUDENT IN AN ORGANIZED HEALTH CARE EDUCATION/TRAINING PROGRAM

## 2020-06-06 RX ADMIN — RANOLAZINE 1000 MG: 500 TABLET, FILM COATED, EXTENDED RELEASE ORAL at 08:43

## 2020-06-06 RX ADMIN — MELATONIN TAB 5 MG 5 MG: 5 TAB at 00:38

## 2020-06-06 RX ADMIN — BUDESONIDE AND FORMOTEROL FUMARATE DIHYDRATE 2 PUFF: 160; 4.5 AEROSOL RESPIRATORY (INHALATION) at 08:09

## 2020-06-06 RX ADMIN — CETIRIZINE HYDROCHLORIDE 10 MG: 10 TABLET, FILM COATED ORAL at 08:44

## 2020-06-06 RX ADMIN — APIXABAN 5 MG: 5 TABLET, FILM COATED ORAL at 08:44

## 2020-06-06 RX ADMIN — ROFLUMILAST 500 MCG: 500 TABLET ORAL at 08:44

## 2020-06-06 RX ADMIN — ALBUTEROL SULFATE 2.5 MG: 2.5 SOLUTION RESPIRATORY (INHALATION) at 08:07

## 2020-06-06 RX ADMIN — ASPIRIN 81 MG: 81 TABLET, COATED ORAL at 08:44

## 2020-06-06 RX ADMIN — Medication 10 ML: at 08:43

## 2020-06-06 RX ADMIN — ATORVASTATIN CALCIUM 80 MG: 40 TABLET, FILM COATED ORAL at 08:44

## 2020-06-06 RX ADMIN — LISINOPRIL 5 MG: 5 TABLET ORAL at 08:44

## 2020-06-06 RX ADMIN — DULOXETINE HYDROCHLORIDE 60 MG: 30 CAPSULE, DELAYED RELEASE ORAL at 08:44

## 2020-06-06 RX ADMIN — ALBUTEROL SULFATE 2.5 MG: 2.5 SOLUTION RESPIRATORY (INHALATION) at 02:59

## 2020-06-06 RX ADMIN — METOPROLOL SUCCINATE 100 MG: 50 TABLET, EXTENDED RELEASE ORAL at 08:43

## 2020-06-06 RX ADMIN — AMIODARONE HYDROCHLORIDE 200 MG: 200 TABLET ORAL at 08:44

## 2020-06-06 RX ADMIN — ALBUTEROL SULFATE 2.5 MG: 2.5 SOLUTION RESPIRATORY (INHALATION) at 12:44

## 2020-06-06 NOTE — PROGRESS NOTES
F/U--LV lead placement Basilio  EF 18% (echo)    Subjective:  Reports he feels some better today in regards to edema    No events overnight  Plans for d/c home today with return for VT ablation on Tues      Intake/Output Summary (Last 24 hours) at 6/6/2020 1024  Last data filed at 6/6/2020 0039  Gross per 24 hour   Intake 1260 ml   Output --   Net 1260 ml     Temp:  [97.4 °F (36.3 °C)-98.1 °F (36.7 °C)] 97.4 °F (36.3 °C)  Heart Rate:  [64-75] 71  Resp:  [16-18] 18  BP: ()/(46-77) 100/57      Results from last 7 days   Lab Units 06/06/20  0116 06/05/20  0301  06/03/20  1849   WBC 10*3/mm3 9.60 9.70   < > 9.90   HEMOGLOBIN g/dL 14.5 14.1   < > 14.6   HEMATOCRIT % 43.4 42.2   < > 44.3   PLATELETS 10*3/mm3 227 228   < > 222   INR   --   --   --  1.01    < > = values in this interval not displayed.     Results from last 7 days   Lab Units 06/06/20  0116   CREATININE mg/dL 0.94   POTASSIUM mmol/L 4.4   SODIUM mmol/L 133*   MAGNESIUM mg/dL 2.1       Physical Exam:  Neuro intact, nad, up in chair  Tele:  Paced 70  Trace edema    Assessment/Plan:  Principal Problem:    Chest pain  Active Problems:    Sleep apnea    Essential hypertension    Paroxysmal atrial fibrillation (CMS/HCC)    Congestive heart failure (CMS/HCC)    Chronic obstructive pulmonary disease (CMS/HCC)    Hyperlipidemia    ICD (implantable cardioverter-defibrillator) in place    Morbid obesity (CMS/HCC)    Ischemic cardiomyopathy    ICM/HFrEF, EF 18%, NYHA class III  Biventricular ICD upgrade ([employer]) and AV node ablation--Sept 2019  Permanent atrial fib s/p AV zurdo ablation/ multiple episodes of VT--on po amio, Eliquis  HTN--stable  HLD--statin  DON on trilogy--[facility] (Dr. Gutierrez)  Morbid obesity, stage 3--BMI 46.5  Premature CAD, s/p stenting and chronic refractory chest pain--first stents at age 48, on Ranexa  Strong family hx of CAD/HF    nsg reports pt kept over night d/t needing COVID-19 testing for ablation on Tues.    He did have VT this  morning and resolved without intervention.  Dr. Richmond evaluated pt.  Plans for pt to have ablation and return at later date for LV lead placement--our office  is aware.  Pt had been on Eliquis and Plavix upon this admission.  Will sign off--call if needed.    Maya Dawson, ABHISHEK  6/6/2020  10:24

## 2020-06-06 NOTE — PLAN OF CARE
Problem: Patient Care Overview  Goal: Plan of Care Review  Outcome: Ongoing (interventions implemented as appropriate)  Flowsheets (Taken 6/6/2020 0407 by Kerry Motley RN)  Plan of Care Reviewed With: patient  Note:   Pt had multiple runs of vtach this a.m. Stable now. No distress noted. Covid test done. Plan to discharge today and come back for an ablation on Tuesday. Will continue to monitor.

## 2020-06-06 NOTE — DISCHARGE SUMMARY
[facility] Medicine Services  DISCHARGE SUMMARY        Prepared For PCP:  Jaylen Moss MD    Patient Name: Jose Dixon Jr.  : 1960  MRN: [id number]      Date of Admission:   6/3/2020    Date of Discharge:  2020    Length of stay:  LOS: 2 days     Hospital Course     Presenting Problem:   Shortness of breath [R06.02]  Chest pain, unspecified type [R07.9]  Chest pain, unspecified type [R07.9]      Active Hospital Problems    Diagnosis  POA   • Ischemic cardiomyopathy [I25.5]  Yes   • Sleep apnea [G47.30]  Yes   • Essential hypertension [I10]  Yes   • Paroxysmal atrial fibrillation (CMS/HCC) [I48.0]  Yes   • Morbid obesity (CMS/HCC) [E66.01]  Yes   • Congestive heart failure (CMS/HCC) [I50.9]  Yes   • ICD (implantable cardioverter-defibrillator) in place [Z95.810]  Yes   • Chronic obstructive pulmonary disease (CMS/HCC) [J44.9]  Yes   • Hyperlipidemia [E78.5]  Yes      Resolved Hospital Problems    Diagnosis Date Resolved POA   • **Chest pain [R07.9] 2020 Yes           Hospital Course:  Jose Dixon Jr. is a 59 y.o. male who presents to [facility] ED with a history of ischemic cardiomyopathy, systolic heart failure, permanent atrial fibrillation, episodic ventricular tachycardia, COPD, sleep apnea, and morbid obesity complaining of left-sided chest pain and shortness of breath.  Patient states he has had continuous chest pain for about 2 years that is normally a dull ache at a 3/10.  Yesterday afternoon, he began to have left-sided, sharp, stabbing, pain between an 8 and 9 out of 10 while he was at rest.  He took sublingual nitroglycerin 4 times with no relief. Patient denies diaphoresis, nausea, or vomiting.  He states he can feel when his heart is in ventricular tachycardia which is what it felt like all day yesterday and up until the point he came to the ER today.  He states the shortness of breath also started today and his feet are much more swollen than  normal.  He states he took his home Bumex 3 times prior to coming to the ER.  Patient denies any relief.     In the ED,Troponin negative, BNP 2137.0, creatinine 0.89, BUN 16, lipase 23, PT 10.6, INR 1.01, PTT 23.6.  EKG shows rate of 70, atrial fibrillation and ventricular paced.  Chest x-ray shows stable cardiomegaly no evidence of active lung disease.  UA negative.    Patient was evaluated by EP cardiology as he is well known to their service.  He has had recurrent and symptomatic episodes of VT per ICD interrogation despite being on amiodarone.  He has had prior VT ablation in 2017 at [facility].  His heart failure is worsening and he was scheduled to have repeat VT ablation in a few weeks, but it is being moved up to next week.  He was started in Metoprolol 100mg a day, and diuresed with furosemide to reduce his fluid accumulation.  CT surgery was consulted for surgical evaluation of the Left ventricle lead placement.  He recommended that the patient undergo the VT ablation prior to the lead placement due to the recurrent runs of Vtach the patient displayed during his admission.  The patient will be discharged home in stable condition at this time and return on Tuesday 6/9/2020 for the ablation procedure with the EP cardiologist.    Recommendation for Outpatient Providers:             Reasons For Change In Medications and Indications for New Medications:        Day of Discharge     HPI: No new complaints or acute events.  He continued to have several runs of Vtach, but he will be returning on Tuesday 6/9/2020 for VT ablation.    Vital Signs:   Temp:  [97.4 °F (36.3 °C)-98.1 °F (36.7 °C)] 97.4 °F (36.3 °C)  Heart Rate:  [64-75] 71  Resp:  [16-18] 18  BP: ()/(46-77) 100/57     Physical Exam:  General: well-developed and well-nourished, NAD  HEENT: NC/AT, EOMI, PERRLA  Heart: RRR. No murmur   Chest: CTAB, no w/r/r, normal respiratory effort  Abdominal: Soft. NT/ND. Bowel sounds  present  Musculoskeletal: Normal ROM. + BLE edema. No calf tenderness.  Neurological: AAOx3, no focal deficits  Skin: Skin is warm and dry. No rash  Psychiatric: Normal mood and affect.    Pertinent  and/or Most Recent Results     Results from last 7 days   Lab Units 06/06/20  0116 06/05/20  0428 06/05/20  0301 06/04/20  0616 06/03/20  1849   WBC 10*3/mm3 9.60  --  9.70 9.10 9.90   HEMOGLOBIN g/dL 14.5  --  14.1 15.0 14.6   HEMATOCRIT % 43.4  --  42.2 45.2 44.3   PLATELETS 10*3/mm3 227  --  228 209 222   SODIUM mmol/L 133* 137  --  139 139   POTASSIUM mmol/L 4.4 4.5  --  4.0 4.5   CHLORIDE mmol/L 95* 96*  --  97* 101   CO2 mmol/L 30.0* 30.0*  --  28.0 28.0   BUN  24* 23*  --  15 16   CREATININE mg/dL 0.94 0.89  --  0.93 0.89   GLUCOSE mg/dL 95 117*  --  111* 121*   CALCIUM mg/dL 9.3 8.8  --  9.1 9.2     Results from last 7 days   Lab Units 06/04/20  0616 06/03/20  1849   BILIRUBIN mg/dL  --  0.5   ALK PHOS U/L  --  71   ALT (SGPT) U/L  --  25   AST (SGOT) U/L  --  34   PROTIME Seconds  --  10.6   INR   --  1.01   APTT seconds 24.2* 23.6*           Invalid input(s): TG, LDLCALC, LDLREALC  Results from last 7 days   Lab Units 06/04/20  0616 06/04/20  0038 06/03/20  1849   HEMOGLOBIN A1C % 5.9*  --   --    PROBNP pg/mL  --   --  2,137.0*   TROPONIN T ng/mL <0.010 <0.010 <0.010       Brief Urine Lab Results  (Last result in the past 365 days)      Color   Clarity   Blood   Leuk Est   Nitrite   Protein   CREAT   Urine HCG        06/03/20 1951 Yellow Clear Negative Negative Negative Negative               Microbiology Results Abnormal     None          Xr Chest 1 View    Result Date: 6/3/2020  Impression: 1. Stable exam, with cardiomegaly and no evidence of active lung disease.  Electronically Signed By-Delicia Blakely On:6/3/2020 7:07 PM This report was finalized on [id number] by  Delicia Blakely, .                             Test Results Pending at Discharge   Order Current Status    COVID-19, MOE IN-HOUSE, NP SWAB IN  TRANSPORT MEDIA 8-12 HR TAT - Swab, Nasopharynx In process            Procedures Performed           Consults:   Consults     Date and Time Order Name Status Description    6/4/2020 1549 Inpatient Cardiothoracic Surgery Consult Completed     6/3/2020 2211 Inpatient Cardiology Consult      6/3/2020 1940 Hospitalist (on-call MD unless specified) Completed             Discharge Details        Discharge Medications      Continue These Medications      Instructions Start Date   albuterol sulfate  (90 Base) MCG/ACT inhaler  Commonly known as:  PROVENTIL HFA;VENTOLIN HFA;PROAIR HFA   2 puffs, Inhalation, Every 4 Hours PRN      amiodarone 200 MG tablet  Commonly known as:  PACERONE   200 mg, Oral, Daily      apixaban 5 MG tablet tablet  Commonly known as:  Eliquis   5 mg, Oral, 2 Times Daily      atorvastatin 80 MG tablet  Commonly known as:  LIPITOR   80 mg, Oral, Daily      budesonide-formoterol 160-4.5 MCG/ACT inhaler  Commonly known as:  Symbicort   2 puffs, Inhalation, 2 Times Daily - RT      bumetanide 1 MG tablet  Commonly known as:  BUMEX   1 mg, Oral, 3 Times Daily      cetirizine 10 MG tablet  Commonly known as:  zyrTEC   10 mg, Oral, Daily      clopidogrel 75 MG tablet  Commonly known as:  PLAVIX   75 mg, Oral, Daily      DULoxetine 60 MG capsule  Commonly known as:  CYMBALTA   60 mg, Oral, Daily      fluticasone 50 MCG/ACT nasal spray  Commonly known as:  FLONASE   2 sprays, Nasal, Daily      gabapentin 300 MG capsule  Commonly known as:  NEURONTIN   300 mg, Oral, 2 Times Daily      hydroCHLOROthiazide 25 MG tablet  Commonly known as:  HYDRODIURIL   25 mg, Oral, Daily      isosorbide mononitrate 60 MG 24 hr tablet  Commonly known as:  IMDUR   60 mg, Oral, 3 Times Daily PRN      lisinopril 5 MG tablet  Commonly known as:  PRINIVIL,ZESTRIL   5 mg, Oral, Daily      metoprolol succinate  MG 24 hr tablet  Commonly known as:  Toprol XL   100 mg, Oral, Daily      montelukast 10 MG tablet  Commonly known  as:  SINGULAIR   10 mg, Oral, Daily      nitroglycerin 0.4 MG SL tablet  Commonly known as:  NITROSTAT   0.4 mg, Sublingual, Every 5 Minutes PRN, Take no more than 3 doses in 15 minutes.       potassium chloride 10 MEQ CR tablet  Commonly known as:  K-DUR   10 mEq, Oral, Daily      ranolazine 500 MG 12 hr tablet  Commonly known as:  RANEXA   1,000 mg, Oral, 2 Times Daily      roflumilast 500 MCG tablet tablet  Commonly known as:  DALIRESP   500 mcg, Oral, Daily      tamsulosin 0.4 MG capsule 24 hr capsule  Commonly known as:  FLOMAX   1 capsule, Oral, Every Evening             Allergies   Allergen Reactions   • Codeine Anaphylaxis          • Tramadol Anaphylaxis and Hives         Discharge Disposition:  Home or Self Care    Diet:  Hospital:  Diet Order   Procedures   • Diet Cardiac; 2gm Na+         Discharge Activity:   Activity Instructions     Activity as Tolerated              CODE STATUS:    Code Status and Medical Interventions:   Ordered at: 06/03/20 6518     Code Status:    CPR     Medical Interventions (Level of Support Prior to Arrest):    Full         Follow-up Appointments  No future appointments.    Additional Instructions for the Follow-ups that You Need to Schedule     Call MD With Problems / Concerns   As directed      Instructions: Call [phone] or email [email or web address] for problems or concerns.    Order Comments:  Instructions: Call [phone] or email [email or web address] for problems or concerns.          Discharge Follow-up with PCP   As directed       Currently Documented PCP:    Jaylen Moss MD    PCP Phone Number:    [phone]     Follow Up Details:  2 weeks         Discharge Follow-up with Specialty: EP Cadiology - -Dr. Lopez -- [facility] procedure; 3 Days   As directed      Specialty:  EP Cadiology - -Dr. Lopez -- [facility] procedure    Follow Up:  3 Days    Follow Up Details:  June 6, 2020 VT ablation                 Condition on Discharge:      Stable      This  patient has been examined wearing appropriate Personal Protective Equipment 06/06/20      Electronically signed by Kay Lopez MD, 06/06/20, 10:31 AM.      Time: I spent  30  minutes on this discharge activity which included face-to-face encounter with the patient/reviewing the data in the system/coordination of the care with the nursing staff as well as consultants/documentation/entering orders.

## 2020-06-06 NOTE — PLAN OF CARE
Patient has no voiced complaint- but he has had several runs of Vtach, Mag and K levels are with normal limits. Will continue to monitor.

## 2020-06-06 NOTE — SIGNIFICANT NOTE
Pt discharge to home with plans of ablation on Tuesday. No distress noted. IVs removed. Pt transported to lobby in wheelchair. Wife waiting outside. All belongings packed up by patient.

## 2020-06-06 NOTE — PROGRESS NOTES
[facility] Medicine Services Daily Progress Note          Hospitalist Team  LOS 1 days      Patient Care Team:  Jaylen Moss MD as PCP - General    Patient Location: 2105/1      Subjective   Subjective     Chief Complaint / Subjective  Chief Complaint   Patient presents with   • Shortness of Breath         Brief Synopsis of Hospital Course/HPI  Mr. Dixon is a 59 y.o. male who presents to [facility] ED with a history of ischemic cardiomyopathy, systolic heart failure, permanent atrial fibrillation, episodic ventricular tachycardia, COPD, sleep apnea, and morbid obesity complaining of left-sided chest pain and shortness of breath.  Patient states he has had continuous chest pain for about 2 years that is normally a dull ache at a 3/10.  Yesterday afternoon, he began to have left-sided, sharp, stabbing, pain between an 8 and 9 out of 10 while he was at rest.  He took sublingual nitroglycerin 4 times with no relief. Patient denies diaphoresis, nausea, or vomiting.  He states he can feel when his heart is in ventricular tachycardia which is what it felt like all day yesterday and up until the point he came to the ER today.  He states the shortness of breath also started today and his feet are much more swollen than normal.  He states he took his home Bumex 3 times prior to coming to the ER.  Patient denies any relief.     In the ED,Troponin negative, BNP 2137.0, creatinine 0.89, BUN 16, lipase 23, PT 10.6, INR 1.01, PTT 23.6.  EKG shows rate of 70, atrial fibrillation and ventricular paced.  Chest x-ray shows stable cardiomegaly no evidence of active lung disease.  UA negative.  Patient admitted for further evaluation and treatment    Date::    6/4/2020:  Patient is anxious and short of air, still chest discomfort/pain.      Review of Systems   Constitution: Negative. Negative for chills, diaphoresis and fever.   HENT: Negative.    Cardiovascular: Positive for chest pain, dyspnea on  "exertion and leg swelling. Negative for syncope.   Respiratory: Positive for shortness of breath and wheezing. Negative for cough, hemoptysis and sputum production.    Skin: Negative.    Musculoskeletal: Negative.    Gastrointestinal: Negative for abdominal pain, nausea and vomiting.   Genitourinary: Negative.    Neurological: Negative.    Psychiatric/Behavioral: The patient is nervous/anxious.          Objective   Objective      Vital Signs  Temp:  [97.3 °F (36.3 °C)-98.1 °F (36.7 °C)] 97.6 °F (36.4 °C)  Heart Rate:  [70-75] 70  Resp:  [12-18] 16  BP: ()/(46-73) 115/63  Oxygen Therapy  SpO2: 98 %  Pulse Oximetry Type: Continuous  Device (Oxygen Therapy): room air  Flow (L/min): 2  Oximetry Probe Site Changed: No  Flowsheet Rows      First Filed Value   Admission Height  175.3 cm (69\") Documented at 06/03/2020 1812   Admission Weight  (!) 145 kg (320 lb 1.7 oz) Documented at 06/03/2020 1812        Intake & Output (last 3 days)       06/03 0701 - 06/04 0700 06/04 0701 - 06/05 0700 06/05 0701 - 06/06 0700    P.O.  240 1680    Total Intake(mL/kg)  240 (1.7) 1680 (11.7)    Urine (mL/kg/hr) 900 200 (0.1)     Total Output 900 200     Net -900 +40 +1680           Urine Unmeasured Occurrence 1 x  2 x        Lines, Drains & Airways    Active LDAs     Name:   Placement date:   Placement time:   Site:   Days:    Peripheral IV 06/03/20 2330 Anterior;Distal;Left Wrist   06/03/20 2330    Wrist   1    Peripheral IV 06/03/20 2330 Anterior;Right Forearm   06/03/20 2330    Forearm   1                  Physical Exam:  General: well-developed and well-nourished, NAD  HEENT: NC/AT, EOMI, PERRLA  Heart: RRR. No murmur   Chest: bilateral wheezing no rhonchi, respiratory effort mildly increased  Abdominal: Soft. NT/ND. Bowel sounds present  Musculoskeletal: Normal ROM.  No edema. No calf tenderness.  Neurological: AAOx3, no focal deficits  Skin: Skin is warm and dry. No rash  Psychiatric: Normal mood and " affect.        Procedures:           Results Review:     I reviewed the patient's new clinical results.    Results from last 7 days   Lab Units 06/05/20  0301 06/04/20  0616 06/03/20  1849   WBC 10*3/mm3 9.70 9.10 9.90   HEMOGLOBIN g/dL 14.1 15.0 14.6   HEMATOCRIT % 42.2 45.2 44.3   PLATELETS 10*3/mm3 228 209 222     Results from last 7 days   Lab Units 06/05/20  0428 06/04/20  0616 06/03/20  1849   SODIUM mmol/L 137 139 139   POTASSIUM mmol/L 4.5 4.0 4.5   CHLORIDE mmol/L 96* 97* 101   CO2 mmol/L 30.0* 28.0 28.0   BUN  23* 15 16   CREATININE mg/dL 0.89 0.93 0.89   GLUCOSE mg/dL 117* 111* 121*   ALBUMIN g/dL  --   --  4.10   BILIRUBIN mg/dL  --   --  0.5   ALK PHOS U/L  --   --  71   AST (SGOT) U/L  --   --  34   ALT (SGPT) U/L  --   --  25   LIPASE U/L  --   --  23   CALCIUM mg/dL 8.8 9.1 9.2     Cr Clearance Estimated Creatinine Clearance: 129.5 mL/min (by C-G formula based on SCr of 0.89 mg/dL).    Coag   Results from last 7 days   Lab Units 06/04/20  0616 06/03/20  1849   INR   --  1.01   APTT seconds 24.2* 23.6*       HbA1C   Lab Results   Component Value Date    HGBA1C 5.9 (H) 06/04/2020    HGBA1C 5.4 09/14/2018     Blood Glucose       Troponin Results from last 7 days   Lab Units 06/04/20  0616 06/04/20  0038 06/03/20  1849   TROPONIN T ng/mL <0.010 <0.010 <0.010   PROBNP pg/mL  --   --  2,137.0*     Lipids  Lab Results   Component Value Date    CHLPL 201 (H) 09/14/2018    TRIG 108 09/14/2018    HDL 46 09/14/2018     (H) 09/14/2018       UA    Results from last 7 days   Lab Units 06/03/20  1951   NITRITE UA  Negative       Microbiology       ABG        EKG  ECG 12 Lead   Final Result   HEART RATE= 70  bpm   RR Interval= 856  ms   MS Interval=   ms   P Horizontal Axis= 239  deg   P Front Axis=   deg   QRSD Interval= 162  ms   QT Interval= 435  ms   QRS Axis= 145  deg   T Wave Axis= -49  deg   - ABNORMAL ECG -   Afib/flutter and ventricular-paced rhythm   Biventricular paced rhythm   When compared with  ECG of 27-Sep-2019 6:36:38,   No significant change   Electronically Signed By: Harry Galarza (ISH) 05-Jun-2020 16:16:23   Date and Time of Study: 2020-06-03 18:23:21      ECG 12 Lead    (Results Pending)   ECG 12 Lead    (Results Pending)   ECG 12 Lead    (Results Pending)       Imaging:  Xr Chest 1 View    Result Date: 6/3/2020  1. Stable exam, with cardiomegaly and no evidence of active lung disease.  Electronically Signed By-Delicia Blakely On:6/3/2020 7:07 PM This report was finalized on [id number] by  Delicia Blakely .            Xrays, labs reviewed personally by physician.    Medication Review:   I have reviewed the patient's current medication list      Scheduled Meds    albuterol 2.5 mg Nebulization Q4H - RT   amiodarone 200 mg Oral Daily   apixaban 5 mg Oral Q12H   aspirin 324 mg Oral Once   And      aspirin 81 mg Oral Daily   atorvastatin 80 mg Oral Daily   budesonide-formoterol 2 puff Inhalation BID - RT   cetirizine 10 mg Oral Daily   DULoxetine 60 mg Oral Daily   lisinopril 5 mg Oral Daily   metoprolol succinate  mg Oral Q24H   montelukast 10 mg Oral Nightly   ranolazine 1,000 mg Oral BID   roflumilast 500 mcg Oral Daily   tamsulosin 0.4 mg Oral Q PM   zolpidem 5 mg Oral Nightly       Meds Infusions       Meds PRN  •  acetaminophen **OR** acetaminophen **OR** acetaminophen  •  bisacodyl  •  docusate sodium  •  melatonin  •  ondansetron **OR** ondansetron  •  [COMPLETED] Insert peripheral IV **AND** sodium chloride  •  sodium chloride      Assessment/Plan   Assessment/Plan     Active Hospital Problems    Diagnosis  POA   • **Chest pain [R07.9]  Yes   • Ischemic cardiomyopathy [I25.5]  Yes   • Sleep apnea [G47.30]  Yes   • Essential hypertension [I10]  Yes   • Paroxysmal atrial fibrillation (CMS/HCC) [I48.0]  Yes   • Morbid obesity (CMS/HCC) [E66.01]  Yes   • Congestive heart failure (CMS/HCC) [I50.9]  Yes   • ICD (implantable cardioverter-defibrillator) in place [Z95.810]  Yes   • Chronic  obstructive pulmonary disease (CMS/[facility]) [J44.9]  Yes   • Hyperlipidemia [E78.5]  Yes      Resolved Hospital Problems   No resolved problems to display.       MEDICAL DECISION MAKING COMPLEXITY BY PROBLEM:     Chest pain, HFr EF,  chronic with ischemic cardiomyopathy, permanent atrial fibrillation post AV node ablation and episodic V. tach with EP ablation scheduled June 18, 2020    --Chronic coronary artery disease       -Received nitro paste ER, with no relief    - EF < 20%, per cardiology note 10/2019  -- chronic angina present  -- Cardiology following     -- IV heparin and NTG drip ordered overnight, then dc'd     -- metoprolol started     -- EP study/VT ablation on Tuesday June 9     -- recurrent VT despite ICD/amiodarone     -- consult CT surgery -- may need transplant  -- continue IV Lasix in limited doses  -- serial troponin negative - ruled out ACS       COPD, not in exacerbation  -- Continuous pulse ox  -- Continue albuterol, Brio Ellipta with hospital substitution of Symbicort  -- Continue Singulair and Daliresp when appropriate     Essential Hypertension, Chronic, Controlled  -Continue home lisinopril  -Hold   - Monitor with routine vital signs      DON  -Okay to use home or hospital CPAP for sleep     Depression  -Continue Cymbalta when appropriate     Urinary retention  - Continue Flomax when appropriate     Seasonal allergies/allergic rhinitis  -Continue cetirizine when appropriate  -Hold Flonase     Dietary supplementation  -Hold dietary supplements for now     Obesity, morbid  -encourage lifestyle modifications    VTE Prophylaxis  Mechanical Order History:      Ordered        06/03/20 2211  Place Sequential Compression Device  Once         06/03/20 2211  Maintain Sequential Compression Device  Continuous                 Pharmalogical Order History:     Ordered     Dose Route Frequency Stop    06/04/20 1338  apixaban (ELIQUIS) tablet 5 mg      5 mg PO Every 12 Hours Scheduled --    06/04/20 2190   heparin bolus from bag 2,500 Units  Status:  Discontinued      2,500 Units IV Every 6 Hours PRN 06/04/20 1315    06/04/20 0728  heparin bolus from bag 5,000 Units  Status:  Discontinued      5,000 Units IV Every 6 Hours PRN 06/04/20 1315    06/03/20 2147  heparin 44470 units/250 ml (100 units/ml) in D5W  10 mL/hr,   Status:  Discontinued      6.9 Units/kg/hr IV Titrated 06/04/20 1312          Code Status  Code Status and Medical Interventions:   Ordered at: 06/03/20 2211     Code Status:    CPR     Medical Interventions (Level of Support Prior to Arrest):    Full       This patient has been examined wearing appropriate Personal Protective Equipment. 06/05/20      Discharge Planning    To be determined pending cardiac work-up.      Destination      Coordination has not been started for this encounter.      Durable Medical Equipment      Coordination has not been started for this encounter.      Dialysis/Infusion      Coordination has not been started for this encounter.      Home Medical Care      Coordination has not been started for this encounter.      Therapy      Coordination has not been started for this encounter.      Community Resources      Coordination has not been started for this encounter.            Electronically signed by Kay Lopez MD, 06/05/20, 20:41.    [facility] Hospitalist Team

## 2020-06-06 NOTE — PROGRESS NOTES
[facility] Medicine Services Daily Progress Note          Hospitalist Team  LOS 1 days      Patient Care Team:  Jaylen Moss MD as PCP - General    Patient Location: 2105/1      Subjective   Subjective     Chief Complaint / Subjective  Chief Complaint   Patient presents with   • Shortness of Breath         Brief Synopsis of Hospital Course/HPI  Mr. Dixon is a 59 y.o. male who presents to [facility] ED with a history of ischemic cardiomyopathy, systolic heart failure, permanent atrial fibrillation, episodic ventricular tachycardia, COPD, sleep apnea, and morbid obesity complaining of left-sided chest pain and shortness of breath.  Patient states he has had continuous chest pain for about 2 years that is normally a dull ache at a 3/10.  Yesterday afternoon, he began to have left-sided, sharp, stabbing, pain between an 8 and 9 out of 10 while he was at rest.  He took sublingual nitroglycerin 4 times with no relief. Patient denies diaphoresis, nausea, or vomiting.  He states he can feel when his heart is in ventricular tachycardia which is what it felt like all day yesterday and up until the point he came to the ER today.  He states the shortness of breath also started today and his feet are much more swollen than normal.  He states he took his home Bumex 3 times prior to coming to the ER.  Patient denies any relief.     In the ED,Troponin negative, BNP 2137.0, creatinine 0.89, BUN 16, lipase 23, PT 10.6, INR 1.01, PTT 23.6.  EKG shows rate of 70, atrial fibrillation and ventricular paced.  Chest x-ray shows stable cardiomegaly no evidence of active lung disease.  UA negative.  Patient admitted for further evaluation and treatment     Date::    6/4/2020:  Patient is anxious and short of air, still chest discomfort/pain.      Review of Systems   Cardiovascular: Positive for chest pain.   Respiratory: Positive for shortness of breath.    All other systems reviewed and are  "negative.        Objective   Objective      Vital Signs  Temp:  [97.3 °F (36.3 °C)-98.1 °F (36.7 °C)] 97.6 °F (36.4 °C)  Heart Rate:  [70-75] 70  Resp:  [12-18] 16  BP: ()/(46-73) 115/63  Oxygen Therapy  SpO2: 98 %  Pulse Oximetry Type: Continuous  Device (Oxygen Therapy): room air  Flow (L/min): 2  Oximetry Probe Site Changed: No  Flowsheet Rows      First Filed Value   Admission Height  175.3 cm (69\") Documented at 06/03/2020 1812   Admission Weight  (!) 145 kg (320 lb 1.7 oz) Documented at 06/03/2020 1812        Intake & Output (last 3 days)       06/03 0701 - 06/04 0700 06/04 0701 - 06/05 0700 06/05 0701 - 06/06 0700    P.O.  240 1680    Total Intake(mL/kg)  240 (1.7) 1680 (11.7)    Urine (mL/kg/hr) 900 200 (0.1)     Total Output 900 200     Net -900 +40 +1680           Urine Unmeasured Occurrence 1 x  2 x        Lines, Drains & Airways    Active LDAs     Name:   Placement date:   Placement time:   Site:   Days:    Peripheral IV 06/03/20 2330 Anterior;Distal;Left Wrist   06/03/20    2330    Wrist   1    Peripheral IV 06/03/20 2330 Anterior;Right Forearm   06/03/20    2330    Forearm   1                  Physical Exam:  General: well-developed and well-nourished, NAD  HEENT: NC/AT, EOMI, PERRLA  Heart: RRR. No murmur   Chest: bilateral wheezing, normal respiratory effort  Abdominal: Soft. NT/ND. Bowel sounds present  Musculoskeletal: Normal ROM.  + BLE edema. No calf tenderness.  Neurological: AAOx3, no focal deficits  Skin: Skin is warm and dry. No rash  Psychiatric: Normal mood and affect.        Procedures:           Results Review:     I reviewed the patient's new clinical results.    Results from last 7 days   Lab Units 06/05/20  0301 06/04/20  0616 06/03/20  1849   WBC 10*3/mm3 9.70 9.10 9.90   HEMOGLOBIN g/dL 14.1 15.0 14.6   HEMATOCRIT % 42.2 45.2 44.3   PLATELETS 10*3/mm3 228 209 222     Results from last 7 days   Lab Units 06/05/20  0428 06/04/20  0616 06/03/20  1849   SODIUM mmol/L 137 139 139 "   POTASSIUM mmol/L 4.5 4.0 4.5   CHLORIDE mmol/L 96* 97* 101   CO2 mmol/L 30.0* 28.0 28.0   BUN  23* 15 16   CREATININE mg/dL 0.89 0.93 0.89   GLUCOSE mg/dL 117* 111* 121*   ALBUMIN g/dL  --   --  4.10   BILIRUBIN mg/dL  --   --  0.5   ALK PHOS U/L  --   --  71   AST (SGOT) U/L  --   --  34   ALT (SGPT) U/L  --   --  25   LIPASE U/L  --   --  23   CALCIUM mg/dL 8.8 9.1 9.2     Cr Clearance Estimated Creatinine Clearance: 129.5 mL/min (by C-G formula based on SCr of 0.89 mg/dL).    Coag   Results from last 7 days   Lab Units 06/04/20  0616 06/03/20  1849   INR   --  1.01   APTT seconds 24.2* 23.6*       HbA1C   Lab Results   Component Value Date    HGBA1C 5.9 (H) 06/04/2020    HGBA1C 5.4 09/14/2018     Blood Glucose       Troponin Results from last 7 days   Lab Units 06/04/20  0616 06/04/20  0038 06/03/20  1849   TROPONIN T ng/mL <0.010 <0.010 <0.010   PROBNP pg/mL  --   --  2,137.0*     Lipids  Lab Results   Component Value Date    CHLPL 201 (H) 09/14/2018    TRIG 108 09/14/2018    HDL 46 09/14/2018     (H) 09/14/2018       UA    Results from last 7 days   Lab Units 06/03/20  1951   NITRITE UA  Negative       Microbiology       ABG        EKG  ECG 12 Lead   Final Result   HEART RATE= 70  bpm   RR Interval= 856  ms   MA Interval=   ms   P Horizontal Axis= 239  deg   P Front Axis=   deg   QRSD Interval= 162  ms   QT Interval= 435  ms   QRS Axis= 145  deg   T Wave Axis= -49  deg   - ABNORMAL ECG -   Afib/flutter and ventricular-paced rhythm   Biventricular paced rhythm   When compared with ECG of 27-Sep-2019 6:36:38,   No significant change   Electronically Signed By: Harry Galarza (ISH) 05-Jun-2020 16:16:23   Date and Time of Study: 2020-06-03 18:23:21      ECG 12 Lead    (Results Pending)   ECG 12 Lead    (Results Pending)   ECG 12 Lead    (Results Pending)       Imaging:  Xr Chest 1 View    Result Date: 6/3/2020  1. Stable exam, with cardiomegaly and no evidence of active lung disease.  Electronically Signed  By-Delicia Blakely On:6/3/2020 7:07 PM This report was finalized on [id number] by  Delicia Blakely .            Xrays, labs reviewed personally by physician.    Medication Review:   I have reviewed the patient's current medication list      Scheduled Meds    albuterol 2.5 mg Nebulization Q4H - RT   amiodarone 200 mg Oral Daily   apixaban 5 mg Oral Q12H   aspirin 324 mg Oral Once   And      aspirin 81 mg Oral Daily   atorvastatin 80 mg Oral Daily   budesonide-formoterol 2 puff Inhalation BID - RT   cetirizine 10 mg Oral Daily   DULoxetine 60 mg Oral Daily   lisinopril 5 mg Oral Daily   metoprolol succinate  mg Oral Q24H   montelukast 10 mg Oral Nightly   ranolazine 1,000 mg Oral BID   roflumilast 500 mcg Oral Daily   tamsulosin 0.4 mg Oral Q PM   zolpidem 5 mg Oral Nightly       Meds Infusions       Meds PRN  •  acetaminophen **OR** acetaminophen **OR** acetaminophen  •  bisacodyl  •  docusate sodium  •  melatonin  •  ondansetron **OR** ondansetron  •  [COMPLETED] Insert peripheral IV **AND** sodium chloride  •  sodium chloride      Assessment/Plan   Assessment/Plan     Active Hospital Problems    Diagnosis  POA   • **Chest pain [R07.9]  Yes   • Ischemic cardiomyopathy [I25.5]  Yes   • Sleep apnea [G47.30]  Yes   • Essential hypertension [I10]  Yes   • Paroxysmal atrial fibrillation (CMS/HCC) [I48.0]  Yes   • Morbid obesity (CMS/HCC) [E66.01]  Yes   • Congestive heart failure (CMS/HCC) [I50.9]  Yes   • ICD (implantable cardioverter-defibrillator) in place [Z95.810]  Yes   • Chronic obstructive pulmonary disease (CMS/HCC) [J44.9]  Yes   • Hyperlipidemia [E78.5]  Yes      Resolved Hospital Problems   No resolved problems to display.       MEDICAL DECISION MAKING COMPLEXITY BY PROBLEM:     Chest pain, HFr EF,  chronic with ischemic cardiomyopathy, permanent atrial fibrillation post AV node ablation and episodic V. tach with EP ablation scheduled June 18, 2020    --Chronic coronary artery disease       -Received  nitro paste ER, with no relief    - EF < 20%, per cardiology note 10/2019  -- chronic angina present  -- Cardiology following     -- IV heparin and NTG drip ordered overnight, then dc'd     -- metoprolol started     -- EP study/VT ablation on Tuesday June 9     -- recurrent VT despite ICD/amiodarone     -- continue amiodarone, Eliquis, Ranexa, lisinopril, metoprolol, ASA     -- consult CT surgery for Lead placement  -- continue IV Lasix in limited doses  -- serial troponin negative - ruled out ACS  -- patient will need to have COVID-19 screen 72h prior to ablation on June 9th     -- he agreeable to stay until tomorrow morning to make sure test is done on time and discharge tomorrow      COPD, not in exacerbation  -- Continuous pulse ox  -- Continue albuterol, Brio Ellipta with hospital substitution of Symbicort  -- Continue Singulair and Daliresp when appropriate     Essential Hypertension, Chronic, Controlled  -Continue home lisinopril  -Hold   - Monitor with routine vital signs      DON  -Okay to use home or hospital CPAP for sleep     Depression  -Continue Cymbalta when appropriate     Urinary retention  - Continue Flomax when appropriate     Seasonal allergies/allergic rhinitis  -Continue cetirizine when appropriate  -Hold Flonase     Dietary supplementation  -Hold dietary supplements for now     Obesity, morbid  -encourage lifestyle modifications    VTE Prophylaxis  Mechanical Order History:      Ordered        06/03/20 2211  Place Sequential Compression Device  Once         06/03/20 2211  Maintain Sequential Compression Device  Continuous                 Pharmalogical Order History:     Ordered     Dose Route Frequency Stop    06/04/20 1338  apixaban (ELIQUIS) tablet 5 mg      5 mg PO Every 12 Hours Scheduled --    06/04/20 0728  heparin bolus from bag 2,500 Units  Status:  Discontinued      2,500 Units IV Every 6 Hours PRN 06/04/20 1315    06/04/20 0728  heparin bolus from bag 5,000 Units  Status:   Discontinued      5,000 Units IV Every 6 Hours PRN 06/04/20 1315    06/03/20 2147  heparin 47013 units/250 ml (100 units/ml) in D5W  10 mL/hr,   Status:  Discontinued      6.9 Units/kg/hr IV Titrated 06/04/20 1312          Code Status  Code Status and Medical Interventions:   Ordered at: 06/03/20 2211     Code Status:    CPR     Medical Interventions (Level of Support Prior to Arrest):    Full       This patient has been examined wearing appropriate Personal Protective Equipment. 06/05/20      Discharge Planning    Will discharge home tomorrow after getting COVID screening for his Tuesday procedure.      Destination      Coordination has not been started for this encounter.      Durable Medical Equipment      Coordination has not been started for this encounter.      Dialysis/Infusion      Coordination has not been started for this encounter.      Home Medical Care      Coordination has not been started for this encounter.      Therapy      Coordination has not been started for this encounter.      Community Resources      Coordination has not been started for this encounter.            Electronically signed by Kay Lopez MD, 06/05/20, 20:42.    [facility] Hospitalist Team

## 2020-06-07 ENCOUNTER — READMISSION MANAGEMENT (OUTPATIENT)
Dept: CALL CENTER | Facility: HOSPITAL | Age: 60
End: 2020-06-07

## 2020-06-07 NOTE — OUTREACH NOTE
Prep Survey      Responses   [facility]?  [facility]   Is LACE score < 7 ?  No   Eligibility  Readm Mgmt   Discharge diagnosis  Chest pain, ischemic cardiomyopathy, morbid obesity, ICD, HLD, COPD, CHF, PAF, essential HTN,    COVID-19 Test Status  Negative   Does the patient have one of the following disease processes/diagnoses(primary or secondary)?  CHF   Does the patient have Home health ordered?  No   Is there a DME ordered?  No   Comments regarding appointments  Pt to schedule F/U with PCP   Medication alerts for this patient  see AVS   Prep survey completed?  Yes          Bhavani Pettit, RN

## 2020-06-08 ENCOUNTER — ANESTHESIA EVENT (OUTPATIENT)
Dept: CARDIOLOGY | Facility: HOSPITAL | Age: 60
End: 2020-06-08

## 2020-06-08 RX ORDER — SODIUM CHLORIDE 0.9 % (FLUSH) 0.9 %
10 SYRINGE (ML) INJECTION AS NEEDED
Status: CANCELLED | OUTPATIENT
Start: 2020-06-08

## 2020-06-08 RX ORDER — SODIUM CHLORIDE 0.9 % (FLUSH) 0.9 %
10 SYRINGE (ML) INJECTION EVERY 12 HOURS SCHEDULED
Status: CANCELLED | OUTPATIENT
Start: 2020-06-08

## 2020-06-08 RX ORDER — SODIUM CHLORIDE 9 MG/ML
9 INJECTION, SOLUTION INTRAVENOUS CONTINUOUS PRN
Status: CANCELLED | OUTPATIENT
Start: 2020-06-08

## 2020-06-09 ENCOUNTER — READMISSION MANAGEMENT (OUTPATIENT)
Dept: CALL CENTER | Facility: HOSPITAL | Age: 60
End: 2020-06-09

## 2020-06-09 ENCOUNTER — ANESTHESIA (OUTPATIENT)
Dept: CARDIOLOGY | Facility: HOSPITAL | Age: 60
End: 2020-06-09

## 2020-06-09 ENCOUNTER — HOSPITAL ENCOUNTER (OUTPATIENT)
Facility: HOSPITAL | Age: 60
Discharge: HOME | End: 2020-06-10
Attending: INTERNAL MEDICINE | Admitting: INTERNAL MEDICINE

## 2020-06-09 DIAGNOSIS — I47.20 VT (VENTRICULAR TACHYCARDIA) (HCC): ICD-10-CM

## 2020-06-09 DIAGNOSIS — I25.5 ISCHEMIC CARDIOMYOPATHY: ICD-10-CM

## 2020-06-09 LAB
ACT BLD: 136 SECONDS (ref 89–137)
ACT BLD: 142 SECONDS (ref 89–137)
ACT BLD: 169 SECONDS (ref 89–137)
ACT BLD: 224 SECONDS (ref 89–137)
ACT BLD: 252 SECONDS (ref 89–137)
ACT BLD: 274 SECONDS (ref 89–137)
ACT BLD: 279 SECONDS (ref 89–137)

## 2020-06-09 PROCEDURE — C1894 INTRO/SHEATH, NON-LASER: HCPCS | Performed by: INTERNAL MEDICINE

## 2020-06-09 PROCEDURE — 25010000002 DEXAMETHASONE PER 1 MG: Performed by: NURSE ANESTHETIST, CERTIFIED REGISTERED

## 2020-06-09 PROCEDURE — 25010000002 MIDAZOLAM PER 1 MG: Performed by: NURSE ANESTHETIST, CERTIFIED REGISTERED

## 2020-06-09 PROCEDURE — 94799 UNLISTED PULMONARY SVC/PX: CPT

## 2020-06-09 PROCEDURE — 93662 INTRACARDIAC ECG (ICE): CPT | Performed by: INTERNAL MEDICINE

## 2020-06-09 PROCEDURE — 93654 COMPRE EP EVAL TX VT: CPT | Performed by: INTERNAL MEDICINE

## 2020-06-09 PROCEDURE — 25010000002 PHENYLEPHRINE 10 MG/ML SOLUTION 5 ML VIAL: Performed by: NURSE ANESTHETIST, CERTIFIED REGISTERED

## 2020-06-09 PROCEDURE — 25010000002 FENTANYL CITRATE (PF) 100 MCG/2ML SOLUTION: Performed by: NURSE ANESTHETIST, CERTIFIED REGISTERED

## 2020-06-09 PROCEDURE — 85347 COAGULATION TIME ACTIVATED: CPT

## 2020-06-09 PROCEDURE — 25010000002 ONDANSETRON PER 1 MG: Performed by: INTERNAL MEDICINE

## 2020-06-09 PROCEDURE — C1769 GUIDE WIRE: HCPCS | Performed by: INTERNAL MEDICINE

## 2020-06-09 PROCEDURE — 93454 CORONARY ARTERY ANGIO S&I: CPT | Performed by: INTERNAL MEDICINE

## 2020-06-09 PROCEDURE — C1732 CATH, EP, DIAG/ABL, 3D/VECT: HCPCS | Performed by: INTERNAL MEDICINE

## 2020-06-09 PROCEDURE — 0 IOPAMIDOL PER 1 ML: Performed by: INTERNAL MEDICINE

## 2020-06-09 PROCEDURE — 25010000002 PROTAMINE SULFATE PER 10 MG: Performed by: NURSE ANESTHETIST, CERTIFIED REGISTERED

## 2020-06-09 PROCEDURE — C1730 CATH, EP, 19 OR FEW ELECT: HCPCS | Performed by: INTERNAL MEDICINE

## 2020-06-09 PROCEDURE — G0378 HOSPITAL OBSERVATION PER HR: HCPCS

## 2020-06-09 PROCEDURE — C1759 CATH, INTRA ECHOCARDIOGRAPHY: HCPCS | Performed by: INTERNAL MEDICINE

## 2020-06-09 PROCEDURE — 25010000002 HEPARIN (PORCINE) PER 1000 UNITS: Performed by: NURSE ANESTHETIST, CERTIFIED REGISTERED

## 2020-06-09 PROCEDURE — 94640 AIRWAY INHALATION TREATMENT: CPT

## 2020-06-09 RX ORDER — ATORVASTATIN CALCIUM 40 MG/1
80 TABLET, FILM COATED ORAL NIGHTLY
Status: DISCONTINUED | OUTPATIENT
Start: 2020-06-09 | End: 2020-06-10 | Stop reason: HOSPADM

## 2020-06-09 RX ORDER — EPHEDRINE SULFATE 50 MG/ML
5 INJECTION, SOLUTION INTRAVENOUS ONCE AS NEEDED
Status: DISCONTINUED | OUTPATIENT
Start: 2020-06-09 | End: 2020-06-09 | Stop reason: HOSPADM

## 2020-06-09 RX ORDER — ONDANSETRON 2 MG/ML
4 INJECTION INTRAMUSCULAR; INTRAVENOUS EVERY 6 HOURS PRN
Status: DISCONTINUED | OUTPATIENT
Start: 2020-06-09 | End: 2020-06-10 | Stop reason: HOSPADM

## 2020-06-09 RX ORDER — ROCURONIUM BROMIDE 10 MG/ML
INJECTION, SOLUTION INTRAVENOUS AS NEEDED
Status: DISCONTINUED | OUTPATIENT
Start: 2020-06-09 | End: 2020-06-09 | Stop reason: SURG

## 2020-06-09 RX ORDER — LISINOPRIL 5 MG/1
5 TABLET ORAL DAILY
Status: DISCONTINUED | OUTPATIENT
Start: 2020-06-09 | End: 2020-06-10 | Stop reason: HOSPADM

## 2020-06-09 RX ORDER — HYDROCHLOROTHIAZIDE 25 MG/1
25 TABLET ORAL DAILY
Status: DISCONTINUED | OUTPATIENT
Start: 2020-06-09 | End: 2020-06-10 | Stop reason: HOSPADM

## 2020-06-09 RX ORDER — MONTELUKAST SODIUM 10 MG/1
10 TABLET ORAL NIGHTLY
Status: DISCONTINUED | OUTPATIENT
Start: 2020-06-09 | End: 2020-06-10 | Stop reason: HOSPADM

## 2020-06-09 RX ORDER — BUDESONIDE AND FORMOTEROL FUMARATE DIHYDRATE 160; 4.5 UG/1; UG/1
2 AEROSOL RESPIRATORY (INHALATION)
Status: DISCONTINUED | OUTPATIENT
Start: 2020-06-09 | End: 2020-06-10 | Stop reason: HOSPADM

## 2020-06-09 RX ORDER — HEPARIN SODIUM 1000 [USP'U]/ML
INJECTION, SOLUTION INTRAVENOUS; SUBCUTANEOUS AS NEEDED
Status: DISCONTINUED | OUTPATIENT
Start: 2020-06-09 | End: 2020-06-09 | Stop reason: SURG

## 2020-06-09 RX ORDER — TAMSULOSIN HYDROCHLORIDE 0.4 MG/1
0.4 CAPSULE ORAL NIGHTLY
Status: DISCONTINUED | OUTPATIENT
Start: 2020-06-09 | End: 2020-06-10 | Stop reason: HOSPADM

## 2020-06-09 RX ORDER — AMIODARONE HYDROCHLORIDE 200 MG/1
200 TABLET ORAL DAILY
Status: DISCONTINUED | OUTPATIENT
Start: 2020-06-09 | End: 2020-06-10 | Stop reason: HOSPADM

## 2020-06-09 RX ORDER — SODIUM CHLORIDE 9 MG/ML
50 INJECTION, SOLUTION INTRAVENOUS CONTINUOUS
Status: DISCONTINUED | OUTPATIENT
Start: 2020-06-09 | End: 2020-06-10 | Stop reason: HOSPADM

## 2020-06-09 RX ORDER — ROFLUMILAST 500 UG/1
500 TABLET ORAL DAILY
Status: DISCONTINUED | OUTPATIENT
Start: 2020-06-09 | End: 2020-06-10 | Stop reason: HOSPADM

## 2020-06-09 RX ORDER — PHENYLEPHRINE HCL IN 0.9% NACL 0.5 MG/5ML
.5-3 SYRINGE (ML) INTRAVENOUS
Status: DISCONTINUED | OUTPATIENT
Start: 2020-06-09 | End: 2020-06-09 | Stop reason: HOSPADM

## 2020-06-09 RX ORDER — PROMETHAZINE HYDROCHLORIDE 25 MG/ML
6.25 INJECTION, SOLUTION INTRAMUSCULAR; INTRAVENOUS ONCE AS NEEDED
Status: DISCONTINUED | OUTPATIENT
Start: 2020-06-09 | End: 2020-06-09 | Stop reason: HOSPADM

## 2020-06-09 RX ORDER — LIDOCAINE HYDROCHLORIDE 20 MG/ML
INJECTION, SOLUTION INFILTRATION; PERINEURAL AS NEEDED
Status: DISCONTINUED | OUTPATIENT
Start: 2020-06-09 | End: 2020-06-09 | Stop reason: HOSPADM

## 2020-06-09 RX ORDER — DEXAMETHASONE SODIUM PHOSPHATE 4 MG/ML
INJECTION, SOLUTION INTRA-ARTICULAR; INTRALESIONAL; INTRAMUSCULAR; INTRAVENOUS; SOFT TISSUE AS NEEDED
Status: DISCONTINUED | OUTPATIENT
Start: 2020-06-09 | End: 2020-06-09 | Stop reason: SURG

## 2020-06-09 RX ORDER — ETOMIDATE 2 MG/ML
INJECTION INTRAVENOUS AS NEEDED
Status: DISCONTINUED | OUTPATIENT
Start: 2020-06-09 | End: 2020-06-09 | Stop reason: SURG

## 2020-06-09 RX ORDER — LIDOCAINE HYDROCHLORIDE 20 MG/ML
INJECTION, SOLUTION EPIDURAL; INFILTRATION; INTRACAUDAL; PERINEURAL AS NEEDED
Status: DISCONTINUED | OUTPATIENT
Start: 2020-06-09 | End: 2020-06-09 | Stop reason: SURG

## 2020-06-09 RX ORDER — CLOPIDOGREL BISULFATE 75 MG/1
75 TABLET ORAL DAILY
Status: DISCONTINUED | OUTPATIENT
Start: 2020-06-09 | End: 2020-06-10 | Stop reason: HOSPADM

## 2020-06-09 RX ORDER — BUMETANIDE 1 MG/1
1 TABLET ORAL 3 TIMES DAILY
Status: DISCONTINUED | OUTPATIENT
Start: 2020-06-09 | End: 2020-06-10 | Stop reason: HOSPADM

## 2020-06-09 RX ORDER — POTASSIUM CHLORIDE 750 MG/1
10 TABLET, FILM COATED, EXTENDED RELEASE ORAL DAILY
Status: DISCONTINUED | OUTPATIENT
Start: 2020-06-10 | End: 2020-06-10 | Stop reason: HOSPADM

## 2020-06-09 RX ORDER — NALOXONE HCL 0.4 MG/ML
0.4 VIAL (ML) INJECTION
Status: DISCONTINUED | OUTPATIENT
Start: 2020-06-09 | End: 2020-06-10 | Stop reason: HOSPADM

## 2020-06-09 RX ORDER — PROMETHAZINE HYDROCHLORIDE 25 MG/1
25 SUPPOSITORY RECTAL ONCE AS NEEDED
Status: DISCONTINUED | OUTPATIENT
Start: 2020-06-09 | End: 2020-06-09 | Stop reason: HOSPADM

## 2020-06-09 RX ORDER — IPRATROPIUM BROMIDE AND ALBUTEROL SULFATE 2.5; .5 MG/3ML; MG/3ML
3 SOLUTION RESPIRATORY (INHALATION) ONCE AS NEEDED
Status: DISCONTINUED | OUTPATIENT
Start: 2020-06-09 | End: 2020-06-09 | Stop reason: HOSPADM

## 2020-06-09 RX ORDER — GABAPENTIN 300 MG/1
300 CAPSULE ORAL 2 TIMES DAILY
Status: DISCONTINUED | OUTPATIENT
Start: 2020-06-09 | End: 2020-06-10 | Stop reason: HOSPADM

## 2020-06-09 RX ORDER — MORPHINE SULFATE 4 MG/ML
1 INJECTION, SOLUTION INTRAMUSCULAR; INTRAVENOUS EVERY 4 HOURS PRN
Status: DISCONTINUED | OUTPATIENT
Start: 2020-06-09 | End: 2020-06-10 | Stop reason: HOSPADM

## 2020-06-09 RX ORDER — ACETAMINOPHEN 325 MG/1
650 TABLET ORAL ONCE AS NEEDED
Status: DISCONTINUED | OUTPATIENT
Start: 2020-06-09 | End: 2020-06-09 | Stop reason: HOSPADM

## 2020-06-09 RX ORDER — PROTAMINE SULFATE 10 MG/ML
INJECTION, SOLUTION INTRAVENOUS AS NEEDED
Status: DISCONTINUED | OUTPATIENT
Start: 2020-06-09 | End: 2020-06-09 | Stop reason: SURG

## 2020-06-09 RX ORDER — MORPHINE SULFATE 4 MG/ML
2 INJECTION, SOLUTION INTRAMUSCULAR; INTRAVENOUS
Status: DISCONTINUED | OUTPATIENT
Start: 2020-06-09 | End: 2020-06-09 | Stop reason: HOSPADM

## 2020-06-09 RX ORDER — ISOSORBIDE MONONITRATE 60 MG/1
60 TABLET, EXTENDED RELEASE ORAL
Status: DISCONTINUED | OUTPATIENT
Start: 2020-06-10 | End: 2020-06-10 | Stop reason: HOSPADM

## 2020-06-09 RX ORDER — KETAMINE HYDROCHLORIDE 10 MG/ML
INJECTION INTRAMUSCULAR; INTRAVENOUS AS NEEDED
Status: DISCONTINUED | OUTPATIENT
Start: 2020-06-09 | End: 2020-06-09 | Stop reason: SURG

## 2020-06-09 RX ORDER — MEPERIDINE HYDROCHLORIDE 25 MG/ML
12.5 INJECTION INTRAMUSCULAR; INTRAVENOUS; SUBCUTANEOUS
Status: DISCONTINUED | OUTPATIENT
Start: 2020-06-09 | End: 2020-06-09 | Stop reason: HOSPADM

## 2020-06-09 RX ORDER — RANOLAZINE 500 MG/1
1000 TABLET, EXTENDED RELEASE ORAL 2 TIMES DAILY
Status: DISCONTINUED | OUTPATIENT
Start: 2020-06-09 | End: 2020-06-10 | Stop reason: HOSPADM

## 2020-06-09 RX ORDER — ACETAMINOPHEN 650 MG/1
650 SUPPOSITORY RECTAL EVERY 4 HOURS PRN
Status: DISCONTINUED | OUTPATIENT
Start: 2020-06-09 | End: 2020-06-10 | Stop reason: HOSPADM

## 2020-06-09 RX ORDER — METOPROLOL SUCCINATE 50 MG/1
100 TABLET, EXTENDED RELEASE ORAL DAILY
Status: DISCONTINUED | OUTPATIENT
Start: 2020-06-09 | End: 2020-06-10 | Stop reason: HOSPADM

## 2020-06-09 RX ORDER — LABETALOL HYDROCHLORIDE 5 MG/ML
5 INJECTION, SOLUTION INTRAVENOUS
Status: DISCONTINUED | OUTPATIENT
Start: 2020-06-09 | End: 2020-06-09 | Stop reason: HOSPADM

## 2020-06-09 RX ORDER — ONDANSETRON 2 MG/ML
4 INJECTION INTRAMUSCULAR; INTRAVENOUS ONCE AS NEEDED
Status: DISCONTINUED | OUTPATIENT
Start: 2020-06-09 | End: 2020-06-09 | Stop reason: HOSPADM

## 2020-06-09 RX ORDER — ACETAMINOPHEN 650 MG/1
650 SUPPOSITORY RECTAL ONCE AS NEEDED
Status: DISCONTINUED | OUTPATIENT
Start: 2020-06-09 | End: 2020-06-09 | Stop reason: HOSPADM

## 2020-06-09 RX ORDER — PROMETHAZINE HYDROCHLORIDE 25 MG/1
25 TABLET ORAL ONCE AS NEEDED
Status: DISCONTINUED | OUTPATIENT
Start: 2020-06-09 | End: 2020-06-09 | Stop reason: HOSPADM

## 2020-06-09 RX ORDER — ACETAMINOPHEN 325 MG/1
650 TABLET ORAL EVERY 4 HOURS PRN
Status: DISCONTINUED | OUTPATIENT
Start: 2020-06-09 | End: 2020-06-10 | Stop reason: HOSPADM

## 2020-06-09 RX ORDER — DULOXETIN HYDROCHLORIDE 30 MG/1
60 CAPSULE, DELAYED RELEASE ORAL DAILY
Status: DISCONTINUED | OUTPATIENT
Start: 2020-06-09 | End: 2020-06-10 | Stop reason: HOSPADM

## 2020-06-09 RX ORDER — NITROGLYCERIN 0.4 MG/1
0.4 TABLET SUBLINGUAL
Status: DISCONTINUED | OUTPATIENT
Start: 2020-06-09 | End: 2020-06-10 | Stop reason: HOSPADM

## 2020-06-09 RX ORDER — IPRATROPIUM BROMIDE AND ALBUTEROL SULFATE 2.5; .5 MG/3ML; MG/3ML
3 SOLUTION RESPIRATORY (INHALATION) EVERY 4 HOURS PRN
Status: DISCONTINUED | OUTPATIENT
Start: 2020-06-09 | End: 2020-06-10 | Stop reason: HOSPADM

## 2020-06-09 RX ORDER — ALBUTEROL SULFATE 2.5 MG/3ML
2.5 SOLUTION RESPIRATORY (INHALATION) EVERY 6 HOURS PRN
Status: DISCONTINUED | OUTPATIENT
Start: 2020-06-09 | End: 2020-06-10 | Stop reason: HOSPADM

## 2020-06-09 RX ORDER — PHENYLEPHRINE HCL IN 0.9% NACL 0.5 MG/5ML
SYRINGE (ML) INTRAVENOUS AS NEEDED
Status: DISCONTINUED | OUTPATIENT
Start: 2020-06-09 | End: 2020-06-09 | Stop reason: SURG

## 2020-06-09 RX ORDER — HYDRALAZINE HYDROCHLORIDE 20 MG/ML
5 INJECTION INTRAMUSCULAR; INTRAVENOUS
Status: DISCONTINUED | OUTPATIENT
Start: 2020-06-09 | End: 2020-06-09 | Stop reason: HOSPADM

## 2020-06-09 RX ORDER — CETIRIZINE HYDROCHLORIDE 10 MG/1
10 TABLET ORAL DAILY
Status: DISCONTINUED | OUTPATIENT
Start: 2020-06-09 | End: 2020-06-10 | Stop reason: HOSPADM

## 2020-06-09 RX ORDER — MIDAZOLAM HYDROCHLORIDE 1 MG/ML
INJECTION INTRAMUSCULAR; INTRAVENOUS AS NEEDED
Status: DISCONTINUED | OUTPATIENT
Start: 2020-06-09 | End: 2020-06-09 | Stop reason: SURG

## 2020-06-09 RX ORDER — FENTANYL CITRATE 50 UG/ML
INJECTION, SOLUTION INTRAMUSCULAR; INTRAVENOUS AS NEEDED
Status: DISCONTINUED | OUTPATIENT
Start: 2020-06-09 | End: 2020-06-09 | Stop reason: SURG

## 2020-06-09 RX ADMIN — CEFAZOLIN SODIUM 2 G: 1 INJECTION, POWDER, FOR SOLUTION INTRAMUSCULAR; INTRAVENOUS at 11:36

## 2020-06-09 RX ADMIN — RANOLAZINE 1000 MG: 500 TABLET, FILM COATED, EXTENDED RELEASE ORAL at 22:15

## 2020-06-09 RX ADMIN — ETOMIDATE 14 MG: 2 INJECTION, SOLUTION INTRAVENOUS at 11:21

## 2020-06-09 RX ADMIN — SODIUM CHLORIDE 50 ML/HR: 900 INJECTION, SOLUTION INTRAVENOUS at 09:46

## 2020-06-09 RX ADMIN — DULOXETINE HYDROCHLORIDE 60 MG: 30 CAPSULE, DELAYED RELEASE ORAL at 17:25

## 2020-06-09 RX ADMIN — ONDANSETRON 4 MG: 2 INJECTION INTRAMUSCULAR; INTRAVENOUS at 13:25

## 2020-06-09 RX ADMIN — HEPARIN SODIUM 4000 UNITS: 1000 INJECTION INTRAVENOUS; SUBCUTANEOUS at 12:34

## 2020-06-09 RX ADMIN — BUMETANIDE 1 MG: 1 TABLET ORAL at 22:16

## 2020-06-09 RX ADMIN — FENTANYL CITRATE 50 MCG: 50 INJECTION, SOLUTION INTRAMUSCULAR; INTRAVENOUS at 11:53

## 2020-06-09 RX ADMIN — FENTANYL CITRATE 50 MCG: 50 INJECTION, SOLUTION INTRAMUSCULAR; INTRAVENOUS at 11:16

## 2020-06-09 RX ADMIN — FENTANYL CITRATE 50 MCG: 50 INJECTION, SOLUTION INTRAMUSCULAR; INTRAVENOUS at 12:54

## 2020-06-09 RX ADMIN — ROCURONIUM BROMIDE 50 MG: 10 INJECTION, SOLUTION INTRAVENOUS at 11:21

## 2020-06-09 RX ADMIN — SUGAMMADEX 200 MG: 100 INJECTION, SOLUTION INTRAVENOUS at 13:44

## 2020-06-09 RX ADMIN — FENTANYL CITRATE 50 MCG: 50 INJECTION, SOLUTION INTRAMUSCULAR; INTRAVENOUS at 11:46

## 2020-06-09 RX ADMIN — IPRATROPIUM BROMIDE AND ALBUTEROL SULFATE 3 ML: .5; 3 SOLUTION RESPIRATORY (INHALATION) at 10:00

## 2020-06-09 RX ADMIN — CETIRIZINE HYDROCHLORIDE 10 MG: 10 TABLET, FILM COATED ORAL at 17:24

## 2020-06-09 RX ADMIN — CLOPIDOGREL BISULFATE 75 MG: 75 TABLET ORAL at 17:25

## 2020-06-09 RX ADMIN — MIDAZOLAM 1 MG: 1 INJECTION INTRAMUSCULAR; INTRAVENOUS at 11:28

## 2020-06-09 RX ADMIN — ACETAMINOPHEN 650 MG: 325 TABLET, FILM COATED ORAL at 19:57

## 2020-06-09 RX ADMIN — ATORVASTATIN CALCIUM 80 MG: 40 TABLET, FILM COATED ORAL at 22:16

## 2020-06-09 RX ADMIN — MIDAZOLAM 1 MG: 1 INJECTION INTRAMUSCULAR; INTRAVENOUS at 11:16

## 2020-06-09 RX ADMIN — LIDOCAINE HYDROCHLORIDE 50 MG: 20 INJECTION, SOLUTION EPIDURAL; INFILTRATION; INTRACAUDAL; PERINEURAL at 11:21

## 2020-06-09 RX ADMIN — TAMSULOSIN HYDROCHLORIDE 0.4 MG: 0.4 CAPSULE ORAL at 22:15

## 2020-06-09 RX ADMIN — KETAMINE HYDROCHLORIDE 30 MG: 10 INJECTION INTRAMUSCULAR; INTRAVENOUS at 11:16

## 2020-06-09 RX ADMIN — PROTAMINE SULFATE 100 MG: 10 INJECTION, SOLUTION INTRAVENOUS at 11:36

## 2020-06-09 RX ADMIN — PHENYLEPHRINE HYDROCHLORIDE 0.3 MCG: 10 INJECTION INTRAVENOUS at 12:12

## 2020-06-09 RX ADMIN — KETAMINE HYDROCHLORIDE 20 MG: 10 INJECTION INTRAMUSCULAR; INTRAVENOUS at 12:06

## 2020-06-09 RX ADMIN — METOPROLOL SUCCINATE 100 MG: 50 TABLET, EXTENDED RELEASE ORAL at 17:25

## 2020-06-09 RX ADMIN — HEPARIN SODIUM 2000 UNITS: 1000 INJECTION INTRAVENOUS; SUBCUTANEOUS at 12:52

## 2020-06-09 RX ADMIN — LISINOPRIL 5 MG: 5 TABLET ORAL at 17:26

## 2020-06-09 RX ADMIN — ROFLUMILAST 500 MCG: 500 TABLET ORAL at 18:46

## 2020-06-09 RX ADMIN — HEPARIN SODIUM 10000 UNITS: 1000 INJECTION INTRAVENOUS; SUBCUTANEOUS at 12:03

## 2020-06-09 RX ADMIN — DEXAMETHASONE SODIUM PHOSPHATE 4 MG: 4 INJECTION, SOLUTION INTRAMUSCULAR; INTRAVENOUS at 11:35

## 2020-06-09 RX ADMIN — HEPARIN SODIUM 5000 UNITS: 1000 INJECTION INTRAVENOUS; SUBCUTANEOUS at 12:18

## 2020-06-09 RX ADMIN — MONTELUKAST SODIUM 10 MG: 10 TABLET, COATED ORAL at 22:15

## 2020-06-09 RX ADMIN — AMIODARONE HYDROCHLORIDE 200 MG: 200 TABLET ORAL at 17:25

## 2020-06-09 RX ADMIN — ONDANSETRON 4 MG: 2 INJECTION INTRAMUSCULAR; INTRAVENOUS at 09:59

## 2020-06-09 RX ADMIN — GABAPENTIN 300 MG: 300 CAPSULE ORAL at 22:16

## 2020-06-09 RX ADMIN — PHENYLEPHRINE HYDROCHLORIDE 200 MCG: 10 INJECTION INTRAVENOUS at 12:06

## 2020-06-09 RX ADMIN — BUDESONIDE AND FORMOTEROL FUMARATE DIHYDRATE 2 PUFF: 160; 4.5 AEROSOL RESPIRATORY (INHALATION) at 21:56

## 2020-06-09 NOTE — ANESTHESIA PREPROCEDURE EVALUATION
Anesthesia Evaluation     Patient summary reviewed and Nursing notes reviewed   NPO Solid Status: > 8 hours  NPO Liquid Status: > 8 hours           Airway   Mallampati: II  TM distance: >3 FB  Neck ROM: full  No difficulty expected  Dental - normal exam     Pulmonary - normal exam   (+) COPD, asthma,sleep apnea,   Cardiovascular - normal exam    (+) hypertension, past MI  >12 months, CAD, cardiac stents more than 12 months ago dysrhythmias Atrial Fib, CHF Systolic <55%, hyperlipidemia,       Neuro/Psych  (+) psychiatric history Depression and Anxiety,     GI/Hepatic/Renal/Endo    (+) morbid obesity, GERD,      Musculoskeletal (-) negative ROS    Abdominal  - normal exam    Bowel sounds: normal.   Substance History - negative use     OB/GYN negative ob/gyn ROS         Other                        Anesthesia Plan    ASA 4     general     intravenous induction     Anesthetic plan, all risks, benefits, and alternatives have been provided, discussed and informed consent has been obtained with: patient.    Plan discussed with CRNA.

## 2020-06-09 NOTE — ANESTHESIA PROCEDURE NOTES
Airway  Urgency: elective    Date/Time: 6/9/2020 11:23 AM  Difficult airway    General Information and Staff    Patient location during procedure: OR    Indications and Patient Condition  Indications for airway management: airway protection    Preoxygenated: yes  MILS maintained throughout  Mask difficulty assessment: 1 - vent by mask    Final Airway Details  Final airway type: endotracheal airway      Successful airway: ETT  Cuffed: yes   Successful intubation technique: direct laryngoscopy  Endotracheal tube insertion site: oral  Blade: Hanna  Blade size: 4  ETT size (mm): 7.5  Cormack-Lehane Classification: grade IIa - partial view of glottis  Placement verified by: chest auscultation and capnometry   Measured from: lips  ETT/EBT  to lips (cm): 22  Number of attempts at approach: 1  Assessment: lips, teeth, and gum same as pre-op and atraumatic intubation

## 2020-06-09 NOTE — ANESTHESIA POSTPROCEDURE EVALUATION
Patient: Jose Dixon Jr.    Procedure Summary     Date:  06/09/20 Room / Location:  [place] CATH LAB 3 / [facility] CATH INVASIVE LOCATION    Anesthesia Start:  1114 Anesthesia Stop:  1357    Procedures:       EP/Ablation (N/A )      Coronary angiography (N/A ) Diagnosis:       VT (ventricular tachycardia) (CMS/HCC)      Ischemic cardiomyopathy      Chronic systolic heart failure (CMS/HCC)      Permanent atrial fibrillation (CMS/HCC)      AV block, complete (CMS/HCC)      Presence of biventricular implantable cardioverter-defibrillator (ICD)      (Post VT ablation without any complications with post ablation coronary geography as dictated)    Provider:  Jose Lopez MD Provider:  Gino Gilmore MD    Anesthesia Type:  general ASA Status:  4          Anesthesia Type: general    Vitals  Vitals Value Taken Time   /69 6/9/2020  2:42 PM   Temp 97.8 °F (36.6 °C) 6/9/2020  2:42 PM   Pulse 70 6/9/2020  2:42 PM   Resp 15 6/9/2020  2:42 PM   SpO2 96 % 6/9/2020  2:42 PM           Post Anesthesia Care and Evaluation    Patient location during evaluation: PACU  Patient participation: complete - patient participated  Level of consciousness: awake  Pain scale: See nurse's notes for pain score.  Pain management: adequate  Airway patency: patent  Anesthetic complications: No anesthetic complications  PONV Status: none  Cardiovascular status: acceptable  Respiratory status: acceptable  Hydration status: acceptable    Comments: Patient seen and examined postoperatively; vital signs stable; SpO2 greater than or equal to 90%; cardiopulmonary status stable; nausea/vomiting adequately controlled; pain adequately controlled; no apparent anesthesia complications; patient discharged from anesthesia care when discharge criteria were met

## 2020-06-09 NOTE — PROGRESS NOTES
Discharge Planning Assessment  [facility]     Patient Name: Jose Dixon Jr.  MRN: [id number]  Today's Date: 6/9/2020    Admit Date: 6/9/2020    Discharge Needs Assessment     Row Name 06/09/20 1611       Living Environment    Lives With  spouse    Name(s) of Who Lives With Patient  Ruma     Current Living Arrangements  home/apartment/condo    Primary Care Provided by  self    Provides Primary Care For  no one    Family Caregiver if Needed  spouse    Family Caregiver Names  Ruma     Quality of Family Relationships  supportive;involved    Able to Return to Prior Arrangements  yes       Resource/Environmental Concerns    Resource/Environmental Concerns  none    Transportation Concerns  car, none       Transition Planning    Patient/Family Anticipates Transition to  home with family    Patient/Family Anticipated Services at Transition  none    Transportation Anticipated  family or friend will provide       Discharge Needs Assessment    Readmission Within the Last 30 Days  planned readmission    Concerns to be Addressed  denies needs/concerns at this time    Equipment Currently Used at Home  other (see comments);cane, straight;oxygen Trilogy machine - pt unsure of which company it is through    Anticipated Changes Related to Illness  none    Equipment Needed After Discharge  none        Discharge Plan     Row Name 06/09/20 1612       Plan    Plan  Anticipate routine home     Patient/Family in Agreement with Plan  yes    Plan Comments  Due to Covid 19, CM working off site.  Spoke to patient via telephone.  Patient stsates he lives with spouse.  He reports that he shares the driving with his spouse.  Denies any issues obtaining medications.  Reports he has home o2 and a trilogy machine although he cannot remember through which company.  Denies any needs at this time.  [facility]P: [facility] [place]           Demographic Summary     Row Name 06/09/20 1610       General Information    Admission Type  observation     Arrived From  home    Referral Source  case finding    Reason for Consult  discharge planning     Used During This Interaction  no        Functional Status     Row Name 06/09/20 1610       Functional Status    Usual Activity Tolerance  good    Current Activity Tolerance  good       Functional Status, IADL    Medications  independent    Meal Preparation  independent    Housekeeping  independent    Laundry  independent    Shopping  independent       Mental Status Summary    Recent Changes in Mental Status/Cognitive Functioning  no changes        Rina Gilmore,RN    /[place] [address]    [phone] office  [phone] fax  [email or web address]  [email or web address]    Hospital NPI:   Hospital Tax ID: [id number]

## 2020-06-10 VITALS
TEMPERATURE: 97.8 F | RESPIRATION RATE: 15 BRPM | HEART RATE: 76 BPM | DIASTOLIC BLOOD PRESSURE: 72 MMHG | BODY MASS INDEX: 45.81 KG/M2 | HEIGHT: 69 IN | OXYGEN SATURATION: 91 % | WEIGHT: 309.31 LBS | SYSTOLIC BLOOD PRESSURE: 114 MMHG

## 2020-06-10 LAB
ANION GAP SERPL CALCULATED.3IONS-SCNC: 8 MMOL/L (ref 5–15)
BUN BLD-MCNC: 16 MG/DL (ref 6–20)
BUN BLD-MCNC: ABNORMAL MG/DL
BUN/CREAT SERPL: ABNORMAL
CALCIUM SPEC-SCNC: 8.6 MG/DL (ref 8.6–10.5)
CHLORIDE SERPL-SCNC: 102 MMOL/L (ref 98–107)
CO2 SERPL-SCNC: 29 MMOL/L (ref 22–29)
CREAT BLD-MCNC: 0.86 MG/DL (ref 0.76–1.27)
GFR SERPL CREATININE-BSD FRML MDRD: 92 ML/MIN/1.73
GLUCOSE BLD-MCNC: 114 MG/DL (ref 65–99)
POTASSIUM BLD-SCNC: 4.8 MMOL/L (ref 3.5–5.2)
SODIUM BLD-SCNC: 139 MMOL/L (ref 136–145)

## 2020-06-10 PROCEDURE — 94799 UNLISTED PULMONARY SVC/PX: CPT

## 2020-06-10 PROCEDURE — 93005 ELECTROCARDIOGRAM TRACING: CPT | Performed by: INTERNAL MEDICINE

## 2020-06-10 PROCEDURE — G0378 HOSPITAL OBSERVATION PER HR: HCPCS

## 2020-06-10 PROCEDURE — 80048 BASIC METABOLIC PNL TOTAL CA: CPT | Performed by: INTERNAL MEDICINE

## 2020-06-10 PROCEDURE — 94640 AIRWAY INHALATION TREATMENT: CPT

## 2020-06-10 PROCEDURE — 99217 PR OBSERVATION CARE DISCHARGE MANAGEMENT: CPT | Performed by: INTERNAL MEDICINE

## 2020-06-10 RX ADMIN — APIXABAN 5 MG: 5 TABLET, FILM COATED ORAL at 07:52

## 2020-06-10 RX ADMIN — ROFLUMILAST 500 MCG: 500 TABLET ORAL at 07:50

## 2020-06-10 RX ADMIN — CETIRIZINE HYDROCHLORIDE 10 MG: 10 TABLET, FILM COATED ORAL at 07:51

## 2020-06-10 RX ADMIN — HYDROCHLOROTHIAZIDE 25 MG: 25 TABLET ORAL at 07:51

## 2020-06-10 RX ADMIN — BUMETANIDE 1 MG: 1 TABLET ORAL at 07:53

## 2020-06-10 RX ADMIN — GABAPENTIN 300 MG: 300 CAPSULE ORAL at 07:52

## 2020-06-10 RX ADMIN — DULOXETINE HYDROCHLORIDE 60 MG: 30 CAPSULE, DELAYED RELEASE ORAL at 07:50

## 2020-06-10 RX ADMIN — ISOSORBIDE MONONITRATE 60 MG: 60 TABLET, EXTENDED RELEASE ORAL at 07:52

## 2020-06-10 RX ADMIN — METOPROLOL SUCCINATE 100 MG: 50 TABLET, EXTENDED RELEASE ORAL at 07:53

## 2020-06-10 RX ADMIN — BUDESONIDE AND FORMOTEROL FUMARATE DIHYDRATE 2 PUFF: 160; 4.5 AEROSOL RESPIRATORY (INHALATION) at 07:14

## 2020-06-10 RX ADMIN — LISINOPRIL 5 MG: 5 TABLET ORAL at 07:53

## 2020-06-10 RX ADMIN — AMIODARONE HYDROCHLORIDE 200 MG: 200 TABLET ORAL at 07:51

## 2020-06-10 RX ADMIN — POTASSIUM CHLORIDE 10 MEQ: 750 TABLET, EXTENDED RELEASE ORAL at 07:51

## 2020-06-10 RX ADMIN — RANOLAZINE 1000 MG: 500 TABLET, FILM COATED, EXTENDED RELEASE ORAL at 07:50

## 2020-06-10 NOTE — PLAN OF CARE
MD in to see pt, Discharged home. Pt doing well, 0 s/s bleeding, hematoma.  0 c/o pain.  Discharge education complete.

## 2020-06-10 NOTE — OUTREACH NOTE
CHF Week 1 Survey      Responses   [facility] patient discharged from?  [facility]   Does the patient have one of the following disease processes/diagnoses(primary or secondary)?  CHF   Is there a successful TCM telephone encounter documented?  No   CHF Week 1 attempt successful?  No   Revoke  Readmitted          Rina Hurst RN

## 2020-06-10 NOTE — DISCHARGE SUMMARY
Admitting diagnosis  Recurrent symptomatic VT  Dilated cardiomyopathy  Associated coronary artery disease  Chronic class III systolic heart failure  Morbid obesity  Essential hypertension  Chronic angina  Biventricular ICD in situ  Permanent atrial fibrillation status post AV node ablation       discharging diagnosis  Post VT ablation without complications and progressive coronary artery disease in the ramus artery       procedures done--VT ablation     condition at discharge  Fair    Physical Exam  VITALS REVIEWED--blood pressure 116/70 pulse rate is 70 patient afebrile respiration 12 times a minute    General:      well developed, well nourished, in no acute distress.    Head:      normocephalic and atraumatic.    Eyes:      PERRL/EOM intact, conjunctiva and sclera clear with out nystagmus.    Neck:      no masses, thyromegaly,  trachea central with normal respiratory effort and PMI displaced laterally  Lungs:      clear bilaterally to auscultation.    Heart:       Paced rhythm without any gallops and right groin is soft without hematoma   Msk:      no deformity or scoliosis noted of thoracic or lumbar spine.    Pulses:      pulses normal in all 4 extremities.    Extremities:       no cyanosis or clubbing--trace left pedal edema and trace right pedal edema.    Neurologic:      no focal deficits.   alert oriented x3  Skin:      intact without lesions or rashes.    Psych:      alert and cooperative; normal mood and affect; normal attention span and concentration.       Hospital course--  Patient had multiple episodes of VT with a prior unsuccessful VT ablation in [place] and came for repeat EP study and patient was found to have significant scarring in the outflow track in the basal left ventricle which was ablated and during the ablation langiography was performed which revealed ramus artery stenosis up to 80% in its proximal portion and the films were reviewed with interventional cardiologist.  Overnight  observation was done patient did well without any postprocedure complications and was sitting up in the chair without any angina or shortness of breath  Outcome of the ablation and angiography findings educated the patient and patient will be followed in the office and further follow-up with interventional cardiologist will be made as an outpatient and medications reviewed with the patient and nurse     medications and allergies reviewed     post procedure instructions given     discharge care discussed with the nurse and the patient     post procedure appointments made    Electronically signed by Jose Lopez MD, 06/10/20, 8:17 AM.

## 2020-06-12 ENCOUNTER — PREP FOR SURGERY (OUTPATIENT)
Dept: OTHER | Facility: HOSPITAL | Age: 60
End: 2020-06-12

## 2020-06-12 ENCOUNTER — HOSPITAL ENCOUNTER (OUTPATIENT)
Facility: HOSPITAL | Age: 60
Setting detail: SURGERY ADMIT
End: 2020-06-12
Attending: THORACIC SURGERY (CARDIOTHORACIC VASCULAR SURGERY) | Admitting: THORACIC SURGERY (CARDIOTHORACIC VASCULAR SURGERY)

## 2020-06-12 ENCOUNTER — TELEPHONE (OUTPATIENT)
Dept: CARDIOLOGY | Facility: CLINIC | Age: 60
End: 2020-06-12

## 2020-06-12 DIAGNOSIS — I42.9 CARDIOMYOPATHY (HCC): Primary | ICD-10-CM

## 2020-06-12 RX ORDER — CHLORHEXIDINE GLUCONATE 0.12 MG/ML
15 RINSE ORAL ONCE
Status: CANCELLED | OUTPATIENT
Start: 2020-06-12 | End: 2020-06-12

## 2020-06-12 RX ORDER — CHLORHEXIDINE GLUCONATE 0.12 MG/ML
15 RINSE ORAL EVERY 12 HOURS
Status: CANCELLED | OUTPATIENT
Start: 2020-06-12 | End: 2020-06-13

## 2020-06-12 RX ORDER — CHLORHEXIDINE GLUCONATE 500 MG/1
1 CLOTH TOPICAL EVERY 12 HOURS PRN
Status: CANCELLED | OUTPATIENT
Start: 2020-06-12

## 2020-06-12 RX ORDER — CEFAZOLIN SODIUM IN 0.9 % NACL 3 G/100 ML
2 INTRAVENOUS SOLUTION, PIGGYBACK (ML) INTRAVENOUS
Status: CANCELLED | OUTPATIENT
Start: 2020-06-13 | End: 2020-06-14

## 2020-06-12 NOTE — TELEPHONE ENCOUNTER
Spoke with patient and gave him all info re: surgery being on 6/22/20 and preadmit on 6/21 to [facility].  Was also told to stop taking his eliquis and plavix  6/17.  Pt expressed verbal unstanding and was told to call with any other questions.

## 2020-06-15 PROCEDURE — 93010 ELECTROCARDIOGRAM REPORT: CPT | Performed by: INTERNAL MEDICINE

## 2020-06-16 RX ORDER — ALBUTEROL SULFATE 90 UG/1
AEROSOL, METERED RESPIRATORY (INHALATION)
Qty: 18 G | OUTPATIENT
Start: 2020-06-16

## 2020-06-19 ENCOUNTER — APPOINTMENT (OUTPATIENT)
Dept: GENERAL RADIOLOGY | Facility: HOSPITAL | Age: 60
End: 2020-06-19

## 2020-06-19 ENCOUNTER — ANESTHESIA EVENT (OUTPATIENT)
Dept: PERIOP | Facility: HOSPITAL | Age: 60
End: 2020-06-19

## 2020-06-19 ENCOUNTER — LAB (OUTPATIENT)
Dept: LAB | Facility: HOSPITAL | Age: 60
End: 2020-06-19

## 2020-06-19 ENCOUNTER — HOSPITAL ENCOUNTER (OUTPATIENT)
Dept: CARDIOLOGY | Facility: HOSPITAL | Age: 60
Discharge: HOME | End: 2020-06-19
Admitting: NURSE PRACTITIONER

## 2020-06-19 ENCOUNTER — HOSPITAL ENCOUNTER (OUTPATIENT)
Dept: GENERAL RADIOLOGY | Facility: HOSPITAL | Age: 60
Discharge: HOME | End: 2020-06-19
Admitting: NURSE PRACTITIONER

## 2020-06-19 ENCOUNTER — APPOINTMENT (OUTPATIENT)
Dept: CARDIOLOGY | Facility: HOSPITAL | Age: 60
End: 2020-06-19

## 2020-06-19 DIAGNOSIS — I42.9 CARDIOMYOPATHY (HCC): ICD-10-CM

## 2020-06-19 DIAGNOSIS — Z01.818 PRE-OP TESTING: Primary | ICD-10-CM

## 2020-06-19 LAB — MRSA DNA SPEC QL NAA+PROBE: NORMAL

## 2020-06-19 PROCEDURE — C9803 HOPD COVID-19 SPEC COLLECT: HCPCS

## 2020-06-19 PROCEDURE — U0002 COVID-19 LAB TEST NON-CDC: HCPCS

## 2020-06-19 PROCEDURE — 87641 MR-STAPH DNA AMP PROBE: CPT

## 2020-06-19 RX ORDER — SODIUM CHLORIDE 0.9 % (FLUSH) 0.9 %
10 SYRINGE (ML) INJECTION EVERY 12 HOURS SCHEDULED
Status: CANCELLED | OUTPATIENT
Start: 2020-06-19

## 2020-06-19 RX ORDER — SODIUM CHLORIDE 0.9 % (FLUSH) 0.9 %
10 SYRINGE (ML) INJECTION AS NEEDED
Status: CANCELLED | OUTPATIENT
Start: 2020-06-19

## 2020-06-19 RX ORDER — SODIUM CHLORIDE 9 MG/ML
9 INJECTION, SOLUTION INTRAVENOUS CONTINUOUS PRN
Status: CANCELLED | OUTPATIENT
Start: 2020-06-19

## 2020-06-20 LAB
REF LAB TEST METHOD: NORMAL
SARS-COV-2 RNA RESP QL NAA+PROBE: NOT DETECTED

## 2020-06-21 ENCOUNTER — HOSPITAL ENCOUNTER (INPATIENT)
Facility: HOSPITAL | Age: 60
LOS: 8 days | Discharge: HOME HEALTH | End: 2020-06-29
Attending: THORACIC SURGERY (CARDIOTHORACIC VASCULAR SURGERY) | Admitting: THORACIC SURGERY (CARDIOTHORACIC VASCULAR SURGERY)

## 2020-06-21 DIAGNOSIS — J96.22 ACUTE ON CHRONIC RESPIRATORY FAILURE WITH HYPOXIA AND HYPERCAPNIA (HCC): Primary | ICD-10-CM

## 2020-06-21 DIAGNOSIS — J96.21 ACUTE ON CHRONIC RESPIRATORY FAILURE WITH HYPOXIA AND HYPERCAPNIA (HCC): Primary | ICD-10-CM

## 2020-06-21 DIAGNOSIS — I42.0 CARDIOMYOPATHY, DILATED (HCC): Chronic | ICD-10-CM

## 2020-06-21 DIAGNOSIS — Z95.0 S/P BIVENTRICULAR CARDIAC PACEMAKER PROCEDURE: ICD-10-CM

## 2020-06-21 DIAGNOSIS — I25.119 CORONARY ARTERY DISEASE INVOLVING NATIVE CORONARY ARTERY OF NATIVE HEART WITH ANGINA PECTORIS (HCC): Chronic | ICD-10-CM

## 2020-06-21 DIAGNOSIS — I42.9 CARDIOMYOPATHY (HCC): ICD-10-CM

## 2020-06-21 LAB
ABO GROUP BLD: NORMAL
ANION GAP SERPL CALCULATED.3IONS-SCNC: 9 MMOL/L (ref 5–15)
APTT PPP: 22.5 SECONDS (ref 24–31)
BASOPHILS # BLD AUTO: 0.1 10*3/MM3 (ref 0–0.2)
BASOPHILS NFR BLD AUTO: 1.2 % (ref 0–1.5)
BILIRUB UR QL STRIP: NEGATIVE
BLD GP AB SCN SERPL QL: NEGATIVE
BUN BLD-MCNC: 18 MG/DL (ref 6–20)
BUN BLD-MCNC: ABNORMAL MG/DL
BUN/CREAT SERPL: ABNORMAL
CALCIUM SPEC-SCNC: 8.9 MG/DL (ref 8.6–10.5)
CHLORIDE SERPL-SCNC: 101 MMOL/L (ref 98–107)
CLARITY UR: CLEAR
CLOSE TME COLL+ADP + EPINEP PNL BLD: 98 % (ref 86–100)
CO2 SERPL-SCNC: 32 MMOL/L (ref 22–29)
COLOR UR: YELLOW
CREAT BLD-MCNC: 0.99 MG/DL (ref 0.76–1.27)
DEPRECATED RDW RBC AUTO: 46.8 FL (ref 37–54)
EOSINOPHIL # BLD AUTO: 0.2 10*3/MM3 (ref 0–0.4)
EOSINOPHIL NFR BLD AUTO: 2.2 % (ref 0.3–6.2)
ERYTHROCYTE [DISTWIDTH] IN BLOOD BY AUTOMATED COUNT: 15.6 % (ref 12.3–15.4)
GFR SERPL CREATININE-BSD FRML MDRD: 78 ML/MIN/1.73
GLUCOSE BLD-MCNC: 111 MG/DL (ref 65–99)
GLUCOSE UR STRIP-MCNC: NEGATIVE MG/DL
HCT VFR BLD AUTO: 44.2 % (ref 37.5–51)
HGB BLD-MCNC: 14.7 G/DL (ref 13–17.7)
HGB UR QL STRIP.AUTO: NEGATIVE
INR PPP: 1.06 (ref 0.9–1.1)
KETONES UR QL STRIP: NEGATIVE
LEUKOCYTE ESTERASE UR QL STRIP.AUTO: NEGATIVE
LYMPHOCYTES # BLD AUTO: 1.7 10*3/MM3 (ref 0.7–3.1)
LYMPHOCYTES NFR BLD AUTO: 15.9 % (ref 19.6–45.3)
MCH RBC QN AUTO: 28.2 PG (ref 26.6–33)
MCHC RBC AUTO-ENTMCNC: 33.2 G/DL (ref 31.5–35.7)
MCV RBC AUTO: 84.9 FL (ref 79–97)
MONOCYTES # BLD AUTO: 1.2 10*3/MM3 (ref 0.1–0.9)
MONOCYTES NFR BLD AUTO: 10.7 % (ref 5–12)
NEUTROPHILS # BLD AUTO: 7.6 10*3/MM3 (ref 1.7–7)
NEUTROPHILS NFR BLD AUTO: 70 % (ref 42.7–76)
NITRITE UR QL STRIP: NEGATIVE
NRBC BLD AUTO-RTO: 0.1 /100 WBC (ref 0–0.2)
PH UR STRIP.AUTO: 5.5 [PH] (ref 5–8)
PLATELET # BLD AUTO: 291 10*3/MM3 (ref 140–450)
PMV BLD AUTO: 8.4 FL (ref 6–12)
POTASSIUM BLD-SCNC: 5 MMOL/L (ref 3.5–5.2)
PROT UR QL STRIP: NEGATIVE
PROTHROMBIN TIME: 11 SECONDS (ref 9.6–11.7)
RBC # BLD AUTO: 5.2 10*6/MM3 (ref 4.14–5.8)
RH BLD: NEGATIVE
SODIUM BLD-SCNC: 142 MMOL/L (ref 136–145)
SP GR UR STRIP: 1.02 (ref 1–1.03)
T&S EXPIRATION DATE: NORMAL
UROBILINOGEN UR QL STRIP: NORMAL
WBC NRBC COR # BLD: 10.9 10*3/MM3 (ref 3.4–10.8)

## 2020-06-21 PROCEDURE — 81003 URINALYSIS AUTO W/O SCOPE: CPT | Performed by: THORACIC SURGERY (CARDIOTHORACIC VASCULAR SURGERY)

## 2020-06-21 PROCEDURE — 86900 BLOOD TYPING SEROLOGIC ABO: CPT | Performed by: THORACIC SURGERY (CARDIOTHORACIC VASCULAR SURGERY)

## 2020-06-21 PROCEDURE — 85576 BLOOD PLATELET AGGREGATION: CPT | Performed by: THORACIC SURGERY (CARDIOTHORACIC VASCULAR SURGERY)

## 2020-06-21 PROCEDURE — 85025 COMPLETE CBC W/AUTO DIFF WBC: CPT | Performed by: THORACIC SURGERY (CARDIOTHORACIC VASCULAR SURGERY)

## 2020-06-21 PROCEDURE — 93010 ELECTROCARDIOGRAM REPORT: CPT | Performed by: INTERNAL MEDICINE

## 2020-06-21 PROCEDURE — 86901 BLOOD TYPING SEROLOGIC RH(D): CPT | Performed by: THORACIC SURGERY (CARDIOTHORACIC VASCULAR SURGERY)

## 2020-06-21 PROCEDURE — 93005 ELECTROCARDIOGRAM TRACING: CPT | Performed by: THORACIC SURGERY (CARDIOTHORACIC VASCULAR SURGERY)

## 2020-06-21 PROCEDURE — 86850 RBC ANTIBODY SCREEN: CPT | Performed by: THORACIC SURGERY (CARDIOTHORACIC VASCULAR SURGERY)

## 2020-06-21 PROCEDURE — 86901 BLOOD TYPING SEROLOGIC RH(D): CPT

## 2020-06-21 PROCEDURE — 85610 PROTHROMBIN TIME: CPT | Performed by: THORACIC SURGERY (CARDIOTHORACIC VASCULAR SURGERY)

## 2020-06-21 PROCEDURE — 85730 THROMBOPLASTIN TIME PARTIAL: CPT | Performed by: THORACIC SURGERY (CARDIOTHORACIC VASCULAR SURGERY)

## 2020-06-21 PROCEDURE — 80048 BASIC METABOLIC PNL TOTAL CA: CPT | Performed by: THORACIC SURGERY (CARDIOTHORACIC VASCULAR SURGERY)

## 2020-06-21 PROCEDURE — 86900 BLOOD TYPING SEROLOGIC ABO: CPT

## 2020-06-21 RX ORDER — CHLORHEXIDINE GLUCONATE 500 MG/1
1 CLOTH TOPICAL EVERY 12 HOURS PRN
Status: DISCONTINUED | OUTPATIENT
Start: 2020-06-21 | End: 2020-06-22

## 2020-06-21 RX ORDER — CEFAZOLIN SODIUM IN 0.9 % NACL 3 G/100 ML
2 INTRAVENOUS SOLUTION, PIGGYBACK (ML) INTRAVENOUS
Status: DISCONTINUED | OUTPATIENT
Start: 2020-06-22 | End: 2020-06-22

## 2020-06-21 RX ORDER — HYDROCHLOROTHIAZIDE 25 MG/1
25 TABLET ORAL DAILY
Status: DISCONTINUED | OUTPATIENT
Start: 2020-06-22 | End: 2020-06-23

## 2020-06-21 RX ORDER — CHOLECALCIFEROL (VITAMIN D3) 125 MCG
5 CAPSULE ORAL NIGHTLY PRN
Status: DISCONTINUED | OUTPATIENT
Start: 2020-06-21 | End: 2020-06-22

## 2020-06-21 RX ORDER — POTASSIUM CHLORIDE 750 MG/1
10 TABLET, FILM COATED, EXTENDED RELEASE ORAL DAILY
Status: DISCONTINUED | OUTPATIENT
Start: 2020-06-22 | End: 2020-06-23

## 2020-06-21 RX ORDER — CHLORHEXIDINE GLUCONATE 0.12 MG/ML
15 RINSE ORAL EVERY 12 HOURS
Status: COMPLETED | OUTPATIENT
Start: 2020-06-21 | End: 2020-06-22

## 2020-06-21 RX ORDER — SODIUM CHLORIDE 0.9 % (FLUSH) 0.9 %
10 SYRINGE (ML) INJECTION EVERY 12 HOURS SCHEDULED
Status: DISCONTINUED | OUTPATIENT
Start: 2020-06-21 | End: 2020-06-22 | Stop reason: HOSPADM

## 2020-06-21 RX ORDER — CHLORHEXIDINE GLUCONATE 0.12 MG/ML
15 RINSE ORAL ONCE
Status: COMPLETED | OUTPATIENT
Start: 2020-06-22 | End: 2020-06-22

## 2020-06-21 RX ORDER — METOPROLOL SUCCINATE 50 MG/1
100 TABLET, EXTENDED RELEASE ORAL DAILY
Status: DISCONTINUED | OUTPATIENT
Start: 2020-06-22 | End: 2020-06-23

## 2020-06-21 RX ORDER — RANOLAZINE 500 MG/1
1000 TABLET, EXTENDED RELEASE ORAL 2 TIMES DAILY
Status: DISCONTINUED | OUTPATIENT
Start: 2020-06-21 | End: 2020-06-23

## 2020-06-21 RX ORDER — ATORVASTATIN CALCIUM 40 MG/1
80 TABLET, FILM COATED ORAL DAILY
Status: DISCONTINUED | OUTPATIENT
Start: 2020-06-22 | End: 2020-06-29 | Stop reason: HOSPADM

## 2020-06-21 RX ORDER — NITROGLYCERIN 0.4 MG/1
0.4 TABLET SUBLINGUAL
Status: DISCONTINUED | OUTPATIENT
Start: 2020-06-21 | End: 2020-06-22

## 2020-06-21 RX ORDER — CETIRIZINE HYDROCHLORIDE 10 MG/1
10 TABLET ORAL DAILY
Status: DISCONTINUED | OUTPATIENT
Start: 2020-06-22 | End: 2020-06-29 | Stop reason: HOSPADM

## 2020-06-21 RX ORDER — BUDESONIDE AND FORMOTEROL FUMARATE DIHYDRATE 160; 4.5 UG/1; UG/1
2 AEROSOL RESPIRATORY (INHALATION)
Status: DISCONTINUED | OUTPATIENT
Start: 2020-06-21 | End: 2020-06-23

## 2020-06-21 RX ORDER — CHLORHEXIDINE GLUCONATE 500 MG/1
1 CLOTH TOPICAL EVERY 12 HOURS PRN
Status: DISCONTINUED | OUTPATIENT
Start: 2020-06-21 | End: 2020-06-22 | Stop reason: HOSPADM

## 2020-06-21 RX ORDER — TAMSULOSIN HYDROCHLORIDE 0.4 MG/1
0.4 CAPSULE ORAL EVERY EVENING
Status: DISCONTINUED | OUTPATIENT
Start: 2020-06-21 | End: 2020-06-29 | Stop reason: HOSPADM

## 2020-06-21 RX ORDER — ALPRAZOLAM 0.25 MG/1
0.25 TABLET ORAL 4 TIMES DAILY PRN
Status: DISCONTINUED | OUTPATIENT
Start: 2020-06-21 | End: 2020-06-22

## 2020-06-21 RX ORDER — SODIUM CHLORIDE 0.9 % (FLUSH) 0.9 %
10 SYRINGE (ML) INJECTION EVERY 12 HOURS SCHEDULED
Status: DISCONTINUED | OUTPATIENT
Start: 2020-06-21 | End: 2020-06-22

## 2020-06-21 RX ORDER — SODIUM CHLORIDE 9 MG/ML
9 INJECTION, SOLUTION INTRAVENOUS CONTINUOUS PRN
Status: DISCONTINUED | OUTPATIENT
Start: 2020-06-21 | End: 2020-06-22

## 2020-06-21 RX ORDER — AMIODARONE HYDROCHLORIDE 200 MG/1
200 TABLET ORAL DAILY
Status: DISCONTINUED | OUTPATIENT
Start: 2020-06-22 | End: 2020-06-29 | Stop reason: HOSPADM

## 2020-06-21 RX ORDER — MONTELUKAST SODIUM 10 MG/1
10 TABLET ORAL DAILY
Status: DISCONTINUED | OUTPATIENT
Start: 2020-06-22 | End: 2020-06-29 | Stop reason: HOSPADM

## 2020-06-21 RX ORDER — ROFLUMILAST 500 UG/1
500 TABLET ORAL DAILY
Status: DISCONTINUED | OUTPATIENT
Start: 2020-06-22 | End: 2020-06-22

## 2020-06-21 RX ORDER — ALBUTEROL SULFATE 2.5 MG/3ML
2.5 SOLUTION RESPIRATORY (INHALATION) EVERY 6 HOURS PRN
Status: DISCONTINUED | OUTPATIENT
Start: 2020-06-21 | End: 2020-06-23

## 2020-06-21 RX ORDER — LISINOPRIL 5 MG/1
5 TABLET ORAL DAILY
Status: DISCONTINUED | OUTPATIENT
Start: 2020-06-22 | End: 2020-06-23

## 2020-06-21 RX ORDER — SODIUM CHLORIDE 0.9 % (FLUSH) 0.9 %
10 SYRINGE (ML) INJECTION AS NEEDED
Status: DISCONTINUED | OUTPATIENT
Start: 2020-06-21 | End: 2020-06-22 | Stop reason: HOSPADM

## 2020-06-21 RX ORDER — SODIUM CHLORIDE 0.9 % (FLUSH) 0.9 %
10 SYRINGE (ML) INJECTION AS NEEDED
Status: DISCONTINUED | OUTPATIENT
Start: 2020-06-21 | End: 2020-06-22

## 2020-06-21 RX ORDER — ISOSORBIDE MONONITRATE 60 MG/1
60 TABLET, EXTENDED RELEASE ORAL 3 TIMES DAILY PRN
Status: DISCONTINUED | OUTPATIENT
Start: 2020-06-21 | End: 2020-06-23

## 2020-06-21 RX ORDER — GABAPENTIN 300 MG/1
300 CAPSULE ORAL 2 TIMES DAILY
Status: DISCONTINUED | OUTPATIENT
Start: 2020-06-21 | End: 2020-06-29 | Stop reason: HOSPADM

## 2020-06-21 RX ORDER — BUMETANIDE 1 MG/1
1 TABLET ORAL 3 TIMES DAILY
Status: DISCONTINUED | OUTPATIENT
Start: 2020-06-21 | End: 2020-06-23

## 2020-06-21 RX ORDER — DULOXETIN HYDROCHLORIDE 30 MG/1
60 CAPSULE, DELAYED RELEASE ORAL DAILY
Status: DISCONTINUED | OUTPATIENT
Start: 2020-06-22 | End: 2020-06-29 | Stop reason: HOSPADM

## 2020-06-21 RX ADMIN — GABAPENTIN 300 MG: 300 CAPSULE ORAL at 22:34

## 2020-06-21 RX ADMIN — Medication 10 ML: at 22:41

## 2020-06-21 RX ADMIN — MUPIROCIN 1 APPLICATION: 20 OINTMENT TOPICAL at 22:35

## 2020-06-21 RX ADMIN — BUMETANIDE 1 MG: 1 TABLET ORAL at 22:34

## 2020-06-21 RX ADMIN — ALPRAZOLAM 0.25 MG: 0.25 TABLET ORAL at 22:34

## 2020-06-21 RX ADMIN — CHLORHEXIDINE GLUCONATE 0.12% ORAL RINSE 15 ML: 1.2 LIQUID ORAL at 22:34

## 2020-06-21 RX ADMIN — MUPIROCIN 1 APPLICATION: 20 OINTMENT TOPICAL at 22:33

## 2020-06-21 RX ADMIN — MELATONIN TAB 5 MG 5 MG: 5 TAB at 22:33

## 2020-06-21 RX ADMIN — RANOLAZINE 1000 MG: 500 TABLET, FILM COATED, EXTENDED RELEASE ORAL at 22:33

## 2020-06-21 RX ADMIN — TAMSULOSIN HYDROCHLORIDE 0.4 MG: 0.4 CAPSULE ORAL at 22:34

## 2020-06-22 ENCOUNTER — APPOINTMENT (OUTPATIENT)
Dept: GENERAL RADIOLOGY | Facility: HOSPITAL | Age: 60
End: 2020-06-22

## 2020-06-22 ENCOUNTER — ANESTHESIA (OUTPATIENT)
Dept: PERIOP | Facility: HOSPITAL | Age: 60
End: 2020-06-22

## 2020-06-22 LAB
ALBUMIN SERPL-MCNC: 3.9 G/DL (ref 3.5–5.2)
ALBUMIN SERPL-MCNC: 4 G/DL (ref 3.5–5.2)
ANION GAP SERPL CALCULATED.3IONS-SCNC: 11 MMOL/L (ref 5–15)
ANION GAP SERPL CALCULATED.3IONS-SCNC: 11 MMOL/L (ref 5–15)
ANION GAP SERPL CALCULATED.3IONS-SCNC: 14 MMOL/L (ref 5–15)
APTT PPP: 23.4 SECONDS (ref 24–31)
ARTERIAL PATENCY WRIST A: ABNORMAL
ARTERIAL PATENCY WRIST A: ABNORMAL
ARTERIAL PATENCY WRIST A: POSITIVE
ATMOSPHERIC PRESS: ABNORMAL MM[HG]
BASE EXCESS BLDA CALC-SCNC: 2.2 MMOL/L (ref 0–3)
BASE EXCESS BLDA CALC-SCNC: 3.3 MMOL/L (ref 0–3)
BASE EXCESS BLDA CALC-SCNC: 6.6 MMOL/L (ref 0–3)
BASOPHILS # BLD AUTO: 0.1 10*3/MM3 (ref 0–0.2)
BASOPHILS NFR BLD AUTO: 0.7 % (ref 0–1.5)
BDY SITE: ABNORMAL
BUN BLD-MCNC: 17 MG/DL (ref 6–20)
BUN BLD-MCNC: 20 MG/DL (ref 6–20)
BUN BLD-MCNC: 20 MG/DL (ref 6–20)
BUN BLD-MCNC: ABNORMAL MG/DL
BUN/CREAT SERPL: ABNORMAL
CA-I BLDA-SCNC: 1.12 MMOL/L (ref 1.15–1.33)
CA-I SERPL ISE-MCNC: 1.15 MMOL/L (ref 1.2–1.3)
CALCIUM SPEC-SCNC: 8.3 MG/DL (ref 8.6–10.5)
CALCIUM SPEC-SCNC: 8.5 MG/DL (ref 8.6–10.5)
CALCIUM SPEC-SCNC: 8.9 MG/DL (ref 8.6–10.5)
CHLORIDE SERPL-SCNC: 100 MMOL/L (ref 98–107)
CHLORIDE SERPL-SCNC: 99 MMOL/L (ref 98–107)
CHLORIDE SERPL-SCNC: 99 MMOL/L (ref 98–107)
CO2 BLDA-SCNC: 33.2 MMOL/L (ref 22–29)
CO2 BLDA-SCNC: 34 MMOL/L (ref 22–29)
CO2 BLDA-SCNC: 34.3 MMOL/L (ref 22–29)
CO2 SERPL-SCNC: 29 MMOL/L (ref 22–29)
CO2 SERPL-SCNC: 29 MMOL/L (ref 22–29)
CO2 SERPL-SCNC: 30 MMOL/L (ref 22–29)
CREAT BLD-MCNC: 1.1 MG/DL (ref 0.76–1.27)
CREAT BLD-MCNC: 1.17 MG/DL (ref 0.76–1.27)
CREAT BLD-MCNC: 1.19 MG/DL (ref 0.76–1.27)
DEPRECATED RDW RBC AUTO: 47.7 FL (ref 37–54)
DEPRECATED RDW RBC AUTO: 47.7 FL (ref 37–54)
EOSINOPHIL # BLD AUTO: 0.2 10*3/MM3 (ref 0–0.4)
EOSINOPHIL NFR BLD AUTO: 1.5 % (ref 0.3–6.2)
ERYTHROCYTE [DISTWIDTH] IN BLOOD BY AUTOMATED COUNT: 15.8 % (ref 12.3–15.4)
ERYTHROCYTE [DISTWIDTH] IN BLOOD BY AUTOMATED COUNT: 15.8 % (ref 12.3–15.4)
FIBRINOGEN PPP-MCNC: 420 MG/DL (ref 210–450)
GFR SERPL CREATININE-BSD FRML MDRD: 63 ML/MIN/1.73
GFR SERPL CREATININE-BSD FRML MDRD: 64 ML/MIN/1.73
GFR SERPL CREATININE-BSD FRML MDRD: 69 ML/MIN/1.73
GLUCOSE BLD-MCNC: 104 MG/DL (ref 65–99)
GLUCOSE BLD-MCNC: 143 MG/DL (ref 65–99)
GLUCOSE BLD-MCNC: 147 MG/DL (ref 65–99)
GLUCOSE BLDC GLUCOMTR-MCNC: 123 MG/DL (ref 74–100)
HCO3 BLDA-SCNC: 31.3 MMOL/L (ref 21–28)
HCO3 BLDA-SCNC: 31.8 MMOL/L (ref 21–28)
HCO3 BLDA-SCNC: 32.7 MMOL/L (ref 21–28)
HCT VFR BLD AUTO: 42.2 % (ref 37.5–51)
HCT VFR BLD AUTO: 43.2 % (ref 37.5–51)
HCT VFR BLDA CALC: 45 % (ref 38–51)
HEMODILUTION: NO
HGB BLD-MCNC: 14 G/DL (ref 13–17.7)
HGB BLD-MCNC: 14.3 G/DL (ref 13–17.7)
HGB BLDA-MCNC: 15.4 G/DL (ref 12–17)
HOLD SPECIMEN: NORMAL
HOROWITZ INDEX BLD+IHG-RTO: 21 %
HOROWITZ INDEX BLD+IHG-RTO: 40 %
HOROWITZ INDEX BLD+IHG-RTO: 40 %
INR PPP: 1.11 (ref 0.9–1.1)
LYMPHOCYTES # BLD AUTO: 1.3 10*3/MM3 (ref 0.7–3.1)
LYMPHOCYTES NFR BLD AUTO: 7.9 % (ref 19.6–45.3)
MAGNESIUM SERPL-MCNC: 2 MG/DL (ref 1.6–2.6)
MAGNESIUM SERPL-MCNC: 2.2 MG/DL (ref 1.6–2.6)
MCH RBC QN AUTO: 28.2 PG (ref 26.6–33)
MCH RBC QN AUTO: 28.7 PG (ref 26.6–33)
MCHC RBC AUTO-ENTMCNC: 33.1 G/DL (ref 31.5–35.7)
MCHC RBC AUTO-ENTMCNC: 33.2 G/DL (ref 31.5–35.7)
MCV RBC AUTO: 85.1 FL (ref 79–97)
MCV RBC AUTO: 86.4 FL (ref 79–97)
MODALITY: ABNORMAL
MONOCYTES # BLD AUTO: 1.2 10*3/MM3 (ref 0.1–0.9)
MONOCYTES NFR BLD AUTO: 7 % (ref 5–12)
NEUTROPHILS # BLD AUTO: 13.7 10*3/MM3 (ref 1.7–7)
NEUTROPHILS NFR BLD AUTO: 82.9 % (ref 42.7–76)
NRBC BLD AUTO-RTO: 0 /100 WBC (ref 0–0.2)
PCO2 BLDA: 50.7 MM HG (ref 35–48)
PCO2 BLDA: 60.6 MM HG (ref 35–48)
PCO2 BLDA: 71.4 MM HG (ref 35–48)
PEEP RESPIRATORY: 5 CM[H2O]
PEEP RESPIRATORY: 5 CM[H2O]
PH BLDA: 7.26 PH UNITS (ref 7.35–7.45)
PH BLDA: 7.32 PH UNITS (ref 7.35–7.45)
PH BLDA: 7.42 PH UNITS (ref 7.35–7.45)
PHOSPHATE SERPL-MCNC: 4.6 MG/DL (ref 2.5–4.5)
PHOSPHATE SERPL-MCNC: 4.7 MG/DL (ref 2.5–4.5)
PLATELET # BLD AUTO: 274 10*3/MM3 (ref 140–450)
PLATELET # BLD AUTO: 278 10*3/MM3 (ref 140–450)
PMV BLD AUTO: 8.8 FL (ref 6–12)
PMV BLD AUTO: 8.8 FL (ref 6–12)
PO2 BLDA: 189.1 MM HG (ref 83–108)
PO2 BLDA: 79.3 MM HG (ref 83–108)
PO2 BLDA: 91.7 MM HG (ref 83–108)
POTASSIUM BLD-SCNC: 4.2 MMOL/L (ref 3.5–5.2)
POTASSIUM BLD-SCNC: 4.7 MMOL/L (ref 3.5–5.2)
POTASSIUM BLD-SCNC: 4.7 MMOL/L (ref 3.5–5.2)
POTASSIUM BLDA-SCNC: 4.7 MMOL/L (ref 3.5–4.5)
PROTHROMBIN TIME: 11.4 SECONDS (ref 9.6–11.7)
PSV: 10 CMH2O
PSV: 10 CMH2O
RBC # BLD AUTO: 4.89 10*6/MM3 (ref 4.14–5.8)
RBC # BLD AUTO: 5.08 10*6/MM3 (ref 4.14–5.8)
SAO2 % BLDCOA: 94.1 % (ref 94–98)
SAO2 % BLDCOA: 95.2 % (ref 94–98)
SAO2 % BLDCOA: 99.7 % (ref 94–98)
SODIUM BLD-SCNC: 139 MMOL/L (ref 136–145)
SODIUM BLD-SCNC: 141 MMOL/L (ref 136–145)
SODIUM BLD-SCNC: 142 MMOL/L (ref 136–145)
SODIUM BLDA-SCNC: 143 MMOL/L (ref 138–146)
VENTILATOR MODE: ABNORMAL
VENTILATOR MODE: ABNORMAL
WBC NRBC COR # BLD: 16.5 10*3/MM3 (ref 3.4–10.8)
WBC NRBC COR # BLD: 20.6 10*3/MM3 (ref 3.4–10.8)

## 2020-06-22 PROCEDURE — 94799 UNLISTED PULMONARY SVC/PX: CPT

## 2020-06-22 PROCEDURE — 94002 VENT MGMT INPAT INIT DAY: CPT

## 2020-06-22 PROCEDURE — 85025 COMPLETE CBC W/AUTO DIFF WBC: CPT | Performed by: NURSE PRACTITIONER

## 2020-06-22 PROCEDURE — C1882 AICD, OTHER THAN SING/DUAL: HCPCS | Performed by: THORACIC SURGERY (CARDIOTHORACIC VASCULAR SURGERY)

## 2020-06-22 PROCEDURE — 25010000003 CEFAZOLIN PER 500 MG: Performed by: ANESTHESIOLOGY

## 2020-06-22 PROCEDURE — C1898 LEAD, PMKR, OTHER THAN TRANS: HCPCS | Performed by: THORACIC SURGERY (CARDIOTHORACIC VASCULAR SURGERY)

## 2020-06-22 PROCEDURE — 85384 FIBRINOGEN ACTIVITY: CPT | Performed by: NURSE PRACTITIONER

## 2020-06-22 PROCEDURE — 80048 BASIC METABOLIC PNL TOTAL CA: CPT | Performed by: THORACIC SURGERY (CARDIOTHORACIC VASCULAR SURGERY)

## 2020-06-22 PROCEDURE — 94640 AIRWAY INHALATION TREATMENT: CPT

## 2020-06-22 PROCEDURE — 83735 ASSAY OF MAGNESIUM: CPT | Performed by: NURSE PRACTITIONER

## 2020-06-22 PROCEDURE — 85730 THROMBOPLASTIN TIME PARTIAL: CPT | Performed by: NURSE PRACTITIONER

## 2020-06-22 PROCEDURE — 82330 ASSAY OF CALCIUM: CPT

## 2020-06-22 PROCEDURE — 25010000002 ONDANSETRON PER 1 MG: Performed by: THORACIC SURGERY (CARDIOTHORACIC VASCULAR SURGERY)

## 2020-06-22 PROCEDURE — 71045 X-RAY EXAM CHEST 1 VIEW: CPT

## 2020-06-22 PROCEDURE — 25010000002 VANCOMYCIN 1 G RECONSTITUTED SOLUTION 1 EACH VIAL: Performed by: THORACIC SURGERY (CARDIOTHORACIC VASCULAR SURGERY)

## 2020-06-22 PROCEDURE — 82330 ASSAY OF CALCIUM: CPT | Performed by: NURSE PRACTITIONER

## 2020-06-22 PROCEDURE — 36600 WITHDRAWAL OF ARTERIAL BLOOD: CPT

## 2020-06-22 PROCEDURE — 25010000002 METOCLOPRAMIDE PER 10 MG: Performed by: NURSE PRACTITIONER

## 2020-06-22 PROCEDURE — 93005 ELECTROCARDIOGRAM TRACING: CPT | Performed by: NURSE PRACTITIONER

## 2020-06-22 PROCEDURE — 02HN0KZ INSERTION OF DEFIBRILLATOR LEAD INTO PERICARDIUM, OPEN APPROACH: ICD-10-PCS | Performed by: THORACIC SURGERY (CARDIOTHORACIC VASCULAR SURGERY)

## 2020-06-22 PROCEDURE — 82803 BLOOD GASES ANY COMBINATION: CPT

## 2020-06-22 PROCEDURE — 25010000002 FUROSEMIDE PER 20 MG: Performed by: NURSE PRACTITIONER

## 2020-06-22 PROCEDURE — 25010000003 BUPIVACAINE LIPOSOME 1.3 % SUSPENSION 10 ML VIAL: Performed by: THORACIC SURGERY (CARDIOTHORACIC VASCULAR SURGERY)

## 2020-06-22 PROCEDURE — 80069 RENAL FUNCTION PANEL: CPT | Performed by: NURSE PRACTITIONER

## 2020-06-22 PROCEDURE — 25010000002 HYDROMORPHONE PER 4 MG: Performed by: NURSE PRACTITIONER

## 2020-06-22 PROCEDURE — 25010000002 MAGNESIUM SULFATE IN D5W 1G/100ML (PREMIX) 1-5 GM/100ML-% SOLUTION: Performed by: NURSE PRACTITIONER

## 2020-06-22 PROCEDURE — 85018 HEMOGLOBIN: CPT

## 2020-06-22 PROCEDURE — C1751 CATH, INF, PER/CENT/MIDLINE: HCPCS | Performed by: ANESTHESIOLOGY

## 2020-06-22 PROCEDURE — 25010000002 AMIODARONE PER 30 MG: Performed by: NURSE PRACTITIONER

## 2020-06-22 PROCEDURE — 85027 COMPLETE CBC AUTOMATED: CPT | Performed by: NURSE PRACTITIONER

## 2020-06-22 PROCEDURE — 25010000002 FENTANYL CITRATE (PF) 250 MCG/5ML SOLUTION: Performed by: ANESTHESIOLOGY

## 2020-06-22 PROCEDURE — 25010000002 MIDAZOLAM PER 1 MG: Performed by: ANESTHESIOLOGY

## 2020-06-22 PROCEDURE — 0JH609Z INSERTION OF CARDIAC RESYNCHRONIZATION DEFIBRILLATOR PULSE GENERATOR INTO CHEST SUBCUTANEOUS TISSUE AND FASCIA, OPEN APPROACH: ICD-10-PCS | Performed by: THORACIC SURGERY (CARDIOTHORACIC VASCULAR SURGERY)

## 2020-06-22 PROCEDURE — C9290 INJ, BUPIVACAINE LIPOSOME: HCPCS | Performed by: THORACIC SURGERY (CARDIOTHORACIC VASCULAR SURGERY)

## 2020-06-22 PROCEDURE — 80051 ELECTROLYTE PANEL: CPT

## 2020-06-22 PROCEDURE — 0JPT0PZ REMOVAL OF CARDIAC RHYTHM RELATED DEVICE FROM TRUNK SUBCUTANEOUS TISSUE AND FASCIA, OPEN APPROACH: ICD-10-PCS | Performed by: THORACIC SURGERY (CARDIOTHORACIC VASCULAR SURGERY)

## 2020-06-22 PROCEDURE — 85610 PROTHROMBIN TIME: CPT | Performed by: NURSE PRACTITIONER

## 2020-06-22 PROCEDURE — 33202 INSERT EPICARD ELTRD OPEN: CPT | Performed by: THORACIC SURGERY (CARDIOTHORACIC VASCULAR SURGERY)

## 2020-06-22 PROCEDURE — 33264 RMVL & RPLCMT DFB GEN MLT LD: CPT | Performed by: THORACIC SURGERY (CARDIOTHORACIC VASCULAR SURGERY)

## 2020-06-22 PROCEDURE — 02PA0MZ REMOVAL OF CARDIAC LEAD FROM HEART, OPEN APPROACH: ICD-10-PCS | Performed by: THORACIC SURGERY (CARDIOTHORACIC VASCULAR SURGERY)

## 2020-06-22 DEVICE — LD EPICARD MYOPORE BIPOL 54CM: Type: IMPLANTABLE DEVICE | Site: CHEST | Status: FUNCTIONAL

## 2020-06-22 DEVICE — CARDIAC RESYNCHRONIZATION THERAPY DEFIBRILLATOR
Type: IMPLANTABLE DEVICE | Site: CHEST | Status: FUNCTIONAL
Brand: MOMENTUM™ CRT-D

## 2020-06-22 DEVICE — DEV CONTRL TISS STRATAFIX SPIRAL MNCRYL UD 3/0 PLS 30CM: Type: IMPLANTABLE DEVICE | Site: CHEST | Status: FUNCTIONAL

## 2020-06-22 RX ORDER — PANTOPRAZOLE SODIUM 40 MG/1
40 TABLET, DELAYED RELEASE ORAL DAILY
Status: DISCONTINUED | OUTPATIENT
Start: 2020-06-23 | End: 2020-06-23

## 2020-06-22 RX ORDER — MAGNESIUM SULFATE 1 G/100ML
1 INJECTION INTRAVENOUS EVERY 8 HOURS
Status: COMPLETED | OUTPATIENT
Start: 2020-06-22 | End: 2020-06-23

## 2020-06-22 RX ORDER — ACETAMINOPHEN 160 MG/5ML
650 SOLUTION ORAL EVERY 4 HOURS
Status: COMPLETED | OUTPATIENT
Start: 2020-06-22 | End: 2020-06-23

## 2020-06-22 RX ORDER — CHLORHEXIDINE GLUCONATE 0.12 MG/ML
15 RINSE ORAL EVERY 12 HOURS
Status: DISCONTINUED | OUTPATIENT
Start: 2020-06-22 | End: 2020-06-26

## 2020-06-22 RX ORDER — DEXMEDETOMIDINE HYDROCHLORIDE 4 UG/ML
.2-1.5 INJECTION, SOLUTION INTRAVENOUS
Status: DISCONTINUED | OUTPATIENT
Start: 2020-06-22 | End: 2020-06-23

## 2020-06-22 RX ORDER — PHENYLEPHRINE HCL IN 0.9% NACL 0.5 MG/5ML
SYRINGE (ML) INTRAVENOUS AS NEEDED
Status: DISCONTINUED | OUTPATIENT
Start: 2020-06-22 | End: 2020-06-22 | Stop reason: SURG

## 2020-06-22 RX ORDER — CEFAZOLIN SODIUM IN 0.9 % NACL 3 G/100 ML
3 INTRAVENOUS SOLUTION, PIGGYBACK (ML) INTRAVENOUS ONCE
Status: COMPLETED | OUTPATIENT
Start: 2020-06-22 | End: 2020-06-22

## 2020-06-22 RX ORDER — ACETAMINOPHEN 325 MG/1
650 TABLET ORAL EVERY 4 HOURS PRN
Status: DISCONTINUED | OUTPATIENT
Start: 2020-06-23 | End: 2020-06-29 | Stop reason: HOSPADM

## 2020-06-22 RX ORDER — CEFAZOLIN SODIUM IN 0.9 % NACL 3 G/100 ML
3 INTRAVENOUS SOLUTION, PIGGYBACK (ML) INTRAVENOUS EVERY 8 HOURS
Status: COMPLETED | OUTPATIENT
Start: 2020-06-22 | End: 2020-06-24

## 2020-06-22 RX ORDER — ACETAMINOPHEN 650 MG/1
650 SUPPOSITORY RECTAL EVERY 4 HOURS
Status: COMPLETED | OUTPATIENT
Start: 2020-06-22 | End: 2020-06-23

## 2020-06-22 RX ORDER — HYDRALAZINE HYDROCHLORIDE 20 MG/ML
10 INJECTION INTRAMUSCULAR; INTRAVENOUS EVERY 6 HOURS PRN
Status: DISCONTINUED | OUTPATIENT
Start: 2020-06-22 | End: 2020-06-29 | Stop reason: HOSPADM

## 2020-06-22 RX ORDER — MAGNESIUM HYDROXIDE 1200 MG/15ML
LIQUID ORAL AS NEEDED
Status: DISCONTINUED | OUTPATIENT
Start: 2020-06-22 | End: 2020-06-22 | Stop reason: HOSPADM

## 2020-06-22 RX ORDER — FENTANYL CITRATE 50 UG/ML
INJECTION, SOLUTION INTRAMUSCULAR; INTRAVENOUS AS NEEDED
Status: DISCONTINUED | OUTPATIENT
Start: 2020-06-22 | End: 2020-06-22 | Stop reason: SURG

## 2020-06-22 RX ORDER — ACETAMINOPHEN 650 MG/1
650 SUPPOSITORY RECTAL EVERY 4 HOURS PRN
Status: DISCONTINUED | OUTPATIENT
Start: 2020-06-23 | End: 2020-06-29 | Stop reason: HOSPADM

## 2020-06-22 RX ORDER — MEPERIDINE HYDROCHLORIDE 25 MG/ML
25 INJECTION INTRAMUSCULAR; INTRAVENOUS; SUBCUTANEOUS EVERY 4 HOURS PRN
Status: ACTIVE | OUTPATIENT
Start: 2020-06-22 | End: 2020-06-23

## 2020-06-22 RX ORDER — ACETAMINOPHEN 160 MG/5ML
650 SOLUTION ORAL EVERY 4 HOURS PRN
Status: DISCONTINUED | OUTPATIENT
Start: 2020-06-23 | End: 2020-06-29 | Stop reason: HOSPADM

## 2020-06-22 RX ORDER — METOCLOPRAMIDE HYDROCHLORIDE 5 MG/ML
10 INJECTION INTRAMUSCULAR; INTRAVENOUS EVERY 6 HOURS
Status: COMPLETED | OUTPATIENT
Start: 2020-06-22 | End: 2020-06-23

## 2020-06-22 RX ORDER — AMIODARONE HCL/D5W 450 MG/250
0.5 PLASTIC BAG, INJECTION (ML) INTRAVENOUS CONTINUOUS
Status: DISCONTINUED | OUTPATIENT
Start: 2020-06-22 | End: 2020-06-23

## 2020-06-22 RX ORDER — MIDAZOLAM HYDROCHLORIDE 1 MG/ML
INJECTION INTRAMUSCULAR; INTRAVENOUS AS NEEDED
Status: DISCONTINUED | OUTPATIENT
Start: 2020-06-22 | End: 2020-06-22 | Stop reason: SURG

## 2020-06-22 RX ORDER — EPHEDRINE SULFATE/0.9% NACL/PF 25 MG/5 ML
SYRINGE (ML) INTRAVENOUS AS NEEDED
Status: DISCONTINUED | OUTPATIENT
Start: 2020-06-22 | End: 2020-06-22 | Stop reason: SURG

## 2020-06-22 RX ORDER — ROCURONIUM BROMIDE 10 MG/ML
INJECTION, SOLUTION INTRAVENOUS AS NEEDED
Status: DISCONTINUED | OUTPATIENT
Start: 2020-06-22 | End: 2020-06-22 | Stop reason: SURG

## 2020-06-22 RX ORDER — BISACODYL 5 MG/1
10 TABLET, DELAYED RELEASE ORAL DAILY PRN
Status: DISCONTINUED | OUTPATIENT
Start: 2020-06-22 | End: 2020-06-29 | Stop reason: HOSPADM

## 2020-06-22 RX ORDER — HYDROCODONE BITARTRATE AND ACETAMINOPHEN 5; 325 MG/1; MG/1
1 TABLET ORAL EVERY 4 HOURS PRN
Status: DISCONTINUED | OUTPATIENT
Start: 2020-06-22 | End: 2020-06-23

## 2020-06-22 RX ORDER — METOPROLOL TARTRATE 50 MG/1
50 TABLET, FILM COATED ORAL ONCE
Status: COMPLETED | OUTPATIENT
Start: 2020-06-22 | End: 2020-06-22

## 2020-06-22 RX ORDER — ACETAMINOPHEN 325 MG/1
650 TABLET ORAL EVERY 4 HOURS
Status: COMPLETED | OUTPATIENT
Start: 2020-06-22 | End: 2020-06-23

## 2020-06-22 RX ORDER — FUROSEMIDE 10 MG/ML
40 INJECTION INTRAMUSCULAR; INTRAVENOUS ONCE
Status: COMPLETED | OUTPATIENT
Start: 2020-06-22 | End: 2020-06-22

## 2020-06-22 RX ORDER — BISACODYL 10 MG
10 SUPPOSITORY, RECTAL RECTAL DAILY PRN
Status: DISCONTINUED | OUTPATIENT
Start: 2020-06-23 | End: 2020-06-29 | Stop reason: HOSPADM

## 2020-06-22 RX ORDER — ONDANSETRON 2 MG/ML
4 INJECTION INTRAMUSCULAR; INTRAVENOUS EVERY 4 HOURS PRN
Status: DISCONTINUED | OUTPATIENT
Start: 2020-06-22 | End: 2020-06-22

## 2020-06-22 RX ORDER — POTASSIUM CHLORIDE 7.45 MG/ML
10 INJECTION INTRAVENOUS
Status: DISCONTINUED | OUTPATIENT
Start: 2020-06-22 | End: 2020-06-29 | Stop reason: HOSPADM

## 2020-06-22 RX ORDER — OXYCODONE HYDROCHLORIDE 5 MG/1
10 TABLET ORAL EVERY 4 HOURS PRN
Status: DISCONTINUED | OUTPATIENT
Start: 2020-06-22 | End: 2020-06-23

## 2020-06-22 RX ORDER — MAGNESIUM SULFATE HEPTAHYDRATE 40 MG/ML
2 INJECTION, SOLUTION INTRAVENOUS AS NEEDED
Status: DISCONTINUED | OUTPATIENT
Start: 2020-06-22 | End: 2020-06-29 | Stop reason: HOSPADM

## 2020-06-22 RX ORDER — AMOXICILLIN 250 MG
2 CAPSULE ORAL 2 TIMES DAILY
Status: DISCONTINUED | OUTPATIENT
Start: 2020-06-23 | End: 2020-06-29 | Stop reason: HOSPADM

## 2020-06-22 RX ORDER — POLYETHYLENE GLYCOL 3350 17 G/17G
17 POWDER, FOR SOLUTION ORAL DAILY PRN
Status: DISCONTINUED | OUTPATIENT
Start: 2020-06-22 | End: 2020-06-29 | Stop reason: HOSPADM

## 2020-06-22 RX ORDER — NALOXONE HCL 0.4 MG/ML
0.4 VIAL (ML) INJECTION
Status: DISCONTINUED | OUTPATIENT
Start: 2020-06-22 | End: 2020-06-29 | Stop reason: HOSPADM

## 2020-06-22 RX ORDER — HYDROMORPHONE HCL 110MG/55ML
0.5 PATIENT CONTROLLED ANALGESIA SYRINGE INTRAVENOUS
Status: DISCONTINUED | OUTPATIENT
Start: 2020-06-22 | End: 2020-06-29 | Stop reason: HOSPADM

## 2020-06-22 RX ORDER — CEFAZOLIN SODIUM 1 G/3ML
INJECTION, POWDER, FOR SOLUTION INTRAMUSCULAR; INTRAVENOUS AS NEEDED
Status: DISCONTINUED | OUTPATIENT
Start: 2020-06-22 | End: 2020-06-22 | Stop reason: SURG

## 2020-06-22 RX ORDER — AMIODARONE HCL/D5W 450 MG/250
1 PLASTIC BAG, INJECTION (ML) INTRAVENOUS CONTINUOUS
Status: DISPENSED | OUTPATIENT
Start: 2020-06-22 | End: 2020-06-22

## 2020-06-22 RX ORDER — LIDOCAINE HYDROCHLORIDE 10 MG/ML
INJECTION, SOLUTION EPIDURAL; INFILTRATION; INTRACAUDAL; PERINEURAL AS NEEDED
Status: DISCONTINUED | OUTPATIENT
Start: 2020-06-22 | End: 2020-06-22 | Stop reason: SURG

## 2020-06-22 RX ORDER — MAGNESIUM SULFATE HEPTAHYDRATE 40 MG/ML
4 INJECTION, SOLUTION INTRAVENOUS AS NEEDED
Status: DISCONTINUED | OUTPATIENT
Start: 2020-06-22 | End: 2020-06-29 | Stop reason: HOSPADM

## 2020-06-22 RX ORDER — CYCLOBENZAPRINE HCL 10 MG
10 TABLET ORAL EVERY 8 HOURS PRN
Status: DISCONTINUED | OUTPATIENT
Start: 2020-06-23 | End: 2020-06-29 | Stop reason: HOSPADM

## 2020-06-22 RX ORDER — ONDANSETRON 2 MG/ML
4 INJECTION INTRAMUSCULAR; INTRAVENOUS EVERY 6 HOURS PRN
Status: DISCONTINUED | OUTPATIENT
Start: 2020-06-22 | End: 2020-06-29 | Stop reason: HOSPADM

## 2020-06-22 RX ADMIN — METOPROLOL SUCCINATE 100 MG: 50 TABLET, EXTENDED RELEASE ORAL at 08:40

## 2020-06-22 RX ADMIN — SODIUM CHLORIDE: 0.9 INJECTION, SOLUTION INTRAVENOUS at 12:51

## 2020-06-22 RX ADMIN — METOPROLOL TARTRATE 50 MG: 50 TABLET, FILM COATED ORAL at 00:43

## 2020-06-22 RX ADMIN — ACETAMINOPHEN 650 MG: 650 SUPPOSITORY RECTAL at 16:16

## 2020-06-22 RX ADMIN — ONDANSETRON 4 MG: 2 INJECTION INTRAMUSCULAR; INTRAVENOUS at 07:11

## 2020-06-22 RX ADMIN — CHLORHEXIDINE GLUCONATE 0.12% ORAL RINSE 15 ML: 1.2 LIQUID ORAL at 20:02

## 2020-06-22 RX ADMIN — PHENYLEPHRINE HYDROCHLORIDE 150 MCG: 10 INJECTION INTRAVENOUS at 14:16

## 2020-06-22 RX ADMIN — BUDESONIDE AND FORMOTEROL FUMARATE DIHYDRATE 2 PUFF: 160; 4.5 AEROSOL RESPIRATORY (INHALATION) at 07:20

## 2020-06-22 RX ADMIN — MIDAZOLAM HYDROCHLORIDE 4 MG: 1 INJECTION, SOLUTION INTRAMUSCULAR; INTRAVENOUS at 13:19

## 2020-06-22 RX ADMIN — DEXMEDETOMIDINE 0.5 MCG/KG/HR: 100 INJECTION, SOLUTION, CONCENTRATE INTRAVENOUS at 16:09

## 2020-06-22 RX ADMIN — ALPRAZOLAM 0.25 MG: 0.25 TABLET ORAL at 11:21

## 2020-06-22 RX ADMIN — Medication 10 ML: at 08:43

## 2020-06-22 RX ADMIN — BUMETANIDE 1 MG: 1 TABLET ORAL at 20:01

## 2020-06-22 RX ADMIN — OXYCODONE HYDROCHLORIDE 10 MG: 5 TABLET ORAL at 19:59

## 2020-06-22 RX ADMIN — Medication 10 ML: at 08:44

## 2020-06-22 RX ADMIN — FUROSEMIDE 40 MG: 10 INJECTION, SOLUTION INTRAMUSCULAR; INTRAVENOUS at 16:09

## 2020-06-22 RX ADMIN — ROCURONIUM BROMIDE 30 MG: 10 INJECTION, SOLUTION INTRAVENOUS at 13:35

## 2020-06-22 RX ADMIN — PHENYLEPHRINE HYDROCHLORIDE 150 MCG: 10 INJECTION INTRAVENOUS at 14:27

## 2020-06-22 RX ADMIN — AMIODARONE HYDROCHLORIDE 1 MG/MIN: 50 INJECTION, SOLUTION INTRAVENOUS at 16:52

## 2020-06-22 RX ADMIN — CETIRIZINE HYDROCHLORIDE 10 MG: 10 TABLET, FILM COATED ORAL at 08:39

## 2020-06-22 RX ADMIN — GABAPENTIN 300 MG: 300 CAPSULE ORAL at 20:01

## 2020-06-22 RX ADMIN — FENTANYL CITRATE 50 MCG: 50 INJECTION, SOLUTION INTRAMUSCULAR; INTRAVENOUS at 13:19

## 2020-06-22 RX ADMIN — MUPIROCIN 1 APPLICATION: 20 OINTMENT TOPICAL at 20:02

## 2020-06-22 RX ADMIN — RANOLAZINE 1000 MG: 500 TABLET, FILM COATED, EXTENDED RELEASE ORAL at 08:38

## 2020-06-22 RX ADMIN — METOCLOPRAMIDE 10 MG: 5 INJECTION, SOLUTION INTRAMUSCULAR; INTRAVENOUS at 22:47

## 2020-06-22 RX ADMIN — MUPIROCIN 1 APPLICATION: 20 OINTMENT TOPICAL at 08:42

## 2020-06-22 RX ADMIN — PHENYLEPHRINE HYDROCHLORIDE 200 MCG: 10 INJECTION INTRAVENOUS at 13:49

## 2020-06-22 RX ADMIN — FENTANYL CITRATE 50 MCG: 50 INJECTION, SOLUTION INTRAMUSCULAR; INTRAVENOUS at 13:21

## 2020-06-22 RX ADMIN — FENTANYL CITRATE 50 MCG: 50 INJECTION, SOLUTION INTRAMUSCULAR; INTRAVENOUS at 14:02

## 2020-06-22 RX ADMIN — SODIUM CHLORIDE 3 G: 9 INJECTION, SOLUTION INTRAVENOUS at 21:01

## 2020-06-22 RX ADMIN — MAGNESIUM SULFATE HEPTAHYDRATE 1 G: 1 INJECTION, SOLUTION INTRAVENOUS at 16:09

## 2020-06-22 RX ADMIN — CHLORHEXIDINE GLUCONATE 0.12% ORAL RINSE 15 ML: 1.2 LIQUID ORAL at 13:03

## 2020-06-22 RX ADMIN — METOCLOPRAMIDE 10 MG: 5 INJECTION, SOLUTION INTRAMUSCULAR; INTRAVENOUS at 16:09

## 2020-06-22 RX ADMIN — SODIUM CHLORIDE: 0.9 INJECTION, SOLUTION INTRAVENOUS at 14:58

## 2020-06-22 RX ADMIN — PHENYLEPHRINE HYDROCHLORIDE 150 MCG: 10 INJECTION INTRAVENOUS at 13:47

## 2020-06-22 RX ADMIN — RANOLAZINE 1000 MG: 500 TABLET, FILM COATED, EXTENDED RELEASE ORAL at 20:01

## 2020-06-22 RX ADMIN — SUGAMMADEX 200 MG: 100 INJECTION, SOLUTION INTRAVENOUS at 14:57

## 2020-06-22 RX ADMIN — ALBUTEROL SULFATE 2.5 MG: 2.5 SOLUTION RESPIRATORY (INHALATION) at 00:50

## 2020-06-22 RX ADMIN — Medication 10 MG: at 14:28

## 2020-06-22 RX ADMIN — ONDANSETRON 4 MG: 2 INJECTION INTRAMUSCULAR; INTRAVENOUS at 11:21

## 2020-06-22 RX ADMIN — HYDROMORPHONE HYDROCHLORIDE 0.5 MG: 2 INJECTION, SOLUTION INTRAMUSCULAR; INTRAVENOUS; SUBCUTANEOUS at 18:33

## 2020-06-22 RX ADMIN — ONDANSETRON 4 MG: 2 INJECTION INTRAMUSCULAR; INTRAVENOUS at 00:43

## 2020-06-22 RX ADMIN — BUDESONIDE AND FORMOTEROL FUMARATE DIHYDRATE 2 PUFF: 160; 4.5 AEROSOL RESPIRATORY (INHALATION) at 00:50

## 2020-06-22 RX ADMIN — ROFLUMILAST 500 MCG: 500 TABLET ORAL at 08:40

## 2020-06-22 RX ADMIN — PHENYLEPHRINE HYDROCHLORIDE 250 MCG: 10 INJECTION INTRAVENOUS at 13:56

## 2020-06-22 RX ADMIN — LIDOCAINE HYDROCHLORIDE 5 ML: 10 INJECTION, SOLUTION EPIDURAL; INFILTRATION; INTRACAUDAL; PERINEURAL at 13:19

## 2020-06-22 RX ADMIN — PHENYLEPHRINE HYDROCHLORIDE 200 MCG: 10 INJECTION INTRAVENOUS at 13:38

## 2020-06-22 RX ADMIN — CHLORHEXIDINE GLUCONATE 0.12% ORAL RINSE 15 ML: 1.2 LIQUID ORAL at 08:42

## 2020-06-22 RX ADMIN — CEFAZOLIN 3 G: 1 INJECTION, POWDER, FOR SOLUTION INTRAMUSCULAR; INTRAVENOUS at 13:36

## 2020-06-22 RX ADMIN — MONTELUKAST SODIUM 10 MG: 10 TABLET, COATED ORAL at 08:40

## 2020-06-22 RX ADMIN — ALBUTEROL SULFATE 2.5 MG: 2.5 SOLUTION RESPIRATORY (INHALATION) at 04:49

## 2020-06-22 RX ADMIN — CEFAZOLIN 3 G: 1 INJECTION, POWDER, FOR SOLUTION INTRAMUSCULAR; INTRAVENOUS; PARENTERAL at 13:31

## 2020-06-22 RX ADMIN — PHENYLEPHRINE HYDROCHLORIDE 150 MCG: 10 INJECTION INTRAVENOUS at 13:43

## 2020-06-22 RX ADMIN — PHENYLEPHRINE HYDROCHLORIDE 100 MCG: 10 INJECTION INTRAVENOUS at 13:33

## 2020-06-22 RX ADMIN — ALBUTEROL SULFATE 2.5 MG: 2.5 SOLUTION RESPIRATORY (INHALATION) at 07:19

## 2020-06-22 RX ADMIN — ACETAMINOPHEN 650 MG: 325 TABLET ORAL at 20:06

## 2020-06-22 RX ADMIN — HYDROCODONE BITARTRATE AND ACETAMINOPHEN 1 TABLET: 5; 325 TABLET ORAL at 21:56

## 2020-06-22 RX ADMIN — GABAPENTIN 300 MG: 300 CAPSULE ORAL at 08:39

## 2020-06-22 RX ADMIN — FENTANYL CITRATE 100 MCG: 50 INJECTION, SOLUTION INTRAMUSCULAR; INTRAVENOUS at 14:05

## 2020-06-22 RX ADMIN — LISINOPRIL 5 MG: 5 TABLET ORAL at 08:38

## 2020-06-22 RX ADMIN — MUPIROCIN 1 APPLICATION: 20 OINTMENT TOPICAL at 08:40

## 2020-06-22 RX ADMIN — HYDROMORPHONE HYDROCHLORIDE 0.5 MG: 2 INJECTION, SOLUTION INTRAMUSCULAR; INTRAVENOUS; SUBCUTANEOUS at 22:47

## 2020-06-22 RX ADMIN — ATORVASTATIN CALCIUM 80 MG: 40 TABLET, FILM COATED ORAL at 08:39

## 2020-06-22 RX ADMIN — ROCURONIUM BROMIDE 20 MG: 10 INJECTION, SOLUTION INTRAVENOUS at 14:05

## 2020-06-22 NOTE — ANESTHESIA POSTPROCEDURE EVALUATION
Patient: Jose Dixon Jr.    Procedure Summary     Date:  06/22/20 Room / Location:  [facility] CVOR 02 / [facility] CVOR    Anesthesia Start:  1311 Anesthesia Stop:  1513    Procedure:  LEFT VENTRICULAR LEAD PLACEMENT (N/A Chest) Diagnosis:       Cardiomyopathy (CMS/HCC)      (Cardiomyopathy (CMS/HCC) [I42.9])    Surgeon:  Christiano Richmond MD Provider:  Salo Adamson MD    Anesthesia Type:  general ASA Status:  4          Anesthesia Type: general    Vitals  No vitals data found for the desired time range.          Post Anesthesia Care and Evaluation    Patient location during evaluation: ICU  Patient participation: complete - patient cannot participate  Level of consciousness: obtunded/minimal responses  Pain scale: See nurse's notes for pain score.  Pain management: adequate  Airway patency: patent  Anesthetic complications: No anesthetic complications  PONV Status: none  Cardiovascular status: acceptable  Respiratory status: acceptable  Hydration status: acceptable    Comments: Patient seen and examined postoperatively; vital signs stable; SpO2 greater than or equal to 90%; cardiopulmonary status stable; nausea/vomiting adequately controlled; pain adequately controlled; no apparent anesthesia complications; patient discharged from anesthesia care when discharge criteria were met

## 2020-06-22 NOTE — ANESTHESIA PROCEDURE NOTES
Central Line      Patient reassessed immediately prior to procedure    Start time: 6/22/2020 1:34 PM  Stop Time:6/22/2020 1:46 PM  Staff  Anesthesiologist: Salo Adamson MD  Preanesthetic Checklist  Completed: patient identified, site marked, surgical consent, pre-op evaluation, timeout performed, IV checked, risks and benefits discussed and monitors and equipment checked  Central Line Prep  Sterile Tech:gloves, cap, gown, mask and sterile barriers  Prep: chloraprep  Patient monitoring: blood pressure monitoring, continuous pulse oximetry and EKG  Central Line Procedure  Laterality:right  Location:internal jugular  Catheter Type:Cordis  Catheter Size:9 Fr  Guidance:landmark technique and palpation technique  Assessment  Post procedure:biopatch applied, line sutured and occlusive dressing applied  Assessement:blood return through all ports, free fluid flow, chest x-ray ordered and Pelon Test  Complications:no  Patient Tolerance:patient tolerated the procedure well with no apparent complications  Additional Notes  Sterile prep, drape, gown and gloves.  Negative pelon negative carotid, no difficulties.  Tolerated well.  Post . Induction yana perkins

## 2020-06-22 NOTE — ANESTHESIA PROCEDURE NOTES
Airway  Urgency: elective    Date/Time: 6/22/2020 1:31 PM  Airway not difficult    General Information and Staff    Patient location during procedure: OR  Anesthesiologist: Salo Adamson MD    Indications and Patient Condition  Indications for airway management: airway protection    Preoxygenated: yes  MILS not maintained throughout  Mask difficulty assessment: 2 - vent by mask + OA or adjuvant +/- NMBA    Final Airway Details  Final airway type: endotracheal airway      Successful airway: ETT  Cuffed: yes   Successful intubation technique: direct laryngoscopy  Endotracheal tube insertion site: oral  Blade: Hanna  Blade size: 4  ETT size (mm): 8.0  Cormack-Lehane Classification: grade I - full view of glottis  Placement verified by: capnometry and palpation of cuff   Measured from: lips  ETT/EBT  to lips (cm): 23  Number of attempts at approach: 1  Assessment: lips, teeth, and gum same as pre-op and atraumatic intubation    Additional Comments  ASA monitors applied; preoxygenated with 100% FiO2 via anesthesia face mask; induction of general anesthesia; bag-mask ventilation; patient's position optimized; laryngoscopy; cuffed ETT lubricated with lidocaine jelly and placed into the trachea; cuff inflated to seal with minimally occlusive airway cuff pressure; ETT connected to anesthesia circuit; atraumatic/dentition in preoperative condition; ETT secured in place; correct placement in the trachea confirmed by bilateral chest rise, tube condensation, and return of EtCO2 > 30 mmHg x3

## 2020-06-22 NOTE — ANESTHESIA PROCEDURE NOTES
Arterial Line      Patient reassessed immediately prior to procedure    Patient location during procedure: OR  Start time: 6/22/2020 1:22 PM  Stop Time:6/22/2020 1:27 PM       Line placed for hemodynamic monitoring and ABGs/Labs/ISTAT.  Performed By   Anesthesiologist: Salo Adamson MD  Preanesthetic Checklist  Completed: patient identified, site marked, surgical consent, pre-op evaluation, timeout performed, IV checked, risks and benefits discussed and monitors and equipment checked  Arterial Line Prep   Sterile Tech: cap, gloves and mask  Prep: ChloraPrep  Patient monitoring: blood pressure monitoring, continuous pulse oximetry and EKG  Arterial Line Procedure   Laterality:left  Location:  radial artery  Catheter size: 20 G   Guidance: landmark technique and palpation technique  Number of attempts: 1  Successful placement: yes  Post Assessment   Dressing Type: occlusive dressing applied, secured with tape and wrist guard applied.   Complications no  Circ/Move/Sens Assessment: unchanged.   Patient Tolerance: patient tolerated the procedure well with no apparent complications  Additional Notes  Pre-procedure:  Patient identified; pre-procedure vital signs, all relevant labs/studies, complete medical/surgical/anesthetic history, full medication list, full allergy list, and NPO status obtained/reviewed; physical assessment performed; anesthetic options, side effects, potential complications, risks, and benefits discussed; questions answered; written anesthesia procedure consent obtained; patient cleared for procedure; IV access in situ    Procedure:  Arterial line placed under anesthesia time, but before induction of general anesthesia; ASA monitor placed; patient positioned; hand hygiene performed; sterile technique maintained throughout the procedure; sterile prep and drape applied; insertion site determined by anatomical landmarks and palpation; skin and subcutaneous tissues numbed by injection of 1% lidocaine;  needle placed into the skin and advanced into the target artery with correct placement confirmed by return of arterial blood; wire slide into the target artery; arterial catheter threaded into the target artery over the needle/wire; needle/wire removed; correct catheter placement confirmed by transducing systemic arterial blood pressure; catheter secured with occlusive dressing and tape    Post-procedure:  Arterial catheter placed successfully; no apparent complications; minimal estimated blood loss; vital signs stable throughout; general anesthesia induced

## 2020-06-23 ENCOUNTER — APPOINTMENT (OUTPATIENT)
Dept: CARDIOLOGY | Facility: HOSPITAL | Age: 60
End: 2020-06-23

## 2020-06-23 ENCOUNTER — APPOINTMENT (OUTPATIENT)
Dept: GENERAL RADIOLOGY | Facility: HOSPITAL | Age: 60
End: 2020-06-23

## 2020-06-23 LAB
ALBUMIN SERPL-MCNC: 4.1 G/DL (ref 3.5–5.2)
ANION GAP SERPL CALCULATED.3IONS-SCNC: 13 MMOL/L (ref 5–15)
ARTERIAL PATENCY WRIST A: ABNORMAL
ARTERIAL PATENCY WRIST A: POSITIVE
ATMOSPHERIC PRESS: ABNORMAL MM[HG]
B PERT DNA SPEC QL NAA+PROBE: NOT DETECTED
BASE EXCESS BLDA CALC-SCNC: -0.3 MMOL/L (ref 0–3)
BASE EXCESS BLDA CALC-SCNC: 0.9 MMOL/L (ref 0–3)
BASE EXCESS BLDA CALC-SCNC: 3.4 MMOL/L (ref 0–3)
BASE EXCESS BLDA CALC-SCNC: 3.9 MMOL/L (ref 0–3)
BASOPHILS # BLD AUTO: 0.2 10*3/MM3 (ref 0–0.2)
BASOPHILS NFR BLD AUTO: 1 % (ref 0–1.5)
BDY SITE: ABNORMAL
BH CV ECHO MEAS - AO ROOT AREA (BSA CORRECTED): 1.3
BH CV ECHO MEAS - AO ROOT AREA: 8 CM^2
BH CV ECHO MEAS - AO ROOT DIAM: 3.2 CM
BH CV ECHO MEAS - BSA(HAYCOCK): 2.7 M^2
BH CV ECHO MEAS - BSA: 2.5 M^2
BH CV ECHO MEAS - BZI_BMI: 45.3 KILOGRAMS/M^2
BH CV ECHO MEAS - BZI_METRIC_HEIGHT: 177.8 CM
BH CV ECHO MEAS - BZI_METRIC_WEIGHT: 143.3 KG
BH CV ECHO MEAS - EDV(CUBED): 331.6 ML
BH CV ECHO MEAS - EDV(TEICH): 249 ML
BH CV ECHO MEAS - EF(CUBED): 18.3 %
BH CV ECHO MEAS - EF(TEICH): 14.1 %
BH CV ECHO MEAS - ESV(CUBED): 271 ML
BH CV ECHO MEAS - ESV(TEICH): 213.8 ML
BH CV ECHO MEAS - FS: 6.5 %
BH CV ECHO MEAS - IVS/LVPW: 1
BH CV ECHO MEAS - IVSD: 1.5 CM
BH CV ECHO MEAS - LA DIMENSION(2D): 5.2 CM
BH CV ECHO MEAS - LV MASS(C)D: 519.3 GRAMS
BH CV ECHO MEAS - LV MASS(C)DI: 204.8 GRAMS/M^2
BH CV ECHO MEAS - LVIDD: 6.9 CM
BH CV ECHO MEAS - LVIDS: 6.5 CM
BH CV ECHO MEAS - LVOT AREA: 5.3 CM^2
BH CV ECHO MEAS - LVOT DIAM: 2.6 CM
BH CV ECHO MEAS - LVPWD: 1.5 CM
BH CV ECHO MEAS - RVDD: 3.1 CM
BH CV ECHO MEAS - SI(CUBED): 23.9 ML/M^2
BH CV ECHO MEAS - SI(TEICH): 13.9 ML/M^2
BH CV ECHO MEAS - SV(CUBED): 60.6 ML
BH CV ECHO MEAS - SV(TEICH): 35.2 ML
BUN BLD-MCNC: 23 MG/DL (ref 6–20)
BUN BLD-MCNC: ABNORMAL MG/DL
BUN/CREAT SERPL: ABNORMAL
C PNEUM DNA NPH QL NAA+NON-PROBE: NOT DETECTED
CA-I SERPL ISE-MCNC: 1.14 MMOL/L (ref 1.2–1.3)
CA-I SERPL ISE-MCNC: 1.22 MMOL/L (ref 1.2–1.3)
CALCIUM SPEC-SCNC: 8.2 MG/DL (ref 8.6–10.5)
CHLORIDE SERPL-SCNC: 99 MMOL/L (ref 98–107)
CO2 BLDA-SCNC: 32.2 MMOL/L (ref 22–29)
CO2 BLDA-SCNC: 32.2 MMOL/L (ref 22–29)
CO2 BLDA-SCNC: 33.9 MMOL/L (ref 22–29)
CO2 BLDA-SCNC: 36.7 MMOL/L (ref 22–29)
CO2 SERPL-SCNC: 28 MMOL/L (ref 22–29)
CREAT BLD-MCNC: 1.38 MG/DL (ref 0.76–1.27)
DEPRECATED RDW RBC AUTO: 47.3 FL (ref 37–54)
EOSINOPHIL # BLD AUTO: 0.1 10*3/MM3 (ref 0–0.4)
EOSINOPHIL NFR BLD AUTO: 0.3 % (ref 0.3–6.2)
ERYTHROCYTE [DISTWIDTH] IN BLOOD BY AUTOMATED COUNT: 15.6 % (ref 12.3–15.4)
FLUAV H1 2009 PAND RNA NPH QL NAA+PROBE: NOT DETECTED
FLUAV H1 HA GENE NPH QL NAA+PROBE: NOT DETECTED
FLUAV H3 RNA NPH QL NAA+PROBE: NOT DETECTED
FLUAV SUBTYP SPEC NAA+PROBE: NOT DETECTED
FLUBV RNA ISLT QL NAA+PROBE: NOT DETECTED
GFR SERPL CREATININE-BSD FRML MDRD: 53 ML/MIN/1.73
GLUCOSE BLD-MCNC: 128 MG/DL (ref 65–99)
GLUCOSE BLDC GLUCOMTR-MCNC: 114 MG/DL (ref 70–105)
HADV DNA SPEC NAA+PROBE: NOT DETECTED
HCO3 BLDA-SCNC: 29.9 MMOL/L (ref 21–28)
HCO3 BLDA-SCNC: 30.1 MMOL/L (ref 21–28)
HCO3 BLDA-SCNC: 31.9 MMOL/L (ref 21–28)
HCO3 BLDA-SCNC: 34.3 MMOL/L (ref 21–28)
HCOV 229E RNA SPEC QL NAA+PROBE: NOT DETECTED
HCOV HKU1 RNA SPEC QL NAA+PROBE: NOT DETECTED
HCOV NL63 RNA SPEC QL NAA+PROBE: NOT DETECTED
HCOV OC43 RNA SPEC QL NAA+PROBE: NOT DETECTED
HCT VFR BLD AUTO: 42.5 % (ref 37.5–51)
HEMODILUTION: NO
HGB BLD-MCNC: 14.1 G/DL (ref 13–17.7)
HMPV RNA NPH QL NAA+NON-PROBE: NOT DETECTED
HOROWITZ INDEX BLD+IHG-RTO: 40 %
HPIV1 RNA SPEC QL NAA+PROBE: NOT DETECTED
HPIV2 RNA SPEC QL NAA+PROBE: NOT DETECTED
HPIV3 RNA NPH QL NAA+PROBE: NOT DETECTED
HPIV4 P GENE NPH QL NAA+PROBE: NOT DETECTED
INR PPP: 1.06 (ref 0.9–1.1)
INSPIRATORY TIME: 1
L PNEUMO1 AG UR QL IA: NEGATIVE
LV EF 2D ECHO EST: 30 %
LYMPHOCYTES # BLD AUTO: 0.9 10*3/MM3 (ref 0.7–3.1)
LYMPHOCYTES NFR BLD AUTO: 4.5 % (ref 19.6–45.3)
M PNEUMO IGG SER IA-ACNC: NOT DETECTED
MAGNESIUM SERPL-MCNC: 2.3 MG/DL (ref 1.6–2.6)
MAXIMAL PREDICTED HEART RATE: 163 BPM
MCH RBC QN AUTO: 28.2 PG (ref 26.6–33)
MCHC RBC AUTO-ENTMCNC: 33.1 G/DL (ref 31.5–35.7)
MCV RBC AUTO: 85 FL (ref 79–97)
MODALITY: ABNORMAL
MONOCYTES # BLD AUTO: 1.8 10*3/MM3 (ref 0.1–0.9)
MONOCYTES NFR BLD AUTO: 8.9 % (ref 5–12)
NEUTROPHILS # BLD AUTO: 17 10*3/MM3 (ref 1.7–7)
NEUTROPHILS NFR BLD AUTO: 85.3 % (ref 42.7–76)
NRBC BLD AUTO-RTO: 0 /100 WBC (ref 0–0.2)
PCO2 BLDA: 64.4 MM HG (ref 35–48)
PCO2 BLDA: 68.1 MM HG (ref 35–48)
PCO2 BLDA: 74.9 MM HG (ref 35–48)
PCO2 BLDA: 79.7 MM HG (ref 35–48)
PEEP RESPIRATORY: 5 CM[H2O]
PEEP RESPIRATORY: 7 CM[H2O]
PEEP RESPIRATORY: 7 CM[H2O]
PEEP RESPIRATORY: 8 CM[H2O]
PH BLDA: 7.21 PH UNITS (ref 7.35–7.45)
PH BLDA: 7.24 PH UNITS (ref 7.35–7.45)
PH BLDA: 7.25 PH UNITS (ref 7.35–7.45)
PH BLDA: 7.3 PH UNITS (ref 7.35–7.45)
PHOSPHATE SERPL-MCNC: 5.5 MG/DL (ref 2.5–4.5)
PLATELET # BLD AUTO: 279 10*3/MM3 (ref 140–450)
PMV BLD AUTO: 8.5 FL (ref 6–12)
PO2 BLDA: 70.1 MM HG (ref 83–108)
PO2 BLDA: 73.5 MM HG (ref 83–108)
PO2 BLDA: 76.2 MM HG (ref 83–108)
PO2 BLDA: 82 MM HG (ref 83–108)
POTASSIUM BLD-SCNC: 5.2 MMOL/L (ref 3.5–5.2)
PROCALCITONIN SERPL-MCNC: 0.1 NG/ML (ref 0.1–0.25)
PROTHROMBIN TIME: 11 SECONDS (ref 9.6–11.7)
RBC # BLD AUTO: 5 10*6/MM3 (ref 4.14–5.8)
RESPIRATORY RATE: 14
RESPIRATORY RATE: 26
RESPIRATORY RATE: 26
RESPIRATORY RATE: 30
RHINOVIRUS RNA SPEC NAA+PROBE: NOT DETECTED
RSV RNA NPH QL NAA+NON-PROBE: NOT DETECTED
S PNEUM AG SPEC QL LA: NEGATIVE
SAO2 % BLDCOA: 89.4 % (ref 94–98)
SAO2 % BLDCOA: 91.2 % (ref 94–98)
SAO2 % BLDCOA: 92.5 % (ref 94–98)
SAO2 % BLDCOA: 93 % (ref 94–98)
SODIUM BLD-SCNC: 140 MMOL/L (ref 136–145)
STRESS TARGET HR: 139 BPM
VENTILATOR MODE: ABNORMAL
VT ON VENT VENT: 550 ML
VT ON VENT VENT: 550 ML
WBC NRBC COR # BLD: 20 10*3/MM3 (ref 3.4–10.8)

## 2020-06-23 PROCEDURE — 94799 UNLISTED PULMONARY SVC/PX: CPT

## 2020-06-23 PROCEDURE — 99024 POSTOP FOLLOW-UP VISIT: CPT | Performed by: THORACIC SURGERY (CARDIOTHORACIC VASCULAR SURGERY)

## 2020-06-23 PROCEDURE — 82962 GLUCOSE BLOOD TEST: CPT

## 2020-06-23 PROCEDURE — 71045 X-RAY EXAM CHEST 1 VIEW: CPT

## 2020-06-23 PROCEDURE — 84145 PROCALCITONIN (PCT): CPT | Performed by: NURSE PRACTITIONER

## 2020-06-23 PROCEDURE — 97530 THERAPEUTIC ACTIVITIES: CPT

## 2020-06-23 PROCEDURE — 87899 AGENT NOS ASSAY W/OPTIC: CPT | Performed by: NURSE PRACTITIONER

## 2020-06-23 PROCEDURE — 25010000002 MIDAZOLAM PER 1 MG

## 2020-06-23 PROCEDURE — 87040 BLOOD CULTURE FOR BACTERIA: CPT | Performed by: NURSE PRACTITIONER

## 2020-06-23 PROCEDURE — 99233 SBSQ HOSP IP/OBS HIGH 50: CPT | Performed by: INTERNAL MEDICINE

## 2020-06-23 PROCEDURE — 94640 AIRWAY INHALATION TREATMENT: CPT

## 2020-06-23 PROCEDURE — 82330 ASSAY OF CALCIUM: CPT | Performed by: NURSE PRACTITIONER

## 2020-06-23 PROCEDURE — 31500 INSERT EMERGENCY AIRWAY: CPT | Performed by: NURSE PRACTITIONER

## 2020-06-23 PROCEDURE — 25010000002 HYDROMORPHONE PER 4 MG: Performed by: NURSE PRACTITIONER

## 2020-06-23 PROCEDURE — 82803 BLOOD GASES ANY COMBINATION: CPT

## 2020-06-23 PROCEDURE — 97166 OT EVAL MOD COMPLEX 45 MIN: CPT

## 2020-06-23 PROCEDURE — 25010000002 METOCLOPRAMIDE PER 10 MG: Performed by: NURSE PRACTITIONER

## 2020-06-23 PROCEDURE — 94002 VENT MGMT INPAT INIT DAY: CPT

## 2020-06-23 PROCEDURE — 25010000002 ONDANSETRON PER 1 MG: Performed by: NURSE PRACTITIONER

## 2020-06-23 PROCEDURE — 82533 TOTAL CORTISOL: CPT | Performed by: NURSE PRACTITIONER

## 2020-06-23 PROCEDURE — 25010000002 CEFAZOLIN PER 500 MG: Performed by: NURSE PRACTITIONER

## 2020-06-23 PROCEDURE — 93308 TTE F-UP OR LMTD: CPT | Performed by: INTERNAL MEDICINE

## 2020-06-23 PROCEDURE — 85025 COMPLETE CBC W/AUTO DIFF WBC: CPT | Performed by: NURSE PRACTITIONER

## 2020-06-23 PROCEDURE — 99024 POSTOP FOLLOW-UP VISIT: CPT | Performed by: NURSE PRACTITIONER

## 2020-06-23 PROCEDURE — 93325 DOPPLER ECHO COLOR FLOW MAPG: CPT | Performed by: INTERNAL MEDICINE

## 2020-06-23 PROCEDURE — 97116 GAIT TRAINING THERAPY: CPT

## 2020-06-23 PROCEDURE — 93010 ELECTROCARDIOGRAM REPORT: CPT | Performed by: INTERNAL MEDICINE

## 2020-06-23 PROCEDURE — 94660 CPAP INITIATION&MGMT: CPT

## 2020-06-23 PROCEDURE — 93308 TTE F-UP OR LMTD: CPT

## 2020-06-23 PROCEDURE — 85610 PROTHROMBIN TIME: CPT | Performed by: NURSE PRACTITIONER

## 2020-06-23 PROCEDURE — 93325 DOPPLER ECHO COLOR FLOW MAPG: CPT

## 2020-06-23 PROCEDURE — 87086 URINE CULTURE/COLONY COUNT: CPT | Performed by: NURSE PRACTITIONER

## 2020-06-23 PROCEDURE — 25010000002 CALCIUM GLUCONATE 2-0.675 GM/100ML-% SOLUTION: Performed by: NURSE PRACTITIONER

## 2020-06-23 PROCEDURE — 81001 URINALYSIS AUTO W/SCOPE: CPT | Performed by: NURSE PRACTITIONER

## 2020-06-23 PROCEDURE — 0099U HC BIOFIRE FILMARRAY RESP PANEL 1: CPT | Performed by: NURSE PRACTITIONER

## 2020-06-23 PROCEDURE — 25010000003 MILRINONE LACTATE IN DEXTROSE 20-5 MG/100ML-% SOLUTION: Performed by: NURSE PRACTITIONER

## 2020-06-23 PROCEDURE — 97162 PT EVAL MOD COMPLEX 30 MIN: CPT

## 2020-06-23 PROCEDURE — 80069 RENAL FUNCTION PANEL: CPT | Performed by: NURSE PRACTITIONER

## 2020-06-23 PROCEDURE — 36600 WITHDRAWAL OF ARTERIAL BLOOD: CPT

## 2020-06-23 PROCEDURE — 5A1935Z RESPIRATORY VENTILATION, LESS THAN 24 CONSECUTIVE HOURS: ICD-10-PCS | Performed by: NURSE PRACTITIONER

## 2020-06-23 PROCEDURE — 0BH17EZ INSERTION OF ENDOTRACHEAL AIRWAY INTO TRACHEA, VIA NATURAL OR ARTIFICIAL OPENING: ICD-10-PCS | Performed by: NURSE PRACTITIONER

## 2020-06-23 PROCEDURE — 25010000002 MAGNESIUM SULFATE IN D5W 1G/100ML (PREMIX) 1-5 GM/100ML-% SOLUTION: Performed by: NURSE PRACTITIONER

## 2020-06-23 PROCEDURE — 25010000002 HYDROCORTISONE SODIUM SUCCINATE 100 MG RECONSTITUTED SOLUTION: Performed by: THORACIC SURGERY (CARDIOTHORACIC VASCULAR SURGERY)

## 2020-06-23 PROCEDURE — 99222 1ST HOSP IP/OBS MODERATE 55: CPT | Performed by: INTERNAL MEDICINE

## 2020-06-23 PROCEDURE — 25010000002 AMIODARONE PER 30 MG: Performed by: NURSE PRACTITIONER

## 2020-06-23 PROCEDURE — 83735 ASSAY OF MAGNESIUM: CPT | Performed by: NURSE PRACTITIONER

## 2020-06-23 RX ORDER — CALCIUM GLUCONATE 20 MG/ML
2 INJECTION, SOLUTION INTRAVENOUS ONCE
Status: COMPLETED | OUTPATIENT
Start: 2020-06-23 | End: 2020-06-23

## 2020-06-23 RX ORDER — ETOMIDATE 2 MG/ML
20 INJECTION INTRAVENOUS ONCE
Status: COMPLETED | OUTPATIENT
Start: 2020-06-23 | End: 2020-06-23

## 2020-06-23 RX ORDER — PANTOPRAZOLE SODIUM 40 MG/10ML
40 INJECTION, POWDER, LYOPHILIZED, FOR SOLUTION INTRAVENOUS
Status: DISCONTINUED | OUTPATIENT
Start: 2020-06-24 | End: 2020-06-25

## 2020-06-23 RX ORDER — MILRINONE LACTATE 0.2 MG/ML
0.25 INJECTION, SOLUTION INTRAVENOUS
Status: DISCONTINUED | OUTPATIENT
Start: 2020-06-23 | End: 2020-06-25

## 2020-06-23 RX ORDER — ETOMIDATE 2 MG/ML
INJECTION INTRAVENOUS
Status: COMPLETED
Start: 2020-06-23 | End: 2020-06-23

## 2020-06-23 RX ORDER — IPRATROPIUM BROMIDE AND ALBUTEROL SULFATE 2.5; .5 MG/3ML; MG/3ML
3 SOLUTION RESPIRATORY (INHALATION)
Status: DISCONTINUED | OUTPATIENT
Start: 2020-06-23 | End: 2020-06-29 | Stop reason: HOSPADM

## 2020-06-23 RX ORDER — MIDAZOLAM HYDROCHLORIDE 1 MG/ML
INJECTION INTRAMUSCULAR; INTRAVENOUS
Status: COMPLETED
Start: 2020-06-23 | End: 2020-06-23

## 2020-06-23 RX ORDER — FENTANYL CITRATE 50 UG/ML
INJECTION, SOLUTION INTRAMUSCULAR; INTRAVENOUS
Status: DISPENSED
Start: 2020-06-23 | End: 2020-06-24

## 2020-06-23 RX ORDER — GABAPENTIN 100 MG/1
100 CAPSULE ORAL 3 TIMES DAILY
Status: DISCONTINUED | OUTPATIENT
Start: 2020-06-23 | End: 2020-06-23

## 2020-06-23 RX ORDER — HYDROCODONE BITARTRATE AND ACETAMINOPHEN 5; 325 MG/1; MG/1
2 TABLET ORAL EVERY 6 HOURS PRN
Status: DISCONTINUED | OUTPATIENT
Start: 2020-06-23 | End: 2020-06-25

## 2020-06-23 RX ORDER — HYDROCODONE BITARTRATE AND ACETAMINOPHEN 5; 325 MG/1; MG/1
1 TABLET ORAL EVERY 6 HOURS PRN
Status: DISCONTINUED | OUTPATIENT
Start: 2020-06-23 | End: 2020-06-25

## 2020-06-23 RX ORDER — MIDAZOLAM HYDROCHLORIDE 1 MG/ML
2 INJECTION INTRAMUSCULAR; INTRAVENOUS ONCE
Status: COMPLETED | OUTPATIENT
Start: 2020-06-23 | End: 2020-06-23

## 2020-06-23 RX ORDER — BUDESONIDE 0.5 MG/2ML
0.5 INHALANT ORAL
Status: DISCONTINUED | OUTPATIENT
Start: 2020-06-23 | End: 2020-06-29 | Stop reason: HOSPADM

## 2020-06-23 RX ORDER — NOREPINEPHRINE BIT/0.9 % NACL 8 MG/250ML
.02-.3 INFUSION BOTTLE (ML) INTRAVENOUS
Status: DISCONTINUED | OUTPATIENT
Start: 2020-06-23 | End: 2020-06-25

## 2020-06-23 RX ORDER — BUMETANIDE 0.25 MG/ML
2 INJECTION INTRAMUSCULAR; INTRAVENOUS ONCE
Status: COMPLETED | OUTPATIENT
Start: 2020-06-23 | End: 2020-06-23

## 2020-06-23 RX ORDER — DEXMEDETOMIDINE HYDROCHLORIDE 4 UG/ML
.2-1.5 INJECTION, SOLUTION INTRAVENOUS
Status: DISCONTINUED | OUTPATIENT
Start: 2020-06-23 | End: 2020-06-25

## 2020-06-23 RX ADMIN — SENNOSIDES AND DOCUSATE SODIUM 2 TABLET: 8.6; 5 TABLET ORAL at 09:19

## 2020-06-23 RX ADMIN — Medication 50 MEQ: at 19:57

## 2020-06-23 RX ADMIN — MUPIROCIN: 20 OINTMENT TOPICAL at 09:22

## 2020-06-23 RX ADMIN — BUDESONIDE AND FORMOTEROL FUMARATE DIHYDRATE 2 PUFF: 160; 4.5 AEROSOL RESPIRATORY (INHALATION) at 08:30

## 2020-06-23 RX ADMIN — Medication 0.1 MCG/KG/MIN: at 20:52

## 2020-06-23 RX ADMIN — ATORVASTATIN CALCIUM 80 MG: 40 TABLET, FILM COATED ORAL at 09:21

## 2020-06-23 RX ADMIN — HYDROCHLOROTHIAZIDE 25 MG: 25 TABLET ORAL at 09:20

## 2020-06-23 RX ADMIN — ONDANSETRON 4 MG: 2 INJECTION INTRAMUSCULAR; INTRAVENOUS at 05:53

## 2020-06-23 RX ADMIN — DULOXETINE HYDROCHLORIDE 60 MG: 30 CAPSULE, DELAYED RELEASE ORAL at 09:21

## 2020-06-23 RX ADMIN — MONTELUKAST SODIUM 10 MG: 10 TABLET, COATED ORAL at 09:21

## 2020-06-23 RX ADMIN — SODIUM BICARBONATE 100 MEQ: 84 INJECTION, SOLUTION INTRAVENOUS at 21:34

## 2020-06-23 RX ADMIN — BUMETANIDE 2 MG: 0.25 INJECTION INTRAMUSCULAR; INTRAVENOUS at 16:06

## 2020-06-23 RX ADMIN — TAMSULOSIN HYDROCHLORIDE 0.4 MG: 0.4 CAPSULE ORAL at 18:39

## 2020-06-23 RX ADMIN — HYDROCODONE BITARTRATE AND ACETAMINOPHEN 1 TABLET: 5; 325 TABLET ORAL at 05:53

## 2020-06-23 RX ADMIN — METOCLOPRAMIDE 10 MG: 5 INJECTION, SOLUTION INTRAMUSCULAR; INTRAVENOUS at 12:25

## 2020-06-23 RX ADMIN — SENNOSIDES AND DOCUSATE SODIUM 2 TABLET: 8.6; 5 TABLET ORAL at 20:48

## 2020-06-23 RX ADMIN — MAGNESIUM SULFATE HEPTAHYDRATE 1 G: 1 INJECTION, SOLUTION INTRAVENOUS at 01:38

## 2020-06-23 RX ADMIN — SODIUM CHLORIDE 3 G: 9 INJECTION, SOLUTION INTRAVENOUS at 04:34

## 2020-06-23 RX ADMIN — DEXMEDETOMIDINE 0.7 MCG/KG/HR: 100 INJECTION, SOLUTION, CONCENTRATE INTRAVENOUS at 22:40

## 2020-06-23 RX ADMIN — POTASSIUM CHLORIDE 10 MEQ: 750 TABLET, EXTENDED RELEASE ORAL at 09:19

## 2020-06-23 RX ADMIN — ETOMIDATE 20 MG: 2 INJECTION INTRAVENOUS at 22:36

## 2020-06-23 RX ADMIN — HYDROCORTISONE SODIUM SUCCINATE 100 MG: 100 INJECTION, POWDER, FOR SOLUTION INTRAMUSCULAR; INTRAVENOUS at 20:48

## 2020-06-23 RX ADMIN — MUPIROCIN: 20 OINTMENT TOPICAL at 20:52

## 2020-06-23 RX ADMIN — SODIUM CHLORIDE 3 G: 9 INJECTION, SOLUTION INTRAVENOUS at 14:45

## 2020-06-23 RX ADMIN — HYDROCODONE BITARTRATE AND ACETAMINOPHEN 1 TABLET: 5; 325 TABLET ORAL at 10:44

## 2020-06-23 RX ADMIN — RANOLAZINE 1000 MG: 500 TABLET, FILM COATED, EXTENDED RELEASE ORAL at 09:20

## 2020-06-23 RX ADMIN — PANTOPRAZOLE SODIUM 40 MG: 40 TABLET, DELAYED RELEASE ORAL at 09:21

## 2020-06-23 RX ADMIN — IPRATROPIUM BROMIDE AND ALBUTEROL SULFATE 3 ML: .5; 3 SOLUTION RESPIRATORY (INHALATION) at 22:57

## 2020-06-23 RX ADMIN — OXYCODONE HYDROCHLORIDE 10 MG: 5 TABLET ORAL at 07:04

## 2020-06-23 RX ADMIN — ACETAMINOPHEN 650 MG: 325 TABLET ORAL at 01:38

## 2020-06-23 RX ADMIN — GABAPENTIN 300 MG: 300 CAPSULE ORAL at 09:20

## 2020-06-23 RX ADMIN — SODIUM BICARBONATE 50 MEQ: 84 INJECTION, SOLUTION INTRAVENOUS at 20:47

## 2020-06-23 RX ADMIN — CYCLOBENZAPRINE HYDROCHLORIDE 10 MG: 10 TABLET, FILM COATED ORAL at 05:56

## 2020-06-23 RX ADMIN — Medication 50 MEQ: at 20:47

## 2020-06-23 RX ADMIN — BUMETANIDE 1 MG: 1 TABLET ORAL at 09:19

## 2020-06-23 RX ADMIN — HYDROMORPHONE HYDROCHLORIDE 0.5 MG: 2 INJECTION, SOLUTION INTRAMUSCULAR; INTRAVENOUS; SUBCUTANEOUS at 20:47

## 2020-06-23 RX ADMIN — MIDAZOLAM HYDROCHLORIDE 2 MG: 1 INJECTION INTRAMUSCULAR; INTRAVENOUS at 22:36

## 2020-06-23 RX ADMIN — BUDESONIDE 0.5 MG: 0.5 INHALANT RESPIRATORY (INHALATION) at 20:19

## 2020-06-23 RX ADMIN — CHLORHEXIDINE GLUCONATE 0.12% ORAL RINSE 15 ML: 1.2 LIQUID ORAL at 20:48

## 2020-06-23 RX ADMIN — CALCIUM CHLORIDE 1 G: 100 INJECTION INTRAVENOUS; INTRAVENTRICULAR at 15:46

## 2020-06-23 RX ADMIN — CALCIUM GLUCONATE 2 G: 20 INJECTION, SOLUTION INTRAVENOUS at 12:25

## 2020-06-23 RX ADMIN — AMIODARONE HYDROCHLORIDE 200 MG: 200 TABLET ORAL at 09:21

## 2020-06-23 RX ADMIN — Medication 0.02 MCG/KG/MIN: at 01:39

## 2020-06-23 RX ADMIN — METOCLOPRAMIDE 10 MG: 5 INJECTION, SOLUTION INTRAMUSCULAR; INTRAVENOUS at 04:34

## 2020-06-23 RX ADMIN — CETIRIZINE HYDROCHLORIDE 10 MG: 10 TABLET, FILM COATED ORAL at 09:21

## 2020-06-23 RX ADMIN — MAGNESIUM SULFATE HEPTAHYDRATE 1 G: 1 INJECTION, SOLUTION INTRAVENOUS at 09:22

## 2020-06-23 RX ADMIN — SODIUM BICARBONATE 50 MEQ: 84 INJECTION, SOLUTION INTRAVENOUS at 19:57

## 2020-06-23 RX ADMIN — ONDANSETRON 4 MG: 2 INJECTION INTRAMUSCULAR; INTRAVENOUS at 18:39

## 2020-06-23 RX ADMIN — IPRATROPIUM BROMIDE AND ALBUTEROL SULFATE 3 ML: .5; 3 SOLUTION RESPIRATORY (INHALATION) at 20:19

## 2020-06-23 RX ADMIN — SODIUM CHLORIDE 3 G: 9 INJECTION, SOLUTION INTRAVENOUS at 20:52

## 2020-06-23 RX ADMIN — MILRINONE LACTATE IN DEXTROSE 0.25 MCG/KG/MIN: 200 INJECTION, SOLUTION INTRAVENOUS at 16:07

## 2020-06-23 RX ADMIN — CHLORHEXIDINE GLUCONATE 0.12% ORAL RINSE 15 ML: 1.2 LIQUID ORAL at 09:22

## 2020-06-23 RX ADMIN — ETOMIDATE 20 MG: 40 INJECTION, SOLUTION INTRAVENOUS at 22:36

## 2020-06-23 RX ADMIN — AMIODARONE HYDROCHLORIDE 0.5 MG/MIN: 50 INJECTION, SOLUTION INTRAVENOUS at 01:40

## 2020-06-23 RX ADMIN — ACETAMINOPHEN 650 MG: 325 TABLET ORAL at 04:20

## 2020-06-23 RX ADMIN — Medication 100 MEQ: at 21:34

## 2020-06-23 RX ADMIN — MILRINONE LACTATE IN DEXTROSE 0.25 MCG/KG/MIN: 200 INJECTION, SOLUTION INTRAVENOUS at 23:52

## 2020-06-23 RX ADMIN — Medication 0.02 MCG/KG/MIN: at 01:40

## 2020-06-23 RX ADMIN — HYDROMORPHONE HYDROCHLORIDE 0.5 MG: 2 INJECTION, SOLUTION INTRAMUSCULAR; INTRAVENOUS; SUBCUTANEOUS at 23:52

## 2020-06-23 RX ADMIN — GABAPENTIN 300 MG: 300 CAPSULE ORAL at 20:48

## 2020-06-23 RX ADMIN — MIDAZOLAM 2 MG: 1 INJECTION INTRAMUSCULAR; INTRAVENOUS at 22:36

## 2020-06-24 ENCOUNTER — APPOINTMENT (OUTPATIENT)
Dept: GENERAL RADIOLOGY | Facility: HOSPITAL | Age: 60
End: 2020-06-24

## 2020-06-24 LAB
ANION GAP SERPL CALCULATED.3IONS-SCNC: 13 MMOL/L (ref 5–15)
ARTERIAL PATENCY WRIST A: ABNORMAL
ARTERIAL PATENCY WRIST A: POSITIVE
ATMOSPHERIC PRESS: ABNORMAL MM[HG]
BACTERIA UR QL AUTO: ABNORMAL /HPF
BASE EXCESS BLDA CALC-SCNC: 3.1 MMOL/L (ref 0–3)
BASE EXCESS BLDA CALC-SCNC: 3.7 MMOL/L (ref 0–3)
BASE EXCESS BLDA CALC-SCNC: 4.2 MMOL/L (ref 0–3)
BASE EXCESS BLDA CALC-SCNC: 4.9 MMOL/L (ref 0–3)
BDY SITE: ABNORMAL
BILIRUB UR QL STRIP: NEGATIVE
BUN BLD-MCNC: 36 MG/DL (ref 6–20)
BUN BLD-MCNC: ABNORMAL MG/DL
BUN/CREAT SERPL: ABNORMAL
CA-I SERPL ISE-MCNC: 1.14 MMOL/L (ref 1.2–1.3)
CALCIUM SPEC-SCNC: 8.5 MG/DL (ref 8.6–10.5)
CHLORIDE SERPL-SCNC: 94 MMOL/L (ref 98–107)
CLARITY UR: ABNORMAL
CO2 BLDA-SCNC: 29.5 MMOL/L (ref 22–29)
CO2 BLDA-SCNC: 30.4 MMOL/L (ref 22–29)
CO2 BLDA-SCNC: 33.3 MMOL/L (ref 22–29)
CO2 BLDA-SCNC: 33.6 MMOL/L (ref 22–29)
CO2 SERPL-SCNC: 28 MMOL/L (ref 22–29)
COLOR UR: ABNORMAL
CORTIS SERPL-MCNC: 21.78 MCG/DL
CREAT BLD-MCNC: 2.13 MG/DL (ref 0.76–1.27)
DEPRECATED RDW RBC AUTO: 45.9 FL (ref 37–54)
ERYTHROCYTE [DISTWIDTH] IN BLOOD BY AUTOMATED COUNT: 15.6 % (ref 12.3–15.4)
GFR SERPL CREATININE-BSD FRML MDRD: 32 ML/MIN/1.73
GLUCOSE BLD-MCNC: 138 MG/DL (ref 65–99)
GLUCOSE UR STRIP-MCNC: NEGATIVE MG/DL
HCO3 BLDA-SCNC: 28.3 MMOL/L (ref 21–28)
HCO3 BLDA-SCNC: 28.9 MMOL/L (ref 21–28)
HCO3 BLDA-SCNC: 31.6 MMOL/L (ref 21–28)
HCO3 BLDA-SCNC: 31.7 MMOL/L (ref 21–28)
HCT VFR BLD AUTO: 36.6 % (ref 37.5–51)
HEMODILUTION: NO
HGB BLD-MCNC: 12.6 G/DL (ref 13–17.7)
HGB UR QL STRIP.AUTO: ABNORMAL
HOROWITZ INDEX BLD+IHG-RTO: 40 %
HOROWITZ INDEX BLD+IHG-RTO: <21 %
HYALINE CASTS UR QL AUTO: ABNORMAL /LPF
KETONES UR QL STRIP: ABNORMAL
LEUKOCYTE ESTERASE UR QL STRIP.AUTO: ABNORMAL
MCH RBC QN AUTO: 28.4 PG (ref 26.6–33)
MCHC RBC AUTO-ENTMCNC: 34.3 G/DL (ref 31.5–35.7)
MCV RBC AUTO: 82.8 FL (ref 79–97)
MODALITY: ABNORMAL
NITRITE UR QL STRIP: NEGATIVE
PCO2 BLDA: 39.3 MM HG (ref 35–48)
PCO2 BLDA: 47.7 MM HG (ref 35–48)
PCO2 BLDA: 54.3 MM HG (ref 35–48)
PCO2 BLDA: 61.5 MM HG (ref 35–48)
PEEP RESPIRATORY: 5 CM[H2O]
PH BLDA: 7.32 PH UNITS (ref 7.35–7.45)
PH BLDA: 7.37 PH UNITS (ref 7.35–7.45)
PH BLDA: 7.39 PH UNITS (ref 7.35–7.45)
PH BLDA: 7.46 PH UNITS (ref 7.35–7.45)
PH UR STRIP.AUTO: <=5 [PH] (ref 5–8)
PLATELET # BLD AUTO: 215 10*3/MM3 (ref 140–450)
PMV BLD AUTO: 8.5 FL (ref 6–12)
PO2 BLDA: 64.6 MM HG (ref 83–108)
PO2 BLDA: 66.4 MM HG (ref 83–108)
PO2 BLDA: 74.6 MM HG (ref 83–108)
PO2 BLDA: 99.4 MM HG (ref 83–108)
POTASSIUM BLD-SCNC: 4.3 MMOL/L (ref 3.5–5.2)
PROT UR QL STRIP: ABNORMAL
PSV: 8 CMH2O
RBC # BLD AUTO: 4.42 10*6/MM3 (ref 4.14–5.8)
RBC # UR: ABNORMAL /HPF
REF LAB TEST METHOD: ABNORMAL
RESPIRATORY RATE: 28
RESPIRATORY RATE: 28
SAO2 % BLDCOA: 91.8 % (ref 94–98)
SAO2 % BLDCOA: 94 % (ref 94–98)
SAO2 % BLDCOA: 94 % (ref 94–98)
SAO2 % BLDCOA: 96.9 % (ref 94–98)
SODIUM BLD-SCNC: 135 MMOL/L (ref 136–145)
SP GR UR STRIP: >=1.03 (ref 1–1.03)
SQUAMOUS #/AREA URNS HPF: ABNORMAL /HPF
UROBILINOGEN UR QL STRIP: ABNORMAL
VENTILATOR MODE: ABNORMAL
VT ON VENT VENT: 600 ML
VT ON VENT VENT: 600 ML
WBC NRBC COR # BLD: 18.5 10*3/MM3 (ref 3.4–10.8)
WBC UR QL AUTO: ABNORMAL /HPF

## 2020-06-24 PROCEDURE — 94799 UNLISTED PULMONARY SVC/PX: CPT

## 2020-06-24 PROCEDURE — 25010000002 CALCIUM GLUCONATE 2-0.675 GM/100ML-% SOLUTION: Performed by: NURSE PRACTITIONER

## 2020-06-24 PROCEDURE — 99024 POSTOP FOLLOW-UP VISIT: CPT | Performed by: NURSE PRACTITIONER

## 2020-06-24 PROCEDURE — 82330 ASSAY OF CALCIUM: CPT | Performed by: NURSE PRACTITIONER

## 2020-06-24 PROCEDURE — 25010000002 CEFAZOLIN PER 500 MG: Performed by: NURSE PRACTITIONER

## 2020-06-24 PROCEDURE — 82803 BLOOD GASES ANY COMBINATION: CPT

## 2020-06-24 PROCEDURE — 71045 X-RAY EXAM CHEST 1 VIEW: CPT

## 2020-06-24 PROCEDURE — 99233 SBSQ HOSP IP/OBS HIGH 50: CPT | Performed by: INTERNAL MEDICINE

## 2020-06-24 PROCEDURE — 85027 COMPLETE CBC AUTOMATED: CPT | Performed by: NURSE PRACTITIONER

## 2020-06-24 PROCEDURE — 80048 BASIC METABOLIC PNL TOTAL CA: CPT | Performed by: NURSE PRACTITIONER

## 2020-06-24 PROCEDURE — 25010000003 MILRINONE LACTATE IN DEXTROSE 20-5 MG/100ML-% SOLUTION: Performed by: NURSE PRACTITIONER

## 2020-06-24 PROCEDURE — 94003 VENT MGMT INPAT SUBQ DAY: CPT

## 2020-06-24 PROCEDURE — 25010000002 ENOXAPARIN PER 10 MG: Performed by: NURSE PRACTITIONER

## 2020-06-24 PROCEDURE — 25010000002 HYDROCORTISONE SODIUM SUCCINATE 100 MG RECONSTITUTED SOLUTION: Performed by: THORACIC SURGERY (CARDIOTHORACIC VASCULAR SURGERY)

## 2020-06-24 PROCEDURE — 25010000002 FUROSEMIDE PER 20 MG: Performed by: INTERNAL MEDICINE

## 2020-06-24 PROCEDURE — 25010000002 ONDANSETRON PER 1 MG: Performed by: NURSE PRACTITIONER

## 2020-06-24 PROCEDURE — 99232 SBSQ HOSP IP/OBS MODERATE 35: CPT | Performed by: INTERNAL MEDICINE

## 2020-06-24 PROCEDURE — 25010000002 HYDROMORPHONE PER 4 MG: Performed by: NURSE PRACTITIONER

## 2020-06-24 PROCEDURE — 74018 RADEX ABDOMEN 1 VIEW: CPT

## 2020-06-24 PROCEDURE — 25010000002 CEFTRIAXONE PER 250 MG: Performed by: INTERNAL MEDICINE

## 2020-06-24 RX ORDER — CALCIUM GLUCONATE 20 MG/ML
2 INJECTION, SOLUTION INTRAVENOUS 2 TIMES DAILY
Status: COMPLETED | OUTPATIENT
Start: 2020-06-24 | End: 2020-06-24

## 2020-06-24 RX ORDER — FUROSEMIDE 10 MG/ML
40 INJECTION INTRAMUSCULAR; INTRAVENOUS
Status: DISCONTINUED | OUTPATIENT
Start: 2020-06-24 | End: 2020-06-26

## 2020-06-24 RX ADMIN — HYDROCODONE BITARTRATE AND ACETAMINOPHEN 1 TABLET: 5; 325 TABLET ORAL at 11:55

## 2020-06-24 RX ADMIN — MONTELUKAST SODIUM 10 MG: 10 TABLET, COATED ORAL at 10:10

## 2020-06-24 RX ADMIN — HYDROMORPHONE HYDROCHLORIDE 0.5 MG: 2 INJECTION, SOLUTION INTRAMUSCULAR; INTRAVENOUS; SUBCUTANEOUS at 07:29

## 2020-06-24 RX ADMIN — SENNOSIDES AND DOCUSATE SODIUM 2 TABLET: 8.6; 5 TABLET ORAL at 10:10

## 2020-06-24 RX ADMIN — HYDROCORTISONE SODIUM SUCCINATE 100 MG: 100 INJECTION, POWDER, FOR SOLUTION INTRAMUSCULAR; INTRAVENOUS at 05:29

## 2020-06-24 RX ADMIN — HYDROMORPHONE HYDROCHLORIDE 0.5 MG: 2 INJECTION, SOLUTION INTRAMUSCULAR; INTRAVENOUS; SUBCUTANEOUS at 13:57

## 2020-06-24 RX ADMIN — ENOXAPARIN SODIUM 40 MG: 40 INJECTION SUBCUTANEOUS at 20:05

## 2020-06-24 RX ADMIN — CHLORHEXIDINE GLUCONATE 0.12% ORAL RINSE 15 ML: 1.2 LIQUID ORAL at 20:05

## 2020-06-24 RX ADMIN — MUPIROCIN: 20 OINTMENT TOPICAL at 10:11

## 2020-06-24 RX ADMIN — BUDESONIDE 0.5 MG: 0.5 INHALANT RESPIRATORY (INHALATION) at 19:34

## 2020-06-24 RX ADMIN — MUPIROCIN: 20 OINTMENT TOPICAL at 20:08

## 2020-06-24 RX ADMIN — SODIUM CHLORIDE 3 G: 9 INJECTION, SOLUTION INTRAVENOUS at 05:30

## 2020-06-24 RX ADMIN — HYDROCODONE BITARTRATE AND ACETAMINOPHEN 1 TABLET: 5; 325 TABLET ORAL at 23:47

## 2020-06-24 RX ADMIN — GABAPENTIN 300 MG: 300 CAPSULE ORAL at 20:05

## 2020-06-24 RX ADMIN — CHLORHEXIDINE GLUCONATE 0.12% ORAL RINSE 15 ML: 1.2 LIQUID ORAL at 10:11

## 2020-06-24 RX ADMIN — ONDANSETRON 4 MG: 2 INJECTION INTRAMUSCULAR; INTRAVENOUS at 19:38

## 2020-06-24 RX ADMIN — SENNOSIDES AND DOCUSATE SODIUM 2 TABLET: 8.6; 5 TABLET ORAL at 20:05

## 2020-06-24 RX ADMIN — CALCIUM GLUCONATE 2 G: 20 INJECTION, SOLUTION INTRAVENOUS at 11:55

## 2020-06-24 RX ADMIN — CALCIUM GLUCONATE 2 G: 20 INJECTION, SOLUTION INTRAVENOUS at 20:05

## 2020-06-24 RX ADMIN — IPRATROPIUM BROMIDE AND ALBUTEROL SULFATE 3 ML: .5; 3 SOLUTION RESPIRATORY (INHALATION) at 22:57

## 2020-06-24 RX ADMIN — TAMSULOSIN HYDROCHLORIDE 0.4 MG: 0.4 CAPSULE ORAL at 17:53

## 2020-06-24 RX ADMIN — AMIODARONE HYDROCHLORIDE 200 MG: 200 TABLET ORAL at 10:10

## 2020-06-24 RX ADMIN — DEXMEDETOMIDINE 0.2 MCG/KG/HR: 100 INJECTION, SOLUTION, CONCENTRATE INTRAVENOUS at 10:58

## 2020-06-24 RX ADMIN — HYDROCORTISONE SODIUM SUCCINATE 100 MG: 100 INJECTION, POWDER, FOR SOLUTION INTRAMUSCULAR; INTRAVENOUS at 11:55

## 2020-06-24 RX ADMIN — Medication 0.04 MCG/KG/MIN: at 10:59

## 2020-06-24 RX ADMIN — GABAPENTIN 300 MG: 300 CAPSULE ORAL at 10:10

## 2020-06-24 RX ADMIN — ACETAMINOPHEN 650 MG: 325 TABLET, FILM COATED ORAL at 20:05

## 2020-06-24 RX ADMIN — CEFTRIAXONE SODIUM 1 G: 1 INJECTION, POWDER, FOR SOLUTION INTRAMUSCULAR; INTRAVENOUS at 10:09

## 2020-06-24 RX ADMIN — ACETAMINOPHEN 650 MG: 325 TABLET, FILM COATED ORAL at 16:08

## 2020-06-24 RX ADMIN — IPRATROPIUM BROMIDE AND ALBUTEROL SULFATE 3 ML: .5; 3 SOLUTION RESPIRATORY (INHALATION) at 07:56

## 2020-06-24 RX ADMIN — CETIRIZINE HYDROCHLORIDE 10 MG: 10 TABLET, FILM COATED ORAL at 10:11

## 2020-06-24 RX ADMIN — IPRATROPIUM BROMIDE AND ALBUTEROL SULFATE 3 ML: .5; 3 SOLUTION RESPIRATORY (INHALATION) at 03:19

## 2020-06-24 RX ADMIN — FUROSEMIDE 40 MG: 10 INJECTION, SOLUTION INTRAMUSCULAR; INTRAVENOUS at 16:08

## 2020-06-24 RX ADMIN — PANTOPRAZOLE SODIUM 40 MG: 40 INJECTION, POWDER, FOR SOLUTION INTRAVENOUS at 05:29

## 2020-06-24 RX ADMIN — IPRATROPIUM BROMIDE AND ALBUTEROL SULFATE 3 ML: .5; 3 SOLUTION RESPIRATORY (INHALATION) at 10:00

## 2020-06-24 RX ADMIN — IPRATROPIUM BROMIDE AND ALBUTEROL SULFATE 3 ML: .5; 3 SOLUTION RESPIRATORY (INHALATION) at 19:34

## 2020-06-24 RX ADMIN — HYDROMORPHONE HYDROCHLORIDE 0.5 MG: 2 INJECTION, SOLUTION INTRAMUSCULAR; INTRAVENOUS; SUBCUTANEOUS at 10:09

## 2020-06-24 RX ADMIN — MILRINONE LACTATE IN DEXTROSE 0.12 MCG/KG/MIN: 200 INJECTION, SOLUTION INTRAVENOUS at 19:35

## 2020-06-24 RX ADMIN — ATORVASTATIN CALCIUM 80 MG: 40 TABLET, FILM COATED ORAL at 10:10

## 2020-06-24 RX ADMIN — HYDROMORPHONE HYDROCHLORIDE 0.5 MG: 2 INJECTION, SOLUTION INTRAMUSCULAR; INTRAVENOUS; SUBCUTANEOUS at 02:50

## 2020-06-24 RX ADMIN — HYDROCODONE BITARTRATE AND ACETAMINOPHEN 1 TABLET: 5; 325 TABLET ORAL at 17:53

## 2020-06-24 RX ADMIN — DEXMEDETOMIDINE 0.7 MCG/KG/HR: 100 INJECTION, SOLUTION, CONCENTRATE INTRAVENOUS at 05:30

## 2020-06-24 RX ADMIN — DEXMEDETOMIDINE 0.7 MCG/KG/HR: 100 INJECTION, SOLUTION, CONCENTRATE INTRAVENOUS at 01:58

## 2020-06-24 RX ADMIN — BUDESONIDE 0.5 MG: 0.5 INHALANT RESPIRATORY (INHALATION) at 07:55

## 2020-06-25 LAB
ALBUMIN SERPL-MCNC: 3.4 G/DL (ref 3.5–5.2)
ANION GAP SERPL CALCULATED.3IONS-SCNC: 10 MMOL/L (ref 5–15)
ARTERIAL PATENCY WRIST A: ABNORMAL
ATMOSPHERIC PRESS: ABNORMAL MM[HG]
BACTERIA SPEC AEROBE CULT: NO GROWTH
BASE EXCESS BLDA CALC-SCNC: 5.3 MMOL/L (ref 0–3)
BDY SITE: ABNORMAL
BUN BLD-MCNC: 33 MG/DL (ref 6–20)
BUN BLD-MCNC: ABNORMAL MG/DL
BUN/CREAT SERPL: ABNORMAL
CA-I SERPL ISE-MCNC: 1.18 MMOL/L (ref 1.2–1.3)
CALCIUM SPEC-SCNC: 8.7 MG/DL (ref 8.6–10.5)
CHLORIDE SERPL-SCNC: 96 MMOL/L (ref 98–107)
CO2 BLDA-SCNC: 33.8 MMOL/L (ref 22–29)
CO2 SERPL-SCNC: 30 MMOL/L (ref 22–29)
CREAT BLD-MCNC: 1.2 MG/DL (ref 0.76–1.27)
DEPRECATED RDW RBC AUTO: 46.8 FL (ref 37–54)
ERYTHROCYTE [DISTWIDTH] IN BLOOD BY AUTOMATED COUNT: 15.9 % (ref 12.3–15.4)
GFR SERPL CREATININE-BSD FRML MDRD: 62 ML/MIN/1.73
GLUCOSE BLD-MCNC: 123 MG/DL (ref 65–99)
HCO3 BLDA-SCNC: 32.1 MMOL/L (ref 21–28)
HCT VFR BLD AUTO: 34.5 % (ref 37.5–51)
HEMODILUTION: NO
HGB BLD-MCNC: 11.8 G/DL (ref 13–17.7)
HOROWITZ INDEX BLD+IHG-RTO: 28 %
MAGNESIUM SERPL-MCNC: 2.1 MG/DL (ref 1.6–2.6)
MCH RBC QN AUTO: 28.5 PG (ref 26.6–33)
MCHC RBC AUTO-ENTMCNC: 34.2 G/DL (ref 31.5–35.7)
MCV RBC AUTO: 83.5 FL (ref 79–97)
MODALITY: ABNORMAL
PCO2 BLDA: 55.7 MM HG (ref 35–48)
PH BLDA: 7.37 PH UNITS (ref 7.35–7.45)
PHOSPHATE SERPL-MCNC: 2.4 MG/DL (ref 2.5–4.5)
PLATELET # BLD AUTO: 199 10*3/MM3 (ref 140–450)
PMV BLD AUTO: 9 FL (ref 6–12)
PO2 BLDA: 95.1 MM HG (ref 83–108)
POTASSIUM BLD-SCNC: 4.1 MMOL/L (ref 3.5–5.2)
RBC # BLD AUTO: 4.13 10*6/MM3 (ref 4.14–5.8)
SAO2 % BLDCOA: 96.9 % (ref 94–98)
SODIUM BLD-SCNC: 136 MMOL/L (ref 136–145)
WBC NRBC COR # BLD: 13.4 10*3/MM3 (ref 3.4–10.8)

## 2020-06-25 PROCEDURE — 93005 ELECTROCARDIOGRAM TRACING: CPT | Performed by: NURSE PRACTITIONER

## 2020-06-25 PROCEDURE — 85027 COMPLETE CBC AUTOMATED: CPT | Performed by: NURSE PRACTITIONER

## 2020-06-25 PROCEDURE — 25010000002 ENOXAPARIN PER 10 MG: Performed by: NURSE PRACTITIONER

## 2020-06-25 PROCEDURE — 25010000002 CEFTRIAXONE PER 250 MG: Performed by: INTERNAL MEDICINE

## 2020-06-25 PROCEDURE — 25010000002 HYDROMORPHONE PER 4 MG: Performed by: NURSE PRACTITIONER

## 2020-06-25 PROCEDURE — 99232 SBSQ HOSP IP/OBS MODERATE 35: CPT | Performed by: INTERNAL MEDICINE

## 2020-06-25 PROCEDURE — 82803 BLOOD GASES ANY COMBINATION: CPT

## 2020-06-25 PROCEDURE — 83735 ASSAY OF MAGNESIUM: CPT | Performed by: INTERNAL MEDICINE

## 2020-06-25 PROCEDURE — 25010000002 CALCIUM GLUCONATE 2-0.675 GM/100ML-% SOLUTION: Performed by: NURSE PRACTITIONER

## 2020-06-25 PROCEDURE — 97530 THERAPEUTIC ACTIVITIES: CPT

## 2020-06-25 PROCEDURE — 80069 RENAL FUNCTION PANEL: CPT | Performed by: INTERNAL MEDICINE

## 2020-06-25 PROCEDURE — 97112 NEUROMUSCULAR REEDUCATION: CPT

## 2020-06-25 PROCEDURE — 99233 SBSQ HOSP IP/OBS HIGH 50: CPT | Performed by: INTERNAL MEDICINE

## 2020-06-25 PROCEDURE — 25010000002 FUROSEMIDE PER 20 MG: Performed by: INTERNAL MEDICINE

## 2020-06-25 PROCEDURE — 94799 UNLISTED PULMONARY SVC/PX: CPT

## 2020-06-25 PROCEDURE — 97110 THERAPEUTIC EXERCISES: CPT

## 2020-06-25 PROCEDURE — 82330 ASSAY OF CALCIUM: CPT | Performed by: NURSE PRACTITIONER

## 2020-06-25 PROCEDURE — 99024 POSTOP FOLLOW-UP VISIT: CPT | Performed by: NURSE PRACTITIONER

## 2020-06-25 PROCEDURE — 99024 POSTOP FOLLOW-UP VISIT: CPT | Performed by: THORACIC SURGERY (CARDIOTHORACIC VASCULAR SURGERY)

## 2020-06-25 PROCEDURE — 97116 GAIT TRAINING THERAPY: CPT

## 2020-06-25 RX ORDER — ASPIRIN 81 MG/1
81 TABLET ORAL DAILY
Status: DISCONTINUED | OUTPATIENT
Start: 2020-06-26 | End: 2020-06-29 | Stop reason: HOSPADM

## 2020-06-25 RX ORDER — METOPROLOL SUCCINATE 50 MG/1
100 TABLET, EXTENDED RELEASE ORAL
Status: DISCONTINUED | OUTPATIENT
Start: 2020-06-25 | End: 2020-06-27

## 2020-06-25 RX ORDER — PANTOPRAZOLE SODIUM 40 MG/1
40 TABLET, DELAYED RELEASE ORAL
Status: DISCONTINUED | OUTPATIENT
Start: 2020-06-26 | End: 2020-06-27

## 2020-06-25 RX ORDER — HYDROCODONE BITARTRATE AND ACETAMINOPHEN 5; 325 MG/1; MG/1
1 TABLET ORAL EVERY 6 HOURS PRN
Status: DISCONTINUED | OUTPATIENT
Start: 2020-06-25 | End: 2020-06-25

## 2020-06-25 RX ORDER — CALCIUM GLUCONATE 20 MG/ML
2 INJECTION, SOLUTION INTRAVENOUS ONCE
Status: COMPLETED | OUTPATIENT
Start: 2020-06-25 | End: 2020-06-25

## 2020-06-25 RX ORDER — HYDROCODONE BITARTRATE AND ACETAMINOPHEN 5; 325 MG/1; MG/1
2 TABLET ORAL EVERY 4 HOURS PRN
Status: DISCONTINUED | OUTPATIENT
Start: 2020-06-25 | End: 2020-06-29 | Stop reason: HOSPADM

## 2020-06-25 RX ORDER — HYDROCODONE BITARTRATE AND ACETAMINOPHEN 5; 325 MG/1; MG/1
2 TABLET ORAL EVERY 4 HOURS PRN
Status: DISCONTINUED | OUTPATIENT
Start: 2020-06-25 | End: 2020-06-25

## 2020-06-25 RX ORDER — HYDROCODONE BITARTRATE AND ACETAMINOPHEN 5; 325 MG/1; MG/1
1 TABLET ORAL EVERY 4 HOURS PRN
Status: DISCONTINUED | OUTPATIENT
Start: 2020-06-25 | End: 2020-06-29 | Stop reason: HOSPADM

## 2020-06-25 RX ADMIN — ACETAMINOPHEN 650 MG: 325 TABLET, FILM COATED ORAL at 03:13

## 2020-06-25 RX ADMIN — AMIODARONE HYDROCHLORIDE 200 MG: 200 TABLET ORAL at 08:09

## 2020-06-25 RX ADMIN — BUDESONIDE 0.5 MG: 0.5 INHALANT RESPIRATORY (INHALATION) at 06:40

## 2020-06-25 RX ADMIN — SENNOSIDES AND DOCUSATE SODIUM 2 TABLET: 8.6; 5 TABLET ORAL at 21:21

## 2020-06-25 RX ADMIN — METOPROLOL SUCCINATE 100 MG: 50 TABLET, EXTENDED RELEASE ORAL at 17:35

## 2020-06-25 RX ADMIN — IPRATROPIUM BROMIDE AND ALBUTEROL SULFATE 3 ML: .5; 3 SOLUTION RESPIRATORY (INHALATION) at 04:31

## 2020-06-25 RX ADMIN — HYDROCODONE BITARTRATE AND ACETAMINOPHEN 2 TABLET: 5; 325 TABLET ORAL at 21:27

## 2020-06-25 RX ADMIN — CALCIUM GLUCONATE 2 G: 20 INJECTION, SOLUTION INTRAVENOUS at 13:40

## 2020-06-25 RX ADMIN — HYDROMORPHONE HYDROCHLORIDE 0.5 MG: 2 INJECTION, SOLUTION INTRAMUSCULAR; INTRAVENOUS; SUBCUTANEOUS at 12:21

## 2020-06-25 RX ADMIN — IPRATROPIUM BROMIDE AND ALBUTEROL SULFATE 3 ML: .5; 3 SOLUTION RESPIRATORY (INHALATION) at 19:07

## 2020-06-25 RX ADMIN — METOPROLOL TARTRATE 12.5 MG: 25 TABLET, FILM COATED ORAL at 12:14

## 2020-06-25 RX ADMIN — TAMSULOSIN HYDROCHLORIDE 0.4 MG: 0.4 CAPSULE ORAL at 16:55

## 2020-06-25 RX ADMIN — SENNOSIDES AND DOCUSATE SODIUM 2 TABLET: 8.6; 5 TABLET ORAL at 08:09

## 2020-06-25 RX ADMIN — FUROSEMIDE 40 MG: 10 INJECTION, SOLUTION INTRAMUSCULAR; INTRAVENOUS at 17:35

## 2020-06-25 RX ADMIN — ATORVASTATIN CALCIUM 80 MG: 40 TABLET, FILM COATED ORAL at 08:09

## 2020-06-25 RX ADMIN — MUPIROCIN: 20 OINTMENT TOPICAL at 08:11

## 2020-06-25 RX ADMIN — HYDROCODONE BITARTRATE AND ACETAMINOPHEN 1 TABLET: 5; 325 TABLET ORAL at 11:14

## 2020-06-25 RX ADMIN — CHLORHEXIDINE GLUCONATE 0.12% ORAL RINSE 15 ML: 1.2 LIQUID ORAL at 21:22

## 2020-06-25 RX ADMIN — CYCLOBENZAPRINE HYDROCHLORIDE 10 MG: 10 TABLET, FILM COATED ORAL at 17:34

## 2020-06-25 RX ADMIN — MUPIROCIN: 20 OINTMENT TOPICAL at 21:22

## 2020-06-25 RX ADMIN — HYDROMORPHONE HYDROCHLORIDE 0.5 MG: 2 INJECTION, SOLUTION INTRAMUSCULAR; INTRAVENOUS; SUBCUTANEOUS at 08:08

## 2020-06-25 RX ADMIN — IPRATROPIUM BROMIDE AND ALBUTEROL SULFATE 3 ML: .5; 3 SOLUTION RESPIRATORY (INHALATION) at 23:07

## 2020-06-25 RX ADMIN — ENOXAPARIN SODIUM 40 MG: 40 INJECTION SUBCUTANEOUS at 21:21

## 2020-06-25 RX ADMIN — HYDROCODONE BITARTRATE AND ACETAMINOPHEN 2 TABLET: 5; 325 TABLET ORAL at 05:58

## 2020-06-25 RX ADMIN — GABAPENTIN 300 MG: 300 CAPSULE ORAL at 21:21

## 2020-06-25 RX ADMIN — PANTOPRAZOLE SODIUM 40 MG: 40 INJECTION, POWDER, FOR SOLUTION INTRAVENOUS at 05:58

## 2020-06-25 RX ADMIN — DULOXETINE HYDROCHLORIDE 60 MG: 30 CAPSULE, DELAYED RELEASE ORAL at 08:09

## 2020-06-25 RX ADMIN — CYCLOBENZAPRINE HYDROCHLORIDE 10 MG: 10 TABLET, FILM COATED ORAL at 08:08

## 2020-06-25 RX ADMIN — IPRATROPIUM BROMIDE AND ALBUTEROL SULFATE 3 ML: .5; 3 SOLUTION RESPIRATORY (INHALATION) at 10:45

## 2020-06-25 RX ADMIN — BUDESONIDE 0.5 MG: 0.5 INHALANT RESPIRATORY (INHALATION) at 19:07

## 2020-06-25 RX ADMIN — CEFTRIAXONE SODIUM 1 G: 1 INJECTION, POWDER, FOR SOLUTION INTRAMUSCULAR; INTRAVENOUS at 10:32

## 2020-06-25 RX ADMIN — ENOXAPARIN SODIUM 40 MG: 40 INJECTION SUBCUTANEOUS at 08:09

## 2020-06-25 RX ADMIN — CETIRIZINE HYDROCHLORIDE 10 MG: 10 TABLET, FILM COATED ORAL at 08:09

## 2020-06-25 RX ADMIN — CHLORHEXIDINE GLUCONATE 0.12% ORAL RINSE 15 ML: 1.2 LIQUID ORAL at 08:09

## 2020-06-25 RX ADMIN — FUROSEMIDE 40 MG: 10 INJECTION, SOLUTION INTRAMUSCULAR; INTRAVENOUS at 08:10

## 2020-06-25 RX ADMIN — GABAPENTIN 300 MG: 300 CAPSULE ORAL at 08:09

## 2020-06-25 RX ADMIN — MONTELUKAST SODIUM 10 MG: 10 TABLET, COATED ORAL at 08:09

## 2020-06-25 RX ADMIN — IPRATROPIUM BROMIDE AND ALBUTEROL SULFATE 3 ML: .5; 3 SOLUTION RESPIRATORY (INHALATION) at 06:40

## 2020-06-26 LAB
ALBUMIN SERPL-MCNC: 3.3 G/DL (ref 3.5–5.2)
ANION GAP SERPL CALCULATED.3IONS-SCNC: 7 MMOL/L (ref 5–15)
BUN BLD-MCNC: 26 MG/DL (ref 6–20)
BUN BLD-MCNC: ABNORMAL MG/DL
BUN/CREAT SERPL: ABNORMAL
CA-I SERPL ISE-MCNC: 1.17 MMOL/L (ref 1.2–1.3)
CALCIUM SPEC-SCNC: 8.5 MG/DL (ref 8.6–10.5)
CHLORIDE SERPL-SCNC: 97 MMOL/L (ref 98–107)
CO2 SERPL-SCNC: 32 MMOL/L (ref 22–29)
CREAT BLD-MCNC: 0.89 MG/DL (ref 0.76–1.27)
DEPRECATED RDW RBC AUTO: 47.7 FL (ref 37–54)
ERYTHROCYTE [DISTWIDTH] IN BLOOD BY AUTOMATED COUNT: 16 % (ref 12.3–15.4)
GFR SERPL CREATININE-BSD FRML MDRD: 88 ML/MIN/1.73
GLUCOSE BLD-MCNC: 103 MG/DL (ref 65–99)
HCT VFR BLD AUTO: 35.5 % (ref 37.5–51)
HGB BLD-MCNC: 12 G/DL (ref 13–17.7)
MCH RBC QN AUTO: 28.4 PG (ref 26.6–33)
MCHC RBC AUTO-ENTMCNC: 33.7 G/DL (ref 31.5–35.7)
MCV RBC AUTO: 84.1 FL (ref 79–97)
PHOSPHATE SERPL-MCNC: 3.3 MG/DL (ref 2.5–4.5)
PLATELET # BLD AUTO: 210 10*3/MM3 (ref 140–450)
PMV BLD AUTO: 9.1 FL (ref 6–12)
POTASSIUM BLD-SCNC: 4.2 MMOL/L (ref 3.5–5.2)
RBC # BLD AUTO: 4.22 10*6/MM3 (ref 4.14–5.8)
SODIUM BLD-SCNC: 136 MMOL/L (ref 136–145)
WBC NRBC COR # BLD: 10.3 10*3/MM3 (ref 3.4–10.8)

## 2020-06-26 PROCEDURE — 97530 THERAPEUTIC ACTIVITIES: CPT

## 2020-06-26 PROCEDURE — 25010000002 CEFTRIAXONE PER 250 MG: Performed by: INTERNAL MEDICINE

## 2020-06-26 PROCEDURE — 25010000002 ENOXAPARIN PER 10 MG: Performed by: NURSE PRACTITIONER

## 2020-06-26 PROCEDURE — 99024 POSTOP FOLLOW-UP VISIT: CPT | Performed by: THORACIC SURGERY (CARDIOTHORACIC VASCULAR SURGERY)

## 2020-06-26 PROCEDURE — 82330 ASSAY OF CALCIUM: CPT | Performed by: NURSE PRACTITIONER

## 2020-06-26 PROCEDURE — 85027 COMPLETE CBC AUTOMATED: CPT | Performed by: NURSE PRACTITIONER

## 2020-06-26 PROCEDURE — 25010000002 CALCIUM GLUCONATE 2-0.675 GM/100ML-% SOLUTION: Performed by: NURSE PRACTITIONER

## 2020-06-26 PROCEDURE — 94799 UNLISTED PULMONARY SVC/PX: CPT

## 2020-06-26 PROCEDURE — 25010000002 FUROSEMIDE PER 20 MG: Performed by: INTERNAL MEDICINE

## 2020-06-26 PROCEDURE — 80069 RENAL FUNCTION PANEL: CPT | Performed by: INTERNAL MEDICINE

## 2020-06-26 PROCEDURE — 97116 GAIT TRAINING THERAPY: CPT

## 2020-06-26 PROCEDURE — 99232 SBSQ HOSP IP/OBS MODERATE 35: CPT | Performed by: INTERNAL MEDICINE

## 2020-06-26 PROCEDURE — 25010000002 ONDANSETRON PER 1 MG: Performed by: NURSE PRACTITIONER

## 2020-06-26 PROCEDURE — 99024 POSTOP FOLLOW-UP VISIT: CPT | Performed by: NURSE PRACTITIONER

## 2020-06-26 RX ORDER — LIDOCAINE 50 MG/G
1 PATCH TOPICAL
Status: DISCONTINUED | OUTPATIENT
Start: 2020-06-26 | End: 2020-06-29 | Stop reason: HOSPADM

## 2020-06-26 RX ORDER — CALCIUM GLUCONATE 20 MG/ML
2 INJECTION, SOLUTION INTRAVENOUS ONCE
Status: COMPLETED | OUTPATIENT
Start: 2020-06-26 | End: 2020-06-26

## 2020-06-26 RX ORDER — CEPHALEXIN 500 MG/1
500 CAPSULE ORAL EVERY 12 HOURS SCHEDULED
Status: COMPLETED | OUTPATIENT
Start: 2020-06-27 | End: 2020-06-28

## 2020-06-26 RX ORDER — LACTULOSE 10 G/15ML
30 SOLUTION ORAL DAILY
Status: DISCONTINUED | OUTPATIENT
Start: 2020-06-26 | End: 2020-06-29 | Stop reason: HOSPADM

## 2020-06-26 RX ORDER — ACETAMINOPHEN 650 MG
TABLET, EXTENDED RELEASE ORAL 2 TIMES DAILY
Status: DISCONTINUED | OUTPATIENT
Start: 2020-06-26 | End: 2020-06-29 | Stop reason: HOSPADM

## 2020-06-26 RX ORDER — FUROSEMIDE 40 MG/1
40 TABLET ORAL
Status: DISCONTINUED | OUTPATIENT
Start: 2020-06-26 | End: 2020-06-29 | Stop reason: HOSPADM

## 2020-06-26 RX ADMIN — ASPIRIN 81 MG: 81 TABLET, COATED ORAL at 08:34

## 2020-06-26 RX ADMIN — GABAPENTIN 300 MG: 300 CAPSULE ORAL at 20:09

## 2020-06-26 RX ADMIN — ENOXAPARIN SODIUM 40 MG: 40 INJECTION SUBCUTANEOUS at 08:34

## 2020-06-26 RX ADMIN — IPRATROPIUM BROMIDE AND ALBUTEROL SULFATE 3 ML: .5; 3 SOLUTION RESPIRATORY (INHALATION) at 07:59

## 2020-06-26 RX ADMIN — BUDESONIDE 0.5 MG: 0.5 INHALANT RESPIRATORY (INHALATION) at 18:41

## 2020-06-26 RX ADMIN — CALCIUM GLUCONATE 2 G: 20 INJECTION, SOLUTION INTRAVENOUS at 11:52

## 2020-06-26 RX ADMIN — IPRATROPIUM BROMIDE AND ALBUTEROL SULFATE 3 ML: .5; 3 SOLUTION RESPIRATORY (INHALATION) at 11:18

## 2020-06-26 RX ADMIN — MONTELUKAST SODIUM 10 MG: 10 TABLET, COATED ORAL at 08:34

## 2020-06-26 RX ADMIN — MUPIROCIN: 20 OINTMENT TOPICAL at 20:09

## 2020-06-26 RX ADMIN — SENNOSIDES AND DOCUSATE SODIUM 2 TABLET: 8.6; 5 TABLET ORAL at 20:09

## 2020-06-26 RX ADMIN — FUROSEMIDE 40 MG: 40 TABLET ORAL at 17:39

## 2020-06-26 RX ADMIN — IPRATROPIUM BROMIDE AND ALBUTEROL SULFATE 3 ML: .5; 3 SOLUTION RESPIRATORY (INHALATION) at 23:00

## 2020-06-26 RX ADMIN — TAMSULOSIN HYDROCHLORIDE 0.4 MG: 0.4 CAPSULE ORAL at 17:39

## 2020-06-26 RX ADMIN — METOPROLOL SUCCINATE 100 MG: 50 TABLET, EXTENDED RELEASE ORAL at 08:34

## 2020-06-26 RX ADMIN — POLYETHYLENE GLYCOL 3350 17 G: 17 POWDER, FOR SOLUTION ORAL at 10:23

## 2020-06-26 RX ADMIN — POVIDONE-IODINE: 10 SOLUTION TOPICAL at 16:56

## 2020-06-26 RX ADMIN — FUROSEMIDE 40 MG: 10 INJECTION, SOLUTION INTRAMUSCULAR; INTRAVENOUS at 08:34

## 2020-06-26 RX ADMIN — PANTOPRAZOLE SODIUM 40 MG: 40 TABLET, DELAYED RELEASE ORAL at 07:18

## 2020-06-26 RX ADMIN — IPRATROPIUM BROMIDE AND ALBUTEROL SULFATE 3 ML: .5; 3 SOLUTION RESPIRATORY (INHALATION) at 18:41

## 2020-06-26 RX ADMIN — ONDANSETRON 4 MG: 2 INJECTION INTRAMUSCULAR; INTRAVENOUS at 15:44

## 2020-06-26 RX ADMIN — GABAPENTIN 300 MG: 300 CAPSULE ORAL at 08:34

## 2020-06-26 RX ADMIN — HYDROCODONE BITARTRATE AND ACETAMINOPHEN 2 TABLET: 5; 325 TABLET ORAL at 01:50

## 2020-06-26 RX ADMIN — DULOXETINE HYDROCHLORIDE 60 MG: 30 CAPSULE, DELAYED RELEASE ORAL at 08:34

## 2020-06-26 RX ADMIN — CHLORHEXIDINE GLUCONATE 0.12% ORAL RINSE 15 ML: 1.2 LIQUID ORAL at 08:34

## 2020-06-26 RX ADMIN — ATORVASTATIN CALCIUM 80 MG: 40 TABLET, FILM COATED ORAL at 08:34

## 2020-06-26 RX ADMIN — ONDANSETRON 4 MG: 2 INJECTION INTRAMUSCULAR; INTRAVENOUS at 01:49

## 2020-06-26 RX ADMIN — CETIRIZINE HYDROCHLORIDE 10 MG: 10 TABLET, FILM COATED ORAL at 08:34

## 2020-06-26 RX ADMIN — MUPIROCIN: 20 OINTMENT TOPICAL at 08:36

## 2020-06-26 RX ADMIN — HYDROCODONE BITARTRATE AND ACETAMINOPHEN 2 TABLET: 5; 325 TABLET ORAL at 07:18

## 2020-06-26 RX ADMIN — LIDOCAINE 1 PATCH: 50 PATCH TOPICAL at 11:52

## 2020-06-26 RX ADMIN — SENNOSIDES AND DOCUSATE SODIUM 2 TABLET: 8.6; 5 TABLET ORAL at 08:41

## 2020-06-26 RX ADMIN — HYDROCODONE BITARTRATE AND ACETAMINOPHEN 2 TABLET: 5; 325 TABLET ORAL at 15:32

## 2020-06-26 RX ADMIN — AMIODARONE HYDROCHLORIDE 200 MG: 200 TABLET ORAL at 08:34

## 2020-06-26 RX ADMIN — BUDESONIDE 0.5 MG: 0.5 INHALANT RESPIRATORY (INHALATION) at 07:59

## 2020-06-26 RX ADMIN — ENOXAPARIN SODIUM 40 MG: 40 INJECTION SUBCUTANEOUS at 20:09

## 2020-06-26 RX ADMIN — ONDANSETRON 4 MG: 2 INJECTION INTRAMUSCULAR; INTRAVENOUS at 21:46

## 2020-06-26 RX ADMIN — IPRATROPIUM BROMIDE AND ALBUTEROL SULFATE 3 ML: .5; 3 SOLUTION RESPIRATORY (INHALATION) at 02:37

## 2020-06-26 RX ADMIN — LACTULOSE 30 G: 10 SOLUTION ORAL at 10:24

## 2020-06-26 RX ADMIN — CEFTRIAXONE SODIUM 1 G: 1 INJECTION, POWDER, FOR SOLUTION INTRAMUSCULAR; INTRAVENOUS at 10:23

## 2020-06-26 RX ADMIN — HYDROCODONE BITARTRATE AND ACETAMINOPHEN 1 TABLET: 5; 325 TABLET ORAL at 23:52

## 2020-06-26 RX ADMIN — HYDROCODONE BITARTRATE AND ACETAMINOPHEN 2 TABLET: 5; 325 TABLET ORAL at 20:09

## 2020-06-26 RX ADMIN — CYCLOBENZAPRINE HYDROCHLORIDE 10 MG: 10 TABLET, FILM COATED ORAL at 08:34

## 2020-06-27 LAB
ALBUMIN SERPL-MCNC: 3.2 G/DL (ref 3.5–5.2)
ANION GAP SERPL CALCULATED.3IONS-SCNC: 9 MMOL/L (ref 5–15)
BUN BLD-MCNC: 20 MG/DL (ref 6–20)
BUN BLD-MCNC: ABNORMAL MG/DL
BUN/CREAT SERPL: ABNORMAL
CA-I SERPL ISE-MCNC: 1.16 MMOL/L (ref 1.2–1.3)
CALCIUM SPEC-SCNC: 8.8 MG/DL (ref 8.6–10.5)
CHLORIDE SERPL-SCNC: 96 MMOL/L (ref 98–107)
CO2 SERPL-SCNC: 33 MMOL/L (ref 22–29)
CREAT BLD-MCNC: 0.8 MG/DL (ref 0.76–1.27)
GFR SERPL CREATININE-BSD FRML MDRD: 100 ML/MIN/1.73
GLUCOSE BLD-MCNC: 103 MG/DL (ref 65–99)
PHOSPHATE SERPL-MCNC: 3.5 MG/DL (ref 2.5–4.5)
POTASSIUM BLD-SCNC: 3.8 MMOL/L (ref 3.5–5.2)
SODIUM BLD-SCNC: 138 MMOL/L (ref 136–145)

## 2020-06-27 PROCEDURE — 94799 UNLISTED PULMONARY SVC/PX: CPT

## 2020-06-27 PROCEDURE — 99024 POSTOP FOLLOW-UP VISIT: CPT | Performed by: THORACIC SURGERY (CARDIOTHORACIC VASCULAR SURGERY)

## 2020-06-27 PROCEDURE — 80069 RENAL FUNCTION PANEL: CPT | Performed by: INTERNAL MEDICINE

## 2020-06-27 PROCEDURE — 25010000002 ONDANSETRON PER 1 MG: Performed by: NURSE PRACTITIONER

## 2020-06-27 PROCEDURE — 25010000002 ENOXAPARIN PER 10 MG: Performed by: NURSE PRACTITIONER

## 2020-06-27 PROCEDURE — 97530 THERAPEUTIC ACTIVITIES: CPT

## 2020-06-27 PROCEDURE — 82330 ASSAY OF CALCIUM: CPT | Performed by: NURSE PRACTITIONER

## 2020-06-27 PROCEDURE — 97116 GAIT TRAINING THERAPY: CPT

## 2020-06-27 PROCEDURE — 99232 SBSQ HOSP IP/OBS MODERATE 35: CPT | Performed by: INTERNAL MEDICINE

## 2020-06-27 RX ORDER — METOPROLOL SUCCINATE 50 MG/1
50 TABLET, EXTENDED RELEASE ORAL
Status: DISCONTINUED | OUTPATIENT
Start: 2020-06-28 | End: 2020-06-29 | Stop reason: HOSPADM

## 2020-06-27 RX ORDER — ECHINACEA PURPUREA EXTRACT 125 MG
1 TABLET ORAL AS NEEDED
Status: DISCONTINUED | OUTPATIENT
Start: 2020-06-27 | End: 2020-06-29 | Stop reason: HOSPADM

## 2020-06-27 RX ADMIN — ONDANSETRON 4 MG: 2 INJECTION INTRAMUSCULAR; INTRAVENOUS at 19:31

## 2020-06-27 RX ADMIN — CYCLOBENZAPRINE HYDROCHLORIDE 10 MG: 10 TABLET, FILM COATED ORAL at 08:03

## 2020-06-27 RX ADMIN — IPRATROPIUM BROMIDE AND ALBUTEROL SULFATE 3 ML: .5; 3 SOLUTION RESPIRATORY (INHALATION) at 12:00

## 2020-06-27 RX ADMIN — IPRATROPIUM BROMIDE AND ALBUTEROL SULFATE 3 ML: .5; 3 SOLUTION RESPIRATORY (INHALATION) at 07:20

## 2020-06-27 RX ADMIN — CETIRIZINE HYDROCHLORIDE 10 MG: 10 TABLET, FILM COATED ORAL at 08:02

## 2020-06-27 RX ADMIN — LIDOCAINE 1 PATCH: 50 PATCH TOPICAL at 08:02

## 2020-06-27 RX ADMIN — ONDANSETRON 4 MG: 2 INJECTION INTRAMUSCULAR; INTRAVENOUS at 08:05

## 2020-06-27 RX ADMIN — BUDESONIDE 0.5 MG: 0.5 INHALANT RESPIRATORY (INHALATION) at 07:20

## 2020-06-27 RX ADMIN — HYDROCODONE BITARTRATE AND ACETAMINOPHEN 1 TABLET: 5; 325 TABLET ORAL at 04:04

## 2020-06-27 RX ADMIN — FUROSEMIDE 40 MG: 40 TABLET ORAL at 17:02

## 2020-06-27 RX ADMIN — IPRATROPIUM BROMIDE AND ALBUTEROL SULFATE 3 ML: .5; 3 SOLUTION RESPIRATORY (INHALATION) at 03:12

## 2020-06-27 RX ADMIN — PANTOPRAZOLE SODIUM 40 MG: 40 TABLET, DELAYED RELEASE ORAL at 05:20

## 2020-06-27 RX ADMIN — SALINE NASAL SPRAY 1 SPRAY: 1.5 SOLUTION NASAL at 20:59

## 2020-06-27 RX ADMIN — IPRATROPIUM BROMIDE AND ALBUTEROL SULFATE 3 ML: .5; 3 SOLUTION RESPIRATORY (INHALATION) at 22:41

## 2020-06-27 RX ADMIN — CEPHALEXIN 500 MG: 500 CAPSULE ORAL at 20:59

## 2020-06-27 RX ADMIN — SALINE NASAL SPRAY 1 SPRAY: 1.5 SOLUTION NASAL at 03:41

## 2020-06-27 RX ADMIN — POVIDONE-IODINE 1 APPLICATION: 10 SOLUTION TOPICAL at 20:59

## 2020-06-27 RX ADMIN — APIXABAN 5 MG: 5 TABLET, FILM COATED ORAL at 20:59

## 2020-06-27 RX ADMIN — IPRATROPIUM BROMIDE AND ALBUTEROL SULFATE 3 ML: .5; 3 SOLUTION RESPIRATORY (INHALATION) at 19:09

## 2020-06-27 RX ADMIN — DULOXETINE HYDROCHLORIDE 60 MG: 30 CAPSULE, DELAYED RELEASE ORAL at 08:02

## 2020-06-27 RX ADMIN — SENNOSIDES AND DOCUSATE SODIUM 2 TABLET: 8.6; 5 TABLET ORAL at 08:02

## 2020-06-27 RX ADMIN — ATORVASTATIN CALCIUM 80 MG: 40 TABLET, FILM COATED ORAL at 08:02

## 2020-06-27 RX ADMIN — ASPIRIN 81 MG: 81 TABLET, COATED ORAL at 08:00

## 2020-06-27 RX ADMIN — GABAPENTIN 300 MG: 300 CAPSULE ORAL at 21:00

## 2020-06-27 RX ADMIN — APIXABAN 5 MG: 5 TABLET, FILM COATED ORAL at 12:21

## 2020-06-27 RX ADMIN — TAMSULOSIN HYDROCHLORIDE 0.4 MG: 0.4 CAPSULE ORAL at 17:02

## 2020-06-27 RX ADMIN — MUPIROCIN: 20 OINTMENT TOPICAL at 08:03

## 2020-06-27 RX ADMIN — MONTELUKAST SODIUM 10 MG: 10 TABLET, COATED ORAL at 08:02

## 2020-06-27 RX ADMIN — CEPHALEXIN 500 MG: 500 CAPSULE ORAL at 08:01

## 2020-06-27 RX ADMIN — METOPROLOL SUCCINATE 100 MG: 50 TABLET, EXTENDED RELEASE ORAL at 08:01

## 2020-06-27 RX ADMIN — HYDROCODONE BITARTRATE AND ACETAMINOPHEN 1 TABLET: 5; 325 TABLET ORAL at 14:11

## 2020-06-27 RX ADMIN — CYCLOBENZAPRINE HYDROCHLORIDE 10 MG: 10 TABLET, FILM COATED ORAL at 19:28

## 2020-06-27 RX ADMIN — AMIODARONE HYDROCHLORIDE 200 MG: 200 TABLET ORAL at 08:03

## 2020-06-27 RX ADMIN — FUROSEMIDE 40 MG: 40 TABLET ORAL at 08:02

## 2020-06-27 RX ADMIN — BUDESONIDE 0.5 MG: 0.5 INHALANT RESPIRATORY (INHALATION) at 19:09

## 2020-06-27 RX ADMIN — POVIDONE-IODINE: 10 SOLUTION TOPICAL at 08:05

## 2020-06-27 RX ADMIN — GABAPENTIN 300 MG: 300 CAPSULE ORAL at 08:02

## 2020-06-27 RX ADMIN — ENOXAPARIN SODIUM 40 MG: 40 INJECTION SUBCUTANEOUS at 08:03

## 2020-06-28 ENCOUNTER — APPOINTMENT (OUTPATIENT)
Dept: GENERAL RADIOLOGY | Facility: HOSPITAL | Age: 60
End: 2020-06-28

## 2020-06-28 LAB
ALBUMIN SERPL-MCNC: 3.5 G/DL (ref 3.5–5.2)
ANION GAP SERPL CALCULATED.3IONS-SCNC: 9 MMOL/L (ref 5–15)
BACTERIA SPEC AEROBE CULT: NORMAL
BACTERIA SPEC AEROBE CULT: NORMAL
BUN BLD-MCNC: 18 MG/DL (ref 6–20)
BUN BLD-MCNC: ABNORMAL MG/DL
BUN/CREAT SERPL: ABNORMAL
CA-I SERPL ISE-MCNC: 1.18 MMOL/L (ref 1.2–1.3)
CALCIUM SPEC-SCNC: 8.7 MG/DL (ref 8.6–10.5)
CHLORIDE SERPL-SCNC: 95 MMOL/L (ref 98–107)
CO2 SERPL-SCNC: 35 MMOL/L (ref 22–29)
CREAT BLD-MCNC: 0.98 MG/DL (ref 0.76–1.27)
GFR SERPL CREATININE-BSD FRML MDRD: 79 ML/MIN/1.73
GLUCOSE BLD-MCNC: 116 MG/DL (ref 65–99)
PHOSPHATE SERPL-MCNC: 3.3 MG/DL (ref 2.5–4.5)
POTASSIUM BLD-SCNC: 3.9 MMOL/L (ref 3.5–5.2)
SODIUM BLD-SCNC: 139 MMOL/L (ref 136–145)

## 2020-06-28 PROCEDURE — 25010000002 ONDANSETRON PER 1 MG: Performed by: NURSE PRACTITIONER

## 2020-06-28 PROCEDURE — 80069 RENAL FUNCTION PANEL: CPT | Performed by: INTERNAL MEDICINE

## 2020-06-28 PROCEDURE — 94799 UNLISTED PULMONARY SVC/PX: CPT

## 2020-06-28 PROCEDURE — 82330 ASSAY OF CALCIUM: CPT | Performed by: NURSE PRACTITIONER

## 2020-06-28 PROCEDURE — 99024 POSTOP FOLLOW-UP VISIT: CPT | Performed by: THORACIC SURGERY (CARDIOTHORACIC VASCULAR SURGERY)

## 2020-06-28 PROCEDURE — 99232 SBSQ HOSP IP/OBS MODERATE 35: CPT | Performed by: INTERNAL MEDICINE

## 2020-06-28 RX ADMIN — CYCLOBENZAPRINE HYDROCHLORIDE 10 MG: 10 TABLET, FILM COATED ORAL at 20:15

## 2020-06-28 RX ADMIN — CYCLOBENZAPRINE HYDROCHLORIDE 10 MG: 10 TABLET, FILM COATED ORAL at 08:21

## 2020-06-28 RX ADMIN — ONDANSETRON 4 MG: 2 INJECTION INTRAMUSCULAR; INTRAVENOUS at 02:20

## 2020-06-28 RX ADMIN — IPRATROPIUM BROMIDE AND ALBUTEROL SULFATE 3 ML: .5; 3 SOLUTION RESPIRATORY (INHALATION) at 23:54

## 2020-06-28 RX ADMIN — FUROSEMIDE 40 MG: 40 TABLET ORAL at 17:00

## 2020-06-28 RX ADMIN — POVIDONE-IODINE: 10 SOLUTION TOPICAL at 08:24

## 2020-06-28 RX ADMIN — METOPROLOL SUCCINATE 50 MG: 50 TABLET, EXTENDED RELEASE ORAL at 08:23

## 2020-06-28 RX ADMIN — GABAPENTIN 300 MG: 300 CAPSULE ORAL at 08:23

## 2020-06-28 RX ADMIN — HYDROCODONE BITARTRATE AND ACETAMINOPHEN 2 TABLET: 5; 325 TABLET ORAL at 20:15

## 2020-06-28 RX ADMIN — ONDANSETRON 4 MG: 2 INJECTION INTRAMUSCULAR; INTRAVENOUS at 20:21

## 2020-06-28 RX ADMIN — HYDROCODONE BITARTRATE AND ACETAMINOPHEN 1 TABLET: 5; 325 TABLET ORAL at 08:22

## 2020-06-28 RX ADMIN — DULOXETINE HYDROCHLORIDE 60 MG: 30 CAPSULE, DELAYED RELEASE ORAL at 08:23

## 2020-06-28 RX ADMIN — ATORVASTATIN CALCIUM 80 MG: 40 TABLET, FILM COATED ORAL at 08:23

## 2020-06-28 RX ADMIN — CEPHALEXIN 500 MG: 500 CAPSULE ORAL at 20:15

## 2020-06-28 RX ADMIN — GABAPENTIN 300 MG: 300 CAPSULE ORAL at 20:15

## 2020-06-28 RX ADMIN — SENNOSIDES AND DOCUSATE SODIUM 2 TABLET: 8.6; 5 TABLET ORAL at 20:15

## 2020-06-28 RX ADMIN — ASPIRIN 81 MG: 81 TABLET, COATED ORAL at 08:23

## 2020-06-28 RX ADMIN — AMIODARONE HYDROCHLORIDE 200 MG: 200 TABLET ORAL at 08:23

## 2020-06-28 RX ADMIN — FUROSEMIDE 40 MG: 40 TABLET ORAL at 08:23

## 2020-06-28 RX ADMIN — APIXABAN 5 MG: 5 TABLET, FILM COATED ORAL at 20:15

## 2020-06-28 RX ADMIN — HYDROCODONE BITARTRATE AND ACETAMINOPHEN 1 TABLET: 5; 325 TABLET ORAL at 02:20

## 2020-06-28 RX ADMIN — CEPHALEXIN 500 MG: 500 CAPSULE ORAL at 08:22

## 2020-06-28 RX ADMIN — CETIRIZINE HYDROCHLORIDE 10 MG: 10 TABLET, FILM COATED ORAL at 08:23

## 2020-06-28 RX ADMIN — BUDESONIDE 0.5 MG: 0.5 INHALANT RESPIRATORY (INHALATION) at 18:38

## 2020-06-28 RX ADMIN — IPRATROPIUM BROMIDE AND ALBUTEROL SULFATE 3 ML: .5; 3 SOLUTION RESPIRATORY (INHALATION) at 03:17

## 2020-06-28 RX ADMIN — IPRATROPIUM BROMIDE AND ALBUTEROL SULFATE 3 ML: .5; 3 SOLUTION RESPIRATORY (INHALATION) at 18:38

## 2020-06-28 RX ADMIN — POVIDONE-IODINE: 10 SOLUTION TOPICAL at 20:17

## 2020-06-28 RX ADMIN — MONTELUKAST SODIUM 10 MG: 10 TABLET, COATED ORAL at 08:23

## 2020-06-28 RX ADMIN — APIXABAN 5 MG: 5 TABLET, FILM COATED ORAL at 08:23

## 2020-06-28 RX ADMIN — LIDOCAINE 1 PATCH: 50 PATCH TOPICAL at 08:22

## 2020-06-28 RX ADMIN — TAMSULOSIN HYDROCHLORIDE 0.4 MG: 0.4 CAPSULE ORAL at 17:00

## 2020-06-29 VITALS
WEIGHT: 315 LBS | RESPIRATION RATE: 16 BRPM | DIASTOLIC BLOOD PRESSURE: 71 MMHG | HEART RATE: 70 BPM | HEIGHT: 70 IN | SYSTOLIC BLOOD PRESSURE: 146 MMHG | BODY MASS INDEX: 45.1 KG/M2 | TEMPERATURE: 97.6 F | OXYGEN SATURATION: 93 %

## 2020-06-29 LAB
ALBUMIN SERPL-MCNC: 3.4 G/DL (ref 3.5–5.2)
ANION GAP SERPL CALCULATED.3IONS-SCNC: 10 MMOL/L (ref 5–15)
BUN BLD-MCNC: 14 MG/DL (ref 6–20)
BUN BLD-MCNC: ABNORMAL MG/DL
BUN/CREAT SERPL: ABNORMAL
CA-I SERPL ISE-MCNC: 1.16 MMOL/L (ref 1.2–1.3)
CALCIUM SPEC-SCNC: 8.7 MG/DL (ref 8.6–10.5)
CHLORIDE SERPL-SCNC: 96 MMOL/L (ref 98–107)
CO2 SERPL-SCNC: 34 MMOL/L (ref 22–29)
CREAT BLD-MCNC: 0.81 MG/DL (ref 0.76–1.27)
GFR SERPL CREATININE-BSD FRML MDRD: 98 ML/MIN/1.73
GLUCOSE BLD-MCNC: 120 MG/DL (ref 65–99)
PHOSPHATE SERPL-MCNC: 3.9 MG/DL (ref 2.5–4.5)
POTASSIUM BLD-SCNC: 4.1 MMOL/L (ref 3.5–5.2)
SODIUM BLD-SCNC: 140 MMOL/L (ref 136–145)

## 2020-06-29 PROCEDURE — 80069 RENAL FUNCTION PANEL: CPT | Performed by: INTERNAL MEDICINE

## 2020-06-29 PROCEDURE — 82330 ASSAY OF CALCIUM: CPT | Performed by: NURSE PRACTITIONER

## 2020-06-29 PROCEDURE — 94799 UNLISTED PULMONARY SVC/PX: CPT

## 2020-06-29 PROCEDURE — 97530 THERAPEUTIC ACTIVITIES: CPT

## 2020-06-29 PROCEDURE — 25010000002 CALCIUM GLUCONATE 2-0.675 GM/100ML-% SOLUTION: Performed by: NURSE PRACTITIONER

## 2020-06-29 PROCEDURE — 25010000002 ONDANSETRON PER 1 MG: Performed by: NURSE PRACTITIONER

## 2020-06-29 RX ORDER — CALCIUM GLUCONATE 20 MG/ML
2 INJECTION, SOLUTION INTRAVENOUS ONCE
Status: COMPLETED | OUTPATIENT
Start: 2020-06-29 | End: 2020-06-29

## 2020-06-29 RX ORDER — METOPROLOL SUCCINATE 50 MG/1
50 TABLET, EXTENDED RELEASE ORAL
Qty: 30 TABLET | Refills: 3 | Status: SHIPPED | OUTPATIENT
Start: 2020-06-30 | End: 2020-07-21 | Stop reason: HOSPADM

## 2020-06-29 RX ORDER — HYDROCHLOROTHIAZIDE 25 MG/1
12.5 TABLET ORAL DAILY
Qty: 30 TABLET | Refills: 6 | Status: SHIPPED | OUTPATIENT
Start: 2020-06-29 | End: 2020-07-21 | Stop reason: HOSPADM

## 2020-06-29 RX ORDER — ACETAMINOPHEN 650 MG
TABLET, EXTENDED RELEASE ORAL 2 TIMES DAILY
Start: 2020-06-29 | End: 2020-07-15

## 2020-06-29 RX ORDER — HYDROCODONE BITARTRATE AND ACETAMINOPHEN 5; 325 MG/1; MG/1
1 TABLET ORAL EVERY 6 HOURS PRN
Qty: 30 TABLET | Refills: 0 | Status: SHIPPED | OUTPATIENT
Start: 2020-06-29 | End: 2020-07-15

## 2020-06-29 RX ORDER — CYCLOBENZAPRINE HCL 10 MG
10 TABLET ORAL EVERY 8 HOURS PRN
Qty: 20 TABLET | Refills: 0 | Status: SHIPPED | OUTPATIENT
Start: 2020-06-29 | End: 2020-07-15

## 2020-06-29 RX ADMIN — FUROSEMIDE 40 MG: 40 TABLET ORAL at 08:24

## 2020-06-29 RX ADMIN — HYDROCODONE BITARTRATE AND ACETAMINOPHEN 2 TABLET: 5; 325 TABLET ORAL at 01:57

## 2020-06-29 RX ADMIN — CALCIUM GLUCONATE 2 G: 20 INJECTION, SOLUTION INTRAVENOUS at 11:27

## 2020-06-29 RX ADMIN — ASPIRIN 81 MG: 81 TABLET, COATED ORAL at 08:24

## 2020-06-29 RX ADMIN — IPRATROPIUM BROMIDE AND ALBUTEROL SULFATE 3 ML: .5; 3 SOLUTION RESPIRATORY (INHALATION) at 10:55

## 2020-06-29 RX ADMIN — HYDROCODONE BITARTRATE AND ACETAMINOPHEN 1 TABLET: 5; 325 TABLET ORAL at 13:40

## 2020-06-29 RX ADMIN — BUDESONIDE 0.5 MG: 0.5 INHALANT RESPIRATORY (INHALATION) at 06:30

## 2020-06-29 RX ADMIN — AMIODARONE HYDROCHLORIDE 200 MG: 200 TABLET ORAL at 08:24

## 2020-06-29 RX ADMIN — LIDOCAINE 1 PATCH: 50 PATCH TOPICAL at 08:24

## 2020-06-29 RX ADMIN — SENNOSIDES AND DOCUSATE SODIUM 2 TABLET: 8.6; 5 TABLET ORAL at 08:24

## 2020-06-29 RX ADMIN — ONDANSETRON 4 MG: 2 INJECTION INTRAMUSCULAR; INTRAVENOUS at 06:41

## 2020-06-29 RX ADMIN — IPRATROPIUM BROMIDE AND ALBUTEROL SULFATE 3 ML: .5; 3 SOLUTION RESPIRATORY (INHALATION) at 06:30

## 2020-06-29 RX ADMIN — POVIDONE-IODINE: 10 SOLUTION TOPICAL at 08:25

## 2020-06-29 RX ADMIN — ONDANSETRON 4 MG: 2 INJECTION INTRAMUSCULAR; INTRAVENOUS at 13:40

## 2020-06-29 RX ADMIN — GABAPENTIN 300 MG: 300 CAPSULE ORAL at 08:24

## 2020-06-29 RX ADMIN — ATORVASTATIN CALCIUM 80 MG: 40 TABLET, FILM COATED ORAL at 08:24

## 2020-06-29 RX ADMIN — CYCLOBENZAPRINE HYDROCHLORIDE 10 MG: 10 TABLET, FILM COATED ORAL at 13:41

## 2020-06-29 RX ADMIN — CYCLOBENZAPRINE HYDROCHLORIDE 10 MG: 10 TABLET, FILM COATED ORAL at 06:41

## 2020-06-29 RX ADMIN — METOPROLOL SUCCINATE 50 MG: 50 TABLET, EXTENDED RELEASE ORAL at 08:24

## 2020-06-29 RX ADMIN — MONTELUKAST SODIUM 10 MG: 10 TABLET, COATED ORAL at 08:24

## 2020-06-29 RX ADMIN — APIXABAN 5 MG: 5 TABLET, FILM COATED ORAL at 08:24

## 2020-06-29 RX ADMIN — CETIRIZINE HYDROCHLORIDE 10 MG: 10 TABLET, FILM COATED ORAL at 08:24

## 2020-06-29 RX ADMIN — HYDROCODONE BITARTRATE AND ACETAMINOPHEN 2 TABLET: 5; 325 TABLET ORAL at 06:41

## 2020-06-29 RX ADMIN — DULOXETINE HYDROCHLORIDE 60 MG: 30 CAPSULE, DELAYED RELEASE ORAL at 08:24

## 2020-06-30 ENCOUNTER — READMISSION MANAGEMENT (OUTPATIENT)
Dept: CALL CENTER | Facility: HOSPITAL | Age: 60
End: 2020-06-30

## 2020-06-30 NOTE — OUTREACH NOTE
Prep Survey      Responses   [facility] facility patient discharged from?  [facility]   Is LACE score < 7 ?  No   Eligibility  Readm Mgmt   Discharge diagnosis  Biventricular ICD upgrade and AV node ablation s/pleft ant. thoracotomy with LV lead placement, PAF, VT, postop PETER, HTN, HLD, DON on trilogy, morbid obesity, premature CAD   COVID-19 Test Status  Negative   Does the patient have one of the following disease processes/diagnoses(primary or secondary)?  Cardiothoracic surgery   Does the patient have Home health ordered?  Yes   What is the Home health agency?   [facility]   Is there a DME ordered?  No   Comments regarding appointments  Pt to schedule F/U with PCP   Medication alerts for this patient  see AVS   Prep survey completed?  Yes          Bhavani Pettit RN

## 2020-07-01 ENCOUNTER — READMISSION MANAGEMENT (OUTPATIENT)
Dept: CALL CENTER | Facility: HOSPITAL | Age: 60
End: 2020-07-01

## 2020-07-01 NOTE — OUTREACH NOTE
CT Surgery Week 1 Survey      Responses   [facility] patient discharged from?  [facility]   Does the patient have one of the following disease processes/diagnoses(primary or secondary)?  Cardiothoracic surgery   Is there a successful TCM telephone encounter documented?  No   Week 1 attempt successful?  Yes   Call start time  1111   Call end time  1113   Discharge diagnosis  Biventricular ICD upgrade and AV node ablation s/pleft ant. thoracotomy with LV lead placement, PAF, VT, postop PETER, HTN, HLD, DON on trilogy, morbid obesity, premature CAD   Is patient permission given to speak with other caregiver?  Yes   Person spoke with today (if not patient) and relationship  Spouse   Meds reviewed with patient/caregiver?  Yes   Is the patient having any side effects they believe may be caused by any medication additions or changes?  No   Does the patient have all medications related to this admission filled (includes all antibiotics, pain medications, cardiac medications, etc.)  Yes   Is the patient taking all medications as directed (includes completed medication regime)?  Yes   Does the patient have a primary care provider?   Yes   Does the patient have an appointment scheduled with their C/T surgeon?  Yes   Has the patient kept scheduled appointments due by today?  Yes   What is the Home health agency?   [facility]   Psychosocial issues?  No   Did the patient receive a copy of their discharge instructions?  Yes   Nursing interventions  Reviewed instructions with patient   What is the patient's perception of their health status since discharge?  Improving   Nursing interventions  Nurse provided patient education   Is the patient/caregiver able to teach back normal signs of recovery?  Pain or discomfort at incisional site   Nursing interventions  Reassured on normal signs of recovery   Is the patient /caregiver able to teach back basic post-op care?  Take showers only when approved by MD-sponge bathe until then, No tub bath,  swimming, or hot tub until instructed by MD, Lifting as instructed by MD in discharge instructions, Keep incision areas clean, dry and protected   Is the patient/caregiver able to teach back signs and symptoms of incisional infection?  Incisional warmth, Increased drainage or bleeding, Increased redness, swelling or pain at the incisonal site, Fever   Is the patient/caregiver able to teach back steps to recovery at home?  Set small, achievable goals for return to baseline health, Rest and rebuild strength, gradually increase activity   Is the patient/caregiver able to teach back the hierarchy of who to call/visit for symptoms/problems? PCP, Specialist, Home health nurse, Urgent Care, ED, 911  Yes   Week 1 call completed?  Yes            Jaylen Kelley RN

## 2020-07-09 ENCOUNTER — READMISSION MANAGEMENT (OUTPATIENT)
Dept: CALL CENTER | Facility: HOSPITAL | Age: 60
End: 2020-07-09

## 2020-07-09 NOTE — OUTREACH NOTE
CT Surgery Week 2 Survey      Responses   [facility] [place] patient discharged from?  [facility]   Does the patient have one of the following disease processes/diagnoses(primary or secondary)?  Cardiothoracic surgery   Week 2 attempt successful?  Yes   Call start time  1536   Call end time  1542   Discharge diagnosis  Biventricular ICD upgrade and AV node ablation s/pleft ant. thoracotomy with LV lead placement, PAF, VT, postop PETER, HTN, HLD, DON on trilogy, morbid obesity, premature CAD   Is the patient having any side effects they believe may be caused by any medication additions or changes?  No   Is the patient taking all medications as directed (includes completed medication regime)?  Yes   Comments  Rescheduled PCP appt to next week due to it being so close to d/c, had swelling in feet and could not get shoes on   What is the Home health agency?   [facility]   Psychosocial issues?  No   Nursing interventions  Reviewed instructions with patient   What is the patient's perception of their health status since discharge?  Improving [Does have increased swelling in feet and legs, no breathing issues]   Nursing interventions  Nurse provided patient education   Is the patient/caregiver able to teach back normal signs of recovery?  Pain or discomfort at incisional site   Nursing interventions  Reassured on normal signs of recovery   Is the patient/caregiver able to teach back steps to recovery at home?  Set small, achievable goals for return to baseline health   Week 2 call completed?  Yes          Bhavani Hernandez RN

## 2020-07-15 ENCOUNTER — READMISSION MANAGEMENT (OUTPATIENT)
Dept: CALL CENTER | Facility: HOSPITAL | Age: 60
End: 2020-07-15

## 2020-07-15 ENCOUNTER — APPOINTMENT (OUTPATIENT)
Dept: GENERAL RADIOLOGY | Facility: HOSPITAL | Age: 60
End: 2020-07-15

## 2020-07-15 ENCOUNTER — HOSPITAL ENCOUNTER (INPATIENT)
Facility: HOSPITAL | Age: 60
LOS: 4 days | Discharge: HOME HEALTH | End: 2020-07-21
Attending: EMERGENCY MEDICINE | Admitting: HOSPITALIST

## 2020-07-15 DIAGNOSIS — R07.9 CHEST PAIN, UNSPECIFIED TYPE: ICD-10-CM

## 2020-07-15 DIAGNOSIS — I50.20 SYSTOLIC CONGESTIVE HEART FAILURE, UNSPECIFIED HF CHRONICITY (HCC): Primary | Chronic | ICD-10-CM

## 2020-07-15 PROBLEM — F41.9 ANXIETY: Chronic | Status: ACTIVE | Noted: 2017-09-08

## 2020-07-15 PROBLEM — F32.A DEPRESSION: Chronic | Status: ACTIVE | Noted: 2019-08-07

## 2020-07-15 LAB
ALBUMIN SERPL-MCNC: 4.2 G/DL (ref 3.5–5.2)
ALBUMIN/GLOB SERPL: 1.2 G/DL
ALP SERPL-CCNC: 92 U/L (ref 39–117)
ALT SERPL W P-5'-P-CCNC: 16 U/L (ref 1–41)
ANION GAP SERPL CALCULATED.3IONS-SCNC: 15 MMOL/L (ref 5–15)
ARTERIAL PATENCY WRIST A: POSITIVE
AST SERPL-CCNC: 37 U/L (ref 1–40)
ATMOSPHERIC PRESS: ABNORMAL MM[HG]
BASE EXCESS BLDA CALC-SCNC: 4.7 MMOL/L (ref 0–3)
BASOPHILS # BLD AUTO: 0.1 10*3/MM3 (ref 0–0.2)
BASOPHILS NFR BLD AUTO: 0.5 % (ref 0–1.5)
BDY SITE: ABNORMAL
BILIRUB SERPL-MCNC: 0.5 MG/DL (ref 0–1.2)
BUN SERPL-MCNC: 13 MG/DL (ref 6–20)
BUN SERPL-MCNC: ABNORMAL MG/DL
BUN/CREAT SERPL: ABNORMAL
CALCIUM SPEC-SCNC: 9 MG/DL (ref 8.6–10.5)
CHLORIDE SERPL-SCNC: 100 MMOL/L (ref 98–107)
CO2 BLDA-SCNC: 33.8 MMOL/L (ref 22–29)
CO2 SERPL-SCNC: 25 MMOL/L (ref 22–29)
CREAT SERPL-MCNC: 0.99 MG/DL (ref 0.76–1.27)
DEPRECATED RDW RBC AUTO: 46.8 FL (ref 37–54)
EOSINOPHIL # BLD AUTO: 0.2 10*3/MM3 (ref 0–0.4)
EOSINOPHIL NFR BLD AUTO: 2.3 % (ref 0.3–6.2)
ERYTHROCYTE [DISTWIDTH] IN BLOOD BY AUTOMATED COUNT: 15.4 % (ref 12.3–15.4)
GFR SERPL CREATININE-BSD FRML MDRD: 78 ML/MIN/1.73
GLOBULIN UR ELPH-MCNC: 3.5 GM/DL
GLUCOSE SERPL-MCNC: 119 MG/DL (ref 65–99)
HCO3 BLDA-SCNC: 32 MMOL/L (ref 21–28)
HCT VFR BLD AUTO: 44.4 % (ref 37.5–51)
HEMODILUTION: NO
HGB BLD-MCNC: 14.4 G/DL (ref 13–17.7)
INHALED O2 CONCENTRATION: 36 %
LYMPHOCYTES # BLD AUTO: 1.9 10*3/MM3 (ref 0.7–3.1)
LYMPHOCYTES NFR BLD AUTO: 18.3 % (ref 19.6–45.3)
MCH RBC QN AUTO: 27.6 PG (ref 26.6–33)
MCHC RBC AUTO-ENTMCNC: 32.4 G/DL (ref 31.5–35.7)
MCV RBC AUTO: 85.2 FL (ref 79–97)
MODALITY: ABNORMAL
MONOCYTES # BLD AUTO: 1.6 10*3/MM3 (ref 0.1–0.9)
MONOCYTES NFR BLD AUTO: 15.4 % (ref 5–12)
NEUTROPHILS NFR BLD AUTO: 6.7 10*3/MM3 (ref 1.7–7)
NEUTROPHILS NFR BLD AUTO: 63.5 % (ref 42.7–76)
NRBC BLD AUTO-RTO: 0 /100 WBC (ref 0–0.2)
PCO2 BLDA: 57.4 MM HG (ref 35–48)
PH BLDA: 7.36 PH UNITS (ref 7.35–7.45)
PLATELET # BLD AUTO: 320 10*3/MM3 (ref 140–450)
PMV BLD AUTO: 9.1 FL (ref 6–12)
PO2 BLDA: 103.5 MM HG (ref 83–108)
POTASSIUM SERPL-SCNC: 4.2 MMOL/L (ref 3.5–5.2)
PROT SERPL-MCNC: 7.7 G/DL (ref 6–8.5)
RBC # BLD AUTO: 5.21 10*6/MM3 (ref 4.14–5.8)
SAO2 % BLDCOA: 97.5 % (ref 94–98)
SODIUM SERPL-SCNC: 140 MMOL/L (ref 136–145)
TROPONIN T SERPL-MCNC: 0.18 NG/ML (ref 0–0.03)
WBC # BLD AUTO: 10.5 10*3/MM3 (ref 3.4–10.8)
WHOLE BLOOD HOLD SPECIMEN: NORMAL

## 2020-07-15 PROCEDURE — 25010000002 MORPHINE PER 10 MG: Performed by: EMERGENCY MEDICINE

## 2020-07-15 PROCEDURE — 99284 EMERGENCY DEPT VISIT MOD MDM: CPT

## 2020-07-15 PROCEDURE — 25010000002 ONDANSETRON PER 1 MG: Performed by: EMERGENCY MEDICINE

## 2020-07-15 PROCEDURE — 36600 WITHDRAWAL OF ARTERIAL BLOOD: CPT

## 2020-07-15 PROCEDURE — 85025 COMPLETE CBC W/AUTO DIFF WBC: CPT | Performed by: EMERGENCY MEDICINE

## 2020-07-15 PROCEDURE — 94640 AIRWAY INHALATION TREATMENT: CPT

## 2020-07-15 PROCEDURE — G0378 HOSPITAL OBSERVATION PER HR: HCPCS

## 2020-07-15 PROCEDURE — 94799 UNLISTED PULMONARY SVC/PX: CPT

## 2020-07-15 PROCEDURE — 84484 ASSAY OF TROPONIN QUANT: CPT | Performed by: EMERGENCY MEDICINE

## 2020-07-15 PROCEDURE — 82803 BLOOD GASES ANY COMBINATION: CPT

## 2020-07-15 PROCEDURE — 80053 COMPREHEN METABOLIC PANEL: CPT | Performed by: EMERGENCY MEDICINE

## 2020-07-15 PROCEDURE — 93005 ELECTROCARDIOGRAM TRACING: CPT | Performed by: EMERGENCY MEDICINE

## 2020-07-15 PROCEDURE — 83735 ASSAY OF MAGNESIUM: CPT | Performed by: PHYSICIAN ASSISTANT

## 2020-07-15 PROCEDURE — 71045 X-RAY EXAM CHEST 1 VIEW: CPT

## 2020-07-15 PROCEDURE — 93005 ELECTROCARDIOGRAM TRACING: CPT

## 2020-07-15 PROCEDURE — 99219 PR INITIAL OBSERVATION CARE/DAY 50 MINUTES: CPT | Performed by: INTERNAL MEDICINE

## 2020-07-15 RX ORDER — MONTELUKAST SODIUM 10 MG/1
10 TABLET ORAL DAILY
Status: DISCONTINUED | OUTPATIENT
Start: 2020-07-16 | End: 2020-07-21 | Stop reason: HOSPADM

## 2020-07-15 RX ORDER — ALUMINA, MAGNESIA, AND SIMETHICONE 2400; 2400; 240 MG/30ML; MG/30ML; MG/30ML
15 SUSPENSION ORAL EVERY 6 HOURS PRN
Status: DISCONTINUED | OUTPATIENT
Start: 2020-07-15 | End: 2020-07-21 | Stop reason: HOSPADM

## 2020-07-15 RX ORDER — MAGNESIUM SULFATE HEPTAHYDRATE 40 MG/ML
2 INJECTION, SOLUTION INTRAVENOUS AS NEEDED
Status: DISCONTINUED | OUTPATIENT
Start: 2020-07-15 | End: 2020-07-21 | Stop reason: HOSPADM

## 2020-07-15 RX ORDER — ONDANSETRON 2 MG/ML
4 INJECTION INTRAMUSCULAR; INTRAVENOUS EVERY 6 HOURS PRN
Status: DISCONTINUED | OUTPATIENT
Start: 2020-07-15 | End: 2020-07-21 | Stop reason: HOSPADM

## 2020-07-15 RX ORDER — NITROGLYCERIN 0.4 MG/1
0.4 TABLET SUBLINGUAL
Status: DISCONTINUED | OUTPATIENT
Start: 2020-07-15 | End: 2020-07-21 | Stop reason: HOSPADM

## 2020-07-15 RX ORDER — ECHINACEA PURPUREA EXTRACT 125 MG
1 TABLET ORAL AS NEEDED
COMMUNITY
End: 2020-10-02 | Stop reason: HOSPADM

## 2020-07-15 RX ORDER — DULOXETIN HYDROCHLORIDE 30 MG/1
60 CAPSULE, DELAYED RELEASE ORAL DAILY
Status: DISCONTINUED | OUTPATIENT
Start: 2020-07-16 | End: 2020-07-21 | Stop reason: HOSPADM

## 2020-07-15 RX ORDER — SODIUM CHLORIDE 0.9 % (FLUSH) 0.9 %
10 SYRINGE (ML) INJECTION AS NEEDED
Status: DISCONTINUED | OUTPATIENT
Start: 2020-07-15 | End: 2020-07-21 | Stop reason: HOSPADM

## 2020-07-15 RX ORDER — BUMETANIDE 1 MG/1
1 TABLET ORAL 3 TIMES DAILY
Status: DISCONTINUED | OUTPATIENT
Start: 2020-07-15 | End: 2020-07-21 | Stop reason: HOSPADM

## 2020-07-15 RX ORDER — BISACODYL 10 MG
10 SUPPOSITORY, RECTAL RECTAL DAILY PRN
Status: DISCONTINUED | OUTPATIENT
Start: 2020-07-15 | End: 2020-07-21 | Stop reason: HOSPADM

## 2020-07-15 RX ORDER — NITROGLYCERIN 0.4 MG/1
0.4 TABLET SUBLINGUAL
Status: DISCONTINUED | OUTPATIENT
Start: 2020-07-15 | End: 2020-07-15 | Stop reason: SDUPTHER

## 2020-07-15 RX ORDER — ACETAMINOPHEN 325 MG/1
650 TABLET ORAL EVERY 4 HOURS PRN
Status: DISCONTINUED | OUTPATIENT
Start: 2020-07-15 | End: 2020-07-21 | Stop reason: HOSPADM

## 2020-07-15 RX ORDER — ATORVASTATIN CALCIUM 40 MG/1
80 TABLET, FILM COATED ORAL DAILY
Status: DISCONTINUED | OUTPATIENT
Start: 2020-07-16 | End: 2020-07-21 | Stop reason: HOSPADM

## 2020-07-15 RX ORDER — POTASSIUM CHLORIDE 750 MG/1
10 TABLET, FILM COATED, EXTENDED RELEASE ORAL DAILY
Status: DISCONTINUED | OUTPATIENT
Start: 2020-07-16 | End: 2020-07-21 | Stop reason: HOSPADM

## 2020-07-15 RX ORDER — SPIRONOLACTONE 25 MG/1
25 TABLET ORAL 3 TIMES DAILY
Status: ON HOLD | COMMUNITY
End: 2020-10-02 | Stop reason: SDUPTHER

## 2020-07-15 RX ORDER — AMIODARONE HYDROCHLORIDE 200 MG/1
200 TABLET ORAL DAILY
Status: DISCONTINUED | OUTPATIENT
Start: 2020-07-16 | End: 2020-07-21 | Stop reason: HOSPADM

## 2020-07-15 RX ORDER — IPRATROPIUM BROMIDE AND ALBUTEROL SULFATE 2.5; .5 MG/3ML; MG/3ML
3 SOLUTION RESPIRATORY (INHALATION) ONCE
Status: COMPLETED | OUTPATIENT
Start: 2020-07-15 | End: 2020-07-15

## 2020-07-15 RX ORDER — LISINOPRIL 5 MG/1
5 TABLET ORAL DAILY
Status: DISCONTINUED | OUTPATIENT
Start: 2020-07-16 | End: 2020-07-18

## 2020-07-15 RX ORDER — ROFLUMILAST 500 UG/1
500 TABLET ORAL DAILY
Status: DISCONTINUED | OUTPATIENT
Start: 2020-07-16 | End: 2020-07-21 | Stop reason: HOSPADM

## 2020-07-15 RX ORDER — ASPIRIN 325 MG
325 TABLET ORAL ONCE
Status: COMPLETED | OUTPATIENT
Start: 2020-07-15 | End: 2020-07-15

## 2020-07-15 RX ORDER — HYDROCHLOROTHIAZIDE 12.5 MG/1
12.5 TABLET ORAL DAILY
Status: DISCONTINUED | OUTPATIENT
Start: 2020-07-16 | End: 2020-07-18

## 2020-07-15 RX ORDER — MAGNESIUM SULFATE 1 G/100ML
1 INJECTION INTRAVENOUS AS NEEDED
Status: DISCONTINUED | OUTPATIENT
Start: 2020-07-15 | End: 2020-07-21 | Stop reason: HOSPADM

## 2020-07-15 RX ORDER — ECHINACEA PURPUREA EXTRACT 125 MG
1 TABLET ORAL AS NEEDED
Status: DISCONTINUED | OUTPATIENT
Start: 2020-07-15 | End: 2020-07-21 | Stop reason: HOSPADM

## 2020-07-15 RX ORDER — GABAPENTIN 300 MG/1
300 CAPSULE ORAL 2 TIMES DAILY
Status: DISCONTINUED | OUTPATIENT
Start: 2020-07-15 | End: 2020-07-21 | Stop reason: HOSPADM

## 2020-07-15 RX ORDER — BUDESONIDE AND FORMOTEROL FUMARATE DIHYDRATE 160; 4.5 UG/1; UG/1
2 AEROSOL RESPIRATORY (INHALATION)
Status: DISCONTINUED | OUTPATIENT
Start: 2020-07-15 | End: 2020-07-20

## 2020-07-15 RX ORDER — ONDANSETRON 2 MG/ML
4 INJECTION INTRAMUSCULAR; INTRAVENOUS ONCE
Status: COMPLETED | OUTPATIENT
Start: 2020-07-15 | End: 2020-07-15

## 2020-07-15 RX ORDER — MORPHINE SULFATE 4 MG/ML
4 INJECTION, SOLUTION INTRAMUSCULAR; INTRAVENOUS EVERY 4 HOURS PRN
Status: DISCONTINUED | OUTPATIENT
Start: 2020-07-15 | End: 2020-07-21 | Stop reason: HOSPADM

## 2020-07-15 RX ORDER — ALBUTEROL SULFATE 2.5 MG/3ML
2.5 SOLUTION RESPIRATORY (INHALATION) EVERY 6 HOURS PRN
Status: DISCONTINUED | OUTPATIENT
Start: 2020-07-15 | End: 2020-07-21 | Stop reason: HOSPADM

## 2020-07-15 RX ORDER — CHOLECALCIFEROL (VITAMIN D3) 125 MCG
5 CAPSULE ORAL NIGHTLY PRN
Status: DISCONTINUED | OUTPATIENT
Start: 2020-07-15 | End: 2020-07-21 | Stop reason: HOSPADM

## 2020-07-15 RX ORDER — CLOPIDOGREL BISULFATE 75 MG/1
75 TABLET ORAL DAILY
Status: DISCONTINUED | OUTPATIENT
Start: 2020-07-16 | End: 2020-07-21 | Stop reason: HOSPADM

## 2020-07-15 RX ORDER — POTASSIUM CHLORIDE 20 MEQ/1
40 TABLET, EXTENDED RELEASE ORAL AS NEEDED
Status: DISCONTINUED | OUTPATIENT
Start: 2020-07-15 | End: 2020-07-21 | Stop reason: HOSPADM

## 2020-07-15 RX ORDER — ACETAMINOPHEN 650 MG/1
650 SUPPOSITORY RECTAL EVERY 4 HOURS PRN
Status: DISCONTINUED | OUTPATIENT
Start: 2020-07-15 | End: 2020-07-21 | Stop reason: HOSPADM

## 2020-07-15 RX ORDER — SPIRONOLACTONE 25 MG/1
25 TABLET ORAL DAILY
Status: DISCONTINUED | OUTPATIENT
Start: 2020-07-16 | End: 2020-07-21 | Stop reason: HOSPADM

## 2020-07-15 RX ORDER — ONDANSETRON 4 MG/1
4 TABLET, FILM COATED ORAL EVERY 6 HOURS PRN
Status: DISCONTINUED | OUTPATIENT
Start: 2020-07-15 | End: 2020-07-21 | Stop reason: HOSPADM

## 2020-07-15 RX ORDER — MORPHINE SULFATE 4 MG/ML
4 INJECTION, SOLUTION INTRAMUSCULAR; INTRAVENOUS ONCE
Status: COMPLETED | OUTPATIENT
Start: 2020-07-15 | End: 2020-07-15

## 2020-07-15 RX ORDER — NITROGLYCERIN 20 MG/100ML
10-50 INJECTION INTRAVENOUS
Status: DISCONTINUED | OUTPATIENT
Start: 2020-07-15 | End: 2020-07-21 | Stop reason: HOSPADM

## 2020-07-15 RX ORDER — RANOLAZINE 500 MG/1
1000 TABLET, EXTENDED RELEASE ORAL 2 TIMES DAILY
Status: DISCONTINUED | OUTPATIENT
Start: 2020-07-15 | End: 2020-07-21 | Stop reason: HOSPADM

## 2020-07-15 RX ORDER — SODIUM CHLORIDE 0.9 % (FLUSH) 0.9 %
10 SYRINGE (ML) INJECTION EVERY 12 HOURS SCHEDULED
Status: DISCONTINUED | OUTPATIENT
Start: 2020-07-15 | End: 2020-07-21 | Stop reason: HOSPADM

## 2020-07-15 RX ORDER — CETIRIZINE HYDROCHLORIDE 10 MG/1
10 TABLET ORAL DAILY
Status: DISCONTINUED | OUTPATIENT
Start: 2020-07-16 | End: 2020-07-21 | Stop reason: HOSPADM

## 2020-07-15 RX ORDER — ACETAMINOPHEN 160 MG/5ML
650 SOLUTION ORAL EVERY 4 HOURS PRN
Status: DISCONTINUED | OUTPATIENT
Start: 2020-07-15 | End: 2020-07-21 | Stop reason: HOSPADM

## 2020-07-15 RX ORDER — METOPROLOL SUCCINATE 50 MG/1
50 TABLET, EXTENDED RELEASE ORAL
Status: DISCONTINUED | OUTPATIENT
Start: 2020-07-16 | End: 2020-07-18

## 2020-07-15 RX ADMIN — GABAPENTIN 300 MG: 300 CAPSULE ORAL at 22:18

## 2020-07-15 RX ADMIN — MORPHINE SULFATE 4 MG: 4 INJECTION INTRAVENOUS at 20:40

## 2020-07-15 RX ADMIN — NITROGLYCERIN 10 MCG/MIN: 20 INJECTION INTRAVENOUS at 19:56

## 2020-07-15 RX ADMIN — ASPIRIN 325 MG ORAL TABLET 325 MG: 325 PILL ORAL at 19:55

## 2020-07-15 RX ADMIN — APIXABAN 5 MG: 5 TABLET, FILM COATED ORAL at 22:18

## 2020-07-15 RX ADMIN — BUMETANIDE 1 MG: 1 TABLET ORAL at 22:18

## 2020-07-15 RX ADMIN — IPRATROPIUM BROMIDE AND ALBUTEROL SULFATE 3 ML: 2.5; .5 SOLUTION RESPIRATORY (INHALATION) at 18:43

## 2020-07-15 RX ADMIN — Medication 10 ML: at 22:18

## 2020-07-15 RX ADMIN — RANOLAZINE 1000 MG: 500 TABLET, FILM COATED, EXTENDED RELEASE ORAL at 22:18

## 2020-07-15 RX ADMIN — ONDANSETRON 4 MG: 2 INJECTION, SOLUTION INTRAMUSCULAR; INTRAVENOUS at 18:50

## 2020-07-15 RX ADMIN — MORPHINE SULFATE 4 MG: 4 INJECTION INTRAVENOUS at 18:50

## 2020-07-15 NOTE — ED PROVIDER NOTES
Subjective   History of Present Illness  Chest pain, shortness of breath  59-year-old male complains of chest pain shortness of breath started yesterday and worsened today.  He states he had periods of diaphoresis and with pain radiating to his jaw.  He reports no fevers or chills or cough.  He reports no syncope.  Pain to moderate testy without relieving or exacerbating factors.  Review of Systems   Constitutional: Negative.    HENT: Negative.    Eyes: Negative.    Respiratory: Positive for shortness of breath.    Cardiovascular: Positive for chest pain.   Gastrointestinal: Negative.    Genitourinary: Negative.    Musculoskeletal: Negative.    Skin: Negative.    Neurological: Negative.    Hematological: Negative.    Psychiatric/Behavioral: Negative.        Past Medical History:   Diagnosis Date   • Asthma    • Atrial fibrillation (CMS/[facility])    • CAD (coronary artery disease)    • CHF (congestive heart failure) (CMS/[facility])    • COPD (chronic obstructive pulmonary disease) (CMS/[facility])    • Depression    • Hyperlipidemia    • Hypertension    • Myocardial infarction (CMS/[facility])    • Pacemaker 2019    [employer]    • V tach (CMS/[facility])        Allergies   Allergen Reactions   • Codeine Anaphylaxis          • Tramadol Anaphylaxis and Hives       Past Surgical History:   Procedure Laterality Date   • CARDIAC ABLATION  11/07/2017   • CARDIAC CATHETERIZATION      6-8 stents, last heart cath 2019   • CARDIAC CATHETERIZATION N/A 6/9/2020    Procedure: Coronary angiography;  Surgeon: Jose Lopez MD;  Location: [facility] CATH INVASIVE LOCATION;  Service: Cardiovascular;  Laterality: N/A;   • CARDIAC ELECTROPHYSIOLOGY PROCEDURE N/A 9/26/2019    Procedure: BIVENTRICULAR DEVICE UPGRADE;  Surgeon: Jose Lopez MD;  Location: [facility] CATH INVASIVE LOCATION;  Service: Cardiovascular   • CARDIAC ELECTROPHYSIOLOGY PROCEDURE N/A 9/26/2019    Procedure: EP/Ablation AV Node ablation;  Surgeon: Jose Lopez MD;  Location:   [facility] CATH INVASIVE LOCATION;  Service: Cardiology   • CARDIAC ELECTROPHYSIOLOGY PROCEDURE N/A 6/9/2020    Procedure: EP/Ablation;  Surgeon: Jose Lopez MD;  Location: [facility] CATH INVASIVE LOCATION;  Service: Cardiovascular;  Laterality: N/A;   • CARDIAC PACEMAKER PLACEMENT N/A 6/22/2020    Procedure: LEFT VENTRICULAR LEAD PLACEMENT;  Surgeon: Christiano Richmond MD;  Location: [facility] CVOR;  Service: Cardiothoracic;  Laterality: N/A;   • CHOLECYSTECTOMY     • CORONARY ANGIOPLASTY WITH STENT PLACEMENT      2 stents   • PACEMAKER IMPLANTATION  07/08/2016    Pacemaker/ Defib implant - [employer]   • SINUS SURGERY      sinus surgery x 9       Family History   Problem Relation Age of Onset   • Diabetes Mother    • Heart disease Mother         MI age 46 - CHF   • Atrial fibrillation Mother    • COPD Mother    • Atrial fibrillation Father    • Heart disease Father         CHF       Social History     Socioeconomic History   • Marital status:      Spouse name: Not on file   • Number of children: Not on file   • Years of education: Not on file   • Highest education level: Not on file   Tobacco Use   • Smoking status: Never Smoker   • Smokeless tobacco: Never Used   Substance and Sexual Activity   • Alcohol use: Yes     Frequency: Never     Comment: socially   • Drug use: No   • Sexual activity: Yes     Partners: Female       Prior to Admission medications    Medication Sig Start Date End Date Taking? Authorizing Provider   albuterol sulfate  (90 Base) MCG/ACT inhaler Inhale 2 puffs Every 4 (Four) Hours As Needed for Wheezing. 10/28/19   Juli Boudreaux APRN   amiodarone (PACERONE) 200 MG tablet Take 1 tablet by mouth Daily. 4/22/20   Jose Lopez MD   apixaban (ELIQUIS) 5 MG tablet tablet Take 1 tablet by mouth 2 (Two) Times a Day. 10/28/19   Juli Boudreaux APRN   atorvastatin (LIPITOR) 80 MG tablet Take 80 mg by mouth Daily.    Provider, MD Kian   budesonide-formoterol (SYMBICORT) 160-4.5  MCG/ACT inhaler Inhale 2 puffs 2 (Two) Times a Day. 8/9/19   Brandon Enrique MD   bumetanide (BUMEX) 1 MG tablet Take 1 tablet by mouth 3 (Three) Times a Day. 10/28/19   Juli Boudreaux APRN   cetirizine (zyrTEC) 10 MG tablet Take 1 tablet by mouth Daily. 10/28/19   Juli Boudreaux APRN   clopidogrel (PLAVIX) 75 MG tablet Take 75 mg by mouth Daily.    Kian Kraus MD   cyclobenzaprine (FLEXERIL) 10 MG tablet Take 1 tablet by mouth Every 8 (Eight) Hours As Needed for Muscle Spasms. 6/29/20   Gisela Calle APRN   DULoxetine (CYMBALTA) 60 MG capsule Take 60 mg by mouth Daily.    Kian Kraus MD   fluticasone (FLONASE) 50 MCG/ACT nasal spray 2 sprays into the nostril(s) as directed by provider Daily. 10/28/19   Juli Boudreaux APRN   gabapentin (NEURONTIN) 300 MG capsule Take 300 mg by mouth 2 (Two) Times a Day.    Kian Kraus MD   hydroCHLOROthiazide (HYDRODIURIL) 25 MG tablet Take 0.5 tablets by mouth Daily. 6/29/20   Gisela Calle APRN   HYDROcodone-acetaminophen (NORCO) 5-325 MG per tablet Take 1 tablet by mouth Every 6 (Six) Hours As Needed for Moderate Pain . 6/29/20   Gisela Calle APRN   lisinopril (PRINIVIL,ZESTRIL) 5 MG tablet Take 1 tablet by mouth Daily. 10/7/19   Jose Lopez MD   metoprolol succinate XL (TOPROL-XL) 50 MG 24 hr tablet Take 1 tablet by mouth Daily. 6/30/20   Gisela Calle APRN   montelukast (SINGULAIR) 10 MG tablet Take 10 mg by mouth Daily.    Kian Kraus MD   nitroglycerin (NITROSTAT) 0.4 MG SL tablet Place 0.4 mg under the tongue Every 5 (Five) Minutes As Needed for Chest Pain. Take no more than 3 doses in 15 minutes.    Kian Kraus MD   potassium chloride (K-DUR) 10 MEQ CR tablet Take 1 tablet by mouth Daily. 10/7/19   Jose Lopez MD   povidone-iodine (BETADINE) 10 % external solution Apply  topically to the appropriate area as directed 2 (two) times a day. 6/29/20   Gisela Calle APRN   ranolazine (RANEXA)  "500 MG 12 hr tablet Take 1,000 mg by mouth 2 (Two) Times a Day.    Provider, MD Kian   roflumilast (DALIRESP) 500 MCG tablet tablet Take 500 mcg by mouth Daily.    ProviderKian MD   tamsulosin (FLOMAX) 0.4 MG capsule 24 hr capsule Take 1 capsule by mouth Every Evening. 9/17/19   ProviderKian MD     /53   Pulse 71   Temp 98.1 °F (36.7 °C) (Oral)   Resp 24   Ht 177.8 cm (70\")   Wt (!) 139 kg (306 lb 14.1 oz)   SpO2 98%   BMI 44.03 kg/m²   I examined the patient using the appropriate personal protective equipment.        Objective   Physical Exam  General: Well-developed male slightly diaphoretic, awake alert  Eyes: Pupils round , sclera nonicteric  HEENT: Mucous membranes moist, no mucosal swelling  Neck: Supple, no nuchal rigidity, no lymphadenopathy  Respirations: Some coarse breath sounds bilaterally, respiration mildly tachypneic  Heart regular rate and rhythm, no murmurs rubs or gallops,   Abdomen soft nontender nondistended, no hepatosplenomegaly, no hernia, no mass, normal bowel sounds, no CVA tenderness  Extremities no clubbing cyanosis or edema, calves are symmetric and nontender  Neuro cranial nerves grossly intact, no focal limb deficits  Psych oriented, pleasant affect  Skin no rash, brisk cap refill  Procedures           ED Course      My EKG interpretation ventricular paced rhythm rate of 72     Xr Chest 1 View    Result Date: 7/15/2020  Stable cardiomegaly without acute pulmonary process.  Electronically Signed By-Demarcus Linares On:7/15/2020 7:19 PM This report was finalized on [id number] by  Demarcus Linares, .    Results for orders placed or performed during the hospital encounter of 07/15/20   Comprehensive Metabolic Panel   Result Value Ref Range    Glucose 119 (H) 65 - 99 mg/dL    BUN      Creatinine 0.99 0.76 - 1.27 mg/dL    Sodium 140 136 - 145 mmol/L    Potassium 4.2 3.5 - 5.2 mmol/L    Chloride 100 98 - 107 mmol/L    CO2 25.0 22.0 - 29.0 mmol/L    " Calcium 9.0 8.6 - 10.5 mg/dL    Total Protein 7.7 6.0 - 8.5 g/dL    Albumin 4.20 3.50 - 5.20 g/dL    ALT (SGPT) 16 1 - 41 U/L    AST (SGOT) 37 1 - 40 U/L    Alkaline Phosphatase 92 39 - 117 U/L    Total Bilirubin 0.5 0.0 - 1.2 mg/dL    eGFR Non African Amer 78 >60 mL/min/1.73    Globulin 3.5 gm/dL    A/G Ratio 1.2 g/dL    BUN/Creatinine Ratio      Anion Gap 15.0 5.0 - 15.0 mmol/L   Troponin   Result Value Ref Range    Troponin T 0.183 (C) 0.000 - 0.030 ng/mL   CBC Auto Differential   Result Value Ref Range    WBC 10.50 3.40 - 10.80 10*3/mm3    RBC 5.21 4.14 - 5.80 10*6/mm3    Hemoglobin 14.4 13.0 - 17.7 g/dL    Hematocrit 44.4 37.5 - 51.0 %    MCV 85.2 79.0 - 97.0 fL    MCH 27.6 26.6 - 33.0 pg    MCHC 32.4 31.5 - 35.7 g/dL    RDW 15.4 12.3 - 15.4 %    RDW-SD 46.8 37.0 - 54.0 fl    MPV 9.1 6.0 - 12.0 fL    Platelets 320 140 - 450 10*3/mm3    Neutrophil % 63.5 42.7 - 76.0 %    Lymphocyte % 18.3 (L) 19.6 - 45.3 %    Monocyte % 15.4 (H) 5.0 - 12.0 %    Eosinophil % 2.3 0.3 - 6.2 %    Basophil % 0.5 0.0 - 1.5 %    Neutrophils, Absolute 6.70 1.70 - 7.00 10*3/mm3    Lymphocytes, Absolute 1.90 0.70 - 3.10 10*3/mm3    Monocytes, Absolute 1.60 (H) 0.10 - 0.90 10*3/mm3    Eosinophils, Absolute 0.20 0.00 - 0.40 10*3/mm3    Basophils, Absolute 0.10 0.00 - 0.20 10*3/mm3    nRBC 0.0 0.0 - 0.2 /100 WBC   Blood Gas, Arterial   Result Value Ref Range    Site Right Radial     Carmelo's Test Positive     pH, Arterial 7.355 7.350 - 7.450 pH units    pCO2, Arterial 57.4 (H) 35.0 - 48.0 mm Hg    pO2, Arterial 103.5 83.0 - 108.0 mm Hg    HCO3, Arterial 32.0 (H) 21.0 - 28.0 mmol/L    Base Excess, Arterial 4.7 (H) 0.0 - 3.0 mmol/L    O2 Saturation, Arterial 97.5 94.0 - 98.0 %    CO2 Content 33.8 (H) 22 - 29 mmol/L    Barometric Pressure for Blood Gas      Modality Cannula     FIO2 36 %    Hemodilution No    BUN   Result Value Ref Range    BUN 13 6 - 20 mg/dL   Light Blue Top   Result Value Ref Range    Extra Tube hold for add-on                                       MDM  Patient presents with chest pain at times rating to his jaw.  His EKG shows no acute ischemic changes his initial troponin is elevated.  He is chronically anticoagulated with Eliquis.  He was given aspirin and started on IV Tridil titrated to effect along with given morphine for discomfort he did get some marginal improvement K discussed with Dr. Steel cardiothoracic surgery as well as with Dr. Durand cardiology.  Chest x-ray shows no pneumothorax or pneumonia.  Patient was advised the findings case because of the hospital service patient mid to the PCU for further care.  Final diagnoses:   Chest pain, unspecified type     Critical care time 40 minutes       Lambert Peace MD  07/15/20 2040

## 2020-07-15 NOTE — OUTREACH NOTE
CT Surgery Week 3 Survey      Responses   [facility] patient discharged from?  [facility]   Does the patient have one of the following disease processes/diagnoses(primary or secondary)?  Cardiothoracic surgery   Week 3 attempt successful?  Yes   Call start time  1651   Call end time  1659   Discharge diagnosis  Biventricular ICD upgrade and AV node ablation s/pleft ant. thoracotomy with LV lead placement, PAF, VT, postop PETER, HTN, HLD, DON on trilogy, morbid obesity, premature CAD   Meds reviewed with patient/caregiver?  Yes   Is the patient taking all medications as directed (includes completed medication regime)?  Yes   Does the patient have an appointment scheduled with their C/T surgeon?  Yes [7/30/20]   Comments regarding PCP  7/21/20   Has the patient kept scheduled appointments due by today?  N/A   What is the Home health agency?   [facility]   What is the patient's perception of their health status since discharge?  Improving [Pt reports he still has swelling in his feet and he's really sore. Pt went on to report he had pain in his chest, jaw, back, and neck then he broke out in a sweat. He took 2 nitro. Hand is tingly today.]   Nursing interventions  Advised patient to call provider [RN advised pt to go to ER if he ever has S&S listed above again. I went on to advise him to call surgeon now for instructions. If he is unable to get instructions from surgeons office I advised him to f/u in ED. He verbalized understanding. ]   Is the patient/caregiver able to teach back steps to recovery at home?  Set small, achievable goals for return to baseline health   Week 3 call completed?  Yes          Jamia Jacobo RN

## 2020-07-16 PROBLEM — I50.43 ACUTE ON CHRONIC COMBINED SYSTOLIC AND DIASTOLIC CHF (CONGESTIVE HEART FAILURE) (HCC): Status: ACTIVE | Noted: 2020-07-16

## 2020-07-16 LAB
ANION GAP SERPL CALCULATED.3IONS-SCNC: 11 MMOL/L (ref 5–15)
BASOPHILS # BLD AUTO: 0 10*3/MM3 (ref 0–0.2)
BASOPHILS NFR BLD AUTO: 0.3 % (ref 0–1.5)
BUN SERPL-MCNC: 14 MG/DL (ref 6–20)
BUN SERPL-MCNC: ABNORMAL MG/DL
BUN/CREAT SERPL: ABNORMAL
CALCIUM SPEC-SCNC: 8.7 MG/DL (ref 8.6–10.5)
CHLORIDE SERPL-SCNC: 98 MMOL/L (ref 98–107)
CO2 SERPL-SCNC: 34 MMOL/L (ref 22–29)
CREAT SERPL-MCNC: 1.15 MG/DL (ref 0.76–1.27)
DEPRECATED RDW RBC AUTO: 47.7 FL (ref 37–54)
EOSINOPHIL # BLD AUTO: 0.4 10*3/MM3 (ref 0–0.4)
EOSINOPHIL NFR BLD AUTO: 4.5 % (ref 0.3–6.2)
ERYTHROCYTE [DISTWIDTH] IN BLOOD BY AUTOMATED COUNT: 15.5 % (ref 12.3–15.4)
GFR SERPL CREATININE-BSD FRML MDRD: 66 ML/MIN/1.73
GLUCOSE SERPL-MCNC: 91 MG/DL (ref 65–99)
HCT VFR BLD AUTO: 42.5 % (ref 37.5–51)
HGB BLD-MCNC: 13.8 G/DL (ref 13–17.7)
LYMPHOCYTES # BLD AUTO: 2 10*3/MM3 (ref 0.7–3.1)
LYMPHOCYTES NFR BLD AUTO: 22.5 % (ref 19.6–45.3)
MAGNESIUM SERPL-MCNC: 2 MG/DL (ref 1.6–2.6)
MCH RBC QN AUTO: 28.1 PG (ref 26.6–33)
MCHC RBC AUTO-ENTMCNC: 32.4 G/DL (ref 31.5–35.7)
MCV RBC AUTO: 86.8 FL (ref 79–97)
MONOCYTES # BLD AUTO: 1.4 10*3/MM3 (ref 0.1–0.9)
MONOCYTES NFR BLD AUTO: 15.4 % (ref 5–12)
NEUTROPHILS NFR BLD AUTO: 5.2 10*3/MM3 (ref 1.7–7)
NEUTROPHILS NFR BLD AUTO: 57.3 % (ref 42.7–76)
NRBC BLD AUTO-RTO: 0.1 /100 WBC (ref 0–0.2)
NT-PROBNP SERPL-MCNC: 2420 PG/ML (ref 0–900)
NT-PROBNP SERPL-MCNC: 2780 PG/ML (ref 0–900)
PLATELET # BLD AUTO: 300 10*3/MM3 (ref 140–450)
PMV BLD AUTO: 9.4 FL (ref 6–12)
POTASSIUM SERPL-SCNC: 4.6 MMOL/L (ref 3.5–5.2)
RBC # BLD AUTO: 4.9 10*6/MM3 (ref 4.14–5.8)
SODIUM SERPL-SCNC: 143 MMOL/L (ref 136–145)
TROPONIN T SERPL-MCNC: 0.16 NG/ML (ref 0–0.03)
TROPONIN T SERPL-MCNC: 0.21 NG/ML (ref 0–0.03)
WBC # BLD AUTO: 9.1 10*3/MM3 (ref 3.4–10.8)

## 2020-07-16 PROCEDURE — 99225 PR SBSQ OBSERVATION CARE/DAY 25 MINUTES: CPT | Performed by: INTERNAL MEDICINE

## 2020-07-16 PROCEDURE — 80048 BASIC METABOLIC PNL TOTAL CA: CPT | Performed by: PHYSICIAN ASSISTANT

## 2020-07-16 PROCEDURE — 94640 AIRWAY INHALATION TREATMENT: CPT

## 2020-07-16 PROCEDURE — 25010000002 ONDANSETRON PER 1 MG: Performed by: PHYSICIAN ASSISTANT

## 2020-07-16 PROCEDURE — 94799 UNLISTED PULMONARY SVC/PX: CPT

## 2020-07-16 PROCEDURE — G0378 HOSPITAL OBSERVATION PER HR: HCPCS

## 2020-07-16 PROCEDURE — 25010000002 MORPHINE PER 10 MG: Performed by: PHYSICIAN ASSISTANT

## 2020-07-16 PROCEDURE — 99214 OFFICE O/P EST MOD 30 MIN: CPT | Performed by: INTERNAL MEDICINE

## 2020-07-16 PROCEDURE — 85025 COMPLETE CBC W/AUTO DIFF WBC: CPT | Performed by: PHYSICIAN ASSISTANT

## 2020-07-16 PROCEDURE — 83880 ASSAY OF NATRIURETIC PEPTIDE: CPT | Performed by: PHYSICIAN ASSISTANT

## 2020-07-16 PROCEDURE — 84484 ASSAY OF TROPONIN QUANT: CPT | Performed by: PHYSICIAN ASSISTANT

## 2020-07-16 PROCEDURE — 25010000002 CHLOROTHIAZIDE PER 500 MG: Performed by: INTERNAL MEDICINE

## 2020-07-16 RX ORDER — IPRATROPIUM BROMIDE AND ALBUTEROL SULFATE 2.5; .5 MG/3ML; MG/3ML
3 SOLUTION RESPIRATORY (INHALATION)
Status: DISCONTINUED | OUTPATIENT
Start: 2020-07-16 | End: 2020-07-21 | Stop reason: HOSPADM

## 2020-07-16 RX ADMIN — MORPHINE SULFATE 4 MG: 4 INJECTION INTRAVENOUS at 09:21

## 2020-07-16 RX ADMIN — BUDESONIDE AND FORMOTEROL FUMARATE DIHYDRATE 2 PUFF: 160; 4.5 AEROSOL RESPIRATORY (INHALATION) at 20:13

## 2020-07-16 RX ADMIN — HYDROCHLOROTHIAZIDE 12.5 MG: 12.5 TABLET ORAL at 08:25

## 2020-07-16 RX ADMIN — BUMETANIDE 1 MG: 1 TABLET ORAL at 22:09

## 2020-07-16 RX ADMIN — IPRATROPIUM BROMIDE AND ALBUTEROL SULFATE 3 ML: 2.5; .5 SOLUTION RESPIRATORY (INHALATION) at 15:25

## 2020-07-16 RX ADMIN — GABAPENTIN 300 MG: 300 CAPSULE ORAL at 08:25

## 2020-07-16 RX ADMIN — Medication 10 ML: at 22:08

## 2020-07-16 RX ADMIN — CLOPIDOGREL BISULFATE 75 MG: 75 TABLET ORAL at 08:25

## 2020-07-16 RX ADMIN — BUMETANIDE 1 MG: 1 TABLET ORAL at 08:24

## 2020-07-16 RX ADMIN — MONTELUKAST SODIUM 10 MG: 10 TABLET, COATED ORAL at 08:25

## 2020-07-16 RX ADMIN — MORPHINE SULFATE 4 MG: 4 INJECTION INTRAVENOUS at 00:43

## 2020-07-16 RX ADMIN — AMIODARONE HYDROCHLORIDE 200 MG: 200 TABLET ORAL at 08:25

## 2020-07-16 RX ADMIN — BUMETANIDE 1 MG: 1 TABLET ORAL at 15:26

## 2020-07-16 RX ADMIN — IPRATROPIUM BROMIDE AND ALBUTEROL SULFATE 3 ML: 2.5; .5 SOLUTION RESPIRATORY (INHALATION) at 11:10

## 2020-07-16 RX ADMIN — GABAPENTIN 300 MG: 300 CAPSULE ORAL at 22:08

## 2020-07-16 RX ADMIN — MORPHINE SULFATE 4 MG: 4 INJECTION INTRAVENOUS at 22:09

## 2020-07-16 RX ADMIN — LISINOPRIL 5 MG: 5 TABLET ORAL at 08:25

## 2020-07-16 RX ADMIN — METOPROLOL SUCCINATE 50 MG: 50 TABLET, EXTENDED RELEASE ORAL at 08:25

## 2020-07-16 RX ADMIN — ONDANSETRON 4 MG: 2 INJECTION INTRAMUSCULAR; INTRAVENOUS at 15:26

## 2020-07-16 RX ADMIN — MORPHINE SULFATE 4 MG: 4 INJECTION INTRAVENOUS at 17:40

## 2020-07-16 RX ADMIN — ROFLUMILAST 500 MCG: 500 TABLET ORAL at 08:25

## 2020-07-16 RX ADMIN — SODIUM CHLORIDE 250 MG: 9 INJECTION, SOLUTION INTRAVENOUS at 17:49

## 2020-07-16 RX ADMIN — SPIRONOLACTONE 25 MG: 25 TABLET, FILM COATED ORAL at 08:25

## 2020-07-16 RX ADMIN — RANOLAZINE 1000 MG: 500 TABLET, FILM COATED, EXTENDED RELEASE ORAL at 22:08

## 2020-07-16 RX ADMIN — MORPHINE SULFATE 4 MG: 4 INJECTION INTRAVENOUS at 13:30

## 2020-07-16 RX ADMIN — APIXABAN 5 MG: 5 TABLET, FILM COATED ORAL at 22:08

## 2020-07-16 RX ADMIN — IPRATROPIUM BROMIDE AND ALBUTEROL SULFATE 3 ML: 2.5; .5 SOLUTION RESPIRATORY (INHALATION) at 20:12

## 2020-07-16 RX ADMIN — ATORVASTATIN CALCIUM 80 MG: 40 TABLET, FILM COATED ORAL at 08:25

## 2020-07-16 RX ADMIN — ONDANSETRON 4 MG: 2 INJECTION INTRAMUSCULAR; INTRAVENOUS at 08:27

## 2020-07-16 RX ADMIN — POTASSIUM CHLORIDE 10 MEQ: 750 TABLET, EXTENDED RELEASE ORAL at 08:25

## 2020-07-16 RX ADMIN — MORPHINE SULFATE 4 MG: 4 INJECTION INTRAVENOUS at 05:08

## 2020-07-16 RX ADMIN — CETIRIZINE HYDROCHLORIDE 10 MG: 10 TABLET, FILM COATED ORAL at 08:25

## 2020-07-16 NOTE — PROGRESS NOTES
[facility] Medicine Services Daily Progress Note      Hospitalist Team  LOS 0 days      Patient Care Team:  Jaylen Moss MD as PCP - General  Wicho Sotelo MD as Consulting Physician (Nephrology)    Patient Location: 2109/1      Subjective   Subjective     Chief Complaint / Subjective  Chief Complaint   Patient presents with   • Chest Pain     chest pain/SOA       Brief Synopsis of Hospital Course/HPI  Mr. Dixon is a 59 y.o. male presents to [facility] ER on 7/15/2020 complaining of chest pain since last night.  Patient states he had an episode of chest pain with associated shortness of breath started last night and again this morning.  Patient states he has periods of diaphoresis as well.  Patient states his pain radiates to the base of his neck up to his jaw and around to the back of his head.  Patient also states that the pain radiates down his left arm into his hand.  Patient states that since he arrived in the ER he noticed his feet have swollen and states he did not miss any home dose of water pills.  Patient also reports some heart palpitations that had started while at the ER.  Patient denies any fever, chills, cough, syncope, nausea, vomiting, abdominal pain or diarrhea. Upon chart review, patient has a history of premature CAD, status post stenting and chronic refractory chest pain, first onset at age 48 with a strong family history of CAD/HF.  Patient has multiple recent surgeries and procedures done regarding his heart.   In the ED, initial ABG pH of 7.355, PCO2 of 57.4, PO2 of 103.5, bicarb of 32.0, base excess of 4.7, O2 saturation of 97.5.  Initial troponin elevated 0.183.  CMP largely unremarkable.  CBC largely unremarkable.  Chest x-ray shows stable cardiomegaly without acute pulmonary process.  EKG shows A. fib/flutter with V paced complexes, rate of 72 bpm, no apparent ST elevation.  Patient had an anterior thoracotomy with placement to epicardial LV leads  "by  on 6/22/2020. Last echo done on 6/23/2020 showed an EF of 30%, left ventricular wall thickness is consistent with hypertrophy.  Last heart cath was done on 6/9/2020 showed Dilated left ventricle with underlying complete heart block and atrial fibrillation with appropriately functioning biventricular ICD, After completion of ablation in the basal aspect, EP study performed did not induce any sustained VT despite aggressive pacing, Circumflex artery is a large vessel dominant appearing vessel and the second marginal branch has multiple stents which is completely occluded, The circumflex artery gives moderate proximal marginal branch versus ramus branch which has an ostial to proximal 80% stenosis and appears like a long plaque, findings were reviewed by interventional cardiologist and discussed with Dr. Frye at that time.  Patient is afebrile, pulse in the 40s, on 4 L nasal cannula at 98% SPO2 and blood pressure 160s over 80s.  Patient started on Tridil drip, morphine, aspirin, Zofran, DuoNeb in the ED.    Date:7/16: c/o chest pain,  Trop 0.2    Review of Systems   Constitution: Positive for malaise/fatigue.   HENT: Negative.    Eyes: Negative.    Cardiovascular: Positive for chest pain.   Respiratory: Negative.    Endocrine: Negative.    Hematologic/Lymphatic: Negative.    Skin: Negative.    Musculoskeletal: Positive for back pain.   Gastrointestinal: Negative.    Genitourinary: Negative.    Neurological: Negative.    Psychiatric/Behavioral: Negative.    Allergic/Immunologic: Negative.    All other systems reviewed and are negative.    Objective   Objective      Vital Signs  Temp:  [97.1 °F (36.2 °C)-98.1 °F (36.7 °C)] 98 °F (36.7 °C)  Heart Rate:  [71-78] 72  Resp:  [19-26] 20  BP: (114-164)/() 125/67  Oxygen Therapy  SpO2: 100 %  Pulse Oximetry Type: Continuous  Device (Oxygen Therapy): nasal cannula  Flow (L/min): 2  Flowsheet Rows      First Filed Value   Admission Height  177.8 cm (70\") " Documented at 07/15/2020 1816   Admission Weight  (!) 139 kg (306 lb 14.1 oz) Documented at 07/15/2020 1816        Intake & Output (last 3 days)       07/13 0701 - 07/14 0700 07/14 0701 - 07/15 0700 07/15 0701 - 07/16 0700 07/16 0701 - 07/17 0700    Urine (mL/kg/hr)   1300     Total Output   1300     Net   -1300                 Lines, Drains & Airways    Active LDAs     Name:   Placement date:   Placement time:   Site:   Days:    Peripheral IV 07/15/20 1843 Left Hand   07/15/20    1843    Hand   less than 1              Physical Exam   Constitutional: He is oriented to person, place, and time. He appears well-developed and well-nourished. No distress.   HENT:   Head: Normocephalic and atraumatic.   Right Ear: External ear normal.   Eyes: Pupils are equal, round, and reactive to light. Conjunctivae and EOM are normal. Right eye exhibits no discharge. Left eye exhibits no discharge. No scleral icterus.   Neck: Normal range of motion. No thyromegaly present.   Cardiovascular: Normal rate, regular rhythm, normal heart sounds and intact distal pulses.   Pulmonary/Chest: Effort normal and breath sounds normal. No respiratory distress.   Abdominal: Soft. Bowel sounds are normal. There is no tenderness.   Musculoskeletal: Normal range of motion. He exhibits no edema.   Neurological: He is alert and oriented to person, place, and time. No cranial nerve deficit.   Skin: Skin is warm and dry. He is not diaphoretic. No erythema.   Psychiatric: He has a normal mood and affect. His behavior is normal.   Nursing note and vitals reviewed.    Procedures:    Results Review:     I reviewed the patient's new clinical results.    Lab Results (last 24 hours)     Procedure Component Value Units Date/Time    Troponin [[id number]]  (Abnormal) Collected:  07/16/20 0547    Specimen:  Blood Updated:  07/16/20 0642     Troponin T 0.213 ng/mL     Narrative:       Troponin T Reference Range:  <= 0.03 ng/mL-   Negative for AMI  >0.03 ng/mL-      Abnormal for myocardial necrosis.  Clinicians would have to utilize clinical acumen, EKG, Troponin and serial changes to determine if it is an Acute Myocardial Infarction or myocardial injury due to an underlying chronic condition.       Results may be falsely decreased if patient taking Biotin.      BNP [[id number]]  (Abnormal) Collected:  07/16/20 0547    Specimen:  Blood Updated:  07/16/20 0637     proBNP 2,420.0 pg/mL     Narrative:       Among patients with dyspnea, NT-proBNP is highly sensitive for the detection of acute congestive heart failure. In addition NT-proBNP of <300 pg/ml effectively rules out acute congestive heart failure with 99% negative predictive value.    Results may be falsely decreased if patient taking Biotin.      Basic Metabolic Panel [[id number]]  (Abnormal) Collected:  07/16/20 0547    Specimen:  Blood Updated:  07/16/20 0633     Glucose 91 mg/dL      BUN --     Comment: Testing performed by alternate method        Creatinine 1.15 mg/dL      Sodium 143 mmol/L      Potassium 4.6 mmol/L      Chloride 98 mmol/L      CO2 34.0 mmol/L      Calcium 8.7 mg/dL      eGFR Non African Amer 66 mL/min/1.73      BUN/Creatinine Ratio --     Comment: Testing not performed        Anion Gap 11.0 mmol/L     Narrative:       GFR Normal >60  Chronic Kidney Disease <60  Kidney Failure <15      BUN [[id number]] Collected:  07/16/20 0547    Specimen:  Blood Updated:  07/16/20 0633    CBC Auto Differential [[id number]]  (Abnormal) Collected:  07/16/20 0547    Specimen:  Blood Updated:  07/16/20 0612     WBC 9.10 10*3/mm3      RBC 4.90 10*6/mm3      Hemoglobin 13.8 g/dL      Hematocrit 42.5 %      MCV 86.8 fL      MCH 28.1 pg      MCHC 32.4 g/dL      RDW 15.5 %      RDW-SD 47.7 fl      MPV 9.4 fL      Platelets 300 10*3/mm3      Neutrophil % 57.3 %      Lymphocyte % 22.5 %      Monocyte % 15.4 %      Eosinophil % 4.5 %      Basophil % 0.3 %      Neutrophils, Absolute 5.20 10*3/mm3      Lymphocytes, Absolute 2.00  10*3/mm3      Monocytes, Absolute 1.40 10*3/mm3      Eosinophils, Absolute 0.40 10*3/mm3      Basophils, Absolute 0.00 10*3/mm3      nRBC 0.1 /100 WBC     Troponin [[id number]]  (Abnormal) Collected:  07/16/20 0038    Specimen:  Blood Updated:  07/16/20 0117     Troponin T 0.157 ng/mL     Narrative:       Troponin T Reference Range:  <= 0.03 ng/mL-   Negative for AMI  >0.03 ng/mL-     Abnormal for myocardial necrosis.  Clinicians would have to utilize clinical acumen, EKG, Troponin and serial changes to determine if it is an Acute Myocardial Infarction or myocardial injury due to an underlying chronic condition.       Results may be falsely decreased if patient taking Biotin.      BNP [[id number]]  (Abnormal) Collected:  07/16/20 0038    Specimen:  Blood Updated:  07/16/20 0111     proBNP 2,780.0 pg/mL     Narrative:       Among patients with dyspnea, NT-proBNP is highly sensitive for the detection of acute congestive heart failure. In addition NT-proBNP of <300 pg/ml effectively rules out acute congestive heart failure with 99% negative predictive value.    Results may be falsely decreased if patient taking Biotin.      Magnesium [[id number]]  (Normal) Collected:  07/15/20 1852    Specimen:  Blood Updated:  07/16/20 0058     Magnesium 2.0 mg/dL     Extra Tubes [[id number]] Collected:  07/15/20 1852    Specimen:  Blood, Venous Line Updated:  07/15/20 2000    Narrative:       The following orders were created for panel order Extra Tubes.  Procedure                               Abnormality         Status                     ---------                               -----------         ------                     Light Blue Top[[id number]]                                   Final result                 Please view results for these tests on the individual orders.    Light Blue Top [[id number]] Collected:  07/15/20 1852    Specimen:  Blood Updated:  07/15/20 2000     Extra Tube hold for add-on     Comment: Auto resulted        Troponin [[id number]]  (Abnormal) Collected:  07/15/20 1852    Specimen:  Blood Updated:  07/15/20 1928     Troponin T 0.183 ng/mL     Narrative:       Troponin T Reference Range:  <= 0.03 ng/mL-   Negative for AMI  >0.03 ng/mL-     Abnormal for myocardial necrosis.  Clinicians would have to utilize clinical acumen, EKG, Troponin and serial changes to determine if it is an Acute Myocardial Infarction or myocardial injury due to an underlying chronic condition.       Results may be falsely decreased if patient taking Biotin.      Comprehensive Metabolic Panel [[id number]]  (Abnormal) Collected:  07/15/20 1852    Specimen:  Blood Updated:  07/15/20 1927     Glucose 119 mg/dL      BUN --     Comment: Testing performed by alternate method        Creatinine 0.99 mg/dL      Sodium 140 mmol/L      Potassium 4.2 mmol/L      Comment: Specimen hemolyzed.  Results may be affected.        Chloride 100 mmol/L      CO2 25.0 mmol/L      Calcium 9.0 mg/dL      Total Protein 7.7 g/dL      Albumin 4.20 g/dL      ALT (SGPT) 16 U/L      AST (SGOT) 37 U/L      Alkaline Phosphatase 92 U/L      Total Bilirubin 0.5 mg/dL      eGFR Non African Amer 78 mL/min/1.73      Globulin 3.5 gm/dL      A/G Ratio 1.2 g/dL      BUN/Creatinine Ratio --     Comment: Testing not performed        Anion Gap 15.0 mmol/L     Narrative:       GFR Normal >60  Chronic Kidney Disease <60  Kidney Failure <15      BUN [[id number]]  (Normal) Collected:  07/15/20 1852    Specimen:  Blood Updated:  07/15/20 1927     BUN 13 mg/dL     Blood Gas, Arterial [[id number]]  (Abnormal) Collected:  07/15/20 1907    Specimen:  Arterial Blood Updated:  07/15/20 1911     Site Right Radial     Carmelo's Test Positive     pH, Arterial 7.355 pH units      pCO2, Arterial 57.4 mm Hg      pO2, Arterial 103.5 mm Hg      HCO3, Arterial 32.0 mmol/L      Base Excess, Arterial 4.7 mmol/L      Comment: Serial Number: [id number]:  [id number]        O2 Saturation, Arterial 97.5 %      CO2 Content 33.8  mmol/L      Barometric Pressure for Blood Gas --     Comment: N/A        Modality Cannula     FIO2 36 %      Hemodilution No    CBC & Differential [[id number]] Collected:  07/15/20 1852    Specimen:  Blood Updated:  07/15/20 1901    Narrative:       The following orders were created for panel order CBC & Differential.  Procedure                               Abnormality         Status                     ---------                               -----------         ------                     CBC Auto Differential[[id number]]        Abnormal            Final result                 Please view results for these tests on the individual orders.    CBC Auto Differential [[id number]]  (Abnormal) Collected:  07/15/20 1852    Specimen:  Blood Updated:  07/15/20 1901     WBC 10.50 10*3/mm3      RBC 5.21 10*6/mm3      Hemoglobin 14.4 g/dL      Hematocrit 44.4 %      MCV 85.2 fL      MCH 27.6 pg      MCHC 32.4 g/dL      RDW 15.4 %      RDW-SD 46.8 fl      MPV 9.1 fL      Platelets 320 10*3/mm3      Neutrophil % 63.5 %      Lymphocyte % 18.3 %      Monocyte % 15.4 %      Eosinophil % 2.3 %      Basophil % 0.5 %      Neutrophils, Absolute 6.70 10*3/mm3      Lymphocytes, Absolute 1.90 10*3/mm3      Monocytes, Absolute 1.60 10*3/mm3      Eosinophils, Absolute 0.20 10*3/mm3      Basophils, Absolute 0.10 10*3/mm3      nRBC 0.0 /100 WBC         No results found for: HGBA1C        Results from last 7 days   Lab Units 07/15/20  1907   PH, ARTERIAL pH units 7.355   PO2 ART mm Hg 103.5   PCO2, ARTERIAL mm Hg 57.4*   HCO3 ART mmol/L 32.0*     Lab Results   Component Value Date    LIPASE 23 06/03/2020     Lab Results   Component Value Date    CHLPL 201 (H) 09/14/2018    TRIG 108 09/14/2018    HDL 46 09/14/2018     (H) 09/14/2018       No results found for: INTRAOP, PREDX, FINALDX, COMDX    Microbiology Results (last 10 days)     ** No results found for the last 240 hours. **          ECG/EMG Results (most recent)     Procedure Component  Value Units Date/Time    ECG 12 Lead [[id number]] Collected:  07/15/20 1817     Updated:  07/15/20 1819    Narrative:       HEART RATE= 72  bpm  RR Interval= 788  ms  ID Interval=   ms  P Horizontal Axis=   deg  P Front Axis=   deg  QRSD Interval= 115  ms  QT Interval= 391  ms  QRS Axis= 245  deg  T Wave Axis=   deg  - ABNORMAL ECG -  Afib/flut and V-paced complexes  When compared with ECG of 25-Jun-2020 4:44:36,  No significant change  Electronically Signed By:   Date and Time of Study: 2020-07-15 18:17:10               Results for orders placed during the hospital encounter of 06/21/20   Adult Transthoracic Echo Limited W/ Cont if Necessary Per Protocol    Narrative · Left ventricular wall thickness is consistent with moderate concentric   hypertrophy.  · Estimated EF = 30%.  · The pericardium is normal. There is no evidence of pericardial effusion.          Xr Chest 1 View    Result Date: 7/15/2020  Stable cardiomegaly without acute pulmonary process.  Electronically Signed By-Demarcus Linares On:7/15/2020 7:19 PM This report was finalized on [id number] by  Demarcus Linares, .          Xrays, labs reviewed personally by physician.    Medication Review:   I have reviewed the patient's current medication list      Scheduled Meds    amiodarone 200 mg Oral Daily   apixaban 5 mg Oral BID   atorvastatin 80 mg Oral Daily   budesonide-formoterol 2 puff Inhalation BID - RT   bumetanide 1 mg Oral TID   cetirizine 10 mg Oral Daily   clopidogrel 75 mg Oral Daily   DULoxetine 60 mg Oral Daily   gabapentin 300 mg Oral BID   hydroCHLOROthiazide 12.5 mg Oral Daily   ipratropium-albuterol 3 mL Nebulization 4x Daily - RT   lisinopril 5 mg Oral Daily   metoprolol succinate XL 50 mg Oral Q24H   montelukast 10 mg Oral Daily   potassium chloride 10 mEq Oral Daily   ranolazine 1,000 mg Oral BID   roflumilast 500 mcg Oral Daily   sodium chloride 10 mL Intravenous Q12H   spironolactone 25 mg Oral Daily       Meds  Infusions    nitroglycerin 10-50 mcg/min Last Rate: 25 mcg/min (07/16/20 0805)       Meds PRN  •  acetaminophen **OR** acetaminophen **OR** acetaminophen  •  albuterol  •  aluminum-magnesium hydroxide-simethicone  •  bisacodyl  •  magnesium hydroxide  •  magnesium sulfate **OR** magnesium sulfate in D5W 1g/100mL (PREMIX)  •  melatonin  •  Morphine  •  nitroglycerin  •  ondansetron **OR** ondansetron  •  potassium chloride  •  [COMPLETED] Insert peripheral IV **AND** sodium chloride  •  sodium chloride  •  sodium chloride    I personally reviewed patient's x-ray films     I personally reviewed patient's EKG strips     Assessment/Plan   Assessment/Plan     Active Hospital Problems    Diagnosis  POA   • **Chest pain [R07.9]  Yes   • Ischemic cardiomyopathy [I25.5]  Yes   • Coronary artery disease involving native heart with angina pectoris (CMS/HCC) [I25.119]  Yes   • Cardiomyopathy, dilated (CMS/HCC) [I42.0]  Yes   • Paroxysmal atrial fibrillation (CMS/HCC) [I48.0]  Yes   • Sleep apnea [G47.30]  Yes   • Essential hypertension [I10]  Yes   • Depression [F32.9]  Yes   • Morbid obesity (CMS/HCC) [E66.01]  Yes   • Congestive heart failure (CMS/HCC) [I50.9]  Yes   • Anxiety [F41.9]  Yes   • ICD (implantable cardioverter-defibrillator) in place [Z95.810]  Yes   • Chronic obstructive pulmonary disease (CMS/HCC) [J44.9]  Yes   • Hyperlipidemia [E78.5]  Yes      Resolved Hospital Problems   No resolved problems to display.       MEDICAL DECISION MAKING COMPLEXITY BY PROBLEM:     Chest pain, elevated troponin  -Possible non-STEMI, initial troponin elevated 0.183.  -Chest x-ray shows stable cardiomegaly without acute pulmonary process.    -EKG shows A. fib/flutter with V paced complexes, rate of 72 bpm, no apparent ST elevation.    -Patient had an anterior thoracotomy with placement to epicardial LV leads by  on 6/22/2020.   -Last echo done on 6/23/2020 showed an EF of 30%, left ventricular wall thickness is consistent  with hypertrophy.    -Last heart cath was done on 6/9/2020, reviewed above    -Patient is afebrile, pulse in the 40s, on 4 L nasal cannula at 98% SPO2 and blood pressure 160s over 80s.    -Patient started on Tridil drip, morphine, aspirin, Zofran, DuoNeb in the ED.  -Consult CT surgery, Dr. Richmond  -Consult cardiology, Dr. Burger  -Continue Tridil drip  -Pain control with morphine  - serial troponins-0.18-0.21-Cardiac monitoring  -2 g sodium diet until n.p.o. midnight     Acute respiratory failure  - initial ABG of 7.355, PCO2 of 57.4, PO2 of 103.5, bicarb of 32.0, base excess of 4.7, O2 saturation of 97.5.   -Patient denies having supplemental O2 at home for any reason, other than using trillegy at night  -Continue on 4 L nasal cannula, titrate     CAD, ischemic& dilated cardiomyopathy, status post ICD  -Patient had an anterior thoracotomy with placement to epicardial LV leads by  on 6/22/2020.  -status post stenting and chronic refractory chest pain, first onset at age 48 with a strong family history of CAD/HF.  -Extensive heart history with several recent procedures.  -Continue home Plavix, metoprolol, Ranexa     CHF  -Signs of trace edema on exam, no edema on chest x-ray, not acute exacerbation at this time  -Check BNP daily  -Continue home Bumex, Aldactone, potassium  -Monitor fluid status     Paroxysmal atrial fibrillation  -Continue home amiodarone, Eliquis     COPD  -Not in acute exacerbation  -Continue home inhalers, Zyrtec, Singulair, Roflumilast     Hyperlipidemia  -Continue home statin     Anxiety/depression  -Continue home Cymbalta     Essential hypertension, poorly controlled  -Continue home hydrochlorothiazide, lisinopril, metoprolol     Morbid obesity, BMI 44.03  -Encourage lifestyle modifications     DON  -Trellegy     VTE Prophylaxis -   Mechanical Order History:     None      Pharmalogical Order History:     Ordered     Dose Route Frequency Stop    07/15/20 2200  apixaban (ELIQUIS) tablet  5 mg      5 mg PO 2 Times Daily --            Code Status -   Code Status and Medical Interventions:   Ordered at: 07/15/20 2117     Code Status:    CPR     Medical Interventions (Level of Support Prior to Arrest):    Full       This patient has been examined wearing appropriate Personal Protective Equipment       Discharge Planning          Destination      Coordination has not been started for this encounter.      Durable Medical Equipment      Coordination has not been started for this encounter.      Dialysis/Infusion      Coordination has not been started for this encounter.      Home Medical Care      Coordination has not been started for this encounter.      Therapy      Coordination has not been started for this encounter.      Community Resources      Coordination has not been started for this encounter.            Electronically signed by Giovany Foy MD, 07/16/20, 08:52.  [facility] Hospitalist Team

## 2020-07-16 NOTE — H&P
[facility] Medicine Services      Patient Name: Jose Dixon Jr.  : 1960  MRN: [id number]  Primary Care Physician: Jaylen Moss MD  Date of admission: 7/15/2020    Patient Care Team:  Jaylen Moss MD as PCP - Wicho Jones MD as Consulting Physician (Nephrology)          Subjective   History Present Illness     Chief Complaint:   Chief Complaint   Patient presents with   • Chest Pain     chest pain/SOA       Mr. Dixon is a 59 y.o. male presents to [facility] ER on 7/15/2020 complaining of chest pain since last night.  Patient states he had an episode of chest pain with associated shortness of breath started last night and again this morning.  Patient states he has periods of diaphoresis as well.  Patient states his pain radiates to the base of his neck up to his jaw and around to the back of his head.  Patient also states that the pain radiates down his left arm into his hand.  Patient states that since he arrived in the ER he noticed his feet have swollen and states he did not miss any home dose of water pills.  Patient also reports some heart palpitations that had started while at the ER.  Patient denies any fever, chills, cough, syncope, nausea, vomiting, abdominal pain or diarrhea. Upon chart review, patient has a history of premature CAD, status post stenting and chronic refractory chest pain, first onset at age 48 with a strong family history of CAD/HF.  Patient has multiple recent surgeries and procedures done regarding his heart.      In the ED, initial ABG pH of 7.355, PCO2 of 57.4, PO2 of 103.5, bicarb of 32.0, base excess of 4.7, O2 saturation of 97.5.  Initial troponin elevated 0.183.  CMP largely unremarkable.  CBC largely unremarkable.  Chest x-ray shows stable cardiomegaly without acute pulmonary process.  EKG shows A. fib/flutter with V paced complexes, rate of 72 bpm, no apparent ST elevation.  Patient had an anterior thoracotomy with  placement to epicardial LV leads by  on 6/22/2020. Last echo done on 6/23/2020 showed an EF of 30%, left ventricular wall thickness is consistent with hypertrophy.  Last heart cath was done on 6/9/2020 showed Dilated left ventricle with underlying complete heart block and atrial fibrillation with appropriately functioning biventricular ICD, After completion of ablation in the basal aspect, EP study performed did not induce any sustained VT despite aggressive pacing, Circumflex artery is a large vessel dominant appearing vessel and the second marginal branch has multiple stents which is completely occluded, The circumflex artery gives moderate proximal marginal branch versus ramus branch which has an ostial to proximal 80% stenosis and appears like a long plaque, findings were reviewed by interventional cardiologist and discussed with Dr. Frye at that time.  Patient is afebrile, pulse in the 40s, on 4 L nasal cannula at 98% SPO2 and blood pressure 160s over 80s.  Patient started on Tridil drip, morphine, aspirin, Zofran, DuoNeb in the ED.      Review of Systems   Constitution: Negative. Negative for chills and fever.   HENT: Negative.    Cardiovascular: Positive for chest pain, dyspnea on exertion, leg swelling and palpitations. Negative for near-syncope and orthopnea.   Respiratory: Positive for shortness of breath and wheezing. Negative for cough and sputum production.    Skin: Negative.    Musculoskeletal: Negative.    Gastrointestinal: Negative.  Negative for abdominal pain, diarrhea, nausea and vomiting.   Genitourinary: Negative.    Neurological: Negative.    Psychiatric/Behavioral: Negative.            Personal History     Past Medical History:   Past Medical History:   Diagnosis Date   • Asthma    • Atrial fibrillation (CMS/[facility])    • CAD (coronary artery disease)    • CHF (congestive heart failure) (CMS/[facility])    • COPD (chronic obstructive pulmonary disease) (CMS/[facility])    • Depression    • Hyperlipidemia     • Hypertension    • Myocardial infarction (CMS/HCC)    • Pacemaker 2019    [employer]    • V tach (CMS/HCC)        Surgical History:      Past Surgical History:   Procedure Laterality Date   • CARDIAC ABLATION  11/07/2017   • CARDIAC CATHETERIZATION      6-8 stents, last heart cath 2019   • CARDIAC CATHETERIZATION N/A 6/9/2020    Procedure: Coronary angiography;  Surgeon: Jose Lopez MD;  Location: [facility] CATH INVASIVE LOCATION;  Service: Cardiovascular;  Laterality: N/A;   • CARDIAC ELECTROPHYSIOLOGY PROCEDURE N/A 9/26/2019    Procedure: BIVENTRICULAR DEVICE UPGRADE;  Surgeon: Jose Lopez MD;  Location: [facility] CATH INVASIVE LOCATION;  Service: Cardiovascular   • CARDIAC ELECTROPHYSIOLOGY PROCEDURE N/A 9/26/2019    Procedure: EP/Ablation AV Node ablation;  Surgeon: Jose Lopez MD;  Location: [facility] CATH INVASIVE LOCATION;  Service: Cardiology   • CARDIAC ELECTROPHYSIOLOGY PROCEDURE N/A 6/9/2020    Procedure: EP/Ablation;  Surgeon: Jose Lopez MD;  Location: [facility] CATH INVASIVE LOCATION;  Service: Cardiovascular;  Laterality: N/A;   • CARDIAC PACEMAKER PLACEMENT N/A 6/22/2020    Procedure: LEFT VENTRICULAR LEAD PLACEMENT;  Surgeon: Christiano Richmond MD;  Location: [facility] CVOR;  Service: Cardiothoracic;  Laterality: N/A;   • CHOLECYSTECTOMY     • CORONARY ANGIOPLASTY WITH STENT PLACEMENT      2 stents   • PACEMAKER IMPLANTATION  07/08/2016    Pacemaker/ Defib implant - [employer]   • SINUS SURGERY      sinus surgery x 9           Family History: family history includes Atrial fibrillation in his father and mother; COPD in his mother; Diabetes in his mother; Heart disease in his father and mother. Otherwise pertinent FHx was reviewed and unremarkable.     Social History:  reports that he has never smoked. He has never used smokeless tobacco. He reports that he drinks alcohol. He reports that he does not use drugs.      Medications:  Prior to Admission medications    Medication Sig  Start Date End Date Taking? Authorizing Provider   albuterol sulfate  (90 Base) MCG/ACT inhaler Inhale 2 puffs Every 4 (Four) Hours As Needed for Wheezing. 10/28/19   Juli Boudreaux APRN   amiodarone (PACERONE) 200 MG tablet Take 1 tablet by mouth Daily. 4/22/20   Jose Lopez MD   apixaban (ELIQUIS) 5 MG tablet tablet Take 1 tablet by mouth 2 (Two) Times a Day. 10/28/19   Juli Boudreaux APRN   atorvastatin (LIPITOR) 80 MG tablet Take 80 mg by mouth Daily.    ProviderKian MD   budesonide-formoterol (SYMBICORT) 160-4.5 MCG/ACT inhaler Inhale 2 puffs 2 (Two) Times a Day. 8/9/19   Brandon Enrique MD   bumetanide (BUMEX) 1 MG tablet Take 1 tablet by mouth 3 (Three) Times a Day. 10/28/19   Juli Boudreaux APRN   cetirizine (zyrTEC) 10 MG tablet Take 1 tablet by mouth Daily. 10/28/19   Juli Boudreaux APRN   clopidogrel (PLAVIX) 75 MG tablet Take 75 mg by mouth Daily.    ProviderKian MD   cyclobenzaprine (FLEXERIL) 10 MG tablet Take 1 tablet by mouth Every 8 (Eight) Hours As Needed for Muscle Spasms. 6/29/20   Gisela Calle APRN   DULoxetine (CYMBALTA) 60 MG capsule Take 60 mg by mouth Daily.    ProviderKian MD   fluticasone (FLONASE) 50 MCG/ACT nasal spray 2 sprays into the nostril(s) as directed by provider Daily. 10/28/19   Juli Boudreaux APRN   gabapentin (NEURONTIN) 300 MG capsule Take 300 mg by mouth 2 (Two) Times a Day.    ProviderKian MD   hydroCHLOROthiazide (HYDRODIURIL) 25 MG tablet Take 0.5 tablets by mouth Daily. 6/29/20   Gisela Calle APRN   HYDROcodone-acetaminophen (NORCO) 5-325 MG per tablet Take 1 tablet by mouth Every 6 (Six) Hours As Needed for Moderate Pain . 6/29/20   Gisela Calle APRN   lisinopril (PRINIVIL,ZESTRIL) 5 MG tablet Take 1 tablet by mouth Daily. 10/7/19   Jose Lopez MD   metoprolol succinate XL (TOPROL-XL) 50 MG 24 hr tablet Take 1 tablet by mouth Daily. 6/30/20   Gisela Calle APRN   montelukast (SINGULAIR)  10 MG tablet Take 10 mg by mouth Daily.    Kian Kraus MD   nitroglycerin (NITROSTAT) 0.4 MG SL tablet Place 0.4 mg under the tongue Every 5 (Five) Minutes As Needed for Chest Pain. Take no more than 3 doses in 15 minutes.    Kian Kraus MD   potassium chloride (K-DUR) 10 MEQ CR tablet Take 1 tablet by mouth Daily. 10/7/19   Jose Lopez MD   povidone-iodine (BETADINE) 10 % external solution Apply  topically to the appropriate area as directed 2 (two) times a day. 6/29/20   Gisela Calle APRN   ranolazine (RANEXA) 500 MG 12 hr tablet Take 1,000 mg by mouth 2 (Two) Times a Day.    Kian Kraus MD   roflumilast (DALIRESP) 500 MCG tablet tablet Take 500 mcg by mouth Daily.    Kian Kraus MD   tamsulosin (FLOMAX) 0.4 MG capsule 24 hr capsule Take 1 capsule by mouth Every Evening. 9/17/19   Kian Kraus MD       Allergies:    Allergies   Allergen Reactions   • Codeine Anaphylaxis          • Tramadol Anaphylaxis and Hives       Objective   Objective     Vital Signs  Temp:  [98.1 °F (36.7 °C)] 98.1 °F (36.7 °C)  Heart Rate:  [71-78] 72  Resp:  [24-26] 24  BP: (114-164)/() 115/97  SpO2:  [93 %-98 %] 93 %  on  Flow (L/min):  [4] 4;   Device (Oxygen Therapy): nasal cannula  Body mass index is 44.03 kg/m².    Physical Exam   Constitutional: He is oriented to person, place, and time. He appears well-developed and well-nourished. No distress.   HENT:   Head: Normocephalic and atraumatic.   Nose: Nose normal.   Mouth/Throat: No oropharyngeal exudate.   Eyes: Pupils are equal, round, and reactive to light. Conjunctivae and EOM are normal.   Neck: Normal range of motion.   Cardiovascular: Normal rate, regular rhythm, normal heart sounds and intact distal pulses.   S1, S2 audible.   Pulmonary/Chest: Effort normal. No respiratory distress. He has wheezes (scattered). He has no rales.   On 4 L nasal cannula.   Abdominal: Soft. Bowel sounds are normal. He exhibits no  distension. There is no tenderness.   Musculoskeletal: Normal range of motion. He exhibits no edema, tenderness or deformity.   Neurological: He is alert and oriented to person, place, and time. No cranial nerve deficit.   Skin: Skin is warm. No rash noted. He is not diaphoretic. No erythema.   Psychiatric: He has a normal mood and affect. His behavior is normal.   Nursing note and vitals reviewed.      Results Review:  I have personally reviewed most recent cardiac tracings, lab results and radiology images and interpretations and agree with findings.    Results from last 7 days   Lab Units 07/15/20  1852   WBC 10*3/mm3 10.50   HEMOGLOBIN g/dL 14.4   HEMATOCRIT % 44.4   PLATELETS 10*3/mm3 320     Results from last 7 days   Lab Units 07/15/20  1852   SODIUM mmol/L 140   POTASSIUM mmol/L 4.2   CHLORIDE mmol/L 100   CO2 mmol/L 25.0   BUN  13   CREATININE mg/dL 0.99   GLUCOSE mg/dL 119*   CALCIUM mg/dL 9.0   ALT (SGPT) U/L 16   AST (SGOT) U/L 37   TROPONIN T ng/mL 0.183*     Estimated Creatinine Clearance: 115.7 mL/min (by C-G formula based on SCr of 0.99 mg/dL).  Brief Urine Lab Results  (Last result in the past 365 days)      Color   Clarity   Blood   Leuk Est   Nitrite   Protein   CREAT   Urine HCG        06/23/20 2040 Dark Yellow Hazy Large (3+) Small (1+) Negative 100 mg/dL (2+)               Microbiology Results (last 10 days)     ** No results found for the last 240 hours. **          ECG/EMG Results (most recent)     Procedure Component Value Units Date/Time    ECG 12 Lead [[id number]] Collected:  07/15/20 1817     Updated:  07/15/20 1819    Narrative:       HEART RATE= 72  bpm  RR Interval= 788  ms  NH Interval=   ms  P Horizontal Axis=   deg  P Front Axis=   deg  QRSD Interval= 115  ms  QT Interval= 391  ms  QRS Axis= 245  deg  T Wave Axis=   deg  - ABNORMAL ECG -  Afib/flut and V-paced complexes  When compared with ECG of 25-Jun-2020 4:44:36,  No significant change  Electronically Signed By:   Date and Time  of Study: 2020-07-15 18:17:10               Results for orders placed during the hospital encounter of 06/21/20   Adult Transthoracic Echo Limited W/ Cont if Necessary Per Protocol    Narrative · Left ventricular wall thickness is consistent with moderate concentric   hypertrophy.  · Estimated EF = 30%.  · The pericardium is normal. There is no evidence of pericardial effusion.          Xr Chest 1 View    Result Date: 7/15/2020  Stable cardiomegaly without acute pulmonary process.  Electronically Signed By-Demarcus Linares On:7/15/2020 7:19 PM This report was finalized on [id number] by  Demarcus Linares, .        Estimated Creatinine Clearance: 115.7 mL/min (by C-G formula based on SCr of 0.99 mg/dL).    Assessment/Plan   Assessment/Plan       Active Hospital Problems    Diagnosis  POA   • **Chest pain [R07.9]  Yes   • Ischemic cardiomyopathy [I25.5]  Yes   • Coronary artery disease involving native heart with angina pectoris (CMS/HCC) [I25.119]  Yes   • Cardiomyopathy, dilated (CMS/HCC) [I42.0]  Yes   • Paroxysmal atrial fibrillation (CMS/HCC) [I48.0]  Yes   • Sleep apnea [G47.30]  Yes   • Essential hypertension [I10]  Yes   • Depression [F32.9]  Yes   • Morbid obesity (CMS/HCC) [E66.01]  Yes   • Congestive heart failure (CMS/HCC) [I50.9]  Yes   • Anxiety [F41.9]  Yes   • ICD (implantable cardioverter-defibrillator) in place [Z95.810]  Yes   • Chronic obstructive pulmonary disease (CMS/HCC) [J44.9]  Yes   • Hyperlipidemia [E78.5]  Yes      Resolved Hospital Problems   No resolved problems to display.         Chest pain, elevated troponin  -Possible non-STEMI, initial troponin elevated 0.183.  -Chest x-ray shows stable cardiomegaly without acute pulmonary process.    -EKG shows A. fib/flutter with V paced complexes, rate of 72 bpm, no apparent ST elevation.    -Patient had an anterior thoracotomy with placement to epicardial LV leads by  on 6/22/2020.   -Last echo done on 6/23/2020 showed an EF of 30%,  left ventricular wall thickness is consistent with hypertrophy.    -Last heart cath was done on 6/9/2020, reviewed above    -Patient is afebrile, pulse in the 40s, on 4 L nasal cannula at 98% SPO2 and blood pressure 160s over 80s.    -Patient started on Tridil drip, morphine, aspirin, Zofran, DuoNeb in the ED.  -Consult CT surgery, Dr. Richmond  -Consult cardiology, Dr. Burger  -Continue Tridil drip  -Pain control with morphine  -Check serial troponins  -Cardiac monitoring  -2 g sodium diet until n.p.o. midnight    Acute respiratory failure  - initial ABG of 7.355, PCO2 of 57.4, PO2 of 103.5, bicarb of 32.0, base excess of 4.7, O2 saturation of 97.5.   -Patient denies having supplemental O2 at home for any reason, other than using trillegy at night  -Continue on 4 L nasal cannula, titrate    CAD, ischemic& dilated cardiomyopathy, status post ICD  -Patient had an anterior thoracotomy with placement to epicardial LV leads by  on 6/22/2020.  -status post stenting and chronic refractory chest pain, first onset at age 48 with a strong family history of CAD/HF.  -Extensive heart history with several recent procedures.  -Continue home Plavix, metoprolol, Ranexa    CHF  -Signs of trace edema on exam, no edema on chest x-ray, not acute exacerbation at this time  -Check BNP daily  -Continue home Bumex, Aldactone, potassium  -Monitor fluid status    Paroxysmal atrial fibrillation  -Continue home amiodarone, Eliquis    COPD  -Not in acute exacerbation  -Continue home inhalers, Zyrtec, Singulair, Roflumilast    Hyperlipidemia  -Continue home statin    Anxiety/depression  -Continue home Cymbalta    Essential hypertension, poorly controlled  -Continue home hydrochlorothiazide, lisinopril, metoprolol    Morbid obesity, BMI 44.03  -Encourage lifestyle modifications    DON  -Trellegy    VTE Prophylaxis -SCDs  Mechanical Order History:     None      Pharmalogical Order History:     None          CODE STATUS:    Code Status and  Medical Interventions:   Ordered at: 07/15/20 2117     Code Status:    CPR     Medical Interventions (Level of Support Prior to Arrest):    Full       This patient has been examined wearing appropriate Personal Protective Equipment and discussed with hospital infection control department. 07/15/20      I discussed the patient's findings and my recommendations with patient.        Electronically signed by ALMA Kay, 07/15/20, 8:04 PM.  [facility] Hospitalist Team

## 2020-07-16 NOTE — PROGRESS NOTES
Discharge Planning Assessment  [facility]     Patient Name: Jose Dixon Jr.  MRN: [id number]  Today's Date: 7/16/2020    Admit Date: 7/15/2020    Discharge Needs Assessment     Row Name 07/16/20 0958       Living Environment    Lives With  spouse    Current Living Arrangements  home/apartment/condo    Quality of Family Relationships  supportive    Able to Return to Prior Arrangements  yes       Resource/Environmental Concerns    Transportation Concerns  car, none       Discharge Needs Assessment    Equipment Currently Used at Home  -- pt has a trilogy machine         Discharge Plan     Row Name 07/16/20 0958       Plan    Plan  D/C Plan : Anticipate home with family . Pt has a home Trilogy , watch for home o2 currently on 4l of o2 . Checking to see if pt is current with [facility] .    Plan Comments  Pt was admitted for chest pain and is on a tridil gtt . Pt is currently on 4l of o2 . Cardiovascular consulted             Coordination has not been started for this encounter.          Demographic Summary     Row Name 07/16/20 0957       General Information    Admission Type  observation    Arrived From  emergency department    Preferred Language  English     Used During This Interaction  no        Functional Status     Row Name 07/16/20 0957       Functional Status    Usual Activity Tolerance  good    Current Activity Tolerance  good       Mental Status    General Appearance WDL  WDL        .           Amy Garcia RN

## 2020-07-16 NOTE — CONSULTS
Cardiology Consult Note      REQUESTING PHYSICIAN    Giovany Foy MD    PATIENT IDENTIFICATION  Name: Jose Dixon Jr.  Age: 59 y.o.  Sex: male  :  1960  MRN: [id number]             REASON FOR CONSULTATION:  This is an unfortunate 59-year-old  male with known history of coronary occlusive disease status post PCI in the past, history of severe cardiomyopathy with severe LV dysfunction, history of heart failure with reduced ejection fraction, history of recurrent and uncontrolled A. fib status post AV node ablation with recent ICD upgrade and LV lead placement, history of VT with multiple ablations in the past, biventricular ICD in situ, obstructive sleep apnea, hypertension, dyslipidemia, chronic renal insufficiency.    Most recent echo 2020 with moderate concentric LVH, EF 30%.    Patient underwent EP study/coronary angiography 2020: Post ablation coronary geography revealed left main coronary artery is patent left and descending artery was patent and both vessels were moderate to large in caliber.  Circumflex artery is a large vessel dominant appearing vessel and the second marginal branch has multiple stents which is completely occluded.    The circumflex artery gives moderate proximal marginal branch versus ramus branch which has a reported ostial to proximal 80% stenosis and appears like a long plaque.  Films personally reviewed and illustrate more likely a 50 to 60% stenosis in a vessel that is borderline caliber for percutaneous revascularization.      CC:  Chest pain  Abnormal cardiac enzymes    HISTORY OF PRESENT ILLNESS:   Patient presented to the emergency department 7/15/2020 with complaint of chest pain.  Onset the night prior with associated shortness of breath.  His symptoms reoccurred the following morning and he presented to the emergency department.  Patient reports radiation to his jaw and down his left arm.  He denies any recent fever or cough, no presyncopal or  syncopal episodes, no nausea or vomiting.  He was admitted for further evaluation and cardiology consultation.  In the ER, work-up was initiated with serial troponins at 0.183, 0.157 and 0.213.  proBNP 2780.  Creatinine normal.  Chest x-ray with no evidence of pulmonary congestion.  EKG shows ventricular paced rhythm with underlying atrial fib.    Upon my evaluation, patient is sitting on side of the bed with IV Tridil infusing.  Patient stated Tridil decreases his pain from 8/10 to 6/10.  He is also reporting some relief with morphine.  He denies any shortness of breath currently.  Telemetry with ventricular paced rhythm.    He is reporting a significant amount of lower extremity edema as well.  He feels like his Bumex is not working very well.      REVIEW OF SYSTEMS:  Pertinent items are noted in HPI, all other systems reviewed and negative    OBJECTIVE       ASSESSMENT/PLAN    Chest pain    Coronary artery disease involving native heart with angina pectoris (CMS/HCC)    Cardiomyopathy, dilated (CMS/HCC)    Sleep apnea    Essential hypertension    Paroxysmal atrial fibrillation (CMS/HCC)    Anxiety    Chronic obstructive pulmonary disease (CMS/HCC)    Depression    Hyperlipidemia    ICD (implantable cardioverter-defibrillator) in place    Morbid obesity (CMS/HCC)    Ischemic cardiomyopathy    Acute on chronic combined systolic and diastolic CHF (congestive heart failure) (CMS/[facility])    Recommendations:  Chest discomfort may be more pericardial irritation from recent surgical procedure.  Non-trending troponin elevation likely also secondary to recent surgical procedure.  We will discontinue nitroglycerin.  Will diurese  Monitor rhythm and electrolytes    Cath films personally reviewed and demonstrate moderate disease involving a ramus intermedius branch extending to the ostium of the vessel.  The vessel is borderline caliber for percutaneous revascularization.  Percutaneous revascularization could compromise the  "large circumflex vessel.  The patient also has had in-stent restenosis of a similar sized vessel in the circumflex system.  Would recommend conservative management for this particular lesion.      Vital Signs  Visit Vitals  /67 (BP Location: Left arm, Patient Position: Sitting)   Pulse 72   Temp 98 °F (36.7 °C) (Oral)   Resp 20   Ht 177.8 cm (70\")   Wt (!) 139 kg (306 lb)   SpO2 100%   BMI 43.91 kg/m²     Oxygen Therapy  SpO2: 100 %  Pulse Oximetry Type: Continuous  Device (Oxygen Therapy): nasal cannula  Flow (L/min): 2  Flowsheet Rows      First Filed Value   Admission Height  177.8 cm (70\") Documented at 07/15/2020 1816   Admission Weight  (!) 139 kg (306 lb 14.1 oz) Documented at 07/15/2020 1816        Intake & Output (last 3 days)       07/13 0701 - 07/14 0700 07/14 0701 - 07/15 0700 07/15 0701 - 07/16 0700 07/16 0701 - 07/17 0700    Urine (mL/kg/hr)   1300     Total Output   1300     Net   -1300                 Lines, Drains & Airways    Active LDAs     Name:   Placement date:   Placement time:   Site:   Days:    Peripheral IV 07/15/20 1843 Left Hand   07/15/20    1843    Hand   less than 1                MEDICAL HISTORY    Past Medical History:   Diagnosis Date   • Asthma    • Atrial fibrillation (CMS/HCC)    • CAD (coronary artery disease)    • CHF (congestive heart failure) (CMS/HCC)    • COPD (chronic obstructive pulmonary disease) (CMS/HCC)    • Depression    • Hyperlipidemia    • Hypertension    • Myocardial infarction (CMS/HCC)    • Pacemaker 2019    [employer]    • V tach (CMS/HCC)         SURGICAL HISTORY    Past Surgical History:   Procedure Laterality Date   • CARDIAC ABLATION  11/07/2017   • CARDIAC CATHETERIZATION      6-8 stents, last heart cath 2019   • CARDIAC CATHETERIZATION N/A 6/9/2020    Procedure: Coronary angiography;  Surgeon: Jose Lopez MD;  Location: [facility] CATH INVASIVE LOC[facility];  Service: Cardiovascular;  Laterality: N/A;   • CARDIAC ELECTROPHYSIOLOGY PROCEDURE " "N/A 9/26/2019    Procedure: BIVENTRICULAR DEVICE UPGRADE;  Surgeon: Jose Lopez MD;  Location: [facility] CATH INVASIVE LOCATION;  Service: Cardiovascular   • CARDIAC ELECTROPHYSIOLOGY PROCEDURE N/A 9/26/2019    Procedure: EP/Ablation AV Node ablation;  Surgeon: Jose Lopez MD;  Location: [facility] CATH INVASIVE LOCATION;  Service: Cardiology   • CARDIAC ELECTROPHYSIOLOGY PROCEDURE N/A 6/9/2020    Procedure: EP/Ablation;  Surgeon: Jose Lopez MD;  Location: [facility] CATH INVASIVE LOCATION;  Service: Cardiovascular;  Laterality: N/A;   • CARDIAC PACEMAKER PLACEMENT N/A 6/22/2020    Procedure: LEFT VENTRICULAR LEAD PLACEMENT;  Surgeon: Christiano Richmond MD;  Location: [facility] CVOR;  Service: Cardiothoracic;  Laterality: N/A;   • CHOLECYSTECTOMY     • CORONARY ANGIOPLASTY WITH STENT PLACEMENT      2 stents   • PACEMAKER IMPLANTATION  07/08/2016    Pacemaker/ Defib implant - [place]   • SINUS SURGERY      sinus surgery x 9        FAMILY HISTORY    Family History   Problem Relation Age of Onset   • Diabetes Mother    • Heart disease Mother         MI age 46 - CHF   • Atrial fibrillation Mother    • COPD Mother    • Atrial fibrillation Father    • Heart disease Father         CHF       SOCIAL HISTORY    Social History     Tobacco Use   • Smoking status: Never Smoker   • Smokeless tobacco: Never Used   Substance Use Topics   • Alcohol use: Yes     Frequency: Never     Comment: socially        ALLERGIES    Allergies   Allergen Reactions   • Codeine Anaphylaxis          • Tramadol Anaphylaxis and Hives              /67 (BP Location: Left arm, Patient Position: Sitting)   Pulse 72   Temp 98 °F (36.7 °C) (Oral)   Resp 20   Ht 177.8 cm (70\")   Wt (!) 139 kg (306 lb)   SpO2 100%   BMI 43.91 kg/m²   Intake/Output last 3 shifts:  I/O last 3 completed shifts:  In: -   Out: 1300 [Urine:1300]  Intake/Output this shift:  No intake/output data recorded.    PHYSICAL EXAM:    General: Well-developed, obese " 59-year-old  male with BMI 43.91 who is alert, cooperative, no distress, appears stated age  Head:  Normocephalic, atraumatic, mucous membranes moist  Eyes:  Conjunctiva/corneas clear, EOM's intact     Neck:  Supple,  no bruit.  JVD noted.  Lungs: Expiratory wheezes in bases  Chest wall: No tenderness  Heart::  Regular rate and rhythm, S1 and S2 normal, no murmur, rub or gallop  Abdomen: Soft, non-tender, nondistended bowel sounds active  Extremities: No cyanosis, clubbing.  2+ edema to lower extremities  Pulses: 2+ and symmetric all extremities  Skin:  No rashes or lesions  Neuro/psych: A&O x3. CN II through XII are grossly intact with appropriate affect.  Very anxious      Scheduled Meds:        amiodarone 200 mg Oral Daily   apixaban 5 mg Oral BID   atorvastatin 80 mg Oral Daily   budesonide-formoterol 2 puff Inhalation BID - RT   bumetanide 1 mg Oral TID   cetirizine 10 mg Oral Daily   clopidogrel 75 mg Oral Daily   DULoxetine 60 mg Oral Daily   gabapentin 300 mg Oral BID   hydroCHLOROthiazide 12.5 mg Oral Daily   ipratropium-albuterol 3 mL Nebulization 4x Daily - RT   lisinopril 5 mg Oral Daily   metoprolol succinate XL 50 mg Oral Q24H   montelukast 10 mg Oral Daily   potassium chloride 10 mEq Oral Daily   ranolazine 1,000 mg Oral BID   roflumilast 500 mcg Oral Daily   sodium chloride 10 mL Intravenous Q12H   spironolactone 25 mg Oral Daily       Continuous Infusions:      nitroglycerin 10-50 mcg/min Last Rate: 25 mcg/min (07/16/20 0805)       PRN Meds:    •  acetaminophen **OR** acetaminophen **OR** acetaminophen  •  albuterol  •  aluminum-magnesium hydroxide-simethicone  •  bisacodyl  •  magnesium hydroxide  •  magnesium sulfate **OR** magnesium sulfate in D5W 1g/100mL (PREMIX)  •  melatonin  •  Morphine  •  nitroglycerin  •  ondansetron **OR** ondansetron  •  potassium chloride  •  [COMPLETED] Insert peripheral IV **AND** sodium chloride  •  sodium chloride  •  sodium chloride        Results Review:      I reviewed the patient's new clinical results.    CBC    Results from last 7 days   Lab Units 07/16/20  0547 07/15/20  1852   WBC 10*3/mm3 9.10 10.50   HEMOGLOBIN g/dL 13.8 14.4   PLATELETS 10*3/mm3 300 320     Cr Clearance Estimated Creatinine Clearance: 99.6 mL/min (by C-G formula based on SCr of 1.15 mg/dL).  Coag     HbA1C   Lab Results   Component Value Date    HGBA1C 5.9 (H) 06/04/2020    HGBA1C 5.4 09/14/2018     Blood Glucose No results found for: POCGLU  Infection     CMP   Results from last 7 days   Lab Units 07/16/20  0547 07/15/20  1852   SODIUM mmol/L 143 140   POTASSIUM mmol/L 4.6 4.2   CHLORIDE mmol/L 98 100   CO2 mmol/L 34.0* 25.0   BUN   --  13   CREATININE mg/dL 1.15 0.99   GLUCOSE mg/dL 91 119*   ALBUMIN g/dL  --  4.20   BILIRUBIN mg/dL  --  0.5   ALK PHOS U/L  --  92   AST (SGOT) U/L  --  37   ALT (SGPT) U/L  --  16     ABG    Results from last 7 days   Lab Units 07/15/20  1907   PH, ARTERIAL pH units 7.355   PCO2, ARTERIAL mm Hg 57.4*   PO2 ART mm Hg 103.5   O2 SATURATION ART % 97.5   BASE EXCESS ART mmol/L 4.7*     UA      TATUM  No results found for: POCMETH, POCAMPHET, POCBARBITUR, POCBENZO, POCCOCAINE, POCOPIATES, POCOXYCODO, POCPHENCYC, POCPROPOXY, POCTHC, POCTRICYC  Lysis Labs   Results from last 7 days   Lab Units 07/16/20  0547 07/15/20  1852   HEMOGLOBIN g/dL 13.8 14.4   PLATELETS 10*3/mm3 300 320   CREATININE mg/dL 1.15 0.99     Radiology(recent) Xr Chest 1 View    Result Date: 7/15/2020  Stable cardiomegaly without acute pulmonary process.  Electronically Signed By-Demarcus Linares On:7/15/2020 7:19 PM This report was finalized on [id number] by  Demarcus Linares, .        Results from last 7 days   Lab Units 07/16/20  0547   TROPONIN T ng/mL 0.213*       Xrays, labs reviewed personally by physician.    ECG/EMG Results (most recent)     Procedure Component Value Units Date/Time    ECG 12 Lead [[id number]] Collected:  07/15/20 1817     Updated:  07/15/20 1819    Narrative:        HEART RATE= 72  bpm  RR Interval= 788  ms  MD Interval=   ms  P Horizontal Axis=   deg  P Front Axis=   deg  QRSD Interval= 115  ms  QT Interval= 391  ms  QRS Axis= 245  deg  T Wave Axis=   deg  - ABNORMAL ECG -  Afib/flut and V-paced complexes  When compared with ECG of 25-Jun-2020 4:44:36,  No significant change  Electronically Signed By:   Date and Time of Study: 2020-07-15 18:17:10            Medication Review:   I have reviewed the patient's current medication list  Scheduled Meds:    amiodarone 200 mg Oral Daily   apixaban 5 mg Oral BID   atorvastatin 80 mg Oral Daily   budesonide-formoterol 2 puff Inhalation BID - RT   bumetanide 1 mg Oral TID   cetirizine 10 mg Oral Daily   clopidogrel 75 mg Oral Daily   DULoxetine 60 mg Oral Daily   gabapentin 300 mg Oral BID   hydroCHLOROthiazide 12.5 mg Oral Daily   ipratropium-albuterol 3 mL Nebulization 4x Daily - RT   lisinopril 5 mg Oral Daily   metoprolol succinate XL 50 mg Oral Q24H   montelukast 10 mg Oral Daily   potassium chloride 10 mEq Oral Daily   ranolazine 1,000 mg Oral BID   roflumilast 500 mcg Oral Daily   sodium chloride 10 mL Intravenous Q12H   spironolactone 25 mg Oral Daily     Continuous Infusions:    nitroglycerin 10-50 mcg/min Last Rate: 25 mcg/min (07/16/20 0805)     PRN Meds:.•  acetaminophen **OR** acetaminophen **OR** acetaminophen  •  albuterol  •  aluminum-magnesium hydroxide-simethicone  •  bisacodyl  •  magnesium hydroxide  •  magnesium sulfate **OR** magnesium sulfate in D5W 1g/100mL (PREMIX)  •  melatonin  •  Morphine  •  nitroglycerin  •  ondansetron **OR** ondansetron  •  potassium chloride  •  [COMPLETED] Insert peripheral IV **AND** sodium chloride  •  sodium chloride  •  sodium chloride    Imaging:  Imaging Results (Last 72 Hours)     Procedure Component Value Units Date/Time    XR Chest 1 View [[id number]] Collected:  07/15/20 1917     Updated:  07/15/20 1921    Narrative:       EXAMINATION: XR CHEST 1 VW-     DATE OF EXAM: 7/15/2020  "7:02 PM     INDICATION: Shortness of air, chest pain, left sided chest pain     COMPARISON: Chest radiograph dated 06/24/2020     TECHNIQUE: Portable AP view of the chest was obtained.     FINDINGS:  There is a left chest wall pacemaker with leads in unchanged position.  There is stable cardiomegaly. Pulmonary vascularity is unremarkable.  There is no focal airspace consolidation, pleural effusion, or  pneumothorax. There are multilevel degenerative changes of the thoracic  spine.       Impression:       Stable cardiomegaly without acute pulmonary process.     Electronically Signed By-Demarcus Linares On:7/15/2020 7:19 PM  This report was finalized on [id number] by  Demarcus Linares, .            ABHISHEK Peña  07/16/20  09:52       EMR Dragon/Transcription:   \"Dictated utilizing Dragon dictation\".                   Electronically signed by ABHISHEK Peña, 07/16/20, 7:52 AM.    "

## 2020-07-16 NOTE — OUTREACH NOTE
CT Surgery Week 4 Survey      Responses   [facility] patient discharged from?  [facility]   Does the patient have one of the following disease processes/diagnoses(primary or secondary)?  Cardiothoracic surgery   Week 4 attempt successful?  No   Revoke  Readmitted          Rina Hurst RN

## 2020-07-16 NOTE — PROGRESS NOTES
Patient is current with [facility].   Please call [phone] or [phone] with any changes or questions

## 2020-07-17 LAB
MAGNESIUM SERPL-MCNC: 2.2 MG/DL (ref 1.6–2.6)
NT-PROBNP SERPL-MCNC: 1134 PG/ML (ref 0–900)
POTASSIUM SERPL-SCNC: 4.4 MMOL/L (ref 3.5–5.2)

## 2020-07-17 PROCEDURE — 94799 UNLISTED PULMONARY SVC/PX: CPT

## 2020-07-17 PROCEDURE — 83735 ASSAY OF MAGNESIUM: CPT | Performed by: PHYSICIAN ASSISTANT

## 2020-07-17 PROCEDURE — 63710000001 ONDANSETRON PER 8 MG: Performed by: PHYSICIAN ASSISTANT

## 2020-07-17 PROCEDURE — 25010000002 MORPHINE PER 10 MG: Performed by: PHYSICIAN ASSISTANT

## 2020-07-17 PROCEDURE — 25010000002 ONDANSETRON PER 1 MG: Performed by: PHYSICIAN ASSISTANT

## 2020-07-17 PROCEDURE — 84132 ASSAY OF SERUM POTASSIUM: CPT | Performed by: PHYSICIAN ASSISTANT

## 2020-07-17 PROCEDURE — 25010000002 CHLOROTHIAZIDE PER 500 MG: Performed by: INTERNAL MEDICINE

## 2020-07-17 PROCEDURE — 93005 ELECTROCARDIOGRAM TRACING: CPT | Performed by: INTERNAL MEDICINE

## 2020-07-17 PROCEDURE — 99233 SBSQ HOSP IP/OBS HIGH 50: CPT | Performed by: INTERNAL MEDICINE

## 2020-07-17 PROCEDURE — 99232 SBSQ HOSP IP/OBS MODERATE 35: CPT | Performed by: INTERNAL MEDICINE

## 2020-07-17 PROCEDURE — 83880 ASSAY OF NATRIURETIC PEPTIDE: CPT | Performed by: PHYSICIAN ASSISTANT

## 2020-07-17 RX ADMIN — Medication 10 ML: at 18:58

## 2020-07-17 RX ADMIN — IPRATROPIUM BROMIDE AND ALBUTEROL SULFATE 3 ML: 2.5; .5 SOLUTION RESPIRATORY (INHALATION) at 15:01

## 2020-07-17 RX ADMIN — BUDESONIDE AND FORMOTEROL FUMARATE DIHYDRATE 2 PUFF: 160; 4.5 AEROSOL RESPIRATORY (INHALATION) at 19:06

## 2020-07-17 RX ADMIN — SPIRONOLACTONE 25 MG: 25 TABLET, FILM COATED ORAL at 09:03

## 2020-07-17 RX ADMIN — GABAPENTIN 300 MG: 300 CAPSULE ORAL at 20:00

## 2020-07-17 RX ADMIN — LISINOPRIL 5 MG: 5 TABLET ORAL at 09:08

## 2020-07-17 RX ADMIN — ROFLUMILAST 500 MCG: 500 TABLET ORAL at 09:03

## 2020-07-17 RX ADMIN — ONDANSETRON 4 MG: 2 INJECTION INTRAMUSCULAR; INTRAVENOUS at 13:33

## 2020-07-17 RX ADMIN — BUMETANIDE 1 MG: 1 TABLET ORAL at 09:04

## 2020-07-17 RX ADMIN — IPRATROPIUM BROMIDE AND ALBUTEROL SULFATE 3 ML: 2.5; .5 SOLUTION RESPIRATORY (INHALATION) at 08:13

## 2020-07-17 RX ADMIN — RANOLAZINE 1000 MG: 500 TABLET, FILM COATED, EXTENDED RELEASE ORAL at 20:00

## 2020-07-17 RX ADMIN — METOPROLOL SUCCINATE 50 MG: 50 TABLET, EXTENDED RELEASE ORAL at 09:03

## 2020-07-17 RX ADMIN — ONDANSETRON HYDROCHLORIDE 4 MG: 4 TABLET, FILM COATED ORAL at 00:22

## 2020-07-17 RX ADMIN — Medication 10 ML: at 23:17

## 2020-07-17 RX ADMIN — AMIODARONE HYDROCHLORIDE 200 MG: 200 TABLET ORAL at 09:04

## 2020-07-17 RX ADMIN — RANOLAZINE 1000 MG: 500 TABLET, FILM COATED, EXTENDED RELEASE ORAL at 09:04

## 2020-07-17 RX ADMIN — IPRATROPIUM BROMIDE AND ALBUTEROL SULFATE 3 ML: 2.5; .5 SOLUTION RESPIRATORY (INHALATION) at 11:16

## 2020-07-17 RX ADMIN — MORPHINE SULFATE 4 MG: 4 INJECTION INTRAVENOUS at 03:35

## 2020-07-17 RX ADMIN — DULOXETINE HYDROCHLORIDE 60 MG: 30 CAPSULE, DELAYED RELEASE ORAL at 09:03

## 2020-07-17 RX ADMIN — Medication 10 ML: at 09:07

## 2020-07-17 RX ADMIN — HYDROCHLOROTHIAZIDE 12.5 MG: 12.5 TABLET ORAL at 09:03

## 2020-07-17 RX ADMIN — MORPHINE SULFATE 4 MG: 4 INJECTION INTRAVENOUS at 09:01

## 2020-07-17 RX ADMIN — APIXABAN 5 MG: 5 TABLET, FILM COATED ORAL at 20:03

## 2020-07-17 RX ADMIN — CETIRIZINE HYDROCHLORIDE 10 MG: 10 TABLET, FILM COATED ORAL at 09:04

## 2020-07-17 RX ADMIN — MORPHINE SULFATE 4 MG: 4 INJECTION INTRAVENOUS at 23:17

## 2020-07-17 RX ADMIN — SODIUM CHLORIDE 250 MG: 9 INJECTION, SOLUTION INTRAVENOUS at 06:13

## 2020-07-17 RX ADMIN — MONTELUKAST SODIUM 10 MG: 10 TABLET, COATED ORAL at 09:03

## 2020-07-17 RX ADMIN — BUDESONIDE AND FORMOTEROL FUMARATE DIHYDRATE 2 PUFF: 160; 4.5 AEROSOL RESPIRATORY (INHALATION) at 08:14

## 2020-07-17 RX ADMIN — GABAPENTIN 300 MG: 300 CAPSULE ORAL at 09:03

## 2020-07-17 RX ADMIN — SODIUM CHLORIDE 250 ML: 0.9 INJECTION, SOLUTION INTRAVENOUS at 14:12

## 2020-07-17 RX ADMIN — Medication 10 ML: at 20:00

## 2020-07-17 RX ADMIN — APIXABAN 5 MG: 5 TABLET, FILM COATED ORAL at 09:03

## 2020-07-17 RX ADMIN — IPRATROPIUM BROMIDE AND ALBUTEROL SULFATE 3 ML: 2.5; .5 SOLUTION RESPIRATORY (INHALATION) at 19:03

## 2020-07-17 RX ADMIN — MORPHINE SULFATE 4 MG: 4 INJECTION INTRAVENOUS at 18:58

## 2020-07-17 RX ADMIN — ATORVASTATIN CALCIUM 80 MG: 40 TABLET, FILM COATED ORAL at 09:03

## 2020-07-17 RX ADMIN — CLOPIDOGREL BISULFATE 75 MG: 75 TABLET ORAL at 09:03

## 2020-07-17 RX ADMIN — POTASSIUM CHLORIDE 10 MEQ: 750 TABLET, EXTENDED RELEASE ORAL at 09:07

## 2020-07-17 NOTE — PROGRESS NOTES
[facility] Medicine Services Daily Progress Note      Hospitalist Team  LOS 0 days      Patient Care Team:  Jaylen Moss MD as PCP - General  Wicho Sotelo MD as Consulting Physician (Nephrology)    Patient Location: 2109/1      Subjective   Subjective     Chief Complaint / Subjective  Chief Complaint   Patient presents with   • Chest Pain     chest pain/SOA       Brief Synopsis of Hospital Course/HPI  Mr. Dixon is a 59 y.o. male presents to [facility] ER on 7/15/2020 complaining of chest pain since last night.  Patient states he had an episode of chest pain with associated shortness of breath started last night and again this morning.  Patient states he has periods of diaphoresis as well.  Patient states his pain radiates to the base of his neck up to his jaw and around to the back of his head.  Patient also states that the pain radiates down his left arm into his hand.  Patient states that since he arrived in the ER he noticed his feet have swollen and states he did not miss any home dose of water pills.  Patient also reports some heart palpitations that had started while at the ER.  Patient denies any fever, chills, cough, syncope, nausea, vomiting, abdominal pain or diarrhea. Upon chart review, patient has a history of premature CAD, status post stenting and chronic refractory chest pain, first onset at age 48 with a strong family history of CAD/HF.  Patient has multiple recent surgeries and procedures done regarding his heart.   In the ED, initial ABG pH of 7.355, PCO2 of 57.4, PO2 of 103.5, bicarb of 32.0, base excess of 4.7, O2 saturation of 97.5.  Initial troponin elevated 0.183.  CMP largely unremarkable.  CBC largely unremarkable.  Chest x-ray shows stable cardiomegaly without acute pulmonary process.  EKG shows A. fib/flutter with V paced complexes, rate of 72 bpm, no apparent ST elevation.  Patient had an anterior thoracotomy with placement to epicardial LV leads  by  on 6/22/2020. Last echo done on 6/23/2020 showed an EF of 30%, left ventricular wall thickness is consistent with hypertrophy.  Last heart cath was done on 6/9/2020 showed Dilated left ventricle with underlying complete heart block and atrial fibrillation with appropriately functioning biventricular ICD, After completion of ablation in the basal aspect, EP study performed did not induce any sustained VT despite aggressive pacing, Circumflex artery is a large vessel dominant appearing vessel and the second marginal branch has multiple stents which is completely occluded, The circumflex artery gives moderate proximal marginal branch versus ramus branch which has an ostial to proximal 80% stenosis and appears like a long plaque, findings were reviewed by interventional cardiologist and discussed with Dr. Frye at that time.  Patient is afebrile, pulse in the 40s, on 4 L nasal cannula at 98% SPO2 and blood pressure 160s over 80s.  Patient started on Tridil drip, morphine, aspirin, Zofran, DuoNeb in the ED.    Date:7/16: c/o chest pain,  Trop 0.2  7/17-still c/o chest pain, vasovagal episode, bp dropped.91/56     Review of Systems   Constitution: Positive for malaise/fatigue.   HENT: Negative.    Eyes: Negative.    Cardiovascular: Positive for chest pain.   Respiratory: Negative.    Endocrine: Negative.    Hematologic/Lymphatic: Negative.    Skin: Negative.    Musculoskeletal: Positive for back pain.   Gastrointestinal: Negative.    Genitourinary: Negative.    Neurological: Negative.    Psychiatric/Behavioral: Negative.    Allergic/Immunologic: Negative.    All other systems reviewed and are negative.    Objective   Objective      Vital Signs  Temp:  [97.1 °F (36.2 °C)-98.5 °F (36.9 °C)] 97.5 °F (36.4 °C)  Heart Rate:  [70-74] 71  Resp:  [14-20] 18  BP: ()/(49-59) 91/56  Oxygen Therapy  SpO2: 99 %  Pulse Oximetry Type: Intermittent  Device (Oxygen Therapy): nasal cannula  Flow (L/min): 4  Oxygen  "Concentration (%): 21  Flowsheet Rows      First Filed Value   Admission Height  177.8 cm (70\") Documented at 07/15/2020 1816   Admission Weight  (!) 139 kg (306 lb 14.1 oz) Documented at 07/15/2020 1816        Intake & Output (last 3 days)       07/14 0701 - 07/15 0700 07/15 0701 - 07/16 0700 07/16 0701 - 07/17 0700 07/17 0701 - 07/18 0700    P.O.   960 360    Total Intake(mL/kg)   960 (6.9) 360 (2.6)    Urine (mL/kg/hr)  1300      Total Output  1300      Net  -1300 +960 +360            Urine Unmeasured Occurrence   2 x         Lines, Drains & Airways    Active LDAs     Name:   Placement date:   Placement time:   Site:   Days:    Peripheral IV 07/15/20 1843 Left Hand   07/15/20    1843    Hand   less than 1              Physical Exam   Constitutional: He is oriented to person, place, and time. He appears well-developed and well-nourished. No distress.   HENT:   Head: Normocephalic and atraumatic.   Right Ear: External ear normal.   Eyes: Pupils are equal, round, and reactive to light. Conjunctivae and EOM are normal. Right eye exhibits no discharge. Left eye exhibits no discharge. No scleral icterus.   Neck: Normal range of motion. No thyromegaly present.   Cardiovascular: Normal rate, regular rhythm, normal heart sounds and intact distal pulses.   Pulmonary/Chest: Effort normal and breath sounds normal. No respiratory distress.   Abdominal: Soft. Bowel sounds are normal. There is no tenderness.   Musculoskeletal: Normal range of motion. He exhibits no edema.   Neurological: He is alert and oriented to person, place, and time. No cranial nerve deficit.   Skin: Skin is warm and dry. He is not diaphoretic. No erythema.   Psychiatric: He has a normal mood and affect. His behavior is normal.   Nursing note and vitals reviewed.       Wounds (last 24 hours)      LDA Wound     Row Name               [REMOVED] Wound 06/22/20 Left anterior chest Incision    Wound - Properties Group Date first assessed: 06/22/20  -AT Present " on Hospital Admission: N  -AT Side: Left  -AT Orientation: anterior  -AT Location: chest  -AT Primary Wound Type: Incision  -AT Additional Comments: Pace maker pocket.  -AT Resolution Date: 07/17/20 -KD Resolution Time: 0335 -KD Wound Outcome: Healed  -KD       [REMOVED] Wound 06/22/20 Left lower chest Incision    Wound - Properties Group Date first assessed: 06/22/20  -AT Present on Hospital Admission: N  -AT Side: Left  -AT Orientation: lower  -AT Location: chest  -AT Primary Wound Type: Incision  -AT Resolution Date: 07/17/20  -KD Resolution Time: 0335 -KD Wound Outcome: Healed  -KD      User Key  (r) = Recorded By, (t) = Taken By, (c) = Cosigned By    Initials Name Provider Type    AT Darrin Cleveland RN Registered Nurse    Lupe Perez RN Registered Nurse      Procedures:    Results Review:     I reviewed the patient's new clinical results.    Lab Results (last 24 hours)     Procedure Component Value Units Date/Time    Potassium [[id number]]  (Normal) Collected:  07/17/20 0319    Specimen:  Blood Updated:  07/17/20 0437     Potassium 4.4 mmol/L      Comment: Specimen hemolyzed.  Results may be affected.       Magnesium [[id number]]  (Normal) Collected:  07/17/20 0319    Specimen:  Blood Updated:  07/17/20 0437     Magnesium 2.2 mg/dL     BNP [[id number]]  (Abnormal) Collected:  07/17/20 0319    Specimen:  Blood Updated:  07/17/20 0434     proBNP 1,134.0 pg/mL     Narrative:       Among patients with dyspnea, NT-proBNP is highly sensitive for the detection of acute congestive heart failure. In addition NT-proBNP of <300 pg/ml effectively rules out acute congestive heart failure with 99% negative predictive value.    Results may be falsely decreased if patient taking Biotin.          No results found for: HGBA1C        Results from last 7 days   Lab Units 07/15/20  1907   PH, ARTERIAL pH units 7.355   PO2 ART mm Hg 103.5   PCO2, ARTERIAL mm Hg 57.4*   HCO3 ART mmol/L 32.0*     Lab Results   Component Value  Date    LIPASE 23 06/03/2020     Lab Results   Component Value Date    CHLPL 201 (H) 09/14/2018    TRIG 108 09/14/2018    HDL 46 09/14/2018     (H) 09/14/2018       No results found for: INTRAOP, PREDX, FINALDX, COMDX    Microbiology Results (last 10 days)     ** No results found for the last 240 hours. **          ECG/EMG Results (most recent)     Procedure Component Value Units Date/Time    ECG 12 Lead [[id number]] Collected:  07/15/20 1817     Updated:  07/16/20 1041    Narrative:       HEART RATE= 72  bpm  RR Interval= 788  ms  LA Interval=   ms  P Horizontal Axis=   deg  P Front Axis=   deg  QRSD Interval= 115  ms  QT Interval= 391  ms  QRS Axis= 245  deg  T Wave Axis=   deg  - ABNORMAL ECG -  Afib/flut and V-paced complexes  When compared with ECG of 25-Jun-2020 4:44:36,  No significant change  Electronically Signed By: Lambert Peace (Frank) 16-Jul-2020 10:33:30  Date and Time of Study: 2020-07-15 18:17:10    ECG 12 Lead [[id number]] Collected:  07/17/20 1354     Updated:  07/17/20 1356    Narrative:       HEART RATE= 70  bpm  RR Interval= 856  ms  LA Interval= 73  ms  P Horizontal Axis= 238  deg  P Front Axis= 0  deg  QRSD Interval= 144  ms  QT Interval= 475  ms  QRS Axis= 229  deg  T Wave Axis= 104  deg  - ABNORMAL ECG -  Ventricular-paced rhythm  When compared with ECG of 15-Jul-2020 18:17:10,  No significant change  Electronically Signed By:   Date and Time of Study: 2020-07-17 13:54:38               Results for orders placed during the hospital encounter of 06/21/20   Adult Transthoracic Echo Limited W/ Cont if Necessary Per Protocol    Narrative · Left ventricular wall thickness is consistent with moderate concentric   hypertrophy.  · Estimated EF = 30%.  · The pericardium is normal. There is no evidence of pericardial effusion.          Xr Chest 1 View    Result Date: 7/15/2020  Stable cardiomegaly without acute pulmonary process.  Electronically Signed ByRas Linares On:7/15/2020 7:19 PM This  report was finalized on [id number] by  Demarcus Linares .          Xrays, labs reviewed personally by physician.    Medication Review:   I have reviewed the patient's current medication list      Scheduled Meds    amiodarone 200 mg Oral Daily   apixaban 5 mg Oral BID   atorvastatin 80 mg Oral Daily   budesonide-formoterol 2 puff Inhalation BID - RT   bumetanide 1 mg Oral TID   cetirizine 10 mg Oral Daily   chlorothiazide 250 mg Intravenous Q12H   clopidogrel 75 mg Oral Daily   DULoxetine 60 mg Oral Daily   gabapentin 300 mg Oral BID   hydroCHLOROthiazide 12.5 mg Oral Daily   ipratropium-albuterol 3 mL Nebulization 4x Daily - RT   lisinopril 5 mg Oral Daily   metoprolol succinate XL 50 mg Oral Q24H   montelukast 10 mg Oral Daily   potassium chloride 10 mEq Oral Daily   ranolazine 1,000 mg Oral BID   roflumilast 500 mcg Oral Daily   sodium chloride 10 mL Intravenous Q12H   spironolactone 25 mg Oral Daily       Meds Infusions    nitroglycerin 10-50 mcg/min Last Rate: Stopped (07/16/20 1741)       Meds PRN  •  acetaminophen **OR** acetaminophen **OR** acetaminophen  •  albuterol  •  aluminum-magnesium hydroxide-simethicone  •  bisacodyl  •  magnesium hydroxide  •  magnesium sulfate **OR** magnesium sulfate in D5W 1g/100mL (PREMIX)  •  melatonin  •  Morphine  •  nitroglycerin  •  ondansetron **OR** ondansetron  •  potassium chloride  •  [COMPLETED] Insert peripheral IV **AND** sodium chloride  •  sodium chloride  •  sodium chloride    I personally reviewed patient's x-ray films     I personally reviewed patient's EKG strips     Assessment/Plan   Assessment/Plan     Active Hospital Problems    Diagnosis  POA   • **Chest pain [R07.9]  Yes   • Acute on chronic combined systolic and diastolic CHF (congestive heart failure) (CMS/HCC) [I50.43]  Yes   • Ischemic cardiomyopathy [I25.5]  Yes   • Coronary artery disease involving native heart with angina pectoris (CMS/HCC) [I25.119]  Yes   • Cardiomyopathy, dilated (CMS/HCC)  [I42.0]  Yes   • Paroxysmal atrial fibrillation (CMS/[facility]) [I48.0]  Yes   • Sleep apnea [G47.30]  Yes   • Essential hypertension [I10]  Yes   • Depression [F32.9]  Yes   • Morbid obesity (CMS/[facility]) [E66.01]  Yes   • Anxiety [F41.9]  Yes   • ICD (implantable cardioverter-defibrillator) in place [Z95.810]  Yes   • Chronic obstructive pulmonary disease (CMS/[facility]) [J44.9]  Yes   • Hyperlipidemia [E78.5]  Yes      Resolved Hospital Problems   No resolved problems to display.       MEDICAL DECISION MAKING COMPLEXITY BY PROBLEM:     Chest pain, elevated troponin  -Possible non-STEMI, initial troponin elevated 0.183.  -Chest x-ray shows stable cardiomegaly without acute pulmonary process.    -EKG shows A. fib/flutter with V paced complexes, rate of 72 bpm, no apparent ST elevation.    -Patient had an anterior thoracotomy with placement to epicardial LV leads by  on 6/22/2020.   -Last echo done on 6/23/2020 showed an EF of 30%, left ventricular wall thickness is consistent with hypertrophy.    -Last heart cath was done on 6/9/2020, reviewed above    -Patient is afebrile, pulse in the 40s, on 4 L nasal cannula at 98% SPO2 and blood pressure 160s over 80s.    -Patient started on Tridil drip, morphine, aspirin, Zofran, DuoNeb in the ED.  -Consult CT surgery, Dr. Richmond  -Consult cardiology, Dr. Burger  -Continue Tridil drip  -Pain control with morphine  - serial troponins-0.18-0.21-Cardiac monitoring  -2 g sodium diet until n.p.o. midnight     Acute respiratory failure  - initial ABG of 7.355, PCO2 of 57.4, PO2 of 103.5, bicarb of 32.0, base excess of 4.7, O2 saturation of 97.5.   -Patient denies having supplemental O2 at home for any reason, other than using trillegy at night  -Continue on 4 L nasal cannula, titrate     CAD, ischemic& dilated cardiomyopathy, status post ICD  -Patient had an anterior thoracotomy with placement to epicardial LV leads by  on 6/22/2020.  -status post stenting and chronic refractory  chest pain, first onset at age 48 with a strong family history of CAD/HF.  -Extensive heart history with several recent procedures.  -Continue home Plavix, metoprolol, Ranexa     CHF  -Signs of trace edema on exam, no edema on chest x-ray, not acute exacerbation at this time  -Check BNP daily  -Continue home Bumex, Aldactone, potassium  -Monitor fluid status     Paroxysmal atrial fibrillation  -Continue home amiodarone, Eliquis     COPD  -Not in acute exacerbation  -Continue home inhalers, Zyrtec, Singulair, Roflumilast     Hyperlipidemia  -Continue home statin     Anxiety/depression  -Continue home Cymbalta     Essential hypertension, poorly controlled  -Continue home hydrochlorothiazide, lisinopril, metoprolol     Morbid obesity, BMI 44.03  -Encourage lifestyle modifications     DON  -Trellegy     VTE Prophylaxis -   Mechanical Order History:     None      Pharmalogical Order History:     Ordered     Dose Route Frequency Stop    07/15/20 2200  apixaban (ELIQUIS) tablet 5 mg      5 mg PO 2 Times Daily --            Code Status -   Code Status and Medical Interventions:   Ordered at: 07/15/20 2117     Code Status:    CPR     Medical Interventions (Level of Support Prior to Arrest):    Full       This patient has been examined wearing appropriate Personal Protective Equipment       Discharge Planning          Destination      Coordination has not been started for this encounter.      Durable Medical Equipment      Coordination has not been started for this encounter.      Dialysis/Infusion      Coordination has not been started for this encounter.      Home Medical Care      Coordination has not been started for this encounter.      Therapy      Coordination has not been started for this encounter.      Community Resources      Coordination has not been started for this encounter.            Electronically signed by Giovany Foy MD, 07/17/20, 14:49.  [facility] Hospitalist Team

## 2020-07-17 NOTE — NURSING NOTE
Pt feeling bad got a EKG and gave zofran and called Sheree DAVISON she is calling [facility] and giving a 250 NS bolus

## 2020-07-17 NOTE — PROGRESS NOTES
Cardiology Progress  Note      Patient Care Team:  Jaylen Moss MD as PCP - General  Wicho Sotelo MD as Consulting Physician (Nephrology)    PATIENT IDENTIFICATION  Name: Jose Dixon Jr.  Age: 59 y.o.  Sex: male  :  1960  MRN: [id number]             REASON FOR FOLLOW-UP:  Chest pain  Abnormal cardiac enzymes        SUBJECTIVE    Seen and examined.  Chart and labs reviewed.  Patient continues to report chest discomfort his incision.  Patient had an episode of acute chest pain and became hypotensive.  Became nauseated.  Currently he is better.  Blood pressure still low.  Edema has improved.      REVIEW OF SYSTEMS:  Pertinent items are noted in HPI, all other systems reviewed and negative    OBJECTIVE   Sitting in chair and appears comfortable    ASSESSMENT/PLAN    Chest pain    Coronary artery disease involving native heart with angina pectoris (CMS/HCC)    Cardiomyopathy, dilated (CMS/HCC)    Sleep apnea    Essential hypertension    Paroxysmal atrial fibrillation (CMS/HCC)    Anxiety    Chronic obstructive pulmonary disease (CMS/HCC)    Depression    Hyperlipidemia    ICD (implantable cardioverter-defibrillator) in place    Morbid obesity (CMS/HCC)    Ischemic cardiomyopathy    Acute on chronic combined systolic and diastolic CHF (congestive heart failure) (CMS/HCC)    Recommendations:  Chest discomfort may be more pericardial irritation from recent surgical procedure.  Non-trending troponin elevation likely also secondary to recent surgical procedure.  We will discontinue nitroglycerin.  Will discontinue diuretics  Monitor rhythm and electrolytes  Anticipate discharge soon     Cath films personally reviewed and demonstrate moderate disease involving a ramus intermedius branch extending to the ostium of the vessel.  The vessel is borderline caliber for percutaneous revascularization.  Percutaneous revascularization could compromise the large circumflex vessel.  The patient also has had in-stent  "restenosis of a similar sized vessel in the circumflex system.  Would recommend conservative management for this particular lesion.      Vital Signs  Visit Vitals  /49   Pulse 74   Temp 98.3 °F (36.8 °C)   Resp 16   Ht 177.8 cm (70\")   Wt (!) 139 kg (306 lb 3.5 oz)   SpO2 100%   BMI 43.94 kg/m²     Oxygen Therapy  SpO2: 100 %  Pulse Oximetry Type: Intermittent  Device (Oxygen Therapy): room air  Flow (L/min): 4  Oxygen Concentration (%): 21  Flowsheet Rows      First Filed Value   Admission Height  177.8 cm (70\") Documented at 07/15/2020 1816   Admission Weight  (!) 139 kg (306 lb 14.1 oz) Documented at 07/15/2020 1816        Intake & Output (last 3 days)       07/14 0701 - 07/15 0700 07/15 0701 - 07/16 0700 07/16 0701 - 07/17 0700 07/17 0701 - 07/18 0700    P.O.   960 360    Total Intake(mL/kg)   960 (6.9) 360 (2.6)    Urine (mL/kg/hr)  1300      Total Output  1300      Net  -1300 +960 +360            Urine Unmeasured Occurrence   2 x         Lines, Drains & Airways    Active LDAs     Name:   Placement date:   Placement time:   Site:   Days:    Peripheral IV 07/17/20 0010 Anterior;Right Antecubital   07/17/20    0010    Antecubital   less than 1                       /49   Pulse 74   Temp 98.3 °F (36.8 °C)   Resp 16   Ht 177.8 cm (70\")   Wt (!) 139 kg (306 lb 3.5 oz)   SpO2 100%   BMI 43.94 kg/m²   Intake/Output last 3 shifts:  I/O last 3 completed shifts:  In: 960 [P.O.:960]  Out: 1300 [Urine:1300]  Intake/Output this shift:  I/O this shift:  In: 360 [P.O.:360]  Out: -     PHYSICAL EXAM:    General: Alert, cooperative, no distress, appears stated age  Head:  Normocephalic, atraumatic, mucous membranes moist  Eyes:  Conjunctiva/corneas clear, EOM's intact     Neck:  Supple,  no adenopathy; no JVD or bruit  Lungs: Clear to auscultation bilaterally, no wheezes rhonchi rales are noted  Chest wall: No tenderness  Heart::  Regular rate and rhythm, S1 and S2 normal, no murmur, rub or " gallop  Abdomen: Soft, non-tender, nondistended bowel sounds active  Extremities: No cyanosis, clubbing, or edema   Pulses: 2+ and symmetric all extremities  Skin:  No rashes or lesions  Neuro/psych: A&O x3. CN II through XII are grossly intact with appropriate affect      Scheduled Meds:        amiodarone 200 mg Oral Daily   apixaban 5 mg Oral BID   atorvastatin 80 mg Oral Daily   budesonide-formoterol 2 puff Inhalation BID - RT   bumetanide 1 mg Oral TID   cetirizine 10 mg Oral Daily   chlorothiazide 250 mg Intravenous Q12H   clopidogrel 75 mg Oral Daily   DULoxetine 60 mg Oral Daily   gabapentin 300 mg Oral BID   hydroCHLOROthiazide 12.5 mg Oral Daily   ipratropium-albuterol 3 mL Nebulization 4x Daily - RT   lisinopril 5 mg Oral Daily   metoprolol succinate XL 50 mg Oral Q24H   montelukast 10 mg Oral Daily   potassium chloride 10 mEq Oral Daily   ranolazine 1,000 mg Oral BID   roflumilast 500 mcg Oral Daily   sodium chloride 10 mL Intravenous Q12H   spironolactone 25 mg Oral Daily       Continuous Infusions:      nitroglycerin 10-50 mcg/min Last Rate: Stopped (07/16/20 1741)       PRN Meds:    •  acetaminophen **OR** acetaminophen **OR** acetaminophen  •  albuterol  •  aluminum-magnesium hydroxide-simethicone  •  bisacodyl  •  magnesium hydroxide  •  magnesium sulfate **OR** magnesium sulfate in D5W 1g/100mL (PREMIX)  •  melatonin  •  Morphine  •  nitroglycerin  •  ondansetron **OR** ondansetron  •  potassium chloride  •  [COMPLETED] Insert peripheral IV **AND** sodium chloride  •  sodium chloride  •  sodium chloride        Results Review:     I reviewed the patient's new clinical results.    CBC    Results from last 7 days   Lab Units 07/16/20  0547 07/15/20  1852   WBC 10*3/mm3 9.10 10.50   HEMOGLOBIN g/dL 13.8 14.4   PLATELETS 10*3/mm3 300 320     Cr Clearance Estimated Creatinine Clearance: 99.6 mL/min (by C-G formula based on SCr of 1.15 mg/dL).  Coag     HbA1C   Lab Results   Component Value Date    HGBA1C  5.9 (H) 06/04/2020    HGBA1C 5.4 09/14/2018     Blood Glucose No results found for: POCGLU  Infection     CMP Results from last 7 days   Lab Units 07/17/20  0319 07/16/20  0547 07/15/20  1852   SODIUM mmol/L  --  143 140   POTASSIUM mmol/L 4.4 4.6 4.2   CHLORIDE mmol/L  --  98 100   CO2 mmol/L  --  34.0* 25.0   BUN   --  14 13   CREATININE mg/dL  --  1.15 0.99   GLUCOSE mg/dL  --  91 119*   ALBUMIN g/dL  --   --  4.20   BILIRUBIN mg/dL  --   --  0.5   ALK PHOS U/L  --   --  92   AST (SGOT) U/L  --   --  37   ALT (SGPT) U/L  --   --  16     ABG  Results from last 7 days   Lab Units 07/15/20  1907   PH, ARTERIAL pH units 7.355   PCO2, ARTERIAL mm Hg 57.4*   PO2 ART mm Hg 103.5   O2 SATURATION ART % 97.5   BASE EXCESS ART mmol/L 4.7*     UA      TATUM  No results found for: POCMETH, POCAMPHET, POCBARBITUR, POCBENZO, POCCOCAINE, POCOPIATES, POCOXYCODO, POCPHENCYC, POCPROPOXY, POCTHC, POCTRICYC  Lysis Labs Results from last 7 days   Lab Units 07/16/20  0547 07/15/20  1852   HEMOGLOBIN g/dL 13.8 14.4   PLATELETS 10*3/mm3 300 320   CREATININE mg/dL 1.15 0.99     Radiology(recent) Xr Chest 1 View    Result Date: 7/15/2020  Stable cardiomegaly without acute pulmonary process.  Electronically Signed By-Demarcus Linares On:7/15/2020 7:19 PM This report was finalized on [id number] by  Demarcus Linares, .        Results from last 7 days   Lab Units 07/16/20  0547   TROPONIN T ng/mL 0.213*       Xrays, labs reviewed personally by physician.    ECG/EMG Results (most recent)     Procedure Component Value Units Date/Time    ECG 12 Lead [[id number]] Collected:  07/15/20 1817     Updated:  07/16/20 1041    Narrative:       HEART RATE= 72  bpm  RR Interval= 788  ms  OH Interval=   ms  P Horizontal Axis=   deg  P Front Axis=   deg  QRSD Interval= 115  ms  QT Interval= 391  ms  QRS Axis= 245  deg  T Wave Axis=   deg  - ABNORMAL ECG -  Afib/flut and V-paced complexes  When compared with ECG of 25-Jun-2020 4:44:36,  No significant  change  Electronically Signed By: Lambert Peace (Frank) 16-Jul-2020 10:33:30  Date and Time of Study: 2020-07-15 18:17:10            Medication Review:   I have reviewed the patient's current medication list  Scheduled Meds:  amiodarone 200 mg Oral Daily   apixaban 5 mg Oral BID   atorvastatin 80 mg Oral Daily   budesonide-formoterol 2 puff Inhalation BID - RT   bumetanide 1 mg Oral TID   cetirizine 10 mg Oral Daily   chlorothiazide 250 mg Intravenous Q12H   clopidogrel 75 mg Oral Daily   DULoxetine 60 mg Oral Daily   gabapentin 300 mg Oral BID   hydroCHLOROthiazide 12.5 mg Oral Daily   ipratropium-albuterol 3 mL Nebulization 4x Daily - RT   lisinopril 5 mg Oral Daily   metoprolol succinate XL 50 mg Oral Q24H   montelukast 10 mg Oral Daily   potassium chloride 10 mEq Oral Daily   ranolazine 1,000 mg Oral BID   roflumilast 500 mcg Oral Daily   sodium chloride 10 mL Intravenous Q12H   spironolactone 25 mg Oral Daily     Continuous Infusions:  nitroglycerin 10-50 mcg/min Last Rate: Stopped (07/16/20 1741)     PRN Meds:.•  acetaminophen **OR** acetaminophen **OR** acetaminophen  •  albuterol  •  aluminum-magnesium hydroxide-simethicone  •  bisacodyl  •  magnesium hydroxide  •  magnesium sulfate **OR** magnesium sulfate in D5W 1g/100mL (PREMIX)  •  melatonin  •  Morphine  •  nitroglycerin  •  ondansetron **OR** ondansetron  •  potassium chloride  •  [COMPLETED] Insert peripheral IV **AND** sodium chloride  •  sodium chloride  •  sodium chloride    Imaging:  Imaging Results (Last 72 Hours)     Procedure Component Value Units Date/Time    XR Chest 1 View [[id number]] Collected:  07/15/20 1917     Updated:  07/15/20 1921    Narrative:       EXAMINATION: XR CHEST 1 VW-     DATE OF EXAM: 7/15/2020 7:02 PM     INDICATION: Shortness of air, chest pain, left sided chest pain     COMPARISON: Chest radiograph dated 06/24/2020     TECHNIQUE: Portable AP view of the chest was obtained.     FINDINGS:  There is a left chest wall  "pacemaker with leads in unchanged position.  There is stable cardiomegaly. Pulmonary vascularity is unremarkable.  There is no focal airspace consolidation, pleural effusion, or  pneumothorax. There are multilevel degenerative changes of the thoracic  spine.       Impression:       Stable cardiomegaly without acute pulmonary process.     Electronically Signed By-Demarcus Linares On:7/15/2020 7:19 PM  This report was finalized on [id number] by  Demarcus Linares, .            ABHISHEK Peña  07/17/20  11:47       EMR Dragon/Transcription:   \"Dictated utilizing Dragon dictation\".           Electronically signed by ABHISHEK Peña, 07/17/20, 11:47 AM.    "

## 2020-07-18 LAB
ANION GAP SERPL CALCULATED.3IONS-SCNC: 11 MMOL/L (ref 5–15)
BUN SERPL-MCNC: 22 MG/DL (ref 6–20)
BUN SERPL-MCNC: ABNORMAL MG/DL
BUN/CREAT SERPL: ABNORMAL
CALCIUM SPEC-SCNC: 9.4 MG/DL (ref 8.6–10.5)
CHLORIDE SERPL-SCNC: 94 MMOL/L (ref 98–107)
CO2 SERPL-SCNC: 32 MMOL/L (ref 22–29)
CREAT SERPL-MCNC: 1.12 MG/DL (ref 0.76–1.27)
GFR SERPL CREATININE-BSD FRML MDRD: 68 ML/MIN/1.73
GLUCOSE SERPL-MCNC: 107 MG/DL (ref 65–99)
MAGNESIUM SERPL-MCNC: 2 MG/DL (ref 1.6–2.6)
NT-PROBNP SERPL-MCNC: 582.5 PG/ML (ref 0–900)
POTASSIUM SERPL-SCNC: 4.7 MMOL/L (ref 3.5–5.2)
POTASSIUM SERPL-SCNC: 4.8 MMOL/L (ref 3.5–5.2)
SODIUM SERPL-SCNC: 137 MMOL/L (ref 136–145)

## 2020-07-18 PROCEDURE — 25010000002 MORPHINE PER 10 MG: Performed by: PHYSICIAN ASSISTANT

## 2020-07-18 PROCEDURE — 99232 SBSQ HOSP IP/OBS MODERATE 35: CPT | Performed by: INTERNAL MEDICINE

## 2020-07-18 PROCEDURE — 84132 ASSAY OF SERUM POTASSIUM: CPT | Performed by: PHYSICIAN ASSISTANT

## 2020-07-18 PROCEDURE — 80048 BASIC METABOLIC PNL TOTAL CA: CPT | Performed by: INTERNAL MEDICINE

## 2020-07-18 PROCEDURE — 94799 UNLISTED PULMONARY SVC/PX: CPT

## 2020-07-18 PROCEDURE — 25010000002 ONDANSETRON PER 1 MG: Performed by: PHYSICIAN ASSISTANT

## 2020-07-18 PROCEDURE — 83880 ASSAY OF NATRIURETIC PEPTIDE: CPT | Performed by: PHYSICIAN ASSISTANT

## 2020-07-18 PROCEDURE — 83735 ASSAY OF MAGNESIUM: CPT | Performed by: PHYSICIAN ASSISTANT

## 2020-07-18 RX ORDER — METOPROLOL SUCCINATE 25 MG/1
12.5 TABLET, EXTENDED RELEASE ORAL
Status: DISCONTINUED | OUTPATIENT
Start: 2020-07-19 | End: 2020-07-19

## 2020-07-18 RX ORDER — LISINOPRIL 5 MG/1
2.5 TABLET ORAL DAILY
Status: DISCONTINUED | OUTPATIENT
Start: 2020-07-19 | End: 2020-07-19

## 2020-07-18 RX ORDER — DOCUSATE SODIUM 100 MG/1
100 CAPSULE, LIQUID FILLED ORAL DAILY
Status: DISCONTINUED | OUTPATIENT
Start: 2020-07-18 | End: 2020-07-21 | Stop reason: HOSPADM

## 2020-07-18 RX ORDER — TAMSULOSIN HYDROCHLORIDE 0.4 MG/1
0.4 CAPSULE ORAL DAILY
Status: DISCONTINUED | OUTPATIENT
Start: 2020-07-18 | End: 2020-07-21 | Stop reason: HOSPADM

## 2020-07-18 RX ADMIN — CETIRIZINE HYDROCHLORIDE 10 MG: 10 TABLET, FILM COATED ORAL at 08:57

## 2020-07-18 RX ADMIN — MORPHINE SULFATE 4 MG: 4 INJECTION INTRAVENOUS at 10:08

## 2020-07-18 RX ADMIN — POTASSIUM CHLORIDE 10 MEQ: 750 TABLET, EXTENDED RELEASE ORAL at 08:56

## 2020-07-18 RX ADMIN — BUDESONIDE AND FORMOTEROL FUMARATE DIHYDRATE 2 PUFF: 160; 4.5 AEROSOL RESPIRATORY (INHALATION) at 23:03

## 2020-07-18 RX ADMIN — MAGNESIUM HYDROXIDE 10 ML: 2400 SUSPENSION ORAL at 21:35

## 2020-07-18 RX ADMIN — GABAPENTIN 300 MG: 300 CAPSULE ORAL at 20:39

## 2020-07-18 RX ADMIN — ROFLUMILAST 500 MCG: 500 TABLET ORAL at 08:57

## 2020-07-18 RX ADMIN — DOCUSATE SODIUM 100 MG: 100 CAPSULE, LIQUID FILLED ORAL at 20:38

## 2020-07-18 RX ADMIN — APIXABAN 5 MG: 5 TABLET, FILM COATED ORAL at 20:39

## 2020-07-18 RX ADMIN — DULOXETINE HYDROCHLORIDE 60 MG: 30 CAPSULE, DELAYED RELEASE ORAL at 08:56

## 2020-07-18 RX ADMIN — POLYETHYLENE GLYCOL 3350 17 G: 17 POWDER, FOR SOLUTION ORAL at 20:39

## 2020-07-18 RX ADMIN — TAMSULOSIN HYDROCHLORIDE 0.4 MG: 0.4 CAPSULE ORAL at 20:38

## 2020-07-18 RX ADMIN — ONDANSETRON 4 MG: 2 INJECTION INTRAMUSCULAR; INTRAVENOUS at 21:36

## 2020-07-18 RX ADMIN — BUDESONIDE AND FORMOTEROL FUMARATE DIHYDRATE 2 PUFF: 160; 4.5 AEROSOL RESPIRATORY (INHALATION) at 07:22

## 2020-07-18 RX ADMIN — BUMETANIDE 1 MG: 1 TABLET ORAL at 16:53

## 2020-07-18 RX ADMIN — MONTELUKAST SODIUM 10 MG: 10 TABLET, COATED ORAL at 08:56

## 2020-07-18 RX ADMIN — MORPHINE SULFATE 4 MG: 4 INJECTION INTRAVENOUS at 21:35

## 2020-07-18 RX ADMIN — IPRATROPIUM BROMIDE AND ALBUTEROL SULFATE 3 ML: 2.5; .5 SOLUTION RESPIRATORY (INHALATION) at 23:02

## 2020-07-18 RX ADMIN — Medication 10 ML: at 20:39

## 2020-07-18 RX ADMIN — METOPROLOL SUCCINATE 50 MG: 50 TABLET, EXTENDED RELEASE ORAL at 08:56

## 2020-07-18 RX ADMIN — RANOLAZINE 1000 MG: 500 TABLET, FILM COATED, EXTENDED RELEASE ORAL at 08:57

## 2020-07-18 RX ADMIN — MORPHINE SULFATE 4 MG: 4 INJECTION INTRAVENOUS at 17:01

## 2020-07-18 RX ADMIN — AMIODARONE HYDROCHLORIDE 200 MG: 200 TABLET ORAL at 08:56

## 2020-07-18 RX ADMIN — GABAPENTIN 300 MG: 300 CAPSULE ORAL at 08:57

## 2020-07-18 RX ADMIN — Medication 10 ML: at 09:03

## 2020-07-18 RX ADMIN — IPRATROPIUM BROMIDE AND ALBUTEROL SULFATE 3 ML: 2.5; .5 SOLUTION RESPIRATORY (INHALATION) at 15:32

## 2020-07-18 RX ADMIN — SALINE NASAL SPRAY 1 SPRAY: 1.5 SOLUTION NASAL at 21:35

## 2020-07-18 RX ADMIN — CLOPIDOGREL BISULFATE 75 MG: 75 TABLET ORAL at 08:57

## 2020-07-18 RX ADMIN — IPRATROPIUM BROMIDE AND ALBUTEROL SULFATE 3 ML: 2.5; .5 SOLUTION RESPIRATORY (INHALATION) at 07:22

## 2020-07-18 RX ADMIN — ONDANSETRON 4 MG: 2 INJECTION INTRAMUSCULAR; INTRAVENOUS at 10:08

## 2020-07-18 RX ADMIN — APIXABAN 5 MG: 5 TABLET, FILM COATED ORAL at 08:57

## 2020-07-18 RX ADMIN — BUMETANIDE 1 MG: 1 TABLET ORAL at 20:39

## 2020-07-18 RX ADMIN — ATORVASTATIN CALCIUM 80 MG: 40 TABLET, FILM COATED ORAL at 08:57

## 2020-07-18 RX ADMIN — RANOLAZINE 1000 MG: 500 TABLET, FILM COATED, EXTENDED RELEASE ORAL at 20:38

## 2020-07-18 RX ADMIN — IPRATROPIUM BROMIDE AND ALBUTEROL SULFATE 3 ML: 2.5; .5 SOLUTION RESPIRATORY (INHALATION) at 11:16

## 2020-07-18 NOTE — PROGRESS NOTES
Cardiology Progress  Note      Patient Care Team:  Jaylen Moss MD as PCP - General  Wicho Sotelo MD as Consulting Physician (Nephrology)    PATIENT IDENTIFICATION  Name: Jose Dixon Jr.  Age: 59 y.o.  Sex: male  :  1960  MRN: [id number]             REASON FOR FOLLOW-UP:  Chest pain  Abnormal cardiac enzymes        SUBJECTIVE    Seen and examined.  Chart and labs reviewed.  Patient continues to report chest discomfort his incision.  Patient had an episode of acute chest pain and became hypotensive.  Became nauseated.  Currently he is better.  Blood pressure still low.  Edema has improved.      REVIEW OF SYSTEMS:  Pertinent items are noted in HPI, all other systems reviewed and negative    OBJECTIVE   Sitting in chair and appears comfortable    ASSESSMENT/PLAN    Chest pain    Coronary artery disease involving native heart with angina pectoris (CMS/HCC)    Cardiomyopathy, dilated (CMS/HCC)    Sleep apnea    Essential hypertension    Paroxysmal atrial fibrillation (CMS/HCC)    Anxiety    Chronic obstructive pulmonary disease (CMS/HCC)    Depression    Hyperlipidemia    ICD (implantable cardioverter-defibrillator) in place    Morbid obesity (CMS/HCC)    Ischemic cardiomyopathy    Acute on chronic combined systolic and diastolic CHF (congestive heart failure) (CMS/HCC)    Recommendations:  Chest discomfort may be more pericardial irritation from recent surgical procedure.  Non-trending troponin elevation likely also secondary to recent surgical procedure.  We will discontinue nitroglycerin.  We will discontinue diuretics due to hypotension  Monitor rhythm and electrolytes  Anticipate discharge soon     Cath films personally reviewed and demonstrate moderate disease involving a ramus intermedius branch extending to the ostium of the vessel.  The vessel is borderline caliber for percutaneous revascularization.  Percutaneous revascularization could compromise the large circumflex vessel.  The patient  "also has had in-stent restenosis of a similar sized vessel in the circumflex system.  Would recommend conservative management for this particular lesion.      Vital Signs  Visit Vitals  BP 94/66   Pulse 78   Temp 97.6 °F (36.4 °C) (Oral)   Resp 16   Ht 177.8 cm (70\")   Wt (!) 139 kg (306 lb 3.5 oz)   SpO2 98%   BMI 43.94 kg/m²     Oxygen Therapy  SpO2: 98 %  Pulse Oximetry Type: Intermittent  Device (Oxygen Therapy): room air  Flow (L/min): 2  Oxygen Concentration (%): 21  Oximetry Probe Site Changed: Yes  Flowsheet Rows      First Filed Value   Admission Height  177.8 cm (70\") Documented at 07/15/2020 1816   Admission Weight  (!) 139 kg (306 lb 14.1 oz) Documented at 07/15/2020 1816        Intake & Output (last 3 days)       07/15 0701 - 07/16 0700 07/16 0701 - 07/17 0700 07/17 0701 - 07/18 0700 07/18 0701 - 07/19 0700    P.O.  960 460 360    Total Intake(mL/kg)  960 (6.9) 460 (3.3) 360 (2.6)    Urine (mL/kg/hr) 1300       Total Output 1300       Net -1300 +960 +460 +360            Urine Unmeasured Occurrence  2 x          Lines, Drains & Airways    Active LDAs     Name:   Placement date:   Placement time:   Site:   Days:    Peripheral IV 07/17/20 0010 Anterior;Right Antecubital   07/17/20    0010    Antecubital   less than 1                       BP 94/66   Pulse 78   Temp 97.6 °F (36.4 °C) (Oral)   Resp 16   Ht 177.8 cm (70\")   Wt (!) 139 kg (306 lb 3.5 oz)   SpO2 98%   BMI 43.94 kg/m²   Intake/Output last 3 shifts:  I/O last 3 completed shifts:  In: 1420 [P.O.:1420]  Out: -   Intake/Output this shift:  I/O this shift:  In: 360 [P.O.:360]  Out: -     PHYSICAL EXAM:    General: Alert, cooperative, no distress, appears stated age  Head:  Normocephalic, atraumatic, mucous membranes moist  Eyes:  Conjunctiva/corneas clear,    Neck:  Supple,  no adenopathy; no JVD or bruit  Lungs: Clear to auscultation bilaterally, no wheezes rhonchi rales are noted  Chest wall: No tenderness  Heart::  Regular rate and rhythm, " S1 and S2 normal, no murmur, rub or gallop  Abdomen: Soft, non-tender, nondistended   Extremities: No cyanosis, clubbing, or edema   Pulses: 2+ right radial pulse  Skin:  No rashes or lesions  Neuro/psych: Alert and awake.  Grossly nonfocal      Scheduled Meds:        amiodarone 200 mg Oral Daily   apixaban 5 mg Oral BID   atorvastatin 80 mg Oral Daily   budesonide-formoterol 2 puff Inhalation BID - RT   bumetanide 1 mg Oral TID   cetirizine 10 mg Oral Daily   clopidogrel 75 mg Oral Daily   DULoxetine 60 mg Oral Daily   gabapentin 300 mg Oral BID   hydroCHLOROthiazide 12.5 mg Oral Daily   ipratropium-albuterol 3 mL Nebulization 4x Daily - RT   lisinopril 5 mg Oral Daily   metoprolol succinate XL 50 mg Oral Q24H   montelukast 10 mg Oral Daily   potassium chloride 10 mEq Oral Daily   ranolazine 1,000 mg Oral BID   roflumilast 500 mcg Oral Daily   sodium chloride 10 mL Intravenous Q12H   spironolactone 25 mg Oral Daily       Continuous Infusions:      nitroglycerin 10-50 mcg/min Last Rate: Stopped (07/16/20 1741)       PRN Meds:    •  acetaminophen **OR** acetaminophen **OR** acetaminophen  •  albuterol  •  aluminum-magnesium hydroxide-simethicone  •  bisacodyl  •  magnesium hydroxide  •  magnesium sulfate **OR** magnesium sulfate in D5W 1g/100mL (PREMIX)  •  melatonin  •  Morphine  •  nitroglycerin  •  ondansetron **OR** ondansetron  •  potassium chloride  •  [COMPLETED] Insert peripheral IV **AND** sodium chloride  •  sodium chloride  •  sodium chloride        Results Review:     I reviewed the patient's new clinical results.    CBC    Results from last 7 days   Lab Units 07/16/20  0547 07/15/20  1852   WBC 10*3/mm3 9.10 10.50   HEMOGLOBIN g/dL 13.8 14.4   PLATELETS 10*3/mm3 300 320     Cr Clearance Estimated Creatinine Clearance: 99.6 mL/min (by C-G formula based on SCr of 1.15 mg/dL).  Coag     HbA1C   Lab Results   Component Value Date    HGBA1C 5.9 (H) 06/04/2020    HGBA1C 5.4 09/14/2018     Blood Glucose No  results found for: POCGLU  Infection     CMP   Results from last 7 days   Lab Units 07/18/20  0242 07/17/20  0319 07/16/20  0547 07/15/20  1852   SODIUM mmol/L  --   --  143 140   POTASSIUM mmol/L 4.7 4.4 4.6 4.2   CHLORIDE mmol/L  --   --  98 100   CO2 mmol/L  --   --  34.0* 25.0   BUN   --   --  14 13   CREATININE mg/dL  --   --  1.15 0.99   GLUCOSE mg/dL  --   --  91 119*   ALBUMIN g/dL  --   --   --  4.20   BILIRUBIN mg/dL  --   --   --  0.5   ALK PHOS U/L  --   --   --  92   AST (SGOT) U/L  --   --   --  37   ALT (SGPT) U/L  --   --   --  16     ABG    Results from last 7 days   Lab Units 07/15/20  1907   PH, ARTERIAL pH units 7.355   PCO2, ARTERIAL mm Hg 57.4*   PO2 ART mm Hg 103.5   O2 SATURATION ART % 97.5   BASE EXCESS ART mmol/L 4.7*     UA      TATUM  No results found for: POCMETH, POCAMPHET, POCBARBITUR, POCBENZO, POCCOCAINE, POCOPIATES, POCOXYCODO, POCPHENCYC, POCPROPOXY, POCTHC, POCTRICYC  Lysis Labs   Results from last 7 days   Lab Units 07/16/20  0547 07/15/20  1852   HEMOGLOBIN g/dL 13.8 14.4   PLATELETS 10*3/mm3 300 320   CREATININE mg/dL 1.15 0.99     Radiology(recent) No radiology results for the last day      Results from last 7 days   Lab Units 07/16/20  0547   TROPONIN T ng/mL 0.213*       Xrays, labs reviewed personally by physician.    ECG/EMG Results (most recent)     Procedure Component Value Units Date/Time    ECG 12 Lead [[id number]] Collected:  07/15/20 1817     Updated:  07/16/20 1041    Narrative:       HEART RATE= 72  bpm  RR Interval= 788  ms  OK Interval=   ms  P Horizontal Axis=   deg  P Front Axis=   deg  QRSD Interval= 115  ms  QT Interval= 391  ms  QRS Axis= 245  deg  T Wave Axis=   deg  - ABNORMAL ECG -  Afib/flut and V-paced complexes  When compared with ECG of 25-Jun-2020 4:44:36,  No significant change  Electronically Signed By: Lambert Peace (Frank) 16-Jul-2020 10:33:30  Date and Time of Study: 2020-07-15 18:17:10    ECG 12 Lead [[id number]] Collected:  07/17/20 1359      Updated:  07/17/20 1356    Narrative:       HEART RATE= 70  bpm  RR Interval= 856  ms  MS Interval= 73  ms  P Horizontal Axis= 238  deg  P Front Axis= 0  deg  QRSD Interval= 144  ms  QT Interval= 475  ms  QRS Axis= 229  deg  T Wave Axis= 104  deg  - ABNORMAL ECG -  Ventricular-paced rhythm  When compared with ECG of 15-Jul-2020 18:17:10,  No significant change  Electronically Signed By:   Date and Time of Study: 2020-07-17 13:54:38            Medication Review:   I have reviewed the patient's current medication list  Scheduled Meds:    amiodarone 200 mg Oral Daily   apixaban 5 mg Oral BID   atorvastatin 80 mg Oral Daily   budesonide-formoterol 2 puff Inhalation BID - RT   bumetanide 1 mg Oral TID   cetirizine 10 mg Oral Daily   clopidogrel 75 mg Oral Daily   DULoxetine 60 mg Oral Daily   gabapentin 300 mg Oral BID   hydroCHLOROthiazide 12.5 mg Oral Daily   ipratropium-albuterol 3 mL Nebulization 4x Daily - RT   lisinopril 5 mg Oral Daily   metoprolol succinate XL 50 mg Oral Q24H   montelukast 10 mg Oral Daily   potassium chloride 10 mEq Oral Daily   ranolazine 1,000 mg Oral BID   roflumilast 500 mcg Oral Daily   sodium chloride 10 mL Intravenous Q12H   spironolactone 25 mg Oral Daily     Continuous Infusions:    nitroglycerin 10-50 mcg/min Last Rate: Stopped (07/16/20 1741)     PRN Meds:.•  acetaminophen **OR** acetaminophen **OR** acetaminophen  •  albuterol  •  aluminum-magnesium hydroxide-simethicone  •  bisacodyl  •  magnesium hydroxide  •  magnesium sulfate **OR** magnesium sulfate in D5W 1g/100mL (PREMIX)  •  melatonin  •  Morphine  •  nitroglycerin  •  ondansetron **OR** ondansetron  •  potassium chloride  •  [COMPLETED] Insert peripheral IV **AND** sodium chloride  •  sodium chloride  •  sodium chloride    Imaging:  Imaging Results (Last 72 Hours)     Procedure Component Value Units Date/Time    XR Chest 1 View [[id number]] Collected:  07/15/20 1917     Updated:  07/15/20 1921    Narrative:       EXAMINATION:  "XR CHEST 1 VW-     DATE OF EXAM: 7/15/2020 7:02 PM     INDICATION: Shortness of air, chest pain, left sided chest pain     COMPARISON: Chest radiograph dated 06/24/2020     TECHNIQUE: Portable AP view of the chest was obtained.     FINDINGS:  There is a left chest wall pacemaker with leads in unchanged position.  There is stable cardiomegaly. Pulmonary vascularity is unremarkable.  There is no focal airspace consolidation, pleural effusion, or  pneumothorax. There are multilevel degenerative changes of the thoracic  spine.       Impression:       Stable cardiomegaly without acute pulmonary process.     Electronically Signed By-Demarcus Linares On:7/15/2020 7:19 PM  This report was finalized on [id number] by  Demarcus Linares, .            Deo Durand MD  07/18/20  10:38       EMR Dragon/Transcription:   \"Dictated utilizing Dragon dictation\".             "

## 2020-07-18 NOTE — PROGRESS NOTES
[facility] Medicine Services Daily Progress Note      Hospitalist Team  LOS 1 days      Patient Care Team:  Jaylen Moss MD as PCP - General  Wicho Sotelo MD as Consulting Physician (Nephrology)    Patient Location: 2109/1      Subjective   Subjective     Chief Complaint / Subjective  Chief Complaint   Patient presents with   • Chest Pain     chest pain/SOA       Brief Synopsis of Hospital Course/HPI  Mr. Dixon is a 59 y.o. male presents to [facility] ER on 7/15/2020 complaining of chest pain since last night.  Patient states he had an episode of chest pain with associated shortness of breath started last night and again this morning.  Patient states he has periods of diaphoresis as well.  Patient states his pain radiates to the base of his neck up to his jaw and around to the back of his head.  Patient also states that the pain radiates down his left arm into his hand.  Patient states that since he arrived in the ER he noticed his feet have swollen and states he did not miss any home dose of water pills.  Patient also reports some heart palpitations that had started while at the ER.  Patient denies any fever, chills, cough, syncope, nausea, vomiting, abdominal pain or diarrhea. Upon chart review, patient has a history of premature CAD, status post stenting and chronic refractory chest pain, first onset at age 48 with a strong family history of CAD/HF.  Patient has multiple recent surgeries and procedures done regarding his heart.   In the ED, initial ABG pH of 7.355, PCO2 of 57.4, PO2 of 103.5, bicarb of 32.0, base excess of 4.7, O2 saturation of 97.5.  Initial troponin elevated 0.183.  CMP largely unremarkable.  CBC largely unremarkable.  Chest x-ray shows stable cardiomegaly without acute pulmonary process.  EKG shows A. fib/flutter with V paced complexes, rate of 72 bpm, no apparent ST elevation.  Patient had an anterior thoracotomy with placement to epicardial LV leads  by  on 6/22/2020. Last echo done on 6/23/2020 showed an EF of 30%, left ventricular wall thickness is consistent with hypertrophy.  Last heart cath was done on 6/9/2020 showed Dilated left ventricle with underlying complete heart block and atrial fibrillation with appropriately functioning biventricular ICD, After completion of ablation in the basal aspect, EP study performed did not induce any sustained VT despite aggressive pacing, Circumflex artery is a large vessel dominant appearing vessel and the second marginal branch has multiple stents which is completely occluded, The circumflex artery gives moderate proximal marginal branch versus ramus branch which has an ostial to proximal 80% stenosis and appears like a long plaque, findings were reviewed by interventional cardiologist and discussed with Dr. Frye at that time.  Patient is afebrile, pulse in the 40s, on 4 L nasal cannula at 98% SPO2 and blood pressure 160s over 80s.  Patient started on Tridil drip, morphine, aspirin, Zofran, DuoNeb in the ED.    Date:7/16: c/o chest pain,  Trop 0.2  7/17-still c/o chest pain, vasovagal episode, bp dropped.91/56   7/18-still c/o chest pain, BP still low, on Tridil drip    Review of Systems   Constitution: Positive for malaise/fatigue.   HENT: Negative.    Eyes: Negative.    Cardiovascular: Positive for chest pain.   Respiratory: Negative.    Endocrine: Negative.    Hematologic/Lymphatic: Negative.    Skin: Negative.    Musculoskeletal: Positive for back pain.   Gastrointestinal: Negative.    Genitourinary: Negative.    Neurological: Negative.    Psychiatric/Behavioral: Negative.    Allergic/Immunologic: Negative.    All other systems reviewed and are negative.    Objective   Objective      Vital Signs  Temp:  [97.3 °F (36.3 °C)-98 °F (36.7 °C)] 98 °F (36.7 °C)  Heart Rate:  [70-81] 74  Resp:  [15-20] 16  BP: ()/(20-66) 91/48  Oxygen Therapy  SpO2: 98 %  Pulse Oximetry Type: Intermittent  Device (Oxygen  "Therapy): room air  Flow (L/min): 2  Oxygen Concentration (%): 21  Oximetry Probe Site Changed: Yes  Flowsheet Rows      First Filed Value   Admission Height  177.8 cm (70\") Documented at 07/15/2020 1816   Admission Weight  (!) 139 kg (306 lb 14.1 oz) Documented at 07/15/2020 1816        Intake & Output (last 3 days)       07/15 0701 - 07/16 0700 07/16 0701 - 07/17 0700 07/17 0701 - 07/18 0700 07/18 0701 - 07/19 0700    P.O.  960 460 840    Total Intake(mL/kg)  960 (6.9) 460 (3.3) 840 (6)    Urine (mL/kg/hr) 1300       Total Output 1300       Net -1300 +960 +460 +840            Urine Unmeasured Occurrence  2 x          Lines, Drains & Airways    Active LDAs     Name:   Placement date:   Placement time:   Site:   Days:    Peripheral IV 07/15/20 1843 Left Hand   07/15/20    1843    Hand   less than 1              Physical Exam   Constitutional: He is oriented to person, place, and time. He appears well-developed and well-nourished. No distress.   HENT:   Head: Normocephalic and atraumatic.   Right Ear: External ear normal.   Eyes: Pupils are equal, round, and reactive to light. Conjunctivae and EOM are normal. Right eye exhibits no discharge. Left eye exhibits no discharge. No scleral icterus.   Neck: Normal range of motion. No thyromegaly present.   Cardiovascular: Normal rate, regular rhythm, normal heart sounds and intact distal pulses.   Pulmonary/Chest: Effort normal and breath sounds normal. No respiratory distress.   Abdominal: Soft. Bowel sounds are normal. There is no tenderness.   Musculoskeletal: Normal range of motion. He exhibits no edema.   Neurological: He is alert and oriented to person, place, and time. No cranial nerve deficit.   Skin: Skin is warm and dry. He is not diaphoretic. No erythema.   Psychiatric: He has a normal mood and affect. His behavior is normal.   Nursing note and vitals reviewed.    Procedures:    Results Review:     I reviewed the patient's new clinical results.    Lab Results " (last 24 hours)     Procedure Component Value Units Date/Time    Basic Metabolic Panel [[id number]]  (Abnormal) Collected:  07/18/20 1235    Specimen:  Blood Updated:  07/18/20 1302     Glucose 107 mg/dL      BUN --     Comment: Testing performed by alternate method        Creatinine 1.12 mg/dL      Sodium 137 mmol/L      Potassium 4.8 mmol/L      Chloride 94 mmol/L      CO2 32.0 mmol/L      Calcium 9.4 mg/dL      eGFR Non African Amer 68 mL/min/1.73      BUN/Creatinine Ratio --     Comment: Testing not performed        Anion Gap 11.0 mmol/L     Narrative:       GFR Normal >60  Chronic Kidney Disease <60  Kidney Failure <15      BUN [[id number]] Collected:  07/18/20 1235    Specimen:  Blood Updated:  07/18/20 1301    Potassium [[id number]]  (Normal) Collected:  07/18/20 0242    Specimen:  Blood Updated:  07/18/20 0341     Potassium 4.7 mmol/L     Magnesium [[id number]]  (Normal) Collected:  07/18/20 0242    Specimen:  Blood Updated:  07/18/20 0341     Magnesium 2.0 mg/dL     BNP [[id number]]  (Normal) Collected:  07/18/20 0242    Specimen:  Blood Updated:  07/18/20 0340     proBNP 582.5 pg/mL     Narrative:       Among patients with dyspnea, NT-proBNP is highly sensitive for the detection of acute congestive heart failure. In addition NT-proBNP of <300 pg/ml effectively rules out acute congestive heart failure with 99% negative predictive value.    Results may be falsely decreased if patient taking Biotin.          No results found for: HGBA1C        Results from last 7 days   Lab Units 07/15/20  1907   PH, ARTERIAL pH units 7.355   PO2 ART mm Hg 103.5   PCO2, ARTERIAL mm Hg 57.4*   HCO3 ART mmol/L 32.0*     Lab Results   Component Value Date    LIPASE 23 06/03/2020     Lab Results   Component Value Date    CHLPL 201 (H) 09/14/2018    TRIG 108 09/14/2018    HDL 46 09/14/2018     (H) 09/14/2018       No results found for: INTRAOP, PREDX, FINALDX, COMDX    Microbiology Results (last 10 days)     ** No results found  for the last 240 hours. **          ECG/EMG Results (most recent)     Procedure Component Value Units Date/Time    ECG 12 Lead [[id number]] Collected:  07/15/20 1817     Updated:  07/16/20 1041    Narrative:       HEART RATE= 72  bpm  RR Interval= 788  ms  MO Interval=   ms  P Horizontal Axis=   deg  P Front Axis=   deg  QRSD Interval= 115  ms  QT Interval= 391  ms  QRS Axis= 245  deg  T Wave Axis=   deg  - ABNORMAL ECG -  Afib/flut and V-paced complexes  When compared with ECG of 25-Jun-2020 4:44:36,  No significant change  Electronically Signed By: Lambert Peace (Frank) 16-Jul-2020 10:33:30  Date and Time of Study: 2020-07-15 18:17:10    ECG 12 Lead [[id number]] Collected:  07/17/20 1354     Updated:  07/17/20 1356    Narrative:       HEART RATE= 70  bpm  RR Interval= 856  ms  MO Interval= 73  ms  P Horizontal Axis= 238  deg  P Front Axis= 0  deg  QRSD Interval= 144  ms  QT Interval= 475  ms  QRS Axis= 229  deg  T Wave Axis= 104  deg  - ABNORMAL ECG -  Ventricular-paced rhythm  When compared with ECG of 15-Jul-2020 18:17:10,  No significant change  Electronically Signed By:   Date and Time of Study: 2020-07-17 13:54:38               Results for orders placed during the hospital encounter of 06/21/20   Adult Transthoracic Echo Limited W/ Cont if Necessary Per Protocol    Narrative · Left ventricular wall thickness is consistent with moderate concentric   hypertrophy.  · Estimated EF = 30%.  · The pericardium is normal. There is no evidence of pericardial effusion.          Xr Chest 1 View    Result Date: 7/15/2020  Stable cardiomegaly without acute pulmonary process.  Electronically Signed By-Demarcus Linares On:7/15/2020 7:19 PM This report was finalized on [id number] by  Demarcus Linares .          Xrays, labs reviewed personally by physician.    Medication Review:   I have reviewed the patient's current medication list      Scheduled Meds    amiodarone 200 mg Oral Daily   apixaban 5 mg Oral BID   atorvastatin  80 mg Oral Daily   budesonide-formoterol 2 puff Inhalation BID - RT   bumetanide 1 mg Oral TID   cetirizine 10 mg Oral Daily   clopidogrel 75 mg Oral Daily   DULoxetine 60 mg Oral Daily   gabapentin 300 mg Oral BID   hydroCHLOROthiazide 12.5 mg Oral Daily   ipratropium-albuterol 3 mL Nebulization 4x Daily - RT   lisinopril 5 mg Oral Daily   metoprolol succinate XL 50 mg Oral Q24H   montelukast 10 mg Oral Daily   potassium chloride 10 mEq Oral Daily   ranolazine 1,000 mg Oral BID   roflumilast 500 mcg Oral Daily   sodium chloride 10 mL Intravenous Q12H   spironolactone 25 mg Oral Daily       Meds Infusions    nitroglycerin 10-50 mcg/min Last Rate: Stopped (07/16/20 1741)       Meds PRN  •  acetaminophen **OR** acetaminophen **OR** acetaminophen  •  albuterol  •  aluminum-magnesium hydroxide-simethicone  •  bisacodyl  •  magnesium hydroxide  •  magnesium sulfate **OR** magnesium sulfate in D5W 1g/100mL (PREMIX)  •  melatonin  •  Morphine  •  nitroglycerin  •  ondansetron **OR** ondansetron  •  potassium chloride  •  [COMPLETED] Insert peripheral IV **AND** sodium chloride  •  sodium chloride  •  sodium chloride    I personally reviewed patient's x-ray films     I personally reviewed patient's EKG strips     Assessment/Plan   Assessment/Plan     Active Hospital Problems    Diagnosis  POA   • **Chest pain [R07.9]  Yes   • Acute on chronic combined systolic and diastolic CHF (congestive heart failure) (CMS/HCC) [I50.43]  Yes   • Ischemic cardiomyopathy [I25.5]  Yes   • Coronary artery disease involving native heart with angina pectoris (CMS/HCC) [I25.119]  Yes   • Cardiomyopathy, dilated (CMS/HCC) [I42.0]  Yes   • Paroxysmal atrial fibrillation (CMS/HCC) [I48.0]  Yes   • Sleep apnea [G47.30]  Yes   • Essential hypertension [I10]  Yes   • Depression [F32.9]  Yes   • Morbid obesity (CMS/HCC) [E66.01]  Yes   • Anxiety [F41.9]  Yes   • ICD (implantable cardioverter-defibrillator) in place [Z95.810]  Yes   • Chronic  obstructive pulmonary disease (CMS/[facility]) [J44.9]  Yes   • Hyperlipidemia [E78.5]  Yes      Resolved Hospital Problems   No resolved problems to display.       MEDICAL DECISION MAKING COMPLEXITY BY PROBLEM:     Chest pain, elevated troponin  -Possible non-STEMI, initial troponin elevated 0.183.  -Chest x-ray shows stable cardiomegaly without acute pulmonary process.    -EKG shows A. fib/flutter with V paced complexes, rate of 72 bpm, no apparent ST elevation.    -Patient had an anterior thoracotomy with placement to epicardial LV leads by  on 6/22/2020.   -Last echo done on 6/23/2020 showed an EF of 30%, left ventricular wall thickness is consistent with hypertrophy.    -Last heart cath was done on 6/9/2020, reviewed above    -Patient is afebrile, pulse in the 40s, on 4 L nasal cannula at 98% SPO2 and blood pressure 160s over 80s.    -Patient started on Tridil drip, morphine, aspirin, Zofran, DuoNeb in the ED.  -Consult CT surgery, Dr. Richmond  -Consulted cardiology,>Cath films personally reviewed and demonstrate moderate disease involving a ramus intermedius branch extending to the ostium of the vessel.  The vessel is borderline caliber for percutaneous revascularization.  Percutaneous revascularization could compromise the large circumflex vessel.  The patient also has had in-stent restenosis of a similar sized vessel in the circumflex system.  Would recommend conservative management for this particular lesion.  -Continue Tridil drip  -Pain control with morphine  - serial troponins-0.18-0.21-Cardiac monitoring  -2 g sodium diet until n.p.o. midnight     Acute respiratory failure  - initial ABG of 7.355, PCO2 of 57.4, PO2 of 103.5, bicarb of 32.0, base excess of 4.7, O2 saturation of 97.5.   -Patient denies having supplemental O2 at home for any reason, other than using trillegy at night  -Continue on 4 L nasal cannula, titrate     CAD, ischemic& dilated cardiomyopathy, status post ICD  -Patient had an anterior  thoracotomy with placement to epicardial LV leads by  on 6/22/2020.  -status post stenting and chronic refractory chest pain, first onset at age 48 with a strong family history of CAD/HF.  -Extensive heart history with several recent procedures.  -Continue home Plavix, metoprolol, Ranexa     CHFerEF chronic  -Signs of trace edema on exam, no edema on chest x-ray, not acute exacerbation at this time  -Check BNP daily  -Continue home Bumex, Aldactone, potassium  -Monitor fluid status     Paroxysmal atrial fibrillation  -Continue home amiodarone, Eliquis     COPD-Not in acute exacerbation  -Continue home inhalers, Zyrtec, Singulair, Roflumilast     Hyperlipidemia  -Continue home statin     Anxiety/depression  -Continue home Cymbalta     Essential hypertension, poorly controlled  -Continue home hydrochlorothiazide, lisinopril, metoprolol     Morbid obesity, BMI 44.03  -Encourage lifestyle modifications     DON  -Trellegy     VTE Prophylaxis -   Mechanical Order History:     None      Pharmalogical Order History:     Ordered     Dose Route Frequency Stop    07/15/20 2200  apixaban (ELIQUIS) tablet 5 mg      5 mg PO 2 Times Daily --            Code Status -   Code Status and Medical Interventions:   Ordered at: 07/15/20 2117     Code Status:    CPR     Medical Interventions (Level of Support Prior to Arrest):    Full       This patient has been examined wearing appropriate Personal Protective Equipment       Discharge Planning          Destination      Coordination has not been started for this encounter.      Durable Medical Equipment      Coordination has not been started for this encounter.      Dialysis/Infusion      Coordination has not been started for this encounter.      Home Medical Care      Coordination has not been started for this encounter.      Therapy      Coordination has not been started for this encounter.      Community Resources      Coordination has not been started for this encounter.                 Electronically signed by Giovany Foy MD, 07/18/20, 16:28.  [facility] Hospitalist Team

## 2020-07-19 LAB
ANION GAP SERPL CALCULATED.3IONS-SCNC: 13 MMOL/L (ref 5–15)
BUN SERPL-MCNC: 20 MG/DL (ref 6–20)
BUN SERPL-MCNC: ABNORMAL MG/DL
BUN/CREAT SERPL: ABNORMAL
CALCIUM SPEC-SCNC: 9.3 MG/DL (ref 8.6–10.5)
CHLORIDE SERPL-SCNC: 93 MMOL/L (ref 98–107)
CO2 SERPL-SCNC: 32 MMOL/L (ref 22–29)
CREAT SERPL-MCNC: 1.01 MG/DL (ref 0.76–1.27)
GFR SERPL CREATININE-BSD FRML MDRD: 76 ML/MIN/1.73
GLUCOSE SERPL-MCNC: 94 MG/DL (ref 65–99)
MAGNESIUM SERPL-MCNC: 2.1 MG/DL (ref 1.6–2.6)
NT-PROBNP SERPL-MCNC: 1768 PG/ML (ref 0–900)
POTASSIUM SERPL-SCNC: 4.4 MMOL/L (ref 3.5–5.2)
SODIUM SERPL-SCNC: 138 MMOL/L (ref 136–145)
TROPONIN T SERPL-MCNC: 0.11 NG/ML (ref 0–0.03)

## 2020-07-19 PROCEDURE — 80048 BASIC METABOLIC PNL TOTAL CA: CPT | Performed by: INTERNAL MEDICINE

## 2020-07-19 PROCEDURE — 25010000002 ONDANSETRON PER 1 MG: Performed by: PHYSICIAN ASSISTANT

## 2020-07-19 PROCEDURE — 99233 SBSQ HOSP IP/OBS HIGH 50: CPT | Performed by: INTERNAL MEDICINE

## 2020-07-19 PROCEDURE — 84484 ASSAY OF TROPONIN QUANT: CPT | Performed by: INTERNAL MEDICINE

## 2020-07-19 PROCEDURE — 83880 ASSAY OF NATRIURETIC PEPTIDE: CPT | Performed by: PHYSICIAN ASSISTANT

## 2020-07-19 PROCEDURE — 25010000002 MORPHINE PER 10 MG: Performed by: PHYSICIAN ASSISTANT

## 2020-07-19 PROCEDURE — 94799 UNLISTED PULMONARY SVC/PX: CPT

## 2020-07-19 PROCEDURE — 93005 ELECTROCARDIOGRAM TRACING: CPT | Performed by: INTERNAL MEDICINE

## 2020-07-19 PROCEDURE — 83735 ASSAY OF MAGNESIUM: CPT | Performed by: PHYSICIAN ASSISTANT

## 2020-07-19 RX ORDER — METOPROLOL SUCCINATE 25 MG/1
25 TABLET, EXTENDED RELEASE ORAL
Status: DISCONTINUED | OUTPATIENT
Start: 2020-07-20 | End: 2020-07-21 | Stop reason: HOSPADM

## 2020-07-19 RX ORDER — AZELASTINE 1 MG/ML
1 SPRAY, METERED NASAL DAILY
Status: DISCONTINUED | OUTPATIENT
Start: 2020-07-19 | End: 2020-07-21 | Stop reason: HOSPADM

## 2020-07-19 RX ORDER — METOPROLOL SUCCINATE 25 MG/1
12.5 TABLET, EXTENDED RELEASE ORAL ONCE
Status: COMPLETED | OUTPATIENT
Start: 2020-07-19 | End: 2020-07-19

## 2020-07-19 RX ADMIN — ONDANSETRON 4 MG: 2 INJECTION INTRAMUSCULAR; INTRAVENOUS at 08:49

## 2020-07-19 RX ADMIN — IPRATROPIUM BROMIDE AND ALBUTEROL SULFATE 3 ML: 2.5; .5 SOLUTION RESPIRATORY (INHALATION) at 18:47

## 2020-07-19 RX ADMIN — BUMETANIDE 1 MG: 1 TABLET ORAL at 08:48

## 2020-07-19 RX ADMIN — RANOLAZINE 1000 MG: 500 TABLET, FILM COATED, EXTENDED RELEASE ORAL at 08:48

## 2020-07-19 RX ADMIN — DOCUSATE SODIUM 100 MG: 100 CAPSULE, LIQUID FILLED ORAL at 08:48

## 2020-07-19 RX ADMIN — ONDANSETRON 4 MG: 2 INJECTION INTRAMUSCULAR; INTRAVENOUS at 16:56

## 2020-07-19 RX ADMIN — APIXABAN 5 MG: 5 TABLET, FILM COATED ORAL at 20:37

## 2020-07-19 RX ADMIN — IPRATROPIUM BROMIDE AND ALBUTEROL SULFATE 3 ML: 2.5; .5 SOLUTION RESPIRATORY (INHALATION) at 08:36

## 2020-07-19 RX ADMIN — TAMSULOSIN HYDROCHLORIDE 0.4 MG: 0.4 CAPSULE ORAL at 08:49

## 2020-07-19 RX ADMIN — Medication 10 ML: at 20:37

## 2020-07-19 RX ADMIN — ATORVASTATIN CALCIUM 80 MG: 40 TABLET, FILM COATED ORAL at 08:47

## 2020-07-19 RX ADMIN — IPRATROPIUM BROMIDE AND ALBUTEROL SULFATE 3 ML: 2.5; .5 SOLUTION RESPIRATORY (INHALATION) at 16:35

## 2020-07-19 RX ADMIN — APIXABAN 5 MG: 5 TABLET, FILM COATED ORAL at 08:48

## 2020-07-19 RX ADMIN — POTASSIUM CHLORIDE 10 MEQ: 750 TABLET, EXTENDED RELEASE ORAL at 08:47

## 2020-07-19 RX ADMIN — CETIRIZINE HYDROCHLORIDE 10 MG: 10 TABLET, FILM COATED ORAL at 08:49

## 2020-07-19 RX ADMIN — AMIODARONE HYDROCHLORIDE 200 MG: 200 TABLET ORAL at 08:49

## 2020-07-19 RX ADMIN — METOPROLOL SUCCINATE 12.5 MG: 25 TABLET, EXTENDED RELEASE ORAL at 08:47

## 2020-07-19 RX ADMIN — ROFLUMILAST 500 MCG: 500 TABLET ORAL at 08:48

## 2020-07-19 RX ADMIN — AZELASTINE HYDROCHLORIDE 1 SPRAY: 137 SPRAY, METERED NASAL at 17:27

## 2020-07-19 RX ADMIN — IPRATROPIUM BROMIDE AND ALBUTEROL SULFATE 3 ML: 2.5; .5 SOLUTION RESPIRATORY (INHALATION) at 12:18

## 2020-07-19 RX ADMIN — BUDESONIDE AND FORMOTEROL FUMARATE DIHYDRATE 2 PUFF: 160; 4.5 AEROSOL RESPIRATORY (INHALATION) at 08:36

## 2020-07-19 RX ADMIN — LISINOPRIL 2.5 MG: 5 TABLET ORAL at 08:48

## 2020-07-19 RX ADMIN — MORPHINE SULFATE 4 MG: 4 INJECTION INTRAVENOUS at 20:52

## 2020-07-19 RX ADMIN — CLOPIDOGREL BISULFATE 75 MG: 75 TABLET ORAL at 08:48

## 2020-07-19 RX ADMIN — MORPHINE SULFATE 4 MG: 4 INJECTION INTRAVENOUS at 02:23

## 2020-07-19 RX ADMIN — METOPROLOL SUCCINATE 12.5 MG: 25 TABLET, EXTENDED RELEASE ORAL at 11:23

## 2020-07-19 RX ADMIN — GABAPENTIN 300 MG: 300 CAPSULE ORAL at 20:37

## 2020-07-19 RX ADMIN — RANOLAZINE 1000 MG: 500 TABLET, FILM COATED, EXTENDED RELEASE ORAL at 20:37

## 2020-07-19 RX ADMIN — MORPHINE SULFATE 4 MG: 4 INJECTION INTRAVENOUS at 16:56

## 2020-07-19 RX ADMIN — BUMETANIDE 1 MG: 1 TABLET ORAL at 16:20

## 2020-07-19 RX ADMIN — POLYETHYLENE GLYCOL 3350 17 G: 17 POWDER, FOR SOLUTION ORAL at 08:47

## 2020-07-19 RX ADMIN — GABAPENTIN 300 MG: 300 CAPSULE ORAL at 08:49

## 2020-07-19 RX ADMIN — DULOXETINE HYDROCHLORIDE 60 MG: 30 CAPSULE, DELAYED RELEASE ORAL at 08:49

## 2020-07-19 RX ADMIN — SPIRONOLACTONE 25 MG: 25 TABLET, FILM COATED ORAL at 08:49

## 2020-07-19 RX ADMIN — MORPHINE SULFATE 4 MG: 4 INJECTION INTRAVENOUS at 08:49

## 2020-07-19 RX ADMIN — BUMETANIDE 1 MG: 1 TABLET ORAL at 20:37

## 2020-07-19 RX ADMIN — Medication 10 ML: at 08:50

## 2020-07-19 RX ADMIN — MONTELUKAST SODIUM 10 MG: 10 TABLET, COATED ORAL at 08:48

## 2020-07-19 RX ADMIN — BUDESONIDE AND FORMOTEROL FUMARATE DIHYDRATE 2 PUFF: 160; 4.5 AEROSOL RESPIRATORY (INHALATION) at 18:47

## 2020-07-19 NOTE — NURSING NOTE
"Notified by [facility] pt had 22 beat run v tach. Pt c/o usual chest pain. Denies nausea. No diaphoresis noted.  Pt states \"that's why I have a pacer, it kicks in when I have vtach.\" Pt states it happens aat home and he can feel the pacer kick in.  Notified Dr Durand of pt 22 beat run.  MD aware.  New orders received.  "

## 2020-07-19 NOTE — NURSING NOTE
"Nofied again by [facility] pt had 19 beat run and also 9 beat run of vtach.  Pt sitting in chair.  Stated he did not feel good.  Clearly paced o;n monitor.  Pt stated he felt \"Fluttering\" stated she was nauseated and chest pain was 9/10. Dr means on floor.  New orders stat ekg, troponin. Pt given prn ms and zofran. Call out to dr Giles.  Retrun call from Dr Casillas. Pt stated he was better Informed Dr casillas of pt status an pt in no distress at present.  Orders to monitor.  Pt states he is feeling ok again.  Will monitor.  "

## 2020-07-19 NOTE — PROGRESS NOTES
[facility] Daily Progress Note      Hospitalist Team  LOS 2 days      Patient Care Team:  Jaylen Moss MD as PCP - General  Wicho Sotelo MD as Consulting Physician (Nephrology)    Patient Location: 2109/1      Subjective   Subjective     Chief Complaint / Subjective  Chief Complaint   Patient presents with   • Chest Pain     chest pain/SOA       Brief Synopsis of Hospital Course/HPI  Mr. Dixon is a 59 y.o. male presents to [facility] ER on 7/15/2020 complaining of chest pain since last night.  Patient states he had an episode of chest pain with associated shortness of breath started last night and again this morning.  Patient states he has periods of diaphoresis as well.  Patient states his pain radiates to the base of his neck up to his jaw and around to the back of his head.  Patient also states that the pain radiates down his left arm into his hand.  Patient states that since he arrived in the ER he noticed his feet have swollen and states he did not miss any home dose of water pills.  Patient also reports some heart palpitations that had started while at the ER.  Patient denies any fever, chills, cough, syncope, nausea, vomiting, abdominal pain or diarrhea. Upon chart review, patient has a history of premature CAD, status post stenting and chronic refractory chest pain, first onset at age 48 with a strong family history of CAD/HF.  Patient has multiple recent surgeries and procedures done regarding his heart.   In the ED, initial ABG pH of 7.355, PCO2 of 57.4, PO2 of 103.5, bicarb of 32.0, base excess of 4.7, O2 saturation of 97.5.  Initial troponin elevated 0.183.  CMP largely unremarkable.  CBC largely unremarkable.  Chest x-ray shows stable cardiomegaly without acute pulmonary process.  EKG shows A. fib/flutter with V paced complexes, rate of 72 bpm, no apparent ST elevation.  Patient had an anterior thoracotomy with placement to epicardial LV leads  by  on 6/22/2020. Last echo done on 6/23/2020 showed an EF of 30%, left ventricular wall thickness is consistent with hypertrophy.  Last heart cath was done on 6/9/2020 showed Dilated left ventricle with underlying complete heart block and atrial fibrillation with appropriately functioning biventricular ICD, After completion of ablation in the basal aspect, EP study performed did not induce any sustained VT despite aggressive pacing, Circumflex artery is a large vessel dominant appearing vessel and the second marginal branch has multiple stents which is completely occluded, The circumflex artery gives moderate proximal marginal branch versus ramus branch which has an ostial to proximal 80% stenosis and appears like a long plaque, findings were reviewed by interventional cardiologist and discussed with Dr. Frye at that time.  Patient is afebrile, pulse in the 40s, on 4 L nasal cannula at 98% SPO2 and blood pressure 160s over 80s.  Patient started on Tridil drip, morphine, aspirin, Zofran, DuoNeb in the ED.    Date:7/16: c/o chest pain,  Trop 0.2  7/17-still c/o chest pain, vasovagal episode, bp dropped.91/56   7/18-still c/o chest pain, BP still low, on Tridil drip  7/19-22 beats run of VT, metoprolol increased, restarted diuretics,  BP improved, bnp increased.    Review of Systems   Constitution: Positive for malaise/fatigue.   HENT: Negative.    Eyes: Negative.    Cardiovascular: Positive for chest pain.   Respiratory: Negative.    Endocrine: Negative.    Hematologic/Lymphatic: Negative.    Skin: Negative.    Musculoskeletal: Positive for back pain.   Gastrointestinal: Negative.    Genitourinary: Negative.    Neurological: Negative.    Psychiatric/Behavioral: Negative.    Allergic/Immunologic: Negative.    All other systems reviewed and are negative.    Objective   Objective      Vital Signs  Temp:  [96.6 °F (35.9 °C)-98.1 °F (36.7 °C)] 96.8 °F (36 °C)  Heart Rate:  [70-86] 75  Resp:  [14-18] 16  BP:  "()/(54-74) 115/57  Oxygen Therapy  SpO2: 97 %  Pulse Oximetry Type: Intermittent  Device (Oxygen Therapy): room air  Flow (L/min): 2  Oxygen Concentration (%): 21  Oximetry Probe Site Changed: Yes  Flowsheet Rows      First Filed Value   Admission Height  177.8 cm (70\") Documented at 07/15/2020 1816   Admission Weight  (!) 139 kg (306 lb 14.1 oz) Documented at 07/15/2020 1816        Intake & Output (last 3 days)       07/16 0701 - 07/17 0700 07/17 0701 - 07/18 0700 07/18 0701 - 07/19 0700 07/19 0701 - 07/20 0700    P.O. 960 460 840 840    Total Intake(mL/kg) 960 (6.9) 460 (3.3) 840 (6.1) 840 (6.1)    Urine (mL/kg/hr)        Total Output        Net +960 +460 +840 +840            Urine Unmeasured Occurrence 2 x           Lines, Drains & Airways    Active LDAs     Name:   Placement date:   Placement time:   Site:   Days:    Peripheral IV 07/15/20 1843 Left Hand   07/15/20    1843    Hand   less than 1              Physical Exam   Constitutional: He is oriented to person, place, and time. He appears well-developed and well-nourished. No distress.   HENT:   Head: Normocephalic and atraumatic.   Right Ear: External ear normal.   Eyes: Pupils are equal, round, and reactive to light. Conjunctivae and EOM are normal. Right eye exhibits no discharge. Left eye exhibits no discharge. No scleral icterus.   Neck: Normal range of motion. No thyromegaly present.   Cardiovascular: Normal rate, regular rhythm, normal heart sounds and intact distal pulses.   Pulmonary/Chest: Effort normal and breath sounds normal. No respiratory distress.   Abdominal: Soft. Bowel sounds are normal. There is no tenderness.   Musculoskeletal: Normal range of motion. He exhibits no edema.   Neurological: He is alert and oriented to person, place, and time. No cranial nerve deficit.   Skin: Skin is warm and dry. He is not diaphoretic. No erythema.   Psychiatric: He has a normal mood and affect. His behavior is normal.   Nursing note and vitals " reviewed.    Procedures:    Results Review:     I reviewed the patient's new clinical results.    Lab Results (last 24 hours)     Procedure Component Value Units Date/Time    BUN [[id number]]  (Normal) Collected:  07/19/20 0233    Specimen:  Blood Updated:  07/19/20 0751     BUN 20 mg/dL     Basic Metabolic Panel [[id number]]  (Abnormal) Collected:  07/19/20 0233    Specimen:  Blood Updated:  07/19/20 0353     Glucose 94 mg/dL      BUN --     Comment: Testing performed by alternate method        Creatinine 1.01 mg/dL      Sodium 138 mmol/L      Potassium 4.4 mmol/L      Chloride 93 mmol/L      CO2 32.0 mmol/L      Calcium 9.3 mg/dL      eGFR Non African Amer 76 mL/min/1.73      BUN/Creatinine Ratio --     Comment: Testing not performed        Anion Gap 13.0 mmol/L     Narrative:       GFR Normal >60  Chronic Kidney Disease <60  Kidney Failure <15      Magnesium [[id number]]  (Normal) Collected:  07/19/20 0233    Specimen:  Blood Updated:  07/19/20 0353     Magnesium 2.1 mg/dL     BNP [[id number]]  (Abnormal) Collected:  07/19/20 0233    Specimen:  Blood Updated:  07/19/20 0350     proBNP 1,768.0 pg/mL     Narrative:       Among patients with dyspnea, NT-proBNP is highly sensitive for the detection of acute congestive heart failure. In addition NT-proBNP of <300 pg/ml effectively rules out acute congestive heart failure with 99% negative predictive value.    Results may be falsely decreased if patient taking Biotin.      BUN [[id number]]  (Abnormal) Collected:  07/18/20 1235    Specimen:  Blood Updated:  07/18/20 1826     BUN 22 mg/dL         No results found for: HGBA1C        Results from last 7 days   Lab Units 07/15/20  1907   PH, ARTERIAL pH units 7.355   PO2 ART mm Hg 103.5   PCO2, ARTERIAL mm Hg 57.4*   HCO3 ART mmol/L 32.0*     Lab Results   Component Value Date    LIPASE 23 06/03/2020     Lab Results   Component Value Date    CHLPL 201 (H) 09/14/2018    TRIG 108 09/14/2018    HDL 46 09/14/2018     (H)  09/14/2018       No results found for: INTRAOP, PREDX, FINALDX, COMDX    Microbiology Results (last 10 days)     ** No results found for the last 240 hours. **          ECG/EMG Results (most recent)     Procedure Component Value Units Date/Time    ECG 12 Lead [[id number]] Collected:  07/15/20 1817     Updated:  07/16/20 1041    Narrative:       HEART RATE= 72  bpm  RR Interval= 788  ms  TX Interval=   ms  P Horizontal Axis=   deg  P Front Axis=   deg  QRSD Interval= 115  ms  QT Interval= 391  ms  QRS Axis= 245  deg  T Wave Axis=   deg  - ABNORMAL ECG -  Afib/flut and V-paced complexes  When compared with ECG of 25-Jun-2020 4:44:36,  No significant change  Electronically Signed By: Lambert Peace ([facility]) 16-Jul-2020 10:33:30  Date and Time of Study: 2020-07-15 18:17:10    ECG 12 Lead [[id number]] Collected:  07/17/20 1354     Updated:  07/17/20 1356    Narrative:       HEART RATE= 70  bpm  RR Interval= 856  ms  TX Interval= 73  ms  P Horizontal Axis= 238  deg  P Front Axis= 0  deg  QRSD Interval= 144  ms  QT Interval= 475  ms  QRS Axis= 229  deg  T Wave Axis= 104  deg  - ABNORMAL ECG -  Ventricular-paced rhythm  When compared with ECG of 15-Jul-2020 18:17:10,  No significant change  Electronically Signed By:   Date and Time of Study: 2020-07-17 13:54:38               Results for orders placed during the hospital encounter of 06/21/20   Adult Transthoracic Echo Limited W/ Cont if Necessary Per Protocol    Narrative · Left ventricular wall thickness is consistent with moderate concentric   hypertrophy.  · Estimated EF = 30%.  · The pericardium is normal. There is no evidence of pericardial effusion.          Xr Chest 1 View    Result Date: 7/15/2020  Stable cardiomegaly without acute pulmonary process.  Electronically Signed By-Demarcus Linares On:7/15/2020 7:19 PM This report was finalized on [id number] by  Demarcus Linares, .          Xrays, labs reviewed personally by physician.    Medication Review:   I have  reviewed the patient's current medication list      Scheduled Meds    amiodarone 200 mg Oral Daily   apixaban 5 mg Oral BID   atorvastatin 80 mg Oral Daily   budesonide-formoterol 2 puff Inhalation BID - RT   bumetanide 1 mg Oral TID   cetirizine 10 mg Oral Daily   clopidogrel 75 mg Oral Daily   docusate sodium 100 mg Oral Daily   DULoxetine 60 mg Oral Daily   gabapentin 300 mg Oral BID   ipratropium-albuterol 3 mL Nebulization 4x Daily - RT   [START ON 7/20/2020] metoprolol succinate XL 25 mg Oral Q24H   montelukast 10 mg Oral Daily   polyethylene glycol 17 g Oral Daily   potassium chloride 10 mEq Oral Daily   ranolazine 1,000 mg Oral BID   roflumilast 500 mcg Oral Daily   sodium chloride 10 mL Intravenous Q12H   spironolactone 25 mg Oral Daily   tamsulosin 0.4 mg Oral Daily       Meds Infusions    nitroglycerin 10-50 mcg/min Last Rate: Stopped (07/16/20 1741)       Meds PRN  •  acetaminophen **OR** acetaminophen **OR** acetaminophen  •  albuterol  •  aluminum-magnesium hydroxide-simethicone  •  bisacodyl  •  magnesium hydroxide  •  magnesium sulfate **OR** magnesium sulfate in D5W 1g/100mL (PREMIX)  •  melatonin  •  Morphine  •  nitroglycerin  •  ondansetron **OR** ondansetron  •  potassium chloride  •  [COMPLETED] Insert peripheral IV **AND** sodium chloride  •  sodium chloride  •  sodium chloride    I personally reviewed patient's x-ray films     I personally reviewed patient's EKG strips     Assessment/Plan   Assessment/Plan     Active Hospital Problems    Diagnosis  POA   • **Chest pain [R07.9]  Yes   • Acute on chronic combined systolic and diastolic CHF (congestive heart failure) (CMS/HCC) [I50.43]  Yes   • Ischemic cardiomyopathy [I25.5]  Yes   • Coronary artery disease involving native heart with angina pectoris (CMS/HCC) [I25.119]  Yes   • Cardiomyopathy, dilated (CMS/HCC) [I42.0]  Yes   • Paroxysmal atrial fibrillation (CMS/HCC) [I48.0]  Yes   • Sleep apnea [G47.30]  Yes   • Essential hypertension [I10]   Yes   • Depression [F32.9]  Yes   • Morbid obesity (CMS/[facility]) [E66.01]  Yes   • Anxiety [F41.9]  Yes   • ICD (implantable cardioverter-defibrillator) in place [Z95.810]  Yes   • Chronic obstructive pulmonary disease (CMS/[facility]) [J44.9]  Yes   • Hyperlipidemia [E78.5]  Yes      Resolved Hospital Problems   No resolved problems to display.       MEDICAL DECISION MAKING COMPLEXITY BY PROBLEM:     Chest pain, elevated troponin  -Possible non-STEMI, initial troponin elevated 0.183.  -Chest x-ray shows stable cardiomegaly without acute pulmonary process.    -EKG shows A. fib/flutter with V paced complexes, rate of 72 bpm, no apparent ST elevation.    -Patient had an anterior thoracotomy with placement to epicardial LV leads by  on 6/22/2020.   -Last echo done on 6/23/2020 showed an EF of 30%, left ventricular wall thickness is consistent with hypertrophy.    -Last heart cath was done on 6/9/2020, reviewed above    -Patient is afebrile, pulse in the 70s, on 4 L nasal cannula at 98% SPO2 and blood pressure 160s over 80s.    -Patient started on Tridil drip, morphine, aspirin, Zofran, DuoNeb in the ED.  -Consult CT surgery, Dr. Richmond  -Consulted cardiology,>Cath films personally reviewed and demonstrate moderate disease involving a ramus intermedius branch extending to the ostium of the vessel.  The vessel is borderline caliber for percutaneous revascularization.  Percutaneous revascularization could compromise the large circumflex vessel.  The patient also has had in-stent restenosis of a similar sized vessel in the circumflex system.  Would recommend conservative management for this particular lesion.  -Continue Tridil drip  -Pain control with morphine  - serial troponins-0.18-0.21-Cardiac monitoring  -2 g sodium diet until n.p.o. midnight     Acute respiratory failure  - initial ABG of 7.355, PCO2 of 57.4, PO2 of 103.5, bicarb of 32.0, base excess of 4.7, O2 saturation of 97.5.   -Patient denies having supplemental O2  at home for any reason, other than using trillegy at night  -Continue on 2 L nasal cannula, titrate     CAD, ischemic& dilated cardiomyopathy, status post ICD  -Patient had an anterior thoracotomy with placement to epicardial LV leads by  on 6/22/2020.  -status post stenting and chronic refractory chest pain, first onset at age 48 with a strong family history of CAD/HF.  -Extensive heart history with several recent procedures.  -Continue home Plavix, metoprolol, Ranexa     CHFerEF chronic  -Signs of trace edema on exam, no edema on chest x-ray, not acute exacerbation at this time  -Check BNP daily>1768  -Continue home Bumex, Aldactone, potassium  -Monitor fluid status     Paroxysmal atrial fibrillation  -Continue home amiodarone, Eliquis     COPD-Not in acute exacerbation  -Continue home inhalers, Zyrtec, Singulair, Roflumilast     Hyperlipidemia  -Continue home statin     Anxiety/depression  -Continue home Cymbalta     Essential hypertension, poorly controlled  -Continue home hydrochlorothiazide, lisinopril, metoprolol     Morbid obesity, BMI 44.03  -Encourage lifestyle modifications     DON  -Trellegy     VTE Prophylaxis -   Mechanical Order History:     None      Pharmalogical Order History:     Ordered     Dose Route Frequency Stop    07/15/20 2200  apixaban (ELIQUIS) tablet 5 mg      5 mg PO 2 Times Daily --        Code Status -   Code Status and Medical Interventions:   Ordered at: 07/15/20 2117     Code Status:    CPR     Medical Interventions (Level of Support Prior to Arrest):    Full       This patient has been examined wearing appropriate Personal Protective Equipment       Discharge Planning        Destination      Coordination has not been started for this encounter.      Durable Medical Equipment      Coordination has not been started for this encounter.      Dialysis/Infusion      Coordination has not been started for this encounter.      Home Medical Care      Coordination has not been  started for this encounter.      Therapy      Coordination has not been started for this encounter.      Community Resources      Coordination has not been started for this encounter.            Electronically signed by Giovany Foy MD, 07/19/20, 16:03.  [facility] Hospitalist Team

## 2020-07-20 ENCOUNTER — APPOINTMENT (OUTPATIENT)
Dept: GENERAL RADIOLOGY | Facility: HOSPITAL | Age: 60
End: 2020-07-20

## 2020-07-20 LAB
ANION GAP SERPL CALCULATED.3IONS-SCNC: 14 MMOL/L (ref 5–15)
BASOPHILS # BLD AUTO: 0.1 10*3/MM3 (ref 0–0.2)
BASOPHILS NFR BLD AUTO: 1 % (ref 0–1.5)
BUN SERPL-MCNC: 18 MG/DL (ref 6–20)
BUN SERPL-MCNC: ABNORMAL MG/DL
BUN/CREAT SERPL: ABNORMAL
CALCIUM SPEC-SCNC: 9.2 MG/DL (ref 8.6–10.5)
CHLORIDE SERPL-SCNC: 94 MMOL/L (ref 98–107)
CO2 SERPL-SCNC: 29 MMOL/L (ref 22–29)
CREAT SERPL-MCNC: 1.11 MG/DL (ref 0.76–1.27)
DEPRECATED RDW RBC AUTO: 45.5 FL (ref 37–54)
EOSINOPHIL # BLD AUTO: 0.3 10*3/MM3 (ref 0–0.4)
EOSINOPHIL NFR BLD AUTO: 3 % (ref 0.3–6.2)
ERYTHROCYTE [DISTWIDTH] IN BLOOD BY AUTOMATED COUNT: 15 % (ref 12.3–15.4)
GFR SERPL CREATININE-BSD FRML MDRD: 68 ML/MIN/1.73
GLUCOSE SERPL-MCNC: 100 MG/DL (ref 65–99)
HCT VFR BLD AUTO: 43 % (ref 37.5–51)
HGB BLD-MCNC: 13.9 G/DL (ref 13–17.7)
LYMPHOCYTES # BLD AUTO: 1.6 10*3/MM3 (ref 0.7–3.1)
LYMPHOCYTES NFR BLD AUTO: 18.3 % (ref 19.6–45.3)
MAGNESIUM SERPL-MCNC: 2 MG/DL (ref 1.6–2.6)
MCH RBC QN AUTO: 27.8 PG (ref 26.6–33)
MCHC RBC AUTO-ENTMCNC: 32.5 G/DL (ref 31.5–35.7)
MCV RBC AUTO: 85.6 FL (ref 79–97)
MONOCYTES # BLD AUTO: 1.3 10*3/MM3 (ref 0.1–0.9)
MONOCYTES NFR BLD AUTO: 14 % (ref 5–12)
NEUTROPHILS NFR BLD AUTO: 5.7 10*3/MM3 (ref 1.7–7)
NEUTROPHILS NFR BLD AUTO: 63.7 % (ref 42.7–76)
NRBC BLD AUTO-RTO: 0.1 /100 WBC (ref 0–0.2)
NT-PROBNP SERPL-MCNC: 1262 PG/ML (ref 0–900)
PLATELET # BLD AUTO: 234 10*3/MM3 (ref 140–450)
PMV BLD AUTO: 9.3 FL (ref 6–12)
POTASSIUM SERPL-SCNC: 4.4 MMOL/L (ref 3.5–5.2)
POTASSIUM SERPL-SCNC: 4.4 MMOL/L (ref 3.5–5.2)
RBC # BLD AUTO: 5.02 10*6/MM3 (ref 4.14–5.8)
SODIUM SERPL-SCNC: 137 MMOL/L (ref 136–145)
WBC # BLD AUTO: 9 10*3/MM3 (ref 3.4–10.8)

## 2020-07-20 PROCEDURE — 83735 ASSAY OF MAGNESIUM: CPT | Performed by: PHYSICIAN ASSISTANT

## 2020-07-20 PROCEDURE — 63710000001 ONDANSETRON PER 8 MG: Performed by: PHYSICIAN ASSISTANT

## 2020-07-20 PROCEDURE — 83880 ASSAY OF NATRIURETIC PEPTIDE: CPT | Performed by: PHYSICIAN ASSISTANT

## 2020-07-20 PROCEDURE — 99233 SBSQ HOSP IP/OBS HIGH 50: CPT | Performed by: INTERNAL MEDICINE

## 2020-07-20 PROCEDURE — 80048 BASIC METABOLIC PNL TOTAL CA: CPT | Performed by: HOSPITALIST

## 2020-07-20 PROCEDURE — 25010000002 MORPHINE PER 10 MG: Performed by: PHYSICIAN ASSISTANT

## 2020-07-20 PROCEDURE — 71045 X-RAY EXAM CHEST 1 VIEW: CPT

## 2020-07-20 PROCEDURE — 85025 COMPLETE CBC W/AUTO DIFF WBC: CPT | Performed by: HOSPITALIST

## 2020-07-20 PROCEDURE — 94799 UNLISTED PULMONARY SVC/PX: CPT

## 2020-07-20 PROCEDURE — 84132 ASSAY OF SERUM POTASSIUM: CPT | Performed by: PHYSICIAN ASSISTANT

## 2020-07-20 PROCEDURE — 99232 SBSQ HOSP IP/OBS MODERATE 35: CPT | Performed by: HOSPITALIST

## 2020-07-20 RX ORDER — BUDESONIDE 0.5 MG/2ML
0.5 INHALANT ORAL
Status: DISCONTINUED | OUTPATIENT
Start: 2020-07-20 | End: 2020-07-21 | Stop reason: HOSPADM

## 2020-07-20 RX ORDER — OXYCODONE HYDROCHLORIDE 5 MG/1
5 TABLET ORAL EVERY 6 HOURS PRN
Status: DISCONTINUED | OUTPATIENT
Start: 2020-07-20 | End: 2020-07-21 | Stop reason: HOSPADM

## 2020-07-20 RX ADMIN — ROFLUMILAST 500 MCG: 500 TABLET ORAL at 08:35

## 2020-07-20 RX ADMIN — AMIODARONE HYDROCHLORIDE 200 MG: 200 TABLET ORAL at 08:34

## 2020-07-20 RX ADMIN — OXYCODONE HYDROCHLORIDE 5 MG: 5 TABLET ORAL at 21:15

## 2020-07-20 RX ADMIN — SPIRONOLACTONE 25 MG: 25 TABLET, FILM COATED ORAL at 08:34

## 2020-07-20 RX ADMIN — BUMETANIDE 1 MG: 1 TABLET ORAL at 20:36

## 2020-07-20 RX ADMIN — BUDESONIDE 0.5 MG: 0.5 INHALANT RESPIRATORY (INHALATION) at 20:44

## 2020-07-20 RX ADMIN — ONDANSETRON HYDROCHLORIDE 4 MG: 4 TABLET, FILM COATED ORAL at 15:11

## 2020-07-20 RX ADMIN — RANOLAZINE 1000 MG: 500 TABLET, FILM COATED, EXTENDED RELEASE ORAL at 20:35

## 2020-07-20 RX ADMIN — AZELASTINE HYDROCHLORIDE 1 SPRAY: 137 SPRAY, METERED NASAL at 08:36

## 2020-07-20 RX ADMIN — RANOLAZINE 1000 MG: 500 TABLET, FILM COATED, EXTENDED RELEASE ORAL at 08:35

## 2020-07-20 RX ADMIN — OXYCODONE HYDROCHLORIDE 5 MG: 5 TABLET ORAL at 15:11

## 2020-07-20 RX ADMIN — GABAPENTIN 300 MG: 300 CAPSULE ORAL at 08:35

## 2020-07-20 RX ADMIN — CETIRIZINE HYDROCHLORIDE 10 MG: 10 TABLET, FILM COATED ORAL at 08:35

## 2020-07-20 RX ADMIN — BUMETANIDE 1 MG: 1 TABLET ORAL at 08:35

## 2020-07-20 RX ADMIN — METOPROLOL SUCCINATE 25 MG: 25 TABLET, EXTENDED RELEASE ORAL at 08:35

## 2020-07-20 RX ADMIN — IPRATROPIUM BROMIDE AND ALBUTEROL SULFATE 3 ML: 2.5; .5 SOLUTION RESPIRATORY (INHALATION) at 10:41

## 2020-07-20 RX ADMIN — DULOXETINE HYDROCHLORIDE 60 MG: 30 CAPSULE, DELAYED RELEASE ORAL at 08:35

## 2020-07-20 RX ADMIN — APIXABAN 5 MG: 5 TABLET, FILM COATED ORAL at 20:35

## 2020-07-20 RX ADMIN — BUMETANIDE 1 MG: 1 TABLET ORAL at 15:11

## 2020-07-20 RX ADMIN — TAMSULOSIN HYDROCHLORIDE 0.4 MG: 0.4 CAPSULE ORAL at 08:35

## 2020-07-20 RX ADMIN — MORPHINE SULFATE 4 MG: 4 INJECTION INTRAVENOUS at 01:14

## 2020-07-20 RX ADMIN — MONTELUKAST SODIUM 10 MG: 10 TABLET, COATED ORAL at 08:35

## 2020-07-20 RX ADMIN — DOCUSATE SODIUM 100 MG: 100 CAPSULE, LIQUID FILLED ORAL at 08:34

## 2020-07-20 RX ADMIN — MORPHINE SULFATE 4 MG: 4 INJECTION INTRAVENOUS at 07:09

## 2020-07-20 RX ADMIN — ONDANSETRON HYDROCHLORIDE 4 MG: 4 TABLET, FILM COATED ORAL at 21:15

## 2020-07-20 RX ADMIN — IPRATROPIUM BROMIDE AND ALBUTEROL SULFATE 3 ML: 2.5; .5 SOLUTION RESPIRATORY (INHALATION) at 07:14

## 2020-07-20 RX ADMIN — IPRATROPIUM BROMIDE AND ALBUTEROL SULFATE 3 ML: 2.5; .5 SOLUTION RESPIRATORY (INHALATION) at 15:15

## 2020-07-20 RX ADMIN — GABAPENTIN 300 MG: 300 CAPSULE ORAL at 20:36

## 2020-07-20 RX ADMIN — ATORVASTATIN CALCIUM 80 MG: 40 TABLET, FILM COATED ORAL at 08:34

## 2020-07-20 RX ADMIN — CLOPIDOGREL BISULFATE 75 MG: 75 TABLET ORAL at 08:35

## 2020-07-20 RX ADMIN — BUDESONIDE AND FORMOTEROL FUMARATE DIHYDRATE 2 PUFF: 160; 4.5 AEROSOL RESPIRATORY (INHALATION) at 07:14

## 2020-07-20 RX ADMIN — MELATONIN 5 MG TABLET 5 MG: at 01:39

## 2020-07-20 RX ADMIN — APIXABAN 5 MG: 5 TABLET, FILM COATED ORAL at 08:35

## 2020-07-20 RX ADMIN — Medication 10 ML: at 08:35

## 2020-07-20 RX ADMIN — Medication 10 ML: at 20:36

## 2020-07-20 RX ADMIN — POTASSIUM CHLORIDE 10 MEQ: 750 TABLET, EXTENDED RELEASE ORAL at 08:35

## 2020-07-20 RX ADMIN — POLYETHYLENE GLYCOL 3350 17 G: 17 POWDER, FOR SOLUTION ORAL at 08:35

## 2020-07-20 RX ADMIN — IPRATROPIUM BROMIDE AND ALBUTEROL SULFATE 3 ML: 2.5; .5 SOLUTION RESPIRATORY (INHALATION) at 20:42

## 2020-07-20 NOTE — PROGRESS NOTES
"  Chief complaint chest pain      Subjective     Patient today slightly more short of breath and wheezing also had a run of V. tach again feels wore out no active chest pain      Objective     Vital Signs  Visit Vitals  /61 (BP Location: Left arm, Patient Position: Lying)   Pulse 77   Temp 98.2 °F (36.8 °C) (Oral)   Resp 19   Ht 177.8 cm (70\")   Wt (!) 138 kg (304 lb 7.3 oz)   SpO2 98%   BMI 43.68 kg/m²       Physical Exam:  Physical Exam   Constitutional: He is oriented to person, place, and time. No distress.   HENT:   Head: Normocephalic and atraumatic.   Eyes: Conjunctivae and EOM are normal. Pupils are equal, round, and reactive to light.   Neck: No JVD present. No thyromegaly present.   Cardiovascular: Normal rate, regular rhythm, normal heart sounds and intact distal pulses. Exam reveals no gallop and no friction rub.   No murmur heard.  Pulmonary/Chest: Bilateral wheezes effort normal and breath sounds normal. No stridor. No respiratory distress. . He has no rales. He exhibits no tenderness.   Abdominal: Soft. Bowel sounds are normal. He exhibits no distension and no mass. There is no tenderness. There is no rebound and no guarding. No hernia.   Musculoskeletal: Normal range of motion.   Lymphadenopathy:     He has no cervical adenopathy.   Neurological: He is alert and oriented to person, place, and time. No cranial nerve deficit or sensory deficit. He exhibits normal muscle tone.   Skin: No rash noted. He is not diaphoretic.   Psychiatric: He has a normal mood and affect.   Vitals reviewed.      Physical Exam          Results Review:    CMP:  Lab Results   Component Value Date     (H) 09/09/2019    BUN  07/20/2020      Comment:      Testing performed by alternate method    BUN 18 07/20/2020    CREATININE 1.11 07/20/2020    EGFRIFNONA 68 07/20/2020    EGFRIFAFRI >60 09/09/2019     07/20/2020    K 4.4 07/20/2020    K 4.4 07/20/2020    CL 94 (L) 07/20/2020    CALCIUM 9.2 07/20/2020    " ALBUMIN 4.20 07/15/2020    BILITOT 0.5 07/15/2020    ALKPHOS 92 07/15/2020    AST 37 07/15/2020    ALT 16 07/15/2020     CBC:  Lab Results   Component Value Date    WBC 9.00 07/20/2020    RBC 5.02 07/20/2020    HGB 13.9 07/20/2020    HCT 43.0 07/20/2020    MCV 85.6 07/20/2020    MCH 27.8 07/20/2020    MCHC 32.5 07/20/2020    RDW 15.0 07/20/2020     07/20/2020         Medication Review:     Scheduled Meds:  amiodarone 200 mg Oral Daily   apixaban 5 mg Oral BID   atorvastatin 80 mg Oral Daily   azelastine 1 spray Each Nare Daily   budesonide-formoterol 2 puff Inhalation BID - RT   bumetanide 1 mg Oral TID   cetirizine 10 mg Oral Daily   clopidogrel 75 mg Oral Daily   docusate sodium 100 mg Oral Daily   DULoxetine 60 mg Oral Daily   gabapentin 300 mg Oral BID   ipratropium-albuterol 3 mL Nebulization 4x Daily - RT   metoprolol succinate XL 25 mg Oral Q24H   montelukast 10 mg Oral Daily   polyethylene glycol 17 g Oral Daily   potassium chloride 10 mEq Oral Daily   ranolazine 1,000 mg Oral BID   roflumilast 500 mcg Oral Daily   sodium chloride 10 mL Intravenous Q12H   spironolactone 25 mg Oral Daily   tamsulosin 0.4 mg Oral Daily     Continuous Infusions:  nitroglycerin 10-50 mcg/min Last Rate: Stopped (07/16/20 1741)     PRN Meds:.•  acetaminophen **OR** acetaminophen **OR** acetaminophen  •  albuterol  •  aluminum-magnesium hydroxide-simethicone  •  bisacodyl  •  magnesium hydroxide  •  magnesium sulfate **OR** magnesium sulfate in D5W 1g/100mL (PREMIX)  •  melatonin  •  Morphine  •  nitroglycerin  •  ondansetron **OR** ondansetron  •  oxyCODONE  •  potassium chloride  •  [COMPLETED] Insert peripheral IV **AND** sodium chloride  •  sodium chloride  •  sodium chloride    Assessment/Plan   Wheezing  Check chest x-ray  We will try to avoid systemic steroids will add budesonide nebs  Continue duo nebs as needed  Continue oxygen as needed    V. tach runs  On metoprolol  Monitor electrolytes  Management per  cardiology    Chest pain  Managed by cardiology last cath 6/9/2020  Cardiology recommending medical management adjusting pain might be related to recent surgical and invasive procedures:  thoracotomy    CAD  This post stenting  Plavix, metoprolol, Ranexa  Per cardiology    CHF  No peripheral edema however patient wheezing will check a chest x-ray to see if any pulmonary edema developed  Chronically elevated BNP  Continue Bumex Aldactone potassium  Previous ef documented at 30%    Paroxysmal A. fib  Amiodarone Eliquis    COPD  An acute exacerbation now  See above discussion for wheezing    Hypertension  Diuretics on hold due to low blood pressure  Blocker restarted  ACE inhibitor on hold to allow beta-blocker therapy    DVT PUD prophylaxis    Plan as above    Dyslipidemia  Continue statin    Depression  Continue Baldev Wilkerson MD  07/20/20  10:13

## 2020-07-20 NOTE — PROGRESS NOTES
Cardiology Progress  Note      Patient Care Team:  Jaylen Moss MD as PCP - General  Wicho Sotelo MD as Consulting Physician (Nephrology)    PATIENT IDENTIFICATION  Name: Jose Dixon Jr.  Age: 59 y.o.  Sex: male  :  1960  MRN: [id number]         Cardiology assessment and plan      Severe cardiomyopathy with a severe LV dysfunction  Congestive heart failure with reduced ejection fraction  Status post surgical LV lead placement  Intermittent chest discomfort  Low blood pressure  History of atrial fibrillation status post AV node ablation  History of ventricular tachycardia multiple ablation procedures in the past  History of ICD  Coronary artery disease status post prior PCI and stenting  Hypertension  Hyperlipidemia   obesity  Obstructive sleep apnea  Biventricular ICD in situ  Normal device function by EP evaluation today  Chronic renal insufficiency    Telemetry with intermittent nonsustained ventricular tachycardia  EKG with a by V paced rhythm with underlying atrial fibrillation  Continue supportive care  Continue current therapy  Patient is back on anticoagulation therapy  Continue close monitoring  Continue antiplatelet therapy  Recent cardiac cath films are reviewed there was a high marginal branch that has angiographically 70% stenosis long segment lesion extending all the way into the ostium likely somewhat complicated PCI secondary to stent in the other marginal and also ostial nature of the disease potentially complicating native circumflex system  Not sure if the obstructive coronary artery disease involving this branch is actually contributing to resting chest pain  Limited treatment options discussed with the patient  Patient is currently on anticoagulation therapy with apixaban  Dose of beta-blockers were decreased secondary to low blood pressure  Continue current medical therapy will discuss with the EP about further treatment options for the ventricular tachyarrhythmia  If patient  has recurrent anginal chest pain with exertion and activity will consider further interventional therapy to the high marginal branch  Continue close monitoring  Treatment options discussed the patient at length          REASON FOR FOLLOW-UP:  Chest pain  Abnormal cardiac enzymes  Nonsustained VT    SUBJECTIVE    Seen and examined.  Chart and labs reviewed.  Patient continues to report chest discomfort his incision and is requesting every 4hr IV morphine.      REVIEW OF SYSTEMS:  Pertinent items are noted in HPI, all other systems reviewed and negative    OBJECTIVE   Patient had nonsustained VT on the monitor  proBNP increased over the weekend, receiving Bumex with improvement in symptoms.        ASSESSMENT/PLAN    Chest pain    Coronary artery disease involving native heart with angina pectoris (CMS/HCC)    Cardiomyopathy, dilated (CMS/HCC)    Sleep apnea    Essential hypertension    Paroxysmal atrial fibrillation (CMS/HCC)    Anxiety    Chronic obstructive pulmonary disease (CMS/HCC)    Depression    Hyperlipidemia    ICD (implantable cardioverter-defibrillator) in place    Morbid obesity (CMS/HCC)    Ischemic cardiomyopathy    Acute on chronic combined systolic and diastolic CHF (congestive heart failure) (CMS/[facility])    Recommendations:  Chest discomfort may be more pericardial irritation from recent surgical procedure.  Discussed with patient and attending nurse need to transition to p.o. pain medication.  Non-trending troponin elevation likely also secondary to recent surgical procedure.  Several medication adjustments over the weekend for nonsustained VT with some blood pressure drop.  Monitor rhythm and electrolytes  Cardiologist to speak with EP regarding arrhythmia       **Cath films personally reviewed and demonstrate moderate disease involving a ramus intermedius branch extending to the ostium of the vessel. The vessel is borderline caliber for percutaneous revascularization. Percutaneous revascularization  "could compromise the large circumflex vessel.  The patient also has had in-stent restenosis of a similar sized vessel in the circumflex system.  Would recommend conservative management for this particular lesion.      Vital Signs  Visit Vitals  /61 (BP Location: Left arm, Patient Position: Lying)   Pulse 77   Temp 98.2 °F (36.8 °C) (Oral)   Resp 19   Ht 177.8 cm (70\")   Wt (!) 138 kg (304 lb 7.3 oz)   SpO2 98%   BMI 43.68 kg/m²     Oxygen Therapy  SpO2: 98 %  Pulse Oximetry Type: Continuous  Device (Oxygen Therapy): room air  Flow (L/min): 2  Oxygen Concentration (%): 21  Oximetry Probe Site Changed: Yes  Flowsheet Rows      First Filed Value   Admission Height  177.8 cm (70\") Documented at 07/15/2020 1816   Admission Weight  (!) 139 kg (306 lb 14.1 oz) Documented at 07/15/2020 1816        Intake & Output (last 3 days)       07/17 0701 - 07/18 0700 07/18 0701 - 07/19 0700 07/19 0701 - 07/20 0700 07/20 0701 - 07/21 0700    P.O.  240    Total Intake(mL/kg) 460 (3.3) 840 (6.1) 1285 (9.3) 240 (1.7)    Net +460 +840 +1285 +240                Lines, Drains & Airways    Active LDAs     Name:   Placement date:   Placement time:   Site:   Days:    Peripheral IV 07/17/20 0010 Anterior;Right Antecubital   07/17/20    0010    Antecubital   less than 1                       /61 (BP Location: Left arm, Patient Position: Lying)   Pulse 77   Temp 98.2 °F (36.8 °C) (Oral)   Resp 19   Ht 177.8 cm (70\")   Wt (!) 138 kg (304 lb 7.3 oz)   SpO2 98%   BMI 43.68 kg/m²   Intake/Output last 3 shifts:  I/O last 3 completed shifts:  In: 1285 [P.O.:1285]  Out: -   Intake/Output this shift:  I/O this shift:  In: 240 [P.O.:240]  Out: -     PHYSICAL EXAM:    General: Alert, cooperative, no distress, appears stated age  Head:  Normocephalic, atraumatic, mucous membranes moist  Eyes:  Conjunctiva/corneas clear,    Neck:  Supple,  no adenopathy; no JVD or bruit  Lungs: Clear to auscultation bilaterally, no wheezes " rhonchi rales are noted  Chest wall: No tenderness  Heart::  Regular rate and rhythm, S1 and S2 normal, no murmur, rub or gallop  Abdomen: Soft, non-tender, nondistended, obese  Extremities: No cyanosis, clubbing.1+ pitting edema seen  Pulses: 2+ right radial pulse  Skin:  Stasis changes seen  Neuro/psych: Alert and awake.  Grossly nonfocal, patient very anxious      Scheduled Meds:        amiodarone 200 mg Oral Daily   apixaban 5 mg Oral BID   atorvastatin 80 mg Oral Daily   azelastine 1 spray Each Nare Daily   budesonide 0.5 mg Nebulization BID - RT   bumetanide 1 mg Oral TID   cetirizine 10 mg Oral Daily   clopidogrel 75 mg Oral Daily   docusate sodium 100 mg Oral Daily   DULoxetine 60 mg Oral Daily   gabapentin 300 mg Oral BID   ipratropium-albuterol 3 mL Nebulization 4x Daily - RT   metoprolol succinate XL 25 mg Oral Q24H   montelukast 10 mg Oral Daily   polyethylene glycol 17 g Oral Daily   potassium chloride 10 mEq Oral Daily   ranolazine 1,000 mg Oral BID   roflumilast 500 mcg Oral Daily   sodium chloride 10 mL Intravenous Q12H   spironolactone 25 mg Oral Daily   tamsulosin 0.4 mg Oral Daily       Continuous Infusions:      nitroglycerin 10-50 mcg/min Last Rate: Stopped (07/16/20 1741)       PRN Meds:    •  acetaminophen **OR** acetaminophen **OR** acetaminophen  •  albuterol  •  aluminum-magnesium hydroxide-simethicone  •  bisacodyl  •  magnesium hydroxide  •  magnesium sulfate **OR** magnesium sulfate in D5W 1g/100mL (PREMIX)  •  melatonin  •  Morphine  •  nitroglycerin  •  ondansetron **OR** ondansetron  •  oxyCODONE  •  potassium chloride  •  [COMPLETED] Insert peripheral IV **AND** sodium chloride  •  sodium chloride  •  sodium chloride        Results Review:     I reviewed the patient's new clinical results.    CBC    Results from last 7 days   Lab Units 07/20/20  0748 07/16/20  0547 07/15/20  1852   WBC 10*3/mm3 9.00 9.10 10.50   HEMOGLOBIN g/dL 13.9 13.8 14.4   PLATELETS 10*3/mm3 234 300 320     Cr  Clearance Estimated Creatinine Clearance: 102.8 mL/min (by C-G formula based on SCr of 1.11 mg/dL).  [facility]     HbA1C   Lab Results   Component Value Date    HGBA1C 5.9 (H) 06/04/2020    HGBA1C 5.4 09/14/2018     Blood Glucose No results found for: POCGLU  Infection     CMP   Results from last 7 days   Lab Units 07/20/20  0748 07/19/20  0233 07/18/20  1235 07/18/20  0242 07/17/20  0319 07/16/20  0547 07/15/20  1852   SODIUM mmol/L 137 138 137  --   --  143 140   POTASSIUM mmol/L 4.4  4.4 4.4 4.8 4.7 4.4 4.6 4.2   CHLORIDE mmol/L 94* 93* 94*  --   --  98 100   CO2 mmol/L 29.0 32.0* 32.0*  --   --  34.0* 25.0   BUN  18 20 22*  --   --  14 13   CREATININE mg/dL 1.11 1.01 1.12  --   --  1.15 0.99   GLUCOSE mg/dL 100* 94 107*  --   --  91 119*   ALBUMIN g/dL  --   --   --   --   --   --  4.20   BILIRUBIN mg/dL  --   --   --   --   --   --  0.5   ALK PHOS U/L  --   --   --   --   --   --  92   AST (SGOT) U/L  --   --   --   --   --   --  37   ALT (SGPT) U/L  --   --   --   --   --   --  16     ABG    Results from last 7 days   Lab Units 07/15/20  1907   PH, ARTERIAL pH units 7.355   PCO2, ARTERIAL mm Hg 57.4*   PO2 ART mm Hg 103.5   O2 SATURATION ART % 97.5   BASE EXCESS ART mmol/L 4.7*     UA      TATUM  No results found for: POCMETH, POCAMPHET, POCBARBITUR, POCBENZO, POCCOCAINE, POCOPIATES, POCOXYCODO, POCPHENCYC, POCPROPOXY, POCTHC, POCTRICYC  Lysis Labs   Results from last 7 days   Lab Units 07/20/20  0748 07/19/20  0233 07/18/20  1235 07/16/20  0547 07/15/20  1852   HEMOGLOBIN g/dL 13.9  --   --  13.8 14.4   PLATELETS 10*3/mm3 234  --   --  300 320   CREATININE mg/dL 1.11 1.01 1.12 1.15 0.99     Radiology(recent) No radiology results for the last day      Results from last 7 days   Lab Units 07/19/20  1722   TROPONIN T ng/mL 0.108*       Xrays, labs reviewed personally by physician.    ECG/EMG Results (most recent)     Procedure Component Value Units Date/Time    ECG 12 Lead [[id number]] Collected:  07/15/20 3958      Updated:  07/16/20 1041    Narrative:       HEART RATE= 72  bpm  RR Interval= 788  ms  NY Interval=   ms  P Horizontal Axis=   deg  P Front Axis=   deg  QRSD Interval= 115  ms  QT Interval= 391  ms  QRS Axis= 245  deg  T Wave Axis=   deg  - ABNORMAL ECG -  Afib/flut and V-paced complexes  When compared with ECG of 25-Jun-2020 4:44:36,  No significant change  Electronically Signed By: Lambert Peace (Frank) 16-Jul-2020 10:33:30  Date and Time of Study: 2020-07-15 18:17:10    ECG 12 Lead [[id number]] Collected:  07/17/20 1354     Updated:  07/17/20 1356    Narrative:       HEART RATE= 70  bpm  RR Interval= 856  ms  NY Interval= 73  ms  P Horizontal Axis= 238  deg  P Front Axis= 0  deg  QRSD Interval= 144  ms  QT Interval= 475  ms  QRS Axis= 229  deg  T Wave Axis= 104  deg  - ABNORMAL ECG -  Ventricular-paced rhythm  When compared with ECG of 15-Jul-2020 18:17:10,  No significant change  Electronically Signed By:   Date and Time of Study: 2020-07-17 13:54:38    ECG 12 Lead [[id number]] Collected:  07/19/20 1708     Updated:  07/19/20 1709    Narrative:       HEART RATE= 70  bpm  RR Interval= 785  ms  NY Interval=   ms  P Horizontal Axis= 240  deg  P Front Axis=   deg  QRSD Interval= 147  ms  QT Interval= 497  ms  QRS Axis= 213  deg  T Wave Axis= 103  deg  - ABNORMAL ECG -  Afib/flut and V-paced complexes  Electronically Signed By:   Date and Time of Study: 2020-07-19 17:08:06            Medication Review:   I have reviewed the patient's current medication list  Scheduled Meds:    amiodarone 200 mg Oral Daily   apixaban 5 mg Oral BID   atorvastatin 80 mg Oral Daily   azelastine 1 spray Each Nare Daily   budesonide 0.5 mg Nebulization BID - RT   bumetanide 1 mg Oral TID   cetirizine 10 mg Oral Daily   clopidogrel 75 mg Oral Daily   docusate sodium 100 mg Oral Daily   DULoxetine 60 mg Oral Daily   gabapentin 300 mg Oral BID   ipratropium-albuterol 3 mL Nebulization 4x Daily - RT   metoprolol succinate XL 25 mg Oral Q24H    montelukast 10 mg Oral Daily   polyethylene glycol 17 g Oral Daily   potassium chloride 10 mEq Oral Daily   ranolazine 1,000 mg Oral BID   roflumilast 500 mcg Oral Daily   sodium chloride 10 mL Intravenous Q12H   spironolactone 25 mg Oral Daily   tamsulosin 0.4 mg Oral Daily     Continuous Infusions:    nitroglycerin 10-50 mcg/min Last Rate: Stopped (07/16/20 1741)     PRN Meds:.•  acetaminophen **OR** acetaminophen **OR** acetaminophen  •  albuterol  •  aluminum-magnesium hydroxide-simethicone  •  bisacodyl  •  magnesium hydroxide  •  magnesium sulfate **OR** magnesium sulfate in D5W 1g/100mL (PREMIX)  •  melatonin  •  Morphine  •  nitroglycerin  •  ondansetron **OR** ondansetron  •  oxyCODONE  •  potassium chloride  •  [COMPLETED] Insert peripheral IV **AND** sodium chloride  •  sodium chloride  •  sodium chloride    Imaging:  Imaging Results (Last 72 Hours)     Procedure Component Value Units Date/Time    XR Chest 1 View [[id number]] Resulted:  07/20/20 1032     Updated:  07/20/20 1033

## 2020-07-20 NOTE — DISCHARGE PLACEMENT REQUEST
"Jose Dixon Jr. (59 y.o. Male)     Date of Birth Social Security Number Address Home Phone MRN    1960  [address] [place] [address] [phone] [id number]    Druze Marital Status          [employer]ar[employer]        Admission Date Admission Type Admitting Provider Attending Provider Department, Room/Bed    7/15/20 Emergency Dunia Wilkerson MD Piwko, Radomir, MD [facility] CARE, 2109/1    Discharge Date Discharge Disposition Discharge Destination                       Attending Provider:  Dunia Wilkerson MD    Allergies:  Codeine, Tramadol    Isolation:  None   Infection:  None   Code Status:  CPR    Ht:  177.8 cm (70\")   Wt:  138 kg (304 lb 7.3 oz)    Admission Cmt:  None   Principal Problem:  Chest pain [R07.9]                 Active Insurance as of 7/15/2020     Primary Coverage     Payor Plan Insurance Group Employer/Plan Group    [facility] [id number]     Payor Plan Address Payor Plan Phone Number Payor Plan Fax Number Effective Dates    [address]   1/1/2019 - None Entered    [facility] [phone]       Subscriber Name Subscriber Birth Date Member ID       JULIABIGAIL NOLAN 2/18/1966 [id number]                 Emergency Contacts      (Rel.) Home Phone Work Phone Mobile Phone    Abigail Dixon (Spouse) [phone] -- [phone]              "

## 2020-07-20 NOTE — PROGRESS NOTES
Continued Stay Note   [facility]     Patient Name: Jose Dixon Jr.  MRN: [id number]  Today's Date: 7/20/2020    Admit Date: 7/15/2020    Discharge Plan     Row Name 07/20/20 1107       Plan    Plan  D/C PLAN ; Home with family and pt is current with [facility] , [facility] orders are in . Pt is on R/A .        Discharge Codes    No documentation.             Amy Garcia RN

## 2020-07-21 VITALS
SYSTOLIC BLOOD PRESSURE: 121 MMHG | TEMPERATURE: 98.2 F | RESPIRATION RATE: 18 BRPM | OXYGEN SATURATION: 95 % | HEART RATE: 70 BPM | BODY MASS INDEX: 43.33 KG/M2 | WEIGHT: 302.69 LBS | DIASTOLIC BLOOD PRESSURE: 70 MMHG | HEIGHT: 70 IN

## 2020-07-21 LAB
ANION GAP SERPL CALCULATED.3IONS-SCNC: 11 MMOL/L (ref 5–15)
BUN SERPL-MCNC: 17 MG/DL (ref 6–20)
BUN SERPL-MCNC: ABNORMAL MG/DL
BUN/CREAT SERPL: ABNORMAL
CALCIUM SPEC-SCNC: 9.2 MG/DL (ref 8.6–10.5)
CHLORIDE SERPL-SCNC: 94 MMOL/L (ref 98–107)
CO2 SERPL-SCNC: 31 MMOL/L (ref 22–29)
CREAT SERPL-MCNC: 1.25 MG/DL (ref 0.76–1.27)
DEPRECATED RDW RBC AUTO: 44.6 FL (ref 37–54)
ERYTHROCYTE [DISTWIDTH] IN BLOOD BY AUTOMATED COUNT: 15.2 % (ref 12.3–15.4)
GFR SERPL CREATININE-BSD FRML MDRD: 60 ML/MIN/1.73
GLUCOSE SERPL-MCNC: 96 MG/DL (ref 65–99)
HCT VFR BLD AUTO: 42.7 % (ref 37.5–51)
HGB BLD-MCNC: 14 G/DL (ref 13–17.7)
MAGNESIUM SERPL-MCNC: 2.1 MG/DL (ref 1.6–2.6)
MCH RBC QN AUTO: 27.7 PG (ref 26.6–33)
MCHC RBC AUTO-ENTMCNC: 32.9 G/DL (ref 31.5–35.7)
MCV RBC AUTO: 84.3 FL (ref 79–97)
NT-PROBNP SERPL-MCNC: 1016 PG/ML (ref 0–900)
PLATELET # BLD AUTO: 235 10*3/MM3 (ref 140–450)
PMV BLD AUTO: 9.8 FL (ref 6–12)
POTASSIUM SERPL-SCNC: 4.9 MMOL/L (ref 3.5–5.2)
RBC # BLD AUTO: 5.06 10*6/MM3 (ref 4.14–5.8)
SODIUM SERPL-SCNC: 136 MMOL/L (ref 136–145)
WBC # BLD AUTO: 8.3 10*3/MM3 (ref 3.4–10.8)

## 2020-07-21 PROCEDURE — 93010 ELECTROCARDIOGRAM REPORT: CPT | Performed by: INTERNAL MEDICINE

## 2020-07-21 PROCEDURE — 83735 ASSAY OF MAGNESIUM: CPT | Performed by: PHYSICIAN ASSISTANT

## 2020-07-21 PROCEDURE — 83880 ASSAY OF NATRIURETIC PEPTIDE: CPT | Performed by: PHYSICIAN ASSISTANT

## 2020-07-21 PROCEDURE — 85027 COMPLETE CBC AUTOMATED: CPT | Performed by: HOSPITALIST

## 2020-07-21 PROCEDURE — 94799 UNLISTED PULMONARY SVC/PX: CPT

## 2020-07-21 PROCEDURE — 99239 HOSP IP/OBS DSCHRG MGMT >30: CPT | Performed by: HOSPITALIST

## 2020-07-21 PROCEDURE — 99233 SBSQ HOSP IP/OBS HIGH 50: CPT | Performed by: INTERNAL MEDICINE

## 2020-07-21 PROCEDURE — 99232 SBSQ HOSP IP/OBS MODERATE 35: CPT | Performed by: INTERNAL MEDICINE

## 2020-07-21 PROCEDURE — 80048 BASIC METABOLIC PNL TOTAL CA: CPT | Performed by: HOSPITALIST

## 2020-07-21 RX ORDER — METOPROLOL SUCCINATE 25 MG/1
25 TABLET, EXTENDED RELEASE ORAL
Qty: 20 TABLET | Refills: 0 | Status: SHIPPED | OUTPATIENT
Start: 2020-07-22 | End: 2020-07-31 | Stop reason: SDUPTHER

## 2020-07-21 RX ORDER — BUMETANIDE 1 MG/1
1 TABLET ORAL 2 TIMES DAILY
Qty: 20 TABLET | Refills: 0 | Status: ON HOLD | OUTPATIENT
Start: 2020-07-21 | End: 2020-10-02 | Stop reason: SDUPTHER

## 2020-07-21 RX ADMIN — BUMETANIDE 1 MG: 1 TABLET ORAL at 08:58

## 2020-07-21 RX ADMIN — DULOXETINE HYDROCHLORIDE 60 MG: 30 CAPSULE, DELAYED RELEASE ORAL at 08:58

## 2020-07-21 RX ADMIN — CLOPIDOGREL BISULFATE 75 MG: 75 TABLET ORAL at 08:58

## 2020-07-21 RX ADMIN — ATORVASTATIN CALCIUM 80 MG: 40 TABLET, FILM COATED ORAL at 08:58

## 2020-07-21 RX ADMIN — RANOLAZINE 1000 MG: 500 TABLET, FILM COATED, EXTENDED RELEASE ORAL at 08:58

## 2020-07-21 RX ADMIN — METOPROLOL SUCCINATE 25 MG: 25 TABLET, EXTENDED RELEASE ORAL at 08:58

## 2020-07-21 RX ADMIN — SPIRONOLACTONE 25 MG: 25 TABLET, FILM COATED ORAL at 08:58

## 2020-07-21 RX ADMIN — IPRATROPIUM BROMIDE AND ALBUTEROL SULFATE 3 ML: 2.5; .5 SOLUTION RESPIRATORY (INHALATION) at 07:03

## 2020-07-21 RX ADMIN — AZELASTINE HYDROCHLORIDE 1 SPRAY: 137 SPRAY, METERED NASAL at 08:58

## 2020-07-21 RX ADMIN — POLYETHYLENE GLYCOL 3350 17 G: 17 POWDER, FOR SOLUTION ORAL at 08:58

## 2020-07-21 RX ADMIN — CETIRIZINE HYDROCHLORIDE 10 MG: 10 TABLET, FILM COATED ORAL at 08:58

## 2020-07-21 RX ADMIN — IPRATROPIUM BROMIDE AND ALBUTEROL SULFATE 3 ML: 2.5; .5 SOLUTION RESPIRATORY (INHALATION) at 10:44

## 2020-07-21 RX ADMIN — GABAPENTIN 300 MG: 300 CAPSULE ORAL at 08:58

## 2020-07-21 RX ADMIN — Medication 10 ML: at 08:59

## 2020-07-21 RX ADMIN — DOCUSATE SODIUM 100 MG: 100 CAPSULE, LIQUID FILLED ORAL at 08:58

## 2020-07-21 RX ADMIN — AMIODARONE HYDROCHLORIDE 200 MG: 200 TABLET ORAL at 08:58

## 2020-07-21 RX ADMIN — POTASSIUM CHLORIDE 10 MEQ: 750 TABLET, EXTENDED RELEASE ORAL at 08:58

## 2020-07-21 RX ADMIN — BUDESONIDE 0.5 MG: 0.5 INHALANT RESPIRATORY (INHALATION) at 07:03

## 2020-07-21 RX ADMIN — APIXABAN 5 MG: 5 TABLET, FILM COATED ORAL at 08:58

## 2020-07-21 RX ADMIN — TAMSULOSIN HYDROCHLORIDE 0.4 MG: 0.4 CAPSULE ORAL at 08:58

## 2020-07-21 RX ADMIN — ROFLUMILAST 500 MCG: 500 TABLET ORAL at 08:58

## 2020-07-21 RX ADMIN — MONTELUKAST SODIUM 10 MG: 10 TABLET, COATED ORAL at 08:58

## 2020-07-21 NOTE — PROGRESS NOTES
Case Management Discharge Note      Final Note: [facility] H/H                       Final Discharge Disposition Code: 06 - home with home health care

## 2020-07-21 NOTE — DISCHARGE SUMMARY
Date of Admission: 7/15/2020    Date of Discharge:  7/21/2020    Length of stay:  LOS: 4 days     Hospital Course  Chief Complaint:        Chief Complaint   Patient presents with   • Chest Pain       chest pain/SOA         Mr. Dixon is a 59 y.o. male presents to [facility] ER on 7/15/2020 complaining of chest pain since last night.  Patient states he had an episode of chest pain with associated shortness of breath started last night and again this morning.  Patient states he has periods of diaphoresis as well.  Patient states his pain radiates to the base of his neck up to his jaw and around to the back of his head.  Patient also states that the pain radiates down his left arm into his hand.  Patient states that since he arrived in the ER he noticed his feet have swollen and states he did not miss any home dose of water pills.  Patient also reports some heart palpitations that had started while at the ER.  Patient denies any fever, chills, cough, syncope, nausea, vomiting, abdominal pain or diarrhea. Upon chart review, patient has a history of premature CAD, status post stenting and chronic refractory chest pain, first onset at age 48 with a strong family history of CAD/HF.  Patient has multiple recent surgeries and procedures done regarding his heart.        In the ED, initial ABG pH of 7.355, PCO2 of 57.4, PO2 of 103.5, bicarb of 32.0, base excess of 4.7, O2 saturation of 97.5.  Initial troponin elevated 0.183.  CMP largely unremarkable.  CBC largely unremarkable.  Chest x-ray shows stable cardiomegaly without acute pulmonary process.  EKG shows A. fib/flutter with V paced complexes, rate of 72 bpm, no apparent ST elevation.  Patient had an anterior thoracotomy with placement to epicardial LV leads by  on 6/22/2020. Last echo done on 6/23/2020 showed an EF of 30%, left ventricular wall thickness is consistent with hypertrophy.  Last heart cath was done on 6/9/2020 showed Dilated left ventricle  with underlying complete heart block and atrial fibrillation with appropriately functioning biventricular ICD, After completion of ablation in the basal aspect, EP study performed did not induce any sustained VT despite aggressive pacing, Circumflex artery is a large vessel dominant appearing vessel and the second marginal branch has multiple stents which is completely occluded, The circumflex artery gives moderate proximal marginal branch versus ramus branch which has an ostial to proximal 80% stenosis and appears like a long plaque, findings were reviewed by interventional cardiologist and discussed with Dr. Frye at that time.  Patient is afebrile, pulse in the 40s, on 4 L nasal cannula at 98% SPO2 and blood pressure 160s over 80s.  Patient started on Tridil drip, morphine, aspirin, Zofran, DuoNeb in the ED.    Physical Exam   Constitutional: He is oriented to person, place, and time. No distress.   HENT:   Head: Normocephalic and atraumatic.   Eyes: Conjunctivae and EOM are normal. Pupils are equal, round, and reactive to light.   Neck: No JVD present. No thyromegaly present.   Cardiovascular: Normal rate, regular rhythm, normal heart sounds and intact distal pulses. Exam reveals no gallop and no friction rub.   No murmur heard.  Pulmonary/Chest: Effort normal and breath sounds normal. No stridor. No respiratory distress. He has no wheezes. He has no rales. He exhibits no tenderness.   Abdominal: Soft. Bowel sounds are normal. He exhibits no distension and no mass. There is no tenderness. There is no rebound and no guarding. No hernia.   Musculoskeletal: Normal range of motion.   Lymphadenopathy:     He has no cervical adenopathy.   Neurological: He is alert and oriented to person, place, and time. No cranial nerve deficit or sensory deficit. He exhibits normal muscle tone.   Skin: No rash noted. He is not diaphoretic.   Psychiatric: He has a normal mood and affect.   Vitals reviewed.    Hospital course and  problem list    Wheezing  Resolved  Given duo nebs and budesonide neb   Repeat chest x-ray 7/20/20 with no acute process identified  Continue duo nebs as needed  Continue oxygen as needed     V. tach runs  Not overnight  Patient be continued on metoprolol at a lower dose amiodarone held  X-rays were replaced as needed  Care overseen by cardiology     Chest pain  Resolved  Managed by cardiology   last cath 6/9/2020  Cardiology recommending medical management adjusting pain might be related to recent surgical and invasive procedures:  thoracotomy     CAD  Status post stenting  Plavix, metoprolol, Ranexa  Per cardiology     CHF  Euvolemic  Chronically elevated BNP  Continue Bumex however will do it at a lower dose due to her blood pressure will put on twice daily instead of 3 times daily  Aldactone   Gentle potassium supplementation  Previous ef documented at 30%     Paroxysmal A. fib  Amiodarone held, metoprolol dose decreased due to low blood pressure,   continue Eliquis  Per cardiology     COPD  An acute exacerbation now  See above discussion for wheezing     Hypertension  Diuretics were temporarily held during hospitalization due to low blood pressure however they will be restarted at a decreased dose upon discharge to maintain euvolemia  Blocker restarted half the dose  ACE inhibitor on hold to allow beta-blocker therapy    Dyslipidemia  Continue statin     Depression  Continue Cymbalta     DVT PUD prophylaxis     Plan as above                Pertinent Test Results:     Lab Results (last 48 hours)     Procedure Component Value Units Date/Time    BUN [[id number]]  (Normal) Collected:  07/21/20 0238    Specimen:  Blood Updated:  07/21/20 0740     BUN 17 mg/dL     Basic Metabolic Panel [[id number]]  (Abnormal) Collected:  07/21/20 0238    Specimen:  Blood Updated:  07/21/20 0403     Glucose 96 mg/dL      BUN --     Comment: Testing performed by alternate method        Creatinine 1.25 mg/dL      Sodium 136 mmol/L       Potassium 4.9 mmol/L      Comment: Specimen hemolyzed.  Results may be affected.        Chloride 94 mmol/L      CO2 31.0 mmol/L      Calcium 9.2 mg/dL      eGFR Non African Amer 60 mL/min/1.73      BUN/Creatinine Ratio --     Comment: Testing not performed        Anion Gap 11.0 mmol/L     Narrative:       GFR Normal >60  Chronic Kidney Disease <60  Kidney Failure <15      Magnesium [[id number]]  (Normal) Collected:  07/21/20 0238    Specimen:  Blood Updated:  07/21/20 0403     Magnesium 2.1 mg/dL     BNP [[id number]]  (Abnormal) Collected:  07/21/20 0238    Specimen:  Blood Updated:  07/21/20 0359     proBNP 1,016.0 pg/mL     Narrative:       Among patients with dyspnea, NT-proBNP is highly sensitive for the detection of acute congestive heart failure. In addition NT-proBNP of <300 pg/ml effectively rules out acute congestive heart failure with 99% negative predictive value.    Results may be falsely decreased if patient taking Biotin.      CBC (No Diff) [[id number]]  (Normal) Collected:  07/21/20 0238    Specimen:  Blood Updated:  07/21/20 0343     WBC 8.30 10*3/mm3      RBC 5.06 10*6/mm3      Hemoglobin 14.0 g/dL      Hematocrit 42.7 %      MCV 84.3 fL      MCH 27.7 pg      MCHC 32.9 g/dL      RDW 15.2 %      RDW-SD 44.6 fl      MPV 9.8 fL      Platelets 235 10*3/mm3     BUN [[id number]]  (Normal) Collected:  07/20/20 0748    Specimen:  Blood Updated:  07/20/20 0955     BUN 18 mg/dL     Potassium [[id number]]  (Normal) Collected:  07/20/20 0748    Specimen:  Blood Updated:  07/20/20 0829     Potassium 4.4 mmol/L      Comment: Specimen hemolyzed.  Results may be affected.       Basic Metabolic Panel [[id number]]  (Abnormal) Collected:  07/20/20 0748    Specimen:  Blood Updated:  07/20/20 0829     Glucose 100 mg/dL      BUN --     Comment: Testing performed by alternate method        Creatinine 1.11 mg/dL      Sodium 137 mmol/L      Potassium 4.4 mmol/L      Comment: Specimen hemolyzed.  Results may be affected.         Chloride 94 mmol/L      CO2 29.0 mmol/L      Calcium 9.2 mg/dL      eGFR Non African Amer 68 mL/min/1.73      BUN/Creatinine Ratio --     Comment: Testing not performed        Anion Gap 14.0 mmol/L     Narrative:       GFR Normal >60  Chronic Kidney Disease <60  Kidney Failure <15      CBC & Differential [[id number]] Collected:  07/20/20 0748    Specimen:  Blood Updated:  07/20/20 0755    Narrative:       The following orders were created for panel order CBC & Differential.  Procedure                               Abnormality         Status                     ---------                               -----------         ------                     CBC Auto Differential[[id number]]        Abnormal            Final result                 Please view results for these tests on the individual orders.    CBC Auto Differential [[id number]]  (Abnormal) Collected:  07/20/20 0748    Specimen:  Blood Updated:  07/20/20 0755     WBC 9.00 10*3/mm3      RBC 5.02 10*6/mm3      Hemoglobin 13.9 g/dL      Hematocrit 43.0 %      MCV 85.6 fL      MCH 27.8 pg      MCHC 32.5 g/dL      RDW 15.0 %      RDW-SD 45.5 fl      MPV 9.3 fL      Platelets 234 10*3/mm3      Neutrophil % 63.7 %      Lymphocyte % 18.3 %      Monocyte % 14.0 %      Eosinophil % 3.0 %      Basophil % 1.0 %      Neutrophils, Absolute 5.70 10*3/mm3      Lymphocytes, Absolute 1.60 10*3/mm3      Monocytes, Absolute 1.30 10*3/mm3      Eosinophils, Absolute 0.30 10*3/mm3      Basophils, Absolute 0.10 10*3/mm3      nRBC 0.1 /100 WBC     BNP [[id number]]  (Abnormal) Collected:  07/20/20 0445    Specimen:  Blood Updated:  07/20/20 0700     proBNP 1,262.0 pg/mL     Narrative:       Among patients with dyspnea, NT-proBNP is highly sensitive for the detection of acute congestive heart failure. In addition NT-proBNP of <300 pg/ml effectively rules out acute congestive heart failure with 99% negative predictive value.    Results may be falsely decreased if patient taking Biotin.       Magnesium [[id number]]  (Normal) Collected:  07/20/20 0445    Specimen:  Blood Updated:  07/20/20 0658     Magnesium 2.0 mg/dL     Troponin [[id number]]  (Abnormal) Collected:  07/19/20 1722    Specimen:  Blood Updated:  07/19/20 1753     Troponin T 0.108 ng/mL     Narrative:       Troponin T Reference Range:  <= 0.03 ng/mL-   Negative for AMI  >0.03 ng/mL-     Abnormal for myocardial necrosis.  Clinicians would have to utilize clinical acumen, EKG, Troponin and serial changes to determine if it is an Acute Myocardial Infarction or myocardial injury due to an underlying chronic condition.       Results may be falsely decreased if patient taking Biotin.              Results for orders placed during the hospital encounter of 06/21/20   Adult Transthoracic Echo Limited W/ Cont if Necessary Per Protocol    Narrative · Left ventricular wall thickness is consistent with moderate concentric   hypertrophy.  · Estimated EF = 30%.  · The pericardium is normal. There is no evidence of pericardial effusion.          Imaging Results (All)     Procedure Component Value Units Date/Time    XR Chest 1 View [[id number]] Collected:  07/20/20 1201     Updated:  07/20/20 1203    Narrative:          DATE OF EXAM:   7/20/2020 10:15 AM     PROCEDURE:   XR CHEST 1 VW-     INDICATIONS:   wheezing; R07.9-Chest pain, unspecified     COMPARISON:  7/15/2020     TECHNIQUE:   Portable chest radiograph.     FINDINGS:    Left-sided AICD noted. Heart size top normal, stable. No focal  consolidation, pneumothorax, or large pleural effusion. Minimal linear  scarring/atelectasis at the right lung base.       Impression:       No acute process.      Electronically Signed By-Edi Hoyt On:7/20/2020 12:01 PM  This report was finalized on [id number] by  Edi Hoyt, .    XR Chest 1 View [[id number]] Collected:  07/15/20 1917     Updated:  07/15/20 1921    Narrative:       EXAMINATION: XR CHEST 1 VW-     DATE OF EXAM: 7/15/2020 7:02 PM     INDICATION:  "Shortness of air, chest pain, left sided chest pain     COMPARISON: Chest radiograph dated 06/24/2020     TECHNIQUE: Portable AP view of the chest was obtained.     FINDINGS:  There is a left chest wall pacemaker with leads in unchanged position.  There is stable cardiomegaly. Pulmonary vascularity is unremarkable.  There is no focal airspace consolidation, pleural effusion, or  pneumothorax. There are multilevel degenerative changes of the thoracic  spine.       Impression:       Stable cardiomegaly without acute pulmonary process.     Electronically Signed By-Demarcus Linares On:7/15/2020 7:19 PM  This report was finalized on [id number] by  Demarcus Linares, .            Vital Signs  Visit Vitals  /70 (BP Location: Left arm, Patient Position: Sitting)   Pulse 70   Temp 98.2 °F (36.8 °C) (Oral)   Resp 18   Ht 177.8 cm (70\")   Wt (!) 137 kg (302 lb 11.1 oz)   SpO2 95%   BMI 43.43 kg/m²       Physical Exam:  Physical Exam      Discharge Medications     Discharge Medications      Changes to Medications      Instructions Start Date   bumetanide 1 MG tablet  Commonly known as:  BUMEX  What changed:    · when to take this  · additional instructions   1 mg, Oral, 2 Times Daily, Hold for bp less then 100/60      metoprolol succinate XL 25 MG 24 hr tablet  Commonly known as:  TOPROL-XL  What changed:    · medication strength  · how much to take   25 mg, Oral, Every 24 Hours Scheduled   Start Date:  July 22, 2020        Continue These Medications      Instructions Start Date   albuterol sulfate  (90 Base) MCG/ACT inhaler  Commonly known as:  PROVENTIL HFA;VENTOLIN HFA;PROAIR HFA   2 puffs, Inhalation, Every 4 Hours PRN      apixaban 5 MG tablet tablet  Commonly known as:  Eliquis   5 mg, Oral, 2 Times Daily      atorvastatin 80 MG tablet  Commonly known as:  LIPITOR   80 mg, Oral, Daily      budesonide-formoterol 160-4.5 MCG/ACT inhaler  Commonly known as:  Symbicort   2 puffs, Inhalation, 2 Times Daily - RT   "    cetirizine 10 MG tablet  Commonly known as:  zyrTEC   10 mg, Oral, Daily      clopidogrel 75 MG tablet  Commonly known as:  PLAVIX   75 mg, Oral, Daily      DULoxetine 60 MG capsule  Commonly known as:  CYMBALTA   60 mg, Oral, Daily      fluticasone 50 MCG/ACT nasal spray  Commonly known as:  FLONASE   2 sprays, Nasal, Daily      gabapentin 300 MG capsule  Commonly known as:  NEURONTIN   300 mg, Oral, 2 Times Daily      montelukast 10 MG tablet  Commonly known as:  SINGULAIR   10 mg, Oral, Daily      nitroglycerin 0.4 MG SL tablet  Commonly known as:  NITROSTAT   0.4 mg, Sublingual, Every 5 Minutes PRN, Take no more than 3 doses in 15 minutes.       potassium chloride 10 MEQ CR tablet  Commonly known as:  K-DUR   10 mEq, Oral, Daily      ranolazine 500 MG 12 hr tablet  Commonly known as:  RANEXA   1,000 mg, Oral, 2 Times Daily      roflumilast 500 MCG tablet tablet  Commonly known as:  DALIRESP   500 mcg, Oral, Daily      sodium chloride 0.65 % nasal spray   1 spray, Nasal, As Needed      spironolactone 25 MG tablet  Commonly known as:  ALDACTONE   25 mg, Oral, Daily         Stop These Medications    amiodarone 200 MG tablet  Commonly known as:  PACERONE     hydroCHLOROthiazide 25 MG tablet  Commonly known as:  HYDRODIURIL     lisinopril 5 MG tablet  Commonly known as:  PRINIVIL,ZESTRIL            Discharge Diet:   Diet Instructions     Diet: Cardiac      Discharge Diet:  Cardiac          Activity at Discharge:   Activity Instructions     Activity as Tolerated            Follow-up Appointments  Future Appointments   Date Time Provider Department Center   7/30/2020  1:00 PM Gisela Calle APRN [facility] None     Additional Instructions for the Follow-ups that You Need to Schedule     Ambulatory Referral to Home Health   As directed      [facility] H/H resumption of care    Order Comments:  [facility] H/H resumption of care     Face to Face Visit Date:  7/20/2020    Follow-up provider for Plan of Care?:  I treated the  patient in an acute care facility and will not continue treatment after discharge.    Follow-up provider:  JAYLEN MCNEAL [[id number]]    Reason/Clinical Findings:  resumption of care    Describe mobility limitations that make leaving home difficult:  resumption of care    Nursing/Therapeutic Services Requested:  Other    Frequency:  1 Week 1         Discharge Follow-up with PCP   As directed       Currently Documented PCP:    Jaylen Mcneal MD    PCP Phone Number:    [phone]     Follow Up Details:  2-3 days         Discharge Follow-up with Specified Provider: cardiology in one week   As directed      To:  cardiology in one week                   Dunia Wilkerson MD  07/21/20  14:05    Time: Discharge 38 min

## 2020-07-21 NOTE — PROGRESS NOTES
Cardiology Progress  Note      Patient Care Team:  Jaylen Moss MD as PCP - General  Wicho Sotelo MD as Consulting Physician (Nephrology)    PATIENT IDENTIFICATION  Name: Jose Dixon Jr.  Age: 59 y.o.  Sex: male  :  1960  MRN: [id number]         Cardiology assessment and plan      Severe cardiomyopathy with a severe LV dysfunction  Congestive heart failure with reduced ejection fraction  Status post surgical LV lead placement  Intermittent chest discomfort  Low blood pressure  History of atrial fibrillation status post AV node ablation  History of ventricular tachycardia multiple ablation procedures in the past  History of ICD  Coronary artery disease status post prior PCI and stenting  Hypertension  Hyperlipidemia   obesity  Obstructive sleep apnea  Biventricular ICD in situ  Normal device function by EP evaluation today  Chronic renal insufficiency    Telemetry with intermittent nonsustained ventricular tachycardia  EKG with a by V paced rhythm with underlying atrial fibrillation  Continue supportive care  Continue current therapy  Patient is back on anticoagulation therapy  Continue close monitoring  Continue antiplatelet therapy  Recent cardiac cath films are reviewed there was a high marginal branch that has angiographically 70% stenosis long segment lesion extending all the way into the ostium likely somewhat complicated PCI secondary to stent in the other marginal and also ostial nature of the disease potentially complicating native circumflex system  Not sure if the obstructive coronary artery disease involving this branch is actually contributing to resting chest pain  Limited treatment options discussed with the patient  Patient is currently on anticoagulation therapy with apixaban  Dose of beta-blockers were decreased secondary to low blood pressure  Continue current medical therapy will discuss with the EP about further treatment options for the ventricular tachyarrhythmia  If patient  has recurrent anginal chest pain with exertion and activity will consider further interventional therapy to the high marginal branch  Continue close monitoring  Optimization of the ICD therapy by electrophysiology today  Close monitoring and follow-up in the office  Patient has residual disease involving the marginal branch of the circumflex plan to manage conservatively at this time  Treatment options discussed the patient at length  Follow-up in office in 1 to 2 weeks    REASON FOR FOLLOW-UP:  Chest pain  Abnormal cardiac enzymes  Nonsustained VT    SUBJECTIVE    Seen and examined.  Chart and labs reviewed.  Patient continues to report chest discomfort his incision and is requesting every 4hr IV morphine.      REVIEW OF SYSTEMS:  Pertinent items are noted in HPI, all other systems reviewed and negative    OBJECTIVE   Patient had nonsustained VT on the monitor  proBNP increased over the weekend, receiving Bumex with improvement in symptoms.        ASSESSMENT/PLAN    Chest pain    Coronary artery disease involving native heart with angina pectoris (CMS/HCC)    Cardiomyopathy, dilated (CMS/HCC)    Sleep apnea    Essential hypertension    Paroxysmal atrial fibrillation (CMS/HCC)    Anxiety    Chronic obstructive pulmonary disease (CMS/HCC)    Depression    Hyperlipidemia    ICD (implantable cardioverter-defibrillator) in place    Morbid obesity (CMS/HCC)    Ischemic cardiomyopathy    Acute on chronic combined systolic and diastolic CHF (congestive heart failure) (CMS/HCC)    Recommendations:  Chest discomfort may be more pericardial irritation from recent surgical procedure.  Discussed with patient and attending nurse need to transition to p.o. pain medication.  Non-trending troponin elevation likely also secondary to recent surgical procedure.  Several medication adjustments over the weekend for nonsustained VT with some blood pressure drop.  Monitor rhythm and electrolytes  Cardiologist to speak with EP regarding  "arrhythmia       **Cath films personally reviewed and demonstrate moderate disease involving a ramus intermedius branch extending to the ostium of the vessel. The vessel is borderline caliber for percutaneous revascularization. Percutaneous revascularization could compromise the large circumflex vessel.  The patient also has had in-stent restenosis of a similar sized vessel in the circumflex system.  Would recommend conservative management for this particular lesion.      Vital Signs  Visit Vitals  /70 (BP Location: Left arm, Patient Position: Sitting)   Pulse 70   Temp 98.2 °F (36.8 °C) (Oral)   Resp 18   Ht 177.8 cm (70\")   Wt (!) 137 kg (302 lb 11.1 oz)   SpO2 95%   BMI 43.43 kg/m²     Oxygen Therapy  SpO2: 95 %  Pulse Oximetry Type: Intermittent  Device (Oxygen Therapy): room air  Flow (L/min): 2  Oxygen Concentration (%): 21  Oximetry Probe Site Changed: Yes  Flowsheet Rows      First Filed Value   Admission Height  177.8 cm (70\") Documented at 07/15/2020 1816   Admission Weight  (!) 139 kg (306 lb 14.1 oz) Documented at 07/15/2020 1816        Intake & Output (last 3 days)       07/18 0701 - 07/19 0700 07/19 0701 - 07/20 0700 07/20 0701 - 07/21 0700 07/21 0701 - 07/22 0700    P.O. 840 1285 1280 360    Total Intake(mL/kg) 840 (6.1) 1285 (9.3) 1280 (9.3) 360 (2.6)    Net +840 +1285 +1280 +360            Urine Unmeasured Occurrence   4 x         Lines, Drains & Airways    Active LDAs     Name:   Placement date:   Placement time:   Site:   Days:    Peripheral IV 07/17/20 0010 Anterior;Right Antecubital   07/17/20    0010    Antecubital   less than 1                       /70 (BP Location: Left arm, Patient Position: Sitting)   Pulse 70   Temp 98.2 °F (36.8 °C) (Oral)   Resp 18   Ht 177.8 cm (70\")   Wt (!) 137 kg (302 lb 11.1 oz)   SpO2 95%   BMI 43.43 kg/m²   Intake/Output last 3 shifts:  I/O last 3 completed shifts:  In: 1725 [P.O.:1725]  Out: -   Intake/Output this shift:  I/O this shift:  In: " 360 [P.O.:360]  Out: -     PHYSICAL EXAM:    General: Alert, cooperative, no distress, appears stated age  Head:  Normocephalic, atraumatic, mucous membranes moist  Eyes:  Conjunctiva/corneas clear,    Neck:  Supple,  no adenopathy; no JVD or bruit  Lungs: Clear to auscultation bilaterally, no wheezes rhonchi rales are noted  Chest wall: No tenderness  Heart::  Regular rate and rhythm, S1 and S2 normal, no murmur, rub or gallop  Abdomen: Soft, non-tender, nondistended, obese  Extremities: No cyanosis, clubbing.1+ pitting edema seen  Pulses: 2+ right radial pulse  Skin:  Stasis changes seen  Neuro/psych: Alert and awake.  Grossly nonfocal, patient very anxious      Scheduled Meds:          Continuous Infusions:      No current facility-administered medications for this encounter.     PRN Meds:            Results Review:     I reviewed the patient's new clinical results.    CBC    Results from last 7 days   Lab Units 07/21/20  0238 07/20/20  0748 07/16/20  0547 07/15/20  1852   WBC 10*3/mm3 8.30 9.00 9.10 10.50   HEMOGLOBIN g/dL 14.0 13.9 13.8 14.4   PLATELETS 10*3/mm3 235 234 300 320     Cr Clearance Estimated Creatinine Clearance: 90.9 mL/min (by C-G formula based on SCr of 1.25 mg/dL).  Coag     HbA1C   Lab Results   Component Value Date    HGBA1C 5.9 (H) 06/04/2020    HGBA1C 5.4 09/14/2018     Blood Glucose No results found for: POCGLU  Infection     CMP   Results from last 7 days   Lab Units 07/21/20  0238 07/20/20  0748 07/19/20  0233 07/18/20  1235 07/18/20  0242 07/17/20  0319 07/16/20  0547 07/15/20  1852   SODIUM mmol/L 136 137 138 137  --   --  143 140   POTASSIUM mmol/L 4.9 4.4  4.4 4.4 4.8 4.7 4.4 4.6 4.2   CHLORIDE mmol/L 94* 94* 93* 94*  --   --  98 100   CO2 mmol/L 31.0* 29.0 32.0* 32.0*  --   --  34.0* 25.0   BUN  17 18 20 22*  --   --  14 13   CREATININE mg/dL 1.25 1.11 1.01 1.12  --   --  1.15 0.99   GLUCOSE mg/dL 96 100* 94 107*  --   --  91 119*   ALBUMIN g/dL  --   --   --   --   --   --   --   4.20   BILIRUBIN mg/dL  --   --   --   --   --   --   --  0.5   ALK PHOS U/L  --   --   --   --   --   --   --  92   AST (SGOT) U/L  --   --   --   --   --   --   --  37   ALT (SGPT) U/L  --   --   --   --   --   --   --  16     ABG    Results from last 7 days   Lab Units 07/15/20  1907   PH, ARTERIAL pH units 7.355   PCO2, ARTERIAL mm Hg 57.4*   PO2 ART mm Hg 103.5   O2 SATURATION ART % 97.5   BASE EXCESS ART mmol/L 4.7*     UA      TATUM  No results found for: POCMETH, POCAMPHET, POCBARBITUR, POCBENZO, POCCOCAINE, POCOPIATES, POCOXYCODO, POCPHENCYC, POCPROPOXY, POCTHC, POCTRICYC  Lysis Labs   Results from last 7 days   Lab Units 07/21/20  0238 07/20/20  0748 07/19/20  0233 07/18/20  1235 07/16/20  0547 07/15/20  1852   HEMOGLOBIN g/dL 14.0 13.9  --   --  13.8 14.4   PLATELETS 10*3/mm3 235 234  --   --  300 320   CREATININE mg/dL 1.25 1.11 1.01 1.12 1.15 0.99     Radiology(recent) Xr Chest 1 View    Result Date: 7/20/2020  No acute process.  Electronically Signed By-Edi Hoyt On:7/20/2020 12:01 PM This report was finalized on [id number] by  Edi Hoyt, .        Results from last 7 days   Lab Units 07/19/20  1722   TROPONIN T ng/mL 0.108*       Xrays, labs reviewed personally by physician.    ECG/EMG Results (most recent)     Procedure Component Value Units Date/Time    ECG 12 Lead [[id number]] Collected:  07/15/20 1817     Updated:  07/16/20 1041    Narrative:       HEART RATE= 72  bpm  RR Interval= 788  ms  IN Interval=   ms  P Horizontal Axis=   deg  P Front Axis=   deg  QRSD Interval= 115  ms  QT Interval= 391  ms  QRS Axis= 245  deg  T Wave Axis=   deg  - ABNORMAL ECG -  Afib/flut and V-paced complexes  When compared with ECG of 25-Jun-2020 4:44:36,  No significant change  Electronically Signed By: Lambert Peace) 16-Jul-2020 10:33:30  Date and Time of Study: 2020-07-15 18:17:10    ECG 12 Lead [[id number]] Collected:  07/17/20 1354     Updated:  07/17/20 1356    Narrative:       HEART RATE= 70  bpm  RR  Interval= 856  ms  TN Interval= 73  ms  P Horizontal Axis= 238  deg  P Front Axis= 0  deg  QRSD Interval= 144  ms  QT Interval= 475  ms  QRS Axis= 229  deg  T Wave Axis= 104  deg  - ABNORMAL ECG -  Ventricular-paced rhythm  When compared with ECG of 15-Jul-2020 18:17:10,  No significant change  Electronically Signed By:   Date and Time of Study: 2020-07-17 13:54:38    ECG 12 Lead [[id number]] Collected:  07/19/20 1708     Updated:  07/19/20 1709    Narrative:       HEART RATE= 70  bpm  RR Interval= 785  ms  TN Interval=   ms  P Horizontal Axis= 240  deg  P Front Axis=   deg  QRSD Interval= 147  ms  QT Interval= 497  ms  QRS Axis= 213  deg  T Wave Axis= 103  deg  - ABNORMAL ECG -  Afib/flut and V-paced complexes  Electronically Signed By:   Date and Time of Study: 2020-07-19 17:08:06            Medication Review:   I have reviewed the patient's current medication list  Scheduled Meds:    Continuous Infusions:    No current facility-administered medications for this encounter.   PRN Meds:.    Imaging:  Imaging Results (Last 72 Hours)     Procedure Component Value Units Date/Time    XR Chest 1 View [[id number]] Collected:  07/20/20 1201     Updated:  07/20/20 1203    Narrative:          DATE OF EXAM:   7/20/2020 10:15 AM     PROCEDURE:   XR CHEST 1 VW-     INDICATIONS:   wheezing; R07.9-Chest pain, unspecified     COMPARISON:  7/15/2020     TECHNIQUE:   Portable chest radiograph.     FINDINGS:    Left-sided AICD noted. Heart size top normal, stable. No focal  consolidation, pneumothorax, or large pleural effusion. Minimal linear  scarring/atelectasis at the right lung base.       Impression:       No acute process.      Electronically Signed By-Edi Hoyt On:7/20/2020 12:01 PM  This report was finalized on [id number] by  Edi Hoyt, .

## 2020-07-21 NOTE — PROGRESS NOTES
Cardiology Progress  Note      Patient Care Team:  Jaylen Moss MD as PCP - General  Wicho Sotelo MD as Consulting Physician (Nephrology)    PATIENT IDENTIFICATION  Name: Jsoe Dixon Jr.  Age: 59 y.o.  Sex: male  :  1960  MRN: [id number]         Cardiology assessment and plan      Severe cardiomyopathy with a severe LV dysfunction  Congestive heart failure with reduced ejection fraction  Status post surgical LV lead placement  Intermittent chest discomfort  Low blood pressure  History of atrial fibrillation status post AV node ablation  History of ventricular tachycardia multiple ablation procedures in the past  History of ICD  Coronary artery disease status post prior PCI and stenting  Hypertension  Hyperlipidemia   obesity  Obstructive sleep apnea  Biventricular ICD in situ  Normal device function by EP evaluation today  Chronic renal insufficiency      Telemetry with intermittent nonsustained ventricular tachycardia  EKG with a biV-paced rhythm with underlying atrial fibrillation  Continue supportive care  Continue current therapy  Continue antiplatelet therapy  Recent cardiac cath films are reviewed there was a high marginal branch that has angiographically 70% stenosis long segment lesion extending all the way into the ostium likely somewhat complicated PCI secondary to stent in the other marginal and also ostial nature of the disease potentially complicating native circumflex system  Not sure if the obstructive coronary artery disease involving this branch is actually contributing to resting chest pain  Limited treatment options discussed with the patient  Patient is currently on anticoagulation therapy with apixaban  Dose of beta-blockers were decreased secondary to low blood pressure  Continue current medical therapy   CV status appropriate for D/C  F/U 7-10 days        REASON FOR FOLLOW-UP:  Chest pain  Abnormal cardiac enzymes  Nonsustained VT    SUBJECTIVE    Seen and examined.  Chart  "and labs reviewed.  Patient continues to report chest discomfort his incision.       REVIEW OF SYSTEMS:  Pertinent items are noted in HPI, all other systems reviewed and negative    OBJECTIVE   In chair sleeping upon entry.  Normal respiratory effort      ASSESSMENT/PLAN    Chest pain    Coronary artery disease involving native heart with angina pectoris (CMS/HCC)    Cardiomyopathy, dilated (CMS/HCC)    Sleep apnea    Essential hypertension    Paroxysmal atrial fibrillation (CMS/HCC)    Anxiety    Chronic obstructive pulmonary disease (CMS/HCC)    Depression    Hyperlipidemia    ICD (implantable cardioverter-defibrillator) in place    Morbid obesity (CMS/HCC)    Ischemic cardiomyopathy    Acute on chronic combined systolic and diastolic CHF (congestive heart failure) (CMS/[facility])    Recommendations:  Chest discomfort may be more pericardial irritation from recent surgical procedure.  Discussed with patient and attending nurse.    Non-trending troponin elevation likely also secondary to recent surgical procedure.       **Cath films personally reviewed and demonstrate moderate disease involving a ramus intermedius branch extending to the ostium of the vessel. The vessel is borderline caliber for percutaneous revascularization. Percutaneous revascularization could compromise the large circumflex vessel.  The patient also has had in-stent restenosis of a similar sized vessel in the circumflex system.  Would recommend conservative management for this particular lesion.      Vital Signs  Visit Vitals  /70 (BP Location: Left arm, Patient Position: Sitting)   Pulse 70   Temp 98.2 °F (36.8 °C) (Oral)   Resp 18   Ht 177.8 cm (70\")   Wt (!) 137 kg (302 lb 11.1 oz)   SpO2 95%   BMI 43.43 kg/m²     Oxygen Therapy  SpO2: 95 %  Pulse Oximetry Type: Intermittent  Device (Oxygen Therapy): room air  Flow (L/min): 2  Oxygen Concentration (%): 21  Oximetry Probe Site Changed: Yes  Flowsheet Rows      First Filed Value   Admission " "Height  177.8 cm (70\") Documented at 07/15/2020 1816   Admission Weight  (!) 139 kg (306 lb 14.1 oz) Documented at 07/15/2020 1816        Intake & Output (last 3 days)       07/18 0701 - 07/19 0700 07/19 0701 - 07/20 0700 07/20 0701 - 07/21 0700 07/21 0701 - 07/22 0700    P.O. 840 1285 1280 360    Total Intake(mL/kg) 840 (6.1) 1285 (9.3) 1280 (9.3) 360 (2.6)    Net +840 +1285 +1280 +360            Urine Unmeasured Occurrence   4 x         Lines, Drains & Airways    Active LDAs     Name:   Placement date:   Placement time:   Site:   Days:    Peripheral IV 07/17/20 0010 Anterior;Right Antecubital   07/17/20    0010    Antecubital   less than 1                       /70 (BP Location: Left arm, Patient Position: Sitting)   Pulse 70   Temp 98.2 °F (36.8 °C) (Oral)   Resp 18   Ht 177.8 cm (70\")   Wt (!) 137 kg (302 lb 11.1 oz)   SpO2 95%   BMI 43.43 kg/m²   Intake/Output last 3 shifts:  I/O last 3 completed shifts:  In: 1725 [P.O.:1725]  Out: -   Intake/Output this shift:  I/O this shift:  In: 360 [P.O.:360]  Out: -     PHYSICAL EXAM:    General: Alert, cooperative, no distress, appears stated age  Head:  Normocephalic, atraumatic, mucous membranes moist  Eyes:  Conjunctiva/corneas clear,    Neck:  Supple,  no adenopathy; no JVD or bruit  Lungs: Clear to auscultation bilaterally, no wheezes rhonchi rales are noted  Chest wall: No tenderness  Heart::  Regular rate and rhythm, S1 and S2 normal, no murmur, rub or gallop  Abdomen: Soft, non-tender, nondistended, obese  Extremities: No cyanosis, clubbing.1+ pitting edema seen  Pulses: 2+ right radial pulse  Skin:  Stasis changes seen  Neuro/psych: Alert and awake.  Grossly nonfocal, patient very anxious      Scheduled Meds:        amiodarone 200 mg Oral Daily   apixaban 5 mg Oral BID   atorvastatin 80 mg Oral Daily   azelastine 1 spray Each Nare Daily   budesonide 0.5 mg Nebulization BID - RT   bumetanide 1 mg Oral TID   cetirizine 10 mg Oral Daily   clopidogrel " 75 mg Oral Daily   docusate sodium 100 mg Oral Daily   DULoxetine 60 mg Oral Daily   gabapentin 300 mg Oral BID   ipratropium-albuterol 3 mL Nebulization 4x Daily - RT   metoprolol succinate XL 25 mg Oral Q24H   montelukast 10 mg Oral Daily   polyethylene glycol 17 g Oral Daily   potassium chloride 10 mEq Oral Daily   ranolazine 1,000 mg Oral BID   roflumilast 500 mcg Oral Daily   sodium chloride 10 mL Intravenous Q12H   spironolactone 25 mg Oral Daily   tamsulosin 0.4 mg Oral Daily       Continuous Infusions:      nitroglycerin 10-50 mcg/min Last Rate: Stopped (07/16/20 1741)       PRN Meds:    •  acetaminophen **OR** acetaminophen **OR** acetaminophen  •  albuterol  •  aluminum-magnesium hydroxide-simethicone  •  bisacodyl  •  magnesium hydroxide  •  magnesium sulfate **OR** magnesium sulfate in D5W 1g/100mL (PREMIX)  •  melatonin  •  Morphine  •  nitroglycerin  •  ondansetron **OR** ondansetron  •  oxyCODONE  •  potassium chloride  •  [COMPLETED] Insert peripheral IV **AND** sodium chloride  •  sodium chloride  •  sodium chloride        Results Review:     I reviewed the patient's new clinical results.    CBC    Results from last 7 days   Lab Units 07/21/20  0238 07/20/20  0748 07/16/20  0547 07/15/20  1852   WBC 10*3/mm3 8.30 9.00 9.10 10.50   HEMOGLOBIN g/dL 14.0 13.9 13.8 14.4   PLATELETS 10*3/mm3 235 234 300 320     Cr Clearance Estimated Creatinine Clearance: 90.9 mL/min (by C-G formula based on SCr of 1.25 mg/dL).  [facility]     HbA1C   Lab Results   Component Value Date    HGBA1C 5.9 (H) 06/04/2020    HGBA1C 5.4 09/14/2018     Blood Glucose No results found for: POCGLU  Infection     CMP   Results from last 7 days   Lab Units 07/21/20  0238 07/20/20  0748 07/19/20  0233 07/18/20  1235 07/18/20  0242 07/17/20  0319 07/16/20  0547 07/15/20  1852   SODIUM mmol/L 136 137 138 137  --   --  143 140   POTASSIUM mmol/L 4.9 4.4  4.4 4.4 4.8 4.7 4.4 4.6 4.2   CHLORIDE mmol/L 94* 94* 93* 94*  --   --  98 100   CO2 mmol/L  31.0* 29.0 32.0* 32.0*  --   --  34.0* 25.0   BUN  17 18 20 22*  --   --  14 13   CREATININE mg/dL 1.25 1.11 1.01 1.12  --   --  1.15 0.99   GLUCOSE mg/dL 96 100* 94 107*  --   --  91 119*   ALBUMIN g/dL  --   --   --   --   --   --   --  4.20   BILIRUBIN mg/dL  --   --   --   --   --   --   --  0.5   ALK PHOS U/L  --   --   --   --   --   --   --  92   AST (SGOT) U/L  --   --   --   --   --   --   --  37   ALT (SGPT) U/L  --   --   --   --   --   --   --  16     ABG    Results from last 7 days   Lab Units 07/15/20  1907   PH, ARTERIAL pH units 7.355   PCO2, ARTERIAL mm Hg 57.4*   PO2 ART mm Hg 103.5   O2 SATURATION ART % 97.5   BASE EXCESS ART mmol/L 4.7*     UA      TATUM  No results found for: POCMETH, POCAMPHET, POCBARBITUR, POCBENZO, POCCOCAINE, POCOPIATES, POCOXYCODO, POCPHENCYC, POCPROPOXY, POCTHC, POCTRICYC  Lysis Labs   Results from last 7 days   Lab Units 07/21/20  0238 07/20/20  0748 07/19/20  0233 07/18/20  1235 07/16/20  0547 07/15/20  1852   HEMOGLOBIN g/dL 14.0 13.9  --   --  13.8 14.4   PLATELETS 10*3/mm3 235 234  --   --  300 320   CREATININE mg/dL 1.25 1.11 1.01 1.12 1.15 0.99     Radiology(recent) Xr Chest 1 View    Result Date: 7/20/2020  No acute process.  Electronically Signed By-Edi Hoyt On:7/20/2020 12:01 PM This report was finalized on [id number] by  Edi Hoyt, .        Results from last 7 days   Lab Units 07/19/20  1722   TROPONIN T ng/mL 0.108*       Xrays, labs reviewed personally by physician.    ECG/EMG Results (most recent)     Procedure Component Value Units Date/Time    ECG 12 Lead [[id number]] Collected:  07/15/20 1817     Updated:  07/16/20 1041    Narrative:       HEART RATE= 72  bpm  RR Interval= 788  ms  OK Interval=   ms  P Horizontal Axis=   deg  P Front Axis=   deg  QRSD Interval= 115  ms  QT Interval= 391  ms  QRS Axis= 245  deg  T Wave Axis=   deg  - ABNORMAL ECG -  Afib/flut and V-paced complexes  When compared with ECG of 25-Jun-2020 4:44:36,  No significant  change  Electronically Signed By: Lambert Peace (Frank) 16-Jul-2020 10:33:30  Date and Time of Study: 2020-07-15 18:17:10    ECG 12 Lead [[id number]] Collected:  07/17/20 1354     Updated:  07/17/20 1356    Narrative:       HEART RATE= 70  bpm  RR Interval= 856  ms  WV Interval= 73  ms  P Horizontal Axis= 238  deg  P Front Axis= 0  deg  QRSD Interval= 144  ms  QT Interval= 475  ms  QRS Axis= 229  deg  T Wave Axis= 104  deg  - ABNORMAL ECG -  Ventricular-paced rhythm  When compared with ECG of 15-Jul-2020 18:17:10,  No significant change  Electronically Signed By:   Date and Time of Study: 2020-07-17 13:54:38    ECG 12 Lead [[id number]] Collected:  07/19/20 1708     Updated:  07/19/20 1709    Narrative:       HEART RATE= 70  bpm  RR Interval= 785  ms  WV Interval=   ms  P Horizontal Axis= 240  deg  P Front Axis=   deg  QRSD Interval= 147  ms  QT Interval= 497  ms  QRS Axis= 213  deg  T Wave Axis= 103  deg  - ABNORMAL ECG -  Afib/flut and V-paced complexes  Electronically Signed By:   Date and Time of Study: 2020-07-19 17:08:06            Medication Review:   I have reviewed the patient's current medication list  Scheduled Meds:    amiodarone 200 mg Oral Daily   apixaban 5 mg Oral BID   atorvastatin 80 mg Oral Daily   azelastine 1 spray Each Nare Daily   budesonide 0.5 mg Nebulization BID - RT   bumetanide 1 mg Oral TID   cetirizine 10 mg Oral Daily   clopidogrel 75 mg Oral Daily   docusate sodium 100 mg Oral Daily   DULoxetine 60 mg Oral Daily   gabapentin 300 mg Oral BID   ipratropium-albuterol 3 mL Nebulization 4x Daily - RT   metoprolol succinate XL 25 mg Oral Q24H   montelukast 10 mg Oral Daily   polyethylene glycol 17 g Oral Daily   potassium chloride 10 mEq Oral Daily   ranolazine 1,000 mg Oral BID   roflumilast 500 mcg Oral Daily   sodium chloride 10 mL Intravenous Q12H   spironolactone 25 mg Oral Daily   tamsulosin 0.4 mg Oral Daily     Continuous Infusions:    nitroglycerin 10-50 mcg/min Last Rate: Stopped  (07/16/20 1741)     PRN Meds:.•  acetaminophen **OR** acetaminophen **OR** acetaminophen  •  albuterol  •  aluminum-magnesium hydroxide-simethicone  •  bisacodyl  •  magnesium hydroxide  •  magnesium sulfate **OR** magnesium sulfate in D5W 1g/100mL (PREMIX)  •  melatonin  •  Morphine  •  nitroglycerin  •  ondansetron **OR** ondansetron  •  oxyCODONE  •  potassium chloride  •  [COMPLETED] Insert peripheral IV **AND** sodium chloride  •  sodium chloride  •  sodium chloride    Imaging:  Imaging Results (Last 72 Hours)     Procedure Component Value Units Date/Time    XR Chest 1 View [[id number]] Collected:  07/20/20 1201     Updated:  07/20/20 1203    Narrative:          DATE OF EXAM:   7/20/2020 10:15 AM     PROCEDURE:   XR CHEST 1 VW-     INDICATIONS:   wheezing; R07.9-Chest pain, unspecified     COMPARISON:  7/15/2020     TECHNIQUE:   Portable chest radiograph.     FINDINGS:    Left-sided AICD noted. Heart size top normal, stable. No focal  consolidation, pneumothorax, or large pleural effusion. Minimal linear  scarring/atelectasis at the right lung base.       Impression:       No acute process.      Electronically Signed By-Edi Hoyt On:7/20/2020 12:01 PM  This report was finalized on [id number] by  Edi Hoyt, .            Electronically signed by ABHISHEK Peña, 07/21/20, 12:27 PM.      Patient seen and examined  All the data is reviewed  Patient in with decompensated heart failure with intermittent nonsustained VT  EKGs reviewed  Biventricular ICD reprogrammed with LV offset 80 ms ahead of RV and patient was made to ambulate and felt much better after optimization of biventricular pacing  Cardiovascular wise patient was in a paced rhythm without evaluation and trace leg edema and leg examination showed no active wheezing  Medications are reviewed and patient being discharged home and will follow-up in our office in 1 to 2 weeks  Patient felt much better after biventricular ICD  reprogramming  Reprogramming of the device attached to chart    Electronically signed by Jose Lopez MD, 07/21/20, 1:14 PM.

## 2020-07-22 ENCOUNTER — READMISSION MANAGEMENT (OUTPATIENT)
Dept: CALL CENTER | Facility: HOSPITAL | Age: 60
End: 2020-07-22

## 2020-07-22 NOTE — OUTREACH NOTE
Prep Survey      Responses   [facility] from?  [facility]   Is LACE score < 7 ?  No   Eligibility  Readm Mgmt   Discharge diagnosis  Chest Pain   Does the patient have one of the following disease processes/diagnoses(primary or secondary)?  Other   Does the patient have Home health ordered?  Yes   What is the Home health agency?   [facility]   Is there a DME ordered?  No   General alerts for this patient  Hx: CHF and COPD   Prep survey completed?  Yes          Tara Siu RN

## 2020-07-23 ENCOUNTER — READMISSION MANAGEMENT (OUTPATIENT)
Dept: CALL CENTER | Facility: HOSPITAL | Age: 60
End: 2020-07-23

## 2020-07-23 NOTE — OUTREACH NOTE
Medical Week 1 Survey      Responses   [facility] patient discharged from?  [facility]   COVID-19 Test Status  Negative   Does the patient have one of the following disease processes/diagnoses(primary or secondary)?  Other   Is there a successful TCM telephone encounter documented?  No   Week 1 attempt successful?  No   Unsuccessful attempts  Attempt 1          Lisa Lopez LPN

## 2020-07-25 ENCOUNTER — READMISSION MANAGEMENT (OUTPATIENT)
Dept: CALL CENTER | Facility: HOSPITAL | Age: 60
End: 2020-07-25

## 2020-07-25 PROCEDURE — 93010 ELECTROCARDIOGRAM REPORT: CPT | Performed by: INTERNAL MEDICINE

## 2020-07-25 NOTE — OUTREACH NOTE
Medical Week 1 Survey      Responses   [facility] patient discharged from?  [facility]   COVID-19 Test Status  Negative   Does the patient have one of the following disease processes/diagnoses(primary or secondary)?  Other   Is there a successful TCM telephone encounter documented?  No   Week 1 attempt successful?  No   Unsuccessful attempts  Attempt 2          Lisa Lopez LPN

## 2020-07-29 ENCOUNTER — READMISSION MANAGEMENT (OUTPATIENT)
Dept: CALL CENTER | Facility: HOSPITAL | Age: 60
End: 2020-07-29

## 2020-07-29 NOTE — OUTREACH NOTE
Medical Week 2 Survey      Responses   [facility] patient discharged from?  [facility]   COVID-19 Test Status  Negative   Does the patient have one of the following disease processes/diagnoses(primary or secondary)?  Other   Week 2 attempt successful?  Yes   Call start time  1325   Call end time  1328   Meds reviewed with patient/caregiver?  Yes   Is the patient having any side effects they believe may be caused by any medication additions or changes?  No   Does the patient have all medications ordered at discharge?  N/A   Is the patient taking all medications as directed (includes completed medication regime)?  Yes   Does the patient have a primary care provider?   Yes   Does the patient have an appointment with their PCP within 7 days of discharge?  Greater than 7 days   Comments regarding PCP  PATIENT STATES HE HAS A FOLLOW UP APPOINTMENT WITH HIS PCP 8/21    What is preventing the patient from scheduling follow up appointments within 7 days of discharge?  Earlier appointment not available   Nursing Interventions  Verified appointment date/time/provider   Has the patient kept scheduled appointments due by today?  N/A   What is the Home health agency?   [facility]   Has home health visited the patient within 72 hours of discharge?  Yes   Pulse Ox monitoring  None   Did the patient receive a copy of their discharge instructions?  Yes   Nursing interventions  Reviewed instructions with patient   What is the patient's perception of their health status since discharge?  Improving   Is the patient/caregiver able to teach back signs and symptoms related to disease process for when to call PCP?  Yes   Is the patient/caregiver able to teach back signs and symptoms related to disease process for when to call 911?  Yes   Is the patient/caregiver able to teach back the hierarchy of who to call/visit for symptoms/problems? PCP, Specialist, Home health nurse, Urgent Care, ED, 911  Yes   Additional teach back comments  PATIENT  STATES HE IS DOING OKAY, BUT JUST HAS A LOT ON HIS PLATE.   Week 2 Call Completed?  Yes          Lisa Lopez LPN

## 2020-07-30 ENCOUNTER — OFFICE VISIT (OUTPATIENT)
Dept: CARDIAC SURGERY | Facility: CLINIC | Age: 60
End: 2020-07-30

## 2020-07-30 DIAGNOSIS — Z09 FOLLOW UP: Primary | ICD-10-CM

## 2020-07-30 PROCEDURE — 99024 POSTOP FOLLOW-UP VISIT: CPT | Performed by: NURSE PRACTITIONER

## 2020-07-30 NOTE — PROGRESS NOTES
"CARDIOVASCULAR SURGERY FOLLOW-UP PROGRESS NOTE  Chief Complaint: Post-op Follow Up        HPI:   Dear Dr. Moss, Jaylen OTTO MD and colleagues:    It was nice to see Jose Dixon Jr. in follow up today after cardiac surgery.  As you know, he is a 59 y.o. male with cardiomyopathy who underwent LV lead placement  at [facility] by Dr. Richmond on 6/22/2020. He did well postoperatively and continues to do well. He comes in today complaining of pain in his feet and \"eyesight problems\". He states he thinks his allergies are causing him to have blurry vision. No additional neuro deficits. He also complained of burning in his hands and feet. I advised him to discuss these changes with his primary care provider whom he is scheduled to see this coming Monday. His surgical incisions are healing well with no redness of drainage.  His activity level has been poor.  He states that he isn't able to walk outside of his home because the weather has been too hot. I explained that walking inside his home was fine as long as he was moving.     Physical Exam:         There were no vitals taken for this visit.  Heart:  regularly irregular rhythm  Lungs:  clear to auscultation bilaterally  Extremities:  trace lower extremity edema bilaterally  Incision(s):  left chest healing well, no significant drainage, no dehiscence, no significant erythema    Assessment/Plan:     S/P LV Lead Placement. Overall, he is doing well.    No significant post-op complications    Keep incisions clean and dry  OK to drive if not taking narcotic pain medicine  OK to begin cardiac rehab  Follow-up as scheduled with cardiology  Follow-up as scheduled with PCP    No restrictions of activity.      Thank you for allowing me to participate in the care of your patient.    Regards,  BERTA MOLINA, ABHISHEK      "

## 2020-07-31 ENCOUNTER — OFFICE VISIT (OUTPATIENT)
Dept: CARDIOLOGY | Facility: CLINIC | Age: 60
End: 2020-07-31

## 2020-07-31 ENCOUNTER — CLINICAL SUPPORT NO REQUIREMENTS (OUTPATIENT)
Dept: CARDIOLOGY | Facility: CLINIC | Age: 60
End: 2020-07-31

## 2020-07-31 VITALS
WEIGHT: 307 LBS | SYSTOLIC BLOOD PRESSURE: 169 MMHG | OXYGEN SATURATION: 95 % | HEART RATE: 70 BPM | DIASTOLIC BLOOD PRESSURE: 102 MMHG | BODY MASS INDEX: 44.05 KG/M2

## 2020-07-31 DIAGNOSIS — I48.0 PAROXYSMAL ATRIAL FIBRILLATION (HCC): Chronic | ICD-10-CM

## 2020-07-31 DIAGNOSIS — I25.5 ISCHEMIC CARDIOMYOPATHY: Primary | Chronic | ICD-10-CM

## 2020-07-31 DIAGNOSIS — I50.22 CHRONIC SYSTOLIC CONGESTIVE HEART FAILURE (HCC): Primary | ICD-10-CM

## 2020-07-31 DIAGNOSIS — I47.20 VT (VENTRICULAR TACHYCARDIA) (HCC): ICD-10-CM

## 2020-07-31 DIAGNOSIS — I10 ESSENTIAL HYPERTENSION: ICD-10-CM

## 2020-07-31 DIAGNOSIS — I25.5 ISCHEMIC CARDIOMYOPATHY: ICD-10-CM

## 2020-07-31 DIAGNOSIS — Z95.810 PRESENCE OF BIVENTRICULAR IMPLANTABLE CARDIOVERTER-DEFIBRILLATOR (ICD): ICD-10-CM

## 2020-07-31 DIAGNOSIS — I48.21 PERMANENT ATRIAL FIBRILLATION (HCC): ICD-10-CM

## 2020-07-31 DIAGNOSIS — I42.0 CARDIOMYOPATHY, DILATED (HCC): Chronic | ICD-10-CM

## 2020-07-31 PROCEDURE — 93000 ELECTROCARDIOGRAM COMPLETE: CPT | Performed by: INTERNAL MEDICINE

## 2020-07-31 PROCEDURE — 99214 OFFICE O/P EST MOD 30 MIN: CPT | Performed by: INTERNAL MEDICINE

## 2020-07-31 PROCEDURE — 93289 INTERROG DEVICE EVAL HEART: CPT | Performed by: INTERNAL MEDICINE

## 2020-07-31 RX ORDER — RANOLAZINE 1000 MG/1
1000 TABLET, EXTENDED RELEASE ORAL 2 TIMES DAILY
Qty: 180 TABLET | Refills: 3 | Status: SHIPPED | OUTPATIENT
Start: 2020-07-31 | End: 2021-01-01 | Stop reason: SDDI

## 2020-07-31 RX ORDER — METOPROLOL SUCCINATE 100 MG/1
100 TABLET, EXTENDED RELEASE ORAL
Qty: 90 TABLET | Refills: 3 | Status: SHIPPED | OUTPATIENT
Start: 2020-07-31 | End: 2020-11-25 | Stop reason: HOSPADM

## 2020-07-31 RX ORDER — ATORVASTATIN CALCIUM 80 MG/1
80 TABLET, FILM COATED ORAL DAILY
Qty: 90 TABLET | Refills: 3 | Status: SHIPPED | OUTPATIENT
Start: 2020-07-31 | End: 2021-01-01 | Stop reason: SDDI

## 2020-07-31 RX ORDER — POTASSIUM CHLORIDE 750 MG/1
10 TABLET, FILM COATED, EXTENDED RELEASE ORAL DAILY
Qty: 90 TABLET | Refills: 3 | Status: SHIPPED | OUTPATIENT
Start: 2020-07-31 | End: 2020-10-02 | Stop reason: HOSPADM

## 2020-07-31 NOTE — PROGRESS NOTES
In clinic device interrogation. Patient cannot have home monitor due to location. See attached device interrogation. No changes made. Charges per Dr. Lopez's clinic.

## 2020-08-05 ENCOUNTER — READMISSION MANAGEMENT (OUTPATIENT)
Dept: CALL CENTER | Facility: HOSPITAL | Age: 60
End: 2020-08-05

## 2020-08-05 NOTE — OUTREACH NOTE
Medical Week 3 Survey      Responses   [facility] patient discharged from?  [facility]   COVID-19 Test Status  Negative   Does the patient have one of the following disease processes/diagnoses(primary or secondary)?  Other   Week 3 attempt successful?  No   Unsuccessful attempts  Attempt 1          Shikha Braswell LPN

## 2020-08-06 ENCOUNTER — READMISSION MANAGEMENT (OUTPATIENT)
Dept: CALL CENTER | Facility: HOSPITAL | Age: 60
End: 2020-08-06

## 2020-08-06 NOTE — OUTREACH NOTE
Medical Week 3 Survey      Responses   [facility] patient discharged from?  [facility]   COVID-19 Test Status  Negative   Does the patient have one of the following disease processes/diagnoses(primary or secondary)?  Other   Week 3 attempt successful?  No   Unsuccessful attempts  Attempt 2          Lisa Lopez LPN

## 2020-08-22 ENCOUNTER — HOSPITAL ENCOUNTER (INPATIENT)
Facility: HOSPITAL | Age: 60
LOS: 4 days | Discharge: HOME HEALTH | End: 2020-08-26
Attending: INTERNAL MEDICINE | Admitting: HOSPITALIST

## 2020-08-22 ENCOUNTER — APPOINTMENT (OUTPATIENT)
Dept: GENERAL RADIOLOGY | Facility: HOSPITAL | Age: 60
End: 2020-08-22

## 2020-08-22 ENCOUNTER — APPOINTMENT (OUTPATIENT)
Dept: CT IMAGING | Facility: HOSPITAL | Age: 60
End: 2020-08-22

## 2020-08-22 DIAGNOSIS — I50.9 ACUTE ON CHRONIC CONGESTIVE HEART FAILURE, UNSPECIFIED HEART FAILURE TYPE (HCC): ICD-10-CM

## 2020-08-22 DIAGNOSIS — R07.9 CHEST PAIN, UNSPECIFIED TYPE: Primary | ICD-10-CM

## 2020-08-22 PROBLEM — I50.23 ACUTE ON CHRONIC SYSTOLIC CONGESTIVE HEART FAILURE (HCC): Chronic | Status: ACTIVE | Noted: 2017-09-08

## 2020-08-22 PROBLEM — I20.8 CHRONIC STABLE ANGINA (HCC): Chronic | Status: ACTIVE | Noted: 2020-07-15

## 2020-08-22 LAB
ALBUMIN SERPL-MCNC: 4 G/DL (ref 3.5–5.2)
ALBUMIN/GLOB SERPL: 1.3 G/DL
ALP SERPL-CCNC: 74 U/L (ref 39–117)
ALT SERPL W P-5'-P-CCNC: 17 U/L (ref 1–41)
ANION GAP SERPL CALCULATED.3IONS-SCNC: 11 MMOL/L (ref 5–15)
APTT PPP: 25.9 SECONDS (ref 24–31)
ARTERIAL PATENCY WRIST A: ABNORMAL
AST SERPL-CCNC: 26 U/L (ref 1–40)
ATMOSPHERIC PRESS: ABNORMAL MM[HG]
BASE EXCESS BLDA CALC-SCNC: 4.2 MMOL/L (ref 0–3)
BASOPHILS # BLD AUTO: 0.1 10*3/MM3 (ref 0–0.2)
BASOPHILS NFR BLD AUTO: 0.9 % (ref 0–1.5)
BDY SITE: ABNORMAL
BILIRUB SERPL-MCNC: 0.7 MG/DL (ref 0–1.2)
BUN SERPL-MCNC: 14 MG/DL (ref 6–20)
BUN SERPL-MCNC: ABNORMAL MG/DL
BUN/CREAT SERPL: ABNORMAL
CALCIUM SPEC-SCNC: 9 MG/DL (ref 8.6–10.5)
CHLORIDE SERPL-SCNC: 103 MMOL/L (ref 98–107)
CO2 BLDA-SCNC: 31.7 MMOL/L (ref 22–29)
CO2 SERPL-SCNC: 29 MMOL/L (ref 22–29)
CREAT SERPL-MCNC: 0.97 MG/DL (ref 0.76–1.27)
DEPRECATED RDW RBC AUTO: 45.9 FL (ref 37–54)
EOSINOPHIL # BLD AUTO: 0.1 10*3/MM3 (ref 0–0.4)
EOSINOPHIL NFR BLD AUTO: 1.1 % (ref 0.3–6.2)
ERYTHROCYTE [DISTWIDTH] IN BLOOD BY AUTOMATED COUNT: 15.3 % (ref 12.3–15.4)
GFR SERPL CREATININE-BSD FRML MDRD: 80 ML/MIN/1.73
GLOBULIN UR ELPH-MCNC: 3.1 GM/DL
GLUCOSE SERPL-MCNC: 113 MG/DL (ref 65–99)
HCO3 BLDA-SCNC: 30.2 MMOL/L (ref 21–28)
HCT VFR BLD AUTO: 41.3 % (ref 37.5–51)
HEMODILUTION: NO
HGB BLD-MCNC: 13.3 G/DL (ref 13–17.7)
HOLD SPECIMEN: NORMAL
INHALED O2 CONCENTRATION: 34 %
INR PPP: 1.06 (ref 0.93–1.1)
LYMPHOCYTES # BLD AUTO: 1.2 10*3/MM3 (ref 0.7–3.1)
LYMPHOCYTES NFR BLD AUTO: 10.5 % (ref 19.6–45.3)
MCH RBC QN AUTO: 27.7 PG (ref 26.6–33)
MCHC RBC AUTO-ENTMCNC: 32.3 G/DL (ref 31.5–35.7)
MCV RBC AUTO: 85.9 FL (ref 79–97)
MODALITY: ABNORMAL
MONOCYTES # BLD AUTO: 1.3 10*3/MM3 (ref 0.1–0.9)
MONOCYTES NFR BLD AUTO: 11.3 % (ref 5–12)
NEUTROPHILS NFR BLD AUTO: 76.2 % (ref 42.7–76)
NEUTROPHILS NFR BLD AUTO: 8.6 10*3/MM3 (ref 1.7–7)
NRBC BLD AUTO-RTO: 0 /100 WBC (ref 0–0.2)
NT-PROBNP SERPL-MCNC: 4147 PG/ML (ref 0–900)
PCO2 BLDA: 49 MM HG (ref 35–48)
PH BLDA: 7.4 PH UNITS (ref 7.35–7.45)
PLATELET # BLD AUTO: 283 10*3/MM3 (ref 140–450)
PMV BLD AUTO: 9.1 FL (ref 6–12)
PO2 BLDA: 107.2 MM HG (ref 83–108)
POTASSIUM SERPL-SCNC: 4.3 MMOL/L (ref 3.5–5.2)
PROT SERPL-MCNC: 7.1 G/DL (ref 6–8.5)
PROTHROMBIN TIME: 11.4 SECONDS (ref 9.6–11.7)
RBC # BLD AUTO: 4.8 10*6/MM3 (ref 4.14–5.8)
SAO2 % BLDCOA: 98.1 % (ref 94–98)
SODIUM SERPL-SCNC: 143 MMOL/L (ref 136–145)
TROPONIN T SERPL-MCNC: <0.01 NG/ML (ref 0–0.03)
WBC # BLD AUTO: 11.3 10*3/MM3 (ref 3.4–10.8)

## 2020-08-22 PROCEDURE — 94799 UNLISTED PULMONARY SVC/PX: CPT

## 2020-08-22 PROCEDURE — 25010000002 ONDANSETRON PER 1 MG: Performed by: NURSE PRACTITIONER

## 2020-08-22 PROCEDURE — 25010000002 MORPHINE PER 10 MG: Performed by: NURSE PRACTITIONER

## 2020-08-22 PROCEDURE — 71045 X-RAY EXAM CHEST 1 VIEW: CPT

## 2020-08-22 PROCEDURE — 82803 BLOOD GASES ANY COMBINATION: CPT

## 2020-08-22 PROCEDURE — 25010000002 FUROSEMIDE PER 20 MG: Performed by: NURSE PRACTITIONER

## 2020-08-22 PROCEDURE — 25010000002 MORPHINE PER 10 MG: Performed by: PHYSICIAN ASSISTANT

## 2020-08-22 PROCEDURE — 94640 AIRWAY INHALATION TREATMENT: CPT

## 2020-08-22 PROCEDURE — 83880 ASSAY OF NATRIURETIC PEPTIDE: CPT | Performed by: NURSE PRACTITIONER

## 2020-08-22 PROCEDURE — 84484 ASSAY OF TROPONIN QUANT: CPT | Performed by: NURSE PRACTITIONER

## 2020-08-22 PROCEDURE — 36600 WITHDRAWAL OF ARTERIAL BLOOD: CPT

## 2020-08-22 PROCEDURE — 99285 EMERGENCY DEPT VISIT HI MDM: CPT

## 2020-08-22 PROCEDURE — 85730 THROMBOPLASTIN TIME PARTIAL: CPT | Performed by: NURSE PRACTITIONER

## 2020-08-22 PROCEDURE — 25010000002 CHLOROTHIAZIDE PER 500 MG: Performed by: PHYSICIAN ASSISTANT

## 2020-08-22 PROCEDURE — 25010000002 FUROSEMIDE PER 20 MG: Performed by: INTERNAL MEDICINE

## 2020-08-22 PROCEDURE — 93005 ELECTROCARDIOGRAM TRACING: CPT

## 2020-08-22 PROCEDURE — 85610 PROTHROMBIN TIME: CPT | Performed by: NURSE PRACTITIONER

## 2020-08-22 PROCEDURE — 85025 COMPLETE CBC W/AUTO DIFF WBC: CPT | Performed by: NURSE PRACTITIONER

## 2020-08-22 PROCEDURE — 93005 ELECTROCARDIOGRAM TRACING: CPT | Performed by: INTERNAL MEDICINE

## 2020-08-22 PROCEDURE — 80053 COMPREHEN METABOLIC PANEL: CPT | Performed by: NURSE PRACTITIONER

## 2020-08-22 PROCEDURE — 99223 1ST HOSP IP/OBS HIGH 75: CPT | Performed by: INTERNAL MEDICINE

## 2020-08-22 RX ORDER — FUROSEMIDE 10 MG/ML
80 INJECTION INTRAMUSCULAR; INTRAVENOUS 3 TIMES DAILY
Status: DISCONTINUED | OUTPATIENT
Start: 2020-08-22 | End: 2020-08-26 | Stop reason: HOSPADM

## 2020-08-22 RX ORDER — NITROGLYCERIN 20 MG/100ML
10-50 INJECTION INTRAVENOUS
Status: DISCONTINUED | OUTPATIENT
Start: 2020-08-22 | End: 2020-08-26 | Stop reason: HOSPADM

## 2020-08-22 RX ORDER — ACETAMINOPHEN 160 MG/5ML
650 SOLUTION ORAL EVERY 4 HOURS PRN
Status: DISCONTINUED | OUTPATIENT
Start: 2020-08-22 | End: 2020-08-26 | Stop reason: HOSPADM

## 2020-08-22 RX ORDER — GABAPENTIN 300 MG/1
300 CAPSULE ORAL 2 TIMES DAILY
Status: DISCONTINUED | OUTPATIENT
Start: 2020-08-22 | End: 2020-08-26 | Stop reason: HOSPADM

## 2020-08-22 RX ORDER — METOPROLOL SUCCINATE 50 MG/1
100 TABLET, EXTENDED RELEASE ORAL
Status: DISCONTINUED | OUTPATIENT
Start: 2020-08-23 | End: 2020-08-26 | Stop reason: HOSPADM

## 2020-08-22 RX ORDER — IPRATROPIUM BROMIDE AND ALBUTEROL SULFATE 2.5; .5 MG/3ML; MG/3ML
3 SOLUTION RESPIRATORY (INHALATION) EVERY 4 HOURS PRN
Status: DISCONTINUED | OUTPATIENT
Start: 2020-08-22 | End: 2020-08-26 | Stop reason: HOSPADM

## 2020-08-22 RX ORDER — CLOPIDOGREL BISULFATE 75 MG/1
75 TABLET ORAL DAILY
Status: DISCONTINUED | OUTPATIENT
Start: 2020-08-23 | End: 2020-08-26 | Stop reason: HOSPADM

## 2020-08-22 RX ORDER — ONDANSETRON 2 MG/ML
4 INJECTION INTRAMUSCULAR; INTRAVENOUS ONCE
Status: COMPLETED | OUTPATIENT
Start: 2020-08-22 | End: 2020-08-22

## 2020-08-22 RX ORDER — FUROSEMIDE 10 MG/ML
40 INJECTION INTRAMUSCULAR; INTRAVENOUS ONCE
Status: COMPLETED | OUTPATIENT
Start: 2020-08-22 | End: 2020-08-22

## 2020-08-22 RX ORDER — ECHINACEA PURPUREA EXTRACT 125 MG
1 TABLET ORAL AS NEEDED
Status: DISCONTINUED | OUTPATIENT
Start: 2020-08-22 | End: 2020-08-26 | Stop reason: HOSPADM

## 2020-08-22 RX ORDER — MAGNESIUM SULFATE HEPTAHYDRATE 40 MG/ML
2 INJECTION, SOLUTION INTRAVENOUS AS NEEDED
Status: DISCONTINUED | OUTPATIENT
Start: 2020-08-22 | End: 2020-08-26 | Stop reason: HOSPADM

## 2020-08-22 RX ORDER — CETIRIZINE HYDROCHLORIDE 10 MG/1
10 TABLET ORAL DAILY
Status: DISCONTINUED | OUTPATIENT
Start: 2020-08-23 | End: 2020-08-23

## 2020-08-22 RX ORDER — BISACODYL 10 MG
10 SUPPOSITORY, RECTAL RECTAL DAILY PRN
Status: DISCONTINUED | OUTPATIENT
Start: 2020-08-22 | End: 2020-08-26 | Stop reason: HOSPADM

## 2020-08-22 RX ORDER — RANOLAZINE 500 MG/1
1000 TABLET, EXTENDED RELEASE ORAL 2 TIMES DAILY
Status: DISCONTINUED | OUTPATIENT
Start: 2020-08-22 | End: 2020-08-26 | Stop reason: HOSPADM

## 2020-08-22 RX ORDER — SODIUM CHLORIDE 0.9 % (FLUSH) 0.9 %
10 SYRINGE (ML) INJECTION AS NEEDED
Status: DISCONTINUED | OUTPATIENT
Start: 2020-08-22 | End: 2020-08-26 | Stop reason: HOSPADM

## 2020-08-22 RX ORDER — NITROGLYCERIN 20 MG/100ML
5-200 INJECTION INTRAVENOUS
Status: DISCONTINUED | OUTPATIENT
Start: 2020-08-22 | End: 2020-08-22

## 2020-08-22 RX ORDER — ONDANSETRON 4 MG/1
4 TABLET, FILM COATED ORAL EVERY 6 HOURS PRN
Status: DISCONTINUED | OUTPATIENT
Start: 2020-08-22 | End: 2020-08-26 | Stop reason: HOSPADM

## 2020-08-22 RX ORDER — ACETAMINOPHEN 325 MG/1
650 TABLET ORAL EVERY 4 HOURS PRN
Status: DISCONTINUED | OUTPATIENT
Start: 2020-08-22 | End: 2020-08-26 | Stop reason: HOSPADM

## 2020-08-22 RX ORDER — ACETAMINOPHEN 650 MG/1
650 SUPPOSITORY RECTAL EVERY 4 HOURS PRN
Status: DISCONTINUED | OUTPATIENT
Start: 2020-08-22 | End: 2020-08-26 | Stop reason: HOSPADM

## 2020-08-22 RX ORDER — POTASSIUM CHLORIDE 1.5 G/1.77G
40 POWDER, FOR SOLUTION ORAL AS NEEDED
Status: DISCONTINUED | OUTPATIENT
Start: 2020-08-22 | End: 2020-08-26 | Stop reason: HOSPADM

## 2020-08-22 RX ORDER — TAMSULOSIN HYDROCHLORIDE 0.4 MG/1
1 CAPSULE ORAL 2 TIMES DAILY
COMMUNITY

## 2020-08-22 RX ORDER — CALCIUM CARBONATE 200(500)MG
2 TABLET,CHEWABLE ORAL 2 TIMES DAILY PRN
Status: DISCONTINUED | OUTPATIENT
Start: 2020-08-22 | End: 2020-08-26 | Stop reason: HOSPADM

## 2020-08-22 RX ORDER — MORPHINE SULFATE 4 MG/ML
4 INJECTION, SOLUTION INTRAMUSCULAR; INTRAVENOUS ONCE
Status: COMPLETED | OUTPATIENT
Start: 2020-08-22 | End: 2020-08-22

## 2020-08-22 RX ORDER — MAGNESIUM SULFATE HEPTAHYDRATE 40 MG/ML
4 INJECTION, SOLUTION INTRAVENOUS AS NEEDED
Status: DISCONTINUED | OUTPATIENT
Start: 2020-08-22 | End: 2020-08-26 | Stop reason: HOSPADM

## 2020-08-22 RX ORDER — AZELASTINE 1 MG/ML
2 SPRAY, METERED NASAL 2 TIMES DAILY
COMMUNITY
End: 2020-10-02 | Stop reason: HOSPADM

## 2020-08-22 RX ORDER — BUDESONIDE AND FORMOTEROL FUMARATE DIHYDRATE 160; 4.5 UG/1; UG/1
2 AEROSOL RESPIRATORY (INHALATION)
Status: DISCONTINUED | OUTPATIENT
Start: 2020-08-22 | End: 2020-08-26 | Stop reason: HOSPADM

## 2020-08-22 RX ORDER — SPIRONOLACTONE 25 MG/1
25 TABLET ORAL DAILY
Status: DISCONTINUED | OUTPATIENT
Start: 2020-08-23 | End: 2020-08-22 | Stop reason: SDUPTHER

## 2020-08-22 RX ORDER — CALCIUM GLUCONATE 20 MG/ML
1 INJECTION, SOLUTION INTRAVENOUS AS NEEDED
Status: DISCONTINUED | OUTPATIENT
Start: 2020-08-22 | End: 2020-08-26 | Stop reason: HOSPADM

## 2020-08-22 RX ORDER — MONTELUKAST SODIUM 10 MG/1
10 TABLET ORAL DAILY
Status: DISCONTINUED | OUTPATIENT
Start: 2020-08-23 | End: 2020-08-26 | Stop reason: HOSPADM

## 2020-08-22 RX ORDER — ROFLUMILAST 500 UG/1
500 TABLET ORAL DAILY
Status: DISCONTINUED | OUTPATIENT
Start: 2020-08-23 | End: 2020-08-26 | Stop reason: HOSPADM

## 2020-08-22 RX ORDER — SPIRONOLACTONE 25 MG/1
25 TABLET ORAL DAILY
Status: DISCONTINUED | OUTPATIENT
Start: 2020-08-23 | End: 2020-08-26 | Stop reason: HOSPADM

## 2020-08-22 RX ORDER — DULOXETIN HYDROCHLORIDE 30 MG/1
60 CAPSULE, DELAYED RELEASE ORAL DAILY
Status: DISCONTINUED | OUTPATIENT
Start: 2020-08-23 | End: 2020-08-26 | Stop reason: HOSPADM

## 2020-08-22 RX ORDER — ALUMINA, MAGNESIA, AND SIMETHICONE 2400; 2400; 240 MG/30ML; MG/30ML; MG/30ML
15 SUSPENSION ORAL EVERY 6 HOURS PRN
Status: DISCONTINUED | OUTPATIENT
Start: 2020-08-22 | End: 2020-08-26 | Stop reason: HOSPADM

## 2020-08-22 RX ORDER — POTASSIUM CHLORIDE 20 MEQ/1
40 TABLET, EXTENDED RELEASE ORAL AS NEEDED
Status: DISCONTINUED | OUTPATIENT
Start: 2020-08-22 | End: 2020-08-26 | Stop reason: HOSPADM

## 2020-08-22 RX ORDER — HYDROXYZINE HYDROCHLORIDE 25 MG/1
50 TABLET, FILM COATED ORAL 3 TIMES DAILY PRN
Status: DISCONTINUED | OUTPATIENT
Start: 2020-08-22 | End: 2020-08-26 | Stop reason: HOSPADM

## 2020-08-22 RX ORDER — IPRATROPIUM BROMIDE AND ALBUTEROL SULFATE 2.5; .5 MG/3ML; MG/3ML
3 SOLUTION RESPIRATORY (INHALATION) ONCE
Status: COMPLETED | OUTPATIENT
Start: 2020-08-22 | End: 2020-08-22

## 2020-08-22 RX ORDER — CHOLECALCIFEROL (VITAMIN D3) 125 MCG
5 CAPSULE ORAL NIGHTLY PRN
Status: DISCONTINUED | OUTPATIENT
Start: 2020-08-22 | End: 2020-08-26 | Stop reason: HOSPADM

## 2020-08-22 RX ORDER — TAMSULOSIN HYDROCHLORIDE 0.4 MG/1
0.4 CAPSULE ORAL NIGHTLY
Status: DISCONTINUED | OUTPATIENT
Start: 2020-08-22 | End: 2020-08-26 | Stop reason: HOSPADM

## 2020-08-22 RX ORDER — MORPHINE SULFATE 4 MG/ML
2 INJECTION, SOLUTION INTRAMUSCULAR; INTRAVENOUS EVERY 4 HOURS PRN
Status: DISPENSED | OUTPATIENT
Start: 2020-08-22 | End: 2020-08-23

## 2020-08-22 RX ORDER — DOCUSATE SODIUM 100 MG/1
100 CAPSULE, LIQUID FILLED ORAL 2 TIMES DAILY PRN
Status: DISCONTINUED | OUTPATIENT
Start: 2020-08-22 | End: 2020-08-26 | Stop reason: HOSPADM

## 2020-08-22 RX ORDER — ATORVASTATIN CALCIUM 40 MG/1
80 TABLET, FILM COATED ORAL DAILY
Status: DISCONTINUED | OUTPATIENT
Start: 2020-08-23 | End: 2020-08-26 | Stop reason: HOSPADM

## 2020-08-22 RX ORDER — ALBUTEROL SULFATE 2.5 MG/3ML
2.5 SOLUTION RESPIRATORY (INHALATION) EVERY 4 HOURS PRN
Status: DISCONTINUED | OUTPATIENT
Start: 2020-08-22 | End: 2020-08-22

## 2020-08-22 RX ORDER — CALCIUM GLUCONATE 20 MG/ML
2 INJECTION, SOLUTION INTRAVENOUS AS NEEDED
Status: DISCONTINUED | OUTPATIENT
Start: 2020-08-22 | End: 2020-08-26 | Stop reason: HOSPADM

## 2020-08-22 RX ORDER — ONDANSETRON 2 MG/ML
4 INJECTION INTRAMUSCULAR; INTRAVENOUS EVERY 6 HOURS PRN
Status: DISCONTINUED | OUTPATIENT
Start: 2020-08-22 | End: 2020-08-26 | Stop reason: HOSPADM

## 2020-08-22 RX ORDER — NITROGLYCERIN 0.4 MG/1
0.4 TABLET SUBLINGUAL
Status: DISCONTINUED | OUTPATIENT
Start: 2020-08-22 | End: 2020-08-26 | Stop reason: HOSPADM

## 2020-08-22 RX ORDER — ALBUTEROL SULFATE 90 UG/1
2 AEROSOL, METERED RESPIRATORY (INHALATION) EVERY 4 HOURS PRN
Status: DISCONTINUED | OUTPATIENT
Start: 2020-08-22 | End: 2020-08-22

## 2020-08-22 RX ORDER — SPIRONOLACTONE 25 MG/1
25 TABLET ORAL ONCE
Status: COMPLETED | OUTPATIENT
Start: 2020-08-22 | End: 2020-08-22

## 2020-08-22 RX ORDER — AZELASTINE 1 MG/ML
2 SPRAY, METERED NASAL 2 TIMES DAILY
Status: DISCONTINUED | OUTPATIENT
Start: 2020-08-22 | End: 2020-08-26 | Stop reason: HOSPADM

## 2020-08-22 RX ORDER — SODIUM CHLORIDE 0.9 % (FLUSH) 0.9 %
10 SYRINGE (ML) INJECTION EVERY 12 HOURS SCHEDULED
Status: DISCONTINUED | OUTPATIENT
Start: 2020-08-22 | End: 2020-08-26 | Stop reason: HOSPADM

## 2020-08-22 RX ADMIN — NITROGLYCERIN 10 MCG/MIN: 20 INJECTION INTRAVENOUS at 15:08

## 2020-08-22 RX ADMIN — MORPHINE SULFATE 2 MG: 4 INJECTION INTRAVENOUS at 22:07

## 2020-08-22 RX ADMIN — ALBUTEROL SULFATE 2.5 MG: 2.5 SOLUTION RESPIRATORY (INHALATION) at 22:40

## 2020-08-22 RX ADMIN — IPRATROPIUM BROMIDE AND ALBUTEROL SULFATE 3 ML: 2.5; .5 SOLUTION RESPIRATORY (INHALATION) at 16:02

## 2020-08-22 RX ADMIN — SPIRONOLACTONE 25 MG: 25 TABLET, FILM COATED ORAL at 22:04

## 2020-08-22 RX ADMIN — Medication 10 ML: at 22:08

## 2020-08-22 RX ADMIN — TAMSULOSIN HYDROCHLORIDE 0.4 MG: 0.4 CAPSULE ORAL at 22:04

## 2020-08-22 RX ADMIN — BUDESONIDE AND FORMOTEROL FUMARATE DIHYDRATE 2 PUFF: 160; 4.5 AEROSOL RESPIRATORY (INHALATION) at 22:38

## 2020-08-22 RX ADMIN — AZELASTINE HYDROCHLORIDE 2 SPRAY: 137 SPRAY, METERED NASAL at 22:34

## 2020-08-22 RX ADMIN — SODIUM CHLORIDE 250 MG: 9 INJECTION, SOLUTION INTRAVENOUS at 22:09

## 2020-08-22 RX ADMIN — ONDANSETRON 4 MG: 2 INJECTION INTRAMUSCULAR; INTRAVENOUS at 15:32

## 2020-08-22 RX ADMIN — FUROSEMIDE 40 MG: 10 INJECTION, SOLUTION INTRAMUSCULAR; INTRAVENOUS at 15:49

## 2020-08-22 RX ADMIN — RANOLAZINE 1000 MG: 500 TABLET, FILM COATED, EXTENDED RELEASE ORAL at 22:03

## 2020-08-22 RX ADMIN — GABAPENTIN 300 MG: 300 CAPSULE ORAL at 22:03

## 2020-08-22 RX ADMIN — MORPHINE SULFATE 4 MG: 4 INJECTION INTRAVENOUS at 15:32

## 2020-08-22 RX ADMIN — FUROSEMIDE 40 MG: 10 INJECTION, SOLUTION INTRAMUSCULAR; INTRAVENOUS at 19:22

## 2020-08-22 RX ADMIN — APIXABAN 5 MG: 5 TABLET, FILM COATED ORAL at 22:04

## 2020-08-22 RX ADMIN — MORPHINE SULFATE 4 MG: 4 INJECTION INTRAVENOUS at 18:26

## 2020-08-22 RX ADMIN — HYDROXYZINE HYDROCHLORIDE 50 MG: 25 TABLET, FILM COATED ORAL at 22:03

## 2020-08-23 LAB
ANION GAP SERPL CALCULATED.3IONS-SCNC: 8 MMOL/L (ref 5–15)
BASOPHILS # BLD AUTO: 0.1 10*3/MM3 (ref 0–0.2)
BASOPHILS NFR BLD AUTO: 1 % (ref 0–1.5)
BUN SERPL-MCNC: 15 MG/DL (ref 6–20)
BUN SERPL-MCNC: ABNORMAL MG/DL
BUN/CREAT SERPL: ABNORMAL
CALCIUM SPEC-SCNC: 8.8 MG/DL (ref 8.6–10.5)
CHLORIDE SERPL-SCNC: 98 MMOL/L (ref 98–107)
CO2 SERPL-SCNC: 34 MMOL/L (ref 22–29)
CREAT SERPL-MCNC: 1.31 MG/DL (ref 0.76–1.27)
DEPRECATED RDW RBC AUTO: 45.5 FL (ref 37–54)
EOSINOPHIL # BLD AUTO: 0.3 10*3/MM3 (ref 0–0.4)
EOSINOPHIL NFR BLD AUTO: 2.4 % (ref 0.3–6.2)
ERYTHROCYTE [DISTWIDTH] IN BLOOD BY AUTOMATED COUNT: 15.3 % (ref 12.3–15.4)
GFR SERPL CREATININE-BSD FRML MDRD: 56 ML/MIN/1.73
GLUCOSE SERPL-MCNC: 119 MG/DL (ref 65–99)
HCT VFR BLD AUTO: 39.1 % (ref 37.5–51)
HGB BLD-MCNC: 12.6 G/DL (ref 13–17.7)
LYMPHOCYTES # BLD AUTO: 1.6 10*3/MM3 (ref 0.7–3.1)
LYMPHOCYTES NFR BLD AUTO: 14.8 % (ref 19.6–45.3)
MCH RBC QN AUTO: 27.6 PG (ref 26.6–33)
MCHC RBC AUTO-ENTMCNC: 32.3 G/DL (ref 31.5–35.7)
MCV RBC AUTO: 85.4 FL (ref 79–97)
MONOCYTES # BLD AUTO: 1.8 10*3/MM3 (ref 0.1–0.9)
MONOCYTES NFR BLD AUTO: 16.1 % (ref 5–12)
NEUTROPHILS NFR BLD AUTO: 65.7 % (ref 42.7–76)
NEUTROPHILS NFR BLD AUTO: 7.2 10*3/MM3 (ref 1.7–7)
NRBC BLD AUTO-RTO: 0 /100 WBC (ref 0–0.2)
NT-PROBNP SERPL-MCNC: 3030 PG/ML (ref 0–900)
PLATELET # BLD AUTO: 261 10*3/MM3 (ref 140–450)
PMV BLD AUTO: 9 FL (ref 6–12)
POTASSIUM SERPL-SCNC: 4.6 MMOL/L (ref 3.5–5.2)
RBC # BLD AUTO: 4.57 10*6/MM3 (ref 4.14–5.8)
SODIUM SERPL-SCNC: 140 MMOL/L (ref 136–145)
TROPONIN T SERPL-MCNC: 0.02 NG/ML (ref 0–0.03)
WBC # BLD AUTO: 10.9 10*3/MM3 (ref 3.4–10.8)

## 2020-08-23 PROCEDURE — 83880 ASSAY OF NATRIURETIC PEPTIDE: CPT | Performed by: PHYSICIAN ASSISTANT

## 2020-08-23 PROCEDURE — 94799 UNLISTED PULMONARY SVC/PX: CPT

## 2020-08-23 PROCEDURE — 25010000002 FUROSEMIDE PER 20 MG: Performed by: INTERNAL MEDICINE

## 2020-08-23 PROCEDURE — 25010000002 MORPHINE PER 10 MG: Performed by: PHYSICIAN ASSISTANT

## 2020-08-23 PROCEDURE — 25010000002 CHLOROTHIAZIDE PER 500 MG: Performed by: INTERNAL MEDICINE

## 2020-08-23 PROCEDURE — 84484 ASSAY OF TROPONIN QUANT: CPT | Performed by: NURSE PRACTITIONER

## 2020-08-23 PROCEDURE — 80048 BASIC METABOLIC PNL TOTAL CA: CPT | Performed by: PHYSICIAN ASSISTANT

## 2020-08-23 PROCEDURE — 85025 COMPLETE CBC W/AUTO DIFF WBC: CPT | Performed by: PHYSICIAN ASSISTANT

## 2020-08-23 PROCEDURE — 99222 1ST HOSP IP/OBS MODERATE 55: CPT | Performed by: INTERNAL MEDICINE

## 2020-08-23 PROCEDURE — 99233 SBSQ HOSP IP/OBS HIGH 50: CPT | Performed by: INTERNAL MEDICINE

## 2020-08-23 PROCEDURE — 25010000003 MILRINONE LACTATE IN DEXTROSE 20-5 MG/100ML-% SOLUTION: Performed by: INTERNAL MEDICINE

## 2020-08-23 PROCEDURE — 25010000002 ONDANSETRON PER 1 MG: Performed by: PHYSICIAN ASSISTANT

## 2020-08-23 RX ORDER — MILRINONE LACTATE 0.2 MG/ML
.25-.75 INJECTION, SOLUTION INTRAVENOUS
Status: DISCONTINUED | OUTPATIENT
Start: 2020-08-23 | End: 2020-08-25

## 2020-08-23 RX ADMIN — MONTELUKAST SODIUM 10 MG: 10 TABLET, COATED ORAL at 09:15

## 2020-08-23 RX ADMIN — IPRATROPIUM BROMIDE AND ALBUTEROL SULFATE 3 ML: 2.5; .5 SOLUTION RESPIRATORY (INHALATION) at 11:40

## 2020-08-23 RX ADMIN — IPRATROPIUM BROMIDE AND ALBUTEROL SULFATE 3 ML: 2.5; .5 SOLUTION RESPIRATORY (INHALATION) at 22:44

## 2020-08-23 RX ADMIN — IPRATROPIUM BROMIDE AND ALBUTEROL SULFATE 3 ML: 2.5; .5 SOLUTION RESPIRATORY (INHALATION) at 18:41

## 2020-08-23 RX ADMIN — IPRATROPIUM BROMIDE AND ALBUTEROL SULFATE 3 ML: 2.5; .5 SOLUTION RESPIRATORY (INHALATION) at 15:48

## 2020-08-23 RX ADMIN — MILRINONE LACTATE IN DEXTROSE 0.25 MCG/KG/MIN: 200 INJECTION, SOLUTION INTRAVENOUS at 23:58

## 2020-08-23 RX ADMIN — Medication 10 ML: at 02:19

## 2020-08-23 RX ADMIN — GABAPENTIN 300 MG: 300 CAPSULE ORAL at 20:56

## 2020-08-23 RX ADMIN — Medication 10 ML: at 02:17

## 2020-08-23 RX ADMIN — FUROSEMIDE 80 MG: 10 INJECTION, SOLUTION INTRAMUSCULAR; INTRAVENOUS at 09:15

## 2020-08-23 RX ADMIN — GABAPENTIN 300 MG: 300 CAPSULE ORAL at 09:15

## 2020-08-23 RX ADMIN — ROFLUMILAST 500 MCG: 500 TABLET ORAL at 09:15

## 2020-08-23 RX ADMIN — CETIRIZINE HYDROCHLORIDE 10 MG: 10 TABLET, FILM COATED ORAL at 09:15

## 2020-08-23 RX ADMIN — CLOPIDOGREL BISULFATE 75 MG: 75 TABLET ORAL at 09:15

## 2020-08-23 RX ADMIN — IPRATROPIUM BROMIDE AND ALBUTEROL SULFATE 3 ML: 2.5; .5 SOLUTION RESPIRATORY (INHALATION) at 07:55

## 2020-08-23 RX ADMIN — APIXABAN 5 MG: 5 TABLET, FILM COATED ORAL at 10:36

## 2020-08-23 RX ADMIN — AZELASTINE HYDROCHLORIDE 2 SPRAY: 137 SPRAY, METERED NASAL at 09:14

## 2020-08-23 RX ADMIN — RANOLAZINE 1000 MG: 500 TABLET, FILM COATED, EXTENDED RELEASE ORAL at 20:57

## 2020-08-23 RX ADMIN — MORPHINE SULFATE 2 MG: 4 INJECTION INTRAVENOUS at 07:23

## 2020-08-23 RX ADMIN — FUROSEMIDE 80 MG: 10 INJECTION, SOLUTION INTRAMUSCULAR; INTRAVENOUS at 20:56

## 2020-08-23 RX ADMIN — NITROGLYCERIN 50 MCG/MIN: 20 INJECTION INTRAVENOUS at 10:24

## 2020-08-23 RX ADMIN — MORPHINE SULFATE 2 MG: 4 INJECTION INTRAVENOUS at 19:42

## 2020-08-23 RX ADMIN — APIXABAN 5 MG: 5 TABLET, FILM COATED ORAL at 20:55

## 2020-08-23 RX ADMIN — BUDESONIDE AND FORMOTEROL FUMARATE DIHYDRATE 2 PUFF: 160; 4.5 AEROSOL RESPIRATORY (INHALATION) at 18:43

## 2020-08-23 RX ADMIN — ATORVASTATIN CALCIUM 80 MG: 40 TABLET, FILM COATED ORAL at 09:15

## 2020-08-23 RX ADMIN — MORPHINE SULFATE 2 MG: 4 INJECTION INTRAVENOUS at 11:57

## 2020-08-23 RX ADMIN — METOPROLOL SUCCINATE 100 MG: 50 TABLET, EXTENDED RELEASE ORAL at 09:15

## 2020-08-23 RX ADMIN — Medication 10 ML: at 09:16

## 2020-08-23 RX ADMIN — SPIRONOLACTONE 25 MG: 25 TABLET, FILM COATED ORAL at 09:15

## 2020-08-23 RX ADMIN — ONDANSETRON 4 MG: 2 INJECTION INTRAMUSCULAR; INTRAVENOUS at 16:04

## 2020-08-23 RX ADMIN — SODIUM CHLORIDE 250 MG: 9 INJECTION, SOLUTION INTRAVENOUS at 09:24

## 2020-08-23 RX ADMIN — MORPHINE SULFATE 2 MG: 4 INJECTION INTRAVENOUS at 02:16

## 2020-08-23 RX ADMIN — ONDANSETRON 4 MG: 2 INJECTION INTRAMUSCULAR; INTRAVENOUS at 09:28

## 2020-08-23 RX ADMIN — TAMSULOSIN HYDROCHLORIDE 0.4 MG: 0.4 CAPSULE ORAL at 20:56

## 2020-08-23 RX ADMIN — MILRINONE LACTATE IN DEXTROSE 0.25 MCG/KG/MIN: 200 INJECTION, SOLUTION INTRAVENOUS at 15:18

## 2020-08-23 RX ADMIN — AZELASTINE HYDROCHLORIDE 2 SPRAY: 137 SPRAY, METERED NASAL at 20:56

## 2020-08-23 RX ADMIN — Medication 10 ML: at 20:58

## 2020-08-23 RX ADMIN — DULOXETINE HYDROCHLORIDE 60 MG: 30 CAPSULE, DELAYED RELEASE ORAL at 09:15

## 2020-08-23 RX ADMIN — RANOLAZINE 1000 MG: 500 TABLET, FILM COATED, EXTENDED RELEASE ORAL at 09:15

## 2020-08-23 RX ADMIN — BUDESONIDE AND FORMOTEROL FUMARATE DIHYDRATE 2 PUFF: 160; 4.5 AEROSOL RESPIRATORY (INHALATION) at 07:55

## 2020-08-23 RX ADMIN — IPRATROPIUM BROMIDE AND ALBUTEROL SULFATE 3 ML: 2.5; .5 SOLUTION RESPIRATORY (INHALATION) at 02:22

## 2020-08-23 RX ADMIN — FUROSEMIDE 80 MG: 10 INJECTION, SOLUTION INTRAMUSCULAR; INTRAVENOUS at 16:04

## 2020-08-24 LAB
ANION GAP SERPL CALCULATED.3IONS-SCNC: 13 MMOL/L (ref 5–15)
BASOPHILS # BLD AUTO: 0.1 10*3/MM3 (ref 0–0.2)
BASOPHILS NFR BLD AUTO: 0.6 % (ref 0–1.5)
BUN SERPL-MCNC: 23 MG/DL (ref 6–20)
BUN SERPL-MCNC: ABNORMAL MG/DL
BUN/CREAT SERPL: ABNORMAL
CALCIUM SPEC-SCNC: 8.6 MG/DL (ref 8.6–10.5)
CHLORIDE SERPL-SCNC: 88 MMOL/L (ref 98–107)
CO2 SERPL-SCNC: 34 MMOL/L (ref 22–29)
CREAT SERPL-MCNC: 1.38 MG/DL (ref 0.76–1.27)
DEPRECATED RDW RBC AUTO: 44.2 FL (ref 37–54)
EOSINOPHIL # BLD AUTO: 0.3 10*3/MM3 (ref 0–0.4)
EOSINOPHIL NFR BLD AUTO: 2.9 % (ref 0.3–6.2)
ERYTHROCYTE [DISTWIDTH] IN BLOOD BY AUTOMATED COUNT: 15 % (ref 12.3–15.4)
GFR SERPL CREATININE-BSD FRML MDRD: 53 ML/MIN/1.73
GLUCOSE SERPL-MCNC: 187 MG/DL (ref 65–99)
HCT VFR BLD AUTO: 38.8 % (ref 37.5–51)
HGB BLD-MCNC: 12.9 G/DL (ref 13–17.7)
LYMPHOCYTES # BLD AUTO: 1.3 10*3/MM3 (ref 0.7–3.1)
LYMPHOCYTES NFR BLD AUTO: 12.9 % (ref 19.6–45.3)
MCH RBC QN AUTO: 27.8 PG (ref 26.6–33)
MCHC RBC AUTO-ENTMCNC: 33.2 G/DL (ref 31.5–35.7)
MCV RBC AUTO: 83.6 FL (ref 79–97)
MONOCYTES # BLD AUTO: 1 10*3/MM3 (ref 0.1–0.9)
MONOCYTES NFR BLD AUTO: 10 % (ref 5–12)
NEUTROPHILS NFR BLD AUTO: 7.5 10*3/MM3 (ref 1.7–7)
NEUTROPHILS NFR BLD AUTO: 73.6 % (ref 42.7–76)
NRBC BLD AUTO-RTO: 0.1 /100 WBC (ref 0–0.2)
NT-PROBNP SERPL-MCNC: 895 PG/ML (ref 0–900)
PLATELET # BLD AUTO: 250 10*3/MM3 (ref 140–450)
PMV BLD AUTO: 9.3 FL (ref 6–12)
POTASSIUM SERPL-SCNC: 3.9 MMOL/L (ref 3.5–5.2)
RBC # BLD AUTO: 4.64 10*6/MM3 (ref 4.14–5.8)
SODIUM SERPL-SCNC: 135 MMOL/L (ref 136–145)
WBC # BLD AUTO: 10.2 10*3/MM3 (ref 3.4–10.8)

## 2020-08-24 PROCEDURE — 94799 UNLISTED PULMONARY SVC/PX: CPT

## 2020-08-24 PROCEDURE — 25010000003 MILRINONE LACTATE IN DEXTROSE 20-5 MG/100ML-% SOLUTION: Performed by: INTERNAL MEDICINE

## 2020-08-24 PROCEDURE — 99232 SBSQ HOSP IP/OBS MODERATE 35: CPT | Performed by: HOSPITALIST

## 2020-08-24 PROCEDURE — 80048 BASIC METABOLIC PNL TOTAL CA: CPT | Performed by: PHYSICIAN ASSISTANT

## 2020-08-24 PROCEDURE — 99232 SBSQ HOSP IP/OBS MODERATE 35: CPT | Performed by: INTERNAL MEDICINE

## 2020-08-24 PROCEDURE — 83880 ASSAY OF NATRIURETIC PEPTIDE: CPT | Performed by: PHYSICIAN ASSISTANT

## 2020-08-24 PROCEDURE — 85025 COMPLETE CBC W/AUTO DIFF WBC: CPT | Performed by: PHYSICIAN ASSISTANT

## 2020-08-24 PROCEDURE — 63710000001 ONDANSETRON PER 8 MG: Performed by: PHYSICIAN ASSISTANT

## 2020-08-24 PROCEDURE — 25010000002 FUROSEMIDE PER 20 MG: Performed by: INTERNAL MEDICINE

## 2020-08-24 RX ADMIN — FUROSEMIDE 80 MG: 10 INJECTION, SOLUTION INTRAMUSCULAR; INTRAVENOUS at 09:04

## 2020-08-24 RX ADMIN — METOPROLOL SUCCINATE 100 MG: 50 TABLET, EXTENDED RELEASE ORAL at 09:04

## 2020-08-24 RX ADMIN — AZELASTINE HYDROCHLORIDE 2 SPRAY: 137 SPRAY, METERED NASAL at 09:36

## 2020-08-24 RX ADMIN — APIXABAN 5 MG: 5 TABLET, FILM COATED ORAL at 21:18

## 2020-08-24 RX ADMIN — ONDANSETRON HYDROCHLORIDE 4 MG: 4 TABLET, FILM COATED ORAL at 09:04

## 2020-08-24 RX ADMIN — ATORVASTATIN CALCIUM 80 MG: 40 TABLET, FILM COATED ORAL at 09:04

## 2020-08-24 RX ADMIN — IPRATROPIUM BROMIDE AND ALBUTEROL SULFATE 3 ML: 2.5; .5 SOLUTION RESPIRATORY (INHALATION) at 18:28

## 2020-08-24 RX ADMIN — APIXABAN 5 MG: 5 TABLET, FILM COATED ORAL at 09:04

## 2020-08-24 RX ADMIN — GABAPENTIN 300 MG: 300 CAPSULE ORAL at 21:18

## 2020-08-24 RX ADMIN — TAMSULOSIN HYDROCHLORIDE 0.4 MG: 0.4 CAPSULE ORAL at 21:18

## 2020-08-24 RX ADMIN — IPRATROPIUM BROMIDE AND ALBUTEROL SULFATE 3 ML: 2.5; .5 SOLUTION RESPIRATORY (INHALATION) at 03:14

## 2020-08-24 RX ADMIN — CLOPIDOGREL BISULFATE 75 MG: 75 TABLET ORAL at 09:04

## 2020-08-24 RX ADMIN — BUDESONIDE AND FORMOTEROL FUMARATE DIHYDRATE 2 PUFF: 160; 4.5 AEROSOL RESPIRATORY (INHALATION) at 18:32

## 2020-08-24 RX ADMIN — MILRINONE LACTATE IN DEXTROSE 0.25 MCG/KG/MIN: 200 INJECTION, SOLUTION INTRAVENOUS at 19:51

## 2020-08-24 RX ADMIN — RANOLAZINE 1000 MG: 500 TABLET, FILM COATED, EXTENDED RELEASE ORAL at 21:18

## 2020-08-24 RX ADMIN — RANOLAZINE 1000 MG: 500 TABLET, FILM COATED, EXTENDED RELEASE ORAL at 09:03

## 2020-08-24 RX ADMIN — MONTELUKAST SODIUM 10 MG: 10 TABLET, COATED ORAL at 09:04

## 2020-08-24 RX ADMIN — MILRINONE LACTATE IN DEXTROSE 0.25 MCG/KG/MIN: 200 INJECTION, SOLUTION INTRAVENOUS at 09:41

## 2020-08-24 RX ADMIN — ONDANSETRON HYDROCHLORIDE 4 MG: 4 TABLET, FILM COATED ORAL at 16:41

## 2020-08-24 RX ADMIN — ROFLUMILAST 500 MCG: 500 TABLET ORAL at 09:04

## 2020-08-24 RX ADMIN — HYDROXYZINE HYDROCHLORIDE 50 MG: 25 TABLET, FILM COATED ORAL at 16:45

## 2020-08-24 RX ADMIN — DULOXETINE HYDROCHLORIDE 60 MG: 30 CAPSULE, DELAYED RELEASE ORAL at 09:04

## 2020-08-24 RX ADMIN — FUROSEMIDE 80 MG: 10 INJECTION, SOLUTION INTRAMUSCULAR; INTRAVENOUS at 16:41

## 2020-08-24 RX ADMIN — GABAPENTIN 300 MG: 300 CAPSULE ORAL at 09:04

## 2020-08-24 RX ADMIN — Medication 10 ML: at 09:05

## 2020-08-24 RX ADMIN — HYDROXYZINE HYDROCHLORIDE 50 MG: 25 TABLET, FILM COATED ORAL at 21:18

## 2020-08-24 RX ADMIN — CALCIUM CARBONATE (ANTACID) CHEW TAB 500 MG 2 TABLET: 500 CHEW TAB at 09:15

## 2020-08-24 RX ADMIN — BUDESONIDE AND FORMOTEROL FUMARATE DIHYDRATE 2 PUFF: 160; 4.5 AEROSOL RESPIRATORY (INHALATION) at 06:47

## 2020-08-24 RX ADMIN — Medication 10 ML: at 21:19

## 2020-08-24 RX ADMIN — ALUMINUM HYDROXIDE, MAGNESIUM HYDROXIDE, AND DIMETHICONE 15 ML: 400; 400; 40 SUSPENSION ORAL at 12:59

## 2020-08-24 RX ADMIN — SPIRONOLACTONE 25 MG: 25 TABLET, FILM COATED ORAL at 09:04

## 2020-08-24 RX ADMIN — FUROSEMIDE 80 MG: 10 INJECTION, SOLUTION INTRAMUSCULAR; INTRAVENOUS at 21:19

## 2020-08-24 RX ADMIN — HYDROXYZINE HYDROCHLORIDE 50 MG: 25 TABLET, FILM COATED ORAL at 09:15

## 2020-08-24 RX ADMIN — ACETAMINOPHEN 650 MG: 325 TABLET, FILM COATED ORAL at 12:56

## 2020-08-24 RX ADMIN — AZELASTINE HYDROCHLORIDE 2 SPRAY: 137 SPRAY, METERED NASAL at 21:44

## 2020-08-25 LAB
ANION GAP SERPL CALCULATED.3IONS-SCNC: 11 MMOL/L (ref 5–15)
BASOPHILS # BLD AUTO: 0.1 10*3/MM3 (ref 0–0.2)
BASOPHILS NFR BLD AUTO: 0.6 % (ref 0–1.5)
BUN SERPL-MCNC: 24 MG/DL (ref 6–20)
BUN SERPL-MCNC: ABNORMAL MG/DL
BUN/CREAT SERPL: ABNORMAL
CALCIUM SPEC-SCNC: 8.6 MG/DL (ref 8.6–10.5)
CHLORIDE SERPL-SCNC: 89 MMOL/L (ref 98–107)
CO2 SERPL-SCNC: 35 MMOL/L (ref 22–29)
CREAT SERPL-MCNC: 1.37 MG/DL (ref 0.76–1.27)
DEPRECATED RDW RBC AUTO: 43.8 FL (ref 37–54)
EOSINOPHIL # BLD AUTO: 0.3 10*3/MM3 (ref 0–0.4)
EOSINOPHIL NFR BLD AUTO: 2.4 % (ref 0.3–6.2)
ERYTHROCYTE [DISTWIDTH] IN BLOOD BY AUTOMATED COUNT: 14.9 % (ref 12.3–15.4)
GFR SERPL CREATININE-BSD FRML MDRD: 54 ML/MIN/1.73
GLUCOSE SERPL-MCNC: 117 MG/DL (ref 65–99)
HCT VFR BLD AUTO: 39.8 % (ref 37.5–51)
HGB BLD-MCNC: 13 G/DL (ref 13–17.7)
LYMPHOCYTES # BLD AUTO: 1.3 10*3/MM3 (ref 0.7–3.1)
LYMPHOCYTES NFR BLD AUTO: 11.7 % (ref 19.6–45.3)
MCH RBC QN AUTO: 27.5 PG (ref 26.6–33)
MCHC RBC AUTO-ENTMCNC: 32.6 G/DL (ref 31.5–35.7)
MCV RBC AUTO: 84.2 FL (ref 79–97)
MONOCYTES # BLD AUTO: 1.4 10*3/MM3 (ref 0.1–0.9)
MONOCYTES NFR BLD AUTO: 12.6 % (ref 5–12)
NEUTROPHILS NFR BLD AUTO: 7.9 10*3/MM3 (ref 1.7–7)
NEUTROPHILS NFR BLD AUTO: 72.7 % (ref 42.7–76)
NRBC BLD AUTO-RTO: 0.1 /100 WBC (ref 0–0.2)
NT-PROBNP SERPL-MCNC: 619.9 PG/ML (ref 0–900)
PLATELET # BLD AUTO: 257 10*3/MM3 (ref 140–450)
PMV BLD AUTO: 9.4 FL (ref 6–12)
POTASSIUM SERPL-SCNC: 3.6 MMOL/L (ref 3.5–5.2)
RBC # BLD AUTO: 4.72 10*6/MM3 (ref 4.14–5.8)
SODIUM SERPL-SCNC: 135 MMOL/L (ref 136–145)
WBC # BLD AUTO: 10.8 10*3/MM3 (ref 3.4–10.8)

## 2020-08-25 PROCEDURE — 94799 UNLISTED PULMONARY SVC/PX: CPT

## 2020-08-25 PROCEDURE — 99232 SBSQ HOSP IP/OBS MODERATE 35: CPT | Performed by: INTERNAL MEDICINE

## 2020-08-25 PROCEDURE — 85025 COMPLETE CBC W/AUTO DIFF WBC: CPT | Performed by: PHYSICIAN ASSISTANT

## 2020-08-25 PROCEDURE — 80048 BASIC METABOLIC PNL TOTAL CA: CPT | Performed by: PHYSICIAN ASSISTANT

## 2020-08-25 PROCEDURE — 25010000002 CHLOROTHIAZIDE PER 500 MG: Performed by: INTERNAL MEDICINE

## 2020-08-25 PROCEDURE — 83880 ASSAY OF NATRIURETIC PEPTIDE: CPT | Performed by: PHYSICIAN ASSISTANT

## 2020-08-25 PROCEDURE — 25010000003 MILRINONE LACTATE IN DEXTROSE 20-5 MG/100ML-% SOLUTION: Performed by: INTERNAL MEDICINE

## 2020-08-25 PROCEDURE — 99232 SBSQ HOSP IP/OBS MODERATE 35: CPT | Performed by: HOSPITALIST

## 2020-08-25 PROCEDURE — 25010000002 FUROSEMIDE PER 20 MG: Performed by: INTERNAL MEDICINE

## 2020-08-25 RX ADMIN — SODIUM CHLORIDE 250 MG: 9 INJECTION, SOLUTION INTRAVENOUS at 17:35

## 2020-08-25 RX ADMIN — SPIRONOLACTONE 25 MG: 25 TABLET, FILM COATED ORAL at 08:56

## 2020-08-25 RX ADMIN — FUROSEMIDE 80 MG: 10 INJECTION, SOLUTION INTRAMUSCULAR; INTRAVENOUS at 08:57

## 2020-08-25 RX ADMIN — MILRINONE LACTATE IN DEXTROSE 0.25 MCG/KG/MIN: 200 INJECTION, SOLUTION INTRAVENOUS at 04:40

## 2020-08-25 RX ADMIN — HYDROXYZINE HYDROCHLORIDE 50 MG: 25 TABLET, FILM COATED ORAL at 22:00

## 2020-08-25 RX ADMIN — DULOXETINE HYDROCHLORIDE 60 MG: 30 CAPSULE, DELAYED RELEASE ORAL at 08:57

## 2020-08-25 RX ADMIN — MAGNESIUM HYDROXIDE 10 ML: 2400 SUSPENSION ORAL at 09:01

## 2020-08-25 RX ADMIN — TAMSULOSIN HYDROCHLORIDE 0.4 MG: 0.4 CAPSULE ORAL at 21:43

## 2020-08-25 RX ADMIN — ROFLUMILAST 500 MCG: 500 TABLET ORAL at 08:56

## 2020-08-25 RX ADMIN — Medication 10 ML: at 08:57

## 2020-08-25 RX ADMIN — ATORVASTATIN CALCIUM 80 MG: 40 TABLET, FILM COATED ORAL at 08:57

## 2020-08-25 RX ADMIN — IPRATROPIUM BROMIDE AND ALBUTEROL SULFATE 3 ML: 2.5; .5 SOLUTION RESPIRATORY (INHALATION) at 08:01

## 2020-08-25 RX ADMIN — APIXABAN 5 MG: 5 TABLET, FILM COATED ORAL at 21:43

## 2020-08-25 RX ADMIN — HYDROXYZINE HYDROCHLORIDE 50 MG: 25 TABLET, FILM COATED ORAL at 08:57

## 2020-08-25 RX ADMIN — IPRATROPIUM BROMIDE AND ALBUTEROL SULFATE 3 ML: 2.5; .5 SOLUTION RESPIRATORY (INHALATION) at 15:53

## 2020-08-25 RX ADMIN — APIXABAN 5 MG: 5 TABLET, FILM COATED ORAL at 08:57

## 2020-08-25 RX ADMIN — BUDESONIDE AND FORMOTEROL FUMARATE DIHYDRATE 2 PUFF: 160; 4.5 AEROSOL RESPIRATORY (INHALATION) at 08:01

## 2020-08-25 RX ADMIN — AZELASTINE HYDROCHLORIDE 2 SPRAY: 137 SPRAY, METERED NASAL at 22:06

## 2020-08-25 RX ADMIN — CALCIUM CARBONATE (ANTACID) CHEW TAB 500 MG 2 TABLET: 500 CHEW TAB at 08:56

## 2020-08-25 RX ADMIN — FUROSEMIDE 80 MG: 10 INJECTION, SOLUTION INTRAMUSCULAR; INTRAVENOUS at 15:16

## 2020-08-25 RX ADMIN — GABAPENTIN 300 MG: 300 CAPSULE ORAL at 08:56

## 2020-08-25 RX ADMIN — RANOLAZINE 1000 MG: 500 TABLET, FILM COATED, EXTENDED RELEASE ORAL at 08:56

## 2020-08-25 RX ADMIN — FUROSEMIDE 80 MG: 10 INJECTION, SOLUTION INTRAMUSCULAR; INTRAVENOUS at 21:43

## 2020-08-25 RX ADMIN — GABAPENTIN 300 MG: 300 CAPSULE ORAL at 21:42

## 2020-08-25 RX ADMIN — POTASSIUM CHLORIDE 40 MEQ: 1500 TABLET, EXTENDED RELEASE ORAL at 15:16

## 2020-08-25 RX ADMIN — Medication 10 ML: at 21:43

## 2020-08-25 RX ADMIN — IPRATROPIUM BROMIDE AND ALBUTEROL SULFATE 3 ML: 2.5; .5 SOLUTION RESPIRATORY (INHALATION) at 12:15

## 2020-08-25 RX ADMIN — POTASSIUM CHLORIDE 40 MEQ: 1500 TABLET, EXTENDED RELEASE ORAL at 08:57

## 2020-08-25 RX ADMIN — MONTELUKAST SODIUM 10 MG: 10 TABLET, COATED ORAL at 08:56

## 2020-08-25 RX ADMIN — CALCIUM CARBONATE (ANTACID) CHEW TAB 500 MG 2 TABLET: 500 CHEW TAB at 22:00

## 2020-08-25 RX ADMIN — CLOPIDOGREL BISULFATE 75 MG: 75 TABLET ORAL at 08:56

## 2020-08-25 RX ADMIN — BUDESONIDE AND FORMOTEROL FUMARATE DIHYDRATE 2 PUFF: 160; 4.5 AEROSOL RESPIRATORY (INHALATION) at 20:34

## 2020-08-25 RX ADMIN — RANOLAZINE 1000 MG: 500 TABLET, FILM COATED, EXTENDED RELEASE ORAL at 21:43

## 2020-08-25 RX ADMIN — DOCUSATE SODIUM 100 MG: 100 CAPSULE, LIQUID FILLED ORAL at 09:02

## 2020-08-25 RX ADMIN — METOPROLOL SUCCINATE 100 MG: 50 TABLET, EXTENDED RELEASE ORAL at 08:57

## 2020-08-25 RX ADMIN — AZELASTINE HYDROCHLORIDE 2 SPRAY: 137 SPRAY, METERED NASAL at 08:57

## 2020-08-26 VITALS
HEIGHT: 70 IN | WEIGHT: 302.47 LBS | BODY MASS INDEX: 43.3 KG/M2 | RESPIRATION RATE: 17 BRPM | TEMPERATURE: 97.5 F | DIASTOLIC BLOOD PRESSURE: 69 MMHG | OXYGEN SATURATION: 97 % | HEART RATE: 71 BPM | SYSTOLIC BLOOD PRESSURE: 115 MMHG

## 2020-08-26 LAB
ANION GAP SERPL CALCULATED.3IONS-SCNC: 10 MMOL/L (ref 5–15)
BASOPHILS # BLD AUTO: 0.1 10*3/MM3 (ref 0–0.2)
BASOPHILS NFR BLD AUTO: 0.9 % (ref 0–1.5)
BUN SERPL-MCNC: 27 MG/DL (ref 6–20)
BUN SERPL-MCNC: ABNORMAL MG/DL
BUN/CREAT SERPL: ABNORMAL
CALCIUM SPEC-SCNC: 8.9 MG/DL (ref 8.6–10.5)
CHLORIDE SERPL-SCNC: 90 MMOL/L (ref 98–107)
CO2 SERPL-SCNC: 33 MMOL/L (ref 22–29)
CREAT SERPL-MCNC: 1.44 MG/DL (ref 0.76–1.27)
DEPRECATED RDW RBC AUTO: 45.1 FL (ref 37–54)
EOSINOPHIL # BLD AUTO: 0.2 10*3/MM3 (ref 0–0.4)
EOSINOPHIL NFR BLD AUTO: 2.1 % (ref 0.3–6.2)
ERYTHROCYTE [DISTWIDTH] IN BLOOD BY AUTOMATED COUNT: 15.1 % (ref 12.3–15.4)
GFR SERPL CREATININE-BSD FRML MDRD: 51 ML/MIN/1.73
GLUCOSE SERPL-MCNC: 106 MG/DL (ref 65–99)
HCT VFR BLD AUTO: 41 % (ref 37.5–51)
HGB BLD-MCNC: 13.4 G/DL (ref 13–17.7)
LYMPHOCYTES # BLD AUTO: 1.7 10*3/MM3 (ref 0.7–3.1)
LYMPHOCYTES NFR BLD AUTO: 16.9 % (ref 19.6–45.3)
MCH RBC QN AUTO: 27.6 PG (ref 26.6–33)
MCHC RBC AUTO-ENTMCNC: 32.7 G/DL (ref 31.5–35.7)
MCV RBC AUTO: 84.6 FL (ref 79–97)
MONOCYTES # BLD AUTO: 1.7 10*3/MM3 (ref 0.1–0.9)
MONOCYTES NFR BLD AUTO: 16.1 % (ref 5–12)
NEUTROPHILS NFR BLD AUTO: 6.5 10*3/MM3 (ref 1.7–7)
NEUTROPHILS NFR BLD AUTO: 64 % (ref 42.7–76)
NRBC BLD AUTO-RTO: 0.1 /100 WBC (ref 0–0.2)
NT-PROBNP SERPL-MCNC: 1356 PG/ML (ref 0–900)
PLATELET # BLD AUTO: 269 10*3/MM3 (ref 140–450)
PMV BLD AUTO: 9.5 FL (ref 6–12)
POTASSIUM SERPL-SCNC: 4 MMOL/L (ref 3.5–5.2)
RBC # BLD AUTO: 4.85 10*6/MM3 (ref 4.14–5.8)
SODIUM SERPL-SCNC: 133 MMOL/L (ref 136–145)
WBC # BLD AUTO: 10.3 10*3/MM3 (ref 3.4–10.8)

## 2020-08-26 PROCEDURE — 83880 ASSAY OF NATRIURETIC PEPTIDE: CPT | Performed by: PHYSICIAN ASSISTANT

## 2020-08-26 PROCEDURE — 99239 HOSP IP/OBS DSCHRG MGMT >30: CPT | Performed by: HOSPITALIST

## 2020-08-26 PROCEDURE — 25010000002 CHLOROTHIAZIDE PER 500 MG: Performed by: INTERNAL MEDICINE

## 2020-08-26 PROCEDURE — 94799 UNLISTED PULMONARY SVC/PX: CPT

## 2020-08-26 PROCEDURE — 25010000002 FUROSEMIDE PER 20 MG: Performed by: INTERNAL MEDICINE

## 2020-08-26 PROCEDURE — 85025 COMPLETE CBC W/AUTO DIFF WBC: CPT | Performed by: PHYSICIAN ASSISTANT

## 2020-08-26 PROCEDURE — 80048 BASIC METABOLIC PNL TOTAL CA: CPT | Performed by: PHYSICIAN ASSISTANT

## 2020-08-26 PROCEDURE — 99232 SBSQ HOSP IP/OBS MODERATE 35: CPT | Performed by: INTERNAL MEDICINE

## 2020-08-26 RX ORDER — METOLAZONE 2.5 MG/1
2.5 TABLET ORAL DAILY PRN
Status: DISCONTINUED | OUTPATIENT
Start: 2020-08-26 | End: 2020-08-26 | Stop reason: HOSPADM

## 2020-08-26 RX ORDER — METOLAZONE 2.5 MG/1
2.5 TABLET ORAL DAILY PRN
Qty: 30 TABLET | Refills: 0 | Status: SHIPPED | OUTPATIENT
Start: 2020-08-26 | End: 2020-10-02 | Stop reason: HOSPADM

## 2020-08-26 RX ORDER — HYDROXYZINE 50 MG/1
50 TABLET, FILM COATED ORAL NIGHTLY PRN
Qty: 10 TABLET | Refills: 0 | Status: SHIPPED | OUTPATIENT
Start: 2020-08-26 | End: 2021-01-01

## 2020-08-26 RX ADMIN — CLOPIDOGREL BISULFATE 75 MG: 75 TABLET ORAL at 09:51

## 2020-08-26 RX ADMIN — ROFLUMILAST 500 MCG: 500 TABLET ORAL at 09:50

## 2020-08-26 RX ADMIN — RANOLAZINE 1000 MG: 500 TABLET, FILM COATED, EXTENDED RELEASE ORAL at 09:50

## 2020-08-26 RX ADMIN — GABAPENTIN 300 MG: 300 CAPSULE ORAL at 09:51

## 2020-08-26 RX ADMIN — BUDESONIDE AND FORMOTEROL FUMARATE DIHYDRATE 2 PUFF: 160; 4.5 AEROSOL RESPIRATORY (INHALATION) at 08:05

## 2020-08-26 RX ADMIN — IPRATROPIUM BROMIDE AND ALBUTEROL SULFATE 3 ML: 2.5; .5 SOLUTION RESPIRATORY (INHALATION) at 11:42

## 2020-08-26 RX ADMIN — METOPROLOL SUCCINATE 100 MG: 50 TABLET, EXTENDED RELEASE ORAL at 09:51

## 2020-08-26 RX ADMIN — HYDROXYZINE HYDROCHLORIDE 50 MG: 25 TABLET, FILM COATED ORAL at 09:54

## 2020-08-26 RX ADMIN — ATORVASTATIN CALCIUM 80 MG: 40 TABLET, FILM COATED ORAL at 09:50

## 2020-08-26 RX ADMIN — SPIRONOLACTONE 25 MG: 25 TABLET, FILM COATED ORAL at 09:51

## 2020-08-26 RX ADMIN — Medication 10 ML: at 09:51

## 2020-08-26 RX ADMIN — DULOXETINE HYDROCHLORIDE 60 MG: 30 CAPSULE, DELAYED RELEASE ORAL at 09:50

## 2020-08-26 RX ADMIN — AZELASTINE HYDROCHLORIDE 2 SPRAY: 137 SPRAY, METERED NASAL at 09:57

## 2020-08-26 RX ADMIN — IPRATROPIUM BROMIDE AND ALBUTEROL SULFATE 3 ML: 2.5; .5 SOLUTION RESPIRATORY (INHALATION) at 08:05

## 2020-08-26 RX ADMIN — MONTELUKAST SODIUM 10 MG: 10 TABLET, COATED ORAL at 09:50

## 2020-08-26 RX ADMIN — CALCIUM CARBONATE (ANTACID) CHEW TAB 500 MG 2 TABLET: 500 CHEW TAB at 09:50

## 2020-08-26 RX ADMIN — APIXABAN 5 MG: 5 TABLET, FILM COATED ORAL at 09:51

## 2020-08-26 RX ADMIN — SODIUM CHLORIDE 250 MG: 9 INJECTION, SOLUTION INTRAVENOUS at 06:29

## 2020-08-26 RX ADMIN — FUROSEMIDE 80 MG: 10 INJECTION, SOLUTION INTRAMUSCULAR; INTRAVENOUS at 09:51

## 2020-08-27 ENCOUNTER — READMISSION MANAGEMENT (OUTPATIENT)
Dept: CALL CENTER | Facility: HOSPITAL | Age: 60
End: 2020-08-27

## 2020-08-27 NOTE — OUTREACH NOTE
Prep Survey      Responses   [facility] from?  [facility]   Is LACE score < 7 ?  No   Eligibility  Readm Mgmt   Discharge diagnosis  Chest pain, CHF exacerbation   COVID-19 Test Status  Not tested   Does the patient have one of the following disease processes/diagnoses(primary or secondary)?  CHF   Does the patient have Home health ordered?  Yes   What is the Home health agency?   [facility]   Is there a DME ordered?  No   Comments regarding appointments  Per AVS   Medication alerts for this patient  on eliquis and plavix   Prep survey completed?  Yes          Enedina Cuevas RN

## 2020-08-31 ENCOUNTER — READMISSION MANAGEMENT (OUTPATIENT)
Dept: CALL CENTER | Facility: HOSPITAL | Age: 60
End: 2020-08-31

## 2020-08-31 NOTE — OUTREACH NOTE
CHF Week 1 Survey      Responses   [facility] patient discharged from?  [facility]   Does the patient have one of the following disease processes/diagnoses(primary or secondary)?  CHF   CHF Week 1 attempt successful?  Yes   Call start time  1607   Call end time  1613   General alerts for this patient  Hx: CHF and COPD   Discharge diagnosis  Chest pain, CHF exacerbation   Meds reviewed with patient/caregiver?  Yes   Is the patient having any side effects they believe may be caused by any medication additions or changes?  No   Does the patient have all medications ordered at discharge?  Yes   Is the patient taking all medications as directed (includes completed medication regime)?  Yes   Does the patient have a primary care provider?   Yes   Does the patient have an appointment with their PCP within 7 days of discharge?  Greater than 7 days   What is preventing the patient from scheduling follow up appointments within 7 days of discharge?  Earlier appointment not available   Has the patient kept scheduled appointments due by today?  N/A   Comments  Pt has numerous f/u appt.s Pt states he is tired of going to appts--I suggested video or telephone visit but he declined.   What is the Home health agency?   [facility]   Has home health visited the patient within 72 hours of discharge?  Yes   Home health comments  Will see one more time then should d/c pt   Psychosocial issues?  No   Did the patient receive a copy of their discharge instructions?  Yes   Nursing interventions  Reviewed instructions with patient   What is the patient's perception of their health status since discharge?  Same   Is the patient weighing daily?  Yes   Does the patient have scales?  Yes   Daily weight interventions  Education provided on importance of daily weight   Is the patient able to teach back Heart Failure diet management?  Yes   Is the patient able to teach back signs and symptoms of worsening condition? (i.e. weight  gain, shortness of air, etc.)  Yes   Is the patient/caregiver able to teach back the hierarchy of who to call/visit for symptoms/problems? PCP, Specialist, Home health nurse, Urgent Care, ED, 911  Yes   Additional teach back comments  Pt satets he dosen't want to go back to the docotr and feels like he is taking too many meds. I have told him that the doctors are probably trying to optimize his meds and he should keep all f/u appts.    CHF Week 1 call completed?  Yes          Deirdre Avilez RN

## 2020-09-04 ENCOUNTER — CLINICAL SUPPORT NO REQUIREMENTS (OUTPATIENT)
Dept: CARDIOLOGY | Facility: CLINIC | Age: 60
End: 2020-09-04

## 2020-09-04 ENCOUNTER — OFFICE VISIT (OUTPATIENT)
Dept: CARDIOLOGY | Facility: CLINIC | Age: 60
End: 2020-09-04

## 2020-09-04 VITALS
DIASTOLIC BLOOD PRESSURE: 82 MMHG | OXYGEN SATURATION: 98 % | WEIGHT: 304 LBS | SYSTOLIC BLOOD PRESSURE: 152 MMHG | HEART RATE: 90 BPM | BODY MASS INDEX: 43.62 KG/M2

## 2020-09-04 DIAGNOSIS — Z95.810 PRESENCE OF BIVENTRICULAR IMPLANTABLE CARDIOVERTER-DEFIBRILLATOR (ICD): ICD-10-CM

## 2020-09-04 DIAGNOSIS — I50.22 CHRONIC SYSTOLIC CONGESTIVE HEART FAILURE (HCC): ICD-10-CM

## 2020-09-04 DIAGNOSIS — I47.29 VENTRICULAR TACHYCARDIA (PAROXYSMAL) (HCC): Primary | ICD-10-CM

## 2020-09-04 DIAGNOSIS — I42.0 CARDIOMYOPATHY, DILATED (HCC): Primary | Chronic | ICD-10-CM

## 2020-09-04 DIAGNOSIS — I25.5 ISCHEMIC CARDIOMYOPATHY: ICD-10-CM

## 2020-09-04 DIAGNOSIS — I48.21 PERMANENT ATRIAL FIBRILLATION (HCC): ICD-10-CM

## 2020-09-04 DIAGNOSIS — I48.0 PAROXYSMAL ATRIAL FIBRILLATION (HCC): Chronic | ICD-10-CM

## 2020-09-04 DIAGNOSIS — I47.20 VT (VENTRICULAR TACHYCARDIA) (HCC): ICD-10-CM

## 2020-09-04 DIAGNOSIS — I10 ESSENTIAL HYPERTENSION: ICD-10-CM

## 2020-09-04 PROCEDURE — 99214 OFFICE O/P EST MOD 30 MIN: CPT | Performed by: INTERNAL MEDICINE

## 2020-09-04 PROCEDURE — 93000 ELECTROCARDIOGRAM COMPLETE: CPT | Performed by: INTERNAL MEDICINE

## 2020-09-04 PROCEDURE — 93284 PRGRMG EVAL IMPLANTABLE DFB: CPT | Performed by: INTERNAL MEDICINE

## 2020-09-04 NOTE — PROGRESS NOTES
CC--recurrent VT and congestive heart failure      Sub--59-year-old male patient came for follow-up after recent hospitalization for chronic symptoms of dyspnea and chronic heart failure  .  Patient had uncontrollable atrial fibrillation and underwent AV node ablation with biventricular ICD upgrade--unfortunately coronary sinus lead was placed in anterobasal vein since there was no posterior and lateral veins for placement of coronary sinus lead--he was in refractory heart failure which has improved to class III chronic systolic heart failure .  Patient has known coronary artery disease with ischemic cardiomyopathy and has significant issues with obesity and prior history of COPD.  Patient had prior multiple ICD therapies for conducted atrial fibrillation needing AV node ablation .  He remains in functional class III .Patient has noticed increased number of palpitations associated with chest discomfort in the last few weeks  He continues to remain in class III functional class without any syncope  Chronic medical problems include ischemic cardio myopathy chronic class III systolic heart failure, persistent atrial fibrillation and morbid obesity   Was started on amiodarone with last visit--continues to have episodes of VT needing antitachycardia pacing  Back in 2017 patient underwent an unsuccessful VT ablation with PVC ablation coming from outflow tract which was felt to be possible epicardial origin--ablation from RVOT site was unsuccessful--VT during the time was inferior axis with left bundle and transition V3  Continued to feel poorly with class III systolic heart failure with multiple episodes of VT   Patient presented to the hospital with chronic angina and chronic class III systolic heart failure symptoms and underwent LV epicardial lead placement for optimization of heart failure therapy--he has recurrent admission for heart failure and comes in for follow-up  Continues to have class III dyspnea symptoms and  has chronic chest pain   Also complains of increased palpitations        Past history is reviewed below and attached and injuries made in the above document   Past Medical History:   Diagnosis Date   • Asthma    • Atrial fibrillation (CMS/HCC)    • CAD (coronary artery disease)    • CHF (congestive heart failure) (CMS/HCC)    • COPD (chronic obstructive pulmonary disease) (CMS/HCC)    • Depression    • Hyperlipidemia    • Hypertension    • Myocardial infarction (CMS/HCC)    • Pacemaker     pacemaker / defib   • V tach (CMS/[facility])      Past Surgical History:   Procedure Laterality Date   • CARDIAC ABLATION  11/07/2017   • CARDIAC CATHETERIZATION     • CARDIAC ELECTROPHYSIOLOGY PROCEDURE N/A 9/26/2019    Procedure: BIVENTRICULAR DEVICE UPGRADE;  Surgeon: Jose Lopez MD;  Location:  [facility];  Service: Cardiovascular   • CARDIAC ELECTROPHYSIOLOGY PROCEDURE N/A 9/26/2019    Procedure: EP/Ablation AV Node ablation;  Surgeon: Jose Lopez MD;  Location:  [facility];  Service: Cardiology   • CHOLECYSTECTOMY     • CORONARY ANGIOPLASTY WITH STENT PLACEMENT      2 stents   • PACEMAKER IMPLANTATION  07/08/2016    Pacemaker/ Defib implant - [place]   • SINUS SURGERY      sinus surgery x 9     Family History   Problem Relation Age of Onset   • Diabetes Mother    • Heart disease Mother         MI age 46 - CHF   • Atrial fibrillation Mother    • COPD Mother    • Atrial fibrillation Father    • Heart disease Father         CHF     Social History     Tobacco Use   • Smoking status: Never Smoker   • Smokeless tobacco: Never Used   Substance Use Topics   • Alcohol use: No     Frequency: Never   • Drug use: No       Review of Systems   General:  positive for fatigue and tiredness  Eyes: No redness  Cardiovascular: Positive for  palpitations   Respiratory:   positive for class 3 shortness of breath  Gastrointestinal: No nausea or vomiting, bleeding  Genitourinary: no hematuria or  dysuria  Musculoskeletal: No arthralgia or myalgia  Skin: No rash  Neurologic: No numbness, tingling, syncope  Hematologic/Lymphatic: No abnormal bleeding      Physical Exam  VITALS REVIEWED-- pulse rate is 70  afebrile respiration 12 times a minute, blood pressure 152/82  ICD site is clean    General:      well developed, well nourished, in no acute distress.    Head:      normocephalic and atraumatic.    Eyes:      PERRL/EOM intact, conjunctiva and sclera clear with out nystagmus.    Neck:    thyromegaly,  trachea central with normal respiratory effort    heart:       Paced rhythm     Msk:      no deformity or scoliosis noted of thoracic or lumbar spine.    Pulses:      pulses normal in all 4 extremities.    Extremities:       no cyanosis or clubbing--trace left pedal edema and trace right pedal edema.    Neurologic:      no focal deficits.   alert oriented x3  Skin:      intact without lesions or rashes.    Psych:      alert and cooperative; normal mood and affect; normal attention span and concentration.    Lung exam shows reduced breath sounds in bilateral bases without wheezing    Assessment plan  Recurrent and symptomatic  episodes of VT--noted on ICD interrogation despite amiodarone --post redo ablation--ablation of basal scar VT was done few months ago--currently started having recurrent PVCs which are different from the prior ablation and the PVCs are positive in lead V1 and negative in the inferior lead suggestive of posterior scar area--- recurrent VT episodes terminated by ATP noted on the ICD interrogation--patient may end up needing redo ablation since he is intolerant to amiodarone and   after discussing with the patient and follow-up closely in 1 month to decide on VT ablation   Severe ischemic cardiomyopathy with EF less than 20%  Prior history of mild cardiorenal syndrome   Chronic class III systolic heart failure  Chronic coronary artery disease and chronic chest pain in the past--no other  further coronary intervention is warranted as per Dr. Frye  Permanent atrial fibrillation status post AV node ablation  Biventricular ICD in situ --post LV lead placement epicardial lead for optimization and EKG shows excellent biventricular pacing --programming changes of the device attached to the chart   hypertension  controlled        ECG 12 Lead  Date/Time: 9/4/2020 12:41 PM  Performed by: Jose Lopez MD  Authorized by: Jose Lopez MD   Comparison: compared with previous ECG   Rhythm: atrial fibrillation  Ectopy: unifocal PVCs and bigeminy  Other findings: non-specific ST-T wave changes  Pacing: ventricular paced rhythmComments: EKG shows underlying atrial fibrillation with biventricular paced rhythm with PVCs in a bigeminal pattern and increasing PVCs noted compared to prior EKG and these PVCs are coming from posterior scar area and positive in lead V1 and negative in the inferior leads and negative in 1 and aVL and indication for EKG includes VT and atrial fibrillation            Electronically signed by Jose Lopez MD, 09/04/20, 12:40 PM.  .

## 2020-09-07 NOTE — PLAN OF CARE
Problem: Patient Care Overview  Goal: Plan of Care Review  Outcome: Ongoing (interventions implemented as appropriate)  Flowsheets  Taken 7/16/2020 1304  Progress: no change  Outcome Summary: Patient on a nitroglycerine drip with chest pain. Receiving morphine q 4 hours. Awaiting on cardiology evaluation  Taken 7/16/2020 1152  Plan of Care Reviewed With: patient     Problem: Fall Risk (Adult)  Goal: Identify Related Risk Factors and Signs and Symptoms  Outcome: Ongoing (interventions implemented as appropriate)  Flowsheets (Taken 7/16/2020 1304)  Related Risk Factors (Fall Risk): age-related changes  Signs and Symptoms (Fall Risk): other (see comments)  Note:   Patient is not a falls risk on this shift. Helped patient ambulate to bathroom with no issues.      
  Problem: Patient Care Overview  Goal: Plan of Care Review  Outcome: Ongoing (interventions implemented as appropriate)  Flowsheets (Taken 7/20/2020 0430)  Progress: no change  Plan of Care Reviewed With: patient  Note:   Pt had multiple runs of vtach during the day. 22, 19, and 9 beat runs. Cardiology aware. Troponin and ekg done. Holding ace and increased beta blocker, pt still hypotensive. Bps to be taken in left arm to help with any discrepansies. Pt stated that he expected to feel better by now and was concerned that his feet were puffy. Pt had diuretics held yesterday which resulted in an increase in his BNP this am. Pt not resting due to worry. Will continue to monitor.      
  Problem: Patient Care Overview  Goal: Plan of Care Review  Outcome: Ongoing (interventions implemented as appropriate)  Note:   Pt BP low this am  Held diuretics and lisinopriil.  Informed Card.  New orders received.   Pt continues to c/o chest pain and requesting MS and zofran.  States c/p is 7/a0 that is reduced to 2-3 /10 with MS.  Pt appeared frustrated today. States just tired of being sick. Better after ms given .  Pt resting quietly.     
"  Problem: Patient Care Overview  Goal: Plan of Care Review  Outcome: Ongoing (interventions implemented as appropriate)  Flowsheets (Taken 7/19/2020 4815)  Plan of Care Reviewed With: patient  Note:   Pt has constant chest pain, maintainable with IV morphine. Pt sad, wants to go home. Diuretics held today due to hypotension. Lisinopril and metoprolol decreased. Pt stated that \"something always happens when they decrease my metoprolol\". Will continue to monitor.      "
Continues to have chest pain, on nitro gtt and morphine prn for pain.  Consults to Dr Giles and Dr Richmond.  NPO for possible test.  Cont to monitor.  
Patient on no gtt's, still has mild to mod chest pain that he states has been there since his surgeries with no change. Still asking for morphine for pain, MD changed to P.O. To wean him down to be able to D/C, cardiologist are to collaborate to figure out D/C plan with his meds, patient up to chair all day, will continue to monitor  
Pt no longer on nitro gtt.  He is now on po pain meds.  Ambulating in room on room air.  Cont to monitor.  
Pt resting will continue to monitor  Problem: Patient Care Overview  Goal: Plan of Care Review  Outcome: Ongoing (interventions implemented as appropriate)  Goal: Individualization and Mutuality  Outcome: Ongoing (interventions implemented as appropriate)  Goal: Discharge Needs Assessment  Outcome: Ongoing (interventions implemented as appropriate)  Goal: Interprofessional Rounds/Family Conf  Outcome: Ongoing (interventions implemented as appropriate)     Problem: Fall Risk (Adult)  Goal: Identify Related Risk Factors and Signs and Symptoms  Description  Related risk factors and signs and symptoms are identified upon initiation of Human Response Clinical Practice Guideline (CPG).  Outcome: Ongoing (interventions implemented as appropriate)  Goal: Absence of Fall  Description  Patient will demonstrate the desired outcomes by discharge/transition of care.  Outcome: Ongoing (interventions implemented as appropriate)     Problem: Pain, Chronic (Adult)  Goal: Identify Related Risk Factors and Signs and Symptoms  Description  Related risk factors and signs and symptoms are identified upon initiation of Human Response Clinical Practice Guideline (CPG).  Outcome: Ongoing (interventions implemented as appropriate)  Goal: Acceptable Pain/Comfort Level and Functional Ability  Description  Patient will demonstrate the desired outcomes by discharge/transition of care.  Outcome: Ongoing (interventions implemented as appropriate)     
Pt still complains of Chest pains.  
The patient states he is still having extensive chest pain throughout the night. He has had morphine twice and is using pillow supports to help with the pain in his left side. The patient remains calm and pleasant. He has expressed some anxiety about going home too soon, emotional support was provided. Vitals have been stable, will continue to monitor.  
Patient

## 2020-09-08 ENCOUNTER — READMISSION MANAGEMENT (OUTPATIENT)
Dept: CALL CENTER | Facility: HOSPITAL | Age: 60
End: 2020-09-08

## 2020-09-08 NOTE — OUTREACH NOTE
CHF Week 2 Survey      Responses   [facility] [place] patient discharged from?  [place]   Does the patient have one of the following disease processes/diagnoses(primary or secondary)?  CHF   Week 2 attempt successful?  Yes   Call start time  1418   Call end time  1422   General alerts for this patient  Hx: CHF and COPD   Discharge diagnosis  Chest pain, CHF exacerbation   Meds reviewed with patient/caregiver?  Yes   Is the patient having any side effects they believe may be caused by any medication additions or changes?  No   Does the patient have all medications ordered at discharge?  Yes   Is the patient taking all medications as directed (includes completed medication regime)?  Yes   Medication comments  Has parameters for taking metolazone and an extra K+   Does the patient have a primary care provider?   Yes   Has the patient kept scheduled appointments due by today?  Yes   Comments  Had 3 appts last week   What is the Home health agency?   [facility] [place]   Home health comments  HH still coming   Pulse Ox monitoring  Intermittent   Pulse Ox device source  Patient   O2 Sat comments  High 90's, Trilogy at night   Psychosocial issues?  No   Nursing interventions  Reviewed instructions with patient   What is the patient's perception of their health status since discharge?  Improving   Is the patient weighing daily?  Yes   Does the patient have scales?  Yes   Daily weight interventions  Education provided on importance of daily weight [Has lost more weight]   Is the patient able to teach back signs and symptoms of worsening condition? (i.e. weight gain, shortness of air, etc.)  Yes   CHF Week 2 call completed?  Yes          Bhavani Hernandez RN

## 2020-09-17 RX ORDER — CETIRIZINE HYDROCHLORIDE 10 MG/1
10 TABLET ORAL DAILY
Qty: 90 TABLET | Refills: 1 | Status: SHIPPED | OUTPATIENT
Start: 2020-09-17 | End: 2021-01-01

## 2020-09-17 RX ORDER — CLOPIDOGREL BISULFATE 75 MG/1
75 TABLET ORAL DAILY
Qty: 90 TABLET | Refills: 1 | Status: SHIPPED | OUTPATIENT
Start: 2020-09-17

## 2020-09-18 ENCOUNTER — READMISSION MANAGEMENT (OUTPATIENT)
Dept: CALL CENTER | Facility: HOSPITAL | Age: 60
End: 2020-09-18

## 2020-09-18 NOTE — OUTREACH NOTE
CHF Week 3 Survey      Responses   [facility] patient discharged from?  [place]   Does the patient have one of the following disease processes/diagnoses(primary or secondary)?  CHF   Week 3 attempt successful?  Yes   Call start time  1611   Call end time  1618   General alerts for this patient  Hx: CHF and COPD   Discharge diagnosis  Chest pain, CHF exacerbation   Meds reviewed with patient/caregiver?  Yes   Is the patient taking all medications as directed (includes completed medication regime)?  Yes   What is the Home health agency?   [facility] [place]   Has home health visited the patient within 72 hours of discharge?  Yes   Pulse Ox monitoring  Intermittent   Pulse Ox device source  Patient   O2 Sat comments  High 90's, Trilogy at night   O2 Sat: education provided  Sat levels   Psychosocial issues?  No   Comments  Pt reports will be having another ablation soon. He has had diff breathing with the heat and humidity   What is the patient's perception of their health status since discharge?  Improving   Is the patient weighing daily?  Yes   Is the patient able to teach back Heart Failure Zones?  Yes   Is the patient able to teach back signs and symptoms of worsening condition? (i.e. weight gain, shortness of air, etc.)  Yes   CHF Week 3 call completed?  Yes   Revoked  No further contact(revokes)-requires comment          Gilda Richardson, RN

## 2020-09-30 ENCOUNTER — APPOINTMENT (OUTPATIENT)
Dept: GENERAL RADIOLOGY | Facility: HOSPITAL | Age: 60
End: 2020-09-30

## 2020-09-30 ENCOUNTER — HOSPITAL ENCOUNTER (INPATIENT)
Facility: HOSPITAL | Age: 60
LOS: 2 days | Discharge: HOME HEALTH | End: 2020-10-02
Attending: EMERGENCY MEDICINE | Admitting: INTERNAL MEDICINE

## 2020-09-30 DIAGNOSIS — R07.9 CHEST PAIN, UNSPECIFIED TYPE: ICD-10-CM

## 2020-09-30 DIAGNOSIS — I47.20 VENTRICULAR TACHYCARDIA (HCC): ICD-10-CM

## 2020-09-30 DIAGNOSIS — R55 SYNCOPE, UNSPECIFIED SYNCOPE TYPE: Primary | ICD-10-CM

## 2020-09-30 DIAGNOSIS — N17.9 ACUTE KIDNEY INJURY (HCC): ICD-10-CM

## 2020-09-30 PROBLEM — I50.22 CHRONIC SYSTOLIC CHF (CONGESTIVE HEART FAILURE) (HCC): Status: ACTIVE | Noted: 2017-09-08

## 2020-09-30 PROBLEM — N40.0 BPH WITHOUT OBSTRUCTION/LOWER URINARY TRACT SYMPTOMS: Chronic | Status: ACTIVE | Noted: 2020-09-30

## 2020-09-30 PROBLEM — J30.2 SEASONAL ALLERGIES: Chronic | Status: ACTIVE | Noted: 2020-09-30

## 2020-09-30 PROBLEM — Z45.02 AICD DISCHARGE: Status: ACTIVE | Noted: 2020-09-30

## 2020-09-30 PROBLEM — I50.22 CHRONIC SYSTOLIC CHF (CONGESTIVE HEART FAILURE) (HCC): Chronic | Status: ACTIVE | Noted: 2017-09-08

## 2020-09-30 PROBLEM — R53.1 GENERALIZED WEAKNESS: Status: ACTIVE | Noted: 2020-09-30

## 2020-09-30 PROBLEM — J44.1 COPD EXACERBATION (HCC): Status: ACTIVE | Noted: 2020-09-30

## 2020-09-30 PROBLEM — M25.561 RIGHT KNEE PAIN: Status: ACTIVE | Noted: 2020-09-30

## 2020-09-30 PROBLEM — R47.1 DYSARTHRIA: Status: ACTIVE | Noted: 2020-09-30

## 2020-09-30 PROBLEM — G62.9 PERIPHERAL NEUROPATHY: Chronic | Status: ACTIVE | Noted: 2020-09-30

## 2020-09-30 LAB
ALBUMIN SERPL-MCNC: 4.4 G/DL (ref 3.5–5.2)
ALBUMIN/GLOB SERPL: 1.4 G/DL
ALP SERPL-CCNC: 87 U/L (ref 39–117)
ALT SERPL W P-5'-P-CCNC: 24 U/L (ref 1–41)
AMPHET+METHAMPHET UR QL: NEGATIVE
ANION GAP SERPL CALCULATED.3IONS-SCNC: 13 MMOL/L (ref 5–15)
APTT PPP: 24.6 SECONDS (ref 24–31)
AST SERPL-CCNC: 38 U/L (ref 1–40)
BACTERIA UR QL AUTO: ABNORMAL /HPF
BARBITURATES UR QL SCN: NEGATIVE
BASOPHILS # BLD AUTO: 0.2 10*3/MM3 (ref 0–0.2)
BASOPHILS NFR BLD AUTO: 1.1 % (ref 0–1.5)
BENZODIAZ UR QL SCN: NEGATIVE
BILIRUB SERPL-MCNC: 1 MG/DL (ref 0–1.2)
BILIRUB UR QL STRIP: ABNORMAL
BUN SERPL-MCNC: 17 MG/DL (ref 6–20)
BUN SERPL-MCNC: ABNORMAL MG/DL
BUN/CREAT SERPL: ABNORMAL
CALCIUM SPEC-SCNC: 9.4 MG/DL (ref 8.6–10.5)
CANNABINOIDS SERPL QL: NEGATIVE
CHLORIDE SERPL-SCNC: 99 MMOL/L (ref 98–107)
CLARITY UR: CLEAR
CO2 SERPL-SCNC: 29 MMOL/L (ref 22–29)
COCAINE UR QL: NEGATIVE
COLOR UR: ABNORMAL
CREAT SERPL-MCNC: 1.15 MG/DL (ref 0.76–1.27)
DEPRECATED RDW RBC AUTO: 49.4 FL (ref 37–54)
EOSINOPHIL # BLD AUTO: 0.2 10*3/MM3 (ref 0–0.4)
EOSINOPHIL NFR BLD AUTO: 1.2 % (ref 0.3–6.2)
ERYTHROCYTE [DISTWIDTH] IN BLOOD BY AUTOMATED COUNT: 16.3 % (ref 12.3–15.4)
GFR SERPL CREATININE-BSD FRML MDRD: 66 ML/MIN/1.73
GLOBULIN UR ELPH-MCNC: 3.2 GM/DL
GLUCOSE SERPL-MCNC: 115 MG/DL (ref 65–99)
GLUCOSE UR STRIP-MCNC: NEGATIVE MG/DL
HCT VFR BLD AUTO: 47.1 % (ref 37.5–51)
HGB BLD-MCNC: 15 G/DL (ref 13–17.7)
HGB UR QL STRIP.AUTO: NEGATIVE
HOLD SPECIMEN: NORMAL
HYALINE CASTS UR QL AUTO: ABNORMAL /LPF
INR PPP: 1.04 (ref 0.93–1.1)
KETONES UR QL STRIP: NEGATIVE
LEUKOCYTE ESTERASE UR QL STRIP.AUTO: NEGATIVE
LYMPHOCYTES # BLD AUTO: 1.5 10*3/MM3 (ref 0.7–3.1)
LYMPHOCYTES NFR BLD AUTO: 11.4 % (ref 19.6–45.3)
MAGNESIUM SERPL-MCNC: 2.2 MG/DL (ref 1.6–2.6)
MCH RBC QN AUTO: 27.4 PG (ref 26.6–33)
MCHC RBC AUTO-ENTMCNC: 31.8 G/DL (ref 31.5–35.7)
MCV RBC AUTO: 86.3 FL (ref 79–97)
METHADONE UR QL SCN: NEGATIVE
MONOCYTES # BLD AUTO: 1.4 10*3/MM3 (ref 0.1–0.9)
MONOCYTES NFR BLD AUTO: 10.5 % (ref 5–12)
MUCOUS THREADS URNS QL MICRO: ABNORMAL /HPF
NEUTROPHILS NFR BLD AUTO: 10.3 10*3/MM3 (ref 1.7–7)
NEUTROPHILS NFR BLD AUTO: 75.8 % (ref 42.7–76)
NITRITE UR QL STRIP: NEGATIVE
NRBC BLD AUTO-RTO: 0.1 /100 WBC (ref 0–0.2)
NT-PROBNP SERPL-MCNC: 4084 PG/ML (ref 0–900)
OPIATES UR QL: NEGATIVE
OXYCODONE UR QL SCN: NEGATIVE
PH UR STRIP.AUTO: 5.5 [PH] (ref 5–8)
PLATELET # BLD AUTO: 313 10*3/MM3 (ref 140–450)
PMV BLD AUTO: 9.5 FL (ref 6–12)
POTASSIUM SERPL-SCNC: 4.6 MMOL/L (ref 3.5–5.2)
PROT SERPL-MCNC: 7.6 G/DL (ref 6–8.5)
PROT UR QL STRIP: ABNORMAL
PROTHROMBIN TIME: 11.4 SECONDS (ref 9.6–11.7)
RBC # BLD AUTO: 5.46 10*6/MM3 (ref 4.14–5.8)
RBC # UR: ABNORMAL /HPF
REF LAB TEST METHOD: ABNORMAL
SODIUM SERPL-SCNC: 141 MMOL/L (ref 136–145)
SP GR UR STRIP: 1.02 (ref 1–1.03)
SQUAMOUS #/AREA URNS HPF: ABNORMAL /HPF
TROPONIN T SERPL-MCNC: 0.02 NG/ML (ref 0–0.03)
TROPONIN T SERPL-MCNC: <0.01 NG/ML (ref 0–0.03)
UROBILINOGEN UR QL STRIP: ABNORMAL
WBC # BLD AUTO: 13.6 10*3/MM3 (ref 3.4–10.8)
WBC UR QL AUTO: ABNORMAL /HPF

## 2020-09-30 PROCEDURE — 80307 DRUG TEST PRSMV CHEM ANLYZR: CPT | Performed by: EMERGENCY MEDICINE

## 2020-09-30 PROCEDURE — 93010 ELECTROCARDIOGRAM REPORT: CPT | Performed by: INTERNAL MEDICINE

## 2020-09-30 PROCEDURE — 25010000002 AMIODARONE PER 30 MG: Performed by: EMERGENCY MEDICINE

## 2020-09-30 PROCEDURE — 93005 ELECTROCARDIOGRAM TRACING: CPT | Performed by: INTERNAL MEDICINE

## 2020-09-30 PROCEDURE — 25010000002 ONDANSETRON PER 1 MG: Performed by: NURSE PRACTITIONER

## 2020-09-30 PROCEDURE — 99223 1ST HOSP IP/OBS HIGH 75: CPT | Performed by: INTERNAL MEDICINE

## 2020-09-30 PROCEDURE — 71045 X-RAY EXAM CHEST 1 VIEW: CPT

## 2020-09-30 PROCEDURE — 84484 ASSAY OF TROPONIN QUANT: CPT | Performed by: EMERGENCY MEDICINE

## 2020-09-30 PROCEDURE — 84132 ASSAY OF SERUM POTASSIUM: CPT | Performed by: NURSE PRACTITIONER

## 2020-09-30 PROCEDURE — 0100U HC BIOFIRE FILMARRAY RESP PANEL 2: CPT | Performed by: NURSE PRACTITIONER

## 2020-09-30 PROCEDURE — 25010000002 HYDROMORPHONE PER 4 MG: Performed by: INTERNAL MEDICINE

## 2020-09-30 PROCEDURE — 25010000002 HYDROMORPHONE PER 4 MG: Performed by: EMERGENCY MEDICINE

## 2020-09-30 PROCEDURE — 85025 COMPLETE CBC W/AUTO DIFF WBC: CPT | Performed by: EMERGENCY MEDICINE

## 2020-09-30 PROCEDURE — 85610 PROTHROMBIN TIME: CPT | Performed by: EMERGENCY MEDICINE

## 2020-09-30 PROCEDURE — 63710000001 ONDANSETRON PER 8 MG: Performed by: NURSE PRACTITIONER

## 2020-09-30 PROCEDURE — 85730 THROMBOPLASTIN TIME PARTIAL: CPT | Performed by: EMERGENCY MEDICINE

## 2020-09-30 PROCEDURE — 25010000002 METHYLPREDNISOLONE PER 40 MG: Performed by: NURSE PRACTITIONER

## 2020-09-30 PROCEDURE — 93005 ELECTROCARDIOGRAM TRACING: CPT | Performed by: EMERGENCY MEDICINE

## 2020-09-30 PROCEDURE — 25010000002 ONDANSETRON PER 1 MG

## 2020-09-30 PROCEDURE — 83735 ASSAY OF MAGNESIUM: CPT | Performed by: NURSE PRACTITIONER

## 2020-09-30 PROCEDURE — 99285 EMERGENCY DEPT VISIT HI MDM: CPT

## 2020-09-30 PROCEDURE — 83880 ASSAY OF NATRIURETIC PEPTIDE: CPT | Performed by: NURSE PRACTITIONER

## 2020-09-30 PROCEDURE — 73564 X-RAY EXAM KNEE 4 OR MORE: CPT

## 2020-09-30 PROCEDURE — 84484 ASSAY OF TROPONIN QUANT: CPT | Performed by: NURSE PRACTITIONER

## 2020-09-30 PROCEDURE — 83735 ASSAY OF MAGNESIUM: CPT | Performed by: EMERGENCY MEDICINE

## 2020-09-30 PROCEDURE — 25010000002 AMIODARONE IN DEXTROSE 5% 150-4.21 MG/100ML-% SOLUTION: Performed by: EMERGENCY MEDICINE

## 2020-09-30 PROCEDURE — 80053 COMPREHEN METABOLIC PANEL: CPT | Performed by: EMERGENCY MEDICINE

## 2020-09-30 PROCEDURE — 81001 URINALYSIS AUTO W/SCOPE: CPT | Performed by: INTERNAL MEDICINE

## 2020-09-30 RX ORDER — HYDROMORPHONE HCL 110MG/55ML
1 PATIENT CONTROLLED ANALGESIA SYRINGE INTRAVENOUS ONCE
Status: COMPLETED | OUTPATIENT
Start: 2020-09-30 | End: 2020-09-30

## 2020-09-30 RX ORDER — IPRATROPIUM BROMIDE AND ALBUTEROL SULFATE 2.5; .5 MG/3ML; MG/3ML
3 SOLUTION RESPIRATORY (INHALATION)
Status: DISCONTINUED | OUTPATIENT
Start: 2020-09-30 | End: 2020-10-02 | Stop reason: HOSPADM

## 2020-09-30 RX ORDER — TAMSULOSIN HYDROCHLORIDE 0.4 MG/1
0.4 CAPSULE ORAL NIGHTLY
Status: DISCONTINUED | OUTPATIENT
Start: 2020-09-30 | End: 2020-10-02 | Stop reason: HOSPADM

## 2020-09-30 RX ORDER — MONTELUKAST SODIUM 10 MG/1
10 TABLET ORAL DAILY
Status: DISCONTINUED | OUTPATIENT
Start: 2020-10-01 | End: 2020-10-02 | Stop reason: HOSPADM

## 2020-09-30 RX ORDER — SODIUM CHLORIDE 0.9 % (FLUSH) 0.9 %
10 SYRINGE (ML) INJECTION AS NEEDED
Status: DISCONTINUED | OUTPATIENT
Start: 2020-09-30 | End: 2020-10-02 | Stop reason: HOSPADM

## 2020-09-30 RX ORDER — AMIODARONE HCL/D5W 450 MG/250
1 PLASTIC BAG, INJECTION (ML) INTRAVENOUS CONTINUOUS
Status: DISPENSED | OUTPATIENT
Start: 2020-09-30 | End: 2020-09-30

## 2020-09-30 RX ORDER — SPIRONOLACTONE 25 MG/1
25 TABLET ORAL 3 TIMES DAILY
Status: DISCONTINUED | OUTPATIENT
Start: 2020-09-30 | End: 2020-10-01

## 2020-09-30 RX ORDER — BUDESONIDE 0.5 MG/2ML
0.5 INHALANT ORAL
Status: DISCONTINUED | OUTPATIENT
Start: 2020-09-30 | End: 2020-10-02 | Stop reason: HOSPADM

## 2020-09-30 RX ORDER — ONDANSETRON 2 MG/ML
4 INJECTION INTRAMUSCULAR; INTRAVENOUS ONCE
Status: COMPLETED | OUTPATIENT
Start: 2020-09-30 | End: 2020-09-30

## 2020-09-30 RX ORDER — CLOPIDOGREL BISULFATE 75 MG/1
75 TABLET ORAL DAILY
Status: DISCONTINUED | OUTPATIENT
Start: 2020-10-01 | End: 2020-10-02 | Stop reason: HOSPADM

## 2020-09-30 RX ORDER — POTASSIUM CHLORIDE 20 MEQ/1
40 TABLET, EXTENDED RELEASE ORAL AS NEEDED
Status: DISCONTINUED | OUTPATIENT
Start: 2020-09-30 | End: 2020-10-02 | Stop reason: HOSPADM

## 2020-09-30 RX ORDER — ACETAMINOPHEN 325 MG/1
650 TABLET ORAL EVERY 4 HOURS PRN
Status: DISCONTINUED | OUTPATIENT
Start: 2020-09-30 | End: 2020-10-02 | Stop reason: HOSPADM

## 2020-09-30 RX ORDER — GABAPENTIN 300 MG/1
300 CAPSULE ORAL 2 TIMES DAILY
Status: DISCONTINUED | OUTPATIENT
Start: 2020-09-30 | End: 2020-10-02 | Stop reason: HOSPADM

## 2020-09-30 RX ORDER — BUMETANIDE 1 MG/1
1 TABLET ORAL
Status: DISCONTINUED | OUTPATIENT
Start: 2020-10-01 | End: 2020-10-02 | Stop reason: HOSPADM

## 2020-09-30 RX ORDER — METHYLPREDNISOLONE SODIUM SUCCINATE 40 MG/ML
40 INJECTION, POWDER, LYOPHILIZED, FOR SOLUTION INTRAMUSCULAR; INTRAVENOUS EVERY 8 HOURS
Status: DISCONTINUED | OUTPATIENT
Start: 2020-09-30 | End: 2020-10-01

## 2020-09-30 RX ORDER — ACETAMINOPHEN 650 MG/1
650 SUPPOSITORY RECTAL EVERY 4 HOURS PRN
Status: DISCONTINUED | OUTPATIENT
Start: 2020-09-30 | End: 2020-10-02 | Stop reason: HOSPADM

## 2020-09-30 RX ORDER — BENZONATATE 100 MG/1
200 CAPSULE ORAL 3 TIMES DAILY PRN
Status: DISCONTINUED | OUTPATIENT
Start: 2020-09-30 | End: 2020-10-02 | Stop reason: HOSPADM

## 2020-09-30 RX ORDER — RANOLAZINE 500 MG/1
1000 TABLET, EXTENDED RELEASE ORAL 2 TIMES DAILY
Status: DISCONTINUED | OUTPATIENT
Start: 2020-09-30 | End: 2020-10-02 | Stop reason: HOSPADM

## 2020-09-30 RX ORDER — METOPROLOL SUCCINATE 50 MG/1
100 TABLET, EXTENDED RELEASE ORAL
Status: DISCONTINUED | OUTPATIENT
Start: 2020-10-01 | End: 2020-10-02 | Stop reason: HOSPADM

## 2020-09-30 RX ORDER — MAGNESIUM SULFATE 1 G/100ML
1 INJECTION INTRAVENOUS AS NEEDED
Status: DISCONTINUED | OUTPATIENT
Start: 2020-09-30 | End: 2020-10-02 | Stop reason: HOSPADM

## 2020-09-30 RX ORDER — SPIRONOLACTONE 25 MG/1
75 TABLET ORAL EVERY 8 HOURS SCHEDULED
COMMUNITY
End: 2020-09-30

## 2020-09-30 RX ORDER — ROFLUMILAST 500 UG/1
500 TABLET ORAL DAILY
Status: DISCONTINUED | OUTPATIENT
Start: 2020-10-01 | End: 2020-10-02 | Stop reason: HOSPADM

## 2020-09-30 RX ORDER — SODIUM CHLORIDE 0.9 % (FLUSH) 0.9 %
10 SYRINGE (ML) INJECTION EVERY 12 HOURS SCHEDULED
Status: DISCONTINUED | OUTPATIENT
Start: 2020-09-30 | End: 2020-10-02 | Stop reason: HOSPADM

## 2020-09-30 RX ORDER — METOLAZONE 2.5 MG/1
2.5 TABLET ORAL CONTINUOUS PRN
COMMUNITY
End: 2020-09-30

## 2020-09-30 RX ORDER — HYDROMORPHONE HCL 110MG/55ML
0.5 PATIENT CONTROLLED ANALGESIA SYRINGE INTRAVENOUS ONCE
Status: COMPLETED | OUTPATIENT
Start: 2020-09-30 | End: 2020-09-30

## 2020-09-30 RX ORDER — DULOXETIN HYDROCHLORIDE 30 MG/1
60 CAPSULE, DELAYED RELEASE ORAL DAILY
Status: DISCONTINUED | OUTPATIENT
Start: 2020-10-01 | End: 2020-10-02 | Stop reason: HOSPADM

## 2020-09-30 RX ORDER — ATORVASTATIN CALCIUM 40 MG/1
80 TABLET, FILM COATED ORAL DAILY
Status: DISCONTINUED | OUTPATIENT
Start: 2020-10-01 | End: 2020-10-02 | Stop reason: HOSPADM

## 2020-09-30 RX ORDER — AMIODARONE HCL/D5W 450 MG/250
0.5 PLASTIC BAG, INJECTION (ML) INTRAVENOUS CONTINUOUS
Status: DISCONTINUED | OUTPATIENT
Start: 2020-09-30 | End: 2020-10-01

## 2020-09-30 RX ORDER — POTASSIUM CHLORIDE 750 MG/1
10 TABLET, FILM COATED, EXTENDED RELEASE ORAL DAILY
Status: DISCONTINUED | OUTPATIENT
Start: 2020-10-01 | End: 2020-10-01

## 2020-09-30 RX ORDER — NITROGLYCERIN 0.4 MG/1
0.4 TABLET SUBLINGUAL
Status: DISCONTINUED | OUTPATIENT
Start: 2020-09-30 | End: 2020-10-02 | Stop reason: HOSPADM

## 2020-09-30 RX ORDER — ONDANSETRON 4 MG/1
4 TABLET, FILM COATED ORAL EVERY 6 HOURS PRN
Status: DISCONTINUED | OUTPATIENT
Start: 2020-09-30 | End: 2020-10-02 | Stop reason: HOSPADM

## 2020-09-30 RX ORDER — LIDOCAINE 50 MG/G
1 PATCH TOPICAL
Status: DISCONTINUED | OUTPATIENT
Start: 2020-09-30 | End: 2020-10-02 | Stop reason: HOSPADM

## 2020-09-30 RX ORDER — MAGNESIUM SULFATE HEPTAHYDRATE 40 MG/ML
2 INJECTION, SOLUTION INTRAVENOUS AS NEEDED
Status: DISCONTINUED | OUTPATIENT
Start: 2020-09-30 | End: 2020-10-02 | Stop reason: HOSPADM

## 2020-09-30 RX ORDER — HYDROMORPHONE HCL 110MG/55ML
1 PATIENT CONTROLLED ANALGESIA SYRINGE INTRAVENOUS ONCE
Status: DISCONTINUED | OUTPATIENT
Start: 2020-09-30 | End: 2020-09-30

## 2020-09-30 RX ORDER — CETIRIZINE HYDROCHLORIDE 10 MG/1
10 TABLET ORAL DAILY
Status: DISCONTINUED | OUTPATIENT
Start: 2020-10-01 | End: 2020-10-02 | Stop reason: HOSPADM

## 2020-09-30 RX ORDER — GUAIFENESIN 600 MG/1
1200 TABLET, EXTENDED RELEASE ORAL EVERY 12 HOURS
Status: DISCONTINUED | OUTPATIENT
Start: 2020-09-30 | End: 2020-10-02 | Stop reason: HOSPADM

## 2020-09-30 RX ORDER — ASPIRIN 81 MG/1
324 TABLET, CHEWABLE ORAL ONCE
Status: COMPLETED | OUTPATIENT
Start: 2020-09-30 | End: 2020-09-30

## 2020-09-30 RX ORDER — ACETAMINOPHEN 160 MG/5ML
650 SOLUTION ORAL EVERY 4 HOURS PRN
Status: DISCONTINUED | OUTPATIENT
Start: 2020-09-30 | End: 2020-10-02 | Stop reason: HOSPADM

## 2020-09-30 RX ORDER — ONDANSETRON 2 MG/ML
INJECTION INTRAMUSCULAR; INTRAVENOUS
Status: COMPLETED
Start: 2020-09-30 | End: 2020-09-30

## 2020-09-30 RX ORDER — ONDANSETRON 2 MG/ML
4 INJECTION INTRAMUSCULAR; INTRAVENOUS EVERY 6 HOURS PRN
Status: DISCONTINUED | OUTPATIENT
Start: 2020-09-30 | End: 2020-10-02 | Stop reason: HOSPADM

## 2020-09-30 RX ORDER — HYDROXYZINE HYDROCHLORIDE 25 MG/1
50 TABLET, FILM COATED ORAL NIGHTLY PRN
Status: DISCONTINUED | OUTPATIENT
Start: 2020-09-30 | End: 2020-10-02 | Stop reason: HOSPADM

## 2020-09-30 RX ORDER — METOLAZONE 2.5 MG/1
2.5 TABLET ORAL DAILY PRN
Status: DISCONTINUED | OUTPATIENT
Start: 2020-09-30 | End: 2020-10-01

## 2020-09-30 RX ADMIN — ONDANSETRON 4 MG: 2 INJECTION INTRAMUSCULAR; INTRAVENOUS at 16:16

## 2020-09-30 RX ADMIN — SODIUM CHLORIDE 500 ML: 0.9 INJECTION, SOLUTION INTRAVENOUS at 16:16

## 2020-09-30 RX ADMIN — SPIRONOLACTONE 25 MG: 25 TABLET, FILM COATED ORAL at 22:20

## 2020-09-30 RX ADMIN — AMIODARONE HYDROCHLORIDE 150 MG: 1.5 INJECTION, SOLUTION INTRAVENOUS at 15:41

## 2020-09-30 RX ADMIN — AMIODARONE HYDROCHLORIDE 0.5 MG/MIN: 50 INJECTION, SOLUTION INTRAVENOUS at 22:12

## 2020-09-30 RX ADMIN — ONDANSETRON 4 MG: 2 INJECTION, SOLUTION INTRAMUSCULAR; INTRAVENOUS at 16:16

## 2020-09-30 RX ADMIN — GABAPENTIN 300 MG: 300 CAPSULE ORAL at 22:17

## 2020-09-30 RX ADMIN — TAMSULOSIN HYDROCHLORIDE 0.4 MG: 0.4 CAPSULE ORAL at 22:21

## 2020-09-30 RX ADMIN — METHYLPREDNISOLONE SODIUM SUCCINATE 40 MG: 40 INJECTION, POWDER, FOR SOLUTION INTRAMUSCULAR; INTRAVENOUS at 22:15

## 2020-09-30 RX ADMIN — RANOLAZINE 1000 MG: 500 TABLET, FILM COATED, EXTENDED RELEASE ORAL at 22:19

## 2020-09-30 RX ADMIN — HYDROMORPHONE HYDROCHLORIDE 0.5 MG: 2 INJECTION, SOLUTION INTRAMUSCULAR; INTRAVENOUS; SUBCUTANEOUS at 16:15

## 2020-09-30 RX ADMIN — GUAIFENESIN 1200 MG: 600 TABLET, EXTENDED RELEASE ORAL at 22:18

## 2020-09-30 RX ADMIN — HYDROMORPHONE HYDROCHLORIDE 1 MG: 2 INJECTION, SOLUTION INTRAMUSCULAR; INTRAVENOUS; SUBCUTANEOUS at 20:27

## 2020-09-30 RX ADMIN — ASPIRIN 81 MG 324 MG: 81 TABLET ORAL at 15:40

## 2020-09-30 RX ADMIN — AMIODARONE HYDROCHLORIDE 1 MG/MIN: 50 INJECTION, SOLUTION INTRAVENOUS at 15:42

## 2020-09-30 RX ADMIN — HYDROMORPHONE HYDROCHLORIDE 1 MG: 2 INJECTION, SOLUTION INTRAMUSCULAR; INTRAVENOUS; SUBCUTANEOUS at 22:13

## 2020-09-30 RX ADMIN — Medication 10 ML: at 20:30

## 2020-09-30 RX ADMIN — ONDANSETRON 4 MG: 2 INJECTION INTRAMUSCULAR; INTRAVENOUS at 20:30

## 2020-10-01 ENCOUNTER — ANESTHESIA (OUTPATIENT)
Dept: CARDIOLOGY | Facility: HOSPITAL | Age: 60
End: 2020-10-01

## 2020-10-01 ENCOUNTER — ANESTHESIA EVENT (OUTPATIENT)
Dept: CARDIOLOGY | Facility: HOSPITAL | Age: 60
End: 2020-10-01

## 2020-10-01 ENCOUNTER — APPOINTMENT (OUTPATIENT)
Dept: CT IMAGING | Facility: HOSPITAL | Age: 60
End: 2020-10-01

## 2020-10-01 PROBLEM — I48.0 PAROXYSMAL ATRIAL FIBRILLATION (HCC): Chronic | Status: RESOLVED | Noted: 2019-08-07 | Resolved: 2020-10-01

## 2020-10-01 PROBLEM — I48.91 ATRIAL FIBRILLATION WITH RVR (HCC): Status: RESOLVED | Noted: 2019-09-20 | Resolved: 2020-10-01

## 2020-10-01 PROBLEM — I47.20 VENTRICULAR TACHYCARDIA (HCC): Status: ACTIVE | Noted: 2020-09-30

## 2020-10-01 PROBLEM — I48.20 ATRIAL FIBRILLATION, CHRONIC (HCC): Status: ACTIVE | Noted: 2020-10-01

## 2020-10-01 LAB
ACT BLD: 109 SECONDS (ref 89–137)
ACT BLD: 125 SECONDS (ref 89–137)
ACT BLD: 219 SECONDS (ref 89–137)
ACT BLD: 235 SECONDS (ref 89–137)
ACT BLD: 279 SECONDS (ref 89–137)
ACT BLD: 290 SECONDS (ref 89–137)
ACT BLD: 301 SECONDS (ref 89–137)
ACT BLD: 307 SECONDS (ref 89–137)
ALBUMIN SERPL-MCNC: 4.4 G/DL (ref 3.5–5.2)
ALBUMIN/GLOB SERPL: 1.4 G/DL
ALP SERPL-CCNC: 88 U/L (ref 39–117)
ALT SERPL W P-5'-P-CCNC: 26 U/L (ref 1–41)
ANION GAP SERPL CALCULATED.3IONS-SCNC: 13 MMOL/L (ref 5–15)
AST SERPL-CCNC: 41 U/L (ref 1–40)
B PARAPERT DNA SPEC QL NAA+PROBE: NOT DETECTED
B PARAPERT DNA SPEC QL NAA+PROBE: NOT DETECTED
B PERT DNA SPEC QL NAA+PROBE: NOT DETECTED
B PERT DNA SPEC QL NAA+PROBE: NOT DETECTED
BILIRUB SERPL-MCNC: 1.2 MG/DL (ref 0–1.2)
BUN SERPL-MCNC: 23 MG/DL (ref 6–20)
BUN SERPL-MCNC: ABNORMAL MG/DL
BUN/CREAT SERPL: ABNORMAL
C PNEUM DNA NPH QL NAA+NON-PROBE: NOT DETECTED
C PNEUM DNA NPH QL NAA+NON-PROBE: NOT DETECTED
CALCIUM SPEC-SCNC: 8.9 MG/DL (ref 8.6–10.5)
CHLORIDE SERPL-SCNC: 99 MMOL/L (ref 98–107)
CO2 SERPL-SCNC: 25 MMOL/L (ref 22–29)
CREAT SERPL-MCNC: 1.42 MG/DL (ref 0.76–1.27)
DEPRECATED RDW RBC AUTO: 51.6 FL (ref 37–54)
ERYTHROCYTE [DISTWIDTH] IN BLOOD BY AUTOMATED COUNT: 16.4 % (ref 12.3–15.4)
FLUAV H1 2009 PAND RNA NPH QL NAA+PROBE: NOT DETECTED
FLUAV H1 2009 PAND RNA NPH QL NAA+PROBE: NOT DETECTED
FLUAV H1 HA GENE NPH QL NAA+PROBE: NOT DETECTED
FLUAV H1 HA GENE NPH QL NAA+PROBE: NOT DETECTED
FLUAV H3 RNA NPH QL NAA+PROBE: NOT DETECTED
FLUAV H3 RNA NPH QL NAA+PROBE: NOT DETECTED
FLUAV SUBTYP SPEC NAA+PROBE: NOT DETECTED
FLUAV SUBTYP SPEC NAA+PROBE: NOT DETECTED
FLUBV RNA ISLT QL NAA+PROBE: NOT DETECTED
FLUBV RNA ISLT QL NAA+PROBE: NOT DETECTED
GFR SERPL CREATININE-BSD FRML MDRD: 51 ML/MIN/1.73
GLOBULIN UR ELPH-MCNC: 3.2 GM/DL
GLUCOSE SERPL-MCNC: 138 MG/DL (ref 65–99)
HADV DNA SPEC NAA+PROBE: NOT DETECTED
HADV DNA SPEC NAA+PROBE: NOT DETECTED
HCOV 229E RNA SPEC QL NAA+PROBE: NOT DETECTED
HCOV 229E RNA SPEC QL NAA+PROBE: NOT DETECTED
HCOV HKU1 RNA SPEC QL NAA+PROBE: NOT DETECTED
HCOV HKU1 RNA SPEC QL NAA+PROBE: NOT DETECTED
HCOV NL63 RNA SPEC QL NAA+PROBE: NOT DETECTED
HCOV NL63 RNA SPEC QL NAA+PROBE: NOT DETECTED
HCOV OC43 RNA SPEC QL NAA+PROBE: NOT DETECTED
HCOV OC43 RNA SPEC QL NAA+PROBE: NOT DETECTED
HCT VFR BLD AUTO: 48.9 % (ref 37.5–51)
HGB BLD-MCNC: 15 G/DL (ref 13–17.7)
HMPV RNA NPH QL NAA+NON-PROBE: NOT DETECTED
HMPV RNA NPH QL NAA+NON-PROBE: NOT DETECTED
HPIV1 RNA SPEC QL NAA+PROBE: NOT DETECTED
HPIV1 RNA SPEC QL NAA+PROBE: NOT DETECTED
HPIV2 RNA SPEC QL NAA+PROBE: NOT DETECTED
HPIV2 RNA SPEC QL NAA+PROBE: NOT DETECTED
HPIV3 RNA NPH QL NAA+PROBE: NOT DETECTED
HPIV3 RNA NPH QL NAA+PROBE: NOT DETECTED
HPIV4 P GENE NPH QL NAA+PROBE: NOT DETECTED
HPIV4 P GENE NPH QL NAA+PROBE: NOT DETECTED
M PNEUMO IGG SER IA-ACNC: NOT DETECTED
M PNEUMO IGG SER IA-ACNC: NOT DETECTED
MAGNESIUM SERPL-MCNC: 2.2 MG/DL (ref 1.6–2.6)
MCH RBC QN AUTO: 27.4 PG (ref 26.6–33)
MCHC RBC AUTO-ENTMCNC: 30.8 G/DL (ref 31.5–35.7)
MCV RBC AUTO: 89 FL (ref 79–97)
PLATELET # BLD AUTO: 281 10*3/MM3 (ref 140–450)
PMV BLD AUTO: 9.4 FL (ref 6–12)
POTASSIUM SERPL-SCNC: 4.8 MMOL/L (ref 3.5–5.2)
POTASSIUM SERPL-SCNC: 5.4 MMOL/L (ref 3.5–5.2)
PROT SERPL-MCNC: 7.6 G/DL (ref 6–8.5)
RBC # BLD AUTO: 5.49 10*6/MM3 (ref 4.14–5.8)
RHINOVIRUS RNA SPEC NAA+PROBE: NOT DETECTED
RHINOVIRUS RNA SPEC NAA+PROBE: NOT DETECTED
RSV RNA NPH QL NAA+NON-PROBE: NOT DETECTED
RSV RNA NPH QL NAA+NON-PROBE: NOT DETECTED
SARS-COV-2 RNA NPH QL NAA+NON-PROBE: NOT DETECTED
SODIUM SERPL-SCNC: 137 MMOL/L (ref 136–145)
TROPONIN T SERPL-MCNC: 0.02 NG/ML (ref 0–0.03)
TROPONIN T SERPL-MCNC: 0.03 NG/ML (ref 0–0.03)
WBC # BLD AUTO: 18.9 10*3/MM3 (ref 3.4–10.8)

## 2020-10-01 PROCEDURE — 93462 L HRT CATH TRNSPTL PUNCTURE: CPT | Performed by: INTERNAL MEDICINE

## 2020-10-01 PROCEDURE — 85027 COMPLETE CBC AUTOMATED: CPT | Performed by: NURSE PRACTITIONER

## 2020-10-01 PROCEDURE — C1732 CATH, EP, DIAG/ABL, 3D/VECT: HCPCS | Performed by: INTERNAL MEDICINE

## 2020-10-01 PROCEDURE — 25010000002 DEXAMETHASONE PER 1 MG: Performed by: ANESTHESIOLOGY

## 2020-10-01 PROCEDURE — C1893 INTRO/SHEATH, FIXED,NON-PEEL: HCPCS | Performed by: INTERNAL MEDICINE

## 2020-10-01 PROCEDURE — 25010000002 MIDAZOLAM PER 1 MG: Performed by: ANESTHESIOLOGY

## 2020-10-01 PROCEDURE — 02K83ZZ MAP CONDUCTION MECHANISM, PERCUTANEOUS APPROACH: ICD-10-PCS | Performed by: INTERNAL MEDICINE

## 2020-10-01 PROCEDURE — 25010000002 FENTANYL CITRATE (PF) 100 MCG/2ML SOLUTION: Performed by: ANESTHESIOLOGY

## 2020-10-01 PROCEDURE — 0202U NFCT DS 22 TRGT SARS-COV-2: CPT | Performed by: INTERNAL MEDICINE

## 2020-10-01 PROCEDURE — 25010000002 PROTAMINE SULFATE PER 10 MG: Performed by: ANESTHESIOLOGY

## 2020-10-01 PROCEDURE — 4A023FZ MEASUREMENT OF CARDIAC RHYTHM, PERCUTANEOUS APPROACH: ICD-10-PCS | Performed by: INTERNAL MEDICINE

## 2020-10-01 PROCEDURE — 25010000002 HEPARIN (PORCINE) PER 1000 UNITS: Performed by: ANESTHESIOLOGY

## 2020-10-01 PROCEDURE — 25010000002 ONDANSETRON PER 1 MG: Performed by: NURSE PRACTITIONER

## 2020-10-01 PROCEDURE — 25010000002 METHYLPREDNISOLONE PER 40 MG: Performed by: NURSE PRACTITIONER

## 2020-10-01 PROCEDURE — 63710000001 ONDANSETRON PER 8 MG: Performed by: NURSE PRACTITIONER

## 2020-10-01 PROCEDURE — 25010000002 PHENYLEPHRINE 10 MG/ML SOLUTION 5 ML VIAL: Performed by: ANESTHESIOLOGY

## 2020-10-01 PROCEDURE — 85347 COAGULATION TIME ACTIVATED: CPT

## 2020-10-01 PROCEDURE — C1759 CATH, INTRA ECHOCARDIOGRAPHY: HCPCS | Performed by: INTERNAL MEDICINE

## 2020-10-01 PROCEDURE — 02583ZZ DESTRUCTION OF CONDUCTION MECHANISM, PERCUTANEOUS APPROACH: ICD-10-PCS | Performed by: INTERNAL MEDICINE

## 2020-10-01 PROCEDURE — 25010000002 HYDROCORTISONE SODIUM SUCCINATE 100 MG RECONSTITUTED SOLUTION: Performed by: ANESTHESIOLOGY

## 2020-10-01 PROCEDURE — C1894 INTRO/SHEATH, NON-LASER: HCPCS | Performed by: INTERNAL MEDICINE

## 2020-10-01 PROCEDURE — 93623 PRGRMD STIMJ&PACG IV RX NFS: CPT | Performed by: INTERNAL MEDICINE

## 2020-10-01 PROCEDURE — 99232 SBSQ HOSP IP/OBS MODERATE 35: CPT | Performed by: INTERNAL MEDICINE

## 2020-10-01 PROCEDURE — 70450 CT HEAD/BRAIN W/O DYE: CPT

## 2020-10-01 PROCEDURE — 93662 INTRACARDIAC ECG (ICE): CPT | Performed by: INTERNAL MEDICINE

## 2020-10-01 PROCEDURE — 80053 COMPREHEN METABOLIC PANEL: CPT | Performed by: NURSE PRACTITIONER

## 2020-10-01 PROCEDURE — 93654 COMPRE EP EVAL TX VT: CPT | Performed by: INTERNAL MEDICINE

## 2020-10-01 PROCEDURE — 4A0234Z MEASUREMENT OF CARDIAC ELECTRICAL ACTIVITY, PERCUTANEOUS APPROACH: ICD-10-PCS | Performed by: INTERNAL MEDICINE

## 2020-10-01 PROCEDURE — 99223 1ST HOSP IP/OBS HIGH 75: CPT | Performed by: INTERNAL MEDICINE

## 2020-10-01 PROCEDURE — 25010000002 PROPOFOL 10 MG/ML EMULSION: Performed by: ANESTHESIOLOGY

## 2020-10-01 PROCEDURE — 99233 SBSQ HOSP IP/OBS HIGH 50: CPT | Performed by: INTERNAL MEDICINE

## 2020-10-01 PROCEDURE — B41F1ZZ FLUOROSCOPY OF RIGHT LOWER EXTREMITY ARTERIES USING LOW OSMOLAR CONTRAST: ICD-10-PCS | Performed by: INTERNAL MEDICINE

## 2020-10-01 PROCEDURE — C1730 CATH, EP, 19 OR FEW ELECT: HCPCS | Performed by: INTERNAL MEDICINE

## 2020-10-01 PROCEDURE — 84484 ASSAY OF TROPONIN QUANT: CPT | Performed by: NURSE PRACTITIONER

## 2020-10-01 RX ORDER — PROTAMINE SULFATE 10 MG/ML
INJECTION, SOLUTION INTRAVENOUS AS NEEDED
Status: DISCONTINUED | OUTPATIENT
Start: 2020-10-01 | End: 2020-10-01 | Stop reason: SURG

## 2020-10-01 RX ORDER — GLYCOPYRROLATE 1 MG/5 ML
SYRINGE (ML) INTRAVENOUS AS NEEDED
Status: DISCONTINUED | OUTPATIENT
Start: 2020-10-01 | End: 2020-10-01 | Stop reason: SURG

## 2020-10-01 RX ORDER — LIDOCAINE HYDROCHLORIDE 20 MG/ML
INJECTION, SOLUTION INFILTRATION; PERINEURAL AS NEEDED
Status: DISCONTINUED | OUTPATIENT
Start: 2020-10-01 | End: 2020-10-01 | Stop reason: HOSPADM

## 2020-10-01 RX ORDER — DEXAMETHASONE SODIUM PHOSPHATE 4 MG/ML
INJECTION, SOLUTION INTRA-ARTICULAR; INTRALESIONAL; INTRAMUSCULAR; INTRAVENOUS; SOFT TISSUE AS NEEDED
Status: DISCONTINUED | OUTPATIENT
Start: 2020-10-01 | End: 2020-10-01 | Stop reason: SURG

## 2020-10-01 RX ORDER — SPIRONOLACTONE 25 MG/1
50 TABLET ORAL DAILY
Status: DISCONTINUED | OUTPATIENT
Start: 2020-10-02 | End: 2020-10-02

## 2020-10-01 RX ORDER — NEOSTIGMINE METHYLSULFATE 5 MG/5 ML
SYRINGE (ML) INTRAVENOUS AS NEEDED
Status: DISCONTINUED | OUTPATIENT
Start: 2020-10-01 | End: 2020-10-01 | Stop reason: SURG

## 2020-10-01 RX ORDER — MIDAZOLAM HYDROCHLORIDE 1 MG/ML
INJECTION INTRAMUSCULAR; INTRAVENOUS AS NEEDED
Status: DISCONTINUED | OUTPATIENT
Start: 2020-10-01 | End: 2020-10-01 | Stop reason: SURG

## 2020-10-01 RX ORDER — PHENYLEPHRINE HCL IN 0.9% NACL 0.5 MG/5ML
SYRINGE (ML) INTRAVENOUS AS NEEDED
Status: DISCONTINUED | OUTPATIENT
Start: 2020-10-01 | End: 2020-10-01 | Stop reason: SURG

## 2020-10-01 RX ORDER — ROCURONIUM BROMIDE 10 MG/ML
INJECTION, SOLUTION INTRAVENOUS AS NEEDED
Status: DISCONTINUED | OUTPATIENT
Start: 2020-10-01 | End: 2020-10-01 | Stop reason: SURG

## 2020-10-01 RX ORDER — FENTANYL CITRATE 50 UG/ML
INJECTION, SOLUTION INTRAMUSCULAR; INTRAVENOUS AS NEEDED
Status: DISCONTINUED | OUTPATIENT
Start: 2020-10-01 | End: 2020-10-01 | Stop reason: SURG

## 2020-10-01 RX ORDER — SODIUM CHLORIDE 0.9 % (FLUSH) 0.9 %
10 SYRINGE (ML) INJECTION AS NEEDED
Status: DISCONTINUED | OUTPATIENT
Start: 2020-10-01 | End: 2020-10-02 | Stop reason: HOSPADM

## 2020-10-01 RX ORDER — FENTANYL CITRATE 50 UG/ML
25 INJECTION, SOLUTION INTRAMUSCULAR; INTRAVENOUS 3 TIMES DAILY PRN
Status: DISCONTINUED | OUTPATIENT
Start: 2020-10-01 | End: 2020-10-02 | Stop reason: HOSPADM

## 2020-10-01 RX ORDER — ONDANSETRON 2 MG/ML
4 INJECTION INTRAMUSCULAR; INTRAVENOUS EVERY 6 HOURS PRN
Status: DISCONTINUED | OUTPATIENT
Start: 2020-10-01 | End: 2020-10-02 | Stop reason: HOSPADM

## 2020-10-01 RX ORDER — SODIUM CHLORIDE 0.9 % (FLUSH) 0.9 %
3 SYRINGE (ML) INJECTION EVERY 12 HOURS SCHEDULED
Status: DISCONTINUED | OUTPATIENT
Start: 2020-10-01 | End: 2020-10-02 | Stop reason: HOSPADM

## 2020-10-01 RX ORDER — SODIUM CHLORIDE 9 MG/ML
INJECTION, SOLUTION INTRAVENOUS CONTINUOUS PRN
Status: DISCONTINUED | OUTPATIENT
Start: 2020-10-01 | End: 2020-10-01 | Stop reason: SURG

## 2020-10-01 RX ORDER — PROPOFOL 10 MG/ML
VIAL (ML) INTRAVENOUS AS NEEDED
Status: DISCONTINUED | OUTPATIENT
Start: 2020-10-01 | End: 2020-10-01 | Stop reason: SURG

## 2020-10-01 RX ORDER — EPHEDRINE SULFATE/0.9% NACL/PF 25 MG/5 ML
SYRINGE (ML) INTRAVENOUS AS NEEDED
Status: DISCONTINUED | OUTPATIENT
Start: 2020-10-01 | End: 2020-10-01 | Stop reason: SURG

## 2020-10-01 RX ADMIN — PHENYLEPHRINE HYDROCHLORIDE 200 MCG: 10 INJECTION INTRAVENOUS at 14:09

## 2020-10-01 RX ADMIN — RANOLAZINE 1000 MG: 500 TABLET, FILM COATED, EXTENDED RELEASE ORAL at 20:19

## 2020-10-01 RX ADMIN — Medication 10 MG: at 13:23

## 2020-10-01 RX ADMIN — ONDANSETRON HYDROCHLORIDE 4 MG: 4 TABLET, FILM COATED ORAL at 09:25

## 2020-10-01 RX ADMIN — PHENYLEPHRINE HYDROCHLORIDE 100 MCG: 10 INJECTION INTRAVENOUS at 13:18

## 2020-10-01 RX ADMIN — Medication 5 MG: at 13:33

## 2020-10-01 RX ADMIN — ROFLUMILAST 500 MCG: 500 TABLET ORAL at 09:25

## 2020-10-01 RX ADMIN — HEPARIN SODIUM 4000 UNITS: 1000 INJECTION INTRAVENOUS; SUBCUTANEOUS at 15:00

## 2020-10-01 RX ADMIN — GUAIFENESIN 1200 MG: 600 TABLET, EXTENDED RELEASE ORAL at 20:40

## 2020-10-01 RX ADMIN — HEPARIN SODIUM 4000 UNITS: 1000 INJECTION INTRAVENOUS; SUBCUTANEOUS at 13:55

## 2020-10-01 RX ADMIN — PHENYLEPHRINE HYDROCHLORIDE 200 MCG: 10 INJECTION INTRAVENOUS at 15:12

## 2020-10-01 RX ADMIN — PHENYLEPHRINE HYDROCHLORIDE 0.5 MCG: 10 INJECTION INTRAVENOUS at 14:05

## 2020-10-01 RX ADMIN — PHENYLEPHRINE HYDROCHLORIDE 300 MCG: 10 INJECTION INTRAVENOUS at 15:02

## 2020-10-01 RX ADMIN — METOPROLOL TARTRATE 25 MG: 25 TABLET, FILM COATED ORAL at 00:53

## 2020-10-01 RX ADMIN — HEPARIN SODIUM 2000 UNITS: 1000 INJECTION INTRAVENOUS; SUBCUTANEOUS at 15:19

## 2020-10-01 RX ADMIN — SODIUM CHLORIDE: 0.9 INJECTION, SOLUTION INTRAVENOUS at 14:10

## 2020-10-01 RX ADMIN — ROCURONIUM BROMIDE 50 MG: 10 INJECTION, SOLUTION INTRAVENOUS at 13:11

## 2020-10-01 RX ADMIN — RANOLAZINE 1000 MG: 500 TABLET, FILM COATED, EXTENDED RELEASE ORAL at 09:25

## 2020-10-01 RX ADMIN — ONDANSETRON 4 MG: 2 INJECTION INTRAMUSCULAR; INTRAVENOUS at 15:46

## 2020-10-01 RX ADMIN — METHYLPREDNISOLONE SODIUM SUCCINATE 40 MG: 40 INJECTION, POWDER, FOR SOLUTION INTRAMUSCULAR; INTRAVENOUS at 07:00

## 2020-10-01 RX ADMIN — HEPARIN SODIUM 8000 UNITS: 1000 INJECTION INTRAVENOUS; SUBCUTANEOUS at 13:48

## 2020-10-01 RX ADMIN — PHENYLEPHRINE HYDROCHLORIDE 400 MCG: 10 INJECTION INTRAVENOUS at 15:06

## 2020-10-01 RX ADMIN — HYDROCORTISONE SODIUM SUCCINATE 100 MG: 100 INJECTION, POWDER, FOR SOLUTION INTRAMUSCULAR; INTRAVENOUS at 13:28

## 2020-10-01 RX ADMIN — Medication 10 ML: at 09:07

## 2020-10-01 RX ADMIN — DEXAMETHASONE SODIUM PHOSPHATE 4 MG: 4 INJECTION, SOLUTION INTRAMUSCULAR; INTRAVENOUS at 13:33

## 2020-10-01 RX ADMIN — GUAIFENESIN 1200 MG: 600 TABLET, EXTENDED RELEASE ORAL at 09:07

## 2020-10-01 RX ADMIN — PROTAMINE SULFATE 100 MG: 10 INJECTION, SOLUTION INTRAVENOUS at 16:09

## 2020-10-01 RX ADMIN — PHENYLEPHRINE HYDROCHLORIDE 200 MCG: 10 INJECTION INTRAVENOUS at 13:34

## 2020-10-01 RX ADMIN — Medication 3 ML: at 20:25

## 2020-10-01 RX ADMIN — MIDAZOLAM 2 MG: 1 INJECTION INTRAMUSCULAR; INTRAVENOUS at 12:59

## 2020-10-01 RX ADMIN — PHENYLEPHRINE HYDROCHLORIDE 200 MCG: 10 INJECTION INTRAVENOUS at 13:59

## 2020-10-01 RX ADMIN — CEFAZOLIN SODIUM 2 G: 1 INJECTION, POWDER, FOR SOLUTION INTRAMUSCULAR; INTRAVENOUS at 13:31

## 2020-10-01 RX ADMIN — PHENYLEPHRINE HYDROCHLORIDE 200 MCG: 10 INJECTION INTRAVENOUS at 13:57

## 2020-10-01 RX ADMIN — PHENYLEPHRINE HYDROCHLORIDE 200 MCG: 10 INJECTION INTRAVENOUS at 14:03

## 2020-10-01 RX ADMIN — MONTELUKAST SODIUM 10 MG: 10 TABLET ORAL at 09:07

## 2020-10-01 RX ADMIN — Medication 3 MG: at 15:48

## 2020-10-01 RX ADMIN — HEPARIN SODIUM 5000 UNITS: 1000 INJECTION INTRAVENOUS; SUBCUTANEOUS at 14:07

## 2020-10-01 RX ADMIN — DULOXETINE HYDROCHLORIDE 60 MG: 30 CAPSULE, DELAYED RELEASE ORAL at 09:06

## 2020-10-01 RX ADMIN — PHENYLEPHRINE HYDROCHLORIDE 200 MCG: 10 INJECTION INTRAVENOUS at 13:27

## 2020-10-01 RX ADMIN — HEPARIN SODIUM 4000 UNITS: 1000 INJECTION INTRAVENOUS; SUBCUTANEOUS at 14:42

## 2020-10-01 RX ADMIN — FENTANYL CITRATE 100 MCG: 50 INJECTION, SOLUTION INTRAMUSCULAR; INTRAVENOUS at 13:11

## 2020-10-01 RX ADMIN — ATORVASTATIN CALCIUM 80 MG: 40 TABLET, FILM COATED ORAL at 09:07

## 2020-10-01 RX ADMIN — PHENYLEPHRINE HYDROCHLORIDE 200 MCG: 10 INJECTION INTRAVENOUS at 13:56

## 2020-10-01 RX ADMIN — ISOPROTERENOL HYDROCHLORIDE 1 MCG: 1 INJECTION, SOLUTION INTRACARDIAC; INTRAMUSCULAR; INTRAVENOUS; SUBCUTANEOUS at 15:31

## 2020-10-01 RX ADMIN — GABAPENTIN 300 MG: 300 CAPSULE ORAL at 20:19

## 2020-10-01 RX ADMIN — BUMETANIDE 1 MG: 1 TABLET ORAL at 09:07

## 2020-10-01 RX ADMIN — CLOPIDOGREL BISULFATE 75 MG: 75 TABLET ORAL at 09:07

## 2020-10-01 RX ADMIN — Medication 0.2 MG: at 15:48

## 2020-10-01 RX ADMIN — Medication 10 ML: at 20:19

## 2020-10-01 RX ADMIN — LIDOCAINE 1 PATCH: 50 PATCH TOPICAL at 20:18

## 2020-10-01 RX ADMIN — HEPARIN SODIUM 6000 UNITS: 1000 INJECTION INTRAVENOUS; SUBCUTANEOUS at 14:24

## 2020-10-01 RX ADMIN — PROPOFOL 200 MG: 10 INJECTION, EMULSION INTRAVENOUS at 13:11

## 2020-10-01 RX ADMIN — PHENYLEPHRINE HYDROCHLORIDE 200 MCG: 10 INJECTION INTRAVENOUS at 13:47

## 2020-10-01 RX ADMIN — CETIRIZINE HYDROCHLORIDE 10 MG: 10 TABLET, FILM COATED ORAL at 09:07

## 2020-10-01 RX ADMIN — SODIUM CHLORIDE: 0.9 INJECTION, SOLUTION INTRAVENOUS at 12:55

## 2020-10-01 RX ADMIN — TAMSULOSIN HYDROCHLORIDE 0.4 MG: 0.4 CAPSULE ORAL at 20:19

## 2020-10-01 RX ADMIN — PHENYLEPHRINE HYDROCHLORIDE 200 MCG: 10 INJECTION INTRAVENOUS at 13:50

## 2020-10-01 RX ADMIN — METOPROLOL SUCCINATE 100 MG: 50 TABLET, EXTENDED RELEASE ORAL at 09:07

## 2020-10-01 RX ADMIN — PHENYLEPHRINE HYDROCHLORIDE 200 MCG: 10 INJECTION INTRAVENOUS at 15:01

## 2020-10-01 RX ADMIN — GABAPENTIN 300 MG: 300 CAPSULE ORAL at 09:07

## 2020-10-01 NOTE — ANESTHESIA PROCEDURE NOTES
Airway  Urgency: elective    Date/Time: 10/1/2020 1:15 PM  Difficult airway    General Information and Staff    Anesthesiologist: Thiago Aguilar MD    Indications and Patient Condition  Indications for airway management: airway protection and CNS depression    Preoxygenated: yes  Mask difficulty assessment: 2 - vent by mask + OA or adjuvant +/- NMBA    Final Airway Details  Final airway type: endotracheal airway      Successful airway: ETT    Successful intubation technique: video laryngoscopy  Endotracheal tube insertion site: oral  Blade: uTrner  Blade size: 4  ETT size (mm): 7.0  Cormack-Lehane Classification: grade I - full view of glottis  Placement verified by: capnometry   Measured from: teeth  ETT/EBT  to teeth (cm): 23  Number of attempts at approach: 1  Assessment: lips, teeth, and gum same as pre-op and atraumatic intubation

## 2020-10-01 NOTE — DISCHARGE PLACEMENT REQUEST
"Haven Bustos Jr. (59 y.o. Male)     Date of Birth Social Security Number Address Home Phone MRN    1960  [address] [place] [address]  [id number]    Christianity Marital Status          Alevism        Admission Date Admission Type Admitting Provider Attending Provider Department, Room/Bed    9/30/20 Emergency Brandon Enrique MD Ozor, Uchenna, MD [facility] CATH LAB, Pool/Cath    Discharge Date Discharge Disposition Discharge Destination                       Attending Provider: Brandon Enrique MD    Allergies: Codeine, Tramadol    Isolation: None   Infection: None   Code Status: CPR    Ht: 175.3 cm (69\")   Wt: 140 kg (308 lb 13.8 oz)    Admission Cmt: None   Principal Problem: Syncope [R55]                 Active Insurance as of 9/30/2020     Primary Coverage     Payor Plan Insurance Group Employer/Plan Group    [facility] [id number]     Payor Plan Address Payor Plan Phone Number Payor Plan Fax Number Effective Dates    [address]   1/1/2019 - None Entered    [facility] [phone]       Subscriber Name Subscriber Birth Date Member ID       ABIGAIL BUSTOS 2/18/1964 [id number]           Secondary Coverage     Payor Plan Insurance Group Employer/Plan Group    MEDICARE MEDICARE A & B      Payor Plan Address Payor Plan Phone Number Payor Plan Fax Number Effective Dates    [address] [phone]  11/1/2004 - None Entered    [facility] [id number]       Subscriber Name Subscriber Birth Date Member ID       HAVEN BUSTOS JR. 1960 [id number]                 Emergency Contacts      (Rel.) Home Phone Work Phone Mobile Phone    Abigail Bustos (Spouse) -- -- [phone]               History & Physical      Christa Keller APRN at 09/30/20 1654     Attestation signed by Brandon Enrique MD at 09/30/20 1807    Reviewed and agreed with the documentation of the NP below    I also personally evaluated this patient.    59-year-old man with history of " chronic ischemic cardiomyopathy status post AICD, paroxysmal atrial fibrillation and hyperlipidemia.  Presented to the [facility] ED after a syncopal episode.  Apparently, patient was at his nephrologist clinic when his defibrillator went off and he passed out    EKG done in the ED showed A. fib/flutter with controlled rates.  Patient was started on amiodarone drip.  EP was consulted and patient admitted for further care.    On general examination, patient is in no obvious painful or respiratory distress  Heart-regular    Plan  Continue on amiodarone  Close monitoring  EP physician consulted from the ED  Further recommendation following clinical course                        [facility] Hospital Medicine Services      Patient Name: Jose Dixon Jr.  : 1960  MRN: [id number]  Primary Care Physician: Jaylen Moss MD  Date of admission: 2020    Patient Care Team:  Jaylen Moss MD as PCP - Wicho Jones MD as Consulting Physician (Nephrology)          Subjective   History Present Illness     Chief Complaint: Syncope    Mr. Dixon is a 59 y.o. male with a past medical history of CAD, dilated cardiomyopathy, sleep apnea, hypertension, hyperlipidemia, paroxysmal atrial fibrillation, chronic systolic congestive heart failure, COPD, AICD, BPH, depression with anxiety, and peripheral neuropathy who presented to [facility] on 2020 after having a syncopal episode.  Patient explains today he was following up with Dr. Sotelo when his defibrillator went off and he passed out.  He explains that he urinated on himself when this happened and that he was only out for a few seconds.  When he passed out he explained that he hit his right knee pain and he is currently having some right knee pain.  He reports that he has had chest pain and shortness of breath for the past 2 days.  He is taken nitro with some relief of the chest pain.  He denies any aggravating factors of  the chest pain.  He states that shortness of breath is worse with activity and better at rest.  He explains yesterday he noticed he developed generalized weakness and his wife mentioned that his speech was slurred.  This morning when he woke up he did not feel like himself and was just had a bad feeling.  He states after being shocked by his defibrillator he has had a headache.  His current chest pain is a 8 out of 10 radiating into his neck and between his shoulder blades.  He reports that he has had 2 cardiac surgeries in the past 2 months with one being an ablation.  He was supposed to follow-up with Dr. Lopez on Monday to schedule another ablation.    In the ED, chest x-ray unremarkable.  EKG showed Afib/flut and, V-paced complexes, Biventricular paced rhythm with rate in the 70's.  All labs unremarkable upon admission except white blood cell 13.6.  All vital signs stable upon admission.  Patient was started on amiodarone drip in the ED.  Cardiac surgery consulted.    Upon review of record patient was here on 8/22/2020 for chronic stable angina and acute exacerbation of systolic congestive heart failure.  Patient was here previously before that on 7/15/2020 for chest pain.  It was noted during that admission patient had ventricular tachycardia runs and patient was continued on metoprolol at a lower dose and patient's amiodarone was held.  On 6/22/2020 patient had a left ventricular lead placement by Dr. Richmond.  On 6/9/2020 patient had a VT ablation.    Review of Systems   Constitution: Positive for malaise/fatigue.   HENT: Negative.    Cardiovascular: Positive for chest pain, dyspnea on exertion and syncope.   Respiratory: Negative.    Skin: Negative.    Musculoskeletal:        Right knee pain    Gastrointestinal: Negative.    Genitourinary: Positive for bladder incontinence.   Neurological: Positive for weakness.        Slurred speech   Psychiatric/Behavioral: Negative.            Personal History     Past  Medical History:   Past Medical History:   Diagnosis Date   • Asthma    • Atrial fibrillation (CMS/[facility])    • CAD (coronary artery disease)    • CHF (congestive heart failure) (CMS/[facility])    • COPD (chronic obstructive pulmonary disease) (CMS/[facility])    • Depression    • Elevated cholesterol    • Hyperlipidemia    • Hypertension    • Myocardial infarction (CMS/[facility])    • Pacemaker 2019    [employer]    • Sleep apnea    • V tach (CMS/[facility])        Surgical History:      Past Surgical History:   Procedure Laterality Date   • BACK SURGERY      Sciatica   • CARDIAC ABLATION  11/07/2017   • CARDIAC CATHETERIZATION      6-8 stents, last heart cath 2019   • CARDIAC CATHETERIZATION N/A 6/9/2020    Procedure: Coronary angiography;  Surgeon: Jose Lopez MD;  Location: [facility] CATH INVASIVE LOCATION;  Service: Cardiovascular;  Laterality: N/A;   • CARDIAC ELECTROPHYSIOLOGY PROCEDURE N/A 9/26/2019    Procedure: BIVENTRICULAR DEVICE UPGRADE;  Surgeon: Jose Lopez MD;  Location:  [facility] CATH INVASIVE LOCATION;  Service: Cardiovascular   • CARDIAC ELECTROPHYSIOLOGY PROCEDURE N/A 9/26/2019    Procedure: EP/Ablation AV Node ablation;  Surgeon: Jose Lopez MD;  Location: [facility] CATH INVASIVE LOCATION;  Service: Cardiology   • CARDIAC ELECTROPHYSIOLOGY PROCEDURE N/A 6/9/2020    Procedure: EP/Ablation;  Surgeon: Jose Lopez MD;  Location: [facility] CATH INVASIVE LOCATION;  Service: Cardiovascular;  Laterality: N/A;   • CARDIAC PACEMAKER PLACEMENT N/A 6/22/2020    Procedure: LEFT VENTRICULAR LEAD PLACEMENT;  Surgeon: Christiano Richmond MD;  Location: [facility] CVOR;  Service: Cardiothoracic;  Laterality: N/A;   • CHOLECYSTECTOMY     • COLONOSCOPY     • CORONARY ANGIOPLASTY WITH STENT PLACEMENT      2 stents   • HERNIA REPAIR      gallbladder    • PACEMAKER IMPLANTATION  07/08/2016    Pacemaker/ Defib implant - [employer]   • SINUS SURGERY      sinus surgery x 9   • SKIN BIOPSY      benign           Family History:  family history includes Atrial fibrillation in his father and mother; COPD in his mother; Diabetes in his mother; Heart disease in his father and mother. Otherwise pertinent FHx was reviewed and unremarkable.     Social History:  reports that he has never smoked. He has never used smokeless tobacco. He reports current alcohol use. He reports that he does not use drugs.      Medications:  Prior to Admission medications    Medication Sig Start Date End Date Taking? Authorizing Provider   apixaban (ELIQUIS) 5 MG tablet tablet Take 1 tablet by mouth 2 (Two) Times a Day. 10/28/19  Yes Juli Boudreaux APRN   atorvastatin (LIPITOR) 80 MG tablet Take 1 tablet by mouth Daily. 7/31/20  Yes Jose Lopez MD   bumetanide (BUMEX) 1 MG tablet Take 1 tablet by mouth 2 (Two) Times a Day. Hold for bp less then 100/60 7/21/20  Yes Dunia Wilkerson MD   cetirizine (zyrTEC) 10 MG tablet Take 1 tablet by mouth Daily. 9/17/20  Yes Jose Lopez MD   clopidogrel (PLAVIX) 75 MG tablet Take 1 tablet by mouth Daily. 9/17/20  Yes Jose Lopez MD   DULoxetine (CYMBALTA) 60 MG capsule Take 60 mg by mouth Daily.   Yes Kian Kraus MD   gabapentin (NEURONTIN) 300 MG capsule Take 300 mg by mouth 2 (Two) Times a Day.   Yes Kian Kraus MD   metoprolol succinate XL (TOPROL-XL) 100 MG 24 hr tablet Take 1 tablet by mouth Daily. 7/31/20  Yes Jose Lopez MD   montelukast (SINGULAIR) 10 MG tablet Take 10 mg by mouth Daily.   Yes Kian Kraus MD   potassium chloride (K-DUR) 10 MEQ CR tablet Take 1 tablet by mouth Daily. 7/31/20  Yes Jose Lopez MD   ranolazine (RANEXA) 1000 MG 12 hr tablet Take 1 tablet by mouth 2 (Two) Times a Day. 7/31/20  Yes Jose Lopez MD   roflumilast (DALIRESP) 500 MCG tablet tablet Take 500 mcg by mouth Daily.   Yes Kian Kraus MD   spironolactone (ALDACTONE) 25 MG tablet Take 25 mg by mouth 3 (Three) Times a Day.   Yes Provider, MD Kian    tamsulosin (FLOMAX) 0.4 MG capsule 24 hr capsule Take 1 capsule by mouth Every Night.   Yes Kian Kraus MD   albuterol sulfate  (90 Base) MCG/ACT inhaler Inhale 2 puffs Every 4 (Four) Hours As Needed for Wheezing. 10/28/19   Juli Boudreaux APRN   azelastine (ASTELIN) 0.1 % nasal spray 2 sprays into the nostril(s) as directed by provider 2 (Two) Times a Day. Use in each nostril as directed    Kian Kraus MD   budesonide-formoterol (SYMBICORT) 160-4.5 MCG/ACT inhaler Inhale 2 puffs 2 (Two) Times a Day. 8/9/19   Brandon Enrique MD   fluticasone (FLONASE) 50 MCG/ACT nasal spray 2 sprays into the nostril(s) as directed by provider Daily. 10/28/19   Juli Boudreaux APRN   hydrOXYzine (ATARAX) 50 MG tablet Take 1 tablet by mouth At Night As Needed for Anxiety. 8/26/20   Juanis Reyes MD   metOLazone (ZAROXOLYN) 2.5 MG tablet Take 1 tablet by mouth Daily As Needed (weight gain of 2 pounds.). 8/26/20   Juanis Reyes MD   nitroglycerin (NITROSTAT) 0.4 MG SL tablet Place 0.4 mg under the tongue Every 5 (Five) Minutes As Needed for Chest Pain. Take no more than 3 doses in 15 minutes.    Kian Kraus MD   sodium chloride 0.65 % nasal spray 1 spray into the nostril(s) as directed by provider As Needed for Congestion.    Kian Kraus MD   metOLazone (ZAROXOLYN) 2.5 MG tablet Take 2.5 mg by mouth Continuous As Needed.  9/30/20  Kian Kraus MD   spironolactone (ALDACTONE) 25 MG tablet Take 75 mg by mouth Every 8 (Eight) Hours.  9/30/20  Kian Kraus MD       Allergies:    Allergies   Allergen Reactions   • Codeine Anaphylaxis          • Tramadol Anaphylaxis and Hives       Objective   Objective     Vital Signs  Temp:  [98.6 °F (37 °C)] 98.6 °F (37 °C)  Heart Rate:  [80-91] 91  Resp:  [24] 24  BP: (125-143)/() 125/60  SpO2:  [97 %-98 %] 97 %  on  Flow (L/min):  [2] 2;   Device (Oxygen Therapy): nasal cannula  Body mass index is 43.05  kg/m².    Physical Exam  Vitals signs reviewed.   Constitutional:       Appearance: Normal appearance. He is obese.   HENT:      Head: Normocephalic and atraumatic.      Nose: Nose normal.      Mouth/Throat:      Mouth: Mucous membranes are dry.      Pharynx: Oropharynx is clear.   Eyes:      Extraocular Movements: Extraocular movements intact.      Conjunctiva/sclera: Conjunctivae normal.      Pupils: Pupils are equal, round, and reactive to light.   Neck:      Musculoskeletal: Normal range of motion.   Cardiovascular:      Rate and Rhythm: Rhythm irregular.      Pulses: Normal pulses.      Heart sounds: Normal heart sounds.      Comments: SS1, S2 audible, Rate in the 110's-130's- not controlled at this time   Pulmonary:      Effort: Pulmonary effort is normal.      Comments: Expiratory wheezing noted throughout all lobes, currently on 2 L NC and wears this at home  Abdominal:      General: Bowel sounds are normal. There is distension.   Musculoskeletal: Normal range of motion.         General: Swelling present.      Comments: Right knee swelling noted   Skin:     General: Skin is warm and dry.   Neurological:      General: No focal deficit present.      Mental Status: He is alert and oriented to person, place, and time. Mental status is at baseline.   Psychiatric:         Mood and Affect: Mood normal.         Behavior: Behavior normal.         Thought Content: Thought content normal.         Judgment: Judgment normal.         Results Review:  I have personally reviewed most recent cardiac tracings, lab results and radiology images and interpretations and agree with findings.    Results from last 7 days   Lab Units 09/30/20  1537   WBC 10*3/mm3 13.60*   HEMOGLOBIN g/dL 15.0   HEMATOCRIT % 47.1   PLATELETS 10*3/mm3 313   INR  1.04     Results from last 7 days   Lab Units 09/30/20  1537   SODIUM mmol/L 141   POTASSIUM mmol/L 4.6   CHLORIDE mmol/L 99   CO2 mmol/L 29.0   BUN  17   CREATININE mg/dL 1.15   GLUCOSE mg/dL  115*   CALCIUM mg/dL 9.4   ALT (SGPT) U/L 24   AST (SGOT) U/L 38   TROPONIN T ng/mL <0.010     Estimated Creatinine Clearance: 98.4 mL/min (by C-G formula based on SCr of 1.15 mg/dL).  Brief Urine Lab Results  (Last result in the past 365 days)      Color   Clarity   Blood   Leuk Est   Nitrite   Protein   CREAT   Urine HCG        06/23/20 2040 Dark Yellow Hazy Large (3+) Small (1+) Negative 100 mg/dL (2+)               Microbiology Results (last 10 days)     ** No results found for the last 240 hours. **          ECG/EMG Results (most recent)     Procedure Component Value Units Date/Time    ECG 12 Lead [[id number]] Collected: 09/30/20 1530     Updated: 09/30/20 1608    Narrative:      HEART RATE= 70  bpm  RR Interval= 752  ms  GA Interval=   ms  P Horizontal Axis=   deg  P Front Axis=   deg  QRSD Interval= 166  ms  QT Interval= 471  ms  QRS Axis= 135  deg  T Wave Axis= -56  deg  - ABNORMAL ECG -  Afib/flut and V-paced complexes  Biventricular paced rhythm  Electronically Signed By:   Date and Time of Study: 2020-09-30 15:30:19               Results for orders placed during the hospital encounter of 06/21/20   Adult Transthoracic Echo Limited W/ Cont if Necessary Per Protocol    Narrative · Left ventricular wall thickness is consistent with moderate concentric   hypertrophy.  · Estimated EF = 30%.  · The pericardium is normal. There is no evidence of pericardial effusion.          Xr Chest 1 View    Result Date: 9/30/2020  Enlarged cardiac silhouette. No definite acute pulmonary abnormality or significant change.  Electronically Signed By-Ruma Oneill On:9/30/2020 4:34 PM This report was finalized on [id number] by  Ruma Oneill, .        Estimated Creatinine Clearance: 98.4 mL/min (by C-G formula based on SCr of 1.15 mg/dL).    Assessment/Plan   Assessment/Plan       Active Hospital Problems    Diagnosis  POA   • **Syncope [R55]  Yes     Priority: High   • AICD discharge [Z45.02]  Not Applicable     Priority:  High   • Right knee pain [M25.561]  Yes     Priority: High   • Dysarthria [R47.1]  Yes     Priority: High   • Generalized weakness [R53.1]  Yes     Priority: High   • COPD with acute exacerbation (CMS/HCC) [J44.1]  Unknown     Priority: High   • BPH without obstruction/lower urinary tract symptoms [N40.0]  Yes   • Peripheral neuropathy [G62.9]  Yes   • Seasonal allergies [J30.2]  Yes   • Ischemic cardiomyopathy [I25.5]  Yes   • Coronary artery disease involving native heart with angina pectoris (CMS/HCC) [I25.119]  Yes   • Cardiomyopathy, dilated (CMS/HCC) [I42.0]  Yes   • Sleep apnea [G47.30]  Yes   • Essential hypertension [I10]  Yes   • Dyslipidemia [E78.5]  Yes   • Paroxysmal atrial fibrillation (CMS/HCC) [I48.0]  Yes   • Depression [F32.9]  Yes   • Morbid obesity (CMS/HCC) [E66.01]  Yes   • Anxiety [F41.9]  Yes   • Chronic systolic CHF (congestive heart failure) (CMS/HCC) [I50.22]  Yes   • ICD (implantable cardioverter-defibrillator) in place [Z95.810]  Yes      Resolved Hospital Problems   No resolved problems to display.       Syncope  -Patient reports his AICD fired  -Chest x-ray unremarkable.    - EKG showed Afib/flut and, V-paced complexes, Biventricular paced rhythm with rate in the 70's.    - Troponin X1 normal, trend   -   Patient was started on amiodarone drip in the ED.   -Continue amiodarone drip  -Continuous cardiac monitoring  - NPO at midnight-possible plan for intervention in the a.m.  - Holding home Eliquis per cardiac surgery  -  Cardiac surgery and cardiology consulted    Acute leukocytosis  - Likely reactive  - WBC 13.6, monitor     Right knee pain status post fall  -Fall risk precautions  -PT ordered  -Pain medication ordered  -X-ray right knee ordered    Acute dysarthria and generalized weakness  -Check CT head to rule out stroke  -Neurochecks    Acute COPD exacerbation  -Patient is currently on 2 L nasal cannula of oxygen, expiratory wheezes noted  -Start steroids, Pulmicort, and albuterol    -Check respiratory panel   -Continue home Daliresp     CAD  -Continue nitrostat, statin, Ranexa, Plavix, and metoprolol    Essential hypertension  -Moderately controlled  -Monitor blood pressure  -Continue home metolazone, Bumex, spironolactone, metoprolol    Hyperlipidemia  - Continue statin     Paroxysmal atrial fibrillation with presence of AICD  -Currently in atrial fibrillation, rate controlled  -Continuous cardiac monitoring  -Holding home Eliquis per thoracic surgery    Chronic systolic congestive heart failure/ischemic cardiomyopathy  - Last 2D echo 6/23/2020 showed EF 30%  - Check BNP  -Continue metolazone, bumex/potassium, and spironolactone    Depression with anxiety  -Stable  -Continue Cymbalta and hydroxyzine as needed    Peripheral neuropathy  -Continue gabapentin    BPH  -Continue Flomax    Seasonal allergies  -Continue Singulair and Zyrtec    Morbid obesity  -BMI 43.0  -Encourage lifestyle modifications    Sleep apnea  -Compliant on trilogy at night    VTE Prophylaxis -SCDs    CODE STATUS:    Code Status and Medical Interventions:   Ordered at: 09/30/20 8445     Level Of Support Discussed With:    Patient     Code Status:    CPR     Medical Interventions (Level of Support Prior to Arrest):    Full       This patient has been examined wearing appropriate Personal Protective Equipment.  09/30/20      I discussed the patient's findings and my recommendations with patient.        Electronically signed by ABHISHEK Jensen, 09/30/20, 4:54 PM EDT.  [facility] Hospitalist Team          Electronically signed by Brnadon Enrique MD at 09/30/20 2367       {Outbreak/Travel/Exposure Documentation......;  Question Available Choices Patient Response   Outbreak Screen: Do you currently have a new onset of the following symptoms?        Fever/Chils, Cough, Shortness of air, Loss of taste or smell, No, Unknown  Unknown (09/30/20 1619)   Outbreak Screen: In the last 14 days, have you had contact with anyone  who is ill, has show any of the symptoms listed above and/or has been diagnosis with the 2019 Novel Coronavirus? This includes any immediate household members but excludes any patients with whom you have been in contact within your normal work duties wearing proper PPE, if you are a healthcare worker.  Yes, No, Unknown              No (09/30/20 1522)   Outbreak Screen: Who was notified?    Free text  (not recorded)   Travel Screen: Have you traveled in the last month? If so, to what country have you traveled? If [place] what state? Yes, No, Unknown  List of all countries  List of all States No (09/30/20 1521)  (not recorded)  (not recorded)   Infection Risk: Do you currently have the following symptoms?  (If cough is selected, the Tuberculosis Screen is performed.) Cough, Fever, Rash, No No (09/30/20 1521)   Tuberculosis Screen: Do you have any of the following Tuberculosis Risks?  · Have you lived or spent time with anyone who had or may have TB?  · Have you lived in or visited any of the following areas for more than one month: [place], [place], [place], [place] or [place], the [place] or [place]?  · Do you have HIV/AIDS?  · Have you lived in or worked in a nursing home, homeless shelter, correctional facility, or substance abuse treatment facility?   · No    If Yes do you have any of the following symptoms? Yes responses display to the right    If Yes, symptoms listed are:  Cough greater than or equal to 3 weeks, Loss of appetite, Unexplained weight loss, Night sweats, Bloody sputum or hemoptysis, Hoarseness, Fever, Fatigue, Chest pain, No (not recorded)  (not recorded)   Exposure Screen: Have you been exposed to any of these contagious diseases in the last month? Measles, Chickenpox, Meningitis, Pertussis, Whooping Cough, No No (09/30/20 1521)       Referral Orders (last 24 hours) (24h ago, onward)     Start     Ordered    10/01/20 0000  Ambulatory Referral to Home Health     Comments: Okay to resume  current services with [facility]   Question Answer Comment   Face to Face Visit Date: 10/1/2020    Follow-up provider for Plan of Care? I treated the patient in an acute care facility and will not continue treatment after discharge.    Follow-up provider: KELSY MCNEAL    Reason/Clinical Findings Post-hospital evaluation    Describe mobility limitations that make leaving home difficult: Tires easily    Nursing/Therapeutic Services Requested Other HH to evaluate   Frequency: 1 Week 1        10/01/20 1355                   Physician Progress Notes (most recent note)      Shikha Johnson FNP at 09/30/20 1942     Attestation signed by Brandon Enrique MD at 10/01/20 0923    I have reviewed this documentation and agree.                  Received a call that the patient is having chest pain at a 9/10.  The patient is throwing large amounts of ectopy.  The bedside nurse had ordered a stat EKG which is pending and repeat troponin.  I skimmed at the patient's admission documentation and noted that the patient has a cardiology consult without documented note at this time.  I recommended that the bedside nurse call cardiology for assistance in this matter as it certainly may be cardiac related.  I recommend that the nurse call a fast call if there is a delay in cardiology response or if the patient condition worsens.    Electronically signed by Brandon Enrique MD at 10/01/20 0976

## 2020-10-01 NOTE — ANESTHESIA POSTPROCEDURE EVALUATION
Patient: Jose Dixon Jr.    Procedure Summary     Date: 10/01/20 Room / Location: [place] CATH LAB 3 / [facility] CATH INVASIVE LOCATION    Anesthesia Start: 1252 Anesthesia Stop: 1612    Procedure: EP/Ablation (N/A ) Diagnosis:       Ventricular tachycardia (CMS/HCC)      (vt)    Provider: Jose Lopez MD Provider: Aracelis Schmid DO    Anesthesia Type: general ASA Status: 4          Anesthesia Type: general    Vitals  No vitals data found for the desired time range.          Post Anesthesia Care and Evaluation    Patient location during evaluation: PACU  Patient participation: complete - patient participated  Level of consciousness: awake  Pain score: 0  Pain management: adequate  Airway patency: patent  Anesthetic complications: No anesthetic complications  PONV Status: none  Cardiovascular status: acceptable  Respiratory status: acceptable  Hydration status: acceptable

## 2020-10-01 NOTE — PROGRESS NOTES
10.1.20 Pt is current with [facility]. Please call [phone] or [phone] with any questions or concerns. Thank you

## 2020-10-01 NOTE — NURSING NOTE
"Pt c/o chest pain 10/10 and states that he \" does not feel good.\" and \" feeling nauseous.\"  HR noted to be 4-5 beat runs VT and mixed pacing , PVCs/PACs in the 120's. Remains on Amiodarone gtt at 0.5mg/kr/hr and 4 L o2 per NC .     ADDENDUM:  called, ordered 25 mg PO lopressor BID, hold for SBP < 120.   "

## 2020-10-01 NOTE — PROGRESS NOTES
[facility] Medicine Services Daily Progress Note      Hospitalist Team  LOS 1 days      Patient Care Team:  Jaylen Moss MD as PCP - General  Wicho Sotelo MD as Consulting Physician (Nephrology)    Patient Location: 2129/1      Subjective   Subjective     Chief Complaint / Subjective  Chief Complaint   Patient presents with   • AICD Problem         Brief Synopsis of Hospital Course/HPI    59-year-old man with history of chronic ischemic cardiomyopathy status post AICD, paroxysmal atrial fibrillation and hyperlipidemia.  Presented to the [facility] ED after a syncopal episode.  Apparently, patient was at his nephrologist clinic when his defibrillator went off and he passed out  He also related having shortness of breath and chest pain.     EKG done in the ED showed A. fib/flutter with controlled rates.  Patient was started on amiodarone drip.  EP was consulted and patient admitted for further care.      Date::    10/1/2020  Patient had chest pain last night  Currently with no chest pain  Has been seen by the cardiologist who stated nonspecific elevation of troponin is likely due to ICD therapy.  For patient's obstructive CAD-medical management will be continued.  EP physician plans repeat VT ablation due to VT storm today      Review of Systems   Constitution: Negative for chills and fever.   HENT: Negative for congestion.    Eyes: Negative for blurred vision.   Cardiovascular: Negative for chest pain.   Endocrine: Negative for cold intolerance and heat intolerance.   Genitourinary: Negative for dysuria.   Neurological: Positive for weakness.   Psychiatric/Behavioral: Negative for altered mental status.         Objective   Objective      Vital Signs  Temp:  [97.5 °F (36.4 °C)-100 °F (37.8 °C)] 97.6 °F (36.4 °C)  Heart Rate:  [80-92] 80  Resp:  [18-24] 20  BP: (120-143)/() 132/72  Oxygen Therapy  SpO2: 98 %  Pulse Oximetry Type: Continuous  Device (Oxygen Therapy):  "nasal cannula  Flow (L/min): 4  Flowsheet Rows      First Filed Value   Admission Height  177.8 cm (70\") Documented at 09/30/2020 1530   Admission Weight  136 kg (300 lb) Documented at 09/30/2020 1530        Intake & Output (last 3 days)       09/28 0701 - 09/29 0700 09/29 0701 - 09/30 0700 09/30 0701 - 10/01 0700 10/01 0701 - 10/02 0700    P.O.   600     Total Intake(mL/kg)   600 (4.3)     Urine (mL/kg/hr)   800     Total Output   800     Net   -200                 Lines, Drains & Airways    Active LDAs     Name:   Placement date:   Placement time:   Site:   Days:    Peripheral IV 09/30/20 1526 Right Antecubital   09/30/20    1526    Antecubital   less than 1    Peripheral IV 09/30/20 1542 Left Arm   09/30/20    1542    Arm   less than 1                  Physical Exam:    Physical Exam  Vitals signs reviewed.   Constitutional:       General: He is not in acute distress.  HENT:      Head: Normocephalic and atraumatic.      Nose: Nose normal.   Eyes:      Conjunctiva/sclera: Conjunctivae normal.      Pupils: Pupils are equal, round, and reactive to light.   Neck:      Musculoskeletal: Neck supple.   Cardiovascular:      Rate and Rhythm: Rhythm irregular.   Pulmonary:      Effort: Pulmonary effort is normal. No respiratory distress.      Breath sounds: Normal breath sounds.   Abdominal:      General: Bowel sounds are normal.      Palpations: Abdomen is soft.      Tenderness: There is no abdominal tenderness.   Musculoskeletal: Normal range of motion.   Skin:     General: Skin is warm and dry.   Neurological:      General: No focal deficit present.      Mental Status: He is alert.   Psychiatric:         Mood and Affect: Mood normal.               Procedures:              Results Review:     I reviewed the patient's new clinical results.      Lab Results (last 24 hours)     Procedure Component Value Units Date/Time    BUN [[id number]]  (Abnormal) Collected: 10/01/20 0248    Specimen: Blood Updated: 10/01/20 0720     BUN " 23 mg/dL     Troponin [[id number]]  (Normal) Collected: 10/01/20 0248    Specimen: Blood Updated: 10/01/20 0350     Troponin T 0.028 ng/mL     Narrative:      Troponin T Reference Range:  <= 0.03 ng/mL-   Negative for AMI  >0.03 ng/mL-     Abnormal for myocardial necrosis.  Clinicians would have to utilize clinical acumen, EKG, Troponin and serial changes to determine if it is an Acute Myocardial Infarction or myocardial injury due to an underlying chronic condition.       Results may be falsely decreased if patient taking Biotin.      Comprehensive Metabolic Panel [[id number]]  (Abnormal) Collected: 10/01/20 0248    Specimen: Blood Updated: 10/01/20 0350     Glucose 138 mg/dL      BUN --     Comment: Testing performed by alternate method        Creatinine 1.42 mg/dL      Sodium 137 mmol/L      Potassium 5.4 mmol/L      Chloride 99 mmol/L      CO2 25.0 mmol/L      Calcium 8.9 mg/dL      Total Protein 7.6 g/dL      Albumin 4.40 g/dL      ALT (SGPT) 26 U/L      AST (SGOT) 41 U/L      Alkaline Phosphatase 88 U/L      Total Bilirubin 1.2 mg/dL      eGFR Non African Amer 51 mL/min/1.73      Globulin 3.2 gm/dL      A/G Ratio 1.4 g/dL      BUN/Creatinine Ratio --     Comment: Testing not performed        Anion Gap 13.0 mmol/L     Narrative:      GFR Normal >60  Chronic Kidney Disease <60  Kidney Failure <15      CBC (No Diff) [[id number]]  (Abnormal) Collected: 10/01/20 0248    Specimen: Blood Updated: 10/01/20 0326     WBC 18.90 10*3/mm3      RBC 5.49 10*6/mm3      Hemoglobin 15.0 g/dL      Hematocrit 48.9 %      MCV 89.0 fL      MCH 27.4 pg      MCHC 30.8 g/dL      RDW 16.4 %      RDW-SD 51.6 fl      MPV 9.4 fL      Platelets 281 10*3/mm3     Respiratory Panel, PCR - Swab, Nasopharynx [[id number]]  (Normal) Collected: 09/30/20 2308    Specimen: Swab from Nasopharynx Updated: 10/01/20 0124     ADENOVIRUS, PCR Not Detected     Coronavirus 229E Not Detected     Coronavirus HKU1 Not Detected     Coronavirus NL63 Not Detected      Coronavirus OC43 Not Detected     Human Metapneumovirus Not Detected     Human Rhinovirus/Enterovirus Not Detected     Influenza B PCR Not Detected     Parainfluenza Virus 1 Not Detected     Parainfluenza Virus 2 Not Detected     Parainfluenza Virus 3 Not Detected     Parainfluenza Virus 4 Not Detected     Bordetella pertussis pcr Not Detected     Influenza A H1 2009 PCR Not Detected     Chlamydophila pneumoniae PCR Not Detected     Mycoplasma pneumo by PCR Not Detected     Influenza A PCR Not Detected     Influenza A H3 Not Detected     Influenza A H1 Not Detected     RSV, PCR Not Detected     Bordetella parapertussis PCR Not Detected    Narrative:      The coronavirus on the RVP is NOT COVID-19 and is NOT indicative of infection with COVID-19.     Troponin [[id number]]  (Normal) Collected: 09/30/20 2329    Specimen: Blood Updated: 10/01/20 0035     Troponin T 0.022 ng/mL     Narrative:      Troponin T Reference Range:  <= 0.03 ng/mL-   Negative for AMI  >0.03 ng/mL-     Abnormal for myocardial necrosis.  Clinicians would have to utilize clinical acumen, EKG, Troponin and serial changes to determine if it is an Acute Myocardial Infarction or myocardial injury due to an underlying chronic condition.       Results may be falsely decreased if patient taking Biotin.      Potassium [[id number]]  (Normal) Collected: 09/30/20 2329    Specimen: Blood Updated: 10/01/20 0031     Potassium 4.8 mmol/L     Magnesium [[id number]]  (Normal) Collected: 09/30/20 2329    Specimen: Blood Updated: 10/01/20 0031     Magnesium 2.2 mg/dL     Urinalysis, Microscopic Only - Urine, Clean Catch [[id number]]  (Abnormal) Collected: 09/30/20 2052    Specimen: Urine, Clean Catch Updated: 09/30/20 2123     RBC, UA 0-2 /HPF      WBC, UA 3-5 /HPF      Bacteria, UA Trace /HPF      Squamous Epithelial Cells, UA 0-2 /HPF      Hyaline Casts, UA 7-12 /LPF      Mucus, UA Small/1+ /HPF      Methodology Manual Light Microscopy    Urine Drug Screen - Urine,  Clean Catch [[id number]]  (Normal) Collected: 09/30/20 2052    Specimen: Urine, Clean Catch Updated: 09/30/20 2123     Amphet/Methamphet, Screen Negative     Barbiturates Screen, Urine Negative     Benzodiazepine Screen, Urine Negative     Cocaine Screen, Urine Negative     Opiate Screen Negative     THC, Screen, Urine Negative     Methadone Screen, Urine Negative     Oxycodone Screen, Urine Negative    Narrative:      Negative Thresholds For Drugs Screened:     Amphetamines               500 ng/ml   Barbiturates               200 ng/ml   Benzodiazepines            100 ng/ml   Cocaine                    300 ng/ml   Methadone                  300 ng/ml   Opiates                    300 ng/ml   Oxycodone                  100 ng/ml   THC                        50 ng/ml    The Normal Value for all drugs tested is negative. This report includes final unconfirmed screening results to be used for medical treatment purposes only. Unconfirmed results must not be used for non-medical purposes such as employment or legal testing. Clinical consideration should be applied to any drug of abuse test, particulary when unconfirmed results are used.  All urine drugs of abuse requests without chain of custody are for medical screening purposes only.  False positives are possible.      Urinalysis With Microscopic If Indicated (No Culture) - Urine, Clean Catch [[id number]]  (Abnormal) Collected: 09/30/20 2052    Specimen: Urine, Clean Catch Updated: 09/30/20 2107     Color, UA Dark Yellow     Comment: Result checked         Appearance, UA Clear     pH, UA 5.5     Specific Gravity, UA 1.025     Glucose, UA Negative     Ketones, UA Negative     Bilirubin, UA Small (1+)     Comment: Confirmation testing is unavailable.  A serum bilirubin is recommended for further assessment.        Blood, UA Negative     Protein,  mg/dL (2+)     Leuk Esterase, UA Negative     Nitrite, UA Negative     Urobilinogen, UA 0.2 E.U./dL    Troponin [[id number]]   (Normal) Collected: 09/30/20 1916    Specimen: Blood Updated: 09/30/20 2000     Troponin T 0.016 ng/mL     Narrative:      Troponin T Reference Range:  <= 0.03 ng/mL-   Negative for AMI  >0.03 ng/mL-     Abnormal for myocardial necrosis.  Clinicians would have to utilize clinical acumen, EKG, Troponin and serial changes to determine if it is an Acute Myocardial Infarction or myocardial injury due to an underlying chronic condition.       Results may be falsely decreased if patient taking Biotin.      BNP [[id number]]  (Abnormal) Collected: 09/30/20 1916    Specimen: Blood Updated: 09/30/20 2000     proBNP 4,084.0 pg/mL     Narrative:      Among patients with dyspnea, NT-proBNP is highly sensitive for the detection of acute congestive heart failure. In addition NT-proBNP of <300 pg/ml effectively rules out acute congestive heart failure with 99% negative predictive value.    Results may be falsely decreased if patient taking Biotin.      Extra Tubes [[id number]] Collected: 09/30/20 1535    Specimen: Blood, Venous Line Updated: 09/30/20 1645    Narrative:      The following orders were created for panel order Extra Tubes.  Procedure                               Abnormality         Status                     ---------                               -----------         ------                     Gold Top - SST[[id number]]                                   Final result                 Please view results for these tests on the individual orders.    Gold Top - SST [[id number]] Collected: 09/30/20 1535    Specimen: Blood Updated: 09/30/20 1645     Extra Tube Hold for add-ons.     Comment: Auto resulted.       BUN [[id number]]  (Normal) Collected: 09/30/20 1537    Specimen: Blood Updated: 09/30/20 1610     BUN 17 mg/dL     Comprehensive Metabolic Panel [[id number]]  (Abnormal) Collected: 09/30/20 1537    Specimen: Blood Updated: 09/30/20 1605     Glucose 115 mg/dL      BUN --     Comment: Testing performed by alternate method         Creatinine 1.15 mg/dL      Sodium 141 mmol/L      Potassium 4.6 mmol/L      Chloride 99 mmol/L      CO2 29.0 mmol/L      Calcium 9.4 mg/dL      Total Protein 7.6 g/dL      Albumin 4.40 g/dL      ALT (SGPT) 24 U/L      AST (SGOT) 38 U/L      Alkaline Phosphatase 87 U/L      Total Bilirubin 1.0 mg/dL      eGFR Non African Amer 66 mL/min/1.73      Globulin 3.2 gm/dL      A/G Ratio 1.4 g/dL      BUN/Creatinine Ratio --     Comment: Testing not performed        Anion Gap 13.0 mmol/L     Narrative:      GFR Normal >60  Chronic Kidney Disease <60  Kidney Failure <15      Troponin [[id number]]  (Normal) Collected: 09/30/20 1537    Specimen: Blood Updated: 09/30/20 1605     Troponin T <0.010 ng/mL     Narrative:      Troponin T Reference Range:  <= 0.03 ng/mL-   Negative for AMI  >0.03 ng/mL-     Abnormal for myocardial necrosis.  Clinicians would have to utilize clinical acumen, EKG, Troponin and serial changes to determine if it is an Acute Myocardial Infarction or myocardial injury due to an underlying chronic condition.       Results may be falsely decreased if patient taking Biotin.      Magnesium [[id number]]  (Normal) Collected: 09/30/20 1537    Specimen: Blood Updated: 09/30/20 1605     Magnesium 2.2 mg/dL     Protime-INR [[id number]]  (Normal) Collected: 09/30/20 1537    Specimen: Blood Updated: 09/30/20 1600     Protime 11.4 Seconds      INR 1.04    aPTT [[id number]]  (Normal) Collected: 09/30/20 1537    Specimen: Blood Updated: 09/30/20 1600     PTT 24.6 seconds     CBC & Differential [[id number]]  (Abnormal) Collected: 09/30/20 1537    Specimen: Blood Updated: 09/30/20 1547    Narrative:      The following orders were created for panel order CBC & Differential.  Procedure                               Abnormality         Status                     ---------                               -----------         ------                     CBC Auto Differential[[id number]]        Abnormal            Final result                  Please view results for these tests on the individual orders.    CBC Auto Differential [[id number]]  (Abnormal) Collected: 09/30/20 1537    Specimen: Blood Updated: 09/30/20 1547     WBC 13.60 10*3/mm3      RBC 5.46 10*6/mm3      Hemoglobin 15.0 g/dL      Hematocrit 47.1 %      MCV 86.3 fL      MCH 27.4 pg      MCHC 31.8 g/dL      RDW 16.3 %      RDW-SD 49.4 fl      MPV 9.5 fL      Platelets 313 10*3/mm3      Neutrophil % 75.8 %      Lymphocyte % 11.4 %      Monocyte % 10.5 %      Eosinophil % 1.2 %      Basophil % 1.1 %      Neutrophils, Absolute 10.30 10*3/mm3      Lymphocytes, Absolute 1.50 10*3/mm3      Monocytes, Absolute 1.40 10*3/mm3      Eosinophils, Absolute 0.20 10*3/mm3      Basophils, Absolute 0.20 10*3/mm3      nRBC 0.1 /100 WBC         No results found for: HGBA1C  Results from last 7 days   Lab Units 09/30/20  1537   INR  1.04           Lab Results   Component Value Date    LIPASE 23 06/03/2020     Lab Results   Component Value Date    CHLPL 201 (H) 09/14/2018    TRIG 108 09/14/2018    HDL 46 09/14/2018     (H) 09/14/2018       No results found for: INTRAOP, PREDX, FINALDX, COMDX    Microbiology Results (last 10 days)     Procedure Component Value - Date/Time    Respiratory Panel, PCR - Swab, Nasopharynx [[id number]]  (Normal) Collected: 09/30/20 2308    Lab Status: Final result Specimen: Swab from Nasopharynx Updated: 10/01/20 0124     ADENOVIRUS, PCR Not Detected     Coronavirus 229E Not Detected     Coronavirus HKU1 Not Detected     Coronavirus NL63 Not Detected     Coronavirus OC43 Not Detected     Human Metapneumovirus Not Detected     Human Rhinovirus/Enterovirus Not Detected     Influenza B PCR Not Detected     Parainfluenza Virus 1 Not Detected     Parainfluenza Virus 2 Not Detected     Parainfluenza Virus 3 Not Detected     Parainfluenza Virus 4 Not Detected     Bordetella pertussis pcr Not Detected     Influenza A H1 2009 PCR Not Detected     Chlamydophila pneumoniae PCR Not  Detected     Mycoplasma pneumo by PCR Not Detected     Influenza A PCR Not Detected     Influenza A H3 Not Detected     Influenza A H1 Not Detected     RSV, PCR Not Detected     Bordetella parapertussis PCR Not Detected    Narrative:      The coronavirus on the RVP is NOT COVID-19 and is NOT indicative of infection with COVID-19.           ECG/EMG Results (most recent)     Procedure Component Value Units Date/Time    ECG 12 Lead [[id number]] Collected: 09/30/20 1530     Updated: 09/30/20 1608    Narrative:      HEART RATE= 70  bpm  RR Interval= 752  ms  OK Interval=   ms  P Horizontal Axis=   deg  P Front Axis=   deg  QRSD Interval= 166  ms  QT Interval= 471  ms  QRS Axis= 135  deg  T Wave Axis= -56  deg  - ABNORMAL ECG -  Afib/flut and V-paced complexes  Biventricular paced rhythm  Electronically Signed By:   Date and Time of Study: 2020-09-30 15:30:19    ECG 12 Lead [[id number]] Collected: 09/30/20 1947     Updated: 09/30/20 1949    Narrative:      HEART RATE= 130  bpm  RR Interval= 600  ms  OK Interval=   ms  P Horizontal Axis=   deg  P Front Axis=   deg  QRSD Interval= 166  ms  QT Interval= 316  ms  QRS Axis= 208  deg  T Wave Axis= 27  deg  - ABNORMAL ECG -  Afib/flut and V-paced complexes  Biventricular paced rhythm  Nonspecific intraventricular conduction delay  Inferior infarct, old  ST depr, consider ischemia, anterolateral lds  Electronically Signed By:   Date and Time of Study: 2020-09-30 19:47:09               Results for orders placed during the hospital encounter of 06/21/20   Adult Transthoracic Echo Limited W/ Cont if Necessary Per Protocol    Narrative · Left ventricular wall thickness is consistent with moderate concentric   hypertrophy.  · Estimated EF = 30%.  · The pericardium is normal. There is no evidence of pericardial effusion.          Xr Knee 4+ View Right    Result Date: 9/30/2020   1. No radiographic evidence of acute fracture or dislocation. 2. Anterior soft tissue swelling with a surgical  anchor displaced into the prepatellar soft tissues.  Electronically Signed By-Jose Carlos Ayers On:9/30/2020 8:38 PM This report was finalized on [id number] by  Jose Carlos Ayers, .    Xr Chest 1 View    Result Date: 9/30/2020  Enlarged cardiac silhouette. No definite acute pulmonary abnormality or significant change.  Electronically Signed By-Ruma Oneill On:9/30/2020 4:34 PM This report was finalized on [id number] by  Ruma Oneill, .          Xrays, labs reviewed personally by physician.    Medication Review:   I have reviewed the patient's current medication list      Scheduled Meds  atorvastatin, 80 mg, Oral, Daily  budesonide, 0.5 mg, Nebulization, BID - RT  bumetanide, 1 mg, Oral, BID  cetirizine, 10 mg, Oral, Daily  clopidogrel, 75 mg, Oral, Daily  DULoxetine, 60 mg, Oral, Daily  gabapentin, 300 mg, Oral, BID  guaiFENesin, 1,200 mg, Oral, Q12H  ipratropium-albuterol, 3 mL, Nebulization, Q4H - RT  lidocaine, 1 patch, Transdermal, Q24H  metoprolol succinate XL, 100 mg, Oral, Q24H  montelukast, 10 mg, Oral, Daily  potassium chloride, 10 mEq, Oral, Daily  ranolazine, 1,000 mg, Oral, BID  roflumilast, 500 mcg, Oral, Daily  sodium chloride, 10 mL, Intravenous, Q12H  spironolactone, 25 mg, Oral, TID  tamsulosin, 0.4 mg, Oral, Nightly        Meds Infusions       Meds PRN  •  acetaminophen **OR** acetaminophen **OR** acetaminophen  •  benzonatate  •  hydrOXYzine  •  influenza vaccine  •  ipratropium-albuterol  •  magnesium sulfate **OR** magnesium sulfate in D5W 1g/100mL (PREMIX)  •  metOLazone  •  nitroglycerin  •  ondansetron **OR** ondansetron  •  potassium chloride  •  [COMPLETED] Insert peripheral IV **AND** sodium chloride  •  sodium chloride        Assessment/Plan   Assessment/Plan     Active Hospital Problems    Diagnosis  POA   • **Syncope [R55]  Yes   • AICD discharge [Z45.02]  Not Applicable   • BPH without obstruction/lower urinary tract symptoms [N40.0]  Yes   • Peripheral neuropathy [G62.9]  Yes   •  Seasonal allergies [J30.2]  Yes   • Right knee pain [M25.561]  Yes   • Dysarthria [R47.1]  Yes   • Generalized weakness [R53.1]  Yes   • COPD exacerbation (CMS/HCC) [J44.1]  Yes   • Ischemic cardiomyopathy [I25.5]  Yes   • Coronary artery disease involving native heart with angina pectoris (CMS/HCC) [I25.119]  Yes   • Cardiomyopathy, dilated (CMS/HCC) [I42.0]  Yes   • Sleep apnea [G47.30]  Yes   • Essential hypertension [I10]  Yes   • Dyslipidemia [E78.5]  Yes   • Paroxysmal atrial fibrillation (CMS/HCC) [I48.0]  Yes   • Depression [F32.9]  Yes   • Morbid obesity (CMS/HCC) [E66.01]  Yes   • Anxiety [F41.9]  Yes   • Chronic systolic CHF (congestive heart failure) (CMS/HCC) [I50.22]  Yes   • ICD (implantable cardioverter-defibrillator) in place [Z95.810]  Yes   • COPD with acute exacerbation (CMS/HCC) [J44.1]  Unknown      Resolved Hospital Problems   No resolved problems to display.       MEDICAL DECISION MAKING COMPLEXITY BY PROBLEM:     Syncope  Secondary to arrhythmia  Patient had recurrent V. tach with multiple ICD therapy  --VT storm with recurrent episode of non-sustained VT  On amiodarone drip  Was seen by the EP physician who plans VT ablation later today    Chest pain  Had nonspecific elevation of troponin which is thought to be secondary to ICD therapy  Patient was seen by cardiologist who recommended to continue medical management    Leukocytosis  Most likely steroid-induced  No evidence of infection  Steroid has been discontinued  Repeat lab in the a.m.    Right knee pain following a fall  X-ray shows no evidence of acute fracture/dislocation    COPD  Patient does not appear to be in exacerbation  Discontinue IV steroid  Continue on Pulmicort and DuoNeb  Patient is also on Daliresp        Chronic systolic heart failure  Compensated  Continue medical management       Depression with anxiety   Cymbalta and hydroxyzine      Peripheral neuropathy  On  gabapentin     BPH-on Flomax       Morbid obesity  BMI  43.0  Encourage lifestyle modifications     Sleep apnea  Compliant on trilogy at night     VTE Prophylaxis -SCDs      Mechanical Order History:      Ordered        09/30/20 1935  Place Sequential Compression Device  Once         09/30/20 1935  Maintain Sequential Compression Device  Continuous                 Pharmalogical Order History:     None            Code Status -   Code Status and Medical Interventions:   Ordered at: 09/30/20 1749     Level Of Support Discussed With:    Patient     Code Status:    CPR     Medical Interventions (Level of Support Prior to Arrest):    Full       This patient has been examined with appropriate PPE. 10/01/20        Discharge Planning            Electronically signed by Brandon Enrique MD, 10/01/20, 09:26 EDT.  [facility] Hospitalist Team

## 2020-10-01 NOTE — PROGRESS NOTES
Discharge Planning Assessment  [facility]     Patient Name: Jose Dixon Jr.  MRN: [id number]  Today's Date: 10/1/2020    Admit Date: 9/30/2020    Discharge Needs Assessment     Row Name 10/01/20 1343       Living Environment    Lives With  spouse    Name(s) of Who Lives With Patient  Spouse- Ruma    Current Living Arrangements  home/apartment/condo House    Primary Care Provided by  self    Provides Primary Care For  no one    Family Caregiver if Needed  spouse    Quality of Family Relationships  supportive    Able to Return to Prior Arrangements  yes       Resource/Environmental Concerns    Resource/Environmental Concerns  none    Transportation Concerns  car, none       Transition Planning    Patient/Family Anticipates Transition to  home with family    Patient/Family Anticipated Services at Transition  none    Transportation Anticipated  car, drives self;family or friend will provide       Discharge Needs Assessment    Readmission Within the Last 30 Days  no previous admission in last 30 days    Equipment Currently Used at Home  other (see comments);oxygen Patient has trilogy through [employer] and has O2 that he uses at night through a [employer] company in [place].    Concerns to be Addressed  no discharge needs identified;denies needs/concerns at this time    Anticipated Changes Related to Illness  none    Equipment Needed After Discharge  none    Discharge Facility/Level of Care Needs  home with home health    Provided Post Acute Provider List?  N/A    Discharge Coordination/Progress  PCP Jaylen Moss, reports no trouble affording medications at this time.        Discharge Plan     Row Name 10/01/20 5488       Plan    Plan  DC Plan: PT joyce pending. Current with [facility], ivon order sent.    Patient/Family in Agreement with Plan  yes    Plan Comments  Discussed dc planning with patient while maintaining distance greater than 6 feet, wearing appropriate PPE, and minimizing discussion to less than 15 minutes. He reports  that he is current with [facility] and they are scheduled to come next Tuesday again. Verified with naina Nesbitt from [facility] who confirmed patient is current. Resumption of care order sent to basket. DC Barriers: Scheduled to have an ablation performed today.        Continued Care and Services - Admitted Since 9/30/2020     Home Medical Care Coordination complete    Service Provider Request Status Selected Services Address Phone Fax    [facility] [address] [place] [address] [phone]                Demographic Summary     Row Name 10/01/20 1343       General Information    Admission Type  inpatient    Reason for Consult  discharge planning    Preferred Language  English     Used During This Interaction  no       Contact Information    Permission Granted to Share Info With          Functional Status     Row Name 10/01/20 1343       Functional Status    Usual Activity Tolerance  good    Current Activity Tolerance  good       Functional Status, IADL    Medications  independent    Meal Preparation  independent    Housekeeping  independent    Laundry  independent    Shopping  independent     Brittany Cruz RN       Office Phone: [phone]  Office Cell: [phone]

## 2020-10-01 NOTE — ANESTHESIA PREPROCEDURE EVALUATION
Anesthesia Evaluation     Patient summary reviewed and Nursing notes reviewed   NPO Solid Status: > 8 hours  NPO Liquid Status: > 8 hours           Airway   Mallampati: III  TM distance: >3 FB  Neck ROM: full  Possible difficult intubation and Large neck circumference  Dental      Pulmonary    (+) COPD moderate, asthma,sleep apnea,   Cardiovascular   Exercise tolerance: poor (<4 METS)    (+) hypertension, past MI , CAD, cardiac stents dysrhythmias, angina, CHF Systolic <55%, hyperlipidemia,       Neuro/Psych  (+) syncope, psychiatric history Anxiety and Depression,     GI/Hepatic/Renal/Endo    (+) obesity,  GERD,      Musculoskeletal     Abdominal    Substance History      OB/GYN          Other                        Anesthesia Plan    ASA 4     general     intravenous induction     Anesthetic plan, all risks, benefits, and alternatives have been provided, discussed and informed consent has been obtained with: patient.

## 2020-10-01 NOTE — CONSULTS
Referring Provider: Dr. Enrique  Reason for Consultation: Chest pain and coronary artery disease    Chief complaint chest pain coronary artery disease and ICD shock    Cardiology assessment and plan    Chest discomfort  ICD shock/multiple ICD therapies consistent with VT storm  Recurrent episodes of nonsustained ventricular tachycardia  Known coronary artery disease with ischemic cardiomyopathy  Chronic anginal chest pain  History of atrial fibrillation  Ischemic cardiomyopathy with LV ejection fraction of 30%  Hypertension  Hyperlipidemia  History of multiple coronary interventions  Prior history of VT ablation multiple times  Known residual coronary artery disease  History of surgical LV lead placement  Biventricular ICD in situ  History of AV node ablation    Prior cath films reviewed and discussed with the patient  Plan to manage conservatively for obstructive coronary artery disease at this time  Continue antiplatelet therapy  Dense of any acute coronary syndrome monitor nonspecific elevation of troponin likely secondary to ICD therapy and ventricular tachyarrhythmia and cardiomyopathy  Plans for repeat VT ablation secondary to VT storm  Risk benefits and alternatives discussed with the patient  Continue current medical therapy  Continue close monitoring          History of present illness:  Jose Dixon  is a 59 y.o. male who presents with past medical history there is significant for history of coronary artery disease history of ischemic cardiomyopathy history of percutaneous coronary intervention multiple times in the past history of ICD history of atrial fibrillation history of ventricular tachycardia history of ablation for ventricular tachycardia in the past with a residual significant cardiomyopathy and scar burden.     Presented with symptoms of chest pain shortness of breath and multiple ICD treatments  EKG shows atrial fibrillation with a biventricular pacing  Patient looks comfortable with no  active chest discomfort mild nonspecific elevation of troponin with no significant trend      Review of Systems  Review of Systems   Constitution: Negative for chills, decreased appetite and malaise/fatigue.   HENT: Negative for congestion.    Eyes: Negative for blurred vision and double vision.   Cardiovascular: Positive for chest pain, dyspnea on exertion and irregular heartbeat. Negative for leg swelling, near-syncope, orthopnea, palpitations, paroxysmal nocturnal dyspnea and syncope.   Respiratory: Positive for shortness of breath. Negative for cough.    Hematologic/Lymphatic: Negative for adenopathy. Does not bruise/bleed easily.   Skin: Positive for color change. Negative for rash.   Musculoskeletal: Negative for back pain and joint pain.   Gastrointestinal: Negative for bloating, abdominal pain, hematemesis and hematochezia.   Genitourinary: Negative for flank pain and hematuria.   Neurological: Negative for dizziness, focal weakness, numbness and seizures.   Psychiatric/Behavioral: Negative for altered mental status. The patient is not nervous/anxious.        Past Medical History  Past Medical History:   Diagnosis Date   • Asthma    • Atrial fibrillation (CMS/HCC)    • CAD (coronary artery disease)    • CHF (congestive heart failure) (CMS/HCC)    • COPD (chronic obstructive pulmonary disease) (CMS/HCC)    • Depression    • Elevated cholesterol    • Hyperlipidemia    • Hypertension    • Myocardial infarction (CMS/HCC)    • Pacemaker 2019    [employer]    • Sleep apnea    • V tach (CMS/HCC)     and   Past Surgical History:   Procedure Laterality Date   • BACK SURGERY      Sciatica   • CARDIAC ABLATION  11/07/2017   • CARDIAC CATHETERIZATION      6-8 stents, last heart cath 2019   • CARDIAC CATHETERIZATION N/A 6/9/2020    Procedure: Coronary angiography;  Surgeon: Jose Lopez MD;  Location: [facility] INVASIVE LOCATION;  Service: Cardiovascular;  Laterality: N/A;   • CARDIAC ELECTROPHYSIOLOGY  PROCEDURE N/A 9/26/2019    Procedure: BIVENTRICULAR DEVICE UPGRADE;  Surgeon: Jose Lopez MD;  Location: [facility] CATH INVASIVE LOCATION;  Service: Cardiovascular   • CARDIAC ELECTROPHYSIOLOGY PROCEDURE N/A 9/26/2019    Procedure: EP/Ablation AV Node ablation;  Surgeon: Jose Lopez MD;  Location: [facility] CATH INVASIVE LOCATION;  Service: Cardiology   • CARDIAC ELECTROPHYSIOLOGY PROCEDURE N/A 6/9/2020    Procedure: EP/Ablation;  Surgeon: Jose Lopez MD;  Location: [facility] CATH INVASIVE LOCATION;  Service: Cardiovascular;  Laterality: N/A;   • CARDIAC PACEMAKER PLACEMENT N/A 6/22/2020    Procedure: LEFT VENTRICULAR LEAD PLACEMENT;  Surgeon: Christiano Richmond MD;  Location: [facility];  Service: Cardiothoracic;  Laterality: N/A;   • CHOLECYSTECTOMY     • COLONOSCOPY     • CORONARY ANGIOPLASTY WITH STENT PLACEMENT      2 stents   • HERNIA REPAIR      gallbladder    • PACEMAKER IMPLANTATION  07/08/2016    Pacemaker/ Defib implant - [place]   • SINUS SURGERY      sinus surgery x 9   • SKIN BIOPSY      benign       Family History  Family History   Problem Relation Age of Onset   • Diabetes Mother    • Heart disease Mother         MI age 46 - CHF   • Atrial fibrillation Mother    • COPD Mother    • Atrial fibrillation Father    • Heart disease Father         CHF       Social History  Social History     Socioeconomic History   • Marital status:      Spouse name: Not on file   • Number of children: Not on file   • Years of education: Not on file   • Highest education level: Not on file   Tobacco Use   • Smoking status: Never Smoker   • Smokeless tobacco: Never Used   Substance and Sexual Activity   • Alcohol use: Never     Frequency: Never     Comment: socially   • Drug use: No   • Sexual activity: Defer     Partners: Female       Objective     Physical Exam:  Constitutional:       Appearance: Well-developed.   Eyes:      Conjunctiva/sclera: Conjunctivae normal.      Pupils: Pupils are equal, round,  "and reactive to light.   HENT:      Head: Normocephalic and atraumatic.   Neck:      Musculoskeletal: Normal range of motion and neck supple.      Thyroid: No thyromegaly.   Pulmonary:      Effort: Pulmonary effort is normal.      Breath sounds: Normal breath sounds.   Cardiovascular:      Abnormal PMI. Normal rate. Regular rhythm.      Murmurs: There is a grade 2/6 early systolic murmur.   Pulses:     Intact distal pulses.   Edema:     Peripheral edema absent.   Abdominal:      General: Bowel sounds are normal.      Palpations: Abdomen is soft.   Musculoskeletal: Normal range of motion.   Skin:     General: Skin is warm.   Neurological:      Mental Status: Alert and oriented to person, place, and time.         Vital Signs  Vitals:    09/30/20 2300 10/01/20 0157 10/01/20 0453 10/01/20 0526   BP:  120/86  132/72   BP Location:  Right arm  Right arm   Patient Position:  Sitting  Lying   Pulse:  92  80   Resp:  22  20   Temp:  100 °F (37.8 °C)  97.6 °F (36.4 °C)   TempSrc:  Rectal  Oral   SpO2: 96% 96%  98%   Weight:   (!) 140 kg (308 lb 13.8 oz)    Height:           Weight  Flowsheet Rows      First Filed Value   Admission Height  177.8 cm (70\") Documented at 09/30/2020 1530   Admission Weight  136 kg (300 lb) Documented at 09/30/2020 1530              Results Review:  Lab Results (last 24 hours)     Procedure Component Value Units Date/Time    COVID PRE-OP / PRE-PROCEDURE SCREENING ORDER (NO ISOLATION) - Swab, Nasopharynx [[id number]] Collected: 10/01/20 0943    Specimen: Swab from Nasopharynx Updated: 10/01/20 0951    Narrative:      The following orders were created for panel order COVID PRE-OP / PRE-PROCEDURE SCREENING ORDER (NO ISOLATION) - Swab, Nasopharynx.  Procedure                               Abnormality         Status                     ---------                               -----------         ------                     Respiratory Panel PCR w/...[[id number]]                      In process               "     Please view results for these tests on the individual orders.    Respiratory Panel PCR w/COVID-19(SARS-CoV-2) MOE/JORGE LUIS/ISH/PAD/COR/MAD In-House, NP Swab in [facility], 3-4 HR TAT - Swab, Nasopharynx [[id number]] Collected: 10/01/20 0943    Specimen: Swab from Nasopharynx Updated: 10/01/20 0951    BUN [[id number]]  (Abnormal) Collected: 10/01/20 0248    Specimen: Blood Updated: 10/01/20 0720     BUN 23 mg/dL     Troponin [[id number]]  (Normal) Collected: 10/01/20 0248    Specimen: Blood Updated: 10/01/20 0350     Troponin T 0.028 ng/mL     Narrative:      Troponin T Reference Range:  <= 0.03 ng/mL-   Negative for AMI  >0.03 ng/mL-     Abnormal for myocardial necrosis.  Clinicians would have to utilize clinical acumen, EKG, Troponin and serial changes to determine if it is an Acute Myocardial Infarction or myocardial injury due to an underlying chronic condition.       Results may be falsely decreased if patient taking Biotin.      Comprehensive Metabolic Panel [[id number]]  (Abnormal) Collected: 10/01/20 0248    Specimen: Blood Updated: 10/01/20 0350     Glucose 138 mg/dL      BUN --     Comment: Testing performed by alternate method        Creatinine 1.42 mg/dL      Sodium 137 mmol/L      Potassium 5.4 mmol/L      Chloride 99 mmol/L      CO2 25.0 mmol/L      Calcium 8.9 mg/dL      Total Protein 7.6 g/dL      Albumin 4.40 g/dL      ALT (SGPT) 26 U/L      AST (SGOT) 41 U/L      Alkaline Phosphatase 88 U/L      Total Bilirubin 1.2 mg/dL      eGFR Non African Amer 51 mL/min/1.73      Globulin 3.2 gm/dL      A/G Ratio 1.4 g/dL      BUN/Creatinine Ratio --     Comment: Testing not performed        Anion Gap 13.0 mmol/L     Narrative:      GFR Normal >60  Chronic Kidney Disease <60  Kidney Failure <15      CBC (No Diff) [[id number]]  (Abnormal) Collected: 10/01/20 0248    Specimen: Blood Updated: 10/01/20 0326     WBC 18.90 10*3/mm3      RBC 5.49 10*6/mm3      Hemoglobin 15.0 g/dL      Hematocrit 48.9 %      MCV 89.0 fL       MCH 27.4 pg      MCHC 30.8 g/dL      RDW 16.4 %      RDW-SD 51.6 fl      MPV 9.4 fL      Platelets 281 10*3/mm3     Respiratory Panel, PCR - Swab, Nasopharynx [[id number]]  (Normal) Collected: 09/30/20 2308    Specimen: Swab from Nasopharynx Updated: 10/01/20 0124     ADENOVIRUS, PCR Not Detected     Coronavirus 229E Not Detected     Coronavirus HKU1 Not Detected     Coronavirus NL63 Not Detected     Coronavirus OC43 Not Detected     Human Metapneumovirus Not Detected     Human Rhinovirus/Enterovirus Not Detected     Influenza B PCR Not Detected     Parainfluenza Virus 1 Not Detected     Parainfluenza Virus 2 Not Detected     Parainfluenza Virus 3 Not Detected     Parainfluenza Virus 4 Not Detected     Bordetella pertussis pcr Not Detected     Influenza A H1 2009 PCR Not Detected     Chlamydophila pneumoniae PCR Not Detected     Mycoplasma pneumo by PCR Not Detected     Influenza A PCR Not Detected     Influenza A H3 Not Detected     Influenza A H1 Not Detected     RSV, PCR Not Detected     Bordetella parapertussis PCR Not Detected    Narrative:      The coronavirus on the RVP is NOT COVID-19 and is NOT indicative of infection with COVID-19.     Troponin [[id number]]  (Normal) Collected: 09/30/20 2329    Specimen: Blood Updated: 10/01/20 0035     Troponin T 0.022 ng/mL     Narrative:      Troponin T Reference Range:  <= 0.03 ng/mL-   Negative for AMI  >0.03 ng/mL-     Abnormal for myocardial necrosis.  Clinicians would have to utilize clinical acumen, EKG, Troponin and serial changes to determine if it is an Acute Myocardial Infarction or myocardial injury due to an underlying chronic condition.       Results may be falsely decreased if patient taking Biotin.      Potassium [[id number]]  (Normal) Collected: 09/30/20 2329    Specimen: Blood Updated: 10/01/20 0031     Potassium 4.8 mmol/L     Magnesium [[id number]]  (Normal) Collected: 09/30/20 2329    Specimen: Blood Updated: 10/01/20 0031     Magnesium 2.2 mg/dL      Urinalysis, Microscopic Only - Urine, Clean Catch [[id number]]  (Abnormal) Collected: 09/30/20 2052    Specimen: Urine, Clean Catch Updated: 09/30/20 2123     RBC, UA 0-2 /HPF      WBC, UA 3-5 /HPF      Bacteria, UA Trace /HPF      Squamous Epithelial Cells, UA 0-2 /HPF      Hyaline Casts, UA 7-12 /LPF      Mucus, UA Small/1+ /HPF      Methodology Manual Light Microscopy    Urine Drug Screen - Urine, Clean Catch [[id number]]  (Normal) Collected: 09/30/20 2052    Specimen: Urine, Clean Catch Updated: 09/30/20 2123     Amphet/Methamphet, Screen Negative     Barbiturates Screen, Urine Negative     Benzodiazepine Screen, Urine Negative     Cocaine Screen, Urine Negative     Opiate Screen Negative     THC, Screen, Urine Negative     Methadone Screen, Urine Negative     Oxycodone Screen, Urine Negative    Narrative:      Negative Thresholds For Drugs Screened:     Amphetamines               500 ng/ml   Barbiturates               200 ng/ml   Benzodiazepines            100 ng/ml   Cocaine                    300 ng/ml   Methadone                  300 ng/ml   Opiates                    300 ng/ml   Oxycodone                  100 ng/ml   THC                        50 ng/ml    The Normal Value for all drugs tested is negative. This report includes final unconfirmed screening results to be used for medical treatment purposes only. Unconfirmed results must not be used for non-medical purposes such as employment or legal testing. Clinical consideration should be applied to any drug of abuse test, particulary when unconfirmed results are used.  All urine drugs of abuse requests without chain of custody are for medical screening purposes only.  False positives are possible.      Urinalysis With Microscopic If Indicated (No Culture) - Urine, Clean Catch [[id number]]  (Abnormal) Collected: 09/30/20 2052    Specimen: Urine, Clean Catch Updated: 09/30/20 2107     Color, UA Dark Yellow     Comment: Result checked         Appearance, UA Clear      pH, UA 5.5     Specific Gravity, UA 1.025     Glucose, UA Negative     Ketones, UA Negative     Bilirubin, UA Small (1+)     Comment: Confirmation testing is unavailable.  A serum bilirubin is recommended for further assessment.        Blood, UA Negative     Protein,  mg/dL (2+)     Leuk Esterase, UA Negative     Nitrite, UA Negative     Urobilinogen, UA 0.2 E.U./dL    Troponin [[id number]]  (Normal) Collected: 09/30/20 1916    Specimen: Blood Updated: 09/30/20 2000     Troponin T 0.016 ng/mL     Narrative:      Troponin T Reference Range:  <= 0.03 ng/mL-   Negative for AMI  >0.03 ng/mL-     Abnormal for myocardial necrosis.  Clinicians would have to utilize clinical acumen, EKG, Troponin and serial changes to determine if it is an Acute Myocardial Infarction or myocardial injury due to an underlying chronic condition.       Results may be falsely decreased if patient taking Biotin.      BNP [[id number]]  (Abnormal) Collected: 09/30/20 1916    Specimen: Blood Updated: 09/30/20 2000     proBNP 4,084.0 pg/mL     Narrative:      Among patients with dyspnea, NT-proBNP is highly sensitive for the detection of acute congestive heart failure. In addition NT-proBNP of <300 pg/ml effectively rules out acute congestive heart failure with 99% negative predictive value.    Results may be falsely decreased if patient taking Biotin.      Extra Tubes [[id number]] Collected: 09/30/20 1535    Specimen: Blood, Venous Line Updated: 09/30/20 1645    Narrative:      The following orders were created for panel order Extra Tubes.  Procedure                               Abnormality         Status                     ---------                               -----------         ------                     Gold Top - SST[[id number]]                                   Final result                 Please view results for these tests on the individual orders.    Gold Top - SST [[id number]] Collected: 09/30/20 1535    Specimen: Blood Updated:  09/30/20 1645     Extra Tube Hold for add-ons.     Comment: Auto resulted.       BUN [[id number]]  (Normal) Collected: 09/30/20 1537    Specimen: Blood Updated: 09/30/20 1610     BUN 17 mg/dL     Comprehensive Metabolic Panel [[id number]]  (Abnormal) Collected: 09/30/20 1537    Specimen: Blood Updated: 09/30/20 1605     Glucose 115 mg/dL      BUN --     Comment: Testing performed by alternate method        Creatinine 1.15 mg/dL      Sodium 141 mmol/L      Potassium 4.6 mmol/L      Chloride 99 mmol/L      CO2 29.0 mmol/L      Calcium 9.4 mg/dL      Total Protein 7.6 g/dL      Albumin 4.40 g/dL      ALT (SGPT) 24 U/L      AST (SGOT) 38 U/L      Alkaline Phosphatase 87 U/L      Total Bilirubin 1.0 mg/dL      eGFR Non African Amer 66 mL/min/1.73      Globulin 3.2 gm/dL      A/G Ratio 1.4 g/dL      BUN/Creatinine Ratio --     Comment: Testing not performed        Anion Gap 13.0 mmol/L     Narrative:      GFR Normal >60  Chronic Kidney Disease <60  Kidney Failure <15      Troponin [[id number]]  (Normal) Collected: 09/30/20 1537    Specimen: Blood Updated: 09/30/20 1605     Troponin T <0.010 ng/mL     Narrative:      Troponin T Reference Range:  <= 0.03 ng/mL-   Negative for AMI  >0.03 ng/mL-     Abnormal for myocardial necrosis.  Clinicians would have to utilize clinical acumen, EKG, Troponin and serial changes to determine if it is an Acute Myocardial Infarction or myocardial injury due to an underlying chronic condition.       Results may be falsely decreased if patient taking Biotin.      Magnesium [[id number]]  (Normal) Collected: 09/30/20 1537    Specimen: Blood Updated: 09/30/20 1605     Magnesium 2.2 mg/dL     Protime-INR [[id number]]  (Normal) Collected: 09/30/20 1537    Specimen: Blood Updated: 09/30/20 1600     Protime 11.4 Seconds      INR 1.04    aPTT [[id number]]  (Normal) Collected: 09/30/20 1537    Specimen: Blood Updated: 09/30/20 1600     PTT 24.6 seconds     CBC & Differential [[id number]]  (Abnormal)  Collected: 09/30/20 1537    Specimen: Blood Updated: 09/30/20 1547    Narrative:      The following orders were created for panel order CBC & Differential.  Procedure                               Abnormality         Status                     ---------                               -----------         ------                     CBC Auto Differential[[id number]]        Abnormal            Final result                 Please view results for these tests on the individual orders.    CBC Auto Differential [[id number]]  (Abnormal) Collected: 09/30/20 1537    Specimen: Blood Updated: 09/30/20 1547     WBC 13.60 10*3/mm3      RBC 5.46 10*6/mm3      Hemoglobin 15.0 g/dL      Hematocrit 47.1 %      MCV 86.3 fL      MCH 27.4 pg      MCHC 31.8 g/dL      RDW 16.3 %      RDW-SD 49.4 fl      MPV 9.5 fL      Platelets 313 10*3/mm3      Neutrophil % 75.8 %      Lymphocyte % 11.4 %      Monocyte % 10.5 %      Eosinophil % 1.2 %      Basophil % 1.1 %      Neutrophils, Absolute 10.30 10*3/mm3      Lymphocytes, Absolute 1.50 10*3/mm3      Monocytes, Absolute 1.40 10*3/mm3      Eosinophils, Absolute 0.20 10*3/mm3      Basophils, Absolute 0.20 10*3/mm3      nRBC 0.1 /100 WBC         Imaging Results (Last 72 Hours)     Procedure Component Value Units Date/Time    CT Head Without Contrast [[id number]] Collected: 10/01/20 1033     Updated: 10/01/20 1036    Narrative:      CT HEAD WO CONTRAST-     Date of Exam: 10/1/2020 10:16 AM     Indication: Fell yesterday. Hit back of head. Suspected stroke..     Comparison: None available.     Technique:  Without contrast, contiguous axial CT images of the head  were obtained from skull base to vertex.  Coronal and sagittal  reconstructions were performed.  Automated exposure control and  iterative reconstruction methods were used.     FINDINGS  No acute intracranial hemorrhage, mass lesion, mass effect, midline  shift or evidence of acute or evolving infarct. Ventricular  configuration is within normal  limits. Preservation of gray matter-white  matter junction distinction without CT evidence of large territory  infarct. The calvarium is within normal limits. Paranasal sinuses and  mastoid air cells are clear. Mild intracranial carotid artery  calcination is are noted.       Impression:      No acute intracranial findings.     Electronically Signed By-Dr. Sheba Thorne MD On:10/1/2020 10:34 AM  This report was finalized on [id number] by Dr. Sheba Thorne MD.    XR Knee 4+ View Right [[id number]] Collected: 09/30/20 2036     Updated: 09/30/20 2041    Narrative:      DATE OF EXAM:  9/30/2020 7:18 PM     PROCEDURE:  XR KNEE 4+ VW RIGHT-     INDICATIONS:  fall; R55-Syncope and collapse; I47.2-Ventricular tachycardia;  R07.9-Chest pain, unspecified     COMPARISON:  None available.     TECHNIQUE:   Four radiologic views or more of the right knee were obtained.        FINDINGS:  No evidence of acute fracture or dislocation. There is a surgical anchor  in the inferior pole of the patella. Additional surgical anchors seen in  the prepatellar soft tissues. The joint spaces are fairly  well-maintained. No significant joint effusion. Anterior soft tissue  swelling. Mild vascular calcifications.        Impression:         1. No radiographic evidence of acute fracture or dislocation.  2. Anterior soft tissue swelling with a surgical anchor displaced into  the prepatellar soft tissues.     Electronically Signed By-Jose Carlos Ayers On:9/30/2020 8:38 PM  This report was finalized on [id number] by  Jose Carlos Ayers, .    XR Chest 1 View [[id number]] Collected: 09/30/20 1630     Updated: 09/30/20 1636    Narrative:      DATE OF EXAM:  9/30/2020 4:14 PM     PROCEDURE:  XR CHEST 1 VW-     INDICATIONS:  Chest pain     COMPARISON:  AP chest x-ray 08/22/2020, 09/26/2019.     TECHNIQUE:   Single radiographic AP view of the chest was obtained.     FINDINGS:  Cardiac silhouette remains enlarged. Pacemaker leads appear stable.  Central  pulmonary vessels remain prominent. Stable linear opacities in  the left lower lung are likely due to scarring. The lungs otherwise  appear grossly clear. No pneumothorax or large pleural effusion is seen.          Impression:      Enlarged cardiac silhouette. No definite acute pulmonary abnormality or  significant change.     Electronically Signed By-Ruma Oneill On:9/30/2020 4:34 PM  This report was finalized on [id number] by  Ruma Oneill, .          Results for orders placed during the hospital encounter of 06/21/20   Adult Transthoracic Echo Limited W/ Cont if Necessary Per Protocol    Narrative · Left ventricular wall thickness is consistent with moderate concentric   hypertrophy.  · Estimated EF = 30%.  · The pericardium is normal. There is no evidence of pericardial effusion.          Medication Review  Scheduled Meds:atorvastatin, 80 mg, Oral, Daily  budesonide, 0.5 mg, Nebulization, BID - RT  bumetanide, 1 mg, Oral, BID  cetirizine, 10 mg, Oral, Daily  clopidogrel, 75 mg, Oral, Daily  DULoxetine, 60 mg, Oral, Daily  gabapentin, 300 mg, Oral, BID  guaiFENesin, 1,200 mg, Oral, Q12H  ipratropium-albuterol, 3 mL, Nebulization, Q4H - RT  lidocaine, 1 patch, Transdermal, Q24H  metoprolol succinate XL, 100 mg, Oral, Q24H  montelukast, 10 mg, Oral, Daily  potassium chloride, 10 mEq, Oral, Daily  ranolazine, 1,000 mg, Oral, BID  roflumilast, 500 mcg, Oral, Daily  sodium chloride, 10 mL, Intravenous, Q12H  spironolactone, 25 mg, Oral, TID  tamsulosin, 0.4 mg, Oral, Nightly      Continuous Infusions:   PRN Meds:.•  acetaminophen **OR** acetaminophen **OR** acetaminophen  •  benzonatate  •  hydrOXYzine  •  influenza vaccine  •  ipratropium-albuterol  •  magnesium sulfate **OR** magnesium sulfate in D5W 1g/100mL (PREMIX)  •  metOLazone  •  nitroglycerin  •  ondansetron **OR** ondansetron  •  potassium chloride  •  [COMPLETED] Insert peripheral IV **AND** sodium chloride  •  sodium chloride    Assessment/Plan        Syncope    Coronary artery disease involving native heart with angina pectoris (CMS/HCC)    Cardiomyopathy, dilated (CMS/HCC)    Sleep apnea    Essential hypertension    Dyslipidemia    Paroxysmal atrial fibrillation (CMS/HCC)    Chronic systolic CHF (congestive heart failure) (CMS/HCC)    Anxiety    COPD with acute exacerbation (CMS/[facility])    Depression    ICD (implantable cardioverter-defibrillator) in place    Morbid obesity (CMS/[facility])    Ischemic cardiomyopathy    AICD discharge    BPH without obstruction/lower urinary tract symptoms    Peripheral neuropathy    Seasonal allergies    Right knee pain    Dysarthria    Generalized weakness    COPD exacerbation (CMS/[facility])    Ventricular tachycardia (CMS/[facility])      Patient Active Problem List   Diagnosis   • Coronary artery disease involving native heart with angina pectoris (CMS/HCC)   • Cardiomyopathy, dilated (CMS/HCC)   • Sleep apnea   • Essential hypertension   • Dyslipidemia   • Paroxysmal atrial fibrillation (CMS/[facility])   • Chronic systolic CHF (congestive heart failure) (CMS/[facility])   • Anxiety   • Myocardial infarction (CMS/[facility])   • COPD with acute exacerbation (CMS/[facility])   • Depression   • Gastroesophageal reflux disease   • ICD (implantable cardioverter-defibrillator) in place   • Morbid obesity (CMS/[facility])   • Obesity   • S/P cardiac cath   • Atrial fibrillation with RVR (CMS/[facility])   • VT (ventricular tachycardia) (CMS/[facility])   • Ischemic cardiomyopathy   • Coronary artery disease   • Cardiomyopathy (CMS/[facility])   • Chronic stable angina (CMS/[facility])   • Acute on chronic combined systolic and diastolic CHF (congestive heart failure) (CMS/[facility])   • Syncope   • AICD discharge   • BPH without obstruction/lower urinary tract symptoms   • Peripheral neuropathy   • Seasonal allergies   • Right knee pain   • Dysarthria   • Generalized weakness   • COPD exacerbation (CMS/[facility])   • Ventricular tachycardia (CMS/[facility])           Silver Burger MD  10/01/20  10:39 EDT

## 2020-10-01 NOTE — PROGRESS NOTES
Pulmonary Disease Educator Note:    Pt is currently out of the room. He is down in cath lab. PDE will follow back up at a later time.

## 2020-10-01 NOTE — NURSING NOTE
"Pt symptoms appear to be resolving, does  Not c/o constant chest pain but states that he is very \" sweaty\", VSS are WNL and ectopy has lessened, not having frequesnt runs of VT as previous and after 25 mg lopressor given earlier per Dr. Durand. However , pt is still anxious. Currently OOB to chair at this time, remains on amiodarone gtt 0.5mg/kg.hr.  "

## 2020-10-01 NOTE — CONSULTS
Cardiology Consult-EP      REQUESTING PROVIDER  Brandon Enrique MD    PATIENT IDENTIFICATION  Name: Jose Dixon Jr.  Age: 59 y.o.  Sex: male  :  1960  MRN: [id number]             REASON  FOR  CONSULTATION  59-year-old  male with well-known history of ischemic heart disease, dilated cardiomyopathy, biventricular ICD placement with AV node ablation for persistent and uncontrolled A. fib with rapid ventricular response.  History of hypertension, dyslipidemia, obstructive sleep apnea, heart failure with reduced ejection fraction class III chronic, history of nonsustained VT, intolerance to amiodarone.  Patient has history of multiple hospitalizations at [facility], [facility] and [facility].  Patient has undergone multiple ICD therapies for atrial fibrillation.  He has history of chronic angina.  He underwent LV epicardial lead placement for optimization of heart failure therapy.  Recent evaluation by Dr. Lopez: Recurrent VT episodes terminated by ATP noted on ICD interrogation.  Patient to follow-up in 1 month for decision about moving forward with VT ablation.     Last 2D echocardiogram 2020: Moderate concentric LVH, EF 30%.      CC:  Syncope  ICD discharge    HPI:  Patient presented to [facility] 2020 after having a syncopal episode.  Patient reports that his ICD discharged and he passed out.  He reports being out for only a few seconds.  He did strike his right knee and reported some knee pain.  Patient states he has been having chest discomfort and shortness of breath for the past 2 days.  His shortness of breath worsens with activity, improved with rest.  He presented to the emergency department and work-up was initiated including troponin which is been negative x2.  Potassium elevated at 5.4 today.  Upon my evaluation, patient is asleep in chair, stated he did not have a very good night.  Feels poorly.  Denies any chest pain at present.  " Creatinine 1.42.  Magnesium 2.2.  Chest x-ray shows enlarged cardiac silhouette with no acute findings.  EKG shows atrial fibrillation with biventricular pacing.  Device interrogated in the emergency department showing VT, successfully aborted after second discharge.  Patient denies any shortness of breath at present.         REVIEW OF SYSTEMS:  Review of Systems  General: Negative  for fatigue and tiredness  Eyes: No redness  Cardiovascular: positive for chest pain, positive for palpitations  Respiratory:   No  shortness of breath  Gastrointestinal: No nausea or vomiting, bleeding  Genitourinary: no hematuria or dysuria  Musculoskeletal: No arthralgia or myalgia  Skin: No rash  Neurologic: No numbness, tingling, syncope  Hematologic/Lymphatic: No abnormal bleeding        OBJECTIVE   Rhythm shows biventricular pacing  Patient currently on amiodarone drip at 0.5 mg/min    ASSESSMENT/PLAN    Syncope    Coronary artery disease involving native heart with angina pectoris (CMS/[facility])    Cardiomyopathy, dilated (CMS/[facility])    Sleep apnea    Essential hypertension    Dyslipidemia    Paroxysmal atrial fibrillation (CMS/HCC)    Chronic systolic CHF (congestive heart failure) (CMS/[facility])    Anxiety    COPD with acute exacerbation (CMS/[facility])    Depression    ICD (implantable cardioverter-defibrillator) in place    Morbid obesity (CMS/[facility])    Ischemic cardiomyopathy    AICD discharge    BPH without obstruction/lower urinary tract symptoms    Peripheral neuropathy    Seasonal allergies    Right knee pain    Dysarthria    Generalized weakness    COPD exacerbation (CMS/HCC)            Vital Signs  Visit Vitals  /72 (BP Location: Right arm, Patient Position: Lying)   Pulse 80   Temp 97.6 °F (36.4 °C) (Oral)   Resp 20   Ht 175.3 cm (69\")   Wt (!) 140 kg (308 lb 13.8 oz)   SpO2 98%   BMI 45.61 kg/m²     Oxygen Therapy  SpO2: 98 %  Pulse Oximetry Type: Continuous  Device (Oxygen Therapy): nasal cannula  Flow (L/min): 4  Flowsheet Rows " "     First Filed Value   Admission Height  177.8 cm (70\") Documented at 09/30/2020 1530   Admission Weight  136 kg (300 lb) Documented at 09/30/2020 1530        Intake & Output (last 3 days)       09/28 0701 - 09/29 0700 09/29 0701 - 09/30 0700 09/30 0701 - 10/01 0700 10/01 0701 - 10/02 0700    P.O.   600     Total Intake(mL/kg)   600 (4.3)     Urine (mL/kg/hr)   800     Total Output   800     Net   -200                 Lines, Drains & Airways    Active LDAs     Name:   Placement date:   Placement time:   Site:   Days:    Peripheral IV 09/30/20 1526 Right Antecubital   09/30/20    1526    Antecubital   less than 1    Peripheral IV 09/30/20 1542 Left Arm   09/30/20    1542    Arm   less than 1                MEDICAL HISTORY    Past Medical History:   Diagnosis Date   • Asthma    • Atrial fibrillation (CMS/[facility])    • CAD (coronary artery disease)    • CHF (congestive heart failure) (CMS/[facility])    • COPD (chronic obstructive pulmonary disease) (CMS/[facility])    • Depression    • Elevated cholesterol    • Hyperlipidemia    • Hypertension    • Myocardial infarction (CMS/[facility])    • Pacemaker 2019    [employer]    • Sleep apnea    • V tach (CMS/[facility])         SURGICAL HISTORY    Past Surgical History:   Procedure Laterality Date   • BACK SURGERY      Sciatica   • CARDIAC ABLATION  11/07/2017   • CARDIAC CATHETERIZATION      6-8 stents, last heart cath 2019   • CARDIAC CATHETERIZATION N/A 6/9/2020    Procedure: Coronary angiography;  Surgeon: Jose Lopez MD;  Location: [facility] CATH INVASIVE LOCATION;  Service: Cardiovascular;  Laterality: N/A;   • CARDIAC ELECTROPHYSIOLOGY PROCEDURE N/A 9/26/2019    Procedure: BIVENTRICULAR DEVICE UPGRADE;  Surgeon: Jose Lopez MD;  Location: [facility] CATH INVASIVE LOCATION;  Service: Cardiovascular   • CARDIAC ELECTROPHYSIOLOGY PROCEDURE N/A 9/26/2019    Procedure: EP/Ablation AV Node ablation;  Surgeon: Jose Lopez MD;  Location: [facility] CATH INVASIVE LOCATION;  Service: " "Cardiology   • CARDIAC ELECTROPHYSIOLOGY PROCEDURE N/A 6/9/2020    Procedure: EP/Ablation;  Surgeon: Jose Lopez MD;  Location: [facility] CATH INVASIVE LOCATION;  Service: Cardiovascular;  Laterality: N/A;   • CARDIAC PACEMAKER PLACEMENT N/A 6/22/2020    Procedure: LEFT VENTRICULAR LEAD PLACEMENT;  Surgeon: Christiano Richmond MD;  Location: [facility] CVOR;  Service: Cardiothoracic;  Laterality: N/A;   • CHOLECYSTECTOMY     • COLONOSCOPY     • CORONARY ANGIOPLASTY WITH STENT PLACEMENT      2 stents   • HERNIA REPAIR      gallbladder    • PACEMAKER IMPLANTATION  07/08/2016    Pacemaker/ Defib implant - [employer]   • SINUS SURGERY      sinus surgery x 9   • SKIN BIOPSY      benign        FAMILY HISTORY    Family History   Problem Relation Age of Onset   • Diabetes Mother    • Heart disease Mother         MI age 46 - CHF   • Atrial fibrillation Mother    • COPD Mother    • Atrial fibrillation Father    • Heart disease Father         CHF       SOCIAL HISTORY    Social History     Tobacco Use   • Smoking status: Never Smoker   • Smokeless tobacco: Never Used   Substance Use Topics   • Alcohol use: Never     Frequency: Never     Comment: socially        ALLERGIES    Allergies   Allergen Reactions   • Codeine Anaphylaxis          • Tramadol Anaphylaxis and Hives              /72 (BP Location: Right arm, Patient Position: Lying)   Pulse 80   Temp 97.6 °F (36.4 °C) (Oral)   Resp 20   Ht 175.3 cm (69\")   Wt (!) 140 kg (308 lb 13.8 oz)   SpO2 98%   BMI 45.61 kg/m²   Intake/Output last 3 shifts:  I/O last 3 completed shifts:  In: 600 [P.O.:600]  Out: 800 [Urine:800]  Intake/Output this shift:  No intake/output data recorded.    PHYSICAL EXAM:    General: Well-developed, obese 59-year-old  male who is alert, cooperative, no distress, appears stated age  Head:  Normocephalic, atraumatic, mucous membranes moist  Eyes:  Conjunctiva/corneas clear, EOM's intact     Neck:  Supple,  no adenopathy;      Lungs: Clear " to auscultation bilaterally, no wheezes rhonchi rales are noted  Chest wall: No tenderness  Heart::  Regular rate and rhythm, S1 and S2 normal, no murmur, rub or gallop  Abdomen: Soft, non-tender, nondistended bowel sounds active  Extremities: No cyanosis, clubbing.  Trace edema to lower extremities  Pulses: 2+ and symmetric all extremities  Skin:  No rashes or lesions  Neuro/psych: A&O x3. CN II through XII are grossly intact with appropriate affect      Scheduled Meds:      atorvastatin, 80 mg, Oral, Daily  budesonide, 0.5 mg, Nebulization, BID - RT  bumetanide, 1 mg, Oral, BID  cetirizine, 10 mg, Oral, Daily  clopidogrel, 75 mg, Oral, Daily  DULoxetine, 60 mg, Oral, Daily  gabapentin, 300 mg, Oral, BID  guaiFENesin, 1,200 mg, Oral, Q12H  ipratropium-albuterol, 3 mL, Nebulization, Q4H - RT  lidocaine, 1 patch, Transdermal, Q24H  methylPREDNISolone sodium succinate, 40 mg, Intravenous, Q8H  metoprolol succinate XL, 100 mg, Oral, Q24H  montelukast, 10 mg, Oral, Daily  potassium chloride, 10 mEq, Oral, Daily  ranolazine, 1,000 mg, Oral, BID  roflumilast, 500 mcg, Oral, Daily  sodium chloride, 10 mL, Intravenous, Q12H  spironolactone, 25 mg, Oral, TID  tamsulosin, 0.4 mg, Oral, Nightly        Continuous Infusions:    amiodarone, 0.5 mg/min, Last Rate: 0.5 mg/min (09/30/20 2212)        PRN Meds:    •  acetaminophen **OR** acetaminophen **OR** acetaminophen  •  benzonatate  •  hydrOXYzine  •  influenza vaccine  •  ipratropium-albuterol  •  magnesium sulfate **OR** magnesium sulfate in D5W 1g/100mL (PREMIX)  •  metOLazone  •  nitroglycerin  •  ondansetron **OR** ondansetron  •  potassium chloride  •  [COMPLETED] Insert peripheral IV **AND** sodium chloride  •  sodium chloride     Data Review:     Results Review:     I reviewed the patient's new clinical results.    CBC    Results from last 7 days   Lab Units 10/01/20  0248 09/30/20  1537   WBC 10*3/mm3 18.90* 13.60*   HEMOGLOBIN g/dL 15.0 15.0   PLATELETS 10*3/mm3 281  313     Cr Clearance Estimated Creatinine Clearance: 79.9 mL/min (A) (by C-G formula based on SCr of 1.42 mg/dL (H)).  Coag   Results from last 7 days   Lab Units 09/30/20  1537   INR  1.04   APTT seconds 24.6     HbA1C   Lab Results   Component Value Date    HGBA1C 5.9 (H) 06/04/2020    HGBA1C 5.4 09/14/2018     Blood Glucose No results found for: POCGLU  Infection     CMP   Results from last 7 days   Lab Units 10/01/20  0248 09/30/20  2329 09/30/20  1537   SODIUM mmol/L 137  --  141   POTASSIUM mmol/L 5.4* 4.8 4.6   CHLORIDE mmol/L 99  --  99   CO2 mmol/L 25.0  --  29.0   BUN  23*  --  17   CREATININE mg/dL 1.42*  --  1.15   GLUCOSE mg/dL 138*  --  115*   ALBUMIN g/dL 4.40  --  4.40   BILIRUBIN mg/dL 1.2  --  1.0   ALK PHOS U/L 88  --  87   AST (SGOT) U/L 41*  --  38   ALT (SGPT) U/L 26  --  24     ABG      UA    Results from last 7 days   Lab Units 09/30/20 2052   NITRITE UA  Negative   WBC UA /HPF 3-5*   BACTERIA UA /HPF Trace*   SQUAM EPITHEL UA /HPF 0-2     TATUM  No results found for: POCMETH, POCAMPHET, POCBARBITUR, POCBENZO, POCCOCAINE, POCOPIATES, POCOXYCODO, POCPHENCYC, POCPROPOXY, POCTHC, POCTRICYC  Lysis Labs   Results from last 7 days   Lab Units 10/01/20  0248 09/30/20  1537   INR   --  1.04   APTT seconds  --  24.6   HEMOGLOBIN g/dL 15.0 15.0   PLATELETS 10*3/mm3 281 313   CREATININE mg/dL 1.42* 1.15     Radiology(recent) Xr Knee 4+ View Right    Result Date: 9/30/2020   1. No radiographic evidence of acute fracture or dislocation. 2. Anterior soft tissue swelling with a surgical anchor displaced into the prepatellar soft tissues.  Electronically Signed By-Jose Carlos Ayers On:9/30/2020 8:38 PM This report was finalized on [id number] by  Jose Carlos Ayers .    Xr Chest 1 View    Result Date: 9/30/2020  Enlarged cardiac silhouette. No definite acute pulmonary abnormality or significant change.  Electronically Signed By-Ruma Oneill On:9/30/2020 4:34 PM This report was finalized on [id number] by  Ruma  Mai, .        Results from last 7 days   Lab Units 10/01/20  0248   TROPONIN T ng/mL 0.028       Xrays, labs reviewed personally by physician.    ECG/EMG Results (most recent)     Procedure Component Value Units Date/Time    ECG 12 Lead [[id number]] Collected: 09/30/20 1530     Updated: 09/30/20 1608    Narrative:      HEART RATE= 70  bpm  RR Interval= 752  ms  ME Interval=   ms  P Horizontal Axis=   deg  P Front Axis=   deg  QRSD Interval= 166  ms  QT Interval= 471  ms  QRS Axis= 135  deg  T Wave Axis= -56  deg  - ABNORMAL ECG -  Afib/flut and V-paced complexes  Biventricular paced rhythm  Electronically Signed By:   Date and Time of Study: 2020-09-30 15:30:19    ECG 12 Lead [[id number]] Collected: 09/30/20 1947     Updated: 09/30/20 1949    Narrative:      HEART RATE= 130  bpm  RR Interval= 600  ms  ME Interval=   ms  P Horizontal Axis=   deg  P Front Axis=   deg  QRSD Interval= 166  ms  QT Interval= 316  ms  QRS Axis= 208  deg  T Wave Axis= 27  deg  - ABNORMAL ECG -  Afib/flut and V-paced complexes  Biventricular paced rhythm  Nonspecific intraventricular conduction delay  Inferior infarct, old  ST depr, consider ischemia, anterolateral lds  Electronically Signed By:   Date and Time of Study: 2020-09-30 19:47:09            Medication Review:   I have reviewed the patient's current medication list  Scheduled Meds:atorvastatin, 80 mg, Oral, Daily  budesonide, 0.5 mg, Nebulization, BID - RT  bumetanide, 1 mg, Oral, BID  cetirizine, 10 mg, Oral, Daily  clopidogrel, 75 mg, Oral, Daily  DULoxetine, 60 mg, Oral, Daily  gabapentin, 300 mg, Oral, BID  guaiFENesin, 1,200 mg, Oral, Q12H  ipratropium-albuterol, 3 mL, Nebulization, Q4H - RT  lidocaine, 1 patch, Transdermal, Q24H  methylPREDNISolone sodium succinate, 40 mg, Intravenous, Q8H  metoprolol succinate XL, 100 mg, Oral, Q24H  montelukast, 10 mg, Oral, Daily  potassium chloride, 10 mEq, Oral, Daily  ranolazine, 1,000 mg, Oral, BID  roflumilast, 500 mcg, Oral,  Daily  sodium chloride, 10 mL, Intravenous, Q12H  spironolactone, 25 mg, Oral, TID  tamsulosin, 0.4 mg, Oral, Nightly      Continuous Infusions:amiodarone, 0.5 mg/min, Last Rate: 0.5 mg/min (09/30/20 2212)      PRN Meds:.•  acetaminophen **OR** acetaminophen **OR** acetaminophen  •  benzonatate  •  hydrOXYzine  •  influenza vaccine  •  ipratropium-albuterol  •  magnesium sulfate **OR** magnesium sulfate in D5W 1g/100mL (PREMIX)  •  metOLazone  •  nitroglycerin  •  ondansetron **OR** ondansetron  •  potassium chloride  •  [COMPLETED] Insert peripheral IV **AND** sodium chloride  •  sodium chloride    Imaging:  Imaging Results (Last 72 Hours)     Procedure Component Value Units Date/Time    XR Knee 4+ View Right [[id number]] Collected: 09/30/20 2036     Updated: 09/30/20 2041    Narrative:      DATE OF EXAM:  9/30/2020 7:18 PM     PROCEDURE:  XR KNEE 4+ VW RIGHT-     INDICATIONS:  fall; R55-Syncope and collapse; I47.2-Ventricular tachycardia;  R07.9-Chest pain, unspecified     COMPARISON:  None available.     TECHNIQUE:   Four radiologic views or more of the right knee were obtained.        FINDINGS:  No evidence of acute fracture or dislocation. There is a surgical anchor  in the inferior pole of the patella. Additional surgical anchors seen in  the prepatellar soft tissues. The joint spaces are fairly  well-maintained. No significant joint effusion. Anterior soft tissue  swelling. Mild vascular calcifications.        Impression:         1. No radiographic evidence of acute fracture or dislocation.  2. Anterior soft tissue swelling with a surgical anchor displaced into  the prepatellar soft tissues.     Electronically Signed By-Jose Carlos Ayers On:9/30/2020 8:38 PM  This report was finalized on [id number] by  Jose Carlos Ayers, .    XR Chest 1 View [[id number]] Collected: 09/30/20 1630     Updated: 09/30/20 1636    Narrative:      DATE OF EXAM:  9/30/2020 4:14 PM     PROCEDURE:  XR CHEST 1 VW-     INDICATIONS:  Chest pain    "  COMPARISON:  AP chest x-ray 08/22/2020, 09/26/2019.     TECHNIQUE:   Single radiographic AP view of the chest was obtained.     FINDINGS:  Cardiac silhouette remains enlarged. Pacemaker leads appear stable.  Central pulmonary vessels remain prominent. Stable linear opacities in  the left lower lung are likely due to scarring. The lungs otherwise  appear grossly clear. No pneumothorax or large pleural effusion is seen.          Impression:      Enlarged cardiac silhouette. No definite acute pulmonary abnormality or  significant change.     Electronically Signed By-Ruma Oneill On:9/30/2020 4:34 PM  This report was finalized on [id number] by  Ruma Oneill, .            ABHISHEK Peña  10/01/20  09:04 EDT      EMR Dragon/Transcription:   \"Dictated utilizing Dragon dictation\".                    Electronically signed by ABHISHEK Peña, 10/01/20, 9:04 AM EDT.    Patient seen and examined  Patient is very well-known to me presented with recurrent VT with multiple ICD therapies consistent with VT storm with recurrent episodes of nonsustained VT  Currently on amiodarone very symptomatic with ongoing ventricular arrhythmia  Physical exam does not show any JVP elevation lungs show decreased breath sounds at bases cardiovascular exam shows paced rhythm with intermittent nonsustained VT  Trace peripheral edema noted  Labs are reviewed  Patient to be scheduled for VT ablation  Risks and benefits and outcomes educated  Labs reviewed  Stop intravenous amiodarone  COVID testing ordered  Orders placed and discussed with the patient and the nurse  Patient has chronic atrial fibrillation with underlying biventricular ICD with LV epicardial lead fragmentation with ischemic cardiomyopathy with chronic class III systolic heart failure  Patient is paced dependent with prior AV node ablation  Had at least 2 VT ablation one done in [place] and one in [facility] center  Current VT appears with a different " morphology  Ablation scheduled today    Electronically signed by Jose Lopez MD, 10/01/20, 9:32 AM EDT.

## 2020-10-02 VITALS
SYSTOLIC BLOOD PRESSURE: 90 MMHG | HEIGHT: 69 IN | HEART RATE: 70 BPM | DIASTOLIC BLOOD PRESSURE: 63 MMHG | RESPIRATION RATE: 16 BRPM | TEMPERATURE: 96 F | BODY MASS INDEX: 45.91 KG/M2 | WEIGHT: 309.97 LBS | OXYGEN SATURATION: 96 %

## 2020-10-02 LAB
ANION GAP SERPL CALCULATED.3IONS-SCNC: 11 MMOL/L (ref 5–15)
BASOPHILS # BLD AUTO: 0 10*3/MM3 (ref 0–0.2)
BASOPHILS NFR BLD AUTO: 0.2 % (ref 0–1.5)
BUN SERPL-MCNC: 45 MG/DL (ref 6–20)
BUN SERPL-MCNC: ABNORMAL MG/DL
BUN/CREAT SERPL: ABNORMAL
CALCIUM SPEC-SCNC: 8.2 MG/DL (ref 8.6–10.5)
CHLORIDE SERPL-SCNC: 96 MMOL/L (ref 98–107)
CO2 SERPL-SCNC: 28 MMOL/L (ref 22–29)
CREAT SERPL-MCNC: 1.7 MG/DL (ref 0.76–1.27)
DEPRECATED RDW RBC AUTO: 50.8 FL (ref 37–54)
EOSINOPHIL # BLD AUTO: 0 10*3/MM3 (ref 0–0.4)
EOSINOPHIL NFR BLD AUTO: 0 % (ref 0.3–6.2)
ERYTHROCYTE [DISTWIDTH] IN BLOOD BY AUTOMATED COUNT: 16.3 % (ref 12.3–15.4)
GFR SERPL CREATININE-BSD FRML MDRD: 42 ML/MIN/1.73
GLUCOSE SERPL-MCNC: 151 MG/DL (ref 65–99)
HCT VFR BLD AUTO: 43.6 % (ref 37.5–51)
HGB BLD-MCNC: 13.8 G/DL (ref 13–17.7)
LYMPHOCYTES # BLD AUTO: 0.8 10*3/MM3 (ref 0.7–3.1)
LYMPHOCYTES NFR BLD AUTO: 5.8 % (ref 19.6–45.3)
MAGNESIUM SERPL-MCNC: 2.6 MG/DL (ref 1.6–2.6)
MCH RBC QN AUTO: 27.4 PG (ref 26.6–33)
MCHC RBC AUTO-ENTMCNC: 31.5 G/DL (ref 31.5–35.7)
MCV RBC AUTO: 86.9 FL (ref 79–97)
MONOCYTES # BLD AUTO: 1.9 10*3/MM3 (ref 0.1–0.9)
MONOCYTES NFR BLD AUTO: 13.3 % (ref 5–12)
NEUTROPHILS NFR BLD AUTO: 11.4 10*3/MM3 (ref 1.7–7)
NEUTROPHILS NFR BLD AUTO: 80.7 % (ref 42.7–76)
NRBC BLD AUTO-RTO: 0 /100 WBC (ref 0–0.2)
PLATELET # BLD AUTO: 256 10*3/MM3 (ref 140–450)
PMV BLD AUTO: 9.8 FL (ref 6–12)
POTASSIUM SERPL-SCNC: 5.4 MMOL/L (ref 3.5–5.2)
RBC # BLD AUTO: 5.02 10*6/MM3 (ref 4.14–5.8)
SODIUM SERPL-SCNC: 135 MMOL/L (ref 136–145)
WBC # BLD AUTO: 14.1 10*3/MM3 (ref 3.4–10.8)

## 2020-10-02 PROCEDURE — 93005 ELECTROCARDIOGRAM TRACING: CPT | Performed by: INTERNAL MEDICINE

## 2020-10-02 PROCEDURE — 94799 UNLISTED PULMONARY SVC/PX: CPT

## 2020-10-02 PROCEDURE — 83735 ASSAY OF MAGNESIUM: CPT | Performed by: INTERNAL MEDICINE

## 2020-10-02 PROCEDURE — 93010 ELECTROCARDIOGRAM REPORT: CPT | Performed by: INTERNAL MEDICINE

## 2020-10-02 PROCEDURE — 97162 PT EVAL MOD COMPLEX 30 MIN: CPT

## 2020-10-02 PROCEDURE — 99239 HOSP IP/OBS DSCHRG MGMT >30: CPT | Performed by: INTERNAL MEDICINE

## 2020-10-02 PROCEDURE — 80048 BASIC METABOLIC PNL TOTAL CA: CPT | Performed by: INTERNAL MEDICINE

## 2020-10-02 PROCEDURE — 99232 SBSQ HOSP IP/OBS MODERATE 35: CPT | Performed by: INTERNAL MEDICINE

## 2020-10-02 PROCEDURE — 85025 COMPLETE CBC W/AUTO DIFF WBC: CPT | Performed by: INTERNAL MEDICINE

## 2020-10-02 RX ORDER — SPIRONOLACTONE 25 MG/1
25 TABLET ORAL 2 TIMES DAILY
Start: 2020-10-02 | End: 2020-11-25 | Stop reason: HOSPADM

## 2020-10-02 RX ORDER — BUMETANIDE 1 MG/1
1 TABLET ORAL DAILY
Qty: 20 TABLET | Refills: 0 | Status: SHIPPED | OUTPATIENT
Start: 2020-10-02 | End: 2020-11-11

## 2020-10-02 RX ADMIN — IPRATROPIUM BROMIDE AND ALBUTEROL SULFATE 3 ML: 2.5; .5 SOLUTION RESPIRATORY (INHALATION) at 06:59

## 2020-10-02 RX ADMIN — Medication 3 ML: at 09:22

## 2020-10-02 RX ADMIN — BUMETANIDE 1 MG: 1 TABLET ORAL at 09:19

## 2020-10-02 RX ADMIN — BUDESONIDE 0.5 MG: 0.5 INHALANT RESPIRATORY (INHALATION) at 06:59

## 2020-10-02 RX ADMIN — APIXABAN 5 MG: 5 TABLET, FILM COATED ORAL at 09:19

## 2020-10-02 RX ADMIN — CLOPIDOGREL BISULFATE 75 MG: 75 TABLET ORAL at 09:19

## 2020-10-02 RX ADMIN — MONTELUKAST SODIUM 10 MG: 10 TABLET ORAL at 09:19

## 2020-10-02 RX ADMIN — CETIRIZINE HYDROCHLORIDE 10 MG: 10 TABLET, FILM COATED ORAL at 09:19

## 2020-10-02 RX ADMIN — Medication 10 ML: at 09:18

## 2020-10-02 RX ADMIN — IPRATROPIUM BROMIDE AND ALBUTEROL SULFATE 3 ML: 2.5; .5 SOLUTION RESPIRATORY (INHALATION) at 11:02

## 2020-10-02 RX ADMIN — ACETAMINOPHEN 650 MG: 325 TABLET ORAL at 04:35

## 2020-10-02 RX ADMIN — DULOXETINE HYDROCHLORIDE 60 MG: 30 CAPSULE, DELAYED RELEASE ORAL at 09:19

## 2020-10-02 RX ADMIN — METOPROLOL SUCCINATE 100 MG: 50 TABLET, EXTENDED RELEASE ORAL at 09:19

## 2020-10-02 RX ADMIN — RANOLAZINE 1000 MG: 500 TABLET, FILM COATED, EXTENDED RELEASE ORAL at 09:19

## 2020-10-02 RX ADMIN — GUAIFENESIN 1200 MG: 600 TABLET, EXTENDED RELEASE ORAL at 09:19

## 2020-10-02 RX ADMIN — GABAPENTIN 300 MG: 300 CAPSULE ORAL at 09:19

## 2020-10-02 RX ADMIN — SPIRONOLACTONE 50 MG: 25 TABLET, FILM COATED ORAL at 09:19

## 2020-10-02 RX ADMIN — ATORVASTATIN CALCIUM 80 MG: 40 TABLET, FILM COATED ORAL at 09:19

## 2020-10-02 RX ADMIN — ROFLUMILAST 500 MCG: 500 TABLET ORAL at 09:18

## 2020-10-02 NOTE — PROGRESS NOTES
Continued Stay Note  [facility]     Patient Name: Jose Dixon Jr.  MRN: [id number]  Today's Date: 10/2/2020    Admit Date: 9/30/2020    Discharge Plan     Row Name 10/02/20 1530       Plan    Plan  DC Plan: Current with [facility].    Plan Comments  PT recommending home with assist. DC orders in at this time.    Final Discharge Disposition Code  06 - home with home health care    Final Note  [facility]     Expected Discharge Date and Time     Expected Discharge Date Expected Discharge Time    Oct 2, 2020         Brittany Cruz RN       Office Phone: [phone]  Office Cell: [phone]

## 2020-10-02 NOTE — THERAPY EVALUATION
Patient Name: Jose Dixon Jr.  : 1960    MRN: [id number]                              Today's Date: 10/2/2020       Admit Date: 2020    Visit Dx:     ICD-10-CM ICD-9-CM   1. Syncope, unspecified syncope type  R55 780.2   2. Ventricular tachycardia (CMS/Formerly [facility])  I47.2 427.1   3. Chest pain, unspecified type  R07.9 786.50     Patient Active Problem List   Diagnosis   • Coronary artery disease involving native heart with angina pectoris (CMS/Formerly [facility])   • Cardiomyopathy, dilated (CMS/Formerly [facility])   • Sleep apnea   • Essential hypertension   • Dyslipidemia   • Chronic systolic CHF (congestive heart failure) (CMS/Formerly [facility])   • Anxiety   • Myocardial infarction (CMS/[facility]ly [facility])   • COPD with acute exacerbation (CMS/[facility])   • Depression   • Gastroesophageal reflux disease   • ICD (implantable cardioverter-defibrillator) in place   • Morbid obesity (CMS/[facility])   • Obesity   • S/P cardiac cath   • VT (ventricular tachycardia) (CMS/[facility])   • Ischemic cardiomyopathy   • Coronary artery disease   • Cardiomyopathy (CMS/[facility])   • Chronic stable angina (CMS/[facility])   • Acute on chronic combined systolic and diastolic CHF (congestive heart failure) (CMS/[facility])   • Syncope   • AICD discharge   • BPH without obstruction/lower urinary tract symptoms   • Peripheral neuropathy   • Seasonal allergies   • Right knee pain   • Dysarthria   • Generalized weakness   • COPD exacerbation (CMS/[facility])   • Ventricular tachycardia (CMS/[facility])   • Atrial fibrillation, chronic (CMS/[facility])     Past Medical History:   Diagnosis Date   • Asthma    • Atrial fibrillation (CMS/[facility])    • CAD (coronary artery disease)    • CHF (congestive heart failure) (CMS/[facility])    • COPD (chronic obstructive pulmonary disease) (CMS/[facility])    • Depression    • Elevated cholesterol    • Hyperlipidemia    • Hypertension    • Myocardial infarction (CMS/[facility])    • Pacemaker 2019    [employer]    • Sleep apnea    • V tach (CMS/[facility])      Past Surgical History:   Procedure Laterality Date   • BACK  SURGERY      Sciatica   • CARDIAC ABLATION  11/07/2017   • CARDIAC CATHETERIZATION      6-8 stents, last heart cath 2019   • CARDIAC CATHETERIZATION N/A 6/9/2020    Procedure: Coronary angiography;  Surgeon: Jose Lopez MD;  Location: [facility] CATH INVASIVE LOCATION;  Service: Cardiovascular;  Laterality: N/A;   • CARDIAC ELECTROPHYSIOLOGY PROCEDURE N/A 9/26/2019    Procedure: BIVENTRICULAR DEVICE UPGRADE;  Surgeon: Jose Lopez MD;  Location: [facility] CATH INVASIVE LOCATION;  Service: Cardiovascular   • CARDIAC ELECTROPHYSIOLOGY PROCEDURE N/A 9/26/2019    Procedure: EP/Ablation AV Node ablation;  Surgeon: Jose Lopez MD;  Location: [facility] CATH INVASIVE LOCATION;  Service: Cardiology   • CARDIAC ELECTROPHYSIOLOGY PROCEDURE N/A 6/9/2020    Procedure: EP/Ablation;  Surgeon: Jose Lopez MD;  Location: [facility] CATH INVASIVE LOCATION;  Service: Cardiovascular;  Laterality: N/A;   • CARDIAC PACEMAKER PLACEMENT N/A 6/22/2020    Procedure: LEFT VENTRICULAR LEAD PLACEMENT;  Surgeon: Christiano Richmond MD;  Location: [facility] CVOR;  Service: Cardiothoracic;  Laterality: N/A;   • CHOLECYSTECTOMY     • COLONOSCOPY     • CORONARY ANGIOPLASTY WITH STENT PLACEMENT      2 stents   • HERNIA REPAIR      gallbladder    • PACEMAKER IMPLANTATION  07/08/2016    Pacemaker/ Defib implant - [employer]   • SINUS SURGERY      sinus surgery x 9   • SKIN BIOPSY      benign     General Information     Row Name 10/02/20 1132          Physical Therapy Time and Intention    Document Type  evaluation  -HC (r) RAJ (t) HC (c)     Mode of Treatment  physical therapy  -HC (r) RAJ (t) HC (c)     Row Name 10/02/20 1132          General Information    Patient Profile Reviewed  yes  -HC (r) RAJ (t) HC (c)     Prior Level of Function  independent:;ADL's;transfer;gait amb independently, he does have straight cane and RW availbale to him.  -HC (r) RAJ (t) HC (c)     Row Name 10/02/20 1131          Living Environment    Lives With  spouse Works  full time  -HC (r) RAJ (t) HC (c)     Row Name 10/02/20 1132          Home Main Entrance    Number of Stairs, Main Entrance  one  -HC (r) RAJ (t) HC (c)     Row Name 10/02/20 1132          Cognition    Orientation Status (Cognition)  oriented x 4  -HC (r) RAJ (t) HC (c)     Row Name 10/02/20 1135          Safety Issues, Functional Mobility    Impairments Affecting Function (Mobility)  pain  -HC (r) RAJ (t) HC (c)       User Key  (r) = Recorded By, (t) = Taken By, (c) = Cosigned By    Initials Name Provider Type    HC Michelle Hinton, PT Physical Therapist    Harry Meade, PT Student PT Student        Mobility     Row Name 10/02/20 1135          Bed Mobility    Comment (Bed Mobility)  Sitting in chair when PT arrived  -HC (r) RAJ (t) HC (c)     Row Name 10/02/20 1135          Transfers    Comment (Transfers)  minimal dizziness noted  -HC (r) RAJ (t) HC (c)     Row Name 10/02/20 1135          Sit-Stand Transfer    Assistive Device (Sit-Stand Transfers)  walker, front-wheeled  -HC (r) RAJ (t) HC (c)     Row Name 10/02/20 1135          Gait/Stairs (Locomotion)    Bath Level (Gait)  modified independence;minimum assist (75% patient effort)  -HC (r) RAJ (t) HC (c)     Assistive Device (Gait)  walker, front-wheeled  -HC (r) RAJ (t) HC (c)     Distance in Feet (Gait)  25'x2  -HC (r) RAJ (t) HC (c)       User Key  (r) = Recorded By, (t) = Taken By, (c) = Cosigned By    Initials Name Provider Type    HC Michelle Hinton, PT Physical Therapist    Harry Meade, PT Student PT Student        Obj/Interventions     Row Name 10/02/20 1142          Range of Motion Comprehensive    Comment, General Range of Motion  ROM WFLs, Pt requires time to move limb through full range due to pain on R knee.  -HC (r) RAJ (t) HC (c)     Row Name 10/02/20 1142          Strength Comprehensive (MMT)    Comment, General Manual Muscle Testing (MMT) Assessment  RLE- knee ext 4-/5, DF/PF 4-/5, knee flex 4+/5  LLE- grossly 5/5  -HC (r) RAJ (t) HC (c)      Row Name 10/02/20 1142          Balance    Balance Assessment  standing static balance  -HC (r) RAJ (t) HC (c)     Static Standing Balance  WFL  -HC (r) RAJ (t) HC (c)     Row Name 10/02/20 1142          Sensory Assessment (Somatosensory)    Sensory Assessment (Somatosensory)  sensation intact  -HC (r) RAJ (t) HC (c)       User Key  (r) = Recorded By, (t) = Taken By, (c) = Cosigned By    Initials Name Provider Type    HC Michelle Hinton, PT Physical Therapist    Harry Meade, PT Student PT Student        Goals/Plan    No documentation.       Clinical Impression     Row Name 10/02/20 1145          Pain    Additional Documentation  Pain Scale: FACES Pre/Post-Treatment (Group)  -HC (r) RAJ (t) HC (c)     Row Name 10/02/20 1145          Pain Scale: Numbers Pre/Post-Treatment    Pain Intervention(s)  Repositioned;Ambulation/increased activity  -HC (r) RAJ (t) HC (c)     Row Name 10/02/20 1145          Pain Scale: FACES Pre/Post-Treatment    Pain: FACES Scale, Pretreatment  4-->hurts little more  -HC (r) RAJ (t) HC (c)     Posttreatment Pain Rating  6-->hurts even more  -HC (r) RAJ (t) HC (c)     Pain Location - Side  Right  -HC (r) RAJ (t) HC (c)     Pain Location  knee  -HC (r) RAJ (t) HC (c)     Row Name 10/02/20 1146          Plan of Care Review    Plan of Care Reviewed With  patient  -HC (r) RAJ (t) HC (c)     Row Name 10/02/20 1145          Vital Signs    Pre Systolic BP Rehab  100  -HC (r) RAJ (t) HC (c)     Pre Treatment Diastolic BP  72  -HC (r) RAJ (t) HC (c)     Post Systolic BP Rehab  84  -HC (r) RAJ (t) HC (c)     Post Treatment Diastolic BP  70  -HC (r) RAJ (t) HC (c)     Pretreatment Heart Rate (beats/min)  70  -HC (r) RAJ (t) HC (c)     Posttreatment Heart Rate (beats/min)  70  -HC (r) RAJ (t) HC (c)     Row Name 10/02/20 1148          Positioning and Restraints    Pre-Treatment Position  sitting in chair/recliner  -HC (r) RAJ (t) HC (c)     Post Treatment Position  chair  -HC (r) RAJ (t) HC (c)     In Chair  exit  alarm on;call light within reach  -HC (r) RAJ (t) HC (c)       User Key  (r) = Recorded By, (t) = Taken By, (c) = Cosigned By    Initials Name Provider Type     Michelle Hinton, PT Physical Therapist    Harry Meade, PT Student PT Student        Outcome Measures     Row Name 10/02/20 1202          How much help from another person do you currently need...    Turning from your back to your side while in flat bed without using bedrails?  4  -HC (r) RAJ (t) HC (c)     Moving from lying on back to sitting on the side of a flat bed without bedrails?  4  -HC (r) RAJ (t) HC (c)     Moving to and from a bed to a chair (including a wheelchair)?  3  -HC (r) RAJ (t) HC (c)     Standing up from a chair using your arms (e.g., wheelchair, bedside chair)?  3  -HC (r) RAJ (t) HC (c)     Climbing 3-5 steps with a railing?  3  -HC (r) RAJ (t) HC (c)     To walk in hospital room?  3  -HC (r) RAJ (t) HC (c)     AM-PAC 6 Clicks Score (PT)  20  -HC (r) RAJ (t)     Row Name 10/02/20 1202          Functional Assessment    Outcome Measure Options  AM-PAC 6 Clicks Basic Mobility (PT)  -HC (r) RAJ (t) HC (c)       User Key  (r) = Recorded By, (t) = Taken By, (c) = Cosigned By    Initials Name Provider Type     Michelle Hinton, PT Physical Therapist    Harry Meade, PT Student PT Student        Physical Therapy Education                 Title: PT OT SLP Therapies (Done)     Topic: Physical Therapy (Done)     Point: Mobility training (Done)     Learning Progress Summary           Patient Acceptance, E, VU by RAJ at 10/2/2020 1202                               User Key     Initials Effective Dates Name Provider Type Discipline     07/20/20 -  Harry Chino, PT Student PT Student PT              PT Recommendation and Plan     Plan of Care Reviewed With: patient  Outcome Summary: 58 y/o M admitted after AICD firing x2,  syncopal episode w/ fall, and chest pain. Pt w/ episode of weakness and dysarthria, CT of head- no intracranial abnormalities.  X-ray of R knee- no fx nor dislocation.  Pt had an ablation procedure done 10/1/20. PMH CAD, COPD, and peripheral neuropathy. PLOF, has RW and straight cane available but normally does not utilized. During eval, pt performed STS w/ RW and independence. He performed 25'x2 of ambulation and minimal dizziness was noted. Pt was educated on how to use RW to temporarily take load off RLE. Pt demonstrated a safe gait and he did not exhibit any postural instability during gait assessment. Pt's limiting factor was R knee pain which can temporarily limit his baseline activity. PT recommends use of RW or cane at home. PT is recommending return home routinely w/ fam assist. PPE: face shield, mask, gloves.     Time Calculation:   PT Charges     Row Name 10/02/20 1204             Time Calculation    Start Time  1000  -HC (r) RAJ (t) HC (c)      Stop Time  1032  -HC (r) RAJ (t) HC (c)      Time Calculation (min)  32 min  -HC (r) RAJ (t)      PT Received On  10/02/20  -HC (r) RAJ (t) HC (c)         Time Calculation- PT    Total Timed Code Minutes- PT  0 minute(s)  -HC (r) RAJ (t) HC (c)        User Key  (r) = Recorded By, (t) = Taken By, (c) = Cosigned By    Initials Name Provider Type     Michelle Hinton, PT Physical Therapist    Harry Meade, PT Student PT Student        Therapy Charges for Today     Code Description Service Date Service Provider Modifiers Qty    [id number]  PT EVAL MOD COMPLEXITY 4 10/2/2020 Harry Chino, PT Student GP 1          PT G-Codes  Outcome Measure Options: AM-PAC 6 Clicks Basic Mobility (PT)  AM-PAC 6 Clicks Score (PT): 20    Harry Chino PT Student  10/2/2020

## 2020-10-02 NOTE — PLAN OF CARE
Problem: Adult Inpatient Plan of Care  Goal: Plan of Care Review  Outcome: Ongoing, Progressing  Flowsheets (Taken 10/2/2020 0442)  Progress: improving  Plan of Care Reviewed With: patient  Outcome Summary: pt had an ablation and is now paced at 70. states he is feeling a lot better. on 2L of oxygen. will continue to monitor  Goal: Patient-Specific Goal (Individualized)  Outcome: Ongoing, Progressing  Goal: Absence of Hospital-Acquired Illness or Injury  Outcome: Ongoing, Progressing  Intervention: Identify and Manage Fall Risk  Recent Flowsheet Documentation  Taken 10/2/2020 0430 by Jessie Leung RN  Safety Promotion/Fall Prevention: safety round/check completed  Taken 10/2/2020 0400 by Jessie Leung RN  Safety Promotion/Fall Prevention: safety round/check completed  Taken 10/2/2020 0215 by Jessie Leung RN  Safety Promotion/Fall Prevention: safety round/check completed  Taken 10/2/2020 0000 by Jessie Leung RN  Safety Promotion/Fall Prevention: safety round/check completed  Taken 10/1/2020 2210 by Jessie Leung RN  Safety Promotion/Fall Prevention: safety round/check completed  Taken 10/1/2020 2000 by Jessie Leung RN  Safety Promotion/Fall Prevention: safety round/check completed  Goal: Optimal Comfort and Wellbeing  Outcome: Ongoing, Progressing  Goal: Readiness for Transition of Care  Outcome: Ongoing, Progressing     Problem: Fall Injury Risk  Goal: Absence of Fall and Fall-Related Injury  Outcome: Ongoing, Progressing  Intervention: Promote Injury-Free Environment  Recent Flowsheet Documentation  Taken 10/2/2020 0430 by Jessie Leung RN  Safety Promotion/Fall Prevention: safety round/check completed  Taken 10/2/2020 0400 by Jessie Leung RN  Safety Promotion/Fall Prevention: safety round/check completed  Taken 10/2/2020 0215 by Jessie Leung RN  Safety Promotion/Fall Prevention: safety round/check completed  Taken 10/2/2020 0000 by Madhu  Jessie J, RN  Safety Promotion/Fall Prevention: safety round/check completed  Taken 10/1/2020 2210 by Jessie Leung RN  Safety Promotion/Fall Prevention: safety round/check completed  Taken 10/1/2020 2000 by Jessie Leung RN  Safety Promotion/Fall Prevention: safety round/check completed     Problem: Adjustment to Illness (Cardiac Rhythm Management Device)  Goal: Optimal Adjustment to Device Presence  Outcome: Ongoing, Progressing     Problem: Bleeding (Cardiac Rhythm Management Device)  Goal: Absence of Bleeding  Outcome: Ongoing, Progressing     Problem: Device-Related Complication Risk (Cardiac Rhythm Management Device)  Goal: Effective Device Function  Outcome: Ongoing, Progressing     Problem: Infection (Cardiac Rhythm Management Device)  Goal: Absence of Infection Signs and Symptoms  Outcome: Ongoing, Progressing     Problem: Pain (Cardiac Rhythm Management Device)  Goal: Acceptable Pain Level  Outcome: Ongoing, Progressing     Problem: Respiratory Compromise (Cardiac Rhythm Management Device)  Goal: Effective Respiratory Effort and Oxygenation  Outcome: Ongoing, Progressing     Problem: Asthma Comorbidity  Goal: Maintenance of Asthma Control  Outcome: Ongoing, Progressing     Problem: Pain Chronic (Persistent) (Comorbidity Management)  Goal: Acceptable Pain Control and Functional Ability  Outcome: Ongoing, Progressing   Goal Outcome Evaluation:  Plan of Care Reviewed With: patient  Progress: improving  Outcome Summary: pt had an ablation and is now paced at 70. states he is feeling a lot better. on 2L of oxygen. will continue to monitor
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
## 2020-10-02 NOTE — DISCHARGE SUMMARY
[facility] Medicine Services  DISCHARGE SUMMARY        Prepared For PCP:  Jaylen Moss MD    Patient Name: Jose Dixon Jr.  : 1960  MRN: [id number]      Date of Admission:   2020    Date of Discharge:  10/2/2020    Length of stay:  LOS: 2 days     Hospital Course     Presenting Problem:   Ventricular tachycardia (CMS/HCC) [I47.2]  Syncope, unspecified syncope type [R55]  Chest pain, unspecified type [R07.9]      Active Hospital Problems    Diagnosis  POA   • **Syncope [R55]  Yes   • Atrial fibrillation, chronic (CMS/HCC) [I48.20]  Unknown   • AICD discharge [Z45.02]  Not Applicable   • BPH without obstruction/lower urinary tract symptoms [N40.0]  Yes   • Peripheral neuropathy [G62.9]  Yes   • Seasonal allergies [J30.2]  Yes   • Right knee pain [M25.561]  Yes   • Dysarthria [R47.1]  Yes   • Generalized weakness [R53.1]  Yes   • COPD exacerbation (CMS/HCC) [J44.1]  Yes   • Ventricular tachycardia (CMS/HCC) [I47.2]  Unknown   • Ischemic cardiomyopathy [I25.5]  Yes   • Coronary artery disease involving native heart with angina pectoris (CMS/HCC) [I25.119]  Yes   • Cardiomyopathy, dilated (CMS/HCC) [I42.0]  Yes   • Sleep apnea [G47.30]  Yes   • Essential hypertension [I10]  Yes   • Dyslipidemia [E78.5]  Yes   • Depression [F32.9]  Yes   • Morbid obesity (CMS/HCC) [E66.01]  Yes   • Anxiety [F41.9]  Yes   • Chronic systolic CHF (congestive heart failure) (CMS/HCC) [I50.22]  Yes   • ICD (implantable cardioverter-defibrillator) in place [Z95.810]  Yes   • COPD with acute exacerbation (CMS/HCC) [J44.1]  Unknown      Resolved Hospital Problems    Diagnosis Date Resolved POA   • Paroxysmal atrial fibrillation (CMS/HCC) [I48.0] 10/01/2020 Yes           Hospital Course:    59-year-old man with history of chronic ischemic cardiomyopathy status post AICD, paroxysmal atrial fibrillation and hyperlipidemia.  Presented to the [facility] ED after a syncopal episode.  Apparently,  patient was at his nephrologist clinic when his defibrillator went off and he passed out  He also related having shortness of breath and chest pain.     EKG done in the ED showed A. fib/flutter with controlled rates.  Patient was started on amiodarone drip.  EP was consulted and patient admitted for further care.     Conditions addressed while inpatient are as stated below    Syncope  Secondary to arrhythmia  Patient had recurrent V. tach with multiple ICD therapy  --VT storm with recurrent episode of non-sustained VT  Initially started on amiodarone drip  Was seen by the EP physician   He has had prior basal VT ablation  Patient had EP study on 10/1/2020 which showed VT coming from mid posterior wall from the posterior scar related to his occluded circumflex system-status post complex ablation with complete ablation of his arrhythmia without a single PCV since ablation.  No post procedure complication.  EP physician recommended discharging home to follow-up at the office in 2 weeks    Chest pain  Had nonspecific elevation of troponin which is thought to be secondary to ICD therapy  Patient was seen by cardiologist who recommended to continue medical management    Acute kidney injury  Serum creatinine on presentation was 1.15  Trended up to 1.7  Patient is on Bumex 1 mg twice daily and metolazone 2.5 mg daily  Bumex was decreased to 1 mg daily  Initial plan was to monitor patient overnight and check renal function however patient declined and wanted to be discharged today to follow-up with his nephrologist.  BMP will be checked next week     Leukocytosis  Most likely steroid-induced  No evidence of infection  Steroid has been discontinued  WBC trending down     Right knee pain following a fall  X-ray shows no evidence of acute fracture/dislocation     COPD  Patient does not appear to be in exacerbation  Discontinue IV steroid  Continue on Pulmicort and DuoNeb  Patient is also on Daliresp         Chronic systolic  heart failure  Compensated  Continue medical management        Depression with anxiety   Cymbalta and hydroxyzine      Peripheral neuropathy  On  gabapentin     BPH-on Flomax        Morbid obesity  BMI 43.0  Encourage lifestyle modifications     Sleep apnea  Compliant on trilogy at night      Recommendation for Outpatient Providers:       Serum creatinine trended up  Bumex was decreased to 1 mg daily and metolazone discontinued  Patient declined further monitoring or inpatient nephrology consult  He plans to follow-up with his nephrologist next week    Reasons For Change In Medications and Indications for New Medications:        Day of Discharge     HPI:   On the morning of discharge, patient was seen and examined  Has no new complaints    Vital Signs:   Temp:  [96 °F (35.6 °C)-97.6 °F (36.4 °C)] 96 °F (35.6 °C)  Heart Rate:  [69-77] 70  Resp:  [10-20] 16  BP: ()/(61-78) 90/63  Arterial Line BP: (113-123)/(73-78) 113/73     Physical Exam:  Physical Exam  Vitals signs reviewed.   Constitutional:       General: He is not in acute distress.  HENT:      Head: Normocephalic and atraumatic.      Nose: Nose normal.   Eyes:      Extraocular Movements: Extraocular movements intact.      Conjunctiva/sclera: Conjunctivae normal.      Pupils: Pupils are equal, round, and reactive to light.   Neck:      Musculoskeletal: Neck supple.   Cardiovascular:      Rate and Rhythm: Rhythm irregular.   Pulmonary:      Effort: No respiratory distress.      Breath sounds: Normal breath sounds.   Abdominal:      General: Bowel sounds are normal.      Palpations: Abdomen is soft.      Tenderness: There is no abdominal tenderness.   Musculoskeletal: Normal range of motion.   Skin:     General: Skin is warm.   Neurological:      Mental Status: He is alert and oriented to person, place, and time.   Psychiatric:         Mood and Affect: Mood normal.         Pertinent  and/or Most Recent Results     Results from last 7 days   Lab Units  10/02/20  0432 10/01/20  0248 09/30/20  2329 09/30/20  1537   WBC 10*3/mm3 14.10* 18.90*  --  13.60*   HEMOGLOBIN g/dL 13.8 15.0  --  15.0   HEMATOCRIT % 43.6 48.9  --  47.1   PLATELETS 10*3/mm3 256 281  --  313   SODIUM mmol/L 135* 137  --  141   POTASSIUM mmol/L 5.4* 5.4* 4.8 4.6   CHLORIDE mmol/L 96* 99  --  99   CO2 mmol/L 28.0 25.0  --  29.0   BUN  45* 23*  --  17   CREATININE mg/dL 1.70* 1.42*  --  1.15   GLUCOSE mg/dL 151* 138*  --  115*   CALCIUM mg/dL 8.2* 8.9  --  9.4     Results from last 7 days   Lab Units 10/01/20  0248 09/30/20  1537   BILIRUBIN mg/dL 1.2 1.0   ALK PHOS U/L 88 87   ALT (SGPT) U/L 26 24   AST (SGOT) U/L 41* 38   PROTIME Seconds  --  11.4   INR   --  1.04   APTT seconds  --  24.6           Invalid input(s): TG, LDLCALC, LDLREALC  Results from last 7 days   Lab Units 10/01/20  0248 09/30/20  2329 09/30/20  1916 09/30/20  1537   PROBNP pg/mL  --   --  4,084.0*  --    TROPONIN T ng/mL 0.028 0.022 0.016 <0.010       Brief Urine Lab Results  (Last result in the past 365 days)      Color   Clarity   Blood   Leuk Est   Nitrite   Protein   CREAT   Urine HCG        09/30/20 2052 Dark Yellow  Comment:  Result checked  Clear Negative Negative Negative 100 mg/dL (2+)               Microbiology Results Abnormal     Procedure Component Value - Date/Time    COVID PRE-OP / PRE-PROCEDURE SCREENING ORDER (NO ISOLATION) - Swab, Nasopharynx [[id number]]  (Normal) Collected: 10/01/20 0943    Lab Status: Final result Specimen: Swab from Nasopharynx Updated: 10/01/20 1053    Narrative:      The following orders were created for panel order COVID PRE-OP / PRE-PROCEDURE SCREENING ORDER (NO ISOLATION) - Swab, Nasopharynx.  Procedure                               Abnormality         Status                     ---------                               -----------         ------                     Respiratory Panel PCR w/...[[id number]]  Normal              Final result                 Please view results for these  tests on the individual orders.    Respiratory Panel PCR w/COVID-19(SARS-CoV-2) MOE/JOREG LUIS/ISH/[facility] In-House, NP Swab in UTM/VTM, 3-4 HR TAT - Swab, Nasopharynx [[id number]]  (Normal) Collected: 10/01/20 0943    Lab Status: Final result Specimen: Swab from Nasopharynx Updated: 10/01/20 1053     ADENOVIRUS, PCR Not Detected     Coronavirus 229E Not Detected     Coronavirus HKU1 Not Detected     Coronavirus NL63 Not Detected     Coronavirus OC43 Not Detected     COVID19 Not Detected     Human Metapneumovirus Not Detected     Human Rhinovirus/Enterovirus Not Detected     Influenza A PCR Not Detected     Influenza A H1 Not Detected     Influenza A H1 2009 PCR Not Detected     Influenza A H3 Not Detected     Influenza B PCR Not Detected     Parainfluenza Virus 1 Not Detected     Parainfluenza Virus 2 Not Detected     Parainfluenza Virus 3 Not Detected     Parainfluenza Virus 4 Not Detected     RSV, PCR Not Detected     Bordetella pertussis pcr Not Detected     Bordetella parapertussis PCR Not Detected     Chlamydophila pneumoniae PCR Not Detected     Mycoplasma pneumo by PCR Not Detected    Narrative:      Fact sheet for providers: https://docs.[employer]/wp-content/uploads/JAI6750-5694-YH4.1-EUA-Provider-Fact-Sheet-3.pdf    Fact sheet for patients: https://docs.[employer]/wp-content/uploads/PVX6107-4887-QN2.1-EUA-Patient-Fact-Sheet-1.pdf    Respiratory Panel, PCR - Swab, Nasopharynx [[id number]]  (Normal) Collected: 09/30/20 2308    Lab Status: Final result Specimen: Swab from Nasopharynx Updated: 10/01/20 0124     ADENOVIRUS, PCR Not Detected     Coronavirus 229E Not Detected     Coronavirus HKU1 Not Detected     Coronavirus NL63 Not Detected     Coronavirus OC43 Not Detected     Human Metapneumovirus Not Detected     Human Rhinovirus/Enterovirus Not Detected     Influenza B PCR Not Detected     Parainfluenza Virus 1 Not Detected     Parainfluenza Virus 2 Not Detected     Parainfluenza Virus 3 Not Detected      Parainfluenza Virus 4 Not Detected     Bordetella pertussis pcr Not Detected     Influenza A H1 2009 PCR Not Detected     Chlamydophila pneumoniae PCR Not Detected     Mycoplasma pneumo by PCR Not Detected     Influenza A PCR Not Detected     Influenza A H3 Not Detected     Influenza A H1 Not Detected     RSV, PCR Not Detected     Bordetella parapertussis PCR Not Detected    Narrative:      The coronavirus on the RVP is NOT COVID-19 and is NOT indicative of infection with COVID-19.           Xr Knee 4+ View Right    Result Date: 9/30/2020  Impression:  1. No radiographic evidence of acute fracture or dislocation. 2. Anterior soft tissue swelling with a surgical anchor displaced into the prepatellar soft tissues.  Electronically Signed By-Jose Carlos Ayers On:9/30/2020 8:38 PM This report was finalized on [id number] by  Jose Carlos Ayers, .    Ct Head Without Contrast    Result Date: 10/1/2020  Impression: No acute intracranial findings.  Electronically Signed By-Dr. Sheba Thorne MD On:10/1/2020 10:34 AM This report was finalized on [id number] by Dr. Sheba Thorne MD.    Xr Chest 1 View    Result Date: 9/30/2020  Impression: Enlarged cardiac silhouette. No definite acute pulmonary abnormality or significant change.  Electronically Signed By-Ruma Oneill On:9/30/2020 4:34 PM This report was finalized on [id number] by  Ruma Oneill, .                Results for orders placed during the hospital encounter of 06/21/20   Adult Transthoracic Echo Limited W/ Cont if Necessary Per Protocol    Narrative · Left ventricular wall thickness is consistent with moderate concentric   hypertrophy.  · Estimated EF = 30%.  · The pericardium is normal. There is no evidence of pericardial effusion.                  Test Results Pending at Discharge        Procedures Performed  Procedure(s):  EP/Ablation         Consults:   Consults     Date and Time Order Name Status Description    10/2/2020 7946 Inpatient Nephrology Consult       9/30/2020 1935 Inpatient Cardiology Consult Completed     9/30/2020 1647 Hospitalist (on-call MD unless specified) Completed     9/30/2020 1547 Cardiology (on-call MD unless specified) Completed             Discharge Details        Discharge Medications      Changes to Medications      Instructions Start Date   bumetanide 1 MG tablet  Commonly known as: BUMEX  What changed: when to take this   1 mg, Oral, Daily, Hold for bp less then 100/60      spironolactone 25 MG tablet  Commonly known as: ALDACTONE  What changed: when to take this   25 mg, Oral, 2 Times Daily         Continue These Medications      Instructions Start Date   albuterol sulfate  (90 Base) MCG/ACT inhaler  Commonly known as: PROVENTIL HFA;VENTOLIN HFA;PROAIR HFA   2 puffs, Inhalation, Every 4 Hours PRN      apixaban 5 MG tablet tablet  Commonly known as: Eliquis   5 mg, Oral, 2 Times Daily      atorvastatin 80 MG tablet  Commonly known as: LIPITOR   80 mg, Oral, Daily      budesonide-formoterol 160-4.5 MCG/ACT inhaler  Commonly known as: Symbicort   2 puffs, Inhalation, 2 Times Daily - RT      cetirizine 10 MG tablet  Commonly known as: zyrTEC   10 mg, Oral, Daily      clopidogrel 75 MG tablet  Commonly known as: PLAVIX   75 mg, Oral, Daily      DULoxetine 60 MG capsule  Commonly known as: CYMBALTA   60 mg, Oral, Daily      fluticasone 50 MCG/ACT nasal spray  Commonly known as: FLONASE   2 sprays, Nasal, Daily      gabapentin 300 MG capsule  Commonly known as: NEURONTIN   300 mg, Oral, 2 Times Daily      hydrOXYzine 50 MG tablet  Commonly known as: ATARAX   50 mg, Oral, Nightly PRN      metoprolol succinate  MG 24 hr tablet  Commonly known as: TOPROL-XL   100 mg, Oral, Every 24 Hours Scheduled      montelukast 10 MG tablet  Commonly known as: SINGULAIR   10 mg, Oral, Daily      nitroglycerin 0.4 MG SL tablet  Commonly known as: NITROSTAT   0.4 mg, Sublingual, Every 5 Minutes PRN, Take no more than 3 doses in 15 minutes.       ranolazine  1000 MG 12 hr tablet  Commonly known as: RANEXA   1,000 mg, Oral, 2 Times Daily      roflumilast 500 MCG tablet tablet  Commonly known as: DALIRESP   500 mcg, Oral, Daily      tamsulosin 0.4 MG capsule 24 hr capsule  Commonly known as: FLOMAX   1 capsule, Oral, Nightly         Stop These Medications    azelastine 0.1 % nasal spray  Commonly known as: ASTELIN     metOLazone 2.5 MG tablet  Commonly known as: ZAROXOLYN     potassium chloride 10 MEQ CR tablet     sodium chloride 0.65 % nasal spray            Allergies   Allergen Reactions   • Codeine Anaphylaxis          • Tramadol Anaphylaxis and Hives         Discharge Disposition:  Home or Self Care    Diet:  Hospital:  Diet Order   Procedures   • Diet Cardiac; Healthy Heart         Discharge Activity:   Activity Instructions     Gradually Increase Activity Until at Pre-Hospitalization Level              CODE STATUS:    Code Status and Medical Interventions:   Ordered at: 09/30/20 1747     Level Of Support Discussed With:    Patient     Code Status:    CPR     Medical Interventions (Level of Support Prior to Arrest):    Full         Follow-up Appointments  Future Appointments   Date Time Provider Department Center   10/5/2020 11:00 AM Jose Lopez MD [facility] None       Additional Instructions for the Follow-ups that You Need to Schedule     Ambulatory Referral to Home Health   As directed      Okay to resume current services with [facility]    Order Comments: Okay to resume current services with [facility]     Face to Face Visit Date: 10/1/2020    Follow-up provider for Plan of Care?: I treated the patient in an acute care facility and will not continue treatment after discharge.    Follow-up provider: KELSY MCNEAL [[id number]]    Reason/Clinical Findings: Post-hospital evaluation    Describe mobility limitations that make leaving home difficult: Tires easily    Nursing/Therapeutic Services Requested: Other ( to evaluate)    Frequency: 1 Week 1         Discharge  Follow-up with PCP   As directed       Currently Documented PCP:    Jaylen Moss MD    PCP Phone Number:    [phone]     Follow Up Details: Follow up with your PCP as needed         Discharge Follow-up with Specified Provider: Follow-up with the EP physician in 2 weeks; 2 Weeks   As directed      To: Follow-up with the EP physician in 2 weeks    Follow Up: 2 Weeks         Discharge Follow-up with Specified Provider: Follow-up with your nephrologist next week   As directed      To: Follow-up with your nephrologist next week         Basic Metabolic Panel    Oct 07, 2020 (Approximate)              Condition on Discharge:      Stable      This patient has been made with appropriate PPE . 10/02/20      Electronically signed by Brandon Enrique MD, 10/02/20, 1:58 PM EDT.      Time: I spent  33 minutes on this discharge activity which included face-to-face encounter with the patient/reviewing the data in the system/coordination of the care with the nursing staff as well as consultants/documentation/entering orders.

## 2020-10-02 NOTE — PROGRESS NOTES
CC--VT storm, ischemic cardiomyopathy    Sub--post ischemic VT ablation with complete ablation of VT without any further recurrence patient doing well and has chronic fatigue which is unchanged        Past Medical History:   Diagnosis Date   • Asthma    • Atrial fibrillation (CMS/[facility])    • CAD (coronary artery disease)    • CHF (congestive heart failure) (CMS/[facility])    • COPD (chronic obstructive pulmonary disease) (CMS/[facility])    • Depression    • Elevated cholesterol    • Hyperlipidemia    • Hypertension    • Myocardial infarction (CMS/[facility])    • Pacemaker 2019    [employer]    • Sleep apnea    • V tach (CMS/[facility])      Past Surgical History:   Procedure Laterality Date   • BACK SURGERY      Sciatica   • CARDIAC ABLATION  11/07/2017   • CARDIAC CATHETERIZATION      6-8 stents, last heart cath 2019   • CARDIAC CATHETERIZATION N/A 6/9/2020    Procedure: Coronary angiography;  Surgeon: Jose Lopez MD;  Location: [facility] CATH INVASIVE LOCATION;  Service: Cardiovascular;  Laterality: N/A;   • CARDIAC ELECTROPHYSIOLOGY PROCEDURE N/A 9/26/2019    Procedure: BIVENTRICULAR DEVICE UPGRADE;  Surgeon: Jose Lopez MD;  Location: [facility] CATH INVASIVE LOCATION;  Service: Cardiovascular   • CARDIAC ELECTROPHYSIOLOGY PROCEDURE N/A 9/26/2019    Procedure: EP/Ablation AV Node ablation;  Surgeon: Jose Lopez MD;  Location: [facility] CATH INVASIVE LOCATION;  Service: Cardiology   • CARDIAC ELECTROPHYSIOLOGY PROCEDURE N/A 6/9/2020    Procedure: EP/Ablation;  Surgeon: Jose Lopez MD;  Location: [facility] CATH INVASIVE LOCATION;  Service: Cardiovascular;  Laterality: N/A;   • CARDIAC PACEMAKER PLACEMENT N/A 6/22/2020    Procedure: LEFT VENTRICULAR LEAD PLACEMENT;  Surgeon: Christiano Richmond MD;  Location: [facility] CVOR;  Service: Cardiothoracic;  Laterality: N/A;   • CHOLECYSTECTOMY     • COLONOSCOPY     • CORONARY ANGIOPLASTY WITH STENT PLACEMENT      2 stents   • HERNIA REPAIR      gallbladder    • PACEMAKER  IMPLANTATION  07/08/2016    Pacemaker/ Defib implant - [employer]   • SINUS SURGERY      sinus surgery x 9   • SKIN BIOPSY      benign     Family History   Problem Relation Age of Onset   • Diabetes Mother    • Heart disease Mother         MI age 46 - CHF   • Atrial fibrillation Mother    • COPD Mother    • Atrial fibrillation Father    • Heart disease Father         CHF     Review of Systems   General:  positive for fatigue and tiredness  Eyes: No redness  Cardiovascular: No chest pain, no palpitations  Respiratory:   positive for class 3 shortness of breath  Gastrointestinal: No nausea or vomiting, bleeding  Genitourinary: no hematuria or dysuria  Musculoskeletal: No arthralgia or myalgia  Skin: No rash  Neurologic: No numbness, tingling, syncope  Hematologic/Lymphatic: No abnormal bleeding      Physical Exam  VITALS REVIEWED--blood pressure 108/50 pulse rate 70 patient is afebrile respiration 12 times minute and right groin soft without hematoma    General:      well developed, well nourished, in no acute distress.    Head:      normocephalic and atraumatic.    Eyes:      PERRL/EOM intact, conjunctiva and sclera clear with out nystagmus.    Neck:      no masses, thyromegaly,  trachea central with normal respiratory effort and PMI displaced laterally  Lungs:      clear bilaterally to auscultation.    Heart:       Paced biventricular rhythm  without any murmurs gallops or rubs  Msk:      no deformity or scoliosis noted of thoracic or lumbar spine.    Pulses:      pulses normal in all 4 extremities.    Extremities:       no cyanosis or clubbing--trace left pedal edema and trace right pedal edema.    Neurologic:      no focal deficits.   alert oriented x3  Skin:      intact without lesions or rashes.    Psych:      alert and cooperative; normal mood and affect; normal attention span and concentration.          CBC    Results from last 7 days   Lab Units 10/02/20  0432 10/01/20  0248 09/30/20  1537   WBC 10*3/mm3 14.10*  18.90* 13.60*   HEMOGLOBIN g/dL 13.8 15.0 15.0   PLATELETS 10*3/mm3 256 281 313     BMP   Results from last 7 days   Lab Units 10/02/20  0432 10/01/20  0248 09/30/20  2329 09/30/20  1537   SODIUM mmol/L 135* 137  --  141   POTASSIUM mmol/L 5.4* 5.4* 4.8 4.6   CHLORIDE mmol/L 96* 99  --  99   CO2 mmol/L 28.0 25.0  --  29.0   BUN  45* 23*  --  17   CREATININE mg/dL 1.70* 1.42*  --  1.15   GLUCOSE mg/dL 151* 138*  --  115*   MAGNESIUM mg/dL 2.6  --  2.2 2.2         Assessment and plan    Patient presented with VT storm unresponsive to medical treatment with multiple ICD therapies--patient had a prior basal VT ablation without recurrence at this time presented with VT coming from mid posterior wall from the posterior scar related to his occluded circumflex system--post complex ablation with complete ablation of his arrhythmia without a single PVC since ablation  No postprocedure complications  Biventricular ICD in situ with normal function with a left ventricular epicardial lead on this patient  Chronic angina with chronic coronary artery disease with occluded circumflex system unresponsive to interventional treatment left on medications  Obesity with sleep apnea  Essential hypertension  Chronic class III systolic heart failure  Patient can be discharged home and followed as an outpatient in 2 weeks and reviewed with the nurse and the patient    Electronically signed by Jose Lopez MD, 10/02/20, 12:52 PM EDT.

## 2020-10-02 NOTE — PLAN OF CARE
Problem: Adult Inpatient Plan of Care  Goal: Plan of Care Review  Recent Flowsheet Documentation  Taken 10/2/2020 1148 by Harry Chino, PT Student  Outcome Summary: 58 y/o M admitted after AICD firing x2,  syncopal episode w/ fall, and chest pain. Pt w/ episode of weakness and dysarthria, CT of head- no intracranial abnormalities. X-ray of R knee- no fx nor dislocation.  Pt had an ablation procedure done 10/1/20. PMH CAD, COPD, and peripheral neuropathy. PLOF, has RW and straight cane available but normally does not utilized. During eval, pt performed STS w/ RW and independence. He performed 25'x2 of ambulation and minimal dizziness was noted. Pt was educated on how to use RW to temporarily take load off RLE. Pt demonstrated a safe gait and he did not exhibit any postural instability during gait assessment. Pt's limiting factor was R knee pain which can temporarily limit his baseline activity. PT recommends use of RW or cane at home. PT is recommending return home routinely w/ fam assist. PPE: face shield, mask, gloves.  Taken 10/2/2020 1145 by Harry Chino, PT Student  Plan of Care Reviewed With: patient

## 2020-10-02 NOTE — PROGRESS NOTES
[facility]  [address] [facility] [place] [address]    Pulmonary Disease Educator  Discharge Planning    Patient Name: Jose Dixon Jr.  : 1960  Room #: 2129/1  Pulmonologist: None  Admit Diagnosis: Ventricular tachycardia (CMS/HCC) [I47.2]  Syncope, unspecified syncope type [R55]  Chest pain, unspecified type [R07.9]  Current Admission Date: 2020   Previous Admit: Chest Pain, Acute on Chronic Respiratory Failure  Previous Admission Date: 2020  Body mass index is 45.77 kg/m².      Jose Dixon Jr. is a life long non-smoker but has had exposure to 2nd hand smoke.    PFT’s in the last year?  No-had PFT done at [place] on 19. There was no restriction, normal diffusion, and consistent with air trapping reported.      Current O2: 3L   Home O2: 2L HS  Home BiPap/CPAP: Yes-Trilogy     ABG: No results found for: SITE, ALLENTEST, PHART, DCW6LBJ, PO2ART, OYI7GOB, BASEEXCESS, S4HAGHCH, HGBBG, HCTABG, OXYHEMOGLOBI, METHHGBN, CARBOXYHGB, CO2CT, BAROMETRIC, MODALITY, FIO2      Current Home Medications:  Prior to Admission medications    Medication Sig Start Date End Date Taking? Authorizing Provider   apixaban (ELIQUIS) 5 MG tablet tablet Take 1 tablet by mouth 2 (Two) Times a Day. 10/28/19  Yes Juli Boudreaux APRN   atorvastatin (LIPITOR) 80 MG tablet Take 1 tablet by mouth Daily. 20  Yes Jose Lopez MD   cetirizine (zyrTEC) 10 MG tablet Take 1 tablet by mouth Daily. 20  Yes Jose Lopez MD   clopidogrel (PLAVIX) 75 MG tablet Take 1 tablet by mouth Daily. 20  Yes Jose Lopez MD   DULoxetine (CYMBALTA) 60 MG capsule Take 60 mg by mouth Daily.   Yes ProviderKian MD   gabapentin (NEURONTIN) 300 MG capsule Take 300 mg by mouth 2 (Two) Times a Day.   Yes ProviderKian MD   metoprolol succinate XL (TOPROL-XL) 100 MG 24 hr tablet Take 1 tablet by mouth Daily. 20  Yes Jose Lopez MD   montelukast (SINGULAIR) 10 MG tablet Take 10 mg by  mouth Daily.   Yes Kian Kraus MD   potassium chloride (K-DUR) 10 MEQ CR tablet Take 1 tablet by mouth Daily. 7/31/20  Yes Jose Lopez MD   ranolazine (RANEXA) 1000 MG 12 hr tablet Take 1 tablet by mouth 2 (Two) Times a Day. 7/31/20  Yes Jose Lopez MD   roflumilast (DALIRESP) 500 MCG tablet tablet Take 500 mcg by mouth Daily.   Yes Kian Kraus MD   tamsulosin (FLOMAX) 0.4 MG capsule 24 hr capsule Take 1 capsule by mouth Every Night.   Yes Kian Kraus MD   bumetanide (BUMEX) 1 MG tablet Take 1 tablet by mouth 2 (Two) Times a Day. Hold for bp less then 100/60 7/21/20 10/2/20 Yes Dunia Wlikerson MD   spironolactone (ALDACTONE) 25 MG tablet Take 25 mg by mouth 3 (Three) Times a Day.  10/2/20 Yes Kian Kraus MD   albuterol sulfate  (90 Base) MCG/ACT inhaler Inhale 2 puffs Every 4 (Four) Hours As Needed for Wheezing. 10/28/19   Juli Boudreaux APRN   azelastine (ASTELIN) 0.1 % nasal spray 2 sprays into the nostril(s) as directed by provider 2 (Two) Times a Day. Use in each nostril as directed    Kian Kraus MD   budesonide-formoterol (SYMBICORT) 160-4.5 MCG/ACT inhaler Inhale 2 puffs 2 (Two) Times a Day. 8/9/19   Brandon Enrique MD   bumetanide (BUMEX) 1 MG tablet Take 1 tablet by mouth Daily. Hold for bp less then 100/60 10/2/20   Brandon Enrique MD   fluticasone (FLONASE) 50 MCG/ACT nasal spray 2 sprays into the nostril(s) as directed by provider Daily. 10/28/19   Juli Boudreaux APRN   hydrOXYzine (ATARAX) 50 MG tablet Take 1 tablet by mouth At Night As Needed for Anxiety. 8/26/20   Juanis Reyes MD   metOLazone (ZAROXOLYN) 2.5 MG tablet Take 1 tablet by mouth Daily As Needed (weight gain of 2 pounds.). 8/26/20   Juanis Reyes MD   nitroglycerin (NITROSTAT) 0.4 MG SL tablet Place 0.4 mg under the tongue Every 5 (Five) Minutes As Needed for Chest Pain. Take no more than 3 doses in 15 minutes.    Provider, MD Kian   sodium  chloride 0.65 % nasal spray 1 spray into the nostril(s) as directed by provider As Needed for Congestion.    Provider, MD Kian   spironolactone (ALDACTONE) 25 MG tablet Take 1 tablet by mouth 2 (Two) Times a Day. 10/2/20   Brandon Enrique MD       Recommendations/Testing:  Jose Dixon Jr. was seen by the pulmonary disease educator for evaluation of care gaps according to the COPD GOLD Guidelines.  After evaluation the patient's cardiopulmonary status, it is the recommendation of the care coordinator that the patient receive the following testing, equipment, or consults.    No new recommendations    Reconciled RT Home Medications:  After evaluation the patient's cardiopulmonary status, it is the recommendation of the care coordinator that the patient receive the following medication changes or additions.    Albuterol MDI   Symbicort 16/4.5  Daliresp 500mcg  Singulair    Prior COPD Education?   No  Prior Pulmonary Rehab?   No  History of COPD admission in the past year?    Yes  Total: 1    Other Notes: Pt refused pulm rehab/COPD education. He stated he is going home and does not feel that he needs it right now. Pt is also a non-smoker but has had exposure to 2nd hand smoke. PDE will recommend a PFT to dx/stage COPD. Pt has a Trilogy machine and had a PFT done in Feb 2019.    Insurance: Payor: [employer] / Plan: [employer] / Product Type: *No Product type* /        EDUCATION DONE WITH PATIENT:       Pt refused    STOP BANG (=/> 3 is HIGH RISK):        TOTAL Pt has a Trilogy

## 2020-10-03 ENCOUNTER — READMISSION MANAGEMENT (OUTPATIENT)
Dept: CALL CENTER | Facility: HOSPITAL | Age: 60
End: 2020-10-03

## 2020-10-03 NOTE — OUTREACH NOTE
Prep Survey      Responses   [facility] facility patient discharged from?  [facility]   Is LACE score < 7 ?  No   Eligibility  Readm Mgmt   Discharge diagnosis  syncope, AICD discharge, CP   Does the patient have one of the following disease processes/diagnoses(primary or secondary)?  Other   Does the patient have Home health ordered?  Yes   What is the Home health agency?   [facility]   Is there a DME ordered?  No   Comments regarding appointments  Per AVS   Medication alerts for this patient  see AVS for changes   General alerts for this patient  CHF, COPD   Prep survey completed?  Yes          Teri Padron RN

## 2020-10-06 ENCOUNTER — READMISSION MANAGEMENT (OUTPATIENT)
Dept: CALL CENTER | Facility: HOSPITAL | Age: 60
End: 2020-10-06

## 2020-10-06 NOTE — OUTREACH NOTE
Medical Week 1 Survey      Responses   [facility] patient discharged from?  [facility]   Does the patient have one of the following disease processes/diagnoses(primary or secondary)?  Other   Week 1 attempt successful?  Yes   Call start time  1448   Call end time  1452   Meds reviewed with patient/caregiver?  Yes   Is the patient having any side effects they believe may be caused by any medication additions or changes?  No   Does the patient have all medications ordered at discharge?  N/A   Is the patient taking all medications as directed (includes completed medication regime)?  Yes   Does the patient have a primary care provider?   Yes   Does the patient have an appointment with their PCP within 7 days of discharge?  No   What is preventing the patient from scheduling follow up appointments within 7 days of discharge?  Haven't had time   Nursing Interventions  Educated patient on importance of making appointment, Advised patient to make appointment   Has the patient kept scheduled appointments due by today?  N/A   What is the Home health agency?   [facility]   Has home health visited the patient within 72 hours of discharge?  Yes   Did the patient receive a copy of their discharge instructions?  Yes   Nursing interventions  Reviewed instructions with patient   What is the patient's perception of their health status since discharge?  Improving   Is the patient/caregiver able to teach back signs and symptoms related to disease process for when to call PCP?  Yes   Is the patient/caregiver able to teach back signs and symptoms related to disease process for when to call 911?  Yes   Is the patient/caregiver able to teach back the hierarchy of who to call/visit for symptoms/problems? PCP, Specialist, Home health nurse, Urgent Care, ED, 911  Yes   Week 1 call completed?  Yes          Lisa Lopez LPN

## 2020-10-15 ENCOUNTER — OFFICE VISIT (OUTPATIENT)
Dept: CARDIOLOGY | Facility: CLINIC | Age: 60
End: 2020-10-15

## 2020-10-15 VITALS
SYSTOLIC BLOOD PRESSURE: 167 MMHG | OXYGEN SATURATION: 100 % | WEIGHT: 308 LBS | HEART RATE: 69 BPM | DIASTOLIC BLOOD PRESSURE: 87 MMHG | BODY MASS INDEX: 45.48 KG/M2

## 2020-10-15 DIAGNOSIS — I47.29 VENTRICULAR TACHYCARDIA (PAROXYSMAL) (HCC): Primary | ICD-10-CM

## 2020-10-15 DIAGNOSIS — I48.21 PERMANENT ATRIAL FIBRILLATION (HCC): ICD-10-CM

## 2020-10-15 DIAGNOSIS — Z95.810 PRESENCE OF BIVENTRICULAR IMPLANTABLE CARDIOVERTER-DEFIBRILLATOR (ICD): ICD-10-CM

## 2020-10-15 DIAGNOSIS — I42.0 CARDIOMYOPATHY, DILATED (HCC): ICD-10-CM

## 2020-10-15 PROCEDURE — 93284 PRGRMG EVAL IMPLANTABLE DFB: CPT | Performed by: INTERNAL MEDICINE

## 2020-10-15 PROCEDURE — 99215 OFFICE O/P EST HI 40 MIN: CPT | Performed by: INTERNAL MEDICINE

## 2020-10-15 PROCEDURE — 93000 ELECTROCARDIOGRAM COMPLETE: CPT | Performed by: INTERNAL MEDICINE

## 2020-10-15 RX ORDER — MEXILETINE HYDROCHLORIDE 250 MG/1
250 CAPSULE ORAL 3 TIMES DAILY
Qty: 90 CAPSULE | Refills: 3 | Status: SHIPPED | OUTPATIENT
Start: 2020-10-15 | End: 2021-01-01

## 2020-10-15 NOTE — PROGRESS NOTES
CC--recurrent VT and congestive heart failure      Sub--59-year-old male patient came for follow-up after recent hospitalization for chronic symptoms of dyspnea and chronic heart failure  .  Patient had uncontrollable atrial fibrillation and underwent AV node ablation with biventricular ICD upgrade--unfortunately coronary sinus lead was placed in anterobasal vein since there was no posterior and lateral veins for placement of coronary sinus lead--he was in refractory heart failure which has improved to class III chronic systolic heart failure .  Patient has known coronary artery disease with ischemic cardiomyopathy and has significant issues with obesity and prior history of COPD.  Patient had prior multiple ICD therapies for conducted atrial fibrillation needing AV node ablation .  He remains in functional class III .Patient has noticed increased number of palpitations associated with chest discomfort in the last few weeks  He continues to remain in class III functional class without any syncope  Chronic medical problems include ischemic cardio myopathy chronic class III systolic heart failure, persistent atrial fibrillation and morbid obesity   Was started on amiodarone with last visit--continues to have episodes of VT needing antitachycardia pacing  Back in 2017 patient underwent an unsuccessful VT ablation with PVC ablation coming from outflow tract which was felt to be possible epicardial origin--ablation from RVOT site was unsuccessful--VT during the time was inferior axis with left bundle and transition V3  Continued to feel poorly with class III systolic heart failure with multiple episodes of VT   Patient presented to the hospital with chronic angina and chronic class III systolic heart failure symptoms and underwent LV epicardial lead placement for optimization of heart failure therapy--he has recurrent admission for heart failure and last admission patient had VT storm with multiple ICD shocks and  underwent a VT ablation affecting the posterior scar and comes in for evaluation   Continues to have class III dyspnea symptoms and has chronic chest pain   He denies any syncope since last visit      Past history is reviewed below and attached and injuries made in the above document   Past Medical History:   Diagnosis Date   • Asthma    • Atrial fibrillation (CMS/[facility])    • CAD (coronary artery disease)    • CHF (congestive heart failure) (CMS/[facility])    • COPD (chronic obstructive pulmonary disease) (CMS/[facility])    • Depression    • Hyperlipidemia    • Hypertension    • Myocardial infarction (CMS/[facility])    • Pacemaker     pacemaker / defib   • V tach (CMS/[facility])      Past Surgical History:   Procedure Laterality Date   • CARDIAC ABLATION  11/07/2017   • CARDIAC CATHETERIZATION     • CARDIAC ELECTROPHYSIOLOGY PROCEDURE N/A 9/26/2019    Procedure: BIVENTRICULAR DEVICE UPGRADE;  Surgeon: Jose Lopez MD;  Location: [facility] CATH INVASIVE LOCATION;  Service: Cardiovascular   • CARDIAC ELECTROPHYSIOLOGY PROCEDURE N/A 9/26/2019    Procedure: EP/Ablation AV Node ablation;  Surgeon: Jose Lopez MD;  Location: [facility] CATH INVASIVE LOCATION;  Service: Cardiology   • CHOLECYSTECTOMY     • CORONARY ANGIOPLASTY WITH STENT PLACEMENT      2 stents   • PACEMAKER IMPLANTATION  07/08/2016    Pacemaker/ Defib implant - [place]   • SINUS SURGERY      sinus surgery x 9     Family History   Problem Relation Age of Onset   • Diabetes Mother    • Heart disease Mother         MI age 46 - CHF   • Atrial fibrillation Mother    • COPD Mother    • Atrial fibrillation Father    • Heart disease Father         CHF     Social History     Tobacco Use   • Smoking status: Never Smoker   • Smokeless tobacco: Never Used   Substance Use Topics   • Alcohol use: No     Frequency: Never   • Drug use: No       Review of Systems   General:  positive for fatigue and tiredness  Eyes: No redness  Cardiovascular: Positive for chronic chest pain    Respiratory:    positive for class 3 shortness of breath  Gastrointestinal: No nausea or vomiting, bleeding  Genitourinary: no hematuria or dysuria  Musculoskeletal: No arthralgia or myalgia  Skin: No rash  Neurologic: No numbness, tingling, syncope  Hematologic/Lymphatic: No abnormal bleeding      Physical Exam  VITALS REVIEWED-- pulse rate is 70  afebrile respiration 12 times a minute, blood pressure 167/87  ICD site is clean    General:      well developed, well nourished, in no acute distress.    Head:      normocephalic and atraumatic.    Eyes:      PERRL/EOM intact, conjunctiva and sclera clear with out nystagmus.    Neck:    thyromegaly,  trachea central with normal respiratory effort    heart:       Paced rhythm     Msk:      no deformity or scoliosis noted of thoracic or lumbar spine.    Pulses:      pulses normal in all 4 extremities.    Extremities:       no cyanosis or clubbing--trace left pedal edema and trace right pedal edema.    Neurologic:      no focal deficits.   alert oriented x3  Skin:      intact without lesions or rashes.    Psych:      alert and cooperative; normal mood and affect; normal attention span and concentration.    Lung exam shows reduced breath sounds in bilateral bases without wheezing    Assessment plan  Recurrent and symptomatic  episodes of VT--status post VT storm --repeat VT ablation and this time VT was coming from posterior scar which was ablated 2 weeks ago --had short bursts of nonsustained VT and slightly increased PVCs since ablation --- start Mexitil to 50 mg 3 times a day and follow-up closely in 2 weeks   First VT ablation several months ago involving the basal scar  Second VT ablation 2 weeks ago involving the posterior scar  Severe ischemic cardiomyopathy with EF less than 20%  Prior history of mild cardiorenal syndrome   Chronic class III systolic heart failure  Chronic coronary artery disease and chronic chest pain in the past--no other further coronary intervention is  warranted as per Dr. Frye  Permanent atrial fibrillation status post AV node ablation  Biventricular ICD in situ --post LV lead placement epicardial lead for optimization and EKG shows excellent biventricular pacing --programming changes of the device attached to the chart   hypertension  controlled  If VT becomes uncontrollable--tertiary center referral to [facility] can be an option  ICD interrogation-attached to chart        ECG 12 Lead    Date/Time: 10/15/2020 4:02 PM  Performed by: Jose Lopez MD  Authorized by: Jose Lopez MD   Comparison: compared with previous ECG   Comparison to previous ECG: Compared to last EKG 1 PVC noted  Rhythm: atrial fibrillation and paced  Rate: normal  Other findings: non-specific ST-T wave changes            Electronically signed by Jose Lopez MD, 10/15/20, 4:01 PM EDT.

## 2020-10-26 RX ORDER — METOLAZONE 2.5 MG/1
TABLET ORAL
Qty: 30 TABLET | Refills: 0 | OUTPATIENT
Start: 2020-10-26

## 2020-10-30 ENCOUNTER — OFFICE VISIT (OUTPATIENT)
Dept: CARDIOLOGY | Facility: CLINIC | Age: 60
End: 2020-10-30

## 2020-10-30 VITALS
HEART RATE: 76 BPM | WEIGHT: 315 LBS | DIASTOLIC BLOOD PRESSURE: 83 MMHG | OXYGEN SATURATION: 97 % | BODY MASS INDEX: 46.96 KG/M2 | SYSTOLIC BLOOD PRESSURE: 147 MMHG

## 2020-10-30 DIAGNOSIS — I48.21 PERMANENT ATRIAL FIBRILLATION (HCC): ICD-10-CM

## 2020-10-30 DIAGNOSIS — I47.29 VENTRICULAR TACHYCARDIA (PAROXYSMAL) (HCC): Primary | ICD-10-CM

## 2020-10-30 DIAGNOSIS — I42.0 CARDIOMYOPATHY, DILATED (HCC): ICD-10-CM

## 2020-10-30 DIAGNOSIS — I10 ESSENTIAL HYPERTENSION: ICD-10-CM

## 2020-10-30 DIAGNOSIS — I50.22 CHRONIC SYSTOLIC CONGESTIVE HEART FAILURE (HCC): ICD-10-CM

## 2020-10-30 DIAGNOSIS — Z95.810 PRESENCE OF BIVENTRICULAR IMPLANTABLE CARDIOVERTER-DEFIBRILLATOR (ICD): ICD-10-CM

## 2020-10-30 PROCEDURE — 99214 OFFICE O/P EST MOD 30 MIN: CPT | Performed by: INTERNAL MEDICINE

## 2020-10-30 PROCEDURE — 93000 ELECTROCARDIOGRAM COMPLETE: CPT | Performed by: INTERNAL MEDICINE

## 2020-10-30 NOTE — PROGRESS NOTES
CC--recurrent VT and congestive heart failure      Sub--60-year-old male patient came for follow-up after recent hospitalization for chronic symptoms of dyspnea and chronic heart failure  .  Patient had uncontrollable atrial fibrillation and underwent AV node ablation with biventricular ICD upgrade--unfortunately coronary sinus lead was placed in anterobasal vein since there was no posterior and lateral veins for placement of coronary sinus lead--he was in refractory heart failure which has improved to class III chronic systolic heart failure .  Patient has known coronary artery disease with ischemic cardiomyopathy and has significant issues with obesity and prior history of COPD.  Patient had prior multiple ICD therapies for conducted atrial fibrillation needing AV node ablation .  He remains in functional class III .Patient has noticed increased number of palpitations associated with chest discomfort in the last few weeks  He continues to remain in class III functional class without any syncope  Chronic medical problems include ischemic cardio myopathy chronic class III systolic heart failure, persistent atrial fibrillation and morbid obesity   Was started on amiodarone with last visit--continues to have episodes of VT needing antitachycardia pacing  Back in 2017 patient underwent an unsuccessful VT ablation with PVC ablation coming from outflow tract which was felt to be possible epicardial origin--ablation from RVOT site was unsuccessful--VT during the time was inferior axis with left bundle and transition V3  Continued to feel poorly with class III systolic heart failure with multiple episodes of VT   Patient presented to the hospital with chronic angina and chronic class III systolic heart failure symptoms and underwent LV epicardial lead placement for optimization of heart failure therapy--he has recurrent admission for heart failure and last admission patient had VT storm with multiple ICD shocks and  underwent a VT ablation affecting the posterior scar and started have recurrent VT and started on Mexitil 2 weeks ago and comes in for  evaluation   Continues to have class III dyspnea symptoms and has chronic chest pain   He denies any syncope since last visit      Past history is reviewed below and attached and injuries made in the above document   Past Medical History:   Diagnosis Date   • Asthma    • Atrial fibrillation (CMS/[facility])    • CAD (coronary artery disease)    • CHF (congestive heart failure) (CMS/[facility])    • COPD (chronic obstructive pulmonary disease) (CMS/[facility])    • Depression    • Hyperlipidemia    • Hypertension    • Myocardial infarction (CMS/[facility])    • Pacemaker     pacemaker / defib   • V tach (CMS/[facility])      Past Surgical History:   Procedure Laterality Date   • CARDIAC ABLATION  11/07/2017   • CARDIAC CATHETERIZATION     • CARDIAC ELECTROPHYSIOLOGY PROCEDURE N/A 9/26/2019    Procedure: BIVENTRICULAR DEVICE UPGRADE;  Surgeon: Jose Lopez MD;  Location: [facility] CATH INVASIVE LOCATION;  Service: Cardiovascular   • CARDIAC ELECTROPHYSIOLOGY PROCEDURE N/A 9/26/2019    Procedure: EP/Ablation AV Node ablation;  Surgeon: Jose Lopez MD;  Location: [facility] CATH INVASIVE LOCATION;  Service: Cardiology   • CHOLECYSTECTOMY     • CORONARY ANGIOPLASTY WITH STENT PLACEMENT      2 stents   • PACEMAKER IMPLANTATION  07/08/2016    Pacemaker/ Defib implant - [place]   • SINUS SURGERY      sinus surgery x 9     Family History   Problem Relation Age of Onset   • Diabetes Mother    • Heart disease Mother         MI age 46 - CHF   • Atrial fibrillation Mother    • COPD Mother    • Atrial fibrillation Father    • Heart disease Father         CHF     Social History     Tobacco Use   • Smoking status: Never Smoker   • Smokeless tobacco: Never Used   Substance Use Topics   • Alcohol use: No     Frequency: Never   • Drug use: No       Review of Systems   General:  positive for fatigue and tiredness  Eyes: No  redness  Cardiovascular: Positive for chronic chest pain    Respiratory:   positive for class 3 shortness of breath  Gastrointestinal: No nausea or vomiting, bleeding  Genitourinary: no hematuria or dysuria  Musculoskeletal: No arthralgia or myalgia  Skin: No rash  Neurologic: No numbness, tingling, syncope  Hematologic/Lymphatic: No abnormal bleeding      Physical Exam  VITALS REVIEWED-- pulse rate is 70  afebrile respiration 12 times a minute, blood pressure 147/83  ICD site is clean    General:      well developed, well nourished, in no acute distress.    Head:      normocephalic and atraumatic.    Eyes:      PERRL/EOM intact, conjunctiva and sclera clear with out nystagmus.    Neck:    thyromegaly,  trachea central with normal respiratory effort    heart:       Paced rhythm     Msk:      no deformity or scoliosis noted of thoracic or lumbar spine.    Pulses:      pulses normal in all 4 extremities.    Extremities:       no cyanosis or clubbing--trace left pedal edema and trace right pedal edema.    Neurologic:      no focal deficits.   alert oriented x3  Skin:      intact without lesions or rashes.    Psych:      alert and cooperative; normal mood and affect; normal attention span and concentration.    Lung exam shows reduced breath sounds in bilateral bases without wheezing    Assessment plan  Recurrent and symptomatic  episodes of VT--status post VT storm --repeat VT ablation and this time VT was coming from posterior scar which was ablated 2 weeks ago --had short bursts of nonsustained VT and slightly increased PVCs since ablation --- started on Mexitil and no recurrent VT since last 2 weeks   Follow-up in 1 month for close evaluation and avoid driving till then and patient educated  First VT ablation several months ago involving the basal scar  Second VT ablation few weeks ago involving the posterior scar  Severe ischemic cardiomyopathy with EF less than 20%  Prior history of mild cardiorenal syndrome    Chronic class III systolic heart failure  Chronic coronary artery disease and chronic chest pain in the past--no other further coronary intervention is warranted as per Dr. Frye  Permanent atrial fibrillation status post AV node ablation  Biventricular ICD in situ --post LV lead placement epicardial lead for optimization and EKG shows excellent biventricular pacing --programming changes of the device attached to the chart   hypertension  controlled  If VT becomes uncontrollable--tertiary center referral to [facility] can be an option  ICD interrogation-attached to chart        ECG 12 Lead    Date/Time: 10/30/2020 2:16 PM  Performed by: Jose Lopez MD  Authorized by: Jose Lopez MD   Comparison: compared with previous ECG   Similar to previous ECG  Rhythm: atrial fibrillation and paced  Ectopy: infrequent PVCs  Rate: normal  Pacing: ventricular paced rhythm            Electronically signed by Jose Lopez MD, 10/30/20, 2:17 PM EDT.

## 2020-11-11 ENCOUNTER — HOSPITAL ENCOUNTER (INPATIENT)
Facility: HOSPITAL | Age: 60
LOS: 13 days | Discharge: HOME HEALTH | End: 2020-11-25
Attending: EMERGENCY MEDICINE | Admitting: HOSPITALIST

## 2020-11-11 ENCOUNTER — APPOINTMENT (OUTPATIENT)
Dept: GENERAL RADIOLOGY | Facility: HOSPITAL | Age: 60
End: 2020-11-11

## 2020-11-11 DIAGNOSIS — R07.9 CHEST PAIN WITH HIGH RISK OF ACUTE CORONARY SYNDROME: Primary | ICD-10-CM

## 2020-11-11 DIAGNOSIS — I42.0 CARDIOMYOPATHY, DILATED (HCC): Chronic | ICD-10-CM

## 2020-11-11 DIAGNOSIS — R53.1 GENERALIZED WEAKNESS: ICD-10-CM

## 2020-11-11 PROBLEM — I25.110 CORONARY ARTERY DISEASE INVOLVING NATIVE CORONARY ARTERY OF NATIVE HEART WITH UNSTABLE ANGINA PECTORIS (HCC): Status: ACTIVE | Noted: 2020-11-11

## 2020-11-11 PROBLEM — I50.23 ACUTE ON CHRONIC SYSTOLIC CHF (CONGESTIVE HEART FAILURE) (HCC): Status: ACTIVE | Noted: 2017-09-08

## 2020-11-11 LAB
ALBUMIN SERPL-MCNC: 4.2 G/DL (ref 3.5–5.2)
ALBUMIN/GLOB SERPL: 1.6 G/DL
ALP SERPL-CCNC: 78 U/L (ref 39–117)
ALT SERPL W P-5'-P-CCNC: 17 U/L (ref 1–41)
ANION GAP SERPL CALCULATED.3IONS-SCNC: 12 MMOL/L (ref 5–15)
APTT PPP: 23.9 SECONDS (ref 24–31)
AST SERPL-CCNC: 38 U/L (ref 1–40)
BASOPHILS # BLD AUTO: 0.1 10*3/MM3 (ref 0–0.2)
BASOPHILS NFR BLD AUTO: 1 % (ref 0–1.5)
BILIRUB SERPL-MCNC: 1.1 MG/DL (ref 0–1.2)
BUN SERPL-MCNC: 22 MG/DL (ref 6–20)
BUN/CREAT SERPL: 17.3 (ref 7–25)
CALCIUM SPEC-SCNC: 9.7 MG/DL (ref 8.6–10.5)
CHLORIDE SERPL-SCNC: 100 MMOL/L (ref 98–107)
CO2 SERPL-SCNC: 28 MMOL/L (ref 22–29)
CREAT SERPL-MCNC: 1.27 MG/DL (ref 0.76–1.27)
DEPRECATED RDW RBC AUTO: 49.4 FL (ref 37–54)
EOSINOPHIL # BLD AUTO: 0.2 10*3/MM3 (ref 0–0.4)
EOSINOPHIL NFR BLD AUTO: 1.1 % (ref 0.3–6.2)
ERYTHROCYTE [DISTWIDTH] IN BLOOD BY AUTOMATED COUNT: 16.5 % (ref 12.3–15.4)
GFR SERPL CREATININE-BSD FRML MDRD: 58 ML/MIN/1.73
GLOBULIN UR ELPH-MCNC: 2.7 GM/DL
GLUCOSE SERPL-MCNC: 104 MG/DL (ref 65–99)
HCT VFR BLD AUTO: 45.8 % (ref 37.5–51)
HGB BLD-MCNC: 14.5 G/DL (ref 13–17.7)
HOLD SPECIMEN: NORMAL
INR PPP: 1.09 (ref 0.93–1.1)
LYMPHOCYTES # BLD AUTO: 1.7 10*3/MM3 (ref 0.7–3.1)
LYMPHOCYTES NFR BLD AUTO: 12 % (ref 19.6–45.3)
MCH RBC QN AUTO: 27 PG (ref 26.6–33)
MCHC RBC AUTO-ENTMCNC: 31.6 G/DL (ref 31.5–35.7)
MCV RBC AUTO: 85.3 FL (ref 79–97)
MONOCYTES # BLD AUTO: 1.5 10*3/MM3 (ref 0.1–0.9)
MONOCYTES NFR BLD AUTO: 10.3 % (ref 5–12)
NEUTROPHILS NFR BLD AUTO: 10.8 10*3/MM3 (ref 1.7–7)
NEUTROPHILS NFR BLD AUTO: 75.6 % (ref 42.7–76)
NRBC BLD AUTO-RTO: 0 /100 WBC (ref 0–0.2)
NT-PROBNP SERPL-MCNC: 6309 PG/ML (ref 0–900)
PLATELET # BLD AUTO: 282 10*3/MM3 (ref 140–450)
PMV BLD AUTO: 9.3 FL (ref 6–12)
POTASSIUM SERPL-SCNC: 4.9 MMOL/L (ref 3.5–5.2)
PROT SERPL-MCNC: 6.9 G/DL (ref 6–8.5)
PROTHROMBIN TIME: 11.9 SECONDS (ref 9.6–11.7)
QT INTERVAL: 521 MS
RBC # BLD AUTO: 5.37 10*6/MM3 (ref 4.14–5.8)
SODIUM SERPL-SCNC: 140 MMOL/L (ref 136–145)
TROPONIN T SERPL-MCNC: <0.01 NG/ML (ref 0–0.03)
WBC # BLD AUTO: 14.2 10*3/MM3 (ref 3.4–10.8)

## 2020-11-11 PROCEDURE — 25010000002 ONDANSETRON PER 1 MG: Performed by: INTERNAL MEDICINE

## 2020-11-11 PROCEDURE — G0378 HOSPITAL OBSERVATION PER HR: HCPCS

## 2020-11-11 PROCEDURE — 85025 COMPLETE CBC W/AUTO DIFF WBC: CPT | Performed by: EMERGENCY MEDICINE

## 2020-11-11 PROCEDURE — 99220 PR INITIAL OBSERVATION CARE/DAY 70 MINUTES: CPT | Performed by: INTERNAL MEDICINE

## 2020-11-11 PROCEDURE — 84484 ASSAY OF TROPONIN QUANT: CPT | Performed by: EMERGENCY MEDICINE

## 2020-11-11 PROCEDURE — 94799 UNLISTED PULMONARY SVC/PX: CPT

## 2020-11-11 PROCEDURE — 80053 COMPREHEN METABOLIC PANEL: CPT | Performed by: EMERGENCY MEDICINE

## 2020-11-11 PROCEDURE — 71045 X-RAY EXAM CHEST 1 VIEW: CPT

## 2020-11-11 PROCEDURE — 93005 ELECTROCARDIOGRAM TRACING: CPT

## 2020-11-11 PROCEDURE — 93005 ELECTROCARDIOGRAM TRACING: CPT | Performed by: EMERGENCY MEDICINE

## 2020-11-11 PROCEDURE — 25010000002 MORPHINE PER 10 MG: Performed by: EMERGENCY MEDICINE

## 2020-11-11 PROCEDURE — 94640 AIRWAY INHALATION TREATMENT: CPT

## 2020-11-11 PROCEDURE — 25010000002 ONDANSETRON PER 1 MG: Performed by: EMERGENCY MEDICINE

## 2020-11-11 PROCEDURE — 85730 THROMBOPLASTIN TIME PARTIAL: CPT | Performed by: EMERGENCY MEDICINE

## 2020-11-11 PROCEDURE — 94760 N-INVAS EAR/PLS OXIMETRY 1: CPT

## 2020-11-11 PROCEDURE — 99285 EMERGENCY DEPT VISIT HI MDM: CPT

## 2020-11-11 PROCEDURE — 25010000002 MORPHINE PER 10 MG: Performed by: INTERNAL MEDICINE

## 2020-11-11 PROCEDURE — 85610 PROTHROMBIN TIME: CPT | Performed by: EMERGENCY MEDICINE

## 2020-11-11 PROCEDURE — 83880 ASSAY OF NATRIURETIC PEPTIDE: CPT | Performed by: EMERGENCY MEDICINE

## 2020-11-11 RX ORDER — BUMETANIDE 1 MG/1
1 TABLET ORAL 2 TIMES DAILY
COMMUNITY
End: 2021-01-01 | Stop reason: SDDI

## 2020-11-11 RX ORDER — LABETALOL HYDROCHLORIDE 5 MG/ML
20 INJECTION, SOLUTION INTRAVENOUS ONCE
Status: COMPLETED | OUTPATIENT
Start: 2020-11-11 | End: 2020-11-11

## 2020-11-11 RX ORDER — DULOXETIN HYDROCHLORIDE 30 MG/1
60 CAPSULE, DELAYED RELEASE ORAL DAILY
Status: DISCONTINUED | OUTPATIENT
Start: 2020-11-12 | End: 2020-11-25 | Stop reason: HOSPADM

## 2020-11-11 RX ORDER — ALBUTEROL SULFATE 2.5 MG/3ML
2.5 SOLUTION RESPIRATORY (INHALATION) EVERY 6 HOURS PRN
Status: DISCONTINUED | OUTPATIENT
Start: 2020-11-11 | End: 2020-11-12

## 2020-11-11 RX ORDER — BUMETANIDE 0.25 MG/ML
2 INJECTION INTRAMUSCULAR; INTRAVENOUS ONCE
Status: COMPLETED | OUTPATIENT
Start: 2020-11-11 | End: 2020-11-11

## 2020-11-11 RX ORDER — BISACODYL 10 MG
10 SUPPOSITORY, RECTAL RECTAL DAILY PRN
Status: DISCONTINUED | OUTPATIENT
Start: 2020-11-11 | End: 2020-11-25 | Stop reason: HOSPADM

## 2020-11-11 RX ORDER — MORPHINE SULFATE 4 MG/ML
2 INJECTION, SOLUTION INTRAMUSCULAR; INTRAVENOUS
Status: DISPENSED | OUTPATIENT
Start: 2020-11-11 | End: 2020-11-14

## 2020-11-11 RX ORDER — MEXILETINE HYDROCHLORIDE 250 MG/1
250 CAPSULE ORAL 3 TIMES DAILY
Status: DISCONTINUED | OUTPATIENT
Start: 2020-11-11 | End: 2020-11-25 | Stop reason: HOSPADM

## 2020-11-11 RX ORDER — ACETAMINOPHEN 325 MG/1
325 TABLET ORAL EVERY 4 HOURS PRN
Status: DISCONTINUED | OUTPATIENT
Start: 2020-11-11 | End: 2020-11-25 | Stop reason: HOSPADM

## 2020-11-11 RX ORDER — GABAPENTIN 300 MG/1
300 CAPSULE ORAL 3 TIMES DAILY
Status: DISCONTINUED | OUTPATIENT
Start: 2020-11-11 | End: 2020-11-25 | Stop reason: HOSPADM

## 2020-11-11 RX ORDER — ACETAMINOPHEN 160 MG/5ML
325 SOLUTION ORAL EVERY 4 HOURS PRN
Status: DISCONTINUED | OUTPATIENT
Start: 2020-11-11 | End: 2020-11-25 | Stop reason: HOSPADM

## 2020-11-11 RX ORDER — SPIRONOLACTONE 25 MG/1
25 TABLET ORAL 2 TIMES DAILY
Status: DISCONTINUED | OUTPATIENT
Start: 2020-11-11 | End: 2020-11-13

## 2020-11-11 RX ORDER — ATORVASTATIN CALCIUM 40 MG/1
80 TABLET, FILM COATED ORAL DAILY
Status: DISCONTINUED | OUTPATIENT
Start: 2020-11-12 | End: 2020-11-25 | Stop reason: HOSPADM

## 2020-11-11 RX ORDER — BUMETANIDE 0.25 MG/ML
1 INJECTION INTRAMUSCULAR; INTRAVENOUS 3 TIMES DAILY
Status: DISCONTINUED | OUTPATIENT
Start: 2020-11-11 | End: 2020-11-13

## 2020-11-11 RX ORDER — ONDANSETRON 2 MG/ML
8 INJECTION INTRAMUSCULAR; INTRAVENOUS ONCE
Status: COMPLETED | OUTPATIENT
Start: 2020-11-11 | End: 2020-11-11

## 2020-11-11 RX ORDER — ROFLUMILAST 500 UG/1
500 TABLET ORAL DAILY
Status: DISCONTINUED | OUTPATIENT
Start: 2020-11-12 | End: 2020-11-25 | Stop reason: HOSPADM

## 2020-11-11 RX ORDER — METOPROLOL SUCCINATE 50 MG/1
100 TABLET, EXTENDED RELEASE ORAL
Status: DISCONTINUED | OUTPATIENT
Start: 2020-11-12 | End: 2020-11-13

## 2020-11-11 RX ORDER — NITROGLYCERIN 20 MG/100ML
10-50 INJECTION INTRAVENOUS
Status: DISCONTINUED | OUTPATIENT
Start: 2020-11-11 | End: 2020-11-20

## 2020-11-11 RX ORDER — CLOPIDOGREL BISULFATE 75 MG/1
75 TABLET ORAL DAILY
Status: DISCONTINUED | OUTPATIENT
Start: 2020-11-12 | End: 2020-11-25 | Stop reason: HOSPADM

## 2020-11-11 RX ORDER — SODIUM CHLORIDE 0.9 % (FLUSH) 0.9 %
10 SYRINGE (ML) INJECTION EVERY 12 HOURS SCHEDULED
Status: DISCONTINUED | OUTPATIENT
Start: 2020-11-11 | End: 2020-11-25 | Stop reason: HOSPADM

## 2020-11-11 RX ORDER — MORPHINE SULFATE 4 MG/ML
4 INJECTION, SOLUTION INTRAMUSCULAR; INTRAVENOUS ONCE
Status: COMPLETED | OUTPATIENT
Start: 2020-11-11 | End: 2020-11-11

## 2020-11-11 RX ORDER — ACETAMINOPHEN 650 MG/1
650 SUPPOSITORY RECTAL EVERY 4 HOURS PRN
Status: DISCONTINUED | OUTPATIENT
Start: 2020-11-11 | End: 2020-11-25 | Stop reason: HOSPADM

## 2020-11-11 RX ORDER — RANOLAZINE 500 MG/1
1000 TABLET, EXTENDED RELEASE ORAL 2 TIMES DAILY
Status: DISCONTINUED | OUTPATIENT
Start: 2020-11-11 | End: 2020-11-25 | Stop reason: HOSPADM

## 2020-11-11 RX ORDER — SODIUM CHLORIDE 0.9 % (FLUSH) 0.9 %
10 SYRINGE (ML) INJECTION AS NEEDED
Status: DISCONTINUED | OUTPATIENT
Start: 2020-11-11 | End: 2020-11-25 | Stop reason: HOSPADM

## 2020-11-11 RX ORDER — MONTELUKAST SODIUM 10 MG/1
10 TABLET ORAL DAILY
Status: DISCONTINUED | OUTPATIENT
Start: 2020-11-12 | End: 2020-11-25 | Stop reason: HOSPADM

## 2020-11-11 RX ORDER — TAMSULOSIN HYDROCHLORIDE 0.4 MG/1
0.4 CAPSULE ORAL NIGHTLY
Status: DISCONTINUED | OUTPATIENT
Start: 2020-11-11 | End: 2020-11-20

## 2020-11-11 RX ORDER — ONDANSETRON 2 MG/ML
4 INJECTION INTRAMUSCULAR; INTRAVENOUS EVERY 6 HOURS PRN
Status: DISCONTINUED | OUTPATIENT
Start: 2020-11-11 | End: 2020-11-25 | Stop reason: HOSPADM

## 2020-11-11 RX ADMIN — LABETALOL 20 MG/4 ML (5 MG/ML) INTRAVENOUS SYRINGE 20 MG: at 16:55

## 2020-11-11 RX ADMIN — GABAPENTIN 300 MG: 300 CAPSULE ORAL at 22:10

## 2020-11-11 RX ADMIN — ALBUTEROL SULFATE 2.5 MG: 2.5 SOLUTION RESPIRATORY (INHALATION) at 22:54

## 2020-11-11 RX ADMIN — MORPHINE SULFATE 4 MG: 4 INJECTION INTRAVENOUS at 16:48

## 2020-11-11 RX ADMIN — MORPHINE SULFATE 4 MG: 4 INJECTION INTRAVENOUS at 18:49

## 2020-11-11 RX ADMIN — BUMETANIDE 1 MG: 0.25 INJECTION INTRAMUSCULAR; INTRAVENOUS at 22:13

## 2020-11-11 RX ADMIN — TAMSULOSIN HYDROCHLORIDE 0.4 MG: 0.4 CAPSULE ORAL at 22:10

## 2020-11-11 RX ADMIN — SPIRONOLACTONE 25 MG: 25 TABLET ORAL at 22:10

## 2020-11-11 RX ADMIN — RANOLAZINE 1000 MG: 500 TABLET, FILM COATED, EXTENDED RELEASE ORAL at 22:10

## 2020-11-11 RX ADMIN — NITROGLYCERIN 40 MCG/MIN: 20 INJECTION INTRAVENOUS at 16:32

## 2020-11-11 RX ADMIN — Medication 10 ML: at 22:17

## 2020-11-11 RX ADMIN — MORPHINE SULFATE 2 MG: 4 INJECTION INTRAVENOUS at 22:11

## 2020-11-11 RX ADMIN — ONDANSETRON 4 MG: 2 INJECTION INTRAMUSCULAR; INTRAVENOUS at 22:10

## 2020-11-11 RX ADMIN — MEXILETINE HYDROCHLORIDE 250 MG: 250 CAPSULE ORAL at 23:12

## 2020-11-11 RX ADMIN — BUMETANIDE 2 MG: 0.25 INJECTION INTRAMUSCULAR; INTRAVENOUS at 16:49

## 2020-11-11 RX ADMIN — ONDANSETRON HYDROCHLORIDE 8 MG: 2 SOLUTION INTRAMUSCULAR; INTRAVENOUS at 16:48

## 2020-11-11 RX ADMIN — APIXABAN 5 MG: 5 TABLET, FILM COATED ORAL at 22:10

## 2020-11-12 LAB
ANION GAP SERPL CALCULATED.3IONS-SCNC: 8 MMOL/L (ref 5–15)
BASOPHILS # BLD AUTO: 0 10*3/MM3 (ref 0–0.2)
BASOPHILS NFR BLD AUTO: 0.4 % (ref 0–1.5)
BUN SERPL-MCNC: 24 MG/DL (ref 6–20)
BUN/CREAT SERPL: 17.1 (ref 7–25)
CALCIUM SPEC-SCNC: 8.8 MG/DL (ref 8.6–10.5)
CHLORIDE SERPL-SCNC: 100 MMOL/L (ref 98–107)
CHOLEST SERPL-MCNC: 152 MG/DL (ref 0–200)
CO2 SERPL-SCNC: 32 MMOL/L (ref 22–29)
CREAT SERPL-MCNC: 1.4 MG/DL (ref 0.76–1.27)
DEPRECATED RDW RBC AUTO: 48.1 FL (ref 37–54)
EOSINOPHIL # BLD AUTO: 0.3 10*3/MM3 (ref 0–0.4)
EOSINOPHIL NFR BLD AUTO: 2.3 % (ref 0.3–6.2)
ERYTHROCYTE [DISTWIDTH] IN BLOOD BY AUTOMATED COUNT: 16.4 % (ref 12.3–15.4)
GFR SERPL CREATININE-BSD FRML MDRD: 52 ML/MIN/1.73
GLUCOSE SERPL-MCNC: 105 MG/DL (ref 65–99)
HCT VFR BLD AUTO: 40 % (ref 37.5–51)
HDLC SERPL-MCNC: 37 MG/DL (ref 40–60)
HGB BLD-MCNC: 13 G/DL (ref 13–17.7)
LDLC SERPL CALC-MCNC: 97 MG/DL (ref 0–100)
LDLC/HDLC SERPL: 2.59 {RATIO}
LYMPHOCYTES # BLD AUTO: 1.8 10*3/MM3 (ref 0.7–3.1)
LYMPHOCYTES NFR BLD AUTO: 16.2 % (ref 19.6–45.3)
MCH RBC QN AUTO: 27.6 PG (ref 26.6–33)
MCHC RBC AUTO-ENTMCNC: 32.5 G/DL (ref 31.5–35.7)
MCV RBC AUTO: 85 FL (ref 79–97)
MONOCYTES # BLD AUTO: 1.5 10*3/MM3 (ref 0.1–0.9)
MONOCYTES NFR BLD AUTO: 14.1 % (ref 5–12)
NEUTROPHILS NFR BLD AUTO: 67 % (ref 42.7–76)
NEUTROPHILS NFR BLD AUTO: 7.2 10*3/MM3 (ref 1.7–7)
NRBC BLD AUTO-RTO: 0 /100 WBC (ref 0–0.2)
PLATELET # BLD AUTO: 250 10*3/MM3 (ref 140–450)
PMV BLD AUTO: 9.4 FL (ref 6–12)
POTASSIUM SERPL-SCNC: 4.3 MMOL/L (ref 3.5–5.2)
RBC # BLD AUTO: 4.7 10*6/MM3 (ref 4.14–5.8)
SODIUM SERPL-SCNC: 140 MMOL/L (ref 136–145)
TRIGL SERPL-MCNC: 96 MG/DL (ref 0–150)
TROPONIN T SERPL-MCNC: 0.01 NG/ML (ref 0–0.03)
VLDLC SERPL-MCNC: 18 MG/DL (ref 5–40)
WBC # BLD AUTO: 10.8 10*3/MM3 (ref 3.4–10.8)

## 2020-11-12 PROCEDURE — 25010000002 MORPHINE PER 10 MG: Performed by: INTERNAL MEDICINE

## 2020-11-12 PROCEDURE — 84484 ASSAY OF TROPONIN QUANT: CPT | Performed by: INTERNAL MEDICINE

## 2020-11-12 PROCEDURE — 93010 ELECTROCARDIOGRAM REPORT: CPT | Performed by: INTERNAL MEDICINE

## 2020-11-12 PROCEDURE — 94799 UNLISTED PULMONARY SVC/PX: CPT

## 2020-11-12 PROCEDURE — 99233 SBSQ HOSP IP/OBS HIGH 50: CPT | Performed by: INTERNAL MEDICINE

## 2020-11-12 PROCEDURE — 99221 1ST HOSP IP/OBS SF/LOW 40: CPT | Performed by: INTERNAL MEDICINE

## 2020-11-12 PROCEDURE — 80048 BASIC METABOLIC PNL TOTAL CA: CPT | Performed by: INTERNAL MEDICINE

## 2020-11-12 PROCEDURE — 85025 COMPLETE CBC W/AUTO DIFF WBC: CPT | Performed by: INTERNAL MEDICINE

## 2020-11-12 PROCEDURE — 25010000002 ONDANSETRON PER 1 MG: Performed by: INTERNAL MEDICINE

## 2020-11-12 PROCEDURE — 80061 LIPID PANEL: CPT | Performed by: INTERNAL MEDICINE

## 2020-11-12 RX ORDER — AZELASTINE 1 MG/ML
1 SPRAY, METERED NASAL DAILY
Status: DISCONTINUED | OUTPATIENT
Start: 2020-11-12 | End: 2020-11-25 | Stop reason: HOSPADM

## 2020-11-12 RX ORDER — IPRATROPIUM BROMIDE AND ALBUTEROL SULFATE 2.5; .5 MG/3ML; MG/3ML
3 SOLUTION RESPIRATORY (INHALATION)
Status: DISCONTINUED | OUTPATIENT
Start: 2020-11-12 | End: 2020-11-25 | Stop reason: HOSPADM

## 2020-11-12 RX ADMIN — MORPHINE SULFATE 2 MG: 4 INJECTION INTRAVENOUS at 02:25

## 2020-11-12 RX ADMIN — APIXABAN 5 MG: 5 TABLET, FILM COATED ORAL at 08:16

## 2020-11-12 RX ADMIN — MORPHINE SULFATE 2 MG: 4 INJECTION INTRAVENOUS at 10:16

## 2020-11-12 RX ADMIN — MORPHINE SULFATE 2 MG: 4 INJECTION INTRAVENOUS at 14:33

## 2020-11-12 RX ADMIN — SPIRONOLACTONE 25 MG: 25 TABLET ORAL at 20:48

## 2020-11-12 RX ADMIN — MEXILETINE HYDROCHLORIDE 250 MG: 250 CAPSULE ORAL at 08:15

## 2020-11-12 RX ADMIN — CLOPIDOGREL BISULFATE 75 MG: 75 TABLET ORAL at 08:15

## 2020-11-12 RX ADMIN — SPIRONOLACTONE 25 MG: 25 TABLET ORAL at 08:15

## 2020-11-12 RX ADMIN — ROFLUMILAST 500 MCG: 500 TABLET ORAL at 08:15

## 2020-11-12 RX ADMIN — RANOLAZINE 1000 MG: 500 TABLET, FILM COATED, EXTENDED RELEASE ORAL at 20:48

## 2020-11-12 RX ADMIN — MORPHINE SULFATE 2 MG: 4 INJECTION INTRAVENOUS at 00:16

## 2020-11-12 RX ADMIN — BUMETANIDE 1 MG: 0.25 INJECTION INTRAMUSCULAR; INTRAVENOUS at 20:48

## 2020-11-12 RX ADMIN — AZELASTINE HYDROCHLORIDE 1 SPRAY: 137 SPRAY, METERED NASAL at 14:29

## 2020-11-12 RX ADMIN — RANOLAZINE 1000 MG: 500 TABLET, FILM COATED, EXTENDED RELEASE ORAL at 08:16

## 2020-11-12 RX ADMIN — NITROGLYCERIN 20 MCG/MIN: 20 INJECTION INTRAVENOUS at 03:18

## 2020-11-12 RX ADMIN — Medication 10 ML: at 20:48

## 2020-11-12 RX ADMIN — GABAPENTIN 300 MG: 300 CAPSULE ORAL at 08:15

## 2020-11-12 RX ADMIN — GABAPENTIN 300 MG: 300 CAPSULE ORAL at 16:14

## 2020-11-12 RX ADMIN — APIXABAN 5 MG: 5 TABLET, FILM COATED ORAL at 20:48

## 2020-11-12 RX ADMIN — DULOXETINE HYDROCHLORIDE 60 MG: 30 CAPSULE, DELAYED RELEASE ORAL at 08:15

## 2020-11-12 RX ADMIN — MORPHINE SULFATE 2 MG: 4 INJECTION INTRAVENOUS at 20:55

## 2020-11-12 RX ADMIN — ONDANSETRON 4 MG: 2 INJECTION INTRAMUSCULAR; INTRAVENOUS at 14:33

## 2020-11-12 RX ADMIN — Medication 10 ML: at 08:16

## 2020-11-12 RX ADMIN — MORPHINE SULFATE 2 MG: 4 INJECTION INTRAVENOUS at 06:35

## 2020-11-12 RX ADMIN — ATORVASTATIN CALCIUM 80 MG: 40 TABLET, FILM COATED ORAL at 08:15

## 2020-11-12 RX ADMIN — MONTELUKAST 10 MG: 10 TABLET, FILM COATED ORAL at 08:15

## 2020-11-12 RX ADMIN — MORPHINE SULFATE 2 MG: 4 INJECTION INTRAVENOUS at 16:19

## 2020-11-12 RX ADMIN — MEXILETINE HYDROCHLORIDE 250 MG: 250 CAPSULE ORAL at 20:48

## 2020-11-12 RX ADMIN — BUMETANIDE 1 MG: 0.25 INJECTION INTRAMUSCULAR; INTRAVENOUS at 08:15

## 2020-11-12 RX ADMIN — BUMETANIDE 1 MG: 0.25 INJECTION INTRAMUSCULAR; INTRAVENOUS at 16:14

## 2020-11-12 RX ADMIN — TAMSULOSIN HYDROCHLORIDE 0.4 MG: 0.4 CAPSULE ORAL at 20:48

## 2020-11-12 RX ADMIN — GABAPENTIN 300 MG: 300 CAPSULE ORAL at 20:48

## 2020-11-12 RX ADMIN — MEXILETINE HYDROCHLORIDE 250 MG: 250 CAPSULE ORAL at 16:14

## 2020-11-12 RX ADMIN — ONDANSETRON 4 MG: 2 INJECTION INTRAMUSCULAR; INTRAVENOUS at 08:37

## 2020-11-12 RX ADMIN — IPRATROPIUM BROMIDE AND ALBUTEROL SULFATE 3 ML: 2.5; .5 SOLUTION RESPIRATORY (INHALATION) at 12:06

## 2020-11-12 RX ADMIN — METOPROLOL SUCCINATE 100 MG: 50 TABLET, EXTENDED RELEASE ORAL at 08:15

## 2020-11-13 LAB
ANION GAP SERPL CALCULATED.3IONS-SCNC: 12 MMOL/L (ref 5–15)
BASOPHILS # BLD AUTO: 0.1 10*3/MM3 (ref 0–0.2)
BASOPHILS NFR BLD AUTO: 0.9 % (ref 0–1.5)
BILIRUB UR QL STRIP: ABNORMAL
BUN SERPL-MCNC: 31 MG/DL (ref 6–20)
BUN/CREAT SERPL: 16.7 (ref 7–25)
CALCIUM SPEC-SCNC: 8.5 MG/DL (ref 8.6–10.5)
CHLORIDE SERPL-SCNC: 96 MMOL/L (ref 98–107)
CLARITY UR: CLEAR
CO2 SERPL-SCNC: 30 MMOL/L (ref 22–29)
COLOR UR: ABNORMAL
CREAT SERPL-MCNC: 1.86 MG/DL (ref 0.76–1.27)
DEPRECATED RDW RBC AUTO: 50.8 FL (ref 37–54)
EOSINOPHIL # BLD AUTO: 0.2 10*3/MM3 (ref 0–0.4)
EOSINOPHIL NFR BLD AUTO: 1.5 % (ref 0.3–6.2)
ERYTHROCYTE [DISTWIDTH] IN BLOOD BY AUTOMATED COUNT: 16.5 % (ref 12.3–15.4)
GFR SERPL CREATININE-BSD FRML MDRD: 38 ML/MIN/1.73
GLUCOSE SERPL-MCNC: 111 MG/DL (ref 65–99)
GLUCOSE UR STRIP-MCNC: NEGATIVE MG/DL
HCT VFR BLD AUTO: 44.2 % (ref 37.5–51)
HGB BLD-MCNC: 13.8 G/DL (ref 13–17.7)
HGB UR QL STRIP.AUTO: NEGATIVE
KETONES UR QL STRIP: NEGATIVE
LEUKOCYTE ESTERASE UR QL STRIP.AUTO: NEGATIVE
LYMPHOCYTES # BLD AUTO: 1.4 10*3/MM3 (ref 0.7–3.1)
LYMPHOCYTES NFR BLD AUTO: 13.3 % (ref 19.6–45.3)
MCH RBC QN AUTO: 27.2 PG (ref 26.6–33)
MCHC RBC AUTO-ENTMCNC: 31.3 G/DL (ref 31.5–35.7)
MCV RBC AUTO: 86.9 FL (ref 79–97)
MONOCYTES # BLD AUTO: 1.4 10*3/MM3 (ref 0.1–0.9)
MONOCYTES NFR BLD AUTO: 13.8 % (ref 5–12)
NEUTROPHILS NFR BLD AUTO: 7.4 10*3/MM3 (ref 1.7–7)
NEUTROPHILS NFR BLD AUTO: 70.5 % (ref 42.7–76)
NITRITE UR QL STRIP: NEGATIVE
NRBC BLD AUTO-RTO: 0.1 /100 WBC (ref 0–0.2)
PH UR STRIP.AUTO: 5.5 [PH] (ref 5–8)
PLATELET # BLD AUTO: 255 10*3/MM3 (ref 140–450)
PMV BLD AUTO: 9.6 FL (ref 6–12)
POTASSIUM SERPL-SCNC: 5.1 MMOL/L (ref 3.5–5.2)
PROT UR QL STRIP: ABNORMAL
RBC # BLD AUTO: 5.08 10*6/MM3 (ref 4.14–5.8)
SODIUM SERPL-SCNC: 138 MMOL/L (ref 136–145)
SP GR UR STRIP: 1.02 (ref 1–1.03)
UROBILINOGEN UR QL STRIP: ABNORMAL
WBC # BLD AUTO: 10.5 10*3/MM3 (ref 3.4–10.8)

## 2020-11-13 PROCEDURE — 94799 UNLISTED PULMONARY SVC/PX: CPT

## 2020-11-13 PROCEDURE — 81003 URINALYSIS AUTO W/O SCOPE: CPT | Performed by: INTERNAL MEDICINE

## 2020-11-13 PROCEDURE — 25010000002 MORPHINE PER 10 MG: Performed by: INTERNAL MEDICINE

## 2020-11-13 PROCEDURE — 99233 SBSQ HOSP IP/OBS HIGH 50: CPT | Performed by: INTERNAL MEDICINE

## 2020-11-13 PROCEDURE — 85025 COMPLETE CBC W/AUTO DIFF WBC: CPT | Performed by: INTERNAL MEDICINE

## 2020-11-13 PROCEDURE — 25010000002 ONDANSETRON PER 1 MG: Performed by: INTERNAL MEDICINE

## 2020-11-13 PROCEDURE — 80048 BASIC METABOLIC PNL TOTAL CA: CPT | Performed by: INTERNAL MEDICINE

## 2020-11-13 PROCEDURE — 25010000003 MILRINONE LACTATE IN DEXTROSE 20-5 MG/100ML-% SOLUTION: Performed by: INTERNAL MEDICINE

## 2020-11-13 RX ORDER — MILRINONE LACTATE 0.2 MG/ML
0.25 INJECTION, SOLUTION INTRAVENOUS CONTINUOUS
Status: DISCONTINUED | OUTPATIENT
Start: 2020-11-13 | End: 2020-11-23

## 2020-11-13 RX ORDER — MAGNESIUM CARB/ALUMINUM HYDROX 105-160MG
296 TABLET,CHEWABLE ORAL ONCE
Status: COMPLETED | OUTPATIENT
Start: 2020-11-13 | End: 2020-11-13

## 2020-11-13 RX ORDER — METOPROLOL SUCCINATE 50 MG/1
50 TABLET, EXTENDED RELEASE ORAL
Status: DISCONTINUED | OUTPATIENT
Start: 2020-11-14 | End: 2020-11-25 | Stop reason: HOSPADM

## 2020-11-13 RX ORDER — BUMETANIDE 0.25 MG/ML
2 INJECTION INTRAMUSCULAR; INTRAVENOUS EVERY 12 HOURS
Status: DISCONTINUED | OUTPATIENT
Start: 2020-11-13 | End: 2020-11-14

## 2020-11-13 RX ADMIN — MORPHINE SULFATE 2 MG: 4 INJECTION INTRAVENOUS at 08:32

## 2020-11-13 RX ADMIN — IPRATROPIUM BROMIDE AND ALBUTEROL SULFATE 3 ML: 2.5; .5 SOLUTION RESPIRATORY (INHALATION) at 19:39

## 2020-11-13 RX ADMIN — RANOLAZINE 1000 MG: 500 TABLET, FILM COATED, EXTENDED RELEASE ORAL at 08:37

## 2020-11-13 RX ADMIN — AZELASTINE HYDROCHLORIDE 1 SPRAY: 137 SPRAY, METERED NASAL at 08:38

## 2020-11-13 RX ADMIN — ATORVASTATIN CALCIUM 80 MG: 40 TABLET, FILM COATED ORAL at 08:37

## 2020-11-13 RX ADMIN — BUMETANIDE 2 MG: 0.25 INJECTION INTRAMUSCULAR; INTRAVENOUS at 20:06

## 2020-11-13 RX ADMIN — BUMETANIDE 1 MG: 0.25 INJECTION INTRAMUSCULAR; INTRAVENOUS at 08:37

## 2020-11-13 RX ADMIN — ONDANSETRON 4 MG: 2 INJECTION INTRAMUSCULAR; INTRAVENOUS at 20:06

## 2020-11-13 RX ADMIN — MEXILETINE HYDROCHLORIDE 250 MG: 250 CAPSULE ORAL at 15:49

## 2020-11-13 RX ADMIN — MILRINONE LACTATE IN DEXTROSE 0.25 MCG/KG/MIN: 200 INJECTION, SOLUTION INTRAVENOUS at 08:57

## 2020-11-13 RX ADMIN — ROFLUMILAST 500 MCG: 500 TABLET ORAL at 08:37

## 2020-11-13 RX ADMIN — IPRATROPIUM BROMIDE AND ALBUTEROL SULFATE 3 ML: 2.5; .5 SOLUTION RESPIRATORY (INHALATION) at 11:30

## 2020-11-13 RX ADMIN — GABAPENTIN 300 MG: 300 CAPSULE ORAL at 15:49

## 2020-11-13 RX ADMIN — RANOLAZINE 1000 MG: 500 TABLET, FILM COATED, EXTENDED RELEASE ORAL at 20:05

## 2020-11-13 RX ADMIN — Medication 10 ML: at 20:06

## 2020-11-13 RX ADMIN — MEXILETINE HYDROCHLORIDE 250 MG: 250 CAPSULE ORAL at 20:05

## 2020-11-13 RX ADMIN — MONTELUKAST 10 MG: 10 TABLET, FILM COATED ORAL at 08:37

## 2020-11-13 RX ADMIN — MORPHINE SULFATE 2 MG: 4 INJECTION INTRAVENOUS at 15:50

## 2020-11-13 RX ADMIN — DULOXETINE HYDROCHLORIDE 60 MG: 30 CAPSULE, DELAYED RELEASE ORAL at 08:37

## 2020-11-13 RX ADMIN — IPRATROPIUM BROMIDE AND ALBUTEROL SULFATE 3 ML: 2.5; .5 SOLUTION RESPIRATORY (INHALATION) at 15:32

## 2020-11-13 RX ADMIN — ONDANSETRON 4 MG: 2 INJECTION INTRAMUSCULAR; INTRAVENOUS at 08:32

## 2020-11-13 RX ADMIN — GABAPENTIN 300 MG: 300 CAPSULE ORAL at 08:37

## 2020-11-13 RX ADMIN — SPIRONOLACTONE 25 MG: 25 TABLET ORAL at 08:37

## 2020-11-13 RX ADMIN — MILRINONE LACTATE IN DEXTROSE 0.25 MCG/KG/MIN: 200 INJECTION, SOLUTION INTRAVENOUS at 20:02

## 2020-11-13 RX ADMIN — APIXABAN 5 MG: 5 TABLET, FILM COATED ORAL at 08:37

## 2020-11-13 RX ADMIN — Medication 10 ML: at 08:38

## 2020-11-13 RX ADMIN — IPRATROPIUM BROMIDE AND ALBUTEROL SULFATE 3 ML: 2.5; .5 SOLUTION RESPIRATORY (INHALATION) at 09:18

## 2020-11-13 RX ADMIN — GABAPENTIN 300 MG: 300 CAPSULE ORAL at 20:05

## 2020-11-13 RX ADMIN — MORPHINE SULFATE 2 MG: 4 INJECTION INTRAVENOUS at 22:39

## 2020-11-13 RX ADMIN — Medication 296 ML: at 13:02

## 2020-11-13 RX ADMIN — TAMSULOSIN HYDROCHLORIDE 0.4 MG: 0.4 CAPSULE ORAL at 20:05

## 2020-11-13 RX ADMIN — MEXILETINE HYDROCHLORIDE 250 MG: 250 CAPSULE ORAL at 08:37

## 2020-11-13 RX ADMIN — APIXABAN 5 MG: 5 TABLET, FILM COATED ORAL at 20:05

## 2020-11-13 RX ADMIN — CLOPIDOGREL BISULFATE 75 MG: 75 TABLET ORAL at 08:37

## 2020-11-13 RX ADMIN — METOPROLOL SUCCINATE 100 MG: 50 TABLET, EXTENDED RELEASE ORAL at 13:02

## 2020-11-13 RX ADMIN — MORPHINE SULFATE 2 MG: 4 INJECTION INTRAVENOUS at 20:06

## 2020-11-14 LAB
ALBUMIN SERPL-MCNC: 3.7 G/DL (ref 3.5–5.2)
ANION GAP SERPL CALCULATED.3IONS-SCNC: 10 MMOL/L (ref 5–15)
BASOPHILS # BLD AUTO: 0.1 10*3/MM3 (ref 0–0.2)
BASOPHILS NFR BLD AUTO: 0.8 % (ref 0–1.5)
BUN SERPL-MCNC: 35 MG/DL (ref 6–20)
BUN/CREAT SERPL: 21.3 (ref 7–25)
CALCIUM SPEC-SCNC: 8.1 MG/DL (ref 8.6–10.5)
CHLORIDE SERPL-SCNC: 93 MMOL/L (ref 98–107)
CO2 SERPL-SCNC: 30 MMOL/L (ref 22–29)
CREAT SERPL-MCNC: 1.64 MG/DL (ref 0.76–1.27)
DEPRECATED RDW RBC AUTO: 45.9 FL (ref 37–54)
EOSINOPHIL # BLD AUTO: 0.2 10*3/MM3 (ref 0–0.4)
EOSINOPHIL NFR BLD AUTO: 2 % (ref 0.3–6.2)
ERYTHROCYTE [DISTWIDTH] IN BLOOD BY AUTOMATED COUNT: 15.7 % (ref 12.3–15.4)
GFR SERPL CREATININE-BSD FRML MDRD: 43 ML/MIN/1.73
GLUCOSE SERPL-MCNC: 114 MG/DL (ref 65–99)
HCT VFR BLD AUTO: 38.8 % (ref 37.5–51)
HGB BLD-MCNC: 12.4 G/DL (ref 13–17.7)
LYMPHOCYTES # BLD AUTO: 1 10*3/MM3 (ref 0.7–3.1)
LYMPHOCYTES NFR BLD AUTO: 10.7 % (ref 19.6–45.3)
MAGNESIUM SERPL-MCNC: 2 MG/DL (ref 1.6–2.6)
MCH RBC QN AUTO: 26.7 PG (ref 26.6–33)
MCHC RBC AUTO-ENTMCNC: 31.9 G/DL (ref 31.5–35.7)
MCV RBC AUTO: 83.6 FL (ref 79–97)
MONOCYTES # BLD AUTO: 1.1 10*3/MM3 (ref 0.1–0.9)
MONOCYTES NFR BLD AUTO: 12.3 % (ref 5–12)
NEUTROPHILS NFR BLD AUTO: 6.7 10*3/MM3 (ref 1.7–7)
NEUTROPHILS NFR BLD AUTO: 74.2 % (ref 42.7–76)
NRBC BLD AUTO-RTO: 0 /100 WBC (ref 0–0.2)
PHOSPHATE SERPL-MCNC: 3.2 MG/DL (ref 2.5–4.5)
PLATELET # BLD AUTO: 209 10*3/MM3 (ref 140–450)
PMV BLD AUTO: 9 FL (ref 6–12)
POTASSIUM SERPL-SCNC: 4.2 MMOL/L (ref 3.5–5.2)
RBC # BLD AUTO: 4.64 10*6/MM3 (ref 4.14–5.8)
SODIUM SERPL-SCNC: 133 MMOL/L (ref 136–145)
WBC # BLD AUTO: 9 10*3/MM3 (ref 3.4–10.8)

## 2020-11-14 PROCEDURE — 25010000002 MORPHINE PER 10 MG: Performed by: INTERNAL MEDICINE

## 2020-11-14 PROCEDURE — 94799 UNLISTED PULMONARY SVC/PX: CPT

## 2020-11-14 PROCEDURE — 99232 SBSQ HOSP IP/OBS MODERATE 35: CPT | Performed by: INTERNAL MEDICINE

## 2020-11-14 PROCEDURE — 25010000002 ONDANSETRON PER 1 MG: Performed by: INTERNAL MEDICINE

## 2020-11-14 PROCEDURE — 85025 COMPLETE CBC W/AUTO DIFF WBC: CPT | Performed by: INTERNAL MEDICINE

## 2020-11-14 PROCEDURE — 83735 ASSAY OF MAGNESIUM: CPT | Performed by: INTERNAL MEDICINE

## 2020-11-14 PROCEDURE — 80069 RENAL FUNCTION PANEL: CPT | Performed by: INTERNAL MEDICINE

## 2020-11-14 PROCEDURE — 25010000003 MILRINONE LACTATE IN DEXTROSE 20-5 MG/100ML-% SOLUTION: Performed by: INTERNAL MEDICINE

## 2020-11-14 PROCEDURE — 25010000002 MORPHINE PER 10 MG: Performed by: PHYSICIAN ASSISTANT

## 2020-11-14 RX ORDER — MORPHINE SULFATE 4 MG/ML
2 INJECTION, SOLUTION INTRAMUSCULAR; INTRAVENOUS
Status: DISPENSED | OUTPATIENT
Start: 2020-11-14 | End: 2020-11-17

## 2020-11-14 RX ADMIN — BUMETANIDE 1 MG/HR: 0.25 INJECTION INTRAMUSCULAR; INTRAVENOUS at 15:50

## 2020-11-14 RX ADMIN — MEXILETINE HYDROCHLORIDE 250 MG: 250 CAPSULE ORAL at 20:11

## 2020-11-14 RX ADMIN — IPRATROPIUM BROMIDE AND ALBUTEROL SULFATE 3 ML: 2.5; .5 SOLUTION RESPIRATORY (INHALATION) at 19:17

## 2020-11-14 RX ADMIN — RANOLAZINE 1000 MG: 500 TABLET, FILM COATED, EXTENDED RELEASE ORAL at 20:11

## 2020-11-14 RX ADMIN — IPRATROPIUM BROMIDE AND ALBUTEROL SULFATE 3 ML: 2.5; .5 SOLUTION RESPIRATORY (INHALATION) at 15:10

## 2020-11-14 RX ADMIN — AZELASTINE HYDROCHLORIDE 1 SPRAY: 137 SPRAY, METERED NASAL at 08:18

## 2020-11-14 RX ADMIN — APIXABAN 5 MG: 5 TABLET, FILM COATED ORAL at 20:11

## 2020-11-14 RX ADMIN — APIXABAN 5 MG: 5 TABLET, FILM COATED ORAL at 08:10

## 2020-11-14 RX ADMIN — GABAPENTIN 300 MG: 300 CAPSULE ORAL at 08:10

## 2020-11-14 RX ADMIN — ATORVASTATIN CALCIUM 80 MG: 40 TABLET, FILM COATED ORAL at 08:10

## 2020-11-14 RX ADMIN — MORPHINE SULFATE 2 MG: 4 INJECTION INTRAVENOUS at 08:11

## 2020-11-14 RX ADMIN — GABAPENTIN 300 MG: 300 CAPSULE ORAL at 20:11

## 2020-11-14 RX ADMIN — MEXILETINE HYDROCHLORIDE 250 MG: 250 CAPSULE ORAL at 08:10

## 2020-11-14 RX ADMIN — ONDANSETRON 4 MG: 2 INJECTION INTRAMUSCULAR; INTRAVENOUS at 22:34

## 2020-11-14 RX ADMIN — Medication 10 ML: at 08:10

## 2020-11-14 RX ADMIN — ONDANSETRON 4 MG: 2 INJECTION INTRAMUSCULAR; INTRAVENOUS at 08:10

## 2020-11-14 RX ADMIN — MORPHINE SULFATE 2 MG: 4 INJECTION INTRAVENOUS at 20:10

## 2020-11-14 RX ADMIN — MORPHINE SULFATE 2 MG: 4 INJECTION INTRAVENOUS at 16:05

## 2020-11-14 RX ADMIN — MILRINONE LACTATE IN DEXTROSE 0.25 MCG/KG/MIN: 200 INJECTION, SOLUTION INTRAVENOUS at 20:25

## 2020-11-14 RX ADMIN — MORPHINE SULFATE 2 MG: 4 INJECTION INTRAVENOUS at 02:56

## 2020-11-14 RX ADMIN — METOPROLOL SUCCINATE 50 MG: 50 TABLET, EXTENDED RELEASE ORAL at 08:10

## 2020-11-14 RX ADMIN — TAMSULOSIN HYDROCHLORIDE 0.4 MG: 0.4 CAPSULE ORAL at 20:11

## 2020-11-14 RX ADMIN — IPRATROPIUM BROMIDE AND ALBUTEROL SULFATE 3 ML: 2.5; .5 SOLUTION RESPIRATORY (INHALATION) at 07:10

## 2020-11-14 RX ADMIN — RANOLAZINE 1000 MG: 500 TABLET, FILM COATED, EXTENDED RELEASE ORAL at 08:10

## 2020-11-14 RX ADMIN — ROFLUMILAST 500 MCG: 500 TABLET ORAL at 08:10

## 2020-11-14 RX ADMIN — CLOPIDOGREL BISULFATE 75 MG: 75 TABLET ORAL at 08:10

## 2020-11-14 RX ADMIN — GABAPENTIN 300 MG: 300 CAPSULE ORAL at 16:04

## 2020-11-14 RX ADMIN — DULOXETINE HYDROCHLORIDE 60 MG: 30 CAPSULE, DELAYED RELEASE ORAL at 08:10

## 2020-11-14 RX ADMIN — BUMETANIDE 2 MG: 0.25 INJECTION INTRAMUSCULAR; INTRAVENOUS at 08:10

## 2020-11-14 RX ADMIN — MORPHINE SULFATE 2 MG: 4 INJECTION INTRAVENOUS at 22:34

## 2020-11-14 RX ADMIN — ONDANSETRON 4 MG: 2 INJECTION INTRAMUSCULAR; INTRAVENOUS at 16:04

## 2020-11-14 RX ADMIN — MONTELUKAST 10 MG: 10 TABLET, FILM COATED ORAL at 08:10

## 2020-11-14 RX ADMIN — MEXILETINE HYDROCHLORIDE 250 MG: 250 CAPSULE ORAL at 16:04

## 2020-11-14 RX ADMIN — MILRINONE LACTATE IN DEXTROSE 0.25 MCG/KG/MIN: 200 INJECTION, SOLUTION INTRAVENOUS at 02:49

## 2020-11-14 RX ADMIN — MILRINONE LACTATE IN DEXTROSE 0.25 MCG/KG/MIN: 200 INJECTION, SOLUTION INTRAVENOUS at 12:50

## 2020-11-14 RX ADMIN — Medication 10 ML: at 20:11

## 2020-11-15 LAB
ALBUMIN SERPL-MCNC: 3.9 G/DL (ref 3.5–5.2)
ANION GAP SERPL CALCULATED.3IONS-SCNC: 12 MMOL/L (ref 5–15)
BASOPHILS # BLD AUTO: 0.1 10*3/MM3 (ref 0–0.2)
BASOPHILS NFR BLD AUTO: 1 % (ref 0–1.5)
BUN SERPL-MCNC: 33 MG/DL (ref 6–20)
BUN/CREAT SERPL: 20.9 (ref 7–25)
CALCIUM SPEC-SCNC: 8.2 MG/DL (ref 8.6–10.5)
CHLORIDE SERPL-SCNC: 87 MMOL/L (ref 98–107)
CO2 SERPL-SCNC: 32 MMOL/L (ref 22–29)
CREAT SERPL-MCNC: 1.58 MG/DL (ref 0.76–1.27)
DEPRECATED RDW RBC AUTO: 46.8 FL (ref 37–54)
EOSINOPHIL # BLD AUTO: 0.2 10*3/MM3 (ref 0–0.4)
EOSINOPHIL NFR BLD AUTO: 2 % (ref 0.3–6.2)
ERYTHROCYTE [DISTWIDTH] IN BLOOD BY AUTOMATED COUNT: 16 % (ref 12.3–15.4)
GFR SERPL CREATININE-BSD FRML MDRD: 45 ML/MIN/1.73
GLUCOSE SERPL-MCNC: 167 MG/DL (ref 65–99)
HCT VFR BLD AUTO: 40.2 % (ref 37.5–51)
HGB BLD-MCNC: 13.3 G/DL (ref 13–17.7)
LYMPHOCYTES # BLD AUTO: 1.1 10*3/MM3 (ref 0.7–3.1)
LYMPHOCYTES NFR BLD AUTO: 11.8 % (ref 19.6–45.3)
MCH RBC QN AUTO: 27.3 PG (ref 26.6–33)
MCHC RBC AUTO-ENTMCNC: 33.1 G/DL (ref 31.5–35.7)
MCV RBC AUTO: 82.7 FL (ref 79–97)
MONOCYTES # BLD AUTO: 1.4 10*3/MM3 (ref 0.1–0.9)
MONOCYTES NFR BLD AUTO: 13.9 % (ref 5–12)
NEUTROPHILS NFR BLD AUTO: 7 10*3/MM3 (ref 1.7–7)
NEUTROPHILS NFR BLD AUTO: 71.3 % (ref 42.7–76)
NRBC BLD AUTO-RTO: 0.1 /100 WBC (ref 0–0.2)
PHOSPHATE SERPL-MCNC: 3 MG/DL (ref 2.5–4.5)
PLATELET # BLD AUTO: 236 10*3/MM3 (ref 140–450)
PMV BLD AUTO: 9.3 FL (ref 6–12)
POTASSIUM SERPL-SCNC: 3.7 MMOL/L (ref 3.5–5.2)
RBC # BLD AUTO: 4.86 10*6/MM3 (ref 4.14–5.8)
SODIUM SERPL-SCNC: 131 MMOL/L (ref 136–145)
WBC # BLD AUTO: 9.7 10*3/MM3 (ref 3.4–10.8)

## 2020-11-15 PROCEDURE — 25010000003 MILRINONE LACTATE IN DEXTROSE 20-5 MG/100ML-% SOLUTION: Performed by: INTERNAL MEDICINE

## 2020-11-15 PROCEDURE — 80069 RENAL FUNCTION PANEL: CPT | Performed by: INTERNAL MEDICINE

## 2020-11-15 PROCEDURE — 25010000002 ONDANSETRON PER 1 MG: Performed by: INTERNAL MEDICINE

## 2020-11-15 PROCEDURE — 25010000002 MORPHINE PER 10 MG: Performed by: PHYSICIAN ASSISTANT

## 2020-11-15 PROCEDURE — 85025 COMPLETE CBC W/AUTO DIFF WBC: CPT | Performed by: INTERNAL MEDICINE

## 2020-11-15 PROCEDURE — 99232 SBSQ HOSP IP/OBS MODERATE 35: CPT | Performed by: INTERNAL MEDICINE

## 2020-11-15 PROCEDURE — 94799 UNLISTED PULMONARY SVC/PX: CPT

## 2020-11-15 PROCEDURE — 99233 SBSQ HOSP IP/OBS HIGH 50: CPT | Performed by: INTERNAL MEDICINE

## 2020-11-15 RX ADMIN — AZELASTINE HYDROCHLORIDE 1 SPRAY: 137 SPRAY, METERED NASAL at 08:29

## 2020-11-15 RX ADMIN — RANOLAZINE 1000 MG: 500 TABLET, FILM COATED, EXTENDED RELEASE ORAL at 08:25

## 2020-11-15 RX ADMIN — ATORVASTATIN CALCIUM 80 MG: 40 TABLET, FILM COATED ORAL at 08:25

## 2020-11-15 RX ADMIN — ONDANSETRON 4 MG: 2 INJECTION INTRAMUSCULAR; INTRAVENOUS at 05:44

## 2020-11-15 RX ADMIN — GABAPENTIN 300 MG: 300 CAPSULE ORAL at 20:59

## 2020-11-15 RX ADMIN — MEXILETINE HYDROCHLORIDE 250 MG: 250 CAPSULE ORAL at 20:59

## 2020-11-15 RX ADMIN — MORPHINE SULFATE 2 MG: 4 INJECTION INTRAVENOUS at 15:35

## 2020-11-15 RX ADMIN — MORPHINE SULFATE 2 MG: 4 INJECTION INTRAVENOUS at 01:42

## 2020-11-15 RX ADMIN — GABAPENTIN 300 MG: 300 CAPSULE ORAL at 08:25

## 2020-11-15 RX ADMIN — MORPHINE SULFATE 2 MG: 4 INJECTION INTRAVENOUS at 08:35

## 2020-11-15 RX ADMIN — IPRATROPIUM BROMIDE AND ALBUTEROL SULFATE 3 ML: 2.5; .5 SOLUTION RESPIRATORY (INHALATION) at 19:40

## 2020-11-15 RX ADMIN — MEXILETINE HYDROCHLORIDE 250 MG: 250 CAPSULE ORAL at 08:25

## 2020-11-15 RX ADMIN — APIXABAN 5 MG: 5 TABLET, FILM COATED ORAL at 08:25

## 2020-11-15 RX ADMIN — MORPHINE SULFATE 2 MG: 4 INJECTION INTRAVENOUS at 05:44

## 2020-11-15 RX ADMIN — BUMETANIDE 1 MG/HR: 0.25 INJECTION INTRAMUSCULAR; INTRAVENOUS at 02:01

## 2020-11-15 RX ADMIN — ROFLUMILAST 500 MCG: 500 TABLET ORAL at 08:25

## 2020-11-15 RX ADMIN — MONTELUKAST 10 MG: 10 TABLET, FILM COATED ORAL at 08:25

## 2020-11-15 RX ADMIN — MILRINONE LACTATE IN DEXTROSE 0.25 MCG/KG/MIN: 200 INJECTION, SOLUTION INTRAVENOUS at 04:34

## 2020-11-15 RX ADMIN — CLOPIDOGREL BISULFATE 75 MG: 75 TABLET ORAL at 08:25

## 2020-11-15 RX ADMIN — RANOLAZINE 1000 MG: 500 TABLET, FILM COATED, EXTENDED RELEASE ORAL at 20:59

## 2020-11-15 RX ADMIN — APIXABAN 5 MG: 5 TABLET, FILM COATED ORAL at 20:59

## 2020-11-15 RX ADMIN — DULOXETINE HYDROCHLORIDE 60 MG: 30 CAPSULE, DELAYED RELEASE ORAL at 08:25

## 2020-11-15 RX ADMIN — ONDANSETRON 4 MG: 2 INJECTION INTRAMUSCULAR; INTRAVENOUS at 20:59

## 2020-11-15 RX ADMIN — MORPHINE SULFATE 2 MG: 4 INJECTION INTRAVENOUS at 20:59

## 2020-11-15 RX ADMIN — GABAPENTIN 300 MG: 300 CAPSULE ORAL at 15:35

## 2020-11-15 RX ADMIN — IPRATROPIUM BROMIDE AND ALBUTEROL SULFATE 3 ML: 2.5; .5 SOLUTION RESPIRATORY (INHALATION) at 07:00

## 2020-11-15 RX ADMIN — MEXILETINE HYDROCHLORIDE 250 MG: 250 CAPSULE ORAL at 15:35

## 2020-11-15 RX ADMIN — METOPROLOL SUCCINATE 50 MG: 50 TABLET, EXTENDED RELEASE ORAL at 08:25

## 2020-11-15 RX ADMIN — ONDANSETRON 4 MG: 2 INJECTION INTRAMUSCULAR; INTRAVENOUS at 15:34

## 2020-11-15 RX ADMIN — TAMSULOSIN HYDROCHLORIDE 0.4 MG: 0.4 CAPSULE ORAL at 20:59

## 2020-11-15 RX ADMIN — MILRINONE LACTATE IN DEXTROSE 0.25 MCG/KG/MIN: 200 INJECTION, SOLUTION INTRAVENOUS at 16:03

## 2020-11-15 RX ADMIN — BUMETANIDE 1 MG/HR: 0.25 INJECTION INTRAMUSCULAR; INTRAVENOUS at 22:01

## 2020-11-15 RX ADMIN — Medication 10 ML: at 08:25

## 2020-11-15 RX ADMIN — Medication 10 ML: at 21:00

## 2020-11-16 LAB
ALBUMIN SERPL-MCNC: 4.1 G/DL (ref 3.5–5.2)
ANION GAP SERPL CALCULATED.3IONS-SCNC: 13 MMOL/L (ref 5–15)
BASOPHILS # BLD AUTO: 0.1 10*3/MM3 (ref 0–0.2)
BASOPHILS NFR BLD AUTO: 1 % (ref 0–1.5)
BUN SERPL-MCNC: 26 MG/DL (ref 6–20)
BUN/CREAT SERPL: 17.4 (ref 7–25)
CALCIUM SPEC-SCNC: 8.5 MG/DL (ref 8.6–10.5)
CHLORIDE SERPL-SCNC: 83 MMOL/L (ref 98–107)
CO2 SERPL-SCNC: 37 MMOL/L (ref 22–29)
CREAT SERPL-MCNC: 1.49 MG/DL (ref 0.76–1.27)
DEPRECATED RDW RBC AUTO: 46.8 FL (ref 37–54)
EOSINOPHIL # BLD AUTO: 0.3 10*3/MM3 (ref 0–0.4)
EOSINOPHIL NFR BLD AUTO: 2.3 % (ref 0.3–6.2)
ERYTHROCYTE [DISTWIDTH] IN BLOOD BY AUTOMATED COUNT: 16.2 % (ref 12.3–15.4)
GFR SERPL CREATININE-BSD FRML MDRD: 48 ML/MIN/1.73
GLUCOSE SERPL-MCNC: 103 MG/DL (ref 65–99)
HCT VFR BLD AUTO: 41.7 % (ref 37.5–51)
HGB BLD-MCNC: 13.5 G/DL (ref 13–17.7)
LYMPHOCYTES # BLD AUTO: 0.9 10*3/MM3 (ref 0.7–3.1)
LYMPHOCYTES NFR BLD AUTO: 7.2 % (ref 19.6–45.3)
MCH RBC QN AUTO: 26.8 PG (ref 26.6–33)
MCHC RBC AUTO-ENTMCNC: 32.5 G/DL (ref 31.5–35.7)
MCV RBC AUTO: 82.7 FL (ref 79–97)
MONOCYTES # BLD AUTO: 2.1 10*3/MM3 (ref 0.1–0.9)
MONOCYTES NFR BLD AUTO: 16.3 % (ref 5–12)
NEUTROPHILS NFR BLD AUTO: 73.2 % (ref 42.7–76)
NEUTROPHILS NFR BLD AUTO: 9.5 10*3/MM3 (ref 1.7–7)
NRBC BLD AUTO-RTO: 0.1 /100 WBC (ref 0–0.2)
PHOSPHATE SERPL-MCNC: 3.5 MG/DL (ref 2.5–4.5)
PLATELET # BLD AUTO: 257 10*3/MM3 (ref 140–450)
PMV BLD AUTO: 9.2 FL (ref 6–12)
POTASSIUM SERPL-SCNC: 3.3 MMOL/L (ref 3.5–5.2)
RBC # BLD AUTO: 5.05 10*6/MM3 (ref 4.14–5.8)
SODIUM SERPL-SCNC: 133 MMOL/L (ref 136–145)
WBC # BLD AUTO: 13 10*3/MM3 (ref 3.4–10.8)

## 2020-11-16 PROCEDURE — 94799 UNLISTED PULMONARY SVC/PX: CPT

## 2020-11-16 PROCEDURE — 85025 COMPLETE CBC W/AUTO DIFF WBC: CPT | Performed by: INTERNAL MEDICINE

## 2020-11-16 PROCEDURE — 25010000002 ONDANSETRON PER 1 MG: Performed by: INTERNAL MEDICINE

## 2020-11-16 PROCEDURE — 80069 RENAL FUNCTION PANEL: CPT | Performed by: INTERNAL MEDICINE

## 2020-11-16 PROCEDURE — 25010000002 MORPHINE PER 10 MG: Performed by: PHYSICIAN ASSISTANT

## 2020-11-16 PROCEDURE — 99232 SBSQ HOSP IP/OBS MODERATE 35: CPT | Performed by: INTERNAL MEDICINE

## 2020-11-16 PROCEDURE — 25010000003 MILRINONE LACTATE IN DEXTROSE 20-5 MG/100ML-% SOLUTION: Performed by: INTERNAL MEDICINE

## 2020-11-16 PROCEDURE — 99232 SBSQ HOSP IP/OBS MODERATE 35: CPT | Performed by: HOSPITALIST

## 2020-11-16 RX ORDER — METOLAZONE 5 MG/1
5 TABLET ORAL ONCE
Status: COMPLETED | OUTPATIENT
Start: 2020-11-16 | End: 2020-11-16

## 2020-11-16 RX ORDER — DOCUSATE SODIUM 100 MG/1
100 CAPSULE, LIQUID FILLED ORAL DAILY PRN
Status: DISCONTINUED | OUTPATIENT
Start: 2020-11-16 | End: 2020-11-20

## 2020-11-16 RX ORDER — POTASSIUM CHLORIDE 20 MEQ/1
40 TABLET, EXTENDED RELEASE ORAL ONCE
Status: COMPLETED | OUTPATIENT
Start: 2020-11-16 | End: 2020-11-16

## 2020-11-16 RX ADMIN — TAMSULOSIN HYDROCHLORIDE 0.4 MG: 0.4 CAPSULE ORAL at 20:23

## 2020-11-16 RX ADMIN — GABAPENTIN 300 MG: 300 CAPSULE ORAL at 08:23

## 2020-11-16 RX ADMIN — IPRATROPIUM BROMIDE AND ALBUTEROL SULFATE 3 ML: 2.5; .5 SOLUTION RESPIRATORY (INHALATION) at 18:55

## 2020-11-16 RX ADMIN — MORPHINE SULFATE 2 MG: 4 INJECTION INTRAVENOUS at 13:09

## 2020-11-16 RX ADMIN — MORPHINE SULFATE 2 MG: 4 INJECTION INTRAVENOUS at 02:08

## 2020-11-16 RX ADMIN — ONDANSETRON 4 MG: 2 INJECTION INTRAMUSCULAR; INTRAVENOUS at 08:33

## 2020-11-16 RX ADMIN — RANOLAZINE 1000 MG: 500 TABLET, FILM COATED, EXTENDED RELEASE ORAL at 20:23

## 2020-11-16 RX ADMIN — MILRINONE LACTATE IN DEXTROSE 0.25 MCG/KG/MIN: 200 INJECTION, SOLUTION INTRAVENOUS at 12:13

## 2020-11-16 RX ADMIN — AZELASTINE HYDROCHLORIDE 1 SPRAY: 137 SPRAY, METERED NASAL at 08:24

## 2020-11-16 RX ADMIN — GABAPENTIN 300 MG: 300 CAPSULE ORAL at 17:19

## 2020-11-16 RX ADMIN — MEXILETINE HYDROCHLORIDE 250 MG: 250 CAPSULE ORAL at 20:23

## 2020-11-16 RX ADMIN — IPRATROPIUM BROMIDE AND ALBUTEROL SULFATE 3 ML: 2.5; .5 SOLUTION RESPIRATORY (INHALATION) at 15:35

## 2020-11-16 RX ADMIN — DOCUSATE SODIUM 100 MG: 100 CAPSULE, LIQUID FILLED ORAL at 22:11

## 2020-11-16 RX ADMIN — MORPHINE SULFATE 2 MG: 4 INJECTION INTRAVENOUS at 05:33

## 2020-11-16 RX ADMIN — MORPHINE SULFATE 2 MG: 4 INJECTION INTRAVENOUS at 17:19

## 2020-11-16 RX ADMIN — MEXILETINE HYDROCHLORIDE 250 MG: 250 CAPSULE ORAL at 08:23

## 2020-11-16 RX ADMIN — Medication 10 ML: at 20:24

## 2020-11-16 RX ADMIN — MILRINONE LACTATE IN DEXTROSE 0.25 MCG/KG/MIN: 200 INJECTION, SOLUTION INTRAVENOUS at 20:22

## 2020-11-16 RX ADMIN — GABAPENTIN 300 MG: 300 CAPSULE ORAL at 20:23

## 2020-11-16 RX ADMIN — METOLAZONE 5 MG: 5 TABLET ORAL at 13:09

## 2020-11-16 RX ADMIN — IPRATROPIUM BROMIDE AND ALBUTEROL SULFATE 3 ML: 2.5; .5 SOLUTION RESPIRATORY (INHALATION) at 07:00

## 2020-11-16 RX ADMIN — RANOLAZINE 1000 MG: 500 TABLET, FILM COATED, EXTENDED RELEASE ORAL at 08:23

## 2020-11-16 RX ADMIN — MORPHINE SULFATE 2 MG: 4 INJECTION INTRAVENOUS at 08:33

## 2020-11-16 RX ADMIN — APIXABAN 5 MG: 5 TABLET, FILM COATED ORAL at 20:24

## 2020-11-16 RX ADMIN — MILRINONE LACTATE IN DEXTROSE 0.25 MCG/KG/MIN: 200 INJECTION, SOLUTION INTRAVENOUS at 00:48

## 2020-11-16 RX ADMIN — ROFLUMILAST 500 MCG: 500 TABLET ORAL at 08:23

## 2020-11-16 RX ADMIN — CLOPIDOGREL BISULFATE 75 MG: 75 TABLET ORAL at 08:23

## 2020-11-16 RX ADMIN — METOPROLOL SUCCINATE 50 MG: 50 TABLET, EXTENDED RELEASE ORAL at 08:24

## 2020-11-16 RX ADMIN — MORPHINE SULFATE 2 MG: 4 INJECTION INTRAVENOUS at 20:25

## 2020-11-16 RX ADMIN — ONDANSETRON 4 MG: 2 INJECTION INTRAMUSCULAR; INTRAVENOUS at 02:08

## 2020-11-16 RX ADMIN — DULOXETINE HYDROCHLORIDE 60 MG: 30 CAPSULE, DELAYED RELEASE ORAL at 08:23

## 2020-11-16 RX ADMIN — BUMETANIDE 1 MG/HR: 0.25 INJECTION INTRAMUSCULAR; INTRAVENOUS at 22:04

## 2020-11-16 RX ADMIN — ATORVASTATIN CALCIUM 80 MG: 40 TABLET, FILM COATED ORAL at 08:23

## 2020-11-16 RX ADMIN — MONTELUKAST 10 MG: 10 TABLET, FILM COATED ORAL at 08:23

## 2020-11-16 RX ADMIN — MEXILETINE HYDROCHLORIDE 250 MG: 250 CAPSULE ORAL at 17:19

## 2020-11-16 RX ADMIN — POTASSIUM CHLORIDE 40 MEQ: 1500 TABLET, EXTENDED RELEASE ORAL at 13:09

## 2020-11-16 RX ADMIN — ONDANSETRON 4 MG: 2 INJECTION INTRAMUSCULAR; INTRAVENOUS at 20:25

## 2020-11-16 RX ADMIN — APIXABAN 5 MG: 5 TABLET, FILM COATED ORAL at 08:24

## 2020-11-16 RX ADMIN — Medication 10 ML: at 08:24

## 2020-11-17 LAB
ALBUMIN SERPL-MCNC: 4.2 G/DL (ref 3.5–5.2)
ANION GAP SERPL CALCULATED.3IONS-SCNC: 13 MMOL/L (ref 5–15)
BASOPHILS # BLD AUTO: 0.1 10*3/MM3 (ref 0–0.2)
BASOPHILS NFR BLD AUTO: 0.9 % (ref 0–1.5)
BUN SERPL-MCNC: 26 MG/DL (ref 6–20)
BUN/CREAT SERPL: 14.2 (ref 7–25)
CALCIUM SPEC-SCNC: 9.2 MG/DL (ref 8.6–10.5)
CHLORIDE SERPL-SCNC: 78 MMOL/L (ref 98–107)
CO2 SERPL-SCNC: 43 MMOL/L (ref 22–29)
CREAT SERPL-MCNC: 1.83 MG/DL (ref 0.76–1.27)
DEPRECATED RDW RBC AUTO: 45.9 FL (ref 37–54)
EOSINOPHIL # BLD AUTO: 0.3 10*3/MM3 (ref 0–0.4)
EOSINOPHIL NFR BLD AUTO: 2.3 % (ref 0.3–6.2)
ERYTHROCYTE [DISTWIDTH] IN BLOOD BY AUTOMATED COUNT: 16.1 % (ref 12.3–15.4)
GFR SERPL CREATININE-BSD FRML MDRD: 38 ML/MIN/1.73
GLUCOSE SERPL-MCNC: 118 MG/DL (ref 65–99)
HCT VFR BLD AUTO: 43.3 % (ref 37.5–51)
HGB BLD-MCNC: 14.1 G/DL (ref 13–17.7)
LYMPHOCYTES # BLD AUTO: 1 10*3/MM3 (ref 0.7–3.1)
LYMPHOCYTES NFR BLD AUTO: 8.3 % (ref 19.6–45.3)
MAGNESIUM SERPL-MCNC: 1.6 MG/DL (ref 1.6–2.6)
MCH RBC QN AUTO: 26.8 PG (ref 26.6–33)
MCHC RBC AUTO-ENTMCNC: 32.5 G/DL (ref 31.5–35.7)
MCV RBC AUTO: 82.5 FL (ref 79–97)
MONOCYTES # BLD AUTO: 2.1 10*3/MM3 (ref 0.1–0.9)
MONOCYTES NFR BLD AUTO: 17 % (ref 5–12)
NEUTROPHILS NFR BLD AUTO: 71.5 % (ref 42.7–76)
NEUTROPHILS NFR BLD AUTO: 8.8 10*3/MM3 (ref 1.7–7)
NRBC BLD AUTO-RTO: 0.2 /100 WBC (ref 0–0.2)
PHOSPHATE SERPL-MCNC: 3.6 MG/DL (ref 2.5–4.5)
PLATELET # BLD AUTO: 288 10*3/MM3 (ref 140–450)
PMV BLD AUTO: 9.4 FL (ref 6–12)
POTASSIUM SERPL-SCNC: 2.7 MMOL/L (ref 3.5–5.2)
POTASSIUM SERPL-SCNC: 2.9 MMOL/L (ref 3.5–5.2)
RBC # BLD AUTO: 5.25 10*6/MM3 (ref 4.14–5.8)
SODIUM SERPL-SCNC: 134 MMOL/L (ref 136–145)
WBC # BLD AUTO: 12.3 10*3/MM3 (ref 3.4–10.8)

## 2020-11-17 PROCEDURE — 99232 SBSQ HOSP IP/OBS MODERATE 35: CPT | Performed by: HOSPITALIST

## 2020-11-17 PROCEDURE — 94799 UNLISTED PULMONARY SVC/PX: CPT

## 2020-11-17 PROCEDURE — 84132 ASSAY OF SERUM POTASSIUM: CPT | Performed by: HOSPITALIST

## 2020-11-17 PROCEDURE — 83735 ASSAY OF MAGNESIUM: CPT | Performed by: HOSPITALIST

## 2020-11-17 PROCEDURE — 25010000002 MORPHINE PER 10 MG: Performed by: PHYSICIAN ASSISTANT

## 2020-11-17 PROCEDURE — 80069 RENAL FUNCTION PANEL: CPT | Performed by: INTERNAL MEDICINE

## 2020-11-17 PROCEDURE — 85025 COMPLETE CBC W/AUTO DIFF WBC: CPT | Performed by: INTERNAL MEDICINE

## 2020-11-17 PROCEDURE — 25010000002 ONDANSETRON PER 1 MG: Performed by: INTERNAL MEDICINE

## 2020-11-17 PROCEDURE — 99233 SBSQ HOSP IP/OBS HIGH 50: CPT | Performed by: INTERNAL MEDICINE

## 2020-11-17 PROCEDURE — 25010000003 MILRINONE LACTATE IN DEXTROSE 20-5 MG/100ML-% SOLUTION: Performed by: INTERNAL MEDICINE

## 2020-11-17 RX ORDER — POTASSIUM CHLORIDE 20 MEQ/1
40 TABLET, EXTENDED RELEASE ORAL ONCE
Status: COMPLETED | OUTPATIENT
Start: 2020-11-17 | End: 2020-11-17

## 2020-11-17 RX ORDER — POTASSIUM CHLORIDE 20 MEQ/1
40 TABLET, EXTENDED RELEASE ORAL EVERY 4 HOURS
Status: DISCONTINUED | OUTPATIENT
Start: 2020-11-17 | End: 2020-11-17

## 2020-11-17 RX ADMIN — MILRINONE LACTATE IN DEXTROSE 0.25 MCG/KG/MIN: 200 INJECTION, SOLUTION INTRAVENOUS at 14:25

## 2020-11-17 RX ADMIN — ONDANSETRON 4 MG: 2 INJECTION INTRAMUSCULAR; INTRAVENOUS at 17:59

## 2020-11-17 RX ADMIN — ATORVASTATIN CALCIUM 80 MG: 40 TABLET, FILM COATED ORAL at 09:19

## 2020-11-17 RX ADMIN — MEXILETINE HYDROCHLORIDE 250 MG: 250 CAPSULE ORAL at 09:18

## 2020-11-17 RX ADMIN — GABAPENTIN 300 MG: 300 CAPSULE ORAL at 16:28

## 2020-11-17 RX ADMIN — IPRATROPIUM BROMIDE AND ALBUTEROL SULFATE 3 ML: 2.5; .5 SOLUTION RESPIRATORY (INHALATION) at 08:05

## 2020-11-17 RX ADMIN — MONTELUKAST 10 MG: 10 TABLET, FILM COATED ORAL at 09:19

## 2020-11-17 RX ADMIN — APIXABAN 5 MG: 5 TABLET, FILM COATED ORAL at 22:10

## 2020-11-17 RX ADMIN — DOCUSATE SODIUM 100 MG: 100 CAPSULE, LIQUID FILLED ORAL at 17:59

## 2020-11-17 RX ADMIN — Medication 10 ML: at 22:08

## 2020-11-17 RX ADMIN — MORPHINE SULFATE 2 MG: 4 INJECTION INTRAVENOUS at 17:59

## 2020-11-17 RX ADMIN — POTASSIUM CHLORIDE 40 MEQ: 1500 TABLET, EXTENDED RELEASE ORAL at 22:11

## 2020-11-17 RX ADMIN — POTASSIUM CHLORIDE 40 MEQ: 1500 TABLET, EXTENDED RELEASE ORAL at 14:17

## 2020-11-17 RX ADMIN — AZELASTINE HYDROCHLORIDE 1 SPRAY: 137 SPRAY, METERED NASAL at 09:20

## 2020-11-17 RX ADMIN — MORPHINE SULFATE 2 MG: 4 INJECTION INTRAVENOUS at 09:19

## 2020-11-17 RX ADMIN — GABAPENTIN 300 MG: 300 CAPSULE ORAL at 09:19

## 2020-11-17 RX ADMIN — ONDANSETRON 4 MG: 2 INJECTION INTRAMUSCULAR; INTRAVENOUS at 02:02

## 2020-11-17 RX ADMIN — MEXILETINE HYDROCHLORIDE 250 MG: 250 CAPSULE ORAL at 22:10

## 2020-11-17 RX ADMIN — MORPHINE SULFATE 2 MG: 4 INJECTION INTRAVENOUS at 02:02

## 2020-11-17 RX ADMIN — APIXABAN 5 MG: 5 TABLET, FILM COATED ORAL at 09:19

## 2020-11-17 RX ADMIN — MEXILETINE HYDROCHLORIDE 250 MG: 250 CAPSULE ORAL at 16:28

## 2020-11-17 RX ADMIN — IPRATROPIUM BROMIDE AND ALBUTEROL SULFATE 3 ML: 2.5; .5 SOLUTION RESPIRATORY (INHALATION) at 19:57

## 2020-11-17 RX ADMIN — Medication 10 ML: at 09:19

## 2020-11-17 RX ADMIN — RANOLAZINE 1000 MG: 500 TABLET, FILM COATED, EXTENDED RELEASE ORAL at 09:18

## 2020-11-17 RX ADMIN — MORPHINE SULFATE 2 MG: 4 INJECTION INTRAVENOUS at 14:50

## 2020-11-17 RX ADMIN — POTASSIUM CHLORIDE 40 MEQ: 1500 TABLET, EXTENDED RELEASE ORAL at 16:28

## 2020-11-17 RX ADMIN — GABAPENTIN 300 MG: 300 CAPSULE ORAL at 22:12

## 2020-11-17 RX ADMIN — TAMSULOSIN HYDROCHLORIDE 0.4 MG: 0.4 CAPSULE ORAL at 22:12

## 2020-11-17 RX ADMIN — METOPROLOL SUCCINATE 50 MG: 50 TABLET, EXTENDED RELEASE ORAL at 09:19

## 2020-11-17 RX ADMIN — MILRINONE LACTATE IN DEXTROSE 0.25 MCG/KG/MIN: 200 INJECTION, SOLUTION INTRAVENOUS at 05:02

## 2020-11-17 RX ADMIN — IPRATROPIUM BROMIDE AND ALBUTEROL SULFATE 3 ML: 2.5; .5 SOLUTION RESPIRATORY (INHALATION) at 15:57

## 2020-11-17 RX ADMIN — RANOLAZINE 1000 MG: 500 TABLET, FILM COATED, EXTENDED RELEASE ORAL at 22:09

## 2020-11-17 RX ADMIN — ROFLUMILAST 500 MCG: 500 TABLET ORAL at 09:18

## 2020-11-17 RX ADMIN — POTASSIUM CHLORIDE 40 MEQ: 1500 TABLET, EXTENDED RELEASE ORAL at 09:18

## 2020-11-17 RX ADMIN — CLOPIDOGREL BISULFATE 75 MG: 75 TABLET ORAL at 09:19

## 2020-11-17 RX ADMIN — DULOXETINE HYDROCHLORIDE 60 MG: 30 CAPSULE, DELAYED RELEASE ORAL at 09:19

## 2020-11-18 LAB
ALBUMIN SERPL-MCNC: 3.9 G/DL (ref 3.5–5.2)
ANION GAP SERPL CALCULATED.3IONS-SCNC: 14 MMOL/L (ref 5–15)
BASOPHILS # BLD AUTO: 0.2 10*3/MM3 (ref 0–0.2)
BASOPHILS NFR BLD AUTO: 1.1 % (ref 0–1.5)
BUN SERPL-MCNC: 34 MG/DL (ref 6–20)
BUN/CREAT SERPL: 16 (ref 7–25)
CALCIUM SPEC-SCNC: 9.1 MG/DL (ref 8.6–10.5)
CHLORIDE SERPL-SCNC: 77 MMOL/L (ref 98–107)
CO2 SERPL-SCNC: 39 MMOL/L (ref 22–29)
CREAT SERPL-MCNC: 2.13 MG/DL (ref 0.76–1.27)
DEPRECATED RDW RBC AUTO: 46.8 FL (ref 37–54)
EOSINOPHIL # BLD AUTO: 0.3 10*3/MM3 (ref 0–0.4)
EOSINOPHIL NFR BLD AUTO: 2 % (ref 0.3–6.2)
ERYTHROCYTE [DISTWIDTH] IN BLOOD BY AUTOMATED COUNT: 16.2 % (ref 12.3–15.4)
GFR SERPL CREATININE-BSD FRML MDRD: 32 ML/MIN/1.73
GLUCOSE SERPL-MCNC: 124 MG/DL (ref 65–99)
HCT VFR BLD AUTO: 44.2 % (ref 37.5–51)
HGB BLD-MCNC: 14.5 G/DL (ref 13–17.7)
LYMPHOCYTES # BLD AUTO: 0.9 10*3/MM3 (ref 0.7–3.1)
LYMPHOCYTES NFR BLD AUTO: 6.3 % (ref 19.6–45.3)
MCH RBC QN AUTO: 26.9 PG (ref 26.6–33)
MCHC RBC AUTO-ENTMCNC: 32.7 G/DL (ref 31.5–35.7)
MCV RBC AUTO: 82.2 FL (ref 79–97)
MONOCYTES # BLD AUTO: 2.2 10*3/MM3 (ref 0.1–0.9)
MONOCYTES NFR BLD AUTO: 15.3 % (ref 5–12)
NEUTROPHILS NFR BLD AUTO: 10.9 10*3/MM3 (ref 1.7–7)
NEUTROPHILS NFR BLD AUTO: 75.3 % (ref 42.7–76)
NRBC BLD AUTO-RTO: 0 /100 WBC (ref 0–0.2)
PHOSPHATE SERPL-MCNC: 3.5 MG/DL (ref 2.5–4.5)
PLATELET # BLD AUTO: 274 10*3/MM3 (ref 140–450)
PMV BLD AUTO: 9.4 FL (ref 6–12)
POTASSIUM SERPL-SCNC: 3 MMOL/L (ref 3.5–5.2)
RBC # BLD AUTO: 5.38 10*6/MM3 (ref 4.14–5.8)
SODIUM SERPL-SCNC: 130 MMOL/L (ref 136–145)
WBC # BLD AUTO: 14.5 10*3/MM3 (ref 3.4–10.8)

## 2020-11-18 PROCEDURE — 99232 SBSQ HOSP IP/OBS MODERATE 35: CPT | Performed by: HOSPITALIST

## 2020-11-18 PROCEDURE — 80069 RENAL FUNCTION PANEL: CPT | Performed by: INTERNAL MEDICINE

## 2020-11-18 PROCEDURE — 25010000003 MILRINONE LACTATE IN DEXTROSE 20-5 MG/100ML-% SOLUTION: Performed by: INTERNAL MEDICINE

## 2020-11-18 PROCEDURE — 85025 COMPLETE CBC W/AUTO DIFF WBC: CPT | Performed by: INTERNAL MEDICINE

## 2020-11-18 PROCEDURE — 94799 UNLISTED PULMONARY SVC/PX: CPT

## 2020-11-18 PROCEDURE — 25010000002 ONDANSETRON PER 1 MG: Performed by: INTERNAL MEDICINE

## 2020-11-18 PROCEDURE — 99232 SBSQ HOSP IP/OBS MODERATE 35: CPT | Performed by: INTERNAL MEDICINE

## 2020-11-18 RX ORDER — POTASSIUM CHLORIDE 20 MEQ/1
40 TABLET, EXTENDED RELEASE ORAL ONCE
Status: COMPLETED | OUTPATIENT
Start: 2020-11-18 | End: 2020-11-18

## 2020-11-18 RX ORDER — ACETAZOLAMIDE 250 MG/1
250 TABLET ORAL 3 TIMES DAILY
Status: DISCONTINUED | OUTPATIENT
Start: 2020-11-18 | End: 2020-11-20

## 2020-11-18 RX ADMIN — ACETAZOLAMIDE 250 MG: 250 TABLET ORAL at 21:11

## 2020-11-18 RX ADMIN — ROFLUMILAST 500 MCG: 500 TABLET ORAL at 10:12

## 2020-11-18 RX ADMIN — RANOLAZINE 1000 MG: 500 TABLET, FILM COATED, EXTENDED RELEASE ORAL at 21:10

## 2020-11-18 RX ADMIN — MILRINONE LACTATE IN DEXTROSE 0.25 MCG/KG/MIN: 200 INJECTION, SOLUTION INTRAVENOUS at 19:02

## 2020-11-18 RX ADMIN — MILRINONE LACTATE IN DEXTROSE 0.25 MCG/KG/MIN: 200 INJECTION, SOLUTION INTRAVENOUS at 10:17

## 2020-11-18 RX ADMIN — IPRATROPIUM BROMIDE AND ALBUTEROL SULFATE 3 ML: 2.5; .5 SOLUTION RESPIRATORY (INHALATION) at 11:00

## 2020-11-18 RX ADMIN — RANOLAZINE 1000 MG: 500 TABLET, FILM COATED, EXTENDED RELEASE ORAL at 10:11

## 2020-11-18 RX ADMIN — GABAPENTIN 300 MG: 300 CAPSULE ORAL at 21:10

## 2020-11-18 RX ADMIN — Medication 10 ML: at 21:10

## 2020-11-18 RX ADMIN — TAMSULOSIN HYDROCHLORIDE 0.4 MG: 0.4 CAPSULE ORAL at 21:11

## 2020-11-18 RX ADMIN — MEXILETINE HYDROCHLORIDE 250 MG: 250 CAPSULE ORAL at 21:11

## 2020-11-18 RX ADMIN — IPRATROPIUM BROMIDE AND ALBUTEROL SULFATE 3 ML: 2.5; .5 SOLUTION RESPIRATORY (INHALATION) at 15:32

## 2020-11-18 RX ADMIN — APIXABAN 5 MG: 5 TABLET, FILM COATED ORAL at 21:11

## 2020-11-18 RX ADMIN — ACETAZOLAMIDE 250 MG: 250 TABLET ORAL at 10:12

## 2020-11-18 RX ADMIN — ACETAMINOPHEN 325 MG: 325 TABLET, FILM COATED ORAL at 10:12

## 2020-11-18 RX ADMIN — ACETAMINOPHEN 325 MG: 325 TABLET, FILM COATED ORAL at 05:18

## 2020-11-18 RX ADMIN — AZELASTINE HYDROCHLORIDE 1 SPRAY: 137 SPRAY, METERED NASAL at 10:13

## 2020-11-18 RX ADMIN — APIXABAN 5 MG: 5 TABLET, FILM COATED ORAL at 10:11

## 2020-11-18 RX ADMIN — MONTELUKAST 10 MG: 10 TABLET, FILM COATED ORAL at 10:12

## 2020-11-18 RX ADMIN — METOPROLOL SUCCINATE 50 MG: 50 TABLET, EXTENDED RELEASE ORAL at 10:11

## 2020-11-18 RX ADMIN — MEXILETINE HYDROCHLORIDE 250 MG: 250 CAPSULE ORAL at 10:11

## 2020-11-18 RX ADMIN — DULOXETINE HYDROCHLORIDE 60 MG: 30 CAPSULE, DELAYED RELEASE ORAL at 10:12

## 2020-11-18 RX ADMIN — MILRINONE LACTATE IN DEXTROSE 0.25 MCG/KG/MIN: 200 INJECTION, SOLUTION INTRAVENOUS at 00:10

## 2020-11-18 RX ADMIN — MEXILETINE HYDROCHLORIDE 250 MG: 250 CAPSULE ORAL at 17:36

## 2020-11-18 RX ADMIN — GABAPENTIN 300 MG: 300 CAPSULE ORAL at 10:11

## 2020-11-18 RX ADMIN — IPRATROPIUM BROMIDE AND ALBUTEROL SULFATE 3 ML: 2.5; .5 SOLUTION RESPIRATORY (INHALATION) at 20:25

## 2020-11-18 RX ADMIN — Medication 10 ML: at 10:13

## 2020-11-18 RX ADMIN — CLOPIDOGREL BISULFATE 75 MG: 75 TABLET ORAL at 10:12

## 2020-11-18 RX ADMIN — BUMETANIDE 1 MG/HR: 0.25 INJECTION INTRAMUSCULAR; INTRAVENOUS at 00:13

## 2020-11-18 RX ADMIN — POTASSIUM CHLORIDE 40 MEQ: 1500 TABLET, EXTENDED RELEASE ORAL at 10:11

## 2020-11-18 RX ADMIN — ONDANSETRON 4 MG: 2 INJECTION INTRAMUSCULAR; INTRAVENOUS at 00:10

## 2020-11-18 RX ADMIN — ACETAZOLAMIDE 250 MG: 250 TABLET ORAL at 17:36

## 2020-11-18 RX ADMIN — GABAPENTIN 300 MG: 300 CAPSULE ORAL at 17:36

## 2020-11-18 RX ADMIN — ATORVASTATIN CALCIUM 80 MG: 40 TABLET, FILM COATED ORAL at 10:12

## 2020-11-19 ENCOUNTER — APPOINTMENT (OUTPATIENT)
Dept: GENERAL RADIOLOGY | Facility: HOSPITAL | Age: 60
End: 2020-11-19

## 2020-11-19 ENCOUNTER — APPOINTMENT (OUTPATIENT)
Dept: CT IMAGING | Facility: HOSPITAL | Age: 60
End: 2020-11-19

## 2020-11-19 LAB
ALBUMIN SERPL-MCNC: 3.8 G/DL (ref 3.5–5.2)
ANION GAP SERPL CALCULATED.3IONS-SCNC: 13 MMOL/L (ref 5–15)
BASOPHILS # BLD AUTO: 0 10*3/MM3 (ref 0–0.2)
BASOPHILS NFR BLD AUTO: 0.3 % (ref 0–1.5)
BUN SERPL-MCNC: 35 MG/DL (ref 6–20)
BUN/CREAT SERPL: 16.3 (ref 7–25)
CALCIUM SPEC-SCNC: 10 MG/DL (ref 8.6–10.5)
CHLORIDE SERPL-SCNC: 77 MMOL/L (ref 98–107)
CO2 SERPL-SCNC: 41 MMOL/L (ref 22–29)
CREAT SERPL-MCNC: 2.15 MG/DL (ref 0.76–1.27)
DEPRECATED RDW RBC AUTO: 44.6 FL (ref 37–54)
EOSINOPHIL # BLD AUTO: 0.2 10*3/MM3 (ref 0–0.4)
EOSINOPHIL NFR BLD AUTO: 1.5 % (ref 0.3–6.2)
ERYTHROCYTE [DISTWIDTH] IN BLOOD BY AUTOMATED COUNT: 15.8 % (ref 12.3–15.4)
GFR SERPL CREATININE-BSD FRML MDRD: 32 ML/MIN/1.73
GLUCOSE SERPL-MCNC: 113 MG/DL (ref 65–99)
HCT VFR BLD AUTO: 43.7 % (ref 37.5–51)
HGB BLD-MCNC: 13.9 G/DL (ref 13–17.7)
LYMPHOCYTES # BLD AUTO: 1 10*3/MM3 (ref 0.7–3.1)
LYMPHOCYTES NFR BLD AUTO: 7.5 % (ref 19.6–45.3)
MAGNESIUM SERPL-MCNC: 1.8 MG/DL (ref 1.6–2.6)
MCH RBC QN AUTO: 26.4 PG (ref 26.6–33)
MCHC RBC AUTO-ENTMCNC: 31.8 G/DL (ref 31.5–35.7)
MCV RBC AUTO: 82.9 FL (ref 79–97)
MONOCYTES # BLD AUTO: 2.8 10*3/MM3 (ref 0.1–0.9)
MONOCYTES NFR BLD AUTO: 21.8 % (ref 5–12)
NEUTROPHILS NFR BLD AUTO: 68.9 % (ref 42.7–76)
NEUTROPHILS NFR BLD AUTO: 8.9 10*3/MM3 (ref 1.7–7)
NRBC BLD AUTO-RTO: 0.1 /100 WBC (ref 0–0.2)
PHOSPHATE SERPL-MCNC: 5.1 MG/DL (ref 2.5–4.5)
PLATELET # BLD AUTO: 278 10*3/MM3 (ref 140–450)
PMV BLD AUTO: 9.9 FL (ref 6–12)
POTASSIUM SERPL-SCNC: 3 MMOL/L (ref 3.5–5.2)
RBC # BLD AUTO: 5.27 10*6/MM3 (ref 4.14–5.8)
SODIUM SERPL-SCNC: 131 MMOL/L (ref 136–145)
WBC # BLD AUTO: 12.9 10*3/MM3 (ref 3.4–10.8)

## 2020-11-19 PROCEDURE — 73560 X-RAY EXAM OF KNEE 1 OR 2: CPT

## 2020-11-19 PROCEDURE — 94799 UNLISTED PULMONARY SVC/PX: CPT

## 2020-11-19 PROCEDURE — 83735 ASSAY OF MAGNESIUM: CPT | Performed by: HOSPITALIST

## 2020-11-19 PROCEDURE — 97165 OT EVAL LOW COMPLEX 30 MIN: CPT

## 2020-11-19 PROCEDURE — 70450 CT HEAD/BRAIN W/O DYE: CPT

## 2020-11-19 PROCEDURE — 85025 COMPLETE CBC W/AUTO DIFF WBC: CPT | Performed by: INTERNAL MEDICINE

## 2020-11-19 PROCEDURE — 97162 PT EVAL MOD COMPLEX 30 MIN: CPT

## 2020-11-19 PROCEDURE — 80069 RENAL FUNCTION PANEL: CPT | Performed by: INTERNAL MEDICINE

## 2020-11-19 PROCEDURE — 73600 X-RAY EXAM OF ANKLE: CPT

## 2020-11-19 PROCEDURE — 25010000003 MILRINONE LACTATE IN DEXTROSE 20-5 MG/100ML-% SOLUTION: Performed by: INTERNAL MEDICINE

## 2020-11-19 PROCEDURE — 99233 SBSQ HOSP IP/OBS HIGH 50: CPT | Performed by: INTERNAL MEDICINE

## 2020-11-19 PROCEDURE — 99232 SBSQ HOSP IP/OBS MODERATE 35: CPT | Performed by: HOSPITALIST

## 2020-11-19 RX ORDER — MIDODRINE HYDROCHLORIDE 5 MG/1
5 TABLET ORAL EVERY 8 HOURS SCHEDULED
Status: DISCONTINUED | OUTPATIENT
Start: 2020-11-19 | End: 2020-11-25 | Stop reason: HOSPADM

## 2020-11-19 RX ORDER — METOLAZONE 5 MG/1
5 TABLET ORAL ONCE
Status: COMPLETED | OUTPATIENT
Start: 2020-11-19 | End: 2020-11-19

## 2020-11-19 RX ORDER — POTASSIUM CHLORIDE 20 MEQ/1
20 TABLET, EXTENDED RELEASE ORAL
Status: DISCONTINUED | OUTPATIENT
Start: 2020-11-20 | End: 2020-11-22

## 2020-11-19 RX ORDER — BUMETANIDE 1 MG/1
2 TABLET ORAL 3 TIMES DAILY
Status: DISCONTINUED | OUTPATIENT
Start: 2020-11-19 | End: 2020-11-20

## 2020-11-19 RX ORDER — POTASSIUM CHLORIDE 20 MEQ/1
40 TABLET, EXTENDED RELEASE ORAL EVERY 4 HOURS
Status: COMPLETED | OUTPATIENT
Start: 2020-11-19 | End: 2020-11-19

## 2020-11-19 RX ADMIN — Medication 10 ML: at 09:17

## 2020-11-19 RX ADMIN — ATORVASTATIN CALCIUM 80 MG: 40 TABLET, FILM COATED ORAL at 09:16

## 2020-11-19 RX ADMIN — ROFLUMILAST 500 MCG: 500 TABLET ORAL at 09:15

## 2020-11-19 RX ADMIN — RANOLAZINE 1000 MG: 500 TABLET, FILM COATED, EXTENDED RELEASE ORAL at 09:16

## 2020-11-19 RX ADMIN — MIDODRINE HYDROCHLORIDE 5 MG: 5 TABLET ORAL at 15:16

## 2020-11-19 RX ADMIN — GABAPENTIN 300 MG: 300 CAPSULE ORAL at 21:22

## 2020-11-19 RX ADMIN — APIXABAN 5 MG: 5 TABLET, FILM COATED ORAL at 21:22

## 2020-11-19 RX ADMIN — TAMSULOSIN HYDROCHLORIDE 0.4 MG: 0.4 CAPSULE ORAL at 21:21

## 2020-11-19 RX ADMIN — BUMETANIDE 2 MG: 1 TABLET ORAL at 21:22

## 2020-11-19 RX ADMIN — DULOXETINE HYDROCHLORIDE 60 MG: 30 CAPSULE, DELAYED RELEASE ORAL at 09:16

## 2020-11-19 RX ADMIN — ACETAZOLAMIDE 250 MG: 250 TABLET ORAL at 09:16

## 2020-11-19 RX ADMIN — BUMETANIDE 2 MG: 1 TABLET ORAL at 15:16

## 2020-11-19 RX ADMIN — ACETAZOLAMIDE 250 MG: 250 TABLET ORAL at 21:22

## 2020-11-19 RX ADMIN — AZELASTINE HYDROCHLORIDE 1 SPRAY: 137 SPRAY, METERED NASAL at 09:17

## 2020-11-19 RX ADMIN — MEXILETINE HYDROCHLORIDE 250 MG: 250 CAPSULE ORAL at 09:16

## 2020-11-19 RX ADMIN — APIXABAN 5 MG: 5 TABLET, FILM COATED ORAL at 09:16

## 2020-11-19 RX ADMIN — GABAPENTIN 300 MG: 300 CAPSULE ORAL at 15:16

## 2020-11-19 RX ADMIN — ACETAZOLAMIDE 250 MG: 250 TABLET ORAL at 15:16

## 2020-11-19 RX ADMIN — POTASSIUM CHLORIDE 40 MEQ: 1500 TABLET, EXTENDED RELEASE ORAL at 18:21

## 2020-11-19 RX ADMIN — POTASSIUM CHLORIDE 40 MEQ: 1500 TABLET, EXTENDED RELEASE ORAL at 09:16

## 2020-11-19 RX ADMIN — METOLAZONE 5 MG: 5 TABLET ORAL at 15:16

## 2020-11-19 RX ADMIN — MEXILETINE HYDROCHLORIDE 250 MG: 250 CAPSULE ORAL at 15:16

## 2020-11-19 RX ADMIN — IPRATROPIUM BROMIDE AND ALBUTEROL SULFATE 3 ML: 2.5; .5 SOLUTION RESPIRATORY (INHALATION) at 11:20

## 2020-11-19 RX ADMIN — POTASSIUM CHLORIDE 40 MEQ: 1500 TABLET, EXTENDED RELEASE ORAL at 15:16

## 2020-11-19 RX ADMIN — MIDODRINE HYDROCHLORIDE 5 MG: 5 TABLET ORAL at 21:22

## 2020-11-19 RX ADMIN — MONTELUKAST 10 MG: 10 TABLET, FILM COATED ORAL at 09:16

## 2020-11-19 RX ADMIN — RANOLAZINE 1000 MG: 500 TABLET, FILM COATED, EXTENDED RELEASE ORAL at 21:22

## 2020-11-19 RX ADMIN — MILRINONE LACTATE IN DEXTROSE 0.25 MCG/KG/MIN: 200 INJECTION, SOLUTION INTRAVENOUS at 04:02

## 2020-11-19 RX ADMIN — Medication 10 ML: at 21:22

## 2020-11-19 RX ADMIN — MILRINONE LACTATE IN DEXTROSE 0.25 MCG/KG/MIN: 200 INJECTION, SOLUTION INTRAVENOUS at 15:45

## 2020-11-19 RX ADMIN — IPRATROPIUM BROMIDE AND ALBUTEROL SULFATE 3 ML: 2.5; .5 SOLUTION RESPIRATORY (INHALATION) at 19:36

## 2020-11-19 RX ADMIN — METOPROLOL SUCCINATE 50 MG: 50 TABLET, EXTENDED RELEASE ORAL at 09:16

## 2020-11-19 RX ADMIN — MEXILETINE HYDROCHLORIDE 250 MG: 250 CAPSULE ORAL at 21:22

## 2020-11-19 RX ADMIN — GABAPENTIN 300 MG: 300 CAPSULE ORAL at 09:16

## 2020-11-19 RX ADMIN — CLOPIDOGREL BISULFATE 75 MG: 75 TABLET ORAL at 09:15

## 2020-11-20 ENCOUNTER — APPOINTMENT (OUTPATIENT)
Dept: ULTRASOUND IMAGING | Facility: HOSPITAL | Age: 60
End: 2020-11-20

## 2020-11-20 LAB
ALBUMIN SERPL-MCNC: 3.8 G/DL (ref 3.5–5.2)
ANION GAP SERPL CALCULATED.3IONS-SCNC: 11 MMOL/L (ref 5–15)
BACTERIA UR QL AUTO: ABNORMAL /HPF
BASOPHILS # BLD AUTO: 0.1 10*3/MM3 (ref 0–0.2)
BASOPHILS NFR BLD AUTO: 0.6 % (ref 0–1.5)
BILIRUB UR QL STRIP: NEGATIVE
BUN SERPL-MCNC: 37 MG/DL (ref 6–20)
BUN/CREAT SERPL: 16.8 (ref 7–25)
CALCIUM SPEC-SCNC: 9.5 MG/DL (ref 8.6–10.5)
CHLORIDE SERPL-SCNC: 78 MMOL/L (ref 98–107)
CLARITY UR: CLEAR
CO2 SERPL-SCNC: 43 MMOL/L (ref 22–29)
COLOR UR: YELLOW
CREAT SERPL-MCNC: 2.2 MG/DL (ref 0.76–1.27)
DEPRECATED RDW RBC AUTO: 45.9 FL (ref 37–54)
EOSINOPHIL # BLD AUTO: 0.3 10*3/MM3 (ref 0–0.4)
EOSINOPHIL NFR BLD AUTO: 2.7 % (ref 0.3–6.2)
ERYTHROCYTE [DISTWIDTH] IN BLOOD BY AUTOMATED COUNT: 16.3 % (ref 12.3–15.4)
GFR SERPL CREATININE-BSD FRML MDRD: 31 ML/MIN/1.73
GLUCOSE SERPL-MCNC: 112 MG/DL (ref 65–99)
GLUCOSE UR STRIP-MCNC: NEGATIVE MG/DL
HCT VFR BLD AUTO: 40.9 % (ref 37.5–51)
HGB BLD-MCNC: 13.6 G/DL (ref 13–17.7)
HGB UR QL STRIP.AUTO: ABNORMAL
HYALINE CASTS UR QL AUTO: ABNORMAL /LPF
KETONES UR QL STRIP: NEGATIVE
LEUKOCYTE ESTERASE UR QL STRIP.AUTO: NEGATIVE
LYMPHOCYTES # BLD AUTO: 1 10*3/MM3 (ref 0.7–3.1)
LYMPHOCYTES NFR BLD AUTO: 8.2 % (ref 19.6–45.3)
MCH RBC QN AUTO: 27.3 PG (ref 26.6–33)
MCHC RBC AUTO-ENTMCNC: 33.4 G/DL (ref 31.5–35.7)
MCV RBC AUTO: 81.9 FL (ref 79–97)
MONOCYTES # BLD AUTO: 2.5 10*3/MM3 (ref 0.1–0.9)
MONOCYTES NFR BLD AUTO: 19.5 % (ref 5–12)
NEUTROPHILS NFR BLD AUTO: 69 % (ref 42.7–76)
NEUTROPHILS NFR BLD AUTO: 8.8 10*3/MM3 (ref 1.7–7)
NITRITE UR QL STRIP: NEGATIVE
NRBC BLD AUTO-RTO: 0 /100 WBC (ref 0–0.2)
PH UR STRIP.AUTO: 8 [PH] (ref 5–8)
PHOSPHATE SERPL-MCNC: 5.1 MG/DL (ref 2.5–4.5)
PLATELET # BLD AUTO: 266 10*3/MM3 (ref 140–450)
PMV BLD AUTO: 10.1 FL (ref 6–12)
POTASSIUM SERPL-SCNC: 2.7 MMOL/L (ref 3.5–5.2)
POTASSIUM SERPL-SCNC: 2.8 MMOL/L (ref 3.5–5.2)
PROT UR QL STRIP: NEGATIVE
RBC # BLD AUTO: 4.99 10*6/MM3 (ref 4.14–5.8)
RBC # UR: ABNORMAL /HPF
REF LAB TEST METHOD: ABNORMAL
SODIUM SERPL-SCNC: 132 MMOL/L (ref 136–145)
SP GR UR STRIP: 1.01 (ref 1–1.03)
SQUAMOUS #/AREA URNS HPF: ABNORMAL /HPF
UROBILINOGEN UR QL STRIP: ABNORMAL
WBC # BLD AUTO: 12.7 10*3/MM3 (ref 3.4–10.8)
WBC UR QL AUTO: ABNORMAL /HPF

## 2020-11-20 PROCEDURE — 76775 US EXAM ABDO BACK WALL LIM: CPT

## 2020-11-20 PROCEDURE — 84132 ASSAY OF SERUM POTASSIUM: CPT | Performed by: INTERNAL MEDICINE

## 2020-11-20 PROCEDURE — 94799 UNLISTED PULMONARY SVC/PX: CPT

## 2020-11-20 PROCEDURE — 99232 SBSQ HOSP IP/OBS MODERATE 35: CPT | Performed by: HOSPITALIST

## 2020-11-20 PROCEDURE — 25010000003 POTASSIUM CHLORIDE 10 MEQ/100ML SOLUTION: Performed by: INTERNAL MEDICINE

## 2020-11-20 PROCEDURE — 97530 THERAPEUTIC ACTIVITIES: CPT

## 2020-11-20 PROCEDURE — 85025 COMPLETE CBC W/AUTO DIFF WBC: CPT | Performed by: INTERNAL MEDICINE

## 2020-11-20 PROCEDURE — 25010000002 MAGNESIUM SULFATE IN D5W 1G/100ML (PREMIX) 1-5 GM/100ML-% SOLUTION: Performed by: INTERNAL MEDICINE

## 2020-11-20 PROCEDURE — 97116 GAIT TRAINING THERAPY: CPT

## 2020-11-20 PROCEDURE — 99232 SBSQ HOSP IP/OBS MODERATE 35: CPT | Performed by: INTERNAL MEDICINE

## 2020-11-20 PROCEDURE — 25010000003 MILRINONE LACTATE IN DEXTROSE 20-5 MG/100ML-% SOLUTION: Performed by: INTERNAL MEDICINE

## 2020-11-20 PROCEDURE — 81001 URINALYSIS AUTO W/SCOPE: CPT | Performed by: UROLOGY

## 2020-11-20 PROCEDURE — 99221 1ST HOSP IP/OBS SF/LOW 40: CPT | Performed by: PSYCHIATRY & NEUROLOGY

## 2020-11-20 PROCEDURE — 80069 RENAL FUNCTION PANEL: CPT | Performed by: INTERNAL MEDICINE

## 2020-11-20 RX ORDER — LACTULOSE 10 G/15ML
20 SOLUTION ORAL 3 TIMES DAILY PRN
Status: DISCONTINUED | OUTPATIENT
Start: 2020-11-20 | End: 2020-11-25 | Stop reason: HOSPADM

## 2020-11-20 RX ORDER — TAMSULOSIN HYDROCHLORIDE 0.4 MG/1
0.4 CAPSULE ORAL 2 TIMES DAILY
Status: DISCONTINUED | OUTPATIENT
Start: 2020-11-20 | End: 2020-11-25 | Stop reason: HOSPADM

## 2020-11-20 RX ORDER — DOCUSATE SODIUM 100 MG/1
100 CAPSULE, LIQUID FILLED ORAL 2 TIMES DAILY
Status: DISCONTINUED | OUTPATIENT
Start: 2020-11-20 | End: 2020-11-20

## 2020-11-20 RX ORDER — MAGNESIUM SULFATE 1 G/100ML
1 INJECTION INTRAVENOUS ONCE
Status: COMPLETED | OUTPATIENT
Start: 2020-11-20 | End: 2020-11-20

## 2020-11-20 RX ORDER — DIVALPROEX SODIUM 125 MG/1
250 TABLET, DELAYED RELEASE ORAL 2 TIMES DAILY
Status: DISCONTINUED | OUTPATIENT
Start: 2020-11-20 | End: 2020-11-25 | Stop reason: HOSPADM

## 2020-11-20 RX ORDER — POTASSIUM CHLORIDE 7.45 MG/ML
10 INJECTION INTRAVENOUS
Status: COMPLETED | OUTPATIENT
Start: 2020-11-20 | End: 2020-11-20

## 2020-11-20 RX ORDER — POTASSIUM CHLORIDE 7.45 MG/ML
10 INJECTION INTRAVENOUS
Status: DISCONTINUED | OUTPATIENT
Start: 2020-11-20 | End: 2020-11-20 | Stop reason: SDUPTHER

## 2020-11-20 RX ORDER — BUMETANIDE 1 MG/1
1 TABLET ORAL 3 TIMES DAILY
Status: DISCONTINUED | OUTPATIENT
Start: 2020-11-20 | End: 2020-11-20

## 2020-11-20 RX ORDER — AMOXICILLIN 250 MG
2 CAPSULE ORAL 2 TIMES DAILY
Status: DISCONTINUED | OUTPATIENT
Start: 2020-11-20 | End: 2020-11-25 | Stop reason: HOSPADM

## 2020-11-20 RX ADMIN — IPRATROPIUM BROMIDE AND ALBUTEROL SULFATE 3 ML: 2.5; .5 SOLUTION RESPIRATORY (INHALATION) at 11:07

## 2020-11-20 RX ADMIN — BUMETANIDE 1 MG: 1 TABLET ORAL at 10:02

## 2020-11-20 RX ADMIN — GABAPENTIN 300 MG: 300 CAPSULE ORAL at 20:42

## 2020-11-20 RX ADMIN — POTASSIUM CHLORIDE 10 MEQ: 7.46 INJECTION, SOLUTION INTRAVENOUS at 18:19

## 2020-11-20 RX ADMIN — MEXILETINE HYDROCHLORIDE 250 MG: 250 CAPSULE ORAL at 17:09

## 2020-11-20 RX ADMIN — DIVALPROEX SODIUM 250 MG: 125 TABLET, DELAYED RELEASE ORAL at 12:30

## 2020-11-20 RX ADMIN — POTASSIUM CHLORIDE 20 MEQ: 1500 TABLET, EXTENDED RELEASE ORAL at 10:01

## 2020-11-20 RX ADMIN — ATORVASTATIN CALCIUM 80 MG: 40 TABLET, FILM COATED ORAL at 10:00

## 2020-11-20 RX ADMIN — MEXILETINE HYDROCHLORIDE 250 MG: 250 CAPSULE ORAL at 20:42

## 2020-11-20 RX ADMIN — GABAPENTIN 300 MG: 300 CAPSULE ORAL at 17:09

## 2020-11-20 RX ADMIN — GABAPENTIN 300 MG: 300 CAPSULE ORAL at 10:02

## 2020-11-20 RX ADMIN — RANOLAZINE 1000 MG: 500 TABLET, FILM COATED, EXTENDED RELEASE ORAL at 10:01

## 2020-11-20 RX ADMIN — POTASSIUM CHLORIDE 10 MEQ: 10 INJECTION, SOLUTION INTRAVENOUS at 14:10

## 2020-11-20 RX ADMIN — POTASSIUM CHLORIDE 10 MEQ: 10 INJECTION, SOLUTION INTRAVENOUS at 11:00

## 2020-11-20 RX ADMIN — MONTELUKAST 10 MG: 10 TABLET, FILM COATED ORAL at 10:01

## 2020-11-20 RX ADMIN — DIVALPROEX SODIUM 250 MG: 125 TABLET, DELAYED RELEASE ORAL at 20:42

## 2020-11-20 RX ADMIN — POTASSIUM CHLORIDE 10 MEQ: 10 INJECTION, SOLUTION INTRAVENOUS at 10:00

## 2020-11-20 RX ADMIN — IPRATROPIUM BROMIDE AND ALBUTEROL SULFATE 3 ML: 2.5; .5 SOLUTION RESPIRATORY (INHALATION) at 15:13

## 2020-11-20 RX ADMIN — POTASSIUM CHLORIDE 10 MEQ: 7.46 INJECTION, SOLUTION INTRAVENOUS at 21:50

## 2020-11-20 RX ADMIN — POTASSIUM CHLORIDE 10 MEQ: 7.46 INJECTION, SOLUTION INTRAVENOUS at 20:42

## 2020-11-20 RX ADMIN — SENNOSIDES AND DOCUSATE SODIUM 2 TABLET: 8.6; 5 TABLET ORAL at 13:53

## 2020-11-20 RX ADMIN — MILRINONE LACTATE IN DEXTROSE 0.25 MCG/KG/MIN: 200 INJECTION, SOLUTION INTRAVENOUS at 00:13

## 2020-11-20 RX ADMIN — POTASSIUM CHLORIDE 20 MEQ: 1500 TABLET, EXTENDED RELEASE ORAL at 17:09

## 2020-11-20 RX ADMIN — POTASSIUM CHLORIDE 20 MEQ: 1500 TABLET, EXTENDED RELEASE ORAL at 12:30

## 2020-11-20 RX ADMIN — MEXILETINE HYDROCHLORIDE 250 MG: 250 CAPSULE ORAL at 10:02

## 2020-11-20 RX ADMIN — MIDODRINE HYDROCHLORIDE 5 MG: 5 TABLET ORAL at 21:50

## 2020-11-20 RX ADMIN — METOPROLOL SUCCINATE 50 MG: 50 TABLET, EXTENDED RELEASE ORAL at 10:00

## 2020-11-20 RX ADMIN — MAGNESIUM SULFATE HEPTAHYDRATE 1 G: 1 INJECTION, SOLUTION INTRAVENOUS at 10:11

## 2020-11-20 RX ADMIN — Medication 10 ML: at 20:42

## 2020-11-20 RX ADMIN — ACETAMINOPHEN 325 MG: 325 TABLET, FILM COATED ORAL at 17:10

## 2020-11-20 RX ADMIN — DOCUSATE SODIUM 100 MG: 100 CAPSULE, LIQUID FILLED ORAL at 02:42

## 2020-11-20 RX ADMIN — POTASSIUM CHLORIDE 10 MEQ: 7.46 INJECTION, SOLUTION INTRAVENOUS at 19:16

## 2020-11-20 RX ADMIN — DOCUSATE SODIUM 100 MG: 100 CAPSULE, LIQUID FILLED ORAL at 10:11

## 2020-11-20 RX ADMIN — POTASSIUM CHLORIDE 10 MEQ: 7.46 INJECTION, SOLUTION INTRAVENOUS at 22:51

## 2020-11-20 RX ADMIN — ACETAZOLAMIDE 250 MG: 250 TABLET ORAL at 17:10

## 2020-11-20 RX ADMIN — MILRINONE LACTATE IN DEXTROSE 0.25 MCG/KG/MIN: 200 INJECTION, SOLUTION INTRAVENOUS at 10:03

## 2020-11-20 RX ADMIN — ACETAZOLAMIDE 250 MG: 250 TABLET ORAL at 10:01

## 2020-11-20 RX ADMIN — POTASSIUM CHLORIDE 10 MEQ: 10 INJECTION, SOLUTION INTRAVENOUS at 13:10

## 2020-11-20 RX ADMIN — TAMSULOSIN HYDROCHLORIDE 0.4 MG: 0.4 CAPSULE ORAL at 20:42

## 2020-11-20 RX ADMIN — Medication 10 ML: at 10:04

## 2020-11-20 RX ADMIN — MILRINONE LACTATE IN DEXTROSE 0.25 MCG/KG/MIN: 200 INJECTION, SOLUTION INTRAVENOUS at 20:42

## 2020-11-20 RX ADMIN — AZELASTINE HYDROCHLORIDE 1 SPRAY: 137 SPRAY, METERED NASAL at 10:05

## 2020-11-20 RX ADMIN — MIDODRINE HYDROCHLORIDE 5 MG: 5 TABLET ORAL at 13:53

## 2020-11-20 RX ADMIN — IPRATROPIUM BROMIDE AND ALBUTEROL SULFATE 3 ML: 2.5; .5 SOLUTION RESPIRATORY (INHALATION) at 07:40

## 2020-11-20 RX ADMIN — APIXABAN 5 MG: 5 TABLET, FILM COATED ORAL at 10:00

## 2020-11-20 RX ADMIN — POTASSIUM CHLORIDE 10 MEQ: 10 INJECTION, SOLUTION INTRAVENOUS at 12:08

## 2020-11-20 RX ADMIN — MIDODRINE HYDROCHLORIDE 5 MG: 5 TABLET ORAL at 05:36

## 2020-11-20 RX ADMIN — ROFLUMILAST 500 MCG: 500 TABLET ORAL at 10:01

## 2020-11-20 RX ADMIN — APIXABAN 5 MG: 5 TABLET, FILM COATED ORAL at 20:42

## 2020-11-20 RX ADMIN — RANOLAZINE 1000 MG: 500 TABLET, FILM COATED, EXTENDED RELEASE ORAL at 20:42

## 2020-11-20 RX ADMIN — BUMETANIDE 1 MG: 1 TABLET ORAL at 17:09

## 2020-11-20 RX ADMIN — CLOPIDOGREL BISULFATE 75 MG: 75 TABLET ORAL at 10:01

## 2020-11-20 RX ADMIN — DULOXETINE HYDROCHLORIDE 60 MG: 30 CAPSULE, DELAYED RELEASE ORAL at 10:00

## 2020-11-21 LAB
ALBUMIN SERPL-MCNC: 4 G/DL (ref 3.5–5.2)
ALBUMIN/GLOB SERPL: 1.3 G/DL
ALP SERPL-CCNC: 88 U/L (ref 39–117)
ALT SERPL W P-5'-P-CCNC: 17 U/L (ref 1–41)
ANION GAP SERPL CALCULATED.3IONS-SCNC: 14 MMOL/L (ref 5–15)
AST SERPL-CCNC: 35 U/L (ref 1–40)
BASOPHILS # BLD AUTO: 0.1 10*3/MM3 (ref 0–0.2)
BASOPHILS NFR BLD AUTO: 0.4 % (ref 0–1.5)
BILIRUB SERPL-MCNC: 1.1 MG/DL (ref 0–1.2)
BUN SERPL-MCNC: 36 MG/DL (ref 6–20)
BUN/CREAT SERPL: 16.8 (ref 7–25)
CA-I SERPL ISE-MCNC: 1.13 MMOL/L (ref 1.2–1.3)
CALCIUM SPEC-SCNC: 9.3 MG/DL (ref 8.6–10.5)
CHLORIDE SERPL-SCNC: 76 MMOL/L (ref 98–107)
CO2 SERPL-SCNC: 40 MMOL/L (ref 22–29)
CREAT SERPL-MCNC: 2.14 MG/DL (ref 0.76–1.27)
DEPRECATED RDW RBC AUTO: 45.9 FL (ref 37–54)
EOSINOPHIL # BLD AUTO: 0.3 10*3/MM3 (ref 0–0.4)
EOSINOPHIL NFR BLD AUTO: 2.5 % (ref 0.3–6.2)
ERYTHROCYTE [DISTWIDTH] IN BLOOD BY AUTOMATED COUNT: 16.2 % (ref 12.3–15.4)
GFR SERPL CREATININE-BSD FRML MDRD: 32 ML/MIN/1.73
GLOBULIN UR ELPH-MCNC: 3 GM/DL
GLUCOSE SERPL-MCNC: 120 MG/DL (ref 65–99)
HCT VFR BLD AUTO: 40.9 % (ref 37.5–51)
HGB BLD-MCNC: 13.2 G/DL (ref 13–17.7)
LYMPHOCYTES # BLD AUTO: 1 10*3/MM3 (ref 0.7–3.1)
LYMPHOCYTES NFR BLD AUTO: 7.5 % (ref 19.6–45.3)
MAGNESIUM SERPL-MCNC: 2.1 MG/DL (ref 1.6–2.6)
MCH RBC QN AUTO: 26.7 PG (ref 26.6–33)
MCHC RBC AUTO-ENTMCNC: 32.4 G/DL (ref 31.5–35.7)
MCV RBC AUTO: 82.4 FL (ref 79–97)
MONOCYTES # BLD AUTO: 2.4 10*3/MM3 (ref 0.1–0.9)
MONOCYTES NFR BLD AUTO: 17.6 % (ref 5–12)
NEUTROPHILS NFR BLD AUTO: 72 % (ref 42.7–76)
NEUTROPHILS NFR BLD AUTO: 9.8 10*3/MM3 (ref 1.7–7)
NRBC BLD AUTO-RTO: 0.1 /100 WBC (ref 0–0.2)
PHOSPHATE SERPL-MCNC: 4.6 MG/DL (ref 2.5–4.5)
PLATELET # BLD AUTO: 276 10*3/MM3 (ref 140–450)
PMV BLD AUTO: 10 FL (ref 6–12)
POTASSIUM SERPL-SCNC: 2.7 MMOL/L (ref 3.5–5.2)
POTASSIUM SERPL-SCNC: 3.3 MMOL/L (ref 3.5–5.2)
PROT SERPL-MCNC: 7 G/DL (ref 6–8.5)
RBC # BLD AUTO: 4.96 10*6/MM3 (ref 4.14–5.8)
SODIUM SERPL-SCNC: 130 MMOL/L (ref 136–145)
WBC # BLD AUTO: 13.6 10*3/MM3 (ref 3.4–10.8)

## 2020-11-21 PROCEDURE — 97535 SELF CARE MNGMENT TRAINING: CPT

## 2020-11-21 PROCEDURE — 97116 GAIT TRAINING THERAPY: CPT

## 2020-11-21 PROCEDURE — 94799 UNLISTED PULMONARY SVC/PX: CPT

## 2020-11-21 PROCEDURE — 84100 ASSAY OF PHOSPHORUS: CPT | Performed by: INTERNAL MEDICINE

## 2020-11-21 PROCEDURE — 83735 ASSAY OF MAGNESIUM: CPT | Performed by: INTERNAL MEDICINE

## 2020-11-21 PROCEDURE — 99232 SBSQ HOSP IP/OBS MODERATE 35: CPT | Performed by: INTERNAL MEDICINE

## 2020-11-21 PROCEDURE — 25010000003 MILRINONE LACTATE IN DEXTROSE 20-5 MG/100ML-% SOLUTION: Performed by: INTERNAL MEDICINE

## 2020-11-21 PROCEDURE — 99231 SBSQ HOSP IP/OBS SF/LOW 25: CPT | Performed by: PSYCHIATRY & NEUROLOGY

## 2020-11-21 PROCEDURE — 82330 ASSAY OF CALCIUM: CPT | Performed by: INTERNAL MEDICINE

## 2020-11-21 PROCEDURE — 84132 ASSAY OF SERUM POTASSIUM: CPT | Performed by: INTERNAL MEDICINE

## 2020-11-21 PROCEDURE — 80053 COMPREHEN METABOLIC PANEL: CPT | Performed by: INTERNAL MEDICINE

## 2020-11-21 PROCEDURE — 99232 SBSQ HOSP IP/OBS MODERATE 35: CPT | Performed by: HOSPITALIST

## 2020-11-21 PROCEDURE — 85025 COMPLETE CBC W/AUTO DIFF WBC: CPT | Performed by: INTERNAL MEDICINE

## 2020-11-21 PROCEDURE — 25010000003 POTASSIUM CHLORIDE 10 MEQ/100ML SOLUTION: Performed by: INTERNAL MEDICINE

## 2020-11-21 RX ORDER — BUMETANIDE 1 MG/1
2 TABLET ORAL 2 TIMES DAILY
Status: DISCONTINUED | OUTPATIENT
Start: 2020-11-21 | End: 2020-11-22

## 2020-11-21 RX ORDER — POTASSIUM CHLORIDE 7.45 MG/ML
10 INJECTION INTRAVENOUS
Status: COMPLETED | OUTPATIENT
Start: 2020-11-21 | End: 2020-11-21

## 2020-11-21 RX ADMIN — MILRINONE LACTATE IN DEXTROSE 0.25 MCG/KG/MIN: 200 INJECTION, SOLUTION INTRAVENOUS at 23:58

## 2020-11-21 RX ADMIN — MILRINONE LACTATE IN DEXTROSE 0.25 MCG/KG/MIN: 200 INJECTION, SOLUTION INTRAVENOUS at 06:14

## 2020-11-21 RX ADMIN — AZELASTINE HYDROCHLORIDE 1 SPRAY: 137 SPRAY, METERED NASAL at 09:40

## 2020-11-21 RX ADMIN — DULOXETINE HYDROCHLORIDE 60 MG: 30 CAPSULE, DELAYED RELEASE ORAL at 09:38

## 2020-11-21 RX ADMIN — MILRINONE LACTATE IN DEXTROSE 0.25 MCG/KG/MIN: 200 INJECTION, SOLUTION INTRAVENOUS at 15:02

## 2020-11-21 RX ADMIN — ROFLUMILAST 500 MCG: 500 TABLET ORAL at 09:39

## 2020-11-21 RX ADMIN — MIDODRINE HYDROCHLORIDE 5 MG: 5 TABLET ORAL at 22:10

## 2020-11-21 RX ADMIN — POTASSIUM CHLORIDE 10 MEQ: 7.46 INJECTION, SOLUTION INTRAVENOUS at 10:25

## 2020-11-21 RX ADMIN — IPRATROPIUM BROMIDE AND ALBUTEROL SULFATE 3 ML: 2.5; .5 SOLUTION RESPIRATORY (INHALATION) at 16:10

## 2020-11-21 RX ADMIN — ATORVASTATIN CALCIUM 80 MG: 40 TABLET, FILM COATED ORAL at 09:32

## 2020-11-21 RX ADMIN — BUMETANIDE 2 MG: 1 TABLET ORAL at 09:43

## 2020-11-21 RX ADMIN — POTASSIUM CHLORIDE 10 MEQ: 7.46 INJECTION, SOLUTION INTRAVENOUS at 12:51

## 2020-11-21 RX ADMIN — GABAPENTIN 300 MG: 300 CAPSULE ORAL at 17:56

## 2020-11-21 RX ADMIN — CLOPIDOGREL BISULFATE 75 MG: 75 TABLET ORAL at 09:39

## 2020-11-21 RX ADMIN — POTASSIUM CHLORIDE 10 MEQ: 7.46 INJECTION, SOLUTION INTRAVENOUS at 15:02

## 2020-11-21 RX ADMIN — GABAPENTIN 300 MG: 300 CAPSULE ORAL at 20:25

## 2020-11-21 RX ADMIN — Medication 10 ML: at 09:40

## 2020-11-21 RX ADMIN — TAMSULOSIN HYDROCHLORIDE 0.4 MG: 0.4 CAPSULE ORAL at 20:28

## 2020-11-21 RX ADMIN — SENNOSIDES AND DOCUSATE SODIUM 2 TABLET: 8.6; 5 TABLET ORAL at 09:32

## 2020-11-21 RX ADMIN — MEXILETINE HYDROCHLORIDE 250 MG: 250 CAPSULE ORAL at 20:27

## 2020-11-21 RX ADMIN — BUMETANIDE 2 MG: 1 TABLET ORAL at 20:26

## 2020-11-21 RX ADMIN — POTASSIUM CHLORIDE 20 MEQ: 1500 TABLET, EXTENDED RELEASE ORAL at 11:51

## 2020-11-21 RX ADMIN — MIDODRINE HYDROCHLORIDE 5 MG: 5 TABLET ORAL at 15:02

## 2020-11-21 RX ADMIN — POTASSIUM CHLORIDE 20 MEQ: 1500 TABLET, EXTENDED RELEASE ORAL at 17:56

## 2020-11-21 RX ADMIN — DIVALPROEX SODIUM 250 MG: 125 TABLET, DELAYED RELEASE ORAL at 20:27

## 2020-11-21 RX ADMIN — APIXABAN 5 MG: 5 TABLET, FILM COATED ORAL at 09:31

## 2020-11-21 RX ADMIN — POTASSIUM CHLORIDE 20 MEQ: 1500 TABLET, EXTENDED RELEASE ORAL at 09:38

## 2020-11-21 RX ADMIN — MONTELUKAST 10 MG: 10 TABLET, FILM COATED ORAL at 09:39

## 2020-11-21 RX ADMIN — Medication 10 ML: at 20:28

## 2020-11-21 RX ADMIN — APIXABAN 5 MG: 5 TABLET, FILM COATED ORAL at 20:25

## 2020-11-21 RX ADMIN — POTASSIUM CHLORIDE 10 MEQ: 7.46 INJECTION, SOLUTION INTRAVENOUS at 13:58

## 2020-11-21 RX ADMIN — METOPROLOL SUCCINATE 50 MG: 50 TABLET, EXTENDED RELEASE ORAL at 09:38

## 2020-11-21 RX ADMIN — GABAPENTIN 300 MG: 300 CAPSULE ORAL at 09:39

## 2020-11-21 RX ADMIN — MEXILETINE HYDROCHLORIDE 250 MG: 250 CAPSULE ORAL at 17:55

## 2020-11-21 RX ADMIN — RANOLAZINE 1000 MG: 500 TABLET, FILM COATED, EXTENDED RELEASE ORAL at 09:39

## 2020-11-21 RX ADMIN — MIDODRINE HYDROCHLORIDE 5 MG: 5 TABLET ORAL at 06:15

## 2020-11-21 RX ADMIN — MEXILETINE HYDROCHLORIDE 250 MG: 250 CAPSULE ORAL at 09:32

## 2020-11-21 RX ADMIN — DIVALPROEX SODIUM 250 MG: 125 TABLET, DELAYED RELEASE ORAL at 09:32

## 2020-11-21 RX ADMIN — SENNOSIDES AND DOCUSATE SODIUM 2 TABLET: 8.6; 5 TABLET ORAL at 20:26

## 2020-11-21 RX ADMIN — TAMSULOSIN HYDROCHLORIDE 0.4 MG: 0.4 CAPSULE ORAL at 09:39

## 2020-11-21 RX ADMIN — POTASSIUM CHLORIDE 10 MEQ: 7.46 INJECTION, SOLUTION INTRAVENOUS at 11:50

## 2020-11-21 RX ADMIN — RANOLAZINE 1000 MG: 500 TABLET, FILM COATED, EXTENDED RELEASE ORAL at 20:27

## 2020-11-22 LAB
ALBUMIN SERPL-MCNC: 3.7 G/DL (ref 3.5–5.2)
ALBUMIN/GLOB SERPL: 1.1 G/DL
ALP SERPL-CCNC: 89 U/L (ref 39–117)
ALT SERPL W P-5'-P-CCNC: 19 U/L (ref 1–41)
ANION GAP SERPL CALCULATED.3IONS-SCNC: 12 MMOL/L (ref 5–15)
AST SERPL-CCNC: 51 U/L (ref 1–40)
BASOPHILS # BLD AUTO: 0.1 10*3/MM3 (ref 0–0.2)
BASOPHILS NFR BLD AUTO: 0.4 % (ref 0–1.5)
BILIRUB SERPL-MCNC: 1 MG/DL (ref 0–1.2)
BUN SERPL-MCNC: 31 MG/DL (ref 6–20)
BUN/CREAT SERPL: 18.7 (ref 7–25)
CA-I SERPL ISE-MCNC: 1.09 MMOL/L (ref 1.2–1.3)
CALCIUM SPEC-SCNC: 9.2 MG/DL (ref 8.6–10.5)
CHLORIDE SERPL-SCNC: 76 MMOL/L (ref 98–107)
CO2 SERPL-SCNC: 38 MMOL/L (ref 22–29)
CREAT SERPL-MCNC: 1.66 MG/DL (ref 0.76–1.27)
DEPRECATED RDW RBC AUTO: 45.1 FL (ref 37–54)
EOSINOPHIL # BLD AUTO: 0.2 10*3/MM3 (ref 0–0.4)
EOSINOPHIL NFR BLD AUTO: 1.7 % (ref 0.3–6.2)
ERYTHROCYTE [DISTWIDTH] IN BLOOD BY AUTOMATED COUNT: 16.2 % (ref 12.3–15.4)
GFR SERPL CREATININE-BSD FRML MDRD: 43 ML/MIN/1.73
GLOBULIN UR ELPH-MCNC: 3.4 GM/DL
GLUCOSE SERPL-MCNC: 138 MG/DL (ref 65–99)
HCT VFR BLD AUTO: 40.3 % (ref 37.5–51)
HGB BLD-MCNC: 13.5 G/DL (ref 13–17.7)
LYMPHOCYTES # BLD AUTO: 1 10*3/MM3 (ref 0.7–3.1)
LYMPHOCYTES NFR BLD AUTO: 7.3 % (ref 19.6–45.3)
MAGNESIUM SERPL-MCNC: 1.8 MG/DL (ref 1.6–2.6)
MCH RBC QN AUTO: 27.2 PG (ref 26.6–33)
MCHC RBC AUTO-ENTMCNC: 33.6 G/DL (ref 31.5–35.7)
MCV RBC AUTO: 81.1 FL (ref 79–97)
MONOCYTES # BLD AUTO: 2.1 10*3/MM3 (ref 0.1–0.9)
MONOCYTES NFR BLD AUTO: 15 % (ref 5–12)
NEUTROPHILS NFR BLD AUTO: 10.7 10*3/MM3 (ref 1.7–7)
NEUTROPHILS NFR BLD AUTO: 75.6 % (ref 42.7–76)
NRBC BLD AUTO-RTO: 0 /100 WBC (ref 0–0.2)
PHOSPHATE SERPL-MCNC: 3.2 MG/DL (ref 2.5–4.5)
PLATELET # BLD AUTO: 278 10*3/MM3 (ref 140–450)
PMV BLD AUTO: 9.6 FL (ref 6–12)
POTASSIUM SERPL-SCNC: 2.5 MMOL/L (ref 3.5–5.2)
POTASSIUM SERPL-SCNC: 2.8 MMOL/L (ref 3.5–5.2)
PROT SERPL-MCNC: 7.1 G/DL (ref 6–8.5)
RBC # BLD AUTO: 4.97 10*6/MM3 (ref 4.14–5.8)
SODIUM SERPL-SCNC: 126 MMOL/L (ref 136–145)
WBC # BLD AUTO: 14.2 10*3/MM3 (ref 3.4–10.8)

## 2020-11-22 PROCEDURE — 80053 COMPREHEN METABOLIC PANEL: CPT | Performed by: INTERNAL MEDICINE

## 2020-11-22 PROCEDURE — 99232 SBSQ HOSP IP/OBS MODERATE 35: CPT | Performed by: HOSPITALIST

## 2020-11-22 PROCEDURE — 25010000002 MAGNESIUM SULFATE IN D5W 1G/100ML (PREMIX) 1-5 GM/100ML-% SOLUTION: Performed by: INTERNAL MEDICINE

## 2020-11-22 PROCEDURE — 25010000003 POTASSIUM CHLORIDE 10 MEQ/100ML SOLUTION: Performed by: HOSPITALIST

## 2020-11-22 PROCEDURE — 99233 SBSQ HOSP IP/OBS HIGH 50: CPT | Performed by: INTERNAL MEDICINE

## 2020-11-22 PROCEDURE — 25010000003 POTASSIUM CHLORIDE 10 MEQ/100ML SOLUTION: Performed by: INTERNAL MEDICINE

## 2020-11-22 PROCEDURE — 25010000003 MILRINONE LACTATE IN DEXTROSE 20-5 MG/100ML-% SOLUTION: Performed by: INTERNAL MEDICINE

## 2020-11-22 PROCEDURE — 84100 ASSAY OF PHOSPHORUS: CPT | Performed by: INTERNAL MEDICINE

## 2020-11-22 PROCEDURE — 84132 ASSAY OF SERUM POTASSIUM: CPT | Performed by: HOSPITALIST

## 2020-11-22 PROCEDURE — 85025 COMPLETE CBC W/AUTO DIFF WBC: CPT | Performed by: INTERNAL MEDICINE

## 2020-11-22 PROCEDURE — 99231 SBSQ HOSP IP/OBS SF/LOW 25: CPT | Performed by: PSYCHIATRY & NEUROLOGY

## 2020-11-22 PROCEDURE — 83735 ASSAY OF MAGNESIUM: CPT | Performed by: INTERNAL MEDICINE

## 2020-11-22 PROCEDURE — 25010000002 CALCIUM GLUCONATE 2-0.675 GM/100ML-% SOLUTION: Performed by: INTERNAL MEDICINE

## 2020-11-22 PROCEDURE — 82330 ASSAY OF CALCIUM: CPT | Performed by: INTERNAL MEDICINE

## 2020-11-22 RX ORDER — CALCIUM GLUCONATE 20 MG/ML
2 INJECTION, SOLUTION INTRAVENOUS EVERY 12 HOURS
Status: COMPLETED | OUTPATIENT
Start: 2020-11-22 | End: 2020-11-22

## 2020-11-22 RX ORDER — POTASSIUM CHLORIDE 20 MEQ/1
40 TABLET, EXTENDED RELEASE ORAL AS NEEDED
Status: DISCONTINUED | OUTPATIENT
Start: 2020-11-22 | End: 2020-11-25 | Stop reason: HOSPADM

## 2020-11-22 RX ORDER — POTASSIUM CHLORIDE 7.45 MG/ML
10 INJECTION INTRAVENOUS
Status: COMPLETED | OUTPATIENT
Start: 2020-11-22 | End: 2020-11-22

## 2020-11-22 RX ORDER — POTASSIUM CHLORIDE 1.5 G/1.77G
40 POWDER, FOR SOLUTION ORAL AS NEEDED
Status: DISCONTINUED | OUTPATIENT
Start: 2020-11-22 | End: 2020-11-25 | Stop reason: HOSPADM

## 2020-11-22 RX ORDER — MAGNESIUM SULFATE 1 G/100ML
1 INJECTION INTRAVENOUS ONCE
Status: COMPLETED | OUTPATIENT
Start: 2020-11-22 | End: 2020-11-22

## 2020-11-22 RX ORDER — POTASSIUM CHLORIDE 7.45 MG/ML
10 INJECTION INTRAVENOUS
Status: DISCONTINUED | OUTPATIENT
Start: 2020-11-22 | End: 2020-11-22

## 2020-11-22 RX ORDER — MINERAL OIL 100 G/100G
1 OIL RECTAL ONCE
Status: COMPLETED | OUTPATIENT
Start: 2020-11-22 | End: 2020-11-22

## 2020-11-22 RX ORDER — POTASSIUM CHLORIDE 20 MEQ/1
40 TABLET, EXTENDED RELEASE ORAL
Status: DISCONTINUED | OUTPATIENT
Start: 2020-11-22 | End: 2020-11-25

## 2020-11-22 RX ADMIN — MAGNESIUM SULFATE HEPTAHYDRATE 1 G: 1 INJECTION, SOLUTION INTRAVENOUS at 10:53

## 2020-11-22 RX ADMIN — POTASSIUM CHLORIDE 10 MEQ: 7.46 INJECTION, SOLUTION INTRAVENOUS at 14:23

## 2020-11-22 RX ADMIN — MIDODRINE HYDROCHLORIDE 5 MG: 5 TABLET ORAL at 16:16

## 2020-11-22 RX ADMIN — Medication 10 ML: at 21:44

## 2020-11-22 RX ADMIN — MIDODRINE HYDROCHLORIDE 5 MG: 5 TABLET ORAL at 05:32

## 2020-11-22 RX ADMIN — AZELASTINE HYDROCHLORIDE 1 SPRAY: 137 SPRAY, METERED NASAL at 09:18

## 2020-11-22 RX ADMIN — ROFLUMILAST 500 MCG: 500 TABLET ORAL at 09:16

## 2020-11-22 RX ADMIN — RANOLAZINE 1000 MG: 500 TABLET, FILM COATED, EXTENDED RELEASE ORAL at 09:16

## 2020-11-22 RX ADMIN — POTASSIUM CHLORIDE 10 MEQ: 7.46 INJECTION, SOLUTION INTRAVENOUS at 02:01

## 2020-11-22 RX ADMIN — MILRINONE LACTATE IN DEXTROSE 0.25 MCG/KG/MIN: 200 INJECTION, SOLUTION INTRAVENOUS at 09:00

## 2020-11-22 RX ADMIN — APIXABAN 5 MG: 5 TABLET, FILM COATED ORAL at 21:44

## 2020-11-22 RX ADMIN — POTASSIUM CHLORIDE 10 MEQ: 7.46 INJECTION, SOLUTION INTRAVENOUS at 12:06

## 2020-11-22 RX ADMIN — POTASSIUM CHLORIDE 10 MEQ: 7.46 INJECTION, SOLUTION INTRAVENOUS at 10:52

## 2020-11-22 RX ADMIN — POTASSIUM CHLORIDE 10 MEQ: 7.46 INJECTION, SOLUTION INTRAVENOUS at 05:31

## 2020-11-22 RX ADMIN — POTASSIUM CHLORIDE 40 MEQ: 1500 TABLET, EXTENDED RELEASE ORAL at 16:15

## 2020-11-22 RX ADMIN — POTASSIUM CHLORIDE 10 MEQ: 7.46 INJECTION, SOLUTION INTRAVENOUS at 00:55

## 2020-11-22 RX ADMIN — MIDODRINE HYDROCHLORIDE 5 MG: 5 TABLET ORAL at 21:44

## 2020-11-22 RX ADMIN — TAMSULOSIN HYDROCHLORIDE 0.4 MG: 0.4 CAPSULE ORAL at 21:43

## 2020-11-22 RX ADMIN — DIVALPROEX SODIUM 250 MG: 125 TABLET, DELAYED RELEASE ORAL at 21:43

## 2020-11-22 RX ADMIN — RANOLAZINE 1000 MG: 500 TABLET, FILM COATED, EXTENDED RELEASE ORAL at 21:43

## 2020-11-22 RX ADMIN — POTASSIUM CHLORIDE 10 MEQ: 7.46 INJECTION, SOLUTION INTRAVENOUS at 03:06

## 2020-11-22 RX ADMIN — CALCIUM GLUCONATE 2 G: 20 INJECTION, SOLUTION INTRAVENOUS at 10:52

## 2020-11-22 RX ADMIN — MONTELUKAST 10 MG: 10 TABLET, FILM COATED ORAL at 09:16

## 2020-11-22 RX ADMIN — MEXILETINE HYDROCHLORIDE 250 MG: 250 CAPSULE ORAL at 21:43

## 2020-11-22 RX ADMIN — TAMSULOSIN HYDROCHLORIDE 0.4 MG: 0.4 CAPSULE ORAL at 09:16

## 2020-11-22 RX ADMIN — POTASSIUM CHLORIDE 40 MEQ: 1500 TABLET, EXTENDED RELEASE ORAL at 13:13

## 2020-11-22 RX ADMIN — DULOXETINE HYDROCHLORIDE 60 MG: 30 CAPSULE, DELAYED RELEASE ORAL at 09:17

## 2020-11-22 RX ADMIN — MINERAL OIL 1 ENEMA: 118 ENEMA RECTAL at 16:16

## 2020-11-22 RX ADMIN — MILRINONE LACTATE IN DEXTROSE 0.25 MCG/KG/MIN: 200 INJECTION, SOLUTION INTRAVENOUS at 18:52

## 2020-11-22 RX ADMIN — POTASSIUM CHLORIDE 40 MEQ: 1500 TABLET, EXTENDED RELEASE ORAL at 18:52

## 2020-11-22 RX ADMIN — Medication 10 ML: at 09:17

## 2020-11-22 RX ADMIN — DIVALPROEX SODIUM 250 MG: 125 TABLET, DELAYED RELEASE ORAL at 09:16

## 2020-11-22 RX ADMIN — CALCIUM GLUCONATE 2 G: 20 INJECTION, SOLUTION INTRAVENOUS at 22:14

## 2020-11-22 RX ADMIN — METOPROLOL SUCCINATE 50 MG: 50 TABLET, EXTENDED RELEASE ORAL at 09:17

## 2020-11-22 RX ADMIN — BUMETANIDE 2 MG: 1 TABLET ORAL at 09:17

## 2020-11-22 RX ADMIN — GABAPENTIN 300 MG: 300 CAPSULE ORAL at 16:15

## 2020-11-22 RX ADMIN — GABAPENTIN 300 MG: 300 CAPSULE ORAL at 09:17

## 2020-11-22 RX ADMIN — SENNOSIDES AND DOCUSATE SODIUM 2 TABLET: 8.6; 5 TABLET ORAL at 09:16

## 2020-11-22 RX ADMIN — POTASSIUM CHLORIDE 10 MEQ: 7.46 INJECTION, SOLUTION INTRAVENOUS at 04:17

## 2020-11-22 RX ADMIN — ATORVASTATIN CALCIUM 80 MG: 40 TABLET, FILM COATED ORAL at 09:17

## 2020-11-22 RX ADMIN — GABAPENTIN 300 MG: 300 CAPSULE ORAL at 21:44

## 2020-11-22 RX ADMIN — CLOPIDOGREL BISULFATE 75 MG: 75 TABLET ORAL at 09:16

## 2020-11-22 RX ADMIN — APIXABAN 5 MG: 5 TABLET, FILM COATED ORAL at 09:17

## 2020-11-22 RX ADMIN — POTASSIUM CHLORIDE 10 MEQ: 7.46 INJECTION, SOLUTION INTRAVENOUS at 13:13

## 2020-11-22 RX ADMIN — MEXILETINE HYDROCHLORIDE 250 MG: 250 CAPSULE ORAL at 09:16

## 2020-11-22 RX ADMIN — POTASSIUM CHLORIDE 20 MEQ: 1500 TABLET, EXTENDED RELEASE ORAL at 09:16

## 2020-11-22 RX ADMIN — MEXILETINE HYDROCHLORIDE 250 MG: 250 CAPSULE ORAL at 16:15

## 2020-11-23 LAB
ALBUMIN SERPL-MCNC: 4.2 G/DL (ref 3.5–5.2)
ALBUMIN/GLOB SERPL: 1.1 G/DL
ALP SERPL-CCNC: 95 U/L (ref 39–117)
ALT SERPL W P-5'-P-CCNC: 20 U/L (ref 1–41)
ANION GAP SERPL CALCULATED.3IONS-SCNC: 14 MMOL/L (ref 5–15)
AST SERPL-CCNC: 50 U/L (ref 1–40)
BASOPHILS # BLD AUTO: 0.1 10*3/MM3 (ref 0–0.2)
BASOPHILS NFR BLD AUTO: 0.4 % (ref 0–1.5)
BILIRUB SERPL-MCNC: 1 MG/DL (ref 0–1.2)
BUN SERPL-MCNC: 28 MG/DL (ref 6–20)
BUN/CREAT SERPL: 20.4 (ref 7–25)
CA-I SERPL ISE-MCNC: 1.2 MMOL/L (ref 1.2–1.3)
CALCIUM SPEC-SCNC: 10.1 MG/DL (ref 8.6–10.5)
CHLORIDE SERPL-SCNC: 78 MMOL/L (ref 98–107)
CO2 SERPL-SCNC: 37 MMOL/L (ref 22–29)
CREAT SERPL-MCNC: 1.37 MG/DL (ref 0.76–1.27)
DEPRECATED RDW RBC AUTO: 45.5 FL (ref 37–54)
EOSINOPHIL # BLD AUTO: 0.3 10*3/MM3 (ref 0–0.4)
EOSINOPHIL NFR BLD AUTO: 1.8 % (ref 0.3–6.2)
ERYTHROCYTE [DISTWIDTH] IN BLOOD BY AUTOMATED COUNT: 16.2 % (ref 12.3–15.4)
GFR SERPL CREATININE-BSD FRML MDRD: 53 ML/MIN/1.73
GLOBULIN UR ELPH-MCNC: 3.7 GM/DL
GLUCOSE SERPL-MCNC: 118 MG/DL (ref 65–99)
HCT VFR BLD AUTO: 43.2 % (ref 37.5–51)
HGB BLD-MCNC: 14.2 G/DL (ref 13–17.7)
LYMPHOCYTES # BLD AUTO: 1.1 10*3/MM3 (ref 0.7–3.1)
LYMPHOCYTES NFR BLD AUTO: 7.9 % (ref 19.6–45.3)
MAGNESIUM SERPL-MCNC: 1.8 MG/DL (ref 1.6–2.6)
MCH RBC QN AUTO: 26.9 PG (ref 26.6–33)
MCHC RBC AUTO-ENTMCNC: 32.9 G/DL (ref 31.5–35.7)
MCV RBC AUTO: 81.8 FL (ref 79–97)
MONOCYTES # BLD AUTO: 2.1 10*3/MM3 (ref 0.1–0.9)
MONOCYTES NFR BLD AUTO: 14.9 % (ref 5–12)
NEUTROPHILS NFR BLD AUTO: 10.4 10*3/MM3 (ref 1.7–7)
NEUTROPHILS NFR BLD AUTO: 75 % (ref 42.7–76)
NRBC BLD AUTO-RTO: 0.1 /100 WBC (ref 0–0.2)
PHOSPHATE SERPL-MCNC: 3.5 MG/DL (ref 2.5–4.5)
PLATELET # BLD AUTO: 302 10*3/MM3 (ref 140–450)
PMV BLD AUTO: 9.7 FL (ref 6–12)
POTASSIUM SERPL-SCNC: 3.1 MMOL/L (ref 3.5–5.2)
POTASSIUM SERPL-SCNC: 3.6 MMOL/L (ref 3.5–5.2)
PROT SERPL-MCNC: 7.9 G/DL (ref 6–8.5)
QT INTERVAL: 472 MS
RBC # BLD AUTO: 5.27 10*6/MM3 (ref 4.14–5.8)
SODIUM SERPL-SCNC: 129 MMOL/L (ref 136–145)
WBC # BLD AUTO: 13.8 10*3/MM3 (ref 3.4–10.8)

## 2020-11-23 PROCEDURE — 85025 COMPLETE CBC W/AUTO DIFF WBC: CPT | Performed by: INTERNAL MEDICINE

## 2020-11-23 PROCEDURE — 94799 UNLISTED PULMONARY SVC/PX: CPT

## 2020-11-23 PROCEDURE — 83735 ASSAY OF MAGNESIUM: CPT | Performed by: INTERNAL MEDICINE

## 2020-11-23 PROCEDURE — 25010000003 MILRINONE LACTATE IN DEXTROSE 20-5 MG/100ML-% SOLUTION: Performed by: INTERNAL MEDICINE

## 2020-11-23 PROCEDURE — 84132 ASSAY OF SERUM POTASSIUM: CPT | Performed by: INTERNAL MEDICINE

## 2020-11-23 PROCEDURE — 84100 ASSAY OF PHOSPHORUS: CPT | Performed by: INTERNAL MEDICINE

## 2020-11-23 PROCEDURE — 82330 ASSAY OF CALCIUM: CPT | Performed by: INTERNAL MEDICINE

## 2020-11-23 PROCEDURE — 0TJB8ZZ INSPECTION OF BLADDER, VIA NATURAL OR ARTIFICIAL OPENING ENDOSCOPIC: ICD-10-PCS | Performed by: UROLOGY

## 2020-11-23 PROCEDURE — 99232 SBSQ HOSP IP/OBS MODERATE 35: CPT | Performed by: INTERNAL MEDICINE

## 2020-11-23 PROCEDURE — 80053 COMPREHEN METABOLIC PANEL: CPT | Performed by: INTERNAL MEDICINE

## 2020-11-23 PROCEDURE — 99233 SBSQ HOSP IP/OBS HIGH 50: CPT | Performed by: INTERNAL MEDICINE

## 2020-11-23 PROCEDURE — 25010000002 MAGNESIUM SULFATE IN D5W 1G/100ML (PREMIX) 1-5 GM/100ML-% SOLUTION: Performed by: INTERNAL MEDICINE

## 2020-11-23 RX ORDER — SPIRONOLACTONE 25 MG/1
25 TABLET ORAL DAILY
Status: DISCONTINUED | OUTPATIENT
Start: 2020-11-23 | End: 2020-11-23

## 2020-11-23 RX ORDER — SPIRONOLACTONE 25 MG/1
50 TABLET ORAL DAILY
Status: DISCONTINUED | OUTPATIENT
Start: 2020-11-24 | End: 2020-11-24

## 2020-11-23 RX ORDER — MAGNESIUM SULFATE 1 G/100ML
1 INJECTION INTRAVENOUS EVERY 12 HOURS
Status: COMPLETED | OUTPATIENT
Start: 2020-11-23 | End: 2020-11-23

## 2020-11-23 RX ADMIN — MILRINONE LACTATE IN DEXTROSE 0.25 MCG/KG/MIN: 200 INJECTION, SOLUTION INTRAVENOUS at 12:49

## 2020-11-23 RX ADMIN — DIVALPROEX SODIUM 250 MG: 125 TABLET, DELAYED RELEASE ORAL at 21:54

## 2020-11-23 RX ADMIN — MILRINONE LACTATE IN DEXTROSE 0.25 MCG/KG/MIN: 200 INJECTION, SOLUTION INTRAVENOUS at 04:16

## 2020-11-23 RX ADMIN — GABAPENTIN 300 MG: 300 CAPSULE ORAL at 21:55

## 2020-11-23 RX ADMIN — MIDODRINE HYDROCHLORIDE 5 MG: 5 TABLET ORAL at 06:09

## 2020-11-23 RX ADMIN — MAGNESIUM SULFATE HEPTAHYDRATE 1 G: 1 INJECTION, SOLUTION INTRAVENOUS at 21:54

## 2020-11-23 RX ADMIN — IPRATROPIUM BROMIDE AND ALBUTEROL SULFATE 3 ML: 2.5; .5 SOLUTION RESPIRATORY (INHALATION) at 07:45

## 2020-11-23 RX ADMIN — TAMSULOSIN HYDROCHLORIDE 0.4 MG: 0.4 CAPSULE ORAL at 21:55

## 2020-11-23 RX ADMIN — MEXILETINE HYDROCHLORIDE 250 MG: 250 CAPSULE ORAL at 15:36

## 2020-11-23 RX ADMIN — SODIUM CHLORIDE 15 MMOL: 9 INJECTION, SOLUTION INTRAVENOUS at 10:04

## 2020-11-23 RX ADMIN — Medication 10 ML: at 21:54

## 2020-11-23 RX ADMIN — METOPROLOL SUCCINATE 50 MG: 50 TABLET, EXTENDED RELEASE ORAL at 09:35

## 2020-11-23 RX ADMIN — IPRATROPIUM BROMIDE AND ALBUTEROL SULFATE 3 ML: 2.5; .5 SOLUTION RESPIRATORY (INHALATION) at 15:48

## 2020-11-23 RX ADMIN — Medication 10 ML: at 09:35

## 2020-11-23 RX ADMIN — POTASSIUM CHLORIDE 40 MEQ: 1500 TABLET, EXTENDED RELEASE ORAL at 09:34

## 2020-11-23 RX ADMIN — MAGNESIUM SULFATE HEPTAHYDRATE 1 G: 1 INJECTION, SOLUTION INTRAVENOUS at 09:35

## 2020-11-23 RX ADMIN — SPIRONOLACTONE 25 MG: 25 TABLET ORAL at 09:35

## 2020-11-23 RX ADMIN — MEXILETINE HYDROCHLORIDE 250 MG: 250 CAPSULE ORAL at 09:35

## 2020-11-23 RX ADMIN — GABAPENTIN 300 MG: 300 CAPSULE ORAL at 09:35

## 2020-11-23 RX ADMIN — DIVALPROEX SODIUM 250 MG: 125 TABLET, DELAYED RELEASE ORAL at 09:34

## 2020-11-23 RX ADMIN — SENNOSIDES AND DOCUSATE SODIUM 2 TABLET: 8.6; 5 TABLET ORAL at 21:54

## 2020-11-23 RX ADMIN — APIXABAN 5 MG: 5 TABLET, FILM COATED ORAL at 09:35

## 2020-11-23 RX ADMIN — TAMSULOSIN HYDROCHLORIDE 0.4 MG: 0.4 CAPSULE ORAL at 09:34

## 2020-11-23 RX ADMIN — POTASSIUM CHLORIDE 40 MEQ: 1500 TABLET, EXTENDED RELEASE ORAL at 12:44

## 2020-11-23 RX ADMIN — MIDODRINE HYDROCHLORIDE 5 MG: 5 TABLET ORAL at 13:51

## 2020-11-23 RX ADMIN — RANOLAZINE 1000 MG: 500 TABLET, FILM COATED, EXTENDED RELEASE ORAL at 21:54

## 2020-11-23 RX ADMIN — ROFLUMILAST 500 MCG: 500 TABLET ORAL at 09:35

## 2020-11-23 RX ADMIN — DULOXETINE HYDROCHLORIDE 60 MG: 30 CAPSULE, DELAYED RELEASE ORAL at 09:35

## 2020-11-23 RX ADMIN — MIDODRINE HYDROCHLORIDE 5 MG: 5 TABLET ORAL at 21:54

## 2020-11-23 RX ADMIN — POTASSIUM CHLORIDE 40 MEQ: 1500 TABLET, EXTENDED RELEASE ORAL at 18:31

## 2020-11-23 RX ADMIN — POTASSIUM CHLORIDE 40 MEQ: 1500 TABLET, EXTENDED RELEASE ORAL at 06:09

## 2020-11-23 RX ADMIN — MONTELUKAST 10 MG: 10 TABLET, FILM COATED ORAL at 09:34

## 2020-11-23 RX ADMIN — AZELASTINE HYDROCHLORIDE 1 SPRAY: 137 SPRAY, METERED NASAL at 09:36

## 2020-11-23 RX ADMIN — ATORVASTATIN CALCIUM 80 MG: 40 TABLET, FILM COATED ORAL at 09:34

## 2020-11-23 RX ADMIN — GABAPENTIN 300 MG: 300 CAPSULE ORAL at 15:36

## 2020-11-23 RX ADMIN — CLOPIDOGREL BISULFATE 75 MG: 75 TABLET ORAL at 09:34

## 2020-11-23 RX ADMIN — RANOLAZINE 1000 MG: 500 TABLET, FILM COATED, EXTENDED RELEASE ORAL at 09:35

## 2020-11-23 RX ADMIN — MEXILETINE HYDROCHLORIDE 250 MG: 250 CAPSULE ORAL at 21:54

## 2020-11-23 RX ADMIN — APIXABAN 5 MG: 5 TABLET, FILM COATED ORAL at 21:54

## 2020-11-24 LAB
ALBUMIN SERPL-MCNC: 3.8 G/DL (ref 3.5–5.2)
ANION GAP SERPL CALCULATED.3IONS-SCNC: 14 MMOL/L (ref 5–15)
BASOPHILS # BLD AUTO: 0.1 10*3/MM3 (ref 0–0.2)
BASOPHILS NFR BLD AUTO: 0.6 % (ref 0–1.5)
BUN SERPL-MCNC: 23 MG/DL (ref 6–20)
BUN/CREAT SERPL: 19.7 (ref 7–25)
CALCIUM SPEC-SCNC: 9.3 MG/DL (ref 8.6–10.5)
CHLORIDE SERPL-SCNC: 85 MMOL/L (ref 98–107)
CO2 SERPL-SCNC: 29 MMOL/L (ref 22–29)
CREAT SERPL-MCNC: 1.17 MG/DL (ref 0.76–1.27)
DACRYOCYTES BLD QL SMEAR: NORMAL
DEPRECATED RDW RBC AUTO: 45.9 FL (ref 37–54)
EOSINOPHIL # BLD AUTO: 0.2 10*3/MM3 (ref 0–0.4)
EOSINOPHIL NFR BLD AUTO: 1.6 % (ref 0.3–6.2)
ERYTHROCYTE [DISTWIDTH] IN BLOOD BY AUTOMATED COUNT: 16.4 % (ref 12.3–15.4)
GFR SERPL CREATININE-BSD FRML MDRD: 64 ML/MIN/1.73
GIANT PLATELETS: NORMAL
GLUCOSE SERPL-MCNC: 97 MG/DL (ref 65–99)
HCT VFR BLD AUTO: 41.4 % (ref 37.5–51)
HGB BLD-MCNC: 13.8 G/DL (ref 13–17.7)
LARGE PLATELETS: NORMAL
LYMPHOCYTES # BLD AUTO: 1.3 10*3/MM3 (ref 0.7–3.1)
LYMPHOCYTES NFR BLD AUTO: 9.9 % (ref 19.6–45.3)
MAGNESIUM SERPL-MCNC: 2 MG/DL (ref 1.6–2.6)
MCH RBC QN AUTO: 26.9 PG (ref 26.6–33)
MCHC RBC AUTO-ENTMCNC: 33.4 G/DL (ref 31.5–35.7)
MCV RBC AUTO: 80.5 FL (ref 79–97)
MONOCYTES # BLD AUTO: 2.2 10*3/MM3 (ref 0.1–0.9)
MONOCYTES NFR BLD AUTO: 16.9 % (ref 5–12)
NEUTROPHILS NFR BLD AUTO: 71 % (ref 42.7–76)
NEUTROPHILS NFR BLD AUTO: 9.5 10*3/MM3 (ref 1.7–7)
NRBC BLD AUTO-RTO: 0 /100 WBC (ref 0–0.2)
PHOSPHATE SERPL-MCNC: 3.5 MG/DL (ref 2.5–4.5)
PLATELET # BLD AUTO: 295 10*3/MM3 (ref 140–450)
PMV BLD AUTO: 9.3 FL (ref 6–12)
POIKILOCYTOSIS BLD QL SMEAR: NORMAL
POTASSIUM SERPL-SCNC: 3.8 MMOL/L (ref 3.5–5.2)
RBC # BLD AUTO: 5.14 10*6/MM3 (ref 4.14–5.8)
SMALL PLATELETS BLD QL SMEAR: ADEQUATE
SODIUM SERPL-SCNC: 128 MMOL/L (ref 136–145)
WBC # BLD AUTO: 13.3 10*3/MM3 (ref 3.4–10.8)
WBC MORPH BLD: NORMAL

## 2020-11-24 PROCEDURE — 97530 THERAPEUTIC ACTIVITIES: CPT

## 2020-11-24 PROCEDURE — 99233 SBSQ HOSP IP/OBS HIGH 50: CPT | Performed by: INTERNAL MEDICINE

## 2020-11-24 PROCEDURE — 80069 RENAL FUNCTION PANEL: CPT | Performed by: INTERNAL MEDICINE

## 2020-11-24 PROCEDURE — 99232 SBSQ HOSP IP/OBS MODERATE 35: CPT | Performed by: INTERNAL MEDICINE

## 2020-11-24 PROCEDURE — 25010000002 ONDANSETRON PER 1 MG: Performed by: INTERNAL MEDICINE

## 2020-11-24 PROCEDURE — 94799 UNLISTED PULMONARY SVC/PX: CPT

## 2020-11-24 PROCEDURE — 85007 BL SMEAR W/DIFF WBC COUNT: CPT | Performed by: INTERNAL MEDICINE

## 2020-11-24 PROCEDURE — 97116 GAIT TRAINING THERAPY: CPT

## 2020-11-24 PROCEDURE — 83735 ASSAY OF MAGNESIUM: CPT | Performed by: INTERNAL MEDICINE

## 2020-11-24 PROCEDURE — 85025 COMPLETE CBC W/AUTO DIFF WBC: CPT | Performed by: INTERNAL MEDICINE

## 2020-11-24 RX ORDER — ALBUTEROL SULFATE 2.5 MG/3ML
2.5 SOLUTION RESPIRATORY (INHALATION) EVERY 4 HOURS PRN
Status: DISCONTINUED | OUTPATIENT
Start: 2020-11-24 | End: 2020-11-25 | Stop reason: HOSPADM

## 2020-11-24 RX ORDER — BUMETANIDE 1 MG/1
1 TABLET ORAL 2 TIMES DAILY
Status: DISCONTINUED | OUTPATIENT
Start: 2020-11-24 | End: 2020-11-25 | Stop reason: HOSPADM

## 2020-11-24 RX ORDER — SPIRONOLACTONE 25 MG/1
25 TABLET ORAL DAILY
Status: DISCONTINUED | OUTPATIENT
Start: 2020-11-24 | End: 2020-11-25 | Stop reason: HOSPADM

## 2020-11-24 RX ADMIN — POTASSIUM CHLORIDE 40 MEQ: 1500 TABLET, EXTENDED RELEASE ORAL at 17:51

## 2020-11-24 RX ADMIN — POTASSIUM CHLORIDE 40 MEQ: 1500 TABLET, EXTENDED RELEASE ORAL at 02:30

## 2020-11-24 RX ADMIN — RANOLAZINE 1000 MG: 500 TABLET, FILM COATED, EXTENDED RELEASE ORAL at 21:59

## 2020-11-24 RX ADMIN — IPRATROPIUM BROMIDE AND ALBUTEROL SULFATE 3 ML: 2.5; .5 SOLUTION RESPIRATORY (INHALATION) at 14:45

## 2020-11-24 RX ADMIN — SENNOSIDES AND DOCUSATE SODIUM 2 TABLET: 8.6; 5 TABLET ORAL at 09:14

## 2020-11-24 RX ADMIN — POTASSIUM CHLORIDE 40 MEQ: 1500 TABLET, EXTENDED RELEASE ORAL at 13:09

## 2020-11-24 RX ADMIN — MEXILETINE HYDROCHLORIDE 250 MG: 250 CAPSULE ORAL at 17:50

## 2020-11-24 RX ADMIN — BUMETANIDE 1 MG: 1 TABLET ORAL at 09:18

## 2020-11-24 RX ADMIN — MEXILETINE HYDROCHLORIDE 250 MG: 250 CAPSULE ORAL at 09:15

## 2020-11-24 RX ADMIN — DIVALPROEX SODIUM 250 MG: 125 TABLET, DELAYED RELEASE ORAL at 21:59

## 2020-11-24 RX ADMIN — Medication 10 ML: at 09:16

## 2020-11-24 RX ADMIN — SENNOSIDES AND DOCUSATE SODIUM 2 TABLET: 8.6; 5 TABLET ORAL at 21:59

## 2020-11-24 RX ADMIN — RANOLAZINE 1000 MG: 500 TABLET, FILM COATED, EXTENDED RELEASE ORAL at 09:14

## 2020-11-24 RX ADMIN — GABAPENTIN 300 MG: 300 CAPSULE ORAL at 17:50

## 2020-11-24 RX ADMIN — TAMSULOSIN HYDROCHLORIDE 0.4 MG: 0.4 CAPSULE ORAL at 21:59

## 2020-11-24 RX ADMIN — MONTELUKAST 10 MG: 10 TABLET, FILM COATED ORAL at 09:14

## 2020-11-24 RX ADMIN — GABAPENTIN 300 MG: 300 CAPSULE ORAL at 09:15

## 2020-11-24 RX ADMIN — ONDANSETRON 4 MG: 2 INJECTION INTRAMUSCULAR; INTRAVENOUS at 19:16

## 2020-11-24 RX ADMIN — CLOPIDOGREL BISULFATE 75 MG: 75 TABLET ORAL at 09:14

## 2020-11-24 RX ADMIN — MEXILETINE HYDROCHLORIDE 250 MG: 250 CAPSULE ORAL at 21:59

## 2020-11-24 RX ADMIN — APIXABAN 5 MG: 5 TABLET, FILM COATED ORAL at 09:15

## 2020-11-24 RX ADMIN — IPRATROPIUM BROMIDE AND ALBUTEROL SULFATE 3 ML: 2.5; .5 SOLUTION RESPIRATORY (INHALATION) at 11:50

## 2020-11-24 RX ADMIN — GABAPENTIN 300 MG: 300 CAPSULE ORAL at 21:59

## 2020-11-24 RX ADMIN — ATORVASTATIN CALCIUM 80 MG: 40 TABLET, FILM COATED ORAL at 09:15

## 2020-11-24 RX ADMIN — MIDODRINE HYDROCHLORIDE 5 MG: 5 TABLET ORAL at 21:59

## 2020-11-24 RX ADMIN — AZELASTINE HYDROCHLORIDE 1 SPRAY: 137 SPRAY, METERED NASAL at 09:16

## 2020-11-24 RX ADMIN — Medication 10 ML: at 21:59

## 2020-11-24 RX ADMIN — BUMETANIDE 1 MG: 1 TABLET ORAL at 21:59

## 2020-11-24 RX ADMIN — IPRATROPIUM BROMIDE AND ALBUTEROL SULFATE 3 ML: 2.5; .5 SOLUTION RESPIRATORY (INHALATION) at 07:25

## 2020-11-24 RX ADMIN — MIDODRINE HYDROCHLORIDE 5 MG: 5 TABLET ORAL at 13:08

## 2020-11-24 RX ADMIN — APIXABAN 5 MG: 5 TABLET, FILM COATED ORAL at 21:59

## 2020-11-24 RX ADMIN — SPIRONOLACTONE 25 MG: 25 TABLET ORAL at 09:15

## 2020-11-24 RX ADMIN — POTASSIUM CHLORIDE 40 MEQ: 1500 TABLET, EXTENDED RELEASE ORAL at 09:16

## 2020-11-24 RX ADMIN — METOPROLOL SUCCINATE 50 MG: 50 TABLET, EXTENDED RELEASE ORAL at 09:15

## 2020-11-24 RX ADMIN — TAMSULOSIN HYDROCHLORIDE 0.4 MG: 0.4 CAPSULE ORAL at 09:15

## 2020-11-24 RX ADMIN — MIDODRINE HYDROCHLORIDE 5 MG: 5 TABLET ORAL at 06:09

## 2020-11-24 RX ADMIN — DULOXETINE HYDROCHLORIDE 60 MG: 30 CAPSULE, DELAYED RELEASE ORAL at 09:14

## 2020-11-24 RX ADMIN — DIVALPROEX SODIUM 250 MG: 125 TABLET, DELAYED RELEASE ORAL at 09:15

## 2020-11-24 RX ADMIN — IPRATROPIUM BROMIDE AND ALBUTEROL SULFATE 3 ML: 2.5; .5 SOLUTION RESPIRATORY (INHALATION) at 19:54

## 2020-11-24 RX ADMIN — ROFLUMILAST 500 MCG: 500 TABLET ORAL at 09:14

## 2020-11-25 VITALS
HEART RATE: 70 BPM | TEMPERATURE: 98.1 F | DIASTOLIC BLOOD PRESSURE: 92 MMHG | WEIGHT: 289.46 LBS | RESPIRATION RATE: 20 BRPM | OXYGEN SATURATION: 94 % | HEIGHT: 70 IN | SYSTOLIC BLOOD PRESSURE: 132 MMHG | BODY MASS INDEX: 41.44 KG/M2

## 2020-11-25 LAB
ALBUMIN SERPL-MCNC: 3.6 G/DL (ref 3.5–5.2)
ANION GAP SERPL CALCULATED.3IONS-SCNC: 12 MMOL/L (ref 5–15)
BASOPHILS # BLD AUTO: 0.1 10*3/MM3 (ref 0–0.2)
BASOPHILS NFR BLD AUTO: 0.6 % (ref 0–1.5)
BUN SERPL-MCNC: 25 MG/DL (ref 6–20)
BUN/CREAT SERPL: 20 (ref 7–25)
CALCIUM SPEC-SCNC: 9.1 MG/DL (ref 8.6–10.5)
CHLORIDE SERPL-SCNC: 90 MMOL/L (ref 98–107)
CO2 SERPL-SCNC: 30 MMOL/L (ref 22–29)
CREAT SERPL-MCNC: 1.25 MG/DL (ref 0.76–1.27)
DEPRECATED RDW RBC AUTO: 47.3 FL (ref 37–54)
EOSINOPHIL # BLD AUTO: 0.1 10*3/MM3 (ref 0–0.4)
EOSINOPHIL NFR BLD AUTO: 0.8 % (ref 0.3–6.2)
ERYTHROCYTE [DISTWIDTH] IN BLOOD BY AUTOMATED COUNT: 16.4 % (ref 12.3–15.4)
GFR SERPL CREATININE-BSD FRML MDRD: 59 ML/MIN/1.73
GLUCOSE SERPL-MCNC: 101 MG/DL (ref 65–99)
HCT VFR BLD AUTO: 40.3 % (ref 37.5–51)
HGB BLD-MCNC: 13.2 G/DL (ref 13–17.7)
LYMPHOCYTES # BLD AUTO: 1.1 10*3/MM3 (ref 0.7–3.1)
LYMPHOCYTES NFR BLD AUTO: 6.2 % (ref 19.6–45.3)
MAGNESIUM SERPL-MCNC: 1.9 MG/DL (ref 1.6–2.6)
MCH RBC QN AUTO: 26.8 PG (ref 26.6–33)
MCHC RBC AUTO-ENTMCNC: 32.8 G/DL (ref 31.5–35.7)
MCV RBC AUTO: 81.8 FL (ref 79–97)
MONOCYTES # BLD AUTO: 2.5 10*3/MM3 (ref 0.1–0.9)
MONOCYTES NFR BLD AUTO: 14.6 % (ref 5–12)
NEUTROPHILS NFR BLD AUTO: 13.3 10*3/MM3 (ref 1.7–7)
NEUTROPHILS NFR BLD AUTO: 77.8 % (ref 42.7–76)
NRBC BLD AUTO-RTO: 0.1 /100 WBC (ref 0–0.2)
PHOSPHATE SERPL-MCNC: 3.5 MG/DL (ref 2.5–4.5)
PLATELET # BLD AUTO: 299 10*3/MM3 (ref 140–450)
PMV BLD AUTO: 9.3 FL (ref 6–12)
POTASSIUM SERPL-SCNC: 4.8 MMOL/L (ref 3.5–5.2)
RBC # BLD AUTO: 4.92 10*6/MM3 (ref 4.14–5.8)
SODIUM SERPL-SCNC: 132 MMOL/L (ref 136–145)
WBC # BLD AUTO: 17.1 10*3/MM3 (ref 3.4–10.8)

## 2020-11-25 PROCEDURE — 97110 THERAPEUTIC EXERCISES: CPT

## 2020-11-25 PROCEDURE — 94799 UNLISTED PULMONARY SVC/PX: CPT

## 2020-11-25 PROCEDURE — 80069 RENAL FUNCTION PANEL: CPT | Performed by: INTERNAL MEDICINE

## 2020-11-25 PROCEDURE — 99239 HOSP IP/OBS DSCHRG MGMT >30: CPT | Performed by: INTERNAL MEDICINE

## 2020-11-25 PROCEDURE — 97116 GAIT TRAINING THERAPY: CPT

## 2020-11-25 PROCEDURE — 83735 ASSAY OF MAGNESIUM: CPT | Performed by: INTERNAL MEDICINE

## 2020-11-25 PROCEDURE — 25010000002 MAGNESIUM SULFATE IN D5W 1G/100ML (PREMIX) 1-5 GM/100ML-% SOLUTION: Performed by: INTERNAL MEDICINE

## 2020-11-25 PROCEDURE — 85025 COMPLETE CBC W/AUTO DIFF WBC: CPT | Performed by: INTERNAL MEDICINE

## 2020-11-25 RX ORDER — DIVALPROEX SODIUM 250 MG/1
250 TABLET, DELAYED RELEASE ORAL 2 TIMES DAILY
Qty: 60 TABLET | Refills: 0 | Status: SHIPPED | OUTPATIENT
Start: 2020-11-25 | End: 2021-01-01

## 2020-11-25 RX ORDER — ALBUTEROL SULFATE 90 UG/1
2 AEROSOL, METERED RESPIRATORY (INHALATION) EVERY 4 HOURS PRN
Qty: 8 G | Refills: 0 | Status: SHIPPED | OUTPATIENT
Start: 2020-11-25 | End: 2021-01-01

## 2020-11-25 RX ORDER — METOPROLOL SUCCINATE 50 MG/1
50 TABLET, EXTENDED RELEASE ORAL
Qty: 30 TABLET | Refills: 0 | Status: SHIPPED | OUTPATIENT
Start: 2020-11-26

## 2020-11-25 RX ORDER — MAGNESIUM SULFATE 1 G/100ML
1 INJECTION INTRAVENOUS ONCE
Status: COMPLETED | OUTPATIENT
Start: 2020-11-25 | End: 2020-11-25

## 2020-11-25 RX ORDER — POTASSIUM CHLORIDE 20 MEQ/1
20 TABLET, EXTENDED RELEASE ORAL 2 TIMES DAILY WITH MEALS
Status: DISCONTINUED | OUTPATIENT
Start: 2020-11-25 | End: 2020-11-25 | Stop reason: HOSPADM

## 2020-11-25 RX ORDER — SPIRONOLACTONE 25 MG/1
25 TABLET ORAL DAILY
Start: 2020-11-26 | End: 2021-01-01

## 2020-11-25 RX ORDER — MIDODRINE HYDROCHLORIDE 5 MG/1
5 TABLET ORAL EVERY 8 HOURS SCHEDULED
Qty: 90 TABLET | Refills: 0 | Status: SHIPPED | OUTPATIENT
Start: 2020-11-25 | End: 2021-01-01

## 2020-11-25 RX ADMIN — CLOPIDOGREL BISULFATE 75 MG: 75 TABLET ORAL at 08:18

## 2020-11-25 RX ADMIN — METOPROLOL SUCCINATE 50 MG: 50 TABLET, EXTENDED RELEASE ORAL at 08:18

## 2020-11-25 RX ADMIN — DULOXETINE HYDROCHLORIDE 60 MG: 30 CAPSULE, DELAYED RELEASE ORAL at 08:17

## 2020-11-25 RX ADMIN — SENNOSIDES AND DOCUSATE SODIUM 2 TABLET: 8.6; 5 TABLET ORAL at 08:17

## 2020-11-25 RX ADMIN — MEXILETINE HYDROCHLORIDE 250 MG: 250 CAPSULE ORAL at 08:17

## 2020-11-25 RX ADMIN — APIXABAN 5 MG: 5 TABLET, FILM COATED ORAL at 08:17

## 2020-11-25 RX ADMIN — GABAPENTIN 300 MG: 300 CAPSULE ORAL at 08:17

## 2020-11-25 RX ADMIN — IPRATROPIUM BROMIDE AND ALBUTEROL SULFATE 3 ML: 2.5; .5 SOLUTION RESPIRATORY (INHALATION) at 07:51

## 2020-11-25 RX ADMIN — SPIRONOLACTONE 25 MG: 25 TABLET ORAL at 08:23

## 2020-11-25 RX ADMIN — BUMETANIDE 1 MG: 1 TABLET ORAL at 08:17

## 2020-11-25 RX ADMIN — TAMSULOSIN HYDROCHLORIDE 0.4 MG: 0.4 CAPSULE ORAL at 08:18

## 2020-11-25 RX ADMIN — ROFLUMILAST 500 MCG: 500 TABLET ORAL at 08:17

## 2020-11-25 RX ADMIN — DIVALPROEX SODIUM 250 MG: 125 TABLET, DELAYED RELEASE ORAL at 08:17

## 2020-11-25 RX ADMIN — MONTELUKAST 10 MG: 10 TABLET, FILM COATED ORAL at 08:18

## 2020-11-25 RX ADMIN — MIDODRINE HYDROCHLORIDE 5 MG: 5 TABLET ORAL at 13:22

## 2020-11-25 RX ADMIN — IPRATROPIUM BROMIDE AND ALBUTEROL SULFATE 3 ML: 2.5; .5 SOLUTION RESPIRATORY (INHALATION) at 11:24

## 2020-11-25 RX ADMIN — ATORVASTATIN CALCIUM 80 MG: 40 TABLET, FILM COATED ORAL at 08:18

## 2020-11-25 RX ADMIN — RANOLAZINE 1000 MG: 500 TABLET, FILM COATED, EXTENDED RELEASE ORAL at 08:17

## 2020-11-25 RX ADMIN — MAGNESIUM SULFATE HEPTAHYDRATE 1 G: 1 INJECTION, SOLUTION INTRAVENOUS at 09:46

## 2020-11-25 RX ADMIN — MIDODRINE HYDROCHLORIDE 5 MG: 5 TABLET ORAL at 05:55

## 2020-11-25 RX ADMIN — Medication 10 ML: at 08:21

## 2020-11-25 RX ADMIN — AZELASTINE HYDROCHLORIDE 1 SPRAY: 137 SPRAY, METERED NASAL at 08:23

## 2020-11-26 ENCOUNTER — READMISSION MANAGEMENT (OUTPATIENT)
Dept: CALL CENTER | Facility: HOSPITAL | Age: 60
End: 2020-11-26

## 2020-11-26 NOTE — OUTREACH NOTE
Prep Survey      Responses   [facility] facility patient discharged from?  [facility]   Is LACE score < 7 ?  No   Eligibility  Readm Mgmt   Discharge diagnosis  Coronary artery disease involving native coronary artery of native heart with unstable angina pectoris   Does the patient have one of the following disease processes/diagnoses(primary or secondary)?  CHF   Does the patient have Home health ordered?  Yes   What is the Home health agency?   [facility]     Is there a DME ordered?  No   Prep survey completed?  Yes          Tara Siu RN

## 2020-11-30 ENCOUNTER — NURSE TRIAGE (OUTPATIENT)
Dept: CALL CENTER | Facility: HOSPITAL | Age: 60
End: 2020-11-30

## 2020-11-30 ENCOUNTER — READMISSION MANAGEMENT (OUTPATIENT)
Dept: CALL CENTER | Facility: HOSPITAL | Age: 60
End: 2020-11-30

## 2020-11-30 NOTE — OUTREACH NOTE
CHF Week 1 Survey      Responses   [facility] patient discharged from?  [facility]   Does the patient have one of the following disease processes/diagnoses(primary or secondary)?  CHF   CHF Week 1 attempt successful?  No   Unsuccessful attempts  Attempt 1          Jaylen Kelley RN

## 2020-11-30 NOTE — TELEPHONE ENCOUNTER
"Patient has a moeller cather, on discharge is to fu with urologist. Called the office informed would need to go to ED. Called the office, spoke with staff will be calling the patient today. Patient informed    Reason for Disposition  • Requesting regular office appointment    Additional Information  • Negative: [1] Caller is not with the adult (patient) AND [2] reporting urgent symptoms  • Negative: Lab result questions  • Negative: Medication questions  • Negative: Caller can't be reached by phone  • Negative: Caller has already spoken to PCP or another triager  • Negative: RN needs further essential information from caller in order to complete triage    Answer Assessment - Initial Assessment Questions  1. REASON FOR CALL or QUESTION: \"What is your reason for calling today?\" or \"How can I best help you?\" or \"What question do you have that I can help answer?\"      Needing an office appointment    Protocols used: INFORMATION ONLY CALL - NO TRIAGE-ADULT-      "

## 2020-12-10 ENCOUNTER — READMISSION MANAGEMENT (OUTPATIENT)
Dept: CALL CENTER | Facility: HOSPITAL | Age: 60
End: 2020-12-10

## 2020-12-10 NOTE — OUTREACH NOTE
CHF Week 3 Survey      Responses   [facility] patient discharged from?  [facility]   Does the patient have one of the following disease processes/diagnoses(primary or secondary)?  CHF   Week 3 attempt successful?  Yes   Call start time  1634   Call end time  1637   Discharge diagnosis  Coronary artery disease involving native coronary artery of native heart with unstable angina pectoris   Meds reviewed with patient/caregiver?  Yes   Is the patient having any side effects they believe may be caused by any medication additions or changes?  No   Does the patient have all medications ordered at discharge?  Yes   Is the patient taking all medications as directed (includes completed medication regime)?  Yes   Does the patient have a primary care provider?   Yes   Does the patient have an appointment with their PCP within 7 days of discharge?  Yes   Has the patient kept scheduled appointments due by today?  Yes   What is the Home health agency?   [facility]     Has home health visited the patient within 72 hours of discharge?  Yes   Pulse Ox monitoring  None   Psychosocial issues?  No   Did the patient receive a copy of their discharge instructions?  Yes   Nursing interventions  Reviewed instructions with patient   What is the patient's perception of their health status since discharge?  Improving   Nursing interventions  Nurse provided patient education   Is the patient weighing daily?  Yes   Does the patient have scales?  Yes   Daily weight interventions  Education provided on importance of daily weight   Is the patient able to teach back Heart Failure diet management?  Yes   Is the patient able to teach back Heart Failure Zones?  Yes   Is the patient able to teach back signs and symptoms of worsening condition? (i.e. weight gain, shortness of air, etc.)  Yes   Is the patient/caregiver able to teach back the hierarchy of who to call/visit for symptoms/problems? PCP, Specialist, Home health nurse, Urgent Care, ED,  [phone]  Yes   CHF Week 3 call completed?  Yes   Wrap up additional comments  Extremely poor connection.          Jaylen Kelley RN

## 2020-12-17 ENCOUNTER — OFFICE VISIT (OUTPATIENT)
Dept: CARDIOLOGY | Facility: CLINIC | Age: 60
End: 2020-12-17

## 2020-12-17 VITALS
DIASTOLIC BLOOD PRESSURE: 80 MMHG | HEIGHT: 70 IN | WEIGHT: 300 LBS | BODY MASS INDEX: 42.95 KG/M2 | HEART RATE: 70 BPM | OXYGEN SATURATION: 94 % | SYSTOLIC BLOOD PRESSURE: 141 MMHG | RESPIRATION RATE: 18 BRPM

## 2020-12-17 DIAGNOSIS — I25.119 CORONARY ARTERY DISEASE INVOLVING NATIVE CORONARY ARTERY OF NATIVE HEART WITH ANGINA PECTORIS (HCC): Primary | Chronic | ICD-10-CM

## 2020-12-17 DIAGNOSIS — I20.8 CHRONIC STABLE ANGINA (HCC): Chronic | ICD-10-CM

## 2020-12-17 DIAGNOSIS — I25.5 ISCHEMIC CARDIOMYOPATHY: Chronic | ICD-10-CM

## 2020-12-17 DIAGNOSIS — I42.0 CARDIOMYOPATHY, DILATED (HCC): Chronic | ICD-10-CM

## 2020-12-17 DIAGNOSIS — I10 ESSENTIAL HYPERTENSION: Chronic | ICD-10-CM

## 2020-12-17 DIAGNOSIS — Z95.810 ICD (IMPLANTABLE CARDIOVERTER-DEFIBRILLATOR) IN PLACE: Chronic | ICD-10-CM

## 2020-12-17 PROCEDURE — 93000 ELECTROCARDIOGRAM COMPLETE: CPT | Performed by: INTERNAL MEDICINE

## 2020-12-17 PROCEDURE — 99214 OFFICE O/P EST MOD 30 MIN: CPT | Performed by: INTERNAL MEDICINE

## 2020-12-17 NOTE — PROGRESS NOTES
Cardiology Office Visit      Encounter Date:  12/17/2020    Patient ID:   Jose Dixon Jr. is a 60 y.o. male.    Reason For Followup:  Cardiomyopathy  Coronary artery disease    Brief Clinical History:  Dear Jaylen Herrera MD    I had the pleasure of seeing Jose Dixon Jr. today. As you are well aware, this is a 60 y.o. male with past medical history there is significant for history of coronary artery disease history of ischemic cardiomyopathy history of percutaneous coronary intervention multiple times in the past history of ICD history of atrial fibrillation history of ventricular tachycardia history of ablation for ventricular tachycardia in the past with a residual significant cardiomyopathy and scar burden    Interval History:  Patient clinically feels better shortness of breath is much better chest pain has improved  Denies any dizziness or syncope  Denies any orthopnea PND  No syncopal episodes  No ICD shocks    Assessment & Plan    Impressions:  Congestive heart failure  chronic systolic heart failure  Congestive heart failure NYHA class Ill  Congestive heart failure secondary to ischemic cardiomyopathy'  History of VT ablation for recurrent VT  Known coronary artery disease with ischemic cardiomyopathy  Chronic anginal chest pain  History of atrial fibrillation  Ischemic cardiomyopathy with LV ejection fraction of 30%  Hypertension  Hyperlipidemia  History of multiple coronary interventions  Prior history of VT ablation multiple times  Known residual coronary artery disease  History of surgical LV lead placement  Biventricular ICD in situ  History of AV node ablation  Chronic renal insufficiency  VT storm with multiple ICD shocks and underwent a VT ablation affecting the posterior scar and started have recurrent VT and started on Mexitil 2    Recommendations:  Continue current medical management  Recent labs with nephrology within normal potassium level and stable renal function  symptoms have  "improved significantly from the recent hospitalization  Continue Plavix as antiplatelet therapy  Continue anticoagulation therapy with Eliquis  Continue high-dose statin  Continue beta-blocker and mexiletine  Continue spironolactone  Continue Ranexa  Need for compliance with medical therapy and close monitoring reviewed and discussed with the patient  Follow-up with the EP as scheduled  Follow-up in office in 3 months  Objective:    Vitals:  Vitals:    12/17/20 1425   BP: 141/80   BP Location: Left arm   Pulse: 70   Resp: 18   SpO2: 94%   Weight: 136 kg (300 lb)   Height: 177.8 cm (70\")       Physical Exam:    General: Alert, cooperative, no distress, appears stated age  Head:  Normocephalic, atraumatic, mucous membranes moist  Eyes:  Conjunctiva/corneas clear, EOM's intact     Neck:  Supple,  no adenopathy;      Lungs: Clear to auscultation bilaterally, no wheezes rhonchi rales are noted  Chest wall: No tenderness  Heart::  Regular rate and rhythm, S1 and S2 normal, displaced LV apical impulse 2 x 6 ejection systolic murmur  Abdomen: Soft, non-tender, nondistended bowel sounds active  Extremities: No cyanosis, clubbing, or trace edema bilateral lower extremities  Pulses: 2+ and symmetric all extremities  Skin:  No rashes or lesions  Neuro/psych: A&O x3. CN II through XII are grossly intact with appropriate affect      Allergies:  Allergies   Allergen Reactions   • Codeine Anaphylaxis          • Tramadol Anaphylaxis and Hives       Medication Review:     Current Outpatient Medications:   •  albuterol sulfate  (90 Base) MCG/ACT inhaler, Inhale 2 puffs Every 4 (Four) Hours As Needed for Wheezing., Disp: 1 inhaler, Rfl: 11  •  albuterol sulfate  (90 Base) MCG/ACT inhaler, Inhale 2 puffs Every 4 (Four) Hours As Needed for Wheezing., Disp: 8 g, Rfl: 0  •  apixaban (ELIQUIS) 5 MG tablet tablet, Take 1 tablet by mouth 2 (Two) Times a Day., Disp: 60 tablet, Rfl: 5  •  atorvastatin (LIPITOR) 80 MG tablet, Take " 1 tablet by mouth Daily., Disp: 90 tablet, Rfl: 3  •  budesonide-formoterol (SYMBICORT) 160-4.5 MCG/ACT inhaler, Inhale 2 puffs 2 (Two) Times a Day., Disp: 10.2 g, Rfl: 0  •  bumetanide (BUMEX) 1 MG tablet, Take 1 mg by mouth 2 (Two) Times a Day., Disp: , Rfl:   •  cetirizine (zyrTEC) 10 MG tablet, Take 1 tablet by mouth Daily., Disp: 90 tablet, Rfl: 1  •  clopidogrel (PLAVIX) 75 MG tablet, Take 1 tablet by mouth Daily., Disp: 90 tablet, Rfl: 1  •  divalproex (DEPAKOTE) 250 MG DR tablet, Take 1 tablet by mouth 2 (two) times a day., Disp: 60 tablet, Rfl: 0  •  DULoxetine (CYMBALTA) 60 MG capsule, Take 60 mg by mouth Daily., Disp: , Rfl:   •  fluticasone (FLONASE) 50 MCG/ACT nasal spray, 2 sprays into the nostril(s) as directed by provider Daily., Disp: 1 bottle, Rfl: 11  •  gabapentin (NEURONTIN) 300 MG capsule, Take 300 mg by mouth 3 (Three) Times a Day., Disp: , Rfl:   •  hydrOXYzine (ATARAX) 50 MG tablet, Take 1 tablet by mouth At Night As Needed for Anxiety., Disp: 10 tablet, Rfl: 0  •  metoprolol succinate XL (TOPROL-XL) 50 MG 24 hr tablet, Take 1 tablet by mouth Daily., Disp: 30 tablet, Rfl: 0  •  mexiletine (MEXITIL) 250 MG capsule, Take 1 capsule by mouth 3 (Three) Times a Day., Disp: 90 capsule, Rfl: 3  •  midodrine (PROAMATINE) 5 MG tablet, Take 1 tablet by mouth Every 8 (Eight) Hours., Disp: 90 tablet, Rfl: 0  •  montelukast (SINGULAIR) 10 MG tablet, Take 10 mg by mouth Daily., Disp: , Rfl:   •  nitroglycerin (NITROSTAT) 0.4 MG SL tablet, Place 0.4 mg under the tongue Every 5 (Five) Minutes As Needed for Chest Pain. Take no more than 3 doses in 15 minutes., Disp: , Rfl:   •  ranolazine (RANEXA) 1000 MG 12 hr tablet, Take 1 tablet by mouth 2 (Two) Times a Day., Disp: 180 tablet, Rfl: 3  •  roflumilast (DALIRESP) 500 MCG tablet tablet, Take 500 mcg by mouth Daily., Disp: , Rfl:   •  spironolactone (ALDACTONE) 25 MG tablet, Take 1 tablet by mouth Daily., Disp: , Rfl:   •  tamsulosin (FLOMAX) 0.4 MG capsule 24  hr capsule, Take 1 capsule by mouth Every Night., Disp: , Rfl:     Family History:  Family History   Problem Relation Age of Onset   • Diabetes Mother    • Heart disease Mother         MI age 46 - CHF   • Atrial fibrillation Mother    • COPD Mother    • Atrial fibrillation Father    • Heart disease Father         CHF       Past Medical History:  Past Medical History:   Diagnosis Date   • Asthma    • Atrial fibrillation (CMS/HCC)    • CAD (coronary artery disease)    • CHF (congestive heart failure) (CMS/HCC)    • COPD (chronic obstructive pulmonary disease) (CMS/[facility])    • Depression    • Elevated cholesterol    • Hyperlipidemia    • Hypertension    • Myocardial infarction (CMS/HCC)    • Pacemaker 2019    [employer]    • Sleep apnea    • V tach (CMS/[facility])        Past surgical History:  Past Surgical History:   Procedure Laterality Date   • BACK SURGERY      Sciatica   • CARDIAC ABLATION  11/07/2017   • CARDIAC CATHETERIZATION      6-8 stents, last heart cath 2019   • CARDIAC CATHETERIZATION N/A 6/9/2020    Procedure: Coronary angiography;  Surgeon: Jose Lopez MD;  Location: [facility] CATH INVASIVE LOCATION;  Service: Cardiovascular;  Laterality: N/A;   • CARDIAC ELECTROPHYSIOLOGY PROCEDURE N/A 9/26/2019    Procedure: BIVENTRICULAR DEVICE UPGRADE;  Surgeon: Jose Lopez MD;  Location:  [facility] CATH INVASIVE LOCATION;  Service: Cardiovascular   • CARDIAC ELECTROPHYSIOLOGY PROCEDURE N/A 9/26/2019    Procedure: EP/Ablation AV Node ablation;  Surgeon: Jose Lopez MD;  Location:  [facility] CATH INVASIVE LOCATION;  Service: Cardiology   • CARDIAC ELECTROPHYSIOLOGY PROCEDURE N/A 6/9/2020    Procedure: EP/Ablation;  Surgeon: Jose Loepz MD;  Location: [facility] CATH INVASIVE LOCATION;  Service: Cardiovascular;  Laterality: N/A;   • CARDIAC ELECTROPHYSIOLOGY PROCEDURE N/A 10/1/2020    Procedure: EP/Ablation;  Surgeon: Jose Lopez MD;  Location:  [facility] CATH INVASIVE LOCATION;  Service:  Cardiovascular;  Laterality: N/A;   • CARDIAC PACEMAKER PLACEMENT N/A 6/22/2020    Procedure: LEFT VENTRICULAR LEAD PLACEMENT;  Surgeon: Christiano Richmond MD;  Location: [facility];  Service: Cardiothoracic;  Laterality: N/A;   • CHOLECYSTECTOMY     • COLONOSCOPY     • CORONARY ANGIOPLASTY WITH STENT PLACEMENT      2 stents   • HERNIA REPAIR      gallbladder    • PACEMAKER IMPLANTATION  07/08/2016    Pacemaker/ Defib implant - [employer]   • SINUS SURGERY      sinus surgery x 9   • SKIN BIOPSY      benign       Social History:  Social History     Socioeconomic History   • Marital status:      Spouse name: Not on file   • Number of children: Not on file   • Years of education: Not on file   • Highest education level: Not on file   Tobacco Use   • Smoking status: Never Smoker   • Smokeless tobacco: Never Used   Substance and Sexual Activity   • Alcohol use: Never     Frequency: Never     Comment: socially   • Drug use: No   • Sexual activity: Defer     Partners: Female       Review of Systems:  The following systems were reviewed as they relate to the cardiovascular system: Constitutional, Eyes, ENT, Cardiovascular, Respiratory, Gastrointestinal, Integumentary, Neurological, Psychiatric, Hematologic, Endocrine, Musculoskeletal, and Genitourinary. The pertinent cardiovascular findings are reported above with all other cardiovascular points within those systems being negative.    Diagnostic Study Review:     Current Electrocardiogram:    ECG 12 Lead    Date/Time: 12/17/2020 2:49 PM  Performed by: Silver Burger MD  Authorized by: Silver Burger MD   Comparison: compared with previous ECG   Similar to previous ECG    Clinical impression: abnormal EKG  Comments: Atrio-ventricular paced rhythm with a ventricular rate of 70 bpm              NOTE: The following portions of the patient's history were reviewed and updated this visit as appropriate: allergies, current medications, past family history, past  medical history, past social history, past surgical history and problem list.

## 2020-12-18 ENCOUNTER — READMISSION MANAGEMENT (OUTPATIENT)
Dept: CALL CENTER | Facility: HOSPITAL | Age: 60
End: 2020-12-18

## 2020-12-18 NOTE — OUTREACH NOTE
CHF Week 4 Survey      Responses   [facility] patient discharged from?  [facility]   Does the patient have one of the following disease processes/diagnoses(primary or secondary)?  CHF   Week 4 attempt successful?  Yes   Call start time  1657   Call end time  1701   Discharge diagnosis  Coronary artery disease involving native coronary artery of native heart with unstable angina pectoris   Meds reviewed with patient/caregiver?  Yes   Is the patient having any side effects they believe may be caused by any medication additions or changes?  No   Is the patient taking all medications as directed (includes completed medication regime)?  Yes   Has the patient kept scheduled appointments due by today?  Yes   Is the patient still receiving Home Health Services?  N/A   Pulse Ox monitoring  None   Psychosocial issues?  No   What is the patient's perception of their health status since discharge?  Improving   Nursing interventions  Nurse provided patient education   Is the patient weighing daily?  Yes   Does the patient have scales?  Yes   Daily weight interventions  Education provided on importance of daily weight   Is the patient able to teach back Heart Failure diet management?  Yes   Is the patient able to teach back Heart Failure Zones?  Yes   Is the patient able to teach back signs and symptoms of worsening condition? (i.e. weight gain, shortness of air, etc.)  Yes   Is the patient/caregiver able to teach back the hierarchy of who to call/visit for symptoms/problems? PCP, Specialist, Home health nurse, Urgent Care, ED, 911  Yes   Additional teach back comments  Discussed daily weights, he avoids sodium, encouraged ambulation.   Week 4 Call Completed?  Yes   Would the patient like one additional call?  No   Graduated  Yes   Is the patient interested in additional calls from an ambulatory ?  NOTE:  applies to high risk patients requiring additional follow-up.  No   Did the patient feel the follow up calls were  helpful during their recovery period?  Yes   Was the number of calls appropriate?  Yes   Wrap up additional comments  HH is finished, discouraged sodium. SOA with COPD and asthma.          Swapna Clancy RN

## 2021-01-01 ENCOUNTER — APPOINTMENT (OUTPATIENT)
Dept: GENERAL RADIOLOGY | Facility: HOSPITAL | Age: 61
End: 2021-01-01

## 2021-01-01 ENCOUNTER — READMISSION MANAGEMENT (OUTPATIENT)
Dept: CALL CENTER | Facility: HOSPITAL | Age: 61
End: 2021-01-01

## 2021-01-01 ENCOUNTER — APPOINTMENT (OUTPATIENT)
Dept: CARDIOLOGY | Facility: HOSPITAL | Age: 61
End: 2021-01-01

## 2021-01-01 ENCOUNTER — OFFICE VISIT (OUTPATIENT)
Dept: CARDIOLOGY | Facility: CLINIC | Age: 61
End: 2021-01-01

## 2021-01-01 ENCOUNTER — APPOINTMENT (OUTPATIENT)
Dept: NUCLEAR MEDICINE | Facility: HOSPITAL | Age: 61
End: 2021-01-01

## 2021-01-01 ENCOUNTER — HOSPITAL ENCOUNTER (OUTPATIENT)
Facility: HOSPITAL | Age: 61
Setting detail: OBSERVATION
Discharge: HOME | End: 2021-07-06
Attending: INTERNAL MEDICINE | Admitting: INTERNAL MEDICINE

## 2021-01-01 ENCOUNTER — HOSPITAL ENCOUNTER (OUTPATIENT)
Facility: HOSPITAL | Age: 61
Setting detail: OBSERVATION
Discharge: HOME | End: 2021-06-25
Attending: EMERGENCY MEDICINE | Admitting: HOSPITALIST

## 2021-01-01 ENCOUNTER — HOSPITAL ENCOUNTER (OUTPATIENT)
Facility: HOSPITAL | Age: 61
Setting detail: OBSERVATION
Discharge: HOME | End: 2021-04-28
Attending: HOSPITALIST | Admitting: INTERNAL MEDICINE

## 2021-01-01 ENCOUNTER — HOSPITAL ENCOUNTER (OUTPATIENT)
Facility: HOSPITAL | Age: 61
Setting detail: OBSERVATION
Discharge: HOME | End: 2021-03-17
Attending: INTERNAL MEDICINE | Admitting: HOSPITALIST

## 2021-01-01 ENCOUNTER — APPOINTMENT (OUTPATIENT)
Dept: CT IMAGING | Facility: HOSPITAL | Age: 61
End: 2021-01-01

## 2021-01-01 VITALS
OXYGEN SATURATION: 94 % | RESPIRATION RATE: 16 BRPM | SYSTOLIC BLOOD PRESSURE: 109 MMHG | HEIGHT: 70 IN | DIASTOLIC BLOOD PRESSURE: 72 MMHG | TEMPERATURE: 97.4 F | HEART RATE: 70 BPM | WEIGHT: 294.75 LBS | BODY MASS INDEX: 42.2 KG/M2

## 2021-01-01 VITALS
SYSTOLIC BLOOD PRESSURE: 150 MMHG | RESPIRATION RATE: 18 BRPM | OXYGEN SATURATION: 98 % | DIASTOLIC BLOOD PRESSURE: 75 MMHG | HEIGHT: 70 IN | WEIGHT: 294 LBS | BODY MASS INDEX: 42.09 KG/M2 | HEART RATE: 45 BPM

## 2021-01-01 VITALS
HEART RATE: 76 BPM | HEIGHT: 69 IN | SYSTOLIC BLOOD PRESSURE: 110 MMHG | RESPIRATION RATE: 18 BRPM | DIASTOLIC BLOOD PRESSURE: 74 MMHG | BODY MASS INDEX: 45.16 KG/M2 | OXYGEN SATURATION: 94 % | WEIGHT: 304.9 LBS | TEMPERATURE: 98 F

## 2021-01-01 VITALS
SYSTOLIC BLOOD PRESSURE: 110 MMHG | DIASTOLIC BLOOD PRESSURE: 69 MMHG | WEIGHT: 309.75 LBS | RESPIRATION RATE: 18 BRPM | BODY MASS INDEX: 44.34 KG/M2 | TEMPERATURE: 98.6 F | HEIGHT: 70 IN | HEART RATE: 70 BPM | OXYGEN SATURATION: 99 %

## 2021-01-01 VITALS
HEIGHT: 70 IN | OXYGEN SATURATION: 97 % | WEIGHT: 311 LBS | SYSTOLIC BLOOD PRESSURE: 128 MMHG | RESPIRATION RATE: 18 BRPM | BODY MASS INDEX: 44.52 KG/M2 | DIASTOLIC BLOOD PRESSURE: 82 MMHG | HEART RATE: 69 BPM

## 2021-01-01 VITALS
BODY MASS INDEX: 42.99 KG/M2 | OXYGEN SATURATION: 96 % | RESPIRATION RATE: 18 BRPM | TEMPERATURE: 97.8 F | HEIGHT: 70 IN | SYSTOLIC BLOOD PRESSURE: 134 MMHG | DIASTOLIC BLOOD PRESSURE: 88 MMHG | HEART RATE: 78 BPM | WEIGHT: 300.3 LBS

## 2021-01-01 VITALS
BODY MASS INDEX: 42.62 KG/M2 | SYSTOLIC BLOOD PRESSURE: 167 MMHG | HEART RATE: 70 BPM | DIASTOLIC BLOOD PRESSURE: 95 MMHG | OXYGEN SATURATION: 98 % | WEIGHT: 297 LBS

## 2021-01-01 VITALS
BODY MASS INDEX: 44.89 KG/M2 | HEART RATE: 72 BPM | OXYGEN SATURATION: 99 % | DIASTOLIC BLOOD PRESSURE: 99 MMHG | SYSTOLIC BLOOD PRESSURE: 163 MMHG | WEIGHT: 304 LBS

## 2021-01-01 DIAGNOSIS — I25.119 CORONARY ARTERY DISEASE INVOLVING NATIVE CORONARY ARTERY OF NATIVE HEART WITH ANGINA PECTORIS (HCC): Primary | ICD-10-CM

## 2021-01-01 DIAGNOSIS — R07.9 CHEST PAIN, UNSPECIFIED TYPE: Primary | ICD-10-CM

## 2021-01-01 DIAGNOSIS — I25.5 ISCHEMIC CARDIOMYOPATHY: ICD-10-CM

## 2021-01-01 DIAGNOSIS — I42.0 CARDIOMYOPATHY, DILATED (HCC): ICD-10-CM

## 2021-01-01 DIAGNOSIS — I25.119 CORONARY ARTERY DISEASE INVOLVING NATIVE CORONARY ARTERY OF NATIVE HEART WITH ANGINA PECTORIS (HCC): ICD-10-CM

## 2021-01-01 DIAGNOSIS — Z95.810 PRESENCE OF BIVENTRICULAR IMPLANTABLE CARDIOVERTER-DEFIBRILLATOR (ICD): ICD-10-CM

## 2021-01-01 DIAGNOSIS — Z95.810 ICD (IMPLANTABLE CARDIOVERTER-DEFIBRILLATOR) IN PLACE: Chronic | ICD-10-CM

## 2021-01-01 DIAGNOSIS — I47.29 VENTRICULAR TACHYCARDIA (PAROXYSMAL) (HCC): ICD-10-CM

## 2021-01-01 DIAGNOSIS — R06.09 EXERTIONAL DYSPNEA: ICD-10-CM

## 2021-01-01 DIAGNOSIS — E78.5 DYSLIPIDEMIA: Chronic | ICD-10-CM

## 2021-01-01 DIAGNOSIS — I50.22 CHRONIC SYSTOLIC CONGESTIVE HEART FAILURE (HCC): ICD-10-CM

## 2021-01-01 DIAGNOSIS — R00.2 PALPITATIONS: ICD-10-CM

## 2021-01-01 DIAGNOSIS — I20.8 CHRONIC STABLE ANGINA (HCC): Chronic | ICD-10-CM

## 2021-01-01 DIAGNOSIS — I47.29 VENTRICULAR TACHYCARDIA (PAROXYSMAL) (HCC): Primary | ICD-10-CM

## 2021-01-01 DIAGNOSIS — I48.21 PERMANENT ATRIAL FIBRILLATION (HCC): ICD-10-CM

## 2021-01-01 DIAGNOSIS — I50.22 CHRONIC SYSTOLIC CONGESTIVE HEART FAILURE (HCC): Chronic | ICD-10-CM

## 2021-01-01 DIAGNOSIS — I25.5 ISCHEMIC CARDIOMYOPATHY: Primary | ICD-10-CM

## 2021-01-01 DIAGNOSIS — I50.9 ACUTE ON CHRONIC CONGESTIVE HEART FAILURE, UNSPECIFIED HEART FAILURE TYPE (HCC): ICD-10-CM

## 2021-01-01 DIAGNOSIS — I25.119 CORONARY ARTERY DISEASE INVOLVING NATIVE CORONARY ARTERY OF NATIVE HEART WITH ANGINA PECTORIS (HCC): Primary | Chronic | ICD-10-CM

## 2021-01-01 DIAGNOSIS — I42.0 CARDIOMYOPATHY, DILATED (HCC): Chronic | ICD-10-CM

## 2021-01-01 DIAGNOSIS — R11.0 NAUSEA: ICD-10-CM

## 2021-01-01 DIAGNOSIS — I25.5 ISCHEMIC CARDIOMYOPATHY: Chronic | ICD-10-CM

## 2021-01-01 DIAGNOSIS — I25.119 CORONARY ARTERY DISEASE INVOLVING NATIVE CORONARY ARTERY OF NATIVE HEART WITH ANGINA PECTORIS (HCC): Chronic | ICD-10-CM

## 2021-01-01 DIAGNOSIS — I10 ESSENTIAL HYPERTENSION: Chronic | ICD-10-CM

## 2021-01-01 DIAGNOSIS — R19.7 DIARRHEA, UNSPECIFIED TYPE: ICD-10-CM

## 2021-01-01 DIAGNOSIS — R06.02 SHORTNESS OF BREATH: ICD-10-CM

## 2021-01-01 DIAGNOSIS — R53.1 WEAKNESS: ICD-10-CM

## 2021-01-01 LAB
ALBUMIN SERPL-MCNC: 3.6 G/DL (ref 3.5–5.2)
ALBUMIN SERPL-MCNC: 3.8 G/DL (ref 3.5–5.2)
ALBUMIN SERPL-MCNC: 4 G/DL (ref 3.5–5.2)
ALBUMIN SERPL-MCNC: 4.2 G/DL (ref 3.5–5.2)
ALBUMIN/GLOB SERPL: 1.1 G/DL
ALBUMIN/GLOB SERPL: 1.3 G/DL
ALBUMIN/GLOB SERPL: 1.4 G/DL
ALBUMIN/GLOB SERPL: 1.5 G/DL
ALP SERPL-CCNC: 71 U/L (ref 39–117)
ALP SERPL-CCNC: 72 U/L (ref 39–117)
ALP SERPL-CCNC: 77 U/L (ref 39–117)
ALP SERPL-CCNC: 89 U/L (ref 39–117)
ALT SERPL W P-5'-P-CCNC: 11 U/L (ref 1–41)
ALT SERPL W P-5'-P-CCNC: 13 U/L (ref 1–41)
ALT SERPL W P-5'-P-CCNC: 16 U/L (ref 1–41)
ALT SERPL W P-5'-P-CCNC: 21 U/L (ref 1–41)
ANION GAP SERPL CALCULATED.3IONS-SCNC: 11 MMOL/L (ref 5–15)
ANION GAP SERPL CALCULATED.3IONS-SCNC: 12 MMOL/L (ref 5–15)
ANION GAP SERPL CALCULATED.3IONS-SCNC: 13 MMOL/L (ref 5–15)
ANION GAP SERPL CALCULATED.3IONS-SCNC: 7 MMOL/L (ref 5–15)
ANION GAP SERPL CALCULATED.3IONS-SCNC: 9 MMOL/L (ref 5–15)
ANISOCYTOSIS BLD QL: ABNORMAL
APTT PPP: 24.4 SECONDS (ref 24–31)
APTT PPP: 25.4 SECONDS (ref 24–31)
APTT PPP: 26.5 SECONDS (ref 24–31)
AST SERPL-CCNC: 20 U/L (ref 1–40)
AST SERPL-CCNC: 25 U/L (ref 1–40)
AST SERPL-CCNC: 25 U/L (ref 1–40)
AST SERPL-CCNC: 26 U/L (ref 1–40)
BACTERIA UR QL AUTO: ABNORMAL /HPF
BASOPHILS # BLD AUTO: 0 10*3/MM3 (ref 0–0.2)
BASOPHILS # BLD AUTO: 0 10*3/MM3 (ref 0–0.2)
BASOPHILS # BLD AUTO: 0.1 10*3/MM3 (ref 0–0.2)
BASOPHILS # BLD MANUAL: 0.11 10*3/MM3 (ref 0–0.2)
BASOPHILS NFR BLD AUTO: 0.1 % (ref 0–1.5)
BASOPHILS NFR BLD AUTO: 0.2 % (ref 0–1.5)
BASOPHILS NFR BLD AUTO: 0.8 % (ref 0–1.5)
BASOPHILS NFR BLD AUTO: 0.9 % (ref 0–1.5)
BASOPHILS NFR BLD AUTO: 1 % (ref 0–1.5)
BASOPHILS NFR BLD AUTO: 1.1 % (ref 0–1.5)
BASOPHILS NFR BLD AUTO: 1.2 % (ref 0–1.5)
BASOPHILS NFR BLD AUTO: 1.3 % (ref 0–1.5)
BASOPHILS NFR BLD AUTO: 1.4 % (ref 0–1.5)
BH CV ECHO MEAS - ACS: 2.3 CM
BH CV ECHO MEAS - AO MAX PG: 3.5 MMHG
BH CV ECHO MEAS - AO MEAN PG: 2.3 MMHG
BH CV ECHO MEAS - AO ROOT AREA (BSA CORRECTED): 1.2
BH CV ECHO MEAS - AO ROOT AREA: 7 CM^2
BH CV ECHO MEAS - AO ROOT DIAM: 3 CM
BH CV ECHO MEAS - AO V2 MAX: 93.8 CM/SEC
BH CV ECHO MEAS - AO V2 MEAN: 70.9 CM/SEC
BH CV ECHO MEAS - AO V2 VTI: 15.6 CM
BH CV ECHO MEAS - AORTIC HR: 69.8 BPM
BH CV ECHO MEAS - AORTIC R-R: 0.86 SEC
BH CV ECHO MEAS - ASC AORTA: 3.2 CM
BH CV ECHO MEAS - BSA(HAYCOCK): 2.6 M^2
BH CV ECHO MEAS - BSA: 2.5 M^2
BH CV ECHO MEAS - BZI_BMI: 42.2 KILOGRAMS/M^2
BH CV ECHO MEAS - BZI_METRIC_HEIGHT: 177.8 CM
BH CV ECHO MEAS - BZI_METRIC_WEIGHT: 133.4 KG
BH CV ECHO MEAS - CI(AO): 3.1 L/MIN/M^2
BH CV ECHO MEAS - CO(AO): 7.6 L/MIN
BH CV ECHO MEAS - EDV(CUBED): 268.5 ML
BH CV ECHO MEAS - EDV(MOD-SP4): 123.1 ML
BH CV ECHO MEAS - EDV(TEICH): 212.3 ML
BH CV ECHO MEAS - EF(CUBED): 11.2 %
BH CV ECHO MEAS - EF(MOD-BP): 40 %
BH CV ECHO MEAS - EF(MOD-SP4): 39.6 %
BH CV ECHO MEAS - EF(TEICH): 8.6 %
BH CV ECHO MEAS - ESV(CUBED): 238.5 ML
BH CV ECHO MEAS - ESV(MOD-SP4): 74.3 ML
BH CV ECHO MEAS - ESV(TEICH): 194.1 ML
BH CV ECHO MEAS - FS: 3.9 %
BH CV ECHO MEAS - IVS/LVPW: 1.3
BH CV ECHO MEAS - IVSD: 1.5 CM
BH CV ECHO MEAS - LA DIMENSION(2D): 5.2 CM
BH CV ECHO MEAS - LV DIASTOLIC VOL/BSA (35-75): 50.1 ML/M^2
BH CV ECHO MEAS - LV MASS(C)D: 392 GRAMS
BH CV ECHO MEAS - LV MASS(C)DI: 159.5 GRAMS/M^2
BH CV ECHO MEAS - LV SYSTOLIC VOL/BSA (12-30): 30.2 ML/M^2
BH CV ECHO MEAS - LVIDD: 6.5 CM
BH CV ECHO MEAS - LVIDS: 6.2 CM
BH CV ECHO MEAS - LVOT AREA: 3.6 CM^2
BH CV ECHO MEAS - LVOT DIAM: 2.1 CM
BH CV ECHO MEAS - LVPWD: 1.1 CM
BH CV ECHO MEAS - MR MAX PG: 57.8 MMHG
BH CV ECHO MEAS - MR MAX VEL: 379.1 CM/SEC
BH CV ECHO MEAS - MR MEAN PG: 46.7 MMHG
BH CV ECHO MEAS - MR MEAN VEL: 314.9 CM/SEC
BH CV ECHO MEAS - MR VTI: 123.4 CM
BH CV ECHO MEAS - MV MAX PG: 35.5 MMHG
BH CV ECHO MEAS - MV MEAN PG: 23.5 MMHG
BH CV ECHO MEAS - MV V2 MAX: 256.5 CM/SEC
BH CV ECHO MEAS - MV V2 MEAN: 186.6 CM/SEC
BH CV ECHO MEAS - MV V2 VTI: 75.8 CM
BH CV ECHO MEAS - PA ACC TIME: 0.1 SEC
BH CV ECHO MEAS - PA MAX PG (FULL): 0.9 MMHG
BH CV ECHO MEAS - PA MAX PG: 1.7 MMHG
BH CV ECHO MEAS - PA PR(ACCEL): 36.2 MMHG
BH CV ECHO MEAS - PA V2 MAX: 65 CM/SEC
BH CV ECHO MEAS - RV MAX PG: 0.81 MMHG
BH CV ECHO MEAS - RV MEAN PG: 0.41 MMHG
BH CV ECHO MEAS - RV V1 MAX: 45 CM/SEC
BH CV ECHO MEAS - RV V1 MEAN: 29.9 CM/SEC
BH CV ECHO MEAS - RV V1 VTI: 7.5 CM
BH CV ECHO MEAS - RVDD: 3.5 CM
BH CV ECHO MEAS - SI(AO): 44.3 ML/M^2
BH CV ECHO MEAS - SI(CUBED): 12.2 ML/M^2
BH CV ECHO MEAS - SI(MOD-SP4): 19.9 ML/M^2
BH CV ECHO MEAS - SI(TEICH): 7.4 ML/M^2
BH CV ECHO MEAS - SV(AO): 108.8 ML
BH CV ECHO MEAS - SV(CUBED): 30 ML
BH CV ECHO MEAS - SV(MOD-SP4): 48.8 ML
BH CV ECHO MEAS - SV(TEICH): 18.2 ML
BH CV NUCLEAR PRIOR STUDY: 3
BH CV REST NUCLEAR ISOTOPE DOSE: 7.9 MCI
BH CV STRESS BP STAGE 1: NORMAL
BH CV STRESS BP STAGE 2: NORMAL
BH CV STRESS BP STAGE 3: NORMAL
BH CV STRESS COMMENTS STAGE 1: NORMAL
BH CV STRESS COMMENTS STAGE 2: NORMAL
BH CV STRESS DOSE REGADENOSON STAGE 1: 0.4
BH CV STRESS DURATION MIN STAGE 1: 0
BH CV STRESS DURATION MIN STAGE 2: 4
BH CV STRESS DURATION SEC STAGE 1: 10
BH CV STRESS DURATION SEC STAGE 2: 0
BH CV STRESS HR STAGE 1: 70
BH CV STRESS HR STAGE 2: 70
BH CV STRESS HR STAGE 3: 70
BH CV STRESS NUCLEAR ISOTOPE DOSE: 21.2 MCI
BH CV STRESS PROTOCOL 1: NORMAL
BH CV STRESS RECOVERY BP: NORMAL MMHG
BH CV STRESS RECOVERY HR: 70 BPM
BH CV STRESS STAGE 1: 1
BH CV STRESS STAGE 2: 2
BH CV STRESS STAGE 3: 3
BILIRUB SERPL-MCNC: 0.6 MG/DL (ref 0–1.2)
BILIRUB SERPL-MCNC: 0.6 MG/DL (ref 0–1.2)
BILIRUB SERPL-MCNC: 0.7 MG/DL (ref 0–1.2)
BILIRUB SERPL-MCNC: 1.3 MG/DL (ref 0–1.2)
BILIRUB UR QL STRIP: ABNORMAL
BILIRUB UR QL STRIP: NEGATIVE
BUN SERPL-MCNC: 11 MG/DL (ref 6–20)
BUN SERPL-MCNC: 12 MG/DL (ref 6–20)
BUN SERPL-MCNC: 15 MG/DL (ref 6–20)
BUN SERPL-MCNC: 15 MG/DL (ref 6–20)
BUN SERPL-MCNC: 16 MG/DL (ref 6–20)
BUN SERPL-MCNC: 16 MG/DL (ref 6–20)
BUN SERPL-MCNC: 19 MG/DL (ref 6–20)
BUN SERPL-MCNC: 19 MG/DL (ref 6–20)
BUN SERPL-MCNC: 22 MG/DL (ref 6–20)
BUN SERPL-MCNC: 26 MG/DL (ref 6–20)
BUN SERPL-MCNC: 28 MG/DL (ref 6–20)
BUN/CREAT SERPL: 10.2 (ref 7–25)
BUN/CREAT SERPL: 12.6 (ref 7–25)
BUN/CREAT SERPL: 14.6 (ref 7–25)
BUN/CREAT SERPL: 15 (ref 7–25)
BUN/CREAT SERPL: 17.1 (ref 7–25)
BUN/CREAT SERPL: 17.6 (ref 7–25)
BUN/CREAT SERPL: 18.4 (ref 7–25)
BUN/CREAT SERPL: 18.6 (ref 7–25)
BUN/CREAT SERPL: 21 (ref 7–25)
BUN/CREAT SERPL: 22.2 (ref 7–25)
BUN/CREAT SERPL: 9.7 (ref 7–25)
CALCIUM SPEC-SCNC: 8.1 MG/DL (ref 8.6–10.5)
CALCIUM SPEC-SCNC: 8.3 MG/DL (ref 8.6–10.5)
CALCIUM SPEC-SCNC: 8.5 MG/DL (ref 8.6–10.5)
CALCIUM SPEC-SCNC: 8.6 MG/DL (ref 8.6–10.5)
CALCIUM SPEC-SCNC: 8.6 MG/DL (ref 8.6–10.5)
CALCIUM SPEC-SCNC: 8.7 MG/DL (ref 8.6–10.5)
CALCIUM SPEC-SCNC: 8.8 MG/DL (ref 8.6–10.5)
CALCIUM SPEC-SCNC: 9.1 MG/DL (ref 8.6–10.5)
CALCIUM SPEC-SCNC: 9.1 MG/DL (ref 8.6–10.5)
CHLORIDE SERPL-SCNC: 100 MMOL/L (ref 98–107)
CHLORIDE SERPL-SCNC: 101 MMOL/L (ref 98–107)
CHLORIDE SERPL-SCNC: 101 MMOL/L (ref 98–107)
CHLORIDE SERPL-SCNC: 102 MMOL/L (ref 98–107)
CHLORIDE SERPL-SCNC: 102 MMOL/L (ref 98–107)
CHLORIDE SERPL-SCNC: 104 MMOL/L (ref 98–107)
CHLORIDE SERPL-SCNC: 93 MMOL/L (ref 98–107)
CHLORIDE SERPL-SCNC: 95 MMOL/L (ref 98–107)
CHLORIDE SERPL-SCNC: 96 MMOL/L (ref 98–107)
CK MB SERPL-CCNC: 4.06 NG/ML
CK SERPL-CCNC: 98 U/L (ref 20–200)
CLARITY UR: ABNORMAL
CLARITY UR: CLEAR
CO2 SERPL-SCNC: 25 MMOL/L (ref 22–29)
CO2 SERPL-SCNC: 26 MMOL/L (ref 22–29)
CO2 SERPL-SCNC: 26 MMOL/L (ref 22–29)
CO2 SERPL-SCNC: 27 MMOL/L (ref 22–29)
CO2 SERPL-SCNC: 27 MMOL/L (ref 22–29)
CO2 SERPL-SCNC: 29 MMOL/L (ref 22–29)
CO2 SERPL-SCNC: 29 MMOL/L (ref 22–29)
CO2 SERPL-SCNC: 30 MMOL/L (ref 22–29)
CO2 SERPL-SCNC: 30 MMOL/L (ref 22–29)
CO2 SERPL-SCNC: 31 MMOL/L (ref 22–29)
CO2 SERPL-SCNC: 35 MMOL/L (ref 22–29)
COLOR UR: ABNORMAL
COLOR UR: ABNORMAL
CREAT SERPL-MCNC: 0.86 MG/DL (ref 0.76–1.27)
CREAT SERPL-MCNC: 1.03 MG/DL (ref 0.76–1.27)
CREAT SERPL-MCNC: 1.03 MG/DL (ref 0.76–1.27)
CREAT SERPL-MCNC: 1.07 MG/DL (ref 0.76–1.27)
CREAT SERPL-MCNC: 1.08 MG/DL (ref 0.76–1.27)
CREAT SERPL-MCNC: 1.13 MG/DL (ref 0.76–1.27)
CREAT SERPL-MCNC: 1.18 MG/DL (ref 0.76–1.27)
CREAT SERPL-MCNC: 1.19 MG/DL (ref 0.76–1.27)
CREAT SERPL-MCNC: 1.24 MG/DL (ref 0.76–1.27)
CREAT SERPL-MCNC: 1.26 MG/DL (ref 0.76–1.27)
CREAT SERPL-MCNC: 1.29 MG/DL (ref 0.76–1.27)
D DIMER PPP FEU-MCNC: 0.31 MG/L (FEU) (ref 0–0.59)
DEPRECATED RDW RBC AUTO: 45.9 FL (ref 37–54)
DEPRECATED RDW RBC AUTO: 46.8 FL (ref 37–54)
DEPRECATED RDW RBC AUTO: 48.1 FL (ref 37–54)
DEPRECATED RDW RBC AUTO: 48.6 FL (ref 37–54)
DEPRECATED RDW RBC AUTO: 48.6 FL (ref 37–54)
DEPRECATED RDW RBC AUTO: 49.4 FL (ref 37–54)
DEPRECATED RDW RBC AUTO: 49.9 FL (ref 37–54)
DEPRECATED RDW RBC AUTO: 51.6 FL (ref 37–54)
EOSINOPHIL # BLD AUTO: 0.1 10*3/MM3 (ref 0–0.4)
EOSINOPHIL # BLD AUTO: 0.2 10*3/MM3 (ref 0–0.4)
EOSINOPHIL # BLD AUTO: 0.3 10*3/MM3 (ref 0–0.4)
EOSINOPHIL # BLD MANUAL: 0.22 10*3/MM3 (ref 0–0.4)
EOSINOPHIL NFR BLD AUTO: 1.3 % (ref 0.3–6.2)
EOSINOPHIL NFR BLD AUTO: 1.7 % (ref 0.3–6.2)
EOSINOPHIL NFR BLD AUTO: 1.7 % (ref 0.3–6.2)
EOSINOPHIL NFR BLD AUTO: 2 % (ref 0.3–6.2)
EOSINOPHIL NFR BLD AUTO: 2.1 % (ref 0.3–6.2)
EOSINOPHIL NFR BLD AUTO: 2.4 % (ref 0.3–6.2)
EOSINOPHIL NFR BLD AUTO: 2.4 % (ref 0.3–6.2)
EOSINOPHIL NFR BLD AUTO: 2.5 % (ref 0.3–6.2)
EOSINOPHIL NFR BLD MANUAL: 2 % (ref 0.3–6.2)
ERYTHROCYTE [DISTWIDTH] IN BLOOD BY AUTOMATED COUNT: 15.6 % (ref 12.3–15.4)
ERYTHROCYTE [DISTWIDTH] IN BLOOD BY AUTOMATED COUNT: 15.9 % (ref 12.3–15.4)
ERYTHROCYTE [DISTWIDTH] IN BLOOD BY AUTOMATED COUNT: 16.1 % (ref 12.3–15.4)
ERYTHROCYTE [DISTWIDTH] IN BLOOD BY AUTOMATED COUNT: 16.2 % (ref 12.3–15.4)
ERYTHROCYTE [DISTWIDTH] IN BLOOD BY AUTOMATED COUNT: 16.3 % (ref 12.3–15.4)
ERYTHROCYTE [DISTWIDTH] IN BLOOD BY AUTOMATED COUNT: 16.3 % (ref 12.3–15.4)
ERYTHROCYTE [DISTWIDTH] IN BLOOD BY AUTOMATED COUNT: 16.5 % (ref 12.3–15.4)
ERYTHROCYTE [DISTWIDTH] IN BLOOD BY AUTOMATED COUNT: 16.6 % (ref 12.3–15.4)
ERYTHROCYTE [DISTWIDTH] IN BLOOD BY AUTOMATED COUNT: 17.1 % (ref 12.3–15.4)
ERYTHROCYTE [DISTWIDTH] IN BLOOD BY AUTOMATED COUNT: 17.2 % (ref 12.3–15.4)
ERYTHROCYTE [DISTWIDTH] IN BLOOD BY AUTOMATED COUNT: 17.5 % (ref 12.3–15.4)
GFR SERPL CREATININE-BSD FRML MDRD: 57 ML/MIN/1.73
GFR SERPL CREATININE-BSD FRML MDRD: 59 ML/MIN/1.73
GFR SERPL CREATININE-BSD FRML MDRD: 60 ML/MIN/1.73
GFR SERPL CREATININE-BSD FRML MDRD: 63 ML/MIN/1.73
GFR SERPL CREATININE-BSD FRML MDRD: 63 ML/MIN/1.73
GFR SERPL CREATININE-BSD FRML MDRD: 67 ML/MIN/1.73
GFR SERPL CREATININE-BSD FRML MDRD: 70 ML/MIN/1.73
GFR SERPL CREATININE-BSD FRML MDRD: 71 ML/MIN/1.73
GFR SERPL CREATININE-BSD FRML MDRD: 74 ML/MIN/1.73
GFR SERPL CREATININE-BSD FRML MDRD: 74 ML/MIN/1.73
GFR SERPL CREATININE-BSD FRML MDRD: 91 ML/MIN/1.73
GLOBULIN UR ELPH-MCNC: 2.5 GM/DL
GLOBULIN UR ELPH-MCNC: 2.9 GM/DL
GLOBULIN UR ELPH-MCNC: 3.1 GM/DL
GLOBULIN UR ELPH-MCNC: 3.3 GM/DL
GLUCOSE SERPL-MCNC: 100 MG/DL (ref 65–99)
GLUCOSE SERPL-MCNC: 102 MG/DL (ref 65–99)
GLUCOSE SERPL-MCNC: 104 MG/DL (ref 65–99)
GLUCOSE SERPL-MCNC: 108 MG/DL (ref 65–99)
GLUCOSE SERPL-MCNC: 109 MG/DL (ref 65–99)
GLUCOSE SERPL-MCNC: 110 MG/DL (ref 65–99)
GLUCOSE SERPL-MCNC: 115 MG/DL (ref 65–99)
GLUCOSE SERPL-MCNC: 119 MG/DL (ref 65–99)
GLUCOSE SERPL-MCNC: 119 MG/DL (ref 65–99)
GLUCOSE SERPL-MCNC: 91 MG/DL (ref 65–99)
GLUCOSE SERPL-MCNC: 96 MG/DL (ref 65–99)
GLUCOSE UR STRIP-MCNC: NEGATIVE MG/DL
GLUCOSE UR STRIP-MCNC: NEGATIVE MG/DL
HBA1C MFR BLD: 5.8 % (ref 3.5–5.6)
HCT VFR BLD AUTO: 40.7 % (ref 37.5–51)
HCT VFR BLD AUTO: 41 % (ref 37.5–51)
HCT VFR BLD AUTO: 41.7 % (ref 37.5–51)
HCT VFR BLD AUTO: 42.6 % (ref 37.5–51)
HCT VFR BLD AUTO: 42.8 % (ref 37.5–51)
HCT VFR BLD AUTO: 42.9 % (ref 37.5–51)
HCT VFR BLD AUTO: 43.4 % (ref 37.5–51)
HCT VFR BLD AUTO: 43.9 % (ref 37.5–51)
HCT VFR BLD AUTO: 44 % (ref 37.5–51)
HCT VFR BLD AUTO: 44.5 % (ref 37.5–51)
HCT VFR BLD AUTO: 45.6 % (ref 37.5–51)
HGB BLD-MCNC: 13.8 G/DL (ref 13–17.7)
HGB BLD-MCNC: 14.1 G/DL (ref 13–17.7)
HGB BLD-MCNC: 14.2 G/DL (ref 13–17.7)
HGB BLD-MCNC: 14.3 G/DL (ref 13–17.7)
HGB BLD-MCNC: 14.3 G/DL (ref 13–17.7)
HGB BLD-MCNC: 14.4 G/DL (ref 13–17.7)
HGB BLD-MCNC: 14.6 G/DL (ref 13–17.7)
HGB BLD-MCNC: 14.7 G/DL (ref 13–17.7)
HGB BLD-MCNC: 15.1 G/DL (ref 13–17.7)
HGB UR QL STRIP.AUTO: NEGATIVE
HGB UR QL STRIP.AUTO: NEGATIVE
HOLD SPECIMEN: NORMAL
HYALINE CASTS UR QL AUTO: ABNORMAL /LPF
INR PPP: 1 (ref 0.93–1.1)
INR PPP: 1.04 (ref 0.93–1.1)
INR PPP: 1.05 (ref 0.93–1.1)
INR PPP: 1.05 (ref 0.93–1.1)
INR PPP: 1.16 (ref 0.93–1.1)
INR PPP: 1.6 (ref 0.93–1.1)
KETONES UR QL STRIP: ABNORMAL
KETONES UR QL STRIP: NEGATIVE
LEUKOCYTE ESTERASE UR QL STRIP.AUTO: ABNORMAL
LEUKOCYTE ESTERASE UR QL STRIP.AUTO: NEGATIVE
LIPASE SERPL-CCNC: 17 U/L (ref 13–60)
LIPASE SERPL-CCNC: 24 U/L (ref 13–60)
LV EF 2D ECHO EST: 35 %
LYMPHOCYTES # BLD AUTO: 0.9 10*3/MM3 (ref 0.7–3.1)
LYMPHOCYTES # BLD AUTO: 1.4 10*3/MM3 (ref 0.7–3.1)
LYMPHOCYTES # BLD AUTO: 1.5 10*3/MM3 (ref 0.7–3.1)
LYMPHOCYTES # BLD AUTO: 1.6 10*3/MM3 (ref 0.7–3.1)
LYMPHOCYTES # BLD AUTO: 1.8 10*3/MM3 (ref 0.7–3.1)
LYMPHOCYTES # BLD AUTO: 2 10*3/MM3 (ref 0.7–3.1)
LYMPHOCYTES # BLD MANUAL: 1.01 10*3/MM3 (ref 0.7–3.1)
LYMPHOCYTES NFR BLD AUTO: 11.8 % (ref 19.6–45.3)
LYMPHOCYTES NFR BLD AUTO: 12.1 % (ref 19.6–45.3)
LYMPHOCYTES NFR BLD AUTO: 12.4 % (ref 19.6–45.3)
LYMPHOCYTES NFR BLD AUTO: 13.7 % (ref 19.6–45.3)
LYMPHOCYTES NFR BLD AUTO: 14.5 % (ref 19.6–45.3)
LYMPHOCYTES NFR BLD AUTO: 15.5 % (ref 19.6–45.3)
LYMPHOCYTES NFR BLD AUTO: 16.7 % (ref 19.6–45.3)
LYMPHOCYTES NFR BLD AUTO: 9.1 % (ref 19.6–45.3)
LYMPHOCYTES NFR BLD MANUAL: 11 % (ref 5–12)
LYMPHOCYTES NFR BLD MANUAL: 9 % (ref 19.6–45.3)
MAGNESIUM SERPL-MCNC: 1.7 MG/DL (ref 1.6–2.6)
MAGNESIUM SERPL-MCNC: 1.9 MG/DL (ref 1.6–2.6)
MAGNESIUM SERPL-MCNC: 1.9 MG/DL (ref 1.6–2.6)
MAGNESIUM SERPL-MCNC: 2 MG/DL (ref 1.6–2.6)
MAGNESIUM SERPL-MCNC: 2 MG/DL (ref 1.6–2.6)
MAGNESIUM SERPL-MCNC: 2.1 MG/DL (ref 1.6–2.6)
MAGNESIUM SERPL-MCNC: 2.3 MG/DL (ref 1.6–2.6)
MAXIMAL PREDICTED HEART RATE: 162 BPM
MAXIMAL PREDICTED HEART RATE: 162 BPM
MCH RBC QN AUTO: 27.5 PG (ref 26.6–33)
MCH RBC QN AUTO: 27.6 PG (ref 26.6–33)
MCH RBC QN AUTO: 28.1 PG (ref 26.6–33)
MCH RBC QN AUTO: 28.1 PG (ref 26.6–33)
MCH RBC QN AUTO: 28.3 PG (ref 26.6–33)
MCH RBC QN AUTO: 28.3 PG (ref 26.6–33)
MCH RBC QN AUTO: 28.5 PG (ref 26.6–33)
MCH RBC QN AUTO: 28.5 PG (ref 26.6–33)
MCH RBC QN AUTO: 28.6 PG (ref 26.6–33)
MCH RBC QN AUTO: 28.6 PG (ref 26.6–33)
MCH RBC QN AUTO: 28.8 PG (ref 26.6–33)
MCHC RBC AUTO-ENTMCNC: 32.6 G/DL (ref 31.5–35.7)
MCHC RBC AUTO-ENTMCNC: 32.8 G/DL (ref 31.5–35.7)
MCHC RBC AUTO-ENTMCNC: 32.9 G/DL (ref 31.5–35.7)
MCHC RBC AUTO-ENTMCNC: 33.1 G/DL (ref 31.5–35.7)
MCHC RBC AUTO-ENTMCNC: 33.2 G/DL (ref 31.5–35.7)
MCHC RBC AUTO-ENTMCNC: 33.3 G/DL (ref 31.5–35.7)
MCHC RBC AUTO-ENTMCNC: 33.6 G/DL (ref 31.5–35.7)
MCHC RBC AUTO-ENTMCNC: 33.7 G/DL (ref 31.5–35.7)
MCHC RBC AUTO-ENTMCNC: 34 G/DL (ref 31.5–35.7)
MCV RBC AUTO: 83.2 FL (ref 79–97)
MCV RBC AUTO: 83.8 FL (ref 79–97)
MCV RBC AUTO: 84 FL (ref 79–97)
MCV RBC AUTO: 84.2 FL (ref 79–97)
MCV RBC AUTO: 84.4 FL (ref 79–97)
MCV RBC AUTO: 84.5 FL (ref 79–97)
MCV RBC AUTO: 85.3 FL (ref 79–97)
MCV RBC AUTO: 86.1 FL (ref 79–97)
MCV RBC AUTO: 86.4 FL (ref 79–97)
MCV RBC AUTO: 86.8 FL (ref 79–97)
MCV RBC AUTO: 86.8 FL (ref 79–97)
MONOCYTES # BLD AUTO: 1.2 10*3/MM3 (ref 0.1–0.9)
MONOCYTES # BLD AUTO: 1.23 10*3/MM3 (ref 0.1–0.9)
MONOCYTES # BLD AUTO: 1.3 10*3/MM3 (ref 0.1–0.9)
MONOCYTES # BLD AUTO: 1.4 10*3/MM3 (ref 0.1–0.9)
MONOCYTES # BLD AUTO: 1.5 10*3/MM3 (ref 0.1–0.9)
MONOCYTES # BLD AUTO: 1.5 10*3/MM3 (ref 0.1–0.9)
MONOCYTES # BLD AUTO: 1.8 10*3/MM3 (ref 0.1–0.9)
MONOCYTES NFR BLD AUTO: 10.2 % (ref 5–12)
MONOCYTES NFR BLD AUTO: 11.2 % (ref 5–12)
MONOCYTES NFR BLD AUTO: 11.7 % (ref 5–12)
MONOCYTES NFR BLD AUTO: 11.8 % (ref 5–12)
MONOCYTES NFR BLD AUTO: 12.3 % (ref 5–12)
MONOCYTES NFR BLD AUTO: 12.5 % (ref 5–12)
MONOCYTES NFR BLD AUTO: 13.5 % (ref 5–12)
MONOCYTES NFR BLD AUTO: 15.4 % (ref 5–12)
NEUTROPHILS # BLD AUTO: 8.62 10*3/MM3 (ref 1.7–7)
NEUTROPHILS NFR BLD AUTO: 6.1 10*3/MM3 (ref 1.7–7)
NEUTROPHILS NFR BLD AUTO: 64.2 % (ref 42.7–76)
NEUTROPHILS NFR BLD AUTO: 67.5 % (ref 42.7–76)
NEUTROPHILS NFR BLD AUTO: 7.4 10*3/MM3 (ref 1.7–7)
NEUTROPHILS NFR BLD AUTO: 7.5 10*3/MM3 (ref 1.7–7)
NEUTROPHILS NFR BLD AUTO: 7.6 10*3/MM3 (ref 1.7–7)
NEUTROPHILS NFR BLD AUTO: 70.5 % (ref 42.7–76)
NEUTROPHILS NFR BLD AUTO: 71 % (ref 42.7–76)
NEUTROPHILS NFR BLD AUTO: 73.3 % (ref 42.7–76)
NEUTROPHILS NFR BLD AUTO: 74.5 % (ref 42.7–76)
NEUTROPHILS NFR BLD AUTO: 75.5 % (ref 42.7–76)
NEUTROPHILS NFR BLD AUTO: 76 % (ref 42.7–76)
NEUTROPHILS NFR BLD AUTO: 8.7 10*3/MM3 (ref 1.7–7)
NEUTROPHILS NFR BLD AUTO: 9 10*3/MM3 (ref 1.7–7)
NEUTROPHILS NFR BLD AUTO: 9.4 10*3/MM3 (ref 1.7–7)
NEUTROPHILS NFR BLD AUTO: 9.9 10*3/MM3 (ref 1.7–7)
NEUTROPHILS NFR BLD MANUAL: 70 % (ref 42.7–76)
NEUTS BAND NFR BLD MANUAL: 7 % (ref 0–5)
NITRITE UR QL STRIP: NEGATIVE
NITRITE UR QL STRIP: NEGATIVE
NRBC BLD AUTO-RTO: 0 /100 WBC (ref 0–0.2)
NRBC BLD AUTO-RTO: 0 /100 WBC (ref 0–0.2)
NRBC BLD AUTO-RTO: 0.1 /100 WBC (ref 0–0.2)
NRBC BLD AUTO-RTO: 0.3 /100 WBC (ref 0–0.2)
NT-PROBNP SERPL-MCNC: 1283 PG/ML (ref 0–900)
NT-PROBNP SERPL-MCNC: 2149 PG/ML (ref 0–900)
NT-PROBNP SERPL-MCNC: 3242 PG/ML (ref 0–900)
NT-PROBNP SERPL-MCNC: 3308 PG/ML (ref 0–900)
PERCENT MAX PREDICTED HR: 43.21 %
PH UR STRIP.AUTO: 6 [PH] (ref 5–8)
PH UR STRIP.AUTO: <=5 [PH] (ref 5–8)
PHOSPHATE SERPL-MCNC: 3.6 MG/DL (ref 2.5–4.5)
PLAT MORPH BLD: NORMAL
PLATELET # BLD AUTO: 211 10*3/MM3 (ref 140–450)
PLATELET # BLD AUTO: 215 10*3/MM3 (ref 140–450)
PLATELET # BLD AUTO: 218 10*3/MM3 (ref 140–450)
PLATELET # BLD AUTO: 236 10*3/MM3 (ref 140–450)
PLATELET # BLD AUTO: 241 10*3/MM3 (ref 140–450)
PLATELET # BLD AUTO: 244 10*3/MM3 (ref 140–450)
PLATELET # BLD AUTO: 244 10*3/MM3 (ref 140–450)
PLATELET # BLD AUTO: 247 10*3/MM3 (ref 140–450)
PLATELET # BLD AUTO: 259 10*3/MM3 (ref 140–450)
PLATELET # BLD AUTO: 262 10*3/MM3 (ref 140–450)
PLATELET # BLD AUTO: 269 10*3/MM3 (ref 140–450)
PMV BLD AUTO: 10 FL (ref 6–12)
PMV BLD AUTO: 8.8 FL (ref 6–12)
PMV BLD AUTO: 8.9 FL (ref 6–12)
PMV BLD AUTO: 9 FL (ref 6–12)
PMV BLD AUTO: 9.2 FL (ref 6–12)
PMV BLD AUTO: 9.3 FL (ref 6–12)
PMV BLD AUTO: 9.4 FL (ref 6–12)
PMV BLD AUTO: 9.4 FL (ref 6–12)
PMV BLD AUTO: 9.9 FL (ref 6–12)
POTASSIUM SERPL-SCNC: 3.7 MMOL/L (ref 3.5–5.2)
POTASSIUM SERPL-SCNC: 3.9 MMOL/L (ref 3.5–5.2)
POTASSIUM SERPL-SCNC: 4 MMOL/L (ref 3.5–5.2)
POTASSIUM SERPL-SCNC: 4 MMOL/L (ref 3.5–5.2)
POTASSIUM SERPL-SCNC: 4.1 MMOL/L (ref 3.5–5.2)
POTASSIUM SERPL-SCNC: 4.2 MMOL/L (ref 3.5–5.2)
POTASSIUM SERPL-SCNC: 4.3 MMOL/L (ref 3.5–5.2)
POTASSIUM SERPL-SCNC: 4.4 MMOL/L (ref 3.5–5.2)
POTASSIUM SERPL-SCNC: 4.5 MMOL/L (ref 3.5–5.2)
POTASSIUM SERPL-SCNC: 4.6 MMOL/L (ref 3.5–5.2)
POTASSIUM SERPL-SCNC: 4.6 MMOL/L (ref 3.5–5.2)
PROT SERPL-MCNC: 6.3 G/DL (ref 6–8.5)
PROT SERPL-MCNC: 6.9 G/DL (ref 6–8.5)
PROT SERPL-MCNC: 7.1 G/DL (ref 6–8.5)
PROT SERPL-MCNC: 7.1 G/DL (ref 6–8.5)
PROT UR QL STRIP: ABNORMAL
PROT UR QL STRIP: ABNORMAL
PROTHROMBIN TIME: 11 SECONDS (ref 9.6–11.7)
PROTHROMBIN TIME: 11.4 SECONDS (ref 9.6–11.7)
PROTHROMBIN TIME: 11.5 SECONDS (ref 9.6–11.7)
PROTHROMBIN TIME: 11.6 SECONDS (ref 9.6–11.7)
PROTHROMBIN TIME: 12.7 SECONDS (ref 9.6–11.7)
PROTHROMBIN TIME: 17.2 SECONDS (ref 9.6–11.7)
QT INTERVAL: 418 MS
QT INTERVAL: 444 MS
QT INTERVAL: 456 MS
QT INTERVAL: 467 MS
QT INTERVAL: 475 MS
QT INTERVAL: 476 MS
QT INTERVAL: 493 MS
QT INTERVAL: 528 MS
QT INTERVAL: 593 MS
RBC # BLD AUTO: 4.84 10*6/MM3 (ref 4.14–5.8)
RBC # BLD AUTO: 4.85 10*6/MM3 (ref 4.14–5.8)
RBC # BLD AUTO: 4.88 10*6/MM3 (ref 4.14–5.8)
RBC # BLD AUTO: 4.93 10*6/MM3 (ref 4.14–5.8)
RBC # BLD AUTO: 5.05 10*6/MM3 (ref 4.14–5.8)
RBC # BLD AUTO: 5.06 10*6/MM3 (ref 4.14–5.8)
RBC # BLD AUTO: 5.08 10*6/MM3 (ref 4.14–5.8)
RBC # BLD AUTO: 5.1 10*6/MM3 (ref 4.14–5.8)
RBC # BLD AUTO: 5.13 10*6/MM3 (ref 4.14–5.8)
RBC # BLD AUTO: 5.22 10*6/MM3 (ref 4.14–5.8)
RBC # BLD AUTO: 5.49 10*6/MM3 (ref 4.14–5.8)
RBC # UR: ABNORMAL /HPF
REF LAB TEST METHOD: ABNORMAL
SARS-COV-2 ORF1AB RESP QL NAA+PROBE: NOT DETECTED
SARS-COV-2 RNA PNL SPEC NAA+PROBE: NOT DETECTED
SCAN SLIDE: NORMAL
SODIUM SERPL-SCNC: 134 MMOL/L (ref 136–145)
SODIUM SERPL-SCNC: 134 MMOL/L (ref 136–145)
SODIUM SERPL-SCNC: 136 MMOL/L (ref 136–145)
SODIUM SERPL-SCNC: 137 MMOL/L (ref 136–145)
SODIUM SERPL-SCNC: 138 MMOL/L (ref 136–145)
SODIUM SERPL-SCNC: 139 MMOL/L (ref 136–145)
SODIUM SERPL-SCNC: 140 MMOL/L (ref 136–145)
SODIUM SERPL-SCNC: 141 MMOL/L (ref 136–145)
SODIUM SERPL-SCNC: 142 MMOL/L (ref 136–145)
SP GR UR STRIP: 1.02 (ref 1–1.03)
SP GR UR STRIP: 1.03 (ref 1–1.03)
SQUAMOUS #/AREA URNS HPF: ABNORMAL /HPF
STRESS BASELINE BP: NORMAL MMHG
STRESS BASELINE HR: 70 BPM
STRESS PERCENT HR: 51 %
STRESS POST PEAK BP: NORMAL MMHG
STRESS POST PEAK HR: 70 BPM
STRESS TARGET HR: 138 BPM
STRESS TARGET HR: 138 BPM
TROPONIN T SERPL-MCNC: 0.01 NG/ML (ref 0–0.03)
TROPONIN T SERPL-MCNC: <0.01 NG/ML (ref 0–0.03)
TSH SERPL DL<=0.05 MIU/L-ACNC: 2.07 UIU/ML (ref 0.27–4.2)
UROBILINOGEN UR QL STRIP: ABNORMAL
UROBILINOGEN UR QL STRIP: ABNORMAL
WBC # BLD AUTO: 10.5 10*3/MM3 (ref 3.4–10.8)
WBC # BLD AUTO: 11.2 10*3/MM3 (ref 3.4–10.8)
WBC # BLD AUTO: 11.2 10*3/MM3 (ref 3.4–10.8)
WBC # BLD AUTO: 11.6 10*3/MM3 (ref 3.4–10.8)
WBC # BLD AUTO: 11.8 10*3/MM3 (ref 3.4–10.8)
WBC # BLD AUTO: 12.1 10*3/MM3 (ref 3.4–10.8)
WBC # BLD AUTO: 12.3 10*3/MM3 (ref 3.4–10.8)
WBC # BLD AUTO: 12.8 10*3/MM3 (ref 3.4–10.8)
WBC # BLD AUTO: 13.2 10*3/MM3 (ref 3.4–10.8)
WBC # BLD AUTO: 9 10*3/MM3 (ref 3.4–10.8)
WBC # BLD AUTO: 9.9 10*3/MM3 (ref 3.4–10.8)
WBC MORPH BLD: NORMAL
WBC UR QL AUTO: ABNORMAL /HPF
WHOLE BLOOD HOLD SPECIMEN: NORMAL
WHOLE BLOOD HOLD SPECIMEN: NORMAL

## 2021-01-01 PROCEDURE — 85730 THROMBOPLASTIN TIME PARTIAL: CPT | Performed by: NURSE PRACTITIONER

## 2021-01-01 PROCEDURE — 36415 COLL VENOUS BLD VENIPUNCTURE: CPT | Performed by: NURSE PRACTITIONER

## 2021-01-01 PROCEDURE — 85025 COMPLETE CBC W/AUTO DIFF WBC: CPT | Performed by: NURSE PRACTITIONER

## 2021-01-01 PROCEDURE — 96372 THER/PROPH/DIAG INJ SC/IM: CPT

## 2021-01-01 PROCEDURE — 99217 PR OBSERVATION CARE DISCHARGE MANAGEMENT: CPT | Performed by: HOSPITALIST

## 2021-01-01 PROCEDURE — 25010000002 MORPHINE PER 10 MG: Performed by: NURSE PRACTITIONER

## 2021-01-01 PROCEDURE — 99214 OFFICE O/P EST MOD 30 MIN: CPT | Performed by: INTERNAL MEDICINE

## 2021-01-01 PROCEDURE — 84484 ASSAY OF TROPONIN QUANT: CPT | Performed by: NURSE PRACTITIONER

## 2021-01-01 PROCEDURE — 94799 UNLISTED PULMONARY SVC/PX: CPT

## 2021-01-01 PROCEDURE — 84443 ASSAY THYROID STIM HORMONE: CPT | Performed by: NURSE PRACTITIONER

## 2021-01-01 PROCEDURE — 25010000002 ONDANSETRON PER 1 MG: Performed by: NURSE PRACTITIONER

## 2021-01-01 PROCEDURE — 96374 THER/PROPH/DIAG INJ IV PUSH: CPT

## 2021-01-01 PROCEDURE — G0378 HOSPITAL OBSERVATION PER HR: HCPCS

## 2021-01-01 PROCEDURE — 80053 COMPREHEN METABOLIC PANEL: CPT | Performed by: PHYSICIAN ASSISTANT

## 2021-01-01 PROCEDURE — 85027 COMPLETE CBC AUTOMATED: CPT | Performed by: HOSPITALIST

## 2021-01-01 PROCEDURE — 25010000002 ENOXAPARIN PER 10 MG: Performed by: NURSE PRACTITIONER

## 2021-01-01 PROCEDURE — C9803 HOPD COVID-19 SPEC COLLECT: HCPCS

## 2021-01-01 PROCEDURE — 97161 PT EVAL LOW COMPLEX 20 MIN: CPT

## 2021-01-01 PROCEDURE — 25010000002 ENOXAPARIN PER 10 MG: Performed by: INTERNAL MEDICINE

## 2021-01-01 PROCEDURE — 84100 ASSAY OF PHOSPHORUS: CPT | Performed by: NURSE PRACTITIONER

## 2021-01-01 PROCEDURE — 99285 EMERGENCY DEPT VISIT HI MDM: CPT

## 2021-01-01 PROCEDURE — 85610 PROTHROMBIN TIME: CPT | Performed by: NURSE PRACTITIONER

## 2021-01-01 PROCEDURE — 63710000001 ONDANSETRON PER 8 MG: Performed by: NURSE PRACTITIONER

## 2021-01-01 PROCEDURE — 85007 BL SMEAR W/DIFF WBC COUNT: CPT | Performed by: NURSE PRACTITIONER

## 2021-01-01 PROCEDURE — 96376 TX/PRO/DX INJ SAME DRUG ADON: CPT

## 2021-01-01 PROCEDURE — 94640 AIRWAY INHALATION TREATMENT: CPT

## 2021-01-01 PROCEDURE — 94660 CPAP INITIATION&MGMT: CPT

## 2021-01-01 PROCEDURE — 25010000002 FUROSEMIDE PER 20 MG: Performed by: NURSE PRACTITIONER

## 2021-01-01 PROCEDURE — 93010 ELECTROCARDIOGRAM REPORT: CPT | Performed by: INTERNAL MEDICINE

## 2021-01-01 PROCEDURE — 99284 EMERGENCY DEPT VISIT MOD MDM: CPT

## 2021-01-01 PROCEDURE — 83880 ASSAY OF NATRIURETIC PEPTIDE: CPT | Performed by: NURSE PRACTITIONER

## 2021-01-01 PROCEDURE — 83735 ASSAY OF MAGNESIUM: CPT | Performed by: NURSE PRACTITIONER

## 2021-01-01 PROCEDURE — 96366 THER/PROPH/DIAG IV INF ADDON: CPT

## 2021-01-01 PROCEDURE — 96375 TX/PRO/DX INJ NEW DRUG ADDON: CPT

## 2021-01-01 PROCEDURE — 85610 PROTHROMBIN TIME: CPT | Performed by: EMERGENCY MEDICINE

## 2021-01-01 PROCEDURE — 80048 BASIC METABOLIC PNL TOTAL CA: CPT | Performed by: NURSE PRACTITIONER

## 2021-01-01 PROCEDURE — 83036 HEMOGLOBIN GLYCOSYLATED A1C: CPT | Performed by: HOSPITALIST

## 2021-01-01 PROCEDURE — 93005 ELECTROCARDIOGRAM TRACING: CPT | Performed by: NURSE PRACTITIONER

## 2021-01-01 PROCEDURE — 25010000002 REGADENOSON 0.4 MG/5ML SOLUTION: Performed by: HOSPITALIST

## 2021-01-01 PROCEDURE — 93005 ELECTROCARDIOGRAM TRACING: CPT

## 2021-01-01 PROCEDURE — 85379 FIBRIN DEGRADATION QUANT: CPT | Performed by: PHYSICIAN ASSISTANT

## 2021-01-01 PROCEDURE — 87635 SARS-COV-2 COVID-19 AMP PRB: CPT | Performed by: EMERGENCY MEDICINE

## 2021-01-01 PROCEDURE — 25010000002 KETOROLAC TROMETHAMINE PER 15 MG: Performed by: NURSE PRACTITIONER

## 2021-01-01 PROCEDURE — 99215 OFFICE O/P EST HI 40 MIN: CPT | Performed by: INTERNAL MEDICINE

## 2021-01-01 PROCEDURE — 71045 X-RAY EXAM CHEST 1 VIEW: CPT

## 2021-01-01 PROCEDURE — 96365 THER/PROPH/DIAG IV INF INIT: CPT

## 2021-01-01 PROCEDURE — 83880 ASSAY OF NATRIURETIC PEPTIDE: CPT | Performed by: PHYSICIAN ASSISTANT

## 2021-01-01 PROCEDURE — 93017 CV STRESS TEST TRACING ONLY: CPT

## 2021-01-01 PROCEDURE — 85730 THROMBOPLASTIN TIME PARTIAL: CPT | Performed by: EMERGENCY MEDICINE

## 2021-01-01 PROCEDURE — 87635 SARS-COV-2 COVID-19 AMP PRB: CPT | Performed by: INTERNAL MEDICINE

## 2021-01-01 PROCEDURE — 80048 BASIC METABOLIC PNL TOTAL CA: CPT | Performed by: HOSPITALIST

## 2021-01-01 PROCEDURE — 80048 BASIC METABOLIC PNL TOTAL CA: CPT | Performed by: INTERNAL MEDICINE

## 2021-01-01 PROCEDURE — 25010000002 HYDROMORPHONE PER 4 MG: Performed by: NURSE PRACTITIONER

## 2021-01-01 PROCEDURE — 94664 DEMO&/EVAL PT USE INHALER: CPT

## 2021-01-01 PROCEDURE — 93000 ELECTROCARDIOGRAM COMPLETE: CPT | Performed by: INTERNAL MEDICINE

## 2021-01-01 PROCEDURE — 80053 COMPREHEN METABOLIC PANEL: CPT | Performed by: NURSE PRACTITIONER

## 2021-01-01 PROCEDURE — 80053 COMPREHEN METABOLIC PANEL: CPT | Performed by: EMERGENCY MEDICINE

## 2021-01-01 PROCEDURE — 25010000002 FENTANYL CITRATE (PF) 50 MCG/ML SOLUTION: Performed by: EMERGENCY MEDICINE

## 2021-01-01 PROCEDURE — U0004 COV-19 TEST NON-CDC HGH THRU: HCPCS | Performed by: NURSE PRACTITIONER

## 2021-01-01 PROCEDURE — 83690 ASSAY OF LIPASE: CPT | Performed by: NURSE PRACTITIONER

## 2021-01-01 PROCEDURE — 84484 ASSAY OF TROPONIN QUANT: CPT | Performed by: INTERNAL MEDICINE

## 2021-01-01 PROCEDURE — 93284 PRGRMG EVAL IMPLANTABLE DFB: CPT | Performed by: INTERNAL MEDICINE

## 2021-01-01 PROCEDURE — 85027 COMPLETE CBC AUTOMATED: CPT | Performed by: INTERNAL MEDICINE

## 2021-01-01 PROCEDURE — 93005 ELECTROCARDIOGRAM TRACING: CPT | Performed by: HOSPITALIST

## 2021-01-01 PROCEDURE — 85025 COMPLETE CBC W/AUTO DIFF WBC: CPT | Performed by: PHYSICIAN ASSISTANT

## 2021-01-01 PROCEDURE — 25010000002 DIPHENHYDRAMINE PER 50 MG: Performed by: NURSE PRACTITIONER

## 2021-01-01 PROCEDURE — 93306 TTE W/DOPPLER COMPLETE: CPT

## 2021-01-01 PROCEDURE — 99213 OFFICE O/P EST LOW 20 MIN: CPT | Performed by: NURSE PRACTITIONER

## 2021-01-01 PROCEDURE — 25010000002 PROCHLORPERAZINE 10 MG/2ML SOLUTION: Performed by: NURSE PRACTITIONER

## 2021-01-01 PROCEDURE — 93005 ELECTROCARDIOGRAM TRACING: CPT | Performed by: INTERNAL MEDICINE

## 2021-01-01 PROCEDURE — 25010000002 ONDANSETRON PER 1 MG: Performed by: EMERGENCY MEDICINE

## 2021-01-01 PROCEDURE — 81001 URINALYSIS AUTO W/SCOPE: CPT | Performed by: NURSE PRACTITIONER

## 2021-01-01 PROCEDURE — 25010000002 METOCLOPRAMIDE PER 10 MG: Performed by: PHYSICIAN ASSISTANT

## 2021-01-01 PROCEDURE — 99225 PR SBSQ OBSERVATION CARE/DAY 25 MINUTES: CPT | Performed by: INTERNAL MEDICINE

## 2021-01-01 PROCEDURE — 99213 OFFICE O/P EST LOW 20 MIN: CPT | Performed by: INTERNAL MEDICINE

## 2021-01-01 PROCEDURE — 25010000002 MORPHINE PER 10 MG: Performed by: PHYSICIAN ASSISTANT

## 2021-01-01 PROCEDURE — 99225 PR SBSQ OBSERVATION CARE/DAY 25 MINUTES: CPT | Performed by: HOSPITALIST

## 2021-01-01 PROCEDURE — 84484 ASSAY OF TROPONIN QUANT: CPT | Performed by: EMERGENCY MEDICINE

## 2021-01-01 PROCEDURE — 99219 PR INITIAL OBSERVATION CARE/DAY 50 MINUTES: CPT | Performed by: NURSE PRACTITIONER

## 2021-01-01 PROCEDURE — 0 TECHNETIUM SESTAMIBI: Performed by: HOSPITALIST

## 2021-01-01 PROCEDURE — 78452 HT MUSCLE IMAGE SPECT MULT: CPT | Performed by: INTERNAL MEDICINE

## 2021-01-01 PROCEDURE — 99220 PR INITIAL OBSERVATION CARE/DAY 70 MINUTES: CPT | Performed by: NURSE PRACTITIONER

## 2021-01-01 PROCEDURE — 82553 CREATINE MB FRACTION: CPT | Performed by: NURSE PRACTITIONER

## 2021-01-01 PROCEDURE — U0003 INFECTIOUS AGENT DETECTION BY NUCLEIC ACID (DNA OR RNA); SEVERE ACUTE RESPIRATORY SYNDROME CORONAVIRUS 2 (SARS-COV-2) (CORONAVIRUS DISEASE [COVID-19]), AMPLIFIED PROBE TECHNIQUE, MAKING USE OF HIGH THROUGHPUT TECHNOLOGIES AS DESCRIBED BY CMS-2020-01-R: HCPCS | Performed by: INTERNAL MEDICINE

## 2021-01-01 PROCEDURE — 85025 COMPLETE CBC W/AUTO DIFF WBC: CPT | Performed by: EMERGENCY MEDICINE

## 2021-01-01 PROCEDURE — 74176 CT ABD & PELVIS W/O CONTRAST: CPT

## 2021-01-01 PROCEDURE — 25010000002 MORPHINE PER 10 MG: Performed by: INTERNAL MEDICINE

## 2021-01-01 PROCEDURE — 93306 TTE W/DOPPLER COMPLETE: CPT | Performed by: INTERNAL MEDICINE

## 2021-01-01 PROCEDURE — 93018 CV STRESS TEST I&R ONLY: CPT | Performed by: NURSE PRACTITIONER

## 2021-01-01 PROCEDURE — 81003 URINALYSIS AUTO W/O SCOPE: CPT | Performed by: INTERNAL MEDICINE

## 2021-01-01 PROCEDURE — 85610 PROTHROMBIN TIME: CPT | Performed by: PHYSICIAN ASSISTANT

## 2021-01-01 PROCEDURE — 99217 PR OBSERVATION CARE DISCHARGE MANAGEMENT: CPT | Performed by: INTERNAL MEDICINE

## 2021-01-01 PROCEDURE — 78452 HT MUSCLE IMAGE SPECT MULT: CPT

## 2021-01-01 PROCEDURE — A9500 TC99M SESTAMIBI: HCPCS | Performed by: HOSPITALIST

## 2021-01-01 PROCEDURE — 84484 ASSAY OF TROPONIN QUANT: CPT | Performed by: PHYSICIAN ASSISTANT

## 2021-01-01 PROCEDURE — 85025 COMPLETE CBC W/AUTO DIFF WBC: CPT | Performed by: HOSPITALIST

## 2021-01-01 PROCEDURE — 83880 ASSAY OF NATRIURETIC PEPTIDE: CPT | Performed by: EMERGENCY MEDICINE

## 2021-01-01 PROCEDURE — 82550 ASSAY OF CK (CPK): CPT | Performed by: NURSE PRACTITIONER

## 2021-01-01 PROCEDURE — 25010000002 FUROSEMIDE PER 20 MG: Performed by: EMERGENCY MEDICINE

## 2021-01-01 PROCEDURE — 25010000002 MORPHINE PER 10 MG: Performed by: STUDENT IN AN ORGANIZED HEALTH CARE EDUCATION/TRAINING PROGRAM

## 2021-01-01 PROCEDURE — 36415 COLL VENOUS BLD VENIPUNCTURE: CPT | Performed by: HOSPITALIST

## 2021-01-01 PROCEDURE — 93005 ELECTROCARDIOGRAM TRACING: CPT | Performed by: EMERGENCY MEDICINE

## 2021-01-01 RX ORDER — HYDROMORPHONE HCL 110MG/55ML
0.5 PATIENT CONTROLLED ANALGESIA SYRINGE INTRAVENOUS ONCE
Status: COMPLETED | OUTPATIENT
Start: 2021-01-01 | End: 2021-01-01

## 2021-01-01 RX ORDER — NITROGLYCERIN 0.4 MG/1
0.4 TABLET SUBLINGUAL
Status: DISCONTINUED | OUTPATIENT
Start: 2021-01-01 | End: 2021-01-01 | Stop reason: HOSPADM

## 2021-01-01 RX ORDER — PANTOPRAZOLE SODIUM 40 MG/10ML
40 INJECTION, POWDER, LYOPHILIZED, FOR SOLUTION INTRAVENOUS
Status: DISCONTINUED | OUTPATIENT
Start: 2021-01-01 | End: 2021-01-01

## 2021-01-01 RX ORDER — MORPHINE SULFATE 4 MG/ML
4 INJECTION, SOLUTION INTRAMUSCULAR; INTRAVENOUS ONCE
Status: DISCONTINUED | OUTPATIENT
Start: 2021-01-01 | End: 2021-01-01

## 2021-01-01 RX ORDER — SPIRONOLACTONE 25 MG/1
25 TABLET ORAL DAILY
Status: DISCONTINUED | OUTPATIENT
Start: 2021-01-01 | End: 2021-01-01 | Stop reason: HOSPADM

## 2021-01-01 RX ORDER — ONDANSETRON 4 MG/1
4 TABLET, FILM COATED ORAL EVERY 6 HOURS PRN
Status: DISCONTINUED | OUTPATIENT
Start: 2021-01-01 | End: 2021-01-01 | Stop reason: HOSPADM

## 2021-01-01 RX ORDER — MAGNESIUM SULFATE HEPTAHYDRATE 40 MG/ML
2 INJECTION, SOLUTION INTRAVENOUS AS NEEDED
Status: DISCONTINUED | OUTPATIENT
Start: 2021-01-01 | End: 2021-01-01 | Stop reason: HOSPADM

## 2021-01-01 RX ORDER — POTASSIUM CHLORIDE 20 MEQ/1
20 TABLET, EXTENDED RELEASE ORAL DAILY
Qty: 20 TABLET | Refills: 0 | Status: SHIPPED | OUTPATIENT
Start: 2021-01-01 | End: 2021-01-01

## 2021-01-01 RX ORDER — MONTELUKAST SODIUM 10 MG/1
10 TABLET ORAL DAILY
Status: DISCONTINUED | OUTPATIENT
Start: 2021-01-01 | End: 2021-01-01 | Stop reason: HOSPADM

## 2021-01-01 RX ORDER — ONDANSETRON 2 MG/ML
4 INJECTION INTRAMUSCULAR; INTRAVENOUS EVERY 6 HOURS PRN
Status: DISCONTINUED | OUTPATIENT
Start: 2021-01-01 | End: 2021-01-01 | Stop reason: HOSPADM

## 2021-01-01 RX ORDER — METOPROLOL SUCCINATE 50 MG/1
50 TABLET, EXTENDED RELEASE ORAL
Status: DISCONTINUED | OUTPATIENT
Start: 2021-01-01 | End: 2021-01-01 | Stop reason: HOSPADM

## 2021-01-01 RX ORDER — DIPHENHYDRAMINE HYDROCHLORIDE 50 MG/ML
25 INJECTION INTRAMUSCULAR; INTRAVENOUS EVERY 6 HOURS PRN
Status: DISCONTINUED | OUTPATIENT
Start: 2021-01-01 | End: 2021-01-01 | Stop reason: HOSPADM

## 2021-01-01 RX ORDER — ATORVASTATIN CALCIUM 40 MG/1
80 TABLET, FILM COATED ORAL NIGHTLY
Status: DISCONTINUED | OUTPATIENT
Start: 2021-01-01 | End: 2021-01-01 | Stop reason: HOSPADM

## 2021-01-01 RX ORDER — ACETAMINOPHEN 650 MG/1
650 SUPPOSITORY RECTAL EVERY 4 HOURS PRN
Status: DISCONTINUED | OUTPATIENT
Start: 2021-01-01 | End: 2021-01-01 | Stop reason: HOSPADM

## 2021-01-01 RX ORDER — CETIRIZINE HYDROCHLORIDE 10 MG/1
10 TABLET ORAL DAILY
COMMUNITY

## 2021-01-01 RX ORDER — ACETAMINOPHEN 160 MG/5ML
650 SOLUTION ORAL EVERY 4 HOURS PRN
Status: DISCONTINUED | OUTPATIENT
Start: 2021-01-01 | End: 2021-01-01 | Stop reason: HOSPADM

## 2021-01-01 RX ORDER — CLOPIDOGREL BISULFATE 75 MG/1
75 TABLET ORAL DAILY
Status: DISCONTINUED | OUTPATIENT
Start: 2021-01-01 | End: 2021-01-01 | Stop reason: HOSPADM

## 2021-01-01 RX ORDER — IPRATROPIUM BROMIDE AND ALBUTEROL SULFATE 2.5; .5 MG/3ML; MG/3ML
3 SOLUTION RESPIRATORY (INHALATION) EVERY 4 HOURS PRN
Status: DISCONTINUED | OUTPATIENT
Start: 2021-01-01 | End: 2021-01-01 | Stop reason: HOSPADM

## 2021-01-01 RX ORDER — FUROSEMIDE 10 MG/ML
40 INJECTION INTRAMUSCULAR; INTRAVENOUS EVERY 8 HOURS
Status: DISCONTINUED | OUTPATIENT
Start: 2021-01-01 | End: 2021-01-01 | Stop reason: HOSPADM

## 2021-01-01 RX ORDER — MORPHINE SULFATE 4 MG/ML
4 INJECTION, SOLUTION INTRAMUSCULAR; INTRAVENOUS ONCE
Status: COMPLETED | OUTPATIENT
Start: 2021-01-01 | End: 2021-01-01

## 2021-01-01 RX ORDER — BUMETANIDE 0.25 MG/ML
2 INJECTION INTRAMUSCULAR; INTRAVENOUS ONCE
Status: DISCONTINUED | OUTPATIENT
Start: 2021-01-01 | End: 2021-01-01

## 2021-01-01 RX ORDER — BUMETANIDE 1 MG/1
1 TABLET ORAL
Status: DISCONTINUED | OUTPATIENT
Start: 2021-01-01 | End: 2021-01-01 | Stop reason: HOSPADM

## 2021-01-01 RX ORDER — KETOROLAC TROMETHAMINE 15 MG/ML
15 INJECTION, SOLUTION INTRAMUSCULAR; INTRAVENOUS EVERY 6 HOURS PRN
Status: DISCONTINUED | OUTPATIENT
Start: 2021-01-01 | End: 2021-01-01

## 2021-01-01 RX ORDER — BUDESONIDE AND FORMOTEROL FUMARATE DIHYDRATE 160; 4.5 UG/1; UG/1
2 AEROSOL RESPIRATORY (INHALATION)
Status: DISCONTINUED | OUTPATIENT
Start: 2021-01-01 | End: 2021-01-01 | Stop reason: HOSPADM

## 2021-01-01 RX ORDER — POTASSIUM CHLORIDE 20 MEQ/1
40 TABLET, EXTENDED RELEASE ORAL AS NEEDED
Status: DISCONTINUED | OUTPATIENT
Start: 2021-01-01 | End: 2021-01-01 | Stop reason: HOSPADM

## 2021-01-01 RX ORDER — SODIUM CHLORIDE 0.9 % (FLUSH) 0.9 %
10 SYRINGE (ML) INJECTION EVERY 12 HOURS SCHEDULED
Status: DISCONTINUED | OUTPATIENT
Start: 2021-01-01 | End: 2021-01-01 | Stop reason: HOSPADM

## 2021-01-01 RX ORDER — SPIRONOLACTONE 25 MG/1
25 TABLET ORAL 3 TIMES DAILY
Status: DISCONTINUED | OUTPATIENT
Start: 2021-01-01 | End: 2021-01-01 | Stop reason: HOSPADM

## 2021-01-01 RX ORDER — ACETAMINOPHEN 325 MG/1
650 TABLET ORAL EVERY 4 HOURS PRN
Status: DISCONTINUED | OUTPATIENT
Start: 2021-01-01 | End: 2021-01-01 | Stop reason: HOSPADM

## 2021-01-01 RX ORDER — RANOLAZINE 500 MG/1
1000 TABLET, EXTENDED RELEASE ORAL 2 TIMES DAILY
Status: DISCONTINUED | OUTPATIENT
Start: 2021-01-01 | End: 2021-01-01 | Stop reason: HOSPADM

## 2021-01-01 RX ORDER — RANOLAZINE 500 MG/1
1000 TABLET, EXTENDED RELEASE ORAL EVERY 12 HOURS SCHEDULED
Status: DISCONTINUED | OUTPATIENT
Start: 2021-01-01 | End: 2021-01-01 | Stop reason: HOSPADM

## 2021-01-01 RX ORDER — ONDANSETRON 4 MG/1
4 TABLET, FILM COATED ORAL EVERY 6 HOURS PRN
Qty: 20 TABLET | Refills: 0 | Status: SHIPPED | OUTPATIENT
Start: 2021-01-01

## 2021-01-01 RX ORDER — CHOLECALCIFEROL (VITAMIN D3) 125 MCG
5 CAPSULE ORAL NIGHTLY PRN
Status: DISCONTINUED | OUTPATIENT
Start: 2021-01-01 | End: 2021-01-01 | Stop reason: HOSPADM

## 2021-01-01 RX ORDER — LORAZEPAM 0.5 MG/1
0.5 TABLET ORAL EVERY 6 HOURS PRN
Status: DISCONTINUED | OUTPATIENT
Start: 2021-01-01 | End: 2021-01-01 | Stop reason: HOSPADM

## 2021-01-01 RX ORDER — MEXILETINE HYDROCHLORIDE 250 MG/1
250 CAPSULE ORAL EVERY 8 HOURS SCHEDULED
Status: DISCONTINUED | OUTPATIENT
Start: 2021-01-01 | End: 2021-01-01 | Stop reason: HOSPADM

## 2021-01-01 RX ORDER — BUDESONIDE AND FORMOTEROL FUMARATE DIHYDRATE 160; 4.5 UG/1; UG/1
2 AEROSOL RESPIRATORY (INHALATION)
COMMUNITY
End: 2021-01-01

## 2021-01-01 RX ORDER — GABAPENTIN 300 MG/1
300 CAPSULE ORAL EVERY 12 HOURS SCHEDULED
Status: DISCONTINUED | OUTPATIENT
Start: 2021-01-01 | End: 2021-01-01 | Stop reason: HOSPADM

## 2021-01-01 RX ORDER — TAMSULOSIN HYDROCHLORIDE 0.4 MG/1
0.4 CAPSULE ORAL 2 TIMES DAILY
Status: DISCONTINUED | OUTPATIENT
Start: 2021-01-01 | End: 2021-01-01 | Stop reason: HOSPADM

## 2021-01-01 RX ORDER — GABAPENTIN 100 MG/1
400 CAPSULE ORAL EVERY MORNING
COMMUNITY

## 2021-01-01 RX ORDER — BISACODYL 10 MG
10 SUPPOSITORY, RECTAL RECTAL DAILY PRN
Status: DISCONTINUED | OUTPATIENT
Start: 2021-01-01 | End: 2021-01-01 | Stop reason: HOSPADM

## 2021-01-01 RX ORDER — RANOLAZINE 1000 MG/1
1000 TABLET, EXTENDED RELEASE ORAL 2 TIMES DAILY
COMMUNITY

## 2021-01-01 RX ORDER — NITROGLYCERIN 20 MG/100ML
10-50 INJECTION INTRAVENOUS
Status: DISCONTINUED | OUTPATIENT
Start: 2021-01-01 | End: 2021-01-01 | Stop reason: HOSPADM

## 2021-01-01 RX ORDER — NITROGLYCERIN 20 MG/100ML
INJECTION INTRAVENOUS
Status: COMPLETED
Start: 2021-01-01 | End: 2021-01-01

## 2021-01-01 RX ORDER — NITROGLYCERIN 20 MG/100ML
5-200 INJECTION INTRAVENOUS
Status: DISCONTINUED | OUTPATIENT
Start: 2021-01-01 | End: 2021-01-01

## 2021-01-01 RX ORDER — FAMOTIDINE 20 MG/1
20 TABLET, FILM COATED ORAL 2 TIMES DAILY
Qty: 20 TABLET | Refills: 0 | Status: SHIPPED | OUTPATIENT
Start: 2021-01-01

## 2021-01-01 RX ORDER — ONDANSETRON 2 MG/ML
4 INJECTION INTRAMUSCULAR; INTRAVENOUS ONCE
Status: COMPLETED | OUTPATIENT
Start: 2021-01-01 | End: 2021-01-01

## 2021-01-01 RX ORDER — GABAPENTIN 600 MG/1
600 TABLET ORAL NIGHTLY
COMMUNITY

## 2021-01-01 RX ORDER — MIDODRINE HYDROCHLORIDE 5 MG/1
5 TABLET ORAL EVERY 8 HOURS SCHEDULED
Status: DISCONTINUED | OUTPATIENT
Start: 2021-01-01 | End: 2021-01-01 | Stop reason: HOSPADM

## 2021-01-01 RX ORDER — ALBUTEROL SULFATE 90 UG/1
2 AEROSOL, METERED RESPIRATORY (INHALATION) EVERY 4 HOURS PRN
Status: DISCONTINUED | OUTPATIENT
Start: 2021-01-01 | End: 2021-01-01 | Stop reason: HOSPADM

## 2021-01-01 RX ORDER — HYDROMORPHONE HCL 110MG/55ML
0.5 PATIENT CONTROLLED ANALGESIA SYRINGE INTRAVENOUS ONCE AS NEEDED
Status: COMPLETED | OUTPATIENT
Start: 2021-01-01 | End: 2021-01-01

## 2021-01-01 RX ORDER — ATORVASTATIN CALCIUM 40 MG/1
80 TABLET, FILM COATED ORAL DAILY
Status: DISCONTINUED | OUTPATIENT
Start: 2021-01-01 | End: 2021-01-01 | Stop reason: HOSPADM

## 2021-01-01 RX ORDER — FUROSEMIDE 10 MG/ML
40 INJECTION INTRAMUSCULAR; INTRAVENOUS ONCE
Status: COMPLETED | OUTPATIENT
Start: 2021-01-01 | End: 2021-01-01

## 2021-01-01 RX ORDER — SPIRONOLACTONE 25 MG/1
25 TABLET ORAL DAILY
Qty: 30 TABLET | Refills: 4 | Status: SHIPPED | OUTPATIENT
Start: 2021-01-01

## 2021-01-01 RX ORDER — FUROSEMIDE 40 MG/1
40 TABLET ORAL DAILY
Qty: 20 TABLET | Refills: 0 | Status: SHIPPED | OUTPATIENT
Start: 2021-01-01

## 2021-01-01 RX ORDER — MEXILETINE HYDROCHLORIDE 250 MG/1
250 CAPSULE ORAL 3 TIMES DAILY
Qty: 90 CAPSULE | Refills: 2 | Status: SHIPPED | OUTPATIENT
Start: 2021-01-01

## 2021-01-01 RX ORDER — ROFLUMILAST 500 UG/1
500 TABLET ORAL DAILY
Status: DISCONTINUED | OUTPATIENT
Start: 2021-01-01 | End: 2021-01-01 | Stop reason: HOSPADM

## 2021-01-01 RX ORDER — SODIUM CHLORIDE 0.9 % (FLUSH) 0.9 %
10 SYRINGE (ML) INJECTION AS NEEDED
Status: DISCONTINUED | OUTPATIENT
Start: 2021-01-01 | End: 2021-01-01 | Stop reason: HOSPADM

## 2021-01-01 RX ORDER — MEXILETINE HYDROCHLORIDE 250 MG/1
250 CAPSULE ORAL 3 TIMES DAILY
Status: DISCONTINUED | OUTPATIENT
Start: 2021-01-01 | End: 2021-01-01 | Stop reason: HOSPADM

## 2021-01-01 RX ORDER — HYDROXYZINE HYDROCHLORIDE 25 MG/1
50 TABLET, FILM COATED ORAL NIGHTLY PRN
Status: DISCONTINUED | OUTPATIENT
Start: 2021-01-01 | End: 2021-01-01 | Stop reason: HOSPADM

## 2021-01-01 RX ORDER — TADALAFIL 5 MG/1
5 TABLET ORAL DAILY PRN
COMMUNITY
End: 2021-01-01 | Stop reason: HOSPADM

## 2021-01-01 RX ORDER — DULOXETIN HYDROCHLORIDE 30 MG/1
60 CAPSULE, DELAYED RELEASE ORAL DAILY
Status: DISCONTINUED | OUTPATIENT
Start: 2021-01-01 | End: 2021-01-01 | Stop reason: HOSPADM

## 2021-01-01 RX ORDER — NITROGLYCERIN 20 MG/100ML
50 INJECTION INTRAVENOUS
Status: DISCONTINUED | OUTPATIENT
Start: 2021-01-01 | End: 2021-01-01 | Stop reason: HOSPADM

## 2021-01-01 RX ORDER — CETIRIZINE HYDROCHLORIDE 10 MG/1
10 TABLET ORAL DAILY
Status: DISCONTINUED | OUTPATIENT
Start: 2021-01-01 | End: 2021-01-01 | Stop reason: HOSPADM

## 2021-01-01 RX ORDER — TAMSULOSIN HYDROCHLORIDE 0.4 MG/1
0.4 CAPSULE ORAL DAILY
Status: DISCONTINUED | OUTPATIENT
Start: 2021-01-01 | End: 2021-01-01 | Stop reason: HOSPADM

## 2021-01-01 RX ORDER — GABAPENTIN 600 MG/1
600 TABLET ORAL NIGHTLY
Status: DISCONTINUED | OUTPATIENT
Start: 2021-01-01 | End: 2021-01-01 | Stop reason: HOSPADM

## 2021-01-01 RX ORDER — ALUMINA, MAGNESIA, AND SIMETHICONE 2400; 2400; 240 MG/30ML; MG/30ML; MG/30ML
15 SUSPENSION ORAL EVERY 6 HOURS PRN
Status: DISCONTINUED | OUTPATIENT
Start: 2021-01-01 | End: 2021-01-01 | Stop reason: HOSPADM

## 2021-01-01 RX ORDER — MORPHINE SULFATE 4 MG/ML
2 INJECTION, SOLUTION INTRAMUSCULAR; INTRAVENOUS ONCE
Status: COMPLETED | OUTPATIENT
Start: 2021-01-01 | End: 2021-01-01

## 2021-01-01 RX ORDER — MAGNESIUM SULFATE 1 G/100ML
1 INJECTION INTRAVENOUS AS NEEDED
Status: DISCONTINUED | OUTPATIENT
Start: 2021-01-01 | End: 2021-01-01 | Stop reason: HOSPADM

## 2021-01-01 RX ORDER — ALBUTEROL SULFATE 90 UG/1
2 AEROSOL, METERED RESPIRATORY (INHALATION) EVERY 4 HOURS PRN
COMMUNITY

## 2021-01-01 RX ORDER — ASPIRIN 325 MG
325 TABLET ORAL ONCE
Status: COMPLETED | OUTPATIENT
Start: 2021-01-01 | End: 2021-01-01

## 2021-01-01 RX ORDER — GABAPENTIN 400 MG/1
400 CAPSULE ORAL EVERY MORNING
Status: DISCONTINUED | OUTPATIENT
Start: 2021-01-01 | End: 2021-01-01 | Stop reason: HOSPADM

## 2021-01-01 RX ORDER — ALBUTEROL SULFATE 2.5 MG/3ML
2.5 SOLUTION RESPIRATORY (INHALATION) EVERY 6 HOURS PRN
Status: DISCONTINUED | OUTPATIENT
Start: 2021-01-01 | End: 2021-01-01 | Stop reason: HOSPADM

## 2021-01-01 RX ORDER — FENTANYL CITRATE 50 UG/ML
50 INJECTION, SOLUTION INTRAMUSCULAR; INTRAVENOUS ONCE
Status: COMPLETED | OUTPATIENT
Start: 2021-01-01 | End: 2021-01-01

## 2021-01-01 RX ORDER — MORPHINE SULFATE 4 MG/ML
2 INJECTION, SOLUTION INTRAMUSCULAR; INTRAVENOUS EVERY 4 HOURS PRN
Status: COMPLETED | OUTPATIENT
Start: 2021-01-01 | End: 2021-01-01

## 2021-01-01 RX ORDER — GABAPENTIN 400 MG/1
400 CAPSULE ORAL NIGHTLY
Status: DISCONTINUED | OUTPATIENT
Start: 2021-01-01 | End: 2021-01-01 | Stop reason: HOSPADM

## 2021-01-01 RX ORDER — ATORVASTATIN CALCIUM 80 MG/1
80 TABLET, FILM COATED ORAL DAILY
COMMUNITY

## 2021-01-01 RX ORDER — KETOROLAC TROMETHAMINE 15 MG/ML
15 INJECTION, SOLUTION INTRAMUSCULAR; INTRAVENOUS EVERY 6 HOURS PRN
Status: DISCONTINUED | OUTPATIENT
Start: 2021-01-01 | End: 2021-01-01 | Stop reason: HOSPADM

## 2021-01-01 RX ORDER — PANTOPRAZOLE SODIUM 40 MG/1
40 TABLET, DELAYED RELEASE ORAL
Status: DISCONTINUED | OUTPATIENT
Start: 2021-01-01 | End: 2021-01-01 | Stop reason: HOSPADM

## 2021-01-01 RX ORDER — METOLAZONE 2.5 MG/1
2.5 TABLET ORAL DAILY PRN
Status: DISCONTINUED | OUTPATIENT
Start: 2021-01-01 | End: 2021-01-01 | Stop reason: HOSPADM

## 2021-01-01 RX ORDER — FUROSEMIDE 40 MG/1
40 TABLET ORAL DAILY
Status: DISCONTINUED | OUTPATIENT
Start: 2021-01-01 | End: 2021-01-01 | Stop reason: HOSPADM

## 2021-01-01 RX ORDER — PROCHLORPERAZINE EDISYLATE 5 MG/ML
5 INJECTION INTRAMUSCULAR; INTRAVENOUS ONCE
Status: COMPLETED | OUTPATIENT
Start: 2021-01-01 | End: 2021-01-01

## 2021-01-01 RX ORDER — METOCLOPRAMIDE HYDROCHLORIDE 5 MG/ML
10 INJECTION INTRAMUSCULAR; INTRAVENOUS ONCE
Status: COMPLETED | OUTPATIENT
Start: 2021-01-01 | End: 2021-01-01

## 2021-01-01 RX ORDER — MORPHINE SULFATE 4 MG/ML
2 INJECTION, SOLUTION INTRAMUSCULAR; INTRAVENOUS EVERY 8 HOURS PRN
Status: COMPLETED | OUTPATIENT
Start: 2021-01-01 | End: 2021-01-01

## 2021-01-01 RX ORDER — MORPHINE SULFATE 4 MG/ML
4 INJECTION, SOLUTION INTRAMUSCULAR; INTRAVENOUS EVERY 4 HOURS PRN
Status: DISPENSED | OUTPATIENT
Start: 2021-01-01 | End: 2021-01-01

## 2021-01-01 RX ORDER — ALBUTEROL SULFATE 2.5 MG/3ML
2.5 SOLUTION RESPIRATORY (INHALATION)
Status: DISCONTINUED | OUTPATIENT
Start: 2021-01-01 | End: 2021-01-01 | Stop reason: HOSPADM

## 2021-01-01 RX ORDER — IPRATROPIUM BROMIDE AND ALBUTEROL SULFATE 2.5; .5 MG/3ML; MG/3ML
3 SOLUTION RESPIRATORY (INHALATION) EVERY 4 HOURS PRN
COMMUNITY
End: 2021-01-01 | Stop reason: SDDI

## 2021-01-01 RX ORDER — ALBUTEROL SULFATE 90 UG/1
2 AEROSOL, METERED RESPIRATORY (INHALATION) ONCE
Status: COMPLETED | OUTPATIENT
Start: 2021-01-01 | End: 2021-01-01

## 2021-01-01 RX ORDER — DIPHENHYDRAMINE HYDROCHLORIDE 50 MG/ML
25 INJECTION INTRAMUSCULAR; INTRAVENOUS ONCE
Status: COMPLETED | OUTPATIENT
Start: 2021-01-01 | End: 2021-01-01

## 2021-01-01 RX ORDER — BUMETANIDE 1 MG/1
TABLET ORAL
Qty: 20 TABLET | OUTPATIENT
Start: 2021-01-01

## 2021-01-01 RX ORDER — MEXILETINE HYDROCHLORIDE 150 MG/1
150 CAPSULE ORAL EVERY 8 HOURS SCHEDULED
Status: DISCONTINUED | OUTPATIENT
Start: 2021-01-01 | End: 2021-01-01

## 2021-01-01 RX ORDER — METOLAZONE 2.5 MG/1
2.5 TABLET ORAL DAILY PRN
Qty: 30 TABLET | Refills: 1 | Status: SHIPPED | OUTPATIENT
Start: 2021-01-01 | End: 2021-01-01 | Stop reason: SDDI

## 2021-01-01 RX ORDER — SPIRONOLACTONE 25 MG/1
25 TABLET ORAL 3 TIMES DAILY
COMMUNITY
End: 2021-01-01 | Stop reason: SDDI

## 2021-01-01 RX ADMIN — SPIRONOLACTONE 25 MG: 25 TABLET ORAL at 12:54

## 2021-01-01 RX ADMIN — APIXABAN 5 MG: 5 TABLET, FILM COATED ORAL at 21:32

## 2021-01-01 RX ADMIN — MEXILETINE HYDROCHLORIDE 250 MG: 250 CAPSULE ORAL at 21:43

## 2021-01-01 RX ADMIN — RANOLAZINE 1000 MG: 500 TABLET, FILM COATED, EXTENDED RELEASE ORAL at 21:44

## 2021-01-01 RX ADMIN — MIDODRINE HYDROCHLORIDE 5 MG: 5 TABLET ORAL at 05:14

## 2021-01-01 RX ADMIN — ROFLUMILAST 500 MCG: 500 TABLET ORAL at 09:58

## 2021-01-01 RX ADMIN — ATORVASTATIN CALCIUM 80 MG: 40 TABLET, FILM COATED ORAL at 08:38

## 2021-01-01 RX ADMIN — BUDESONIDE AND FORMOTEROL FUMARATE DIHYDRATE 2 PUFF: 160; 4.5 AEROSOL RESPIRATORY (INHALATION) at 06:28

## 2021-01-01 RX ADMIN — APIXABAN 5 MG: 5 TABLET, FILM COATED ORAL at 21:43

## 2021-01-01 RX ADMIN — MIDODRINE HYDROCHLORIDE 5 MG: 5 TABLET ORAL at 21:13

## 2021-01-01 RX ADMIN — MEXILETINE HYDROCHLORIDE 250 MG: 250 CAPSULE ORAL at 16:21

## 2021-01-01 RX ADMIN — SPIRONOLACTONE 25 MG: 25 TABLET ORAL at 09:21

## 2021-01-01 RX ADMIN — MEXILETINE HYDROCHLORIDE 250 MG: 250 CAPSULE ORAL at 09:23

## 2021-01-01 RX ADMIN — ONDANSETRON 4 MG: 2 INJECTION INTRAMUSCULAR; INTRAVENOUS at 11:57

## 2021-01-01 RX ADMIN — TAMSULOSIN HYDROCHLORIDE 0.4 MG: 0.4 CAPSULE ORAL at 09:21

## 2021-01-01 RX ADMIN — BUDESONIDE AND FORMOTEROL FUMARATE DIHYDRATE 2 PUFF: 160; 4.5 AEROSOL RESPIRATORY (INHALATION) at 07:40

## 2021-01-01 RX ADMIN — NITROGLYCERIN 1 INCH: 20 OINTMENT TOPICAL at 22:29

## 2021-01-01 RX ADMIN — GABAPENTIN 300 MG: 300 CAPSULE ORAL at 21:45

## 2021-01-01 RX ADMIN — ONDANSETRON 4 MG: 2 INJECTION INTRAMUSCULAR; INTRAVENOUS at 21:33

## 2021-01-01 RX ADMIN — ONDANSETRON 4 MG: 2 INJECTION INTRAMUSCULAR; INTRAVENOUS at 21:30

## 2021-01-01 RX ADMIN — REGADENOSON 0.4 MG: 0.08 INJECTION, SOLUTION INTRAVENOUS at 08:40

## 2021-01-01 RX ADMIN — GABAPENTIN 300 MG: 300 CAPSULE ORAL at 12:54

## 2021-01-01 RX ADMIN — ROFLUMILAST 500 MCG: 500 TABLET ORAL at 08:39

## 2021-01-01 RX ADMIN — ALBUTEROL SULFATE 2 PUFF: 108 AEROSOL, METERED RESPIRATORY (INHALATION) at 17:19

## 2021-01-01 RX ADMIN — MORPHINE SULFATE 2 MG: 4 INJECTION INTRAVENOUS at 12:14

## 2021-01-01 RX ADMIN — NITROGLYCERIN 1 INCH: 20 OINTMENT TOPICAL at 16:38

## 2021-01-01 RX ADMIN — PANTOPRAZOLE SODIUM 40 MG: 40 INJECTION, POWDER, FOR SOLUTION INTRAVENOUS at 09:57

## 2021-01-01 RX ADMIN — ONDANSETRON 4 MG: 2 INJECTION INTRAMUSCULAR; INTRAVENOUS at 17:27

## 2021-01-01 RX ADMIN — MEXILETINE HYDROCHLORIDE 250 MG: 250 CAPSULE ORAL at 09:58

## 2021-01-01 RX ADMIN — ATORVASTATIN CALCIUM 80 MG: 40 TABLET, FILM COATED ORAL at 09:26

## 2021-01-01 RX ADMIN — GABAPENTIN 300 MG: 300 CAPSULE ORAL at 21:44

## 2021-01-01 RX ADMIN — FUROSEMIDE 40 MG: 40 TABLET ORAL at 09:58

## 2021-01-01 RX ADMIN — BUDESONIDE AND FORMOTEROL FUMARATE DIHYDRATE 2 PUFF: 160; 4.5 AEROSOL RESPIRATORY (INHALATION) at 08:40

## 2021-01-01 RX ADMIN — ALBUTEROL SULFATE 2.5 MG: 2.5 SOLUTION RESPIRATORY (INHALATION) at 11:10

## 2021-01-01 RX ADMIN — APIXABAN 5 MG: 5 TABLET, FILM COATED ORAL at 09:27

## 2021-01-01 RX ADMIN — LIDOCAINE HYDROCHLORIDE: 20 SOLUTION ORAL; TOPICAL at 14:06

## 2021-01-01 RX ADMIN — TAMSULOSIN HYDROCHLORIDE 0.4 MG: 0.4 CAPSULE ORAL at 09:58

## 2021-01-01 RX ADMIN — Medication 10 ML: at 09:57

## 2021-01-01 RX ADMIN — PROCHLORPERAZINE EDISYLATE 5 MG: 5 INJECTION INTRAMUSCULAR; INTRAVENOUS at 14:11

## 2021-01-01 RX ADMIN — MONTELUKAST 10 MG: 10 TABLET, FILM COATED ORAL at 08:38

## 2021-01-01 RX ADMIN — MORPHINE SULFATE 4 MG: 4 INJECTION INTRAVENOUS at 17:54

## 2021-01-01 RX ADMIN — ONDANSETRON 4 MG: 2 INJECTION INTRAMUSCULAR; INTRAVENOUS at 01:25

## 2021-01-01 RX ADMIN — MIDODRINE HYDROCHLORIDE 5 MG: 5 TABLET ORAL at 21:44

## 2021-01-01 RX ADMIN — MONTELUKAST 10 MG: 10 TABLET, FILM COATED ORAL at 09:57

## 2021-01-01 RX ADMIN — MONTELUKAST 10 MG: 10 TABLET, FILM COATED ORAL at 09:23

## 2021-01-01 RX ADMIN — ALBUTEROL SULFATE 2 PUFF: 108 AEROSOL, METERED RESPIRATORY (INHALATION) at 23:24

## 2021-01-01 RX ADMIN — METOPROLOL SUCCINATE 50 MG: 50 TABLET, EXTENDED RELEASE ORAL at 17:46

## 2021-01-01 RX ADMIN — MEXILETINE HYDROCHLORIDE 250 MG: 250 CAPSULE ORAL at 21:44

## 2021-01-01 RX ADMIN — DULOXETINE HYDROCHLORIDE 60 MG: 30 CAPSULE, DELAYED RELEASE ORAL at 08:38

## 2021-01-01 RX ADMIN — MIDODRINE HYDROCHLORIDE 5 MG: 5 TABLET ORAL at 05:39

## 2021-01-01 RX ADMIN — METOPROLOL SUCCINATE 50 MG: 50 TABLET, EXTENDED RELEASE ORAL at 09:57

## 2021-01-01 RX ADMIN — TAMSULOSIN HYDROCHLORIDE 0.4 MG: 0.4 CAPSULE ORAL at 09:27

## 2021-01-01 RX ADMIN — METOPROLOL SUCCINATE 50 MG: 50 TABLET, EXTENDED RELEASE ORAL at 09:27

## 2021-01-01 RX ADMIN — CLOPIDOGREL BISULFATE 75 MG: 75 TABLET ORAL at 21:43

## 2021-01-01 RX ADMIN — SPIRONOLACTONE 25 MG: 25 TABLET ORAL at 09:27

## 2021-01-01 RX ADMIN — MONTELUKAST 10 MG: 10 TABLET, FILM COATED ORAL at 08:39

## 2021-01-01 RX ADMIN — BUMETANIDE 1 MG: 1 TABLET ORAL at 09:21

## 2021-01-01 RX ADMIN — SPIRONOLACTONE 25 MG: 25 TABLET ORAL at 09:58

## 2021-01-01 RX ADMIN — ENOXAPARIN SODIUM 40 MG: 40 INJECTION SUBCUTANEOUS at 08:40

## 2021-01-01 RX ADMIN — HYDROXYZINE HYDROCHLORIDE 50 MG: 25 TABLET, FILM COATED ORAL at 22:13

## 2021-01-01 RX ADMIN — Medication 10 ML: at 21:15

## 2021-01-01 RX ADMIN — BUMETANIDE 1 MG: 1 TABLET ORAL at 09:26

## 2021-01-01 RX ADMIN — SPIRONOLACTONE 25 MG: 25 TABLET ORAL at 16:21

## 2021-01-01 RX ADMIN — BUMETANIDE 1 MG: 1 TABLET ORAL at 08:38

## 2021-01-01 RX ADMIN — MEXILETINE HYDROCHLORIDE 250 MG: 250 CAPSULE ORAL at 21:14

## 2021-01-01 RX ADMIN — TECHNETIUM TC 99M SESTAMIBI 1 DOSE: 1 INJECTION INTRAVENOUS at 08:40

## 2021-01-01 RX ADMIN — BUDESONIDE AND FORMOTEROL FUMARATE DIHYDRATE 2 PUFF: 160; 4.5 AEROSOL RESPIRATORY (INHALATION) at 19:04

## 2021-01-01 RX ADMIN — MONTELUKAST 10 MG: 10 TABLET, FILM COATED ORAL at 09:26

## 2021-01-01 RX ADMIN — MEXILETINE HYDROCHLORIDE 250 MG: 250 CAPSULE ORAL at 15:28

## 2021-01-01 RX ADMIN — MIDODRINE HYDROCHLORIDE 5 MG: 5 TABLET ORAL at 21:45

## 2021-01-01 RX ADMIN — PANTOPRAZOLE SODIUM 40 MG: 40 INJECTION, POWDER, FOR SOLUTION INTRAVENOUS at 14:11

## 2021-01-01 RX ADMIN — Medication 10 ML: at 09:25

## 2021-01-01 RX ADMIN — BUDESONIDE AND FORMOTEROL FUMARATE DIHYDRATE 2 PUFF: 160; 4.5 AEROSOL RESPIRATORY (INHALATION) at 20:31

## 2021-01-01 RX ADMIN — ALBUTEROL SULFATE 2.5 MG: 2.5 SOLUTION RESPIRATORY (INHALATION) at 02:48

## 2021-01-01 RX ADMIN — METOPROLOL SUCCINATE 50 MG: 50 TABLET, EXTENDED RELEASE ORAL at 09:21

## 2021-01-01 RX ADMIN — HYDROMORPHONE HYDROCHLORIDE 0.5 MG: 2 INJECTION, SOLUTION INTRAMUSCULAR; INTRAVENOUS; SUBCUTANEOUS at 16:39

## 2021-01-01 RX ADMIN — METOPROLOL SUCCINATE 50 MG: 50 TABLET, EXTENDED RELEASE ORAL at 21:53

## 2021-01-01 RX ADMIN — TAMSULOSIN HYDROCHLORIDE 0.4 MG: 0.4 CAPSULE ORAL at 09:26

## 2021-01-01 RX ADMIN — NITROGLYCERIN 10 MCG/MIN: 20 INJECTION INTRAVENOUS at 16:36

## 2021-01-01 RX ADMIN — DULOXETINE HYDROCHLORIDE 60 MG: 30 CAPSULE, DELAYED RELEASE ORAL at 09:21

## 2021-01-01 RX ADMIN — ROFLUMILAST 500 MCG: 500 TABLET ORAL at 09:21

## 2021-01-01 RX ADMIN — CETIRIZINE HYDROCHLORIDE 10 MG: 10 TABLET, FILM COATED ORAL at 12:54

## 2021-01-01 RX ADMIN — ALBUTEROL SULFATE 2.5 MG: 2.5 SOLUTION RESPIRATORY (INHALATION) at 22:44

## 2021-01-01 RX ADMIN — RANOLAZINE 1000 MG: 500 TABLET, FILM COATED, EXTENDED RELEASE ORAL at 21:43

## 2021-01-01 RX ADMIN — MORPHINE SULFATE 4 MG: 4 INJECTION INTRAVENOUS at 02:35

## 2021-01-01 RX ADMIN — ALBUTEROL SULFATE 2.5 MG: 2.5 SOLUTION RESPIRATORY (INHALATION) at 07:40

## 2021-01-01 RX ADMIN — MIDODRINE HYDROCHLORIDE 5 MG: 5 TABLET ORAL at 13:02

## 2021-01-01 RX ADMIN — CLOPIDOGREL BISULFATE 75 MG: 75 TABLET ORAL at 09:26

## 2021-01-01 RX ADMIN — BUDESONIDE AND FORMOTEROL FUMARATE DIHYDRATE 2 PUFF: 160; 4.5 AEROSOL RESPIRATORY (INHALATION) at 08:51

## 2021-01-01 RX ADMIN — RANOLAZINE 1000 MG: 500 TABLET, FILM COATED, EXTENDED RELEASE ORAL at 21:13

## 2021-01-01 RX ADMIN — Medication 10 ML: at 21:12

## 2021-01-01 RX ADMIN — CETIRIZINE HYDROCHLORIDE 10 MG: 10 TABLET, FILM COATED ORAL at 09:21

## 2021-01-01 RX ADMIN — Medication 10 ML: at 09:29

## 2021-01-01 RX ADMIN — MORPHINE SULFATE 4 MG: 4 INJECTION INTRAVENOUS at 17:13

## 2021-01-01 RX ADMIN — MIDODRINE HYDROCHLORIDE 5 MG: 5 TABLET ORAL at 06:00

## 2021-01-01 RX ADMIN — ONDANSETRON 4 MG: 2 INJECTION INTRAMUSCULAR; INTRAVENOUS at 20:17

## 2021-01-01 RX ADMIN — KETOROLAC TROMETHAMINE 15 MG: 15 INJECTION, SOLUTION INTRAMUSCULAR; INTRAVENOUS at 21:18

## 2021-01-01 RX ADMIN — Medication 10 ML: at 21:18

## 2021-01-01 RX ADMIN — MEXILETINE HYDROCHLORIDE 250 MG: 250 CAPSULE ORAL at 21:13

## 2021-01-01 RX ADMIN — ROFLUMILAST 500 MCG: 500 TABLET ORAL at 09:26

## 2021-01-01 RX ADMIN — GABAPENTIN 300 MG: 300 CAPSULE ORAL at 08:38

## 2021-01-01 RX ADMIN — FUROSEMIDE 40 MG: 10 INJECTION, SOLUTION INTRAMUSCULAR; INTRAVENOUS at 22:30

## 2021-01-01 RX ADMIN — RANOLAZINE 1000 MG: 500 TABLET, FILM COATED, EXTENDED RELEASE ORAL at 08:39

## 2021-01-01 RX ADMIN — BUMETANIDE 1 MG: 1 TABLET ORAL at 17:16

## 2021-01-01 RX ADMIN — ENOXAPARIN SODIUM 40 MG: 40 INJECTION SUBCUTANEOUS at 20:10

## 2021-01-01 RX ADMIN — MORPHINE SULFATE 4 MG: 4 INJECTION INTRAVENOUS at 21:45

## 2021-01-01 RX ADMIN — ONDANSETRON 4 MG: 2 INJECTION INTRAMUSCULAR; INTRAVENOUS at 20:15

## 2021-01-01 RX ADMIN — FUROSEMIDE 40 MG: 10 INJECTION, SOLUTION INTRAMUSCULAR; INTRAVENOUS at 06:32

## 2021-01-01 RX ADMIN — MIDODRINE HYDROCHLORIDE 5 MG: 5 TABLET ORAL at 05:38

## 2021-01-01 RX ADMIN — MEXILETINE HYDROCHLORIDE 250 MG: 250 CAPSULE ORAL at 08:37

## 2021-01-01 RX ADMIN — FENTANYL CITRATE 50 MCG: 50 INJECTION, SOLUTION INTRAMUSCULAR; INTRAVENOUS at 20:16

## 2021-01-01 RX ADMIN — METOCLOPRAMIDE HYDROCHLORIDE 10 MG: 5 INJECTION INTRAMUSCULAR; INTRAVENOUS at 16:35

## 2021-01-01 RX ADMIN — RANOLAZINE 1000 MG: 500 TABLET, FILM COATED, EXTENDED RELEASE ORAL at 09:21

## 2021-01-01 RX ADMIN — SODIUM CHLORIDE, POTASSIUM CHLORIDE, SODIUM LACTATE AND CALCIUM CHLORIDE 500 ML: 600; 310; 30; 20 INJECTION, SOLUTION INTRAVENOUS at 14:19

## 2021-01-01 RX ADMIN — FUROSEMIDE 40 MG: 10 INJECTION, SOLUTION INTRAMUSCULAR; INTRAVENOUS at 22:54

## 2021-01-01 RX ADMIN — ROFLUMILAST 500 MCG: 500 TABLET ORAL at 08:37

## 2021-01-01 RX ADMIN — BUDESONIDE AND FORMOTEROL FUMARATE DIHYDRATE 2 PUFF: 160; 4.5 AEROSOL RESPIRATORY (INHALATION) at 08:47

## 2021-01-01 RX ADMIN — CLOPIDOGREL BISULFATE 75 MG: 75 TABLET ORAL at 09:24

## 2021-01-01 RX ADMIN — FUROSEMIDE 40 MG: 10 INJECTION, SOLUTION INTRAMUSCULAR; INTRAVENOUS at 13:25

## 2021-01-01 RX ADMIN — GABAPENTIN 600 MG: 600 TABLET, FILM COATED ORAL at 21:43

## 2021-01-01 RX ADMIN — FUROSEMIDE 40 MG: 10 INJECTION, SOLUTION INTRAMUSCULAR; INTRAVENOUS at 21:33

## 2021-01-01 RX ADMIN — Medication 10 ML: at 08:41

## 2021-01-01 RX ADMIN — ROFLUMILAST 500 MCG: 500 TABLET ORAL at 09:23

## 2021-01-01 RX ADMIN — NITROGLYCERIN 10 MCG/MIN: 20 INJECTION INTRAVENOUS at 04:10

## 2021-01-01 RX ADMIN — GABAPENTIN 300 MG: 300 CAPSULE ORAL at 09:26

## 2021-01-01 RX ADMIN — PANTOPRAZOLE SODIUM 40 MG: 40 TABLET, DELAYED RELEASE ORAL at 06:32

## 2021-01-01 RX ADMIN — SPIRONOLACTONE 25 MG: 25 TABLET ORAL at 08:38

## 2021-01-01 RX ADMIN — ONDANSETRON HYDROCHLORIDE 4 MG: 4 TABLET, FILM COATED ORAL at 01:04

## 2021-01-01 RX ADMIN — ONDANSETRON 4 MG: 2 INJECTION INTRAMUSCULAR; INTRAVENOUS at 16:39

## 2021-01-01 RX ADMIN — CETIRIZINE HYDROCHLORIDE 10 MG: 10 TABLET, FILM COATED ORAL at 09:58

## 2021-01-01 RX ADMIN — TAMSULOSIN HYDROCHLORIDE 0.4 MG: 0.4 CAPSULE ORAL at 21:44

## 2021-01-01 RX ADMIN — SPIRONOLACTONE 25 MG: 25 TABLET ORAL at 21:32

## 2021-01-01 RX ADMIN — ROFLUMILAST 500 MCG: 500 TABLET ORAL at 12:53

## 2021-01-01 RX ADMIN — Medication 10 ML: at 09:21

## 2021-01-01 RX ADMIN — ATORVASTATIN CALCIUM 80 MG: 40 TABLET, FILM COATED ORAL at 12:52

## 2021-01-01 RX ADMIN — APIXABAN 5 MG: 5 TABLET, FILM COATED ORAL at 09:57

## 2021-01-01 RX ADMIN — ONDANSETRON HYDROCHLORIDE 4 MG: 4 TABLET, FILM COATED ORAL at 11:16

## 2021-01-01 RX ADMIN — NITROGLYCERIN 10 MCG/MIN: 20 INJECTION INTRAVENOUS at 06:44

## 2021-01-01 RX ADMIN — BUMETANIDE 1 MG: 1 TABLET ORAL at 12:53

## 2021-01-01 RX ADMIN — ALBUTEROL SULFATE 2.5 MG: 2.5 SOLUTION RESPIRATORY (INHALATION) at 14:38

## 2021-01-01 RX ADMIN — GABAPENTIN 400 MG: 400 CAPSULE ORAL at 21:15

## 2021-01-01 RX ADMIN — FENTANYL CITRATE 50 MCG: 50 INJECTION, SOLUTION INTRAMUSCULAR; INTRAVENOUS at 22:29

## 2021-01-01 RX ADMIN — ALBUTEROL SULFATE 2.5 MG: 2.5 SOLUTION RESPIRATORY (INHALATION) at 20:30

## 2021-01-01 RX ADMIN — MORPHINE SULFATE 2 MG: 4 INJECTION INTRAVENOUS at 04:17

## 2021-01-01 RX ADMIN — TAMSULOSIN HYDROCHLORIDE 0.4 MG: 0.4 CAPSULE ORAL at 21:32

## 2021-01-01 RX ADMIN — CLOPIDOGREL BISULFATE 75 MG: 75 TABLET ORAL at 08:39

## 2021-01-01 RX ADMIN — DULOXETINE HYDROCHLORIDE 60 MG: 30 CAPSULE, DELAYED RELEASE ORAL at 12:53

## 2021-01-01 RX ADMIN — LORAZEPAM 0.5 MG: 0.5 TABLET ORAL at 06:43

## 2021-01-01 RX ADMIN — TAMSULOSIN HYDROCHLORIDE 0.4 MG: 0.4 CAPSULE ORAL at 12:53

## 2021-01-01 RX ADMIN — HYDROXYZINE HYDROCHLORIDE 50 MG: 25 TABLET, FILM COATED ORAL at 00:42

## 2021-01-01 RX ADMIN — MORPHINE SULFATE 4 MG: 4 INJECTION INTRAVENOUS at 21:22

## 2021-01-01 RX ADMIN — ATORVASTATIN CALCIUM 80 MG: 40 TABLET, FILM COATED ORAL at 21:44

## 2021-01-01 RX ADMIN — FUROSEMIDE 40 MG: 40 TABLET ORAL at 21:43

## 2021-01-01 RX ADMIN — MORPHINE SULFATE 2 MG: 4 INJECTION INTRAVENOUS at 19:07

## 2021-01-01 RX ADMIN — ONDANSETRON 4 MG: 2 INJECTION INTRAMUSCULAR; INTRAVENOUS at 14:10

## 2021-01-01 RX ADMIN — ATORVASTATIN CALCIUM 80 MG: 40 TABLET, FILM COATED ORAL at 09:21

## 2021-01-01 RX ADMIN — TAMSULOSIN HYDROCHLORIDE 0.4 MG: 0.4 CAPSULE ORAL at 08:39

## 2021-01-01 RX ADMIN — APIXABAN 5 MG: 5 TABLET, FILM COATED ORAL at 21:44

## 2021-01-01 RX ADMIN — ONDANSETRON 4 MG: 2 INJECTION INTRAMUSCULAR; INTRAVENOUS at 08:48

## 2021-01-01 RX ADMIN — RANOLAZINE 1000 MG: 500 TABLET, FILM COATED, EXTENDED RELEASE ORAL at 12:54

## 2021-01-01 RX ADMIN — METOPROLOL SUCCINATE 50 MG: 50 TABLET, EXTENDED RELEASE ORAL at 08:39

## 2021-01-01 RX ADMIN — GABAPENTIN 300 MG: 300 CAPSULE ORAL at 21:13

## 2021-01-01 RX ADMIN — MEXILETINE HYDROCHLORIDE 250 MG: 250 CAPSULE ORAL at 09:27

## 2021-01-01 RX ADMIN — MORPHINE SULFATE 4 MG: 4 INJECTION INTRAVENOUS at 08:40

## 2021-01-01 RX ADMIN — GABAPENTIN 300 MG: 300 CAPSULE ORAL at 21:32

## 2021-01-01 RX ADMIN — RANOLAZINE 1000 MG: 500 TABLET, FILM COATED, EXTENDED RELEASE ORAL at 09:58

## 2021-01-01 RX ADMIN — MEXILETINE HYDROCHLORIDE 250 MG: 250 CAPSULE ORAL at 22:06

## 2021-01-01 RX ADMIN — MEXILETINE HYDROCHLORIDE 250 MG: 250 CAPSULE ORAL at 08:39

## 2021-01-01 RX ADMIN — GABAPENTIN 400 MG: 400 CAPSULE ORAL at 06:59

## 2021-01-01 RX ADMIN — Medication 10 ML: at 08:40

## 2021-01-01 RX ADMIN — KETOROLAC TROMETHAMINE 15 MG: 15 INJECTION, SOLUTION INTRAMUSCULAR; INTRAVENOUS at 05:39

## 2021-01-01 RX ADMIN — FUROSEMIDE 40 MG: 10 INJECTION, SOLUTION INTRAMUSCULAR; INTRAVENOUS at 21:15

## 2021-01-01 RX ADMIN — MEXILETINE HYDROCHLORIDE 250 MG: 250 CAPSULE ORAL at 17:54

## 2021-01-01 RX ADMIN — CLOPIDOGREL BISULFATE 75 MG: 75 TABLET ORAL at 08:38

## 2021-01-01 RX ADMIN — RANOLAZINE 1000 MG: 500 TABLET, FILM COATED, EXTENDED RELEASE ORAL at 21:45

## 2021-01-01 RX ADMIN — BUDESONIDE AND FORMOTEROL FUMARATE DIHYDRATE 2 PUFF: 160; 4.5 AEROSOL RESPIRATORY (INHALATION) at 18:47

## 2021-01-01 RX ADMIN — MIDODRINE HYDROCHLORIDE 5 MG: 5 TABLET ORAL at 15:28

## 2021-01-01 RX ADMIN — GABAPENTIN 300 MG: 300 CAPSULE ORAL at 09:21

## 2021-01-01 RX ADMIN — Medication 10 ML: at 21:44

## 2021-01-01 RX ADMIN — FUROSEMIDE 40 MG: 10 INJECTION, SOLUTION INTRAMUSCULAR; INTRAVENOUS at 06:43

## 2021-01-01 RX ADMIN — TAMSULOSIN HYDROCHLORIDE 0.4 MG: 0.4 CAPSULE ORAL at 21:43

## 2021-01-01 RX ADMIN — RANOLAZINE 1000 MG: 500 TABLET, FILM COATED, EXTENDED RELEASE ORAL at 09:26

## 2021-01-01 RX ADMIN — MIDODRINE HYDROCHLORIDE 5 MG: 5 TABLET ORAL at 21:32

## 2021-01-01 RX ADMIN — MORPHINE SULFATE 4 MG: 4 INJECTION INTRAVENOUS at 17:28

## 2021-01-01 RX ADMIN — MORPHINE SULFATE 2 MG: 4 INJECTION INTRAVENOUS at 05:14

## 2021-01-01 RX ADMIN — MORPHINE SULFATE 2 MG: 4 INJECTION INTRAVENOUS at 13:02

## 2021-01-01 RX ADMIN — PANTOPRAZOLE SODIUM 40 MG: 40 TABLET, DELAYED RELEASE ORAL at 09:23

## 2021-01-01 RX ADMIN — ENOXAPARIN SODIUM 40 MG: 40 INJECTION SUBCUTANEOUS at 09:21

## 2021-01-01 RX ADMIN — CLOPIDOGREL BISULFATE 75 MG: 75 TABLET ORAL at 09:21

## 2021-01-01 RX ADMIN — METOPROLOL SUCCINATE 50 MG: 50 TABLET, EXTENDED RELEASE ORAL at 17:55

## 2021-01-01 RX ADMIN — RANOLAZINE 1000 MG: 500 TABLET, FILM COATED, EXTENDED RELEASE ORAL at 21:32

## 2021-01-01 RX ADMIN — Medication 10 ML: at 21:57

## 2021-01-01 RX ADMIN — MONTELUKAST 10 MG: 10 TABLET, FILM COATED ORAL at 12:53

## 2021-01-01 RX ADMIN — DIPHENHYDRAMINE HYDROCHLORIDE 25 MG: 50 INJECTION INTRAMUSCULAR; INTRAVENOUS at 14:11

## 2021-01-01 RX ADMIN — MORPHINE SULFATE 4 MG: 4 INJECTION INTRAVENOUS at 09:25

## 2021-01-01 RX ADMIN — TECHNETIUM TC 99M SESTAMIBI 1 DOSE: 1 INJECTION INTRAVENOUS at 07:17

## 2021-01-01 RX ADMIN — MORPHINE SULFATE 4 MG: 4 INJECTION INTRAVENOUS at 13:25

## 2021-01-01 RX ADMIN — MEXILETINE HYDROCHLORIDE 250 MG: 250 CAPSULE ORAL at 06:00

## 2021-01-01 RX ADMIN — NITROGLYCERIN 1 INCH: 20 OINTMENT TOPICAL at 17:27

## 2021-01-01 RX ADMIN — BUMETANIDE 1 MG: 1 TABLET ORAL at 17:46

## 2021-01-01 RX ADMIN — Medication 10 ML: at 12:52

## 2021-01-01 RX ADMIN — ASPIRIN 325 MG ORAL TABLET 325 MG: 325 PILL ORAL at 17:27

## 2021-01-01 RX ADMIN — HYDROMORPHONE HYDROCHLORIDE 0.5 MG: 2 INJECTION, SOLUTION INTRAMUSCULAR; INTRAVENOUS; SUBCUTANEOUS at 22:22

## 2021-01-01 RX ADMIN — ALBUTEROL SULFATE 2.5 MG: 2.5 SOLUTION RESPIRATORY (INHALATION) at 11:56

## 2021-01-01 RX ADMIN — CLOPIDOGREL BISULFATE 75 MG: 75 TABLET ORAL at 12:54

## 2021-01-01 RX ADMIN — Medication 10 ML: at 21:33

## 2021-01-01 RX ADMIN — Medication 10 ML: at 08:04

## 2021-01-01 RX ADMIN — ONDANSETRON 4 MG: 2 INJECTION INTRAMUSCULAR; INTRAVENOUS at 08:04

## 2021-01-01 RX ADMIN — SPIRONOLACTONE 25 MG: 25 TABLET ORAL at 21:44

## 2021-01-01 RX ADMIN — MONTELUKAST 10 MG: 10 TABLET, FILM COATED ORAL at 09:21

## 2021-01-01 RX ADMIN — DULOXETINE HYDROCHLORIDE 60 MG: 30 CAPSULE, DELAYED RELEASE ORAL at 09:26

## 2021-01-01 RX ADMIN — MORPHINE SULFATE 4 MG: 4 INJECTION INTRAVENOUS at 03:16

## 2021-01-01 RX ADMIN — CLOPIDOGREL BISULFATE 75 MG: 75 TABLET ORAL at 09:57

## 2021-01-01 RX ADMIN — MAGNESIUM HYDROXIDE,ALUMINUM HYDROXICE,SIMETHICONE 15 ML: 240; 2400; 2400 SUSPENSION ORAL at 09:26

## 2021-01-01 RX ADMIN — ENOXAPARIN SODIUM 40 MG: 40 INJECTION SUBCUTANEOUS at 21:15

## 2021-01-01 RX ADMIN — KETOROLAC TROMETHAMINE 15 MG: 15 INJECTION, SOLUTION INTRAMUSCULAR; INTRAVENOUS at 01:06

## 2021-02-26 NOTE — PROGRESS NOTES
CC--recurrent VT and congestive heart failure      Sub--60-year-old male patient came for follow-up after recent hospitalization for chronic symptoms of dyspnea and chronic heart failure  .  Patient had uncontrollable atrial fibrillation and underwent AV node ablation with biventricular ICD upgrade--unfortunately coronary sinus lead was placed in anterobasal vein since there was no posterior and lateral veins for placement of coronary sinus lead--he was in refractory heart failure which has improved to class III chronic systolic heart failure .  Patient has known coronary artery disease with ischemic cardiomyopathy and has significant issues with obesity and prior history of COPD.  Patient had prior multiple ICD therapies for conducted atrial fibrillation needing AV node ablation .  He remains in functional class III .Patient has noticed increased number of palpitations associated with chest discomfort in the last few weeks  He continues to remain in class III functional class without any syncope  Chronic medical problems include ischemic cardio myopathy chronic class III systolic heart failure, persistent atrial fibrillation and morbid obesity   Was started on amiodarone with last visit--continues to have episodes of VT needing antitachycardia pacing  Back in 2017 patient underwent an unsuccessful VT ablation with PVC ablation coming from outflow tract which was felt to be possible epicardial origin--ablation from RVOT site was unsuccessful--VT during the time was inferior axis with left bundle and transition V3  Continued to feel poorly with class III systolic heart failure with multiple episodes of VT   Patient presented to the hospital with chronic angina and chronic class III systolic heart failure symptoms and underwent LV epicardial lead placement for optimization of heart failure therapy--he has recurrent admission for heart failure and last admission patient had VT storm with multiple ICD shocks and  underwent a VT ablation affecting the posterior scar and started have recurrent VT and started on Mexitil  and comes in for  evaluation   He denies any syncope since last visit  Clinically patient has improved and well compensated and functional class III and no recent hospitalization in the last 3 months and denies any leg edema  Complains of transient palpitations without syncope      Past Medical History:   Diagnosis Date   • Asthma    • Atrial fibrillation (CMS/[facility])    • CAD (coronary artery disease)    • CHF (congestive heart failure) (CMS/[facility])    • COPD (chronic obstructive pulmonary disease) (CMS/[facility])    • Depression    • Hyperlipidemia    • Hypertension    • Myocardial infarction (CMS/[facility])    • Pacemaker     pacemaker / defib   • V tach (CMS/[facility])      Past Surgical History:   Procedure Laterality Date   • CARDIAC ABLATION  11/07/2017   • CARDIAC CATHETERIZATION     • CARDIAC ELECTROPHYSIOLOGY PROCEDURE N/A 9/26/2019    Procedure: BIVENTRICULAR DEVICE UPGRADE;  Surgeon: Jose Lopez MD;  Location: [facility] CATH INVASIVE LOCATION;  Service: Cardiovascular   • CARDIAC ELECTROPHYSIOLOGY PROCEDURE N/A 9/26/2019    Procedure: EP/Ablation AV Node ablation;  Surgeon: Jose Lopez MD;  Location: [facility] CATH INVASIVE LOCATION;  Service: Cardiology   • CHOLECYSTECTOMY     • CORONARY ANGIOPLASTY WITH STENT PLACEMENT      2 stents   • PACEMAKER IMPLANTATION  07/08/2016    Pacemaker/ Defib implant - [employer]   • SINUS SURGERY      sinus surgery x 9         Physical Exam  VITALS REVIEWED    General:      well developed, well nourished, in no acute distress.    Head:      normocephalic and atraumatic.    Eyes:      PERRL/EOM intact, conjunctiva and sclera clear with out nystagmus.    Neck:    thyromegaly,  trachea central with normal respiratory effort    heart:       Paced rhythm     Lung exam shows reduced breath sounds in bilateral bases without wheezing    Assessment plan  Recurrent and symptomatic  episodes  of VT--status post VT storm --repeat VT ablation and this time VT was coming from posterior scar which was ablated 2 weeks ago --had short bursts of nonsustained VT and slightly increased PVCs since ablation --- started on Mexitil and no recurrent sustained VT--nonsustained ventricular arrhythmias noted on ICD interrogation  Clinically improved with intake of current medications including Mexitil  First VT ablation several months ago involving the basal scar  Second VT ablation  involving the posterior scar  Severe ischemic cardiomyopathy with EF less than 20%  Prior history of mild cardiorenal syndrome   Chronic class III systolic heart failure  Chronic coronary artery disease and chronic chest pain in the past--no other further coronary intervention is warranted as per Dr. Frye  Permanent atrial fibrillation status post AV node ablation  Biventricular ICD in situ --post LV lead placement epicardial lead for optimization and EKG shows excellent biventricular pacing --ICD interrogation attached to chart which is appropriately functioning  Thoracic impedance evaluation ICD interrogation with normal fluid levels without any recent fluid accumulation  Metolazone prescription given to be taken as needed basis  Other medications reviewed and follow-up in 3 months      ECG 12 Lead    Date/Time: 2/26/2021 2:06 PM  Performed by: Jose Lopez MD  Authorized by: Jose Lopez MD   Comparison: compared with previous ECG   Similar to previous ECG  Rhythm: atrial fibrillation and paced  Rate: normal  Pacing: ventricular paced rhythm          Electronically signed by Jose Lopez MD, 02/26/21, 2:06 PM EST.

## 2021-03-15 PROBLEM — R07.9 CHEST PAIN: Status: ACTIVE | Noted: 2021-01-01

## 2021-03-15 NOTE — ED PROVIDER NOTES
"Subjective   Chief Complaint: Chest pain    Patient is a 60-year-old  male history of A. fib, CAD, CHF, COPD, hypertension, has pacemaker and defibrillator placement presents today with chief complaint of chest pain for 1 day.  Patient states he woke up this morning 8 AM with midsternal chest pain.  Patient states that when he got out of bed the past pain seemed to resolve.  Patient states throughout the day chest pain seem to get worse, states it starts in his midsternal area, radiates to his left shoulder and back, describes as a pressure and sharp pain that he rates 8/10.  He reports some associated shortness of breath, left arm tingling and neck pain.  He denies any headache blurred vision or fever or chills.  Patient does report intermittent lightheadedness.,  Vomiting diarrhea or dysuria.  Patient does report nausea.  Per EMS patient took 2 sublingual nitro at home, was given 4 additional sublingual nitro in route.  Patient also given 2 doses of Zofran in route.  Patient denies any lower extremity swelling.  Patient states he did not feel his defibrillator go off but states that he feels like his pacemaker is \"kicking in\".    Location: Chest    Quality: Pressure, sharp    Duration: 1 day    Timing: Constant    Severity: Moderate severe    Associated Symptoms: Shortness of breath, nausea    PCP: Jaylen Moss  Cardiology: Juan M  Cardiothoracic: John Richmond      History provided by:  Patient      Review of Systems   Constitutional: Negative for chills and fever.   HENT: Negative for congestion, sore throat and trouble swallowing.    Eyes: Negative for visual disturbance.   Respiratory: Positive for chest tightness and shortness of breath. Negative for cough and wheezing.    Cardiovascular: Positive for chest pain and palpitations. Negative for leg swelling.   Gastrointestinal: Positive for nausea. Negative for abdominal pain, diarrhea and vomiting.   Genitourinary: Negative for difficulty " urinating and dysuria.   Musculoskeletal: Negative for myalgias.   Skin: Negative for rash.   Neurological: Positive for light-headedness. Negative for dizziness, syncope, weakness and headaches.   Psychiatric/Behavioral: Negative for behavioral problems.   All other systems reviewed and are negative.      Past Medical History:   Diagnosis Date   • Asthma    • Atrial fibrillation (CMS/[facility])    • CAD (coronary artery disease)    • CHF (congestive heart failure) (CMS/[facility])    • COPD (chronic obstructive pulmonary disease) (CMS/[facility])    • Depression    • Elevated cholesterol    • Hyperlipidemia    • Hypertension    • Myocardial infarction (CMS/[facility])    • Pacemaker 2019    [employer]    • Sleep apnea    • V tach (CMS/[facility])        Allergies   Allergen Reactions   • Codeine Anaphylaxis          • Tramadol Anaphylaxis and Hives       Past Surgical History:   Procedure Laterality Date   • BACK SURGERY      Sciatica   • CARDIAC ABLATION  11/07/2017   • CARDIAC CATHETERIZATION      6-8 stents, last heart cath 2019   • CARDIAC CATHETERIZATION N/A 6/9/2020    Procedure: Coronary angiography;  Surgeon: Jose Lopez MD;  Location: [facility] CATH INVASIVE LOCATION;  Service: Cardiovascular;  Laterality: N/A;   • CARDIAC ELECTROPHYSIOLOGY PROCEDURE N/A 9/26/2019    Procedure: BIVENTRICULAR DEVICE UPGRADE;  Surgeon: Jose Lopez MD;  Location: [facility] CATH INVASIVE LOCATION;  Service: Cardiovascular   • CARDIAC ELECTROPHYSIOLOGY PROCEDURE N/A 9/26/2019    Procedure: EP/Ablation AV Node ablation;  Surgeon: Jose Lopez MD;  Location: [facility] CATH INVASIVE LOCATION;  Service: Cardiology   • CARDIAC ELECTROPHYSIOLOGY PROCEDURE N/A 6/9/2020    Procedure: EP/Ablation;  Surgeon: Jose Lopez MD;  Location: [facility] CATH INVASIVE LOCATION;  Service: Cardiovascular;  Laterality: N/A;   • CARDIAC ELECTROPHYSIOLOGY PROCEDURE N/A 10/1/2020    Procedure: EP/Ablation;  Surgeon: Jose Lopez MD;  Location: [facility]  CATH INVASIVE LOCATION;  Service: Cardiovascular;  Laterality: N/A;   • CARDIAC PACEMAKER PLACEMENT N/A 6/22/2020    Procedure: LEFT VENTRICULAR LEAD PLACEMENT;  Surgeon: Christiano Richmond MD;  Location: [facility];  Service: Cardiothoracic;  Laterality: N/A;   • CHOLECYSTECTOMY     • COLONOSCOPY     • CORONARY ANGIOPLASTY WITH STENT PLACEMENT      2 stents   • HERNIA REPAIR      gallbladder    • PACEMAKER IMPLANTATION  07/08/2016    Pacemaker/ Defib implant - [employer]   • SINUS SURGERY      sinus surgery x 9   • SKIN BIOPSY      benign       Family History   Problem Relation Age of Onset   • Diabetes Mother    • Heart disease Mother         MI age 46 - CHF   • Atrial fibrillation Mother    • COPD Mother    • Atrial fibrillation Father    • Heart disease Father         CHF       Social History     Socioeconomic History   • Marital status:      Spouse name: Not on file   • Number of children: Not on file   • Years of education: Not on file   • Highest education level: Not on file   Tobacco Use   • Smoking status: Never Smoker   • Smokeless tobacco: Never Used   Vaping Use   • Vaping Use: Never used   Substance and Sexual Activity   • Alcohol use: Never     Comment: socially   • Drug use: No   • Sexual activity: Defer     Partners: Female           Objective   Physical Exam  Vitals and nursing note reviewed.   Constitutional:       General: He is not in acute distress.     Appearance: He is well-developed. He is obese. He is not diaphoretic.   HENT:      Head: Normocephalic and atraumatic.   Eyes:      Extraocular Movements: Extraocular movements intact.      Pupils: Pupils are equal, round, and reactive to light.   Cardiovascular:      Rate and Rhythm: Normal rate and regular rhythm.      Pulses:           Radial pulses are 2+ on the right side and 2+ on the left side.      Heart sounds: Normal heart sounds. No murmur.   Pulmonary:      Effort: Pulmonary effort is normal.      Breath sounds: Examination of the  right-upper field reveals wheezing. Examination of the left-upper field reveals wheezing. Examination of the right-lower field reveals wheezing. Examination of the left-lower field reveals wheezing. Wheezing present. No decreased breath sounds or rhonchi.   Chest:      Chest wall: No mass, tenderness or crepitus.   Abdominal:      General: Bowel sounds are normal.      Palpations: Abdomen is soft.      Tenderness: There is no abdominal tenderness.   Musculoskeletal:         General: Normal range of motion.      Cervical back: Normal range of motion and neck supple.      Right lower leg: No tenderness. No edema.      Left lower leg: No tenderness. No edema.   Lymphadenopathy:      Cervical: No cervical adenopathy.   Skin:     General: Skin is warm.      Capillary Refill: Capillary refill takes less than 2 seconds.      Findings: No erythema.   Neurological:      General: No focal deficit present.      Mental Status: He is alert and oriented to person, place, and time.      Cranial Nerves: No cranial nerve deficit.   Psychiatric:         Mood and Affect: Mood normal.         Behavior: Behavior normal.         ECG 12 Lead      Date/Time: 3/15/2021 4:11 PM  Performed by: Bhavani Tanner PA  Authorized by: Bhavani Tanner PA   Interpreted by physician (Jeremi)  Rhythm: paced  Rate: normal  BPM: 70  QRS axis: normal  ST Segments: ST segments normal  T Waves: T waves normal  Other: no other findings  Clinical impression: abnormal ECG and non-specific ECG                 ED Course  ED Course as of Mar 15 1821   Mon Mar 15, 2021   1730 EKG interpretation: Reviewed by self, interpreted by Dr. Blood, ventricular paced rhythm with a rate of 70 with no acute ST changes.    [MM]   1745 Pacemaker interrogated, results reviewed with Dr. Blood, patient appears to have had 4 episodes of V. fib today, patient also received defibrillator shock at 7:12 AM this morning after 43 seconds of V. fib    [MM]   1805 Spoke with Dr. Enrique who  "agreed to accept patient for admission    [MM]      ED Course User Index  [MM] Bhavani Tanner, PA    /66   Pulse 77   Temp 98.4 °F (36.9 °C) (Oral)   Resp 20   Ht 177.8 cm (70\")   Wt 132 kg (290 lb)   SpO2 90%   BMI 41.61 kg/m²   Labs Reviewed   COMPREHENSIVE METABOLIC PANEL - Abnormal; Notable for the following components:       Result Value    Glucose 115 (*)     Chloride 96 (*)     All other components within normal limits    Narrative:     GFR Normal >60  Chronic Kidney Disease <60  Kidney Failure <15     PROTIME-INR - Abnormal; Notable for the following components:    Protime 17.2 (*)     INR 1.60 (*)     All other components within normal limits   BNP (IN-HOUSE) - Abnormal; Notable for the following components:    proBNP 1,283.0 (*)     All other components within normal limits    Narrative:     Among patients with dyspnea, NT-proBNP is highly sensitive for the detection of acute congestive heart failure. In addition NT-proBNP of <300 pg/ml effectively rules out acute congestive heart failure with 99% negative predictive value.    Results may be falsely decreased if patient taking Biotin.     CBC WITH AUTO DIFFERENTIAL - Abnormal; Notable for the following components:    WBC 13.20 (*)     RDW 17.2 (*)     Lymphocyte % 12.4 (*)     Neutrophils, Absolute 9.90 (*)     Monocytes, Absolute 1.30 (*)     All other components within normal limits   TROPONIN (IN-HOUSE) - Normal    Narrative:     Troponin T Reference Range:  <= 0.03 ng/mL-   Negative for AMI  >0.03 ng/mL-     Abnormal for myocardial necrosis.  Clinicians would have to utilize clinical acumen, EKG, Troponin and serial changes to determine if it is an Acute Myocardial Infarction or myocardial injury due to an underlying chronic condition.       Results may be falsely decreased if patient taking Biotin.     D-DIMER, QUANTITATIVE - Normal    Narrative:     Reference Range  --------------------------------------------------------------------   "   < 0.50   Negative Predictive Value  0.50-0.59   Indeterminate    >= 0.60   Probable VTE             A very low percentage of patients with DVT may yield D-Dimer results   below the cut-off of 0.50 mg/L FEU.  This is known to be more   prevalent in patients with distal DVT.             Results of this test should always be interpreted in conjunction with   the patient's medical history, clinical presentation and other   findings.  Clinical diagnosis should not be based on the result of   [place]OV[place] D-Dimer alone.   RAINBOW DRAW    Narrative:     The following orders were created for panel order [place] Draw.  Procedure                               Abnormality         Status                     ---------                               -----------         ------                     Light Blue Top[[id number]]                                   Final result               Green Top (Gel)[[id number]]                                  Final result               Lavender Top[[id number]]                                     Final result               Gold Top - SST[[id number]]                                   Final result                 Please view results for these tests on the individual orders.   TROPONIN (IN-HOUSE)   LIGHT BLUE TOP   GREEN TOP   LAVENDER TOP   GOLD TOP - SST   EXTRA TUBES    Narrative:     The following orders were created for panel order Extra Tubes.  Procedure                               Abnormality         Status                     ---------                               -----------         ------                     Red Top[[id number]]                                          Final result               Green Top (Gel)[[id number]]                                  Final result                 Please view results for these tests on the individual orders.   RED TOP   GREEN TOP   CBC AND DIFFERENTIAL    Narrative:     The following orders were created for panel order CBC & Differential.  Procedure                                Abnormality         Status                     ---------                               -----------         ------                     CBC Auto Differential[[id number]]        Abnormal            Final result                 Please view results for these tests on the individual orders.     Medications   sodium chloride 0.9 % flush 10 mL (has no administration in time range)   sodium chloride 0.9 % flush 10 mL (has no administration in time range)   nitroglycerin (TRIDIL) 200 mcg/ml infusion (15 mcg/min Intravenous Rate/Dose Change 3/15/21 1704)   metoclopramide (REGLAN) injection 10 mg (10 mg Intravenous Given 3/15/21 1635)   albuterol sulfate HFA (PROVENTIL HFA;VENTOLIN HFA;PROAIR HFA) inhaler 2 puff (2 puffs Inhalation Given 3/15/21 1719)   Morphine sulfate (PF) injection 4 mg (4 mg Intravenous Given 3/15/21 1713)     XR Chest 1 View    Result Date: 3/15/2021  No acute chest finding.  Electronically Signed By-Sheba Thorne MD On:3/15/2021 4:37 PM This report was finalized on [id number] by  Sheba Thorne MD.                                           MDM  Number of Diagnoses or Management Options  Chest pain, unspecified type  Nausea  Palpitations  Diagnosis management comments: MEDICAL DECISION  Epic Chart Review: Patient was last hospitalized on 11/11/2020  Comorbidities: A. fib, CAD, hypertension, COPD, CHF  Differentials: Dysrhythmia, electrolyte abnormality, acute coronary syndrome, MI, dissection, PE; this list is not all inclusive and does not constitute the entirety of considered causes  Radiology interpretation:  Images reviewed by me and interpreted by radiologist,   Chest x-ray shows no acute chest findings.  Lab interpretation:  Labs viewed by me significant for, CMP glucose 115, chloride 96.  Troponin normal 0.01.  PT/INR 17.2/1.6.  BNP slightly elevated 1283.0.  CBC mild leukocytosis 13.2, otherwise unremarkable.  D-dimer normal 0.31.  EKG interpretation: Reviewed myself,  interpreted by Dr. Blood ventricular paced rhythm, rate of 70 with no acute ST changes    While in the ED IV was placed and labs were obtained appropriate PPE was worn during exam and throughout all encounters with the patient.  Patient had the above evaluation.  Physical exam is unremarkable.  Per EMS patient had received 5 sublingual nitro prior to ER arrival without resolution of chest pain.  IV established, lab work obtained, patient started on Tridil infusion for chest pain.  Patient also given Reglan for nausea.  Patient also given 4 mg morphine.  Patient placed on heart monitor and vital signs were monitored.  Lab work essentially normal, normal troponin, normal D-dimer, BNP mildly elevated.  Patient reports mild relief of pain with Tridil and morphine, rates his pain 7/10.  Pacemaker interrogated, this was reviewed with Dr. Blood, patient appears to have 4 episodes of V. fib today, appeared to have defibrillator shock around 7 AM this morning at 41 J.  Lab work, imaging and pacemaker results were discussed with patient at bedside and he is agreeable to admission.  Patient was admitted for observation, cardiology consult.  Consult placed to hospitalist, spoke with Dr. Enrique who agreed accept patient for admission.  Patient stable on admission.       Amount and/or Complexity of Data Reviewed  Clinical lab tests: ordered and reviewed  Tests in the radiology section of CPT®: ordered and reviewed    Patient Progress  Patient progress: stable      Final diagnoses:   Chest pain, unspecified type   Palpitations   Nausea            Bhavani Tanner PA  03/15/21 5822

## 2021-03-15 NOTE — ED NOTES
Patient came to the ED for an evaluation for chest pain. Patient states his chest pain is in the middle to the right of the chest and it radiates to his neck and back.  Patient states he took 3 of nitro at home and EMS had given patient 2 nitro, 4mg of zofran and 325mg of baby aspirin.  Patients cardiologist is Dr. Potter.  Patient states his pain is 9/10.      Aracelis Sierra, RN  03/15/21 0634

## 2021-03-15 NOTE — H&P
[facility] Medicine Services      Patient Name: Jose Dixon Jr.  : 1960  MRN: [id number]  Primary Care Physician: Jaylen Moss MD  Date of admission: 3/15/2021    Patient Care Team:  Jaylen Moss MD as PCP - Wicho Jonse MD as Consulting Physician (Nephrology)          Subjective   History Present Illness     Chief Complaint:   Chief Complaint   Patient presents with   • Chest Pain     Chief complaint: chest pain    History of Present Illness:    Patient is a 60-year-old male that presents to [facility] ED with severe sharp midsternal chest pain which is progressively increased since 7 AM this morning.  Patient reports pain is radiating to his left shoulder and around to his back and is a 10 out of 10.  Patient reports waking up this morning and feeling something was odd but he fell back asleep.  Patient reports waking 1 to 2 hours later with moderately dull midsternal chest pain.  Patient reports as he moved around the pain decreased but never went away.  Patient reports pain increased as he was getting dressed for a relative's , increasing in intensity, beginning to radiate along with shortness of breath and nausea.  Patient does state he believes he lost consciousness at some point after EMS was activated because he has no memory between his house in ER.  Patient reports taking several sublingual nitros at home without relief.        History of Present Illness    Review of Systems   Constitutional: Negative for chills, decreased appetite and fever.   HENT: Negative.    Eyes: Negative.    Cardiovascular: Positive for chest pain, dyspnea on exertion, irregular heartbeat, orthopnea, palpitations and syncope.   Respiratory: Positive for shortness of breath and wheezing. Negative for cough.    Endocrine: Negative.    Hematologic/Lymphatic: Negative.    Skin: Negative.    Musculoskeletal: Positive for back pain, joint pain, joint swelling and  muscle weakness.   Gastrointestinal: Negative.  Negative for abdominal pain and diarrhea.   Genitourinary: Negative.  Negative for flank pain.   Neurological: Positive for weakness.   Psychiatric/Behavioral: Negative.    Allergic/Immunologic: Negative.    All other systems reviewed and are negative.          Personal History     Past Medical History:   Past Medical History:   Diagnosis Date   • Asthma    • Atrial fibrillation (CMS/[facility])    • CAD (coronary artery disease)    • CHF (congestive heart failure) (CMS/[facility])    • COPD (chronic obstructive pulmonary disease) (CMS/[facility])    • Depression    • Elevated cholesterol    • Hyperlipidemia    • Hypertension    • Myocardial infarction (CMS/[facility])    • Pacemaker 2019    [employer]    • Sleep apnea    • V tach (CMS/[facility])        Surgical History:      Past Surgical History:   Procedure Laterality Date   • BACK SURGERY      Sciatica   • CARDIAC ABLATION  11/07/2017   • CARDIAC CATHETERIZATION      6-8 stents, last heart cath 2019   • CARDIAC CATHETERIZATION N/A 6/9/2020    Procedure: Coronary angiography;  Surgeon: Jose Lopez MD;  Location: [facility] CATH INVASIVE LOCATION;  Service: Cardiovascular;  Laterality: N/A;   • CARDIAC ELECTROPHYSIOLOGY PROCEDURE N/A 9/26/2019    Procedure: BIVENTRICULAR DEVICE UPGRADE;  Surgeon: Jose Lopez MD;  Location: [facility] CATH INVASIVE LOCATION;  Service: Cardiovascular   • CARDIAC ELECTROPHYSIOLOGY PROCEDURE N/A 9/26/2019    Procedure: EP/Ablation AV Node ablation;  Surgeon: Jose Lopez MD;  Location: [facility] CATH INVASIVE LOCATION;  Service: Cardiology   • CARDIAC ELECTROPHYSIOLOGY PROCEDURE N/A 6/9/2020    Procedure: EP/Ablation;  Surgeon: Jose Lopez MD;  Location: [facility] CATH INVASIVE LOCATION;  Service: Cardiovascular;  Laterality: N/A;   • CARDIAC ELECTROPHYSIOLOGY PROCEDURE N/A 10/1/2020    Procedure: EP/Ablation;  Surgeon: Jose Lopez MD;  Location: [facility] CATH INVASIVE LOCATION;  Service:  Cardiovascular;  Laterality: N/A;   • CARDIAC PACEMAKER PLACEMENT N/A 6/22/2020    Procedure: LEFT VENTRICULAR LEAD PLACEMENT;  Surgeon: Christiano Richmond MD;  Location: [facility];  Service: Cardiothoracic;  Laterality: N/A;   • CHOLECYSTECTOMY     • COLONOSCOPY     • CORONARY ANGIOPLASTY WITH STENT PLACEMENT      2 stents   • HERNIA REPAIR      gallbladder    • PACEMAKER IMPLANTATION  07/08/2016    Pacemaker/ Defib implant - [place]   • SINUS SURGERY      sinus surgery x 9   • SKIN BIOPSY      benign           Family History: family history includes Atrial fibrillation in his father and mother; COPD in his mother; Diabetes in his mother; Heart disease in his father and mother. Otherwise pertinent FHx was reviewed and unremarkable.     Social History:  reports that he has never smoked. He has never used smokeless tobacco. He reports that he does not drink alcohol and does not use drugs.      Medications:  Prior to Admission medications    Medication Sig Start Date End Date Taking? Authorizing Provider   albuterol sulfate  (90 Base) MCG/ACT inhaler Inhale 2 puffs Every 4 (Four) Hours As Needed for Wheezing.   Yes ProviderKian MD   Budeson-Glycopyrrol-Formoterol (BREZTRI) 160-9-4.8 MCG/ACT aerosol inhaler Inhale 2 puffs 2 (Two) Times a Day.   Yes ProviderKian MD   budesonide-formoterol (SYMBICORT) 160-4.5 MCG/ACT inhaler Inhale 2 puffs 2 (Two) Times a Day.   Yes Provider, MD Kian   ipratropium-albuterol (DUO-NEB) 0.5-2.5 mg/3 ml nebulizer Take 3 mL by nebulization Every 4 (Four) Hours As Needed for Wheezing.   Yes ProviderKian MD   tadalafil (CIALIS) 5 MG tablet Take 5 mg by mouth Daily As Needed for Erectile Dysfunction.   Yes Provider, MD Kian   apixaban (ELIQUIS) 5 MG tablet tablet Take 1 tablet by mouth 2 (Two) Times a Day. 10/28/19   Juli Boudreaux APRN   atorvastatin (LIPITOR) 80 MG tablet Take 1 tablet by mouth Daily. 7/31/20   Jose Lopez MD      bumetanide (BUMEX) 1 MG tablet Take 1 mg by mouth 2 (Two) Times a Day.    Kian Kraus MD   cetirizine (zyrTEC) 10 MG tablet Take 1 tablet by mouth Daily. 9/17/20   Jose Lopez MD   clopidogrel (PLAVIX) 75 MG tablet Take 1 tablet by mouth Daily. 9/17/20   Jose Lopez MD   divalproex (DEPAKOTE) 250 MG DR tablet Take 1 tablet by mouth 2 (two) times a day. 11/25/20   Rafael Mcneal DO   DULoxetine (CYMBALTA) 60 MG capsule Take 60 mg by mouth Daily.    Kian Kraus MD   fluticasone (FLONASE) 50 MCG/ACT nasal spray 2 sprays into the nostril(s) as directed by provider Daily. 10/28/19   Juli Boudreaux APRN   gabapentin (NEURONTIN) 300 MG capsule Take 300 mg by mouth 2 (two) times a day.    Kian Kraus MD   hydrOXYzine (ATARAX) 50 MG tablet Take 1 tablet by mouth At Night As Needed for Anxiety. 8/26/20   Juanis Reyes MD   metOLazone (ZAROXOLYN) 2.5 MG tablet Take 1 tablet by mouth Daily As Needed (edema). 2/26/21   Jose Lopez MD   metoprolol succinate XL (TOPROL-XL) 50 MG 24 hr tablet Take 1 tablet by mouth Daily. 11/26/20   Rafael Mcneal DO   midodrine (PROAMATINE) 5 MG tablet Take 1 tablet by mouth Every 8 (Eight) Hours. 11/25/20   Rafael Mcneal DO   montelukast (SINGULAIR) 10 MG tablet Take 10 mg by mouth Daily.    Kian Kraus MD   nitroglycerin (NITROSTAT) 0.4 MG SL tablet Place 0.4 mg under the tongue Every 5 (Five) Minutes As Needed for Chest Pain. Take no more than 3 doses in 15 minutes.    Kian Kraus MD   ranolazine (RANEXA) 1000 MG 12 hr tablet Take 1 tablet by mouth 2 (Two) Times a Day. 7/31/20   Jose Lopez MD   roflumilast (DALIRESP) 500 MCG tablet tablet Take 500 mcg by mouth Daily.    Provider, MD Kian   spironolactone (ALDACTONE) 25 MG tablet Take 1 tablet by mouth Daily. 11/26/20   Rafael Mcneal DO   tamsulosin (FLOMAX) 0.4 MG capsule 24 hr capsule Take 1 capsule by mouth 2 (two) times a day.     Provider, MD Kian   mexiletine (MEXITIL) 250 MG capsule Take 1 capsule by mouth 3 (Three) Times a Day. 10/15/20 3/15/21  Jose Lopez MD       Allergies:    Allergies   Allergen Reactions   • Codeine Anaphylaxis          • Tramadol Anaphylaxis and Hives       Objective   Objective     Vital Signs  Temp:  [97.7 °F (36.5 °C)-98.4 °F (36.9 °C)] 97.7 °F (36.5 °C)  Heart Rate:  [70-94] 70  Resp:  [18-20] 18  BP: (106-151)/() 136/87  SpO2:  [90 %-97 %] 97 %  on   ;   Device (Oxygen Therapy): room air  Body mass index is 41.94 kg/m².    Physical Exam  Constitutional:       Appearance: He is obese.   HENT:      Head: Normocephalic and atraumatic.      Nose: Nose normal.      Mouth/Throat:      Mouth: Mucous membranes are moist.   Eyes:      Extraocular Movements: Extraocular movements intact.      Pupils: Pupils are equal, round, and reactive to light.   Cardiovascular:      Rate and Rhythm: Normal rate and regular rhythm.      Pulses: Normal pulses.      Heart sounds: Normal heart sounds.   Pulmonary:      Breath sounds: Wheezing and rales present.   Abdominal:      General: Bowel sounds are normal. There is distension.   Musculoskeletal:      Cervical back: Normal range of motion and neck supple.      Right lower leg: Edema present.      Left lower leg: Edema present.   Skin:     General: Skin is dry.      Coloration: Skin is pale.   Neurological:      General: No focal deficit present.      Mental Status: He is alert and oriented to person, place, and time. Mental status is at baseline.   Psychiatric:         Mood and Affect: Mood normal.         Behavior: Behavior normal.         Thought Content: Thought content normal.         Judgment: Judgment normal.           Results Review:  I have personally reviewed most recent cardiac tracings and radiographic results agree with findings.    Results from last 7 days   Lab Units 03/15/21  1601   WBC 10*3/mm3 13.20*   HEMOGLOBIN g/dL 15.1   HEMATOCRIT % 45.6      PLATELETS 10*3/mm3 262   INR  1.60*     Results from last 7 days   Lab Units 03/15/21  1839 03/15/21  1601   SODIUM mmol/L  --  137   POTASSIUM mmol/L  --  4.0   CHLORIDE mmol/L  --  96*   CO2 mmol/L  --  29.0   BUN mg/dL  --  19   CREATININE mg/dL  --  1.03   GLUCOSE mg/dL  --  115*   CALCIUM mg/dL  --  9.1   ALT (SGPT) U/L  --  13   AST (SGOT) U/L  --  25   TROPONIN T ng/mL <0.010 0.010   PROBNP pg/mL  --  1,283.0*     Estimated Creatinine Clearance: 107.3 mL/min (by C-G formula based on SCr of 1.03 mg/dL).  Brief Urine Lab Results  (Last result in the past 365 days)      Color   Clarity   Blood   Leuk Est   Nitrite   Protein   CREAT   Urine HCG        11/20/20 1711 Yellow Clear Trace Negative Negative Negative               Microbiology Results (last 10 days)     ** No results found for the last 240 hours. **          ECG/EMG Results (most recent)     Procedure Component Value Units Date/Time    ECG 12 Lead [[id number]] Collected: 03/15/21 1546     Updated: 03/15/21 1553     QT Interval 444 ms     Narrative:      HEART RATE= 70  bpm  RR Interval= 856  ms  ME Interval= 34  ms  P Horizontal Axis=   deg  P Front Axis= 0  deg  QRSD Interval= 168  ms  QT Interval= 444  ms  QRS Axis= 131  deg  T Wave Axis= -63  deg  - ABNORMAL ECG -  Ventricular-paced rhythm  Biventricular paced rhythm  When compared with ECG of 12-Nov-2020 10:50:13,  No significant change  Electronically Signed By:   Date and Time of Study: 2021-03-15 15:46:44    ECG 12 Lead [[id number]]  (Abnormal) Resulted: 03/15/21 1829     Updated: 03/15/21 1829    ECG 12 Lead [[id number]] Collected: 03/15/21 1947     Updated: 03/15/21 1949     QT Interval 593 ms     Narrative:      HEART RATE= 89  bpm  RR Interval= 673  ms  ME Interval= 56  ms  P Horizontal Axis=   deg  P Front Axis= 0  deg  QRSD Interval= 186  ms  QT Interval= 593  ms  QRS Axis= 135  deg  T Wave Axis= 19  deg  - ABNORMAL ECG -  Ventricular-paced complexes  Biventricular paced rhythm  When  compared with ECG of 15-Mar-2021 15:46:44,  No significant change  Electronically Signed By:   Date and Time of Study: 2021-03-15 19:47:04                  Results for orders placed during the hospital encounter of 06/21/20    Adult Transthoracic Echo Limited W/ Cont if Necessary Per Protocol    Interpretation Summary  · Left ventricular wall thickness is consistent with moderate concentric hypertrophy.  · Estimated EF = 30%.  · The pericardium is normal. There is no evidence of pericardial effusion.      XR Chest 1 View    Result Date: 3/15/2021  No acute chest finding.  Electronically Signed By-Sheba Thorne MD On:3/15/2021 4:37 PM This report was finalized on [id number] by  Sheba Thorne MD.        Estimated Creatinine Clearance: 107.3 mL/min (by C-G formula based on SCr of 1.03 mg/dL).    Assessment/Plan   Assessment/Plan       Active Hospital Problems    Diagnosis  POA   • **Chest pain [R07.9]  Yes     Priority: High   • CHF (congestive heart failure) (CMS/HCC) [I50.9]  Yes     Priority: High   • Atrial fibrillation, chronic (CMS/[facility]) [I48.20]  Yes     Priority: High   • AICD discharge [Z45.02]  Not Applicable     Priority: High   • Ischemic cardiomyopathy [I25.5]  Yes     Priority: High   • BPH without obstruction/lower urinary tract symptoms [N40.0]  Yes   • Peripheral neuropathy [G62.9]  Yes   • Essential hypertension [I10]  Yes   • Coronary artery disease involving native heart with angina pectoris (CMS/[facility]) [I25.119]  Yes   • Sleep apnea [G47.30]  Yes   • Dyslipidemia [E78.5]  Yes   • Depression [F32.9]  Yes   • Morbid obesity (CMS/HCC) [E66.01]  Yes   • Anxiety [F41.9]  Yes   • ICD (implantable cardioverter-defibrillator) in place [Z95.810]  Yes   • COPD with acute exacerbation (CMS/[facility]) [J44.1]  Yes      Resolved Hospital Problems   No resolved problems to display.     Chest pain:  -Initial troponin negative  -Chest x-ray results show heart size is mildly enlarged but stable  -EKG shows ventricular  paced rhythm with a rate of 70  -In the ED patient received 4 mg of morphine, Reglan and nitroglycerin drip was started, 4 sublingual nitros were given in route  -Cardiac diet, n.p.o. after midnight  -Echocardiogram ordered  -Stress test ordered  -Consider cardiology consult  -Trend EKGs  -Trend troponins  -Continuous cardiac monitoring  -Continue nitroglycerin drip for chest pain    AICD discharge:  -Pacemaker interrogated in the ED which showed 4 episodes of ventricular fibrillation but only 1   defibrillator shock at 7 AM at 41 J.  -Consider cardiology consult to evaluate AICD lead placement  -Continuous cardiac monitoring    CHF/ICM:  -ProBNP 1283, 40 mg of IV Lasix ordered x1  -Continue home Bumex 1 mg twice daily  -Continue home spironolactone 25 mg daily  -Continue home midodrine 5 mg every 8 hours  -Monitor daily weights and I and O´s  -2g Na+ diet     Hypertension:  -Encourage lifestyle modifications  -Low-sodium diet  -Metoprolol held for stress test in a.m.  -Vital signs every 4    Dyslipidemia:  -Encourage lifestyle modifications  -Encourage dietary modifications  -Continue home atorvastatin 80 mg daily    Neuropathy:  -Continue home Neurontin 300 mg p.o. twice daily  -Continue home Cymbalta 60 mg daily p.o.    COPD:  -Albuterol nebs as needed for shortness of air  -Continue home Symbicort twice daily 2 puffs  -O2 as needed as needed to keep sats greater than 92    A. Fib/CAD:  -Continue Plavix 75 mg p.o. daily  -Lovenox 40 mg daily ordered  -Metoprolol  held for stress test in a.m.  -Continue home daliresp 1000 mg twice daily    Sleep apnea:  -Patient wears home trilogy  -BiPAP ordered per home settings    BPH:   -Continue home Flomax    Depression/anxiety  -Continue home Cymbalta 60 mg p.o. daily  -Continue home hydroxyzine 50 mg nightly  -Hold Depakote, patient unsure why this medication is ordered          VTE Prophylaxis -   Mechanical Order History:     None      Pharmalogical Order History:      None          CODE STATUS:    Code Status and Medical Interventions:   Ordered at: 03/15/21 1941     Level Of Support Discussed With:    Patient     Code Status:    CPR     Medical Interventions (Level of Support Prior to Arrest):    Full       This patient has been interviewed wearing appropriate personal protective equipment.    I discussed the patient's findings and my recommendations with the patient.      Signature:Electronically signed by ABHISHEK Villanueva, 03/15/21, 11:29 PM EDT.      [facility] Hospitalist Team

## 2021-03-16 NOTE — PROGRESS NOTES
Discharge Planning Assessment   [place]     Patient Name: Jose Dixon Jr.  MRN: [id number]  Today's Date: 3/16/2021    Admit Date: 3/15/2021    Discharge Needs Assessment     Row Name 03/16/21 1244       Living Environment    Lives With  spouse    Current Living Arrangements  home/apartment/condo    Primary Care Provided by  self    Able to Return to Prior Arrangements  yes       Resource/Environmental Concerns    Resource/Environmental Concerns  none    Transportation Concerns  car, none       Transition Planning    Patient/Family Anticipates Transition to  home with family    Patient/Family Anticipated Services at Transition  none    Transportation Anticipated  car, drives self;family or friend will provide Spouse to transport to home at DC       Discharge Needs Assessment    Equipment Currently Used at Home  cane, straight;walker, standard;oxygen Has a Trilogy at home -  Uses oxygen at 2 L from [employer] at     Concerns to be Addressed  no discharge needs identified    Anticipated Changes Related to Illness  none    Equipment Needed After Discharge  none        Discharge Plan     Row Name 03/16/21 1248       Plan    Plan  Routine d/c to home      Plan Comments  Spoke to patient in room with mask and goggles maintaining 6 ft for less than 15 min. Patient is I with ADL's and drives. PCP is Isa and Pharmacy is [facility] in [place]. NE Barriers - Pending Stess Test and Echo today .        Continued Care and Services - Admitted Since 3/15/2021        Demographic Summary     Row Name 03/16/21 1244       General Information    Admission Type  observation    Arrived From  emergency department    Required Notices Provided  Observation Status Notice    Referral Source  admission list    Reason for Consult  discharge planning    Preferred Language  English        Functional Status     Row Name 03/16/21 1244       Functional Status, IADL    Medications  independent    Meal Preparation  independent    Housekeeping   independent    Laundry  independent    Shopping  independent       Mental Status    General Appearance WDL  WDL       Mental Status Summary    Recent Changes in Mental Status/Cognitive Functioning  no changes            Patient Forms     Row Name 03/16/21 1243       Patient Forms    Patient Observation Letter  Delivered Moon 3/15/21 per Registration        Margarita Zacarias RN, CM  Office Phone [phone]  Cell [phone]

## 2021-03-16 NOTE — CONSULTS
Referring Provider: NOLAN Malone  Reason for Consultation: Chest pain/ ICD discharge    Chief complaint ICD discharge and chest discomfort    Cardiology assessment and plan    Recurrent ventricular tachycardia  ICD shock  Severe cardiomyopathy  Severe LV dysfunction  Known obstructive coronary artery disease with prior PCI and stenting  Last cardiac catheterization in June of last year showing moderate to severe obstructive coronary disease and then marginal branch of the circumflex  Congestive heart failure  chronic systolic heart failure  Congestive heart failure NYHA class Ill  Congestive heart failure secondary to ischemic cardiomyopathy'  History of VT ablation for recurrent VT  Known coronary artery disease with ischemic cardiomyopathy  Chronic anginal chest pain  History of atrial fibrillation  Ischemic cardiomyopathy with LV ejection fraction of 30%  Hypertension  Hyperlipidemia  History of multiple coronary interventions  Prior history of VT ablation multiple times  Known residual coronary artery disease  History of surgical LV lead placement  Biventricular ICD in situ  History of AV node ablation  Chronic renal insufficiency   prior history of VT storm with multiple ICD shocks and underwent a VT ablation affecting the posterior scar and started have recurrent VT     No evidence of any acute coronary syndrome  Cardiogram this hospital admission with LV ejection fraction of 35%  No significant reversible ischemia on the stress testing  Device interrogation shows multiple episodes of ventricular tachycardia/ventricular fibrillation and appropriate shock for episode of ventricular fibrillation  Normal device function  Twelve-lead EKG shows biventricular paced rhythm  Patient is currently on antiplatelet therapy with Plavix  Patient is on anticoagulation therapy with Eliquis which is being held currently  Patient was on spironolactone 25 mg p.o. once a day  Patient is supposed to be on beta-blocker  at home mild did not see it in the hospital  Resume beta-blocker therapy  Discussed with the EP about antiarrhythmic medical therapy and further treatment options                Interpretation Summary    · The left ventricular cavity is moderate to severely dilated.  · Left ventricular wall thickness is consistent with mild concentric hypertrophy.  · The following left ventricular wall segments are hypokinetic: basal anterolateral, mid anterolateral, apical lateral, basal inferolateral and mid inferolateral.  · Estimated left ventricular EF = 35% Estimated left ventricular EF was in agreement with the calculated left ventricular EF. Left ventricular systolic function is severely decreased.  · The right ventricular cavity is mildly dilated.  · Mildly reduced right ventricular systolic function noted.  · Left ventricular diastolic function is consistent with (grade II w/high LAP) pseudonormalization.  · Estimated right ventricular systolic pressure from tricuspid regurgitation is normal (<35 mmHg).                  Interpretation Summary       · Abnormal myocardial perfusion study  · Resting myocardial perfusion study shows a large area of severe intensity perfusion defect involving the anterior wall anteroapical wall basal inferolateral wall and also extending into the inferior wall  · At peak stress after the Lexiscan infusion myocardial perfusion is better than the resting myocardial perfusion study still has some severe intensity perfusion defect involving the basal inferolateral wall and inferoapical wall with no evidence of any significant reversible ischemia  · Gated imaging shows significant hypokinesis and akinesis involving the inferoapical wall  · Quantified LV ejection fraction of 50%  · Severe dilated LV with LV end-diastolic volume of 290 mL  · Clinical correlation is recommended           History of present illness:  Jose SALVADOR Dixon Jr. is a 60 y.o. male who presents with history of ischemic  cardiomyopathy history of percutaneous coronary intervention multiple times in the past history of ICD history of atrial fibrillation history of ventricular tachycardia history of ablation for ventricular tachycardia in the past with a residual significant cardiomyopathy and scar burden presented with ICD shock    Patient woke up with the palpitations had ICD shock and then started having chest discomfort  Clinically feels better now  Denies any dizziness or syncope  No further episodes of chest discomfort or tachycardia  Patient is well-known to service with prior history of known coronary disease prior cardiomyopathy multiple hospitalizations for congestive heart failure and ICD shocks                  Review of Systems  Review of Systems   Constitutional: Negative for chills, decreased appetite and malaise/fatigue.   HENT: Negative for congestion and nosebleeds.    Eyes: Negative for blurred vision and double vision.   Cardiovascular: Positive for chest pain, dyspnea on exertion, irregular heartbeat, leg swelling, near-syncope, orthopnea, palpitations and paroxysmal nocturnal dyspnea. Negative for syncope.   Respiratory: Positive for shortness of breath. Negative for cough.    Hematologic/Lymphatic: Negative for adenopathy. Does not bruise/bleed easily.   Skin: Negative for rash.   Musculoskeletal: Negative for back pain and joint pain.   Gastrointestinal: Negative for bloating, abdominal pain, hematemesis and hematochezia.   Genitourinary: Negative for flank pain and hematuria.   Neurological: Negative for dizziness, focal weakness, paresthesias and seizures.   Psychiatric/Behavioral: Negative for altered mental status and memory loss.       Past Medical History  Past Medical History:   Diagnosis Date   • Asthma    • Atrial fibrillation (CMS/[facility])    • CAD (coronary artery disease)    • CHF (congestive heart failure) (CMS/[facility])    • COPD (chronic obstructive pulmonary disease) (CMS/[facility])    • Depression    •  Elevated cholesterol    • Hyperlipidemia    • Hypertension    • Myocardial infarction (CMS/[facility])    • Pacemaker 2019    [employer]    • Sleep apnea    • V tach (CMS/HCC)     and   Past Surgical History:   Procedure Laterality Date   • BACK SURGERY      Sciatica   • CARDIAC ABLATION  11/07/2017   • CARDIAC CATHETERIZATION      6-8 stents, last heart cath 2019   • CARDIAC CATHETERIZATION N/A 6/9/2020    Procedure: Coronary angiography;  Surgeon: Jose Lopez MD;  Location: [facility] CATH INVASIVE LOCATION;  Service: Cardiovascular;  Laterality: N/A;   • CARDIAC ELECTROPHYSIOLOGY PROCEDURE N/A 9/26/2019    Procedure: BIVENTRICULAR DEVICE UPGRADE;  Surgeon: Jose Lopez MD;  Location:  [facility] CATH INVASIVE LOCATION;  Service: Cardiovascular   • CARDIAC ELECTROPHYSIOLOGY PROCEDURE N/A 9/26/2019    Procedure: EP/Ablation AV Node ablation;  Surgeon: Jose Lopez MD;  Location:  [facility] CATH INVASIVE LOCATION;  Service: Cardiology   • CARDIAC ELECTROPHYSIOLOGY PROCEDURE N/A 6/9/2020    Procedure: EP/Ablation;  Surgeon: Jose Lopez MD;  Location: [facility] CATH INVASIVE LOCATION;  Service: Cardiovascular;  Laterality: N/A;   • CARDIAC ELECTROPHYSIOLOGY PROCEDURE N/A 10/1/2020    Procedure: EP/Ablation;  Surgeon: Jose Lopez MD;  Location: [facility] CATH INVASIVE LOCATION;  Service: Cardiovascular;  Laterality: N/A;   • CARDIAC PACEMAKER PLACEMENT N/A 6/22/2020    Procedure: LEFT VENTRICULAR LEAD PLACEMENT;  Surgeon: Christiano Richmond MD;  Location: [facility] CVOR;  Service: Cardiothoracic;  Laterality: N/A;   • CHOLECYSTECTOMY     • COLONOSCOPY     • CORONARY ANGIOPLASTY WITH STENT PLACEMENT      2 stents   • HERNIA REPAIR      gallbladder    • PACEMAKER IMPLANTATION  07/08/2016    Pacemaker/ Defib implant - [place]   • SINUS SURGERY      sinus surgery x 9   • SKIN BIOPSY      benign       Family History  Family History   Problem Relation Age of Onset   • Diabetes Mother    • Heart disease Mother           MI age 46 - CHF   • Atrial fibrillation Mother    • COPD Mother    • Atrial fibrillation Father    • Heart disease Father         CHF       Social History  Social History     Socioeconomic History   • Marital status:      Spouse name: Not on file   • Number of children: Not on file   • Years of education: Not on file   • Highest education level: Not on file   Tobacco Use   • Smoking status: Never Smoker   • Smokeless tobacco: Never Used   Vaping Use   • Vaping Use: Never used   Substance and Sexual Activity   • Alcohol use: Never     Comment: socially   • Drug use: No   • Sexual activity: Defer     Partners: Female       Objective     Physical Exam:  Constitutional:       Appearance: Well-developed.   Eyes:      Conjunctiva/sclera: Conjunctivae normal.      Pupils: Pupils are equal, round, and reactive to light.   HENT:      Head: Normocephalic and atraumatic.   Neck:      Thyroid: No thyromegaly.   Pulmonary:      Effort: Pulmonary effort is normal.      Breath sounds: Examination of the right-lower field reveals decreased breath sounds. Examination of the left-lower field reveals decreased breath sounds. Decreased breath sounds present.   Cardiovascular:      Abnormal PMI. Normal rate. Irregular rhythm.      Murmurs: There is a grade 2/6 midsystolic murmur.   Pulses:     Intact distal pulses.   Edema:     Thigh: bilateral 1+ pitting edema of the thigh.     Pretibial: bilateral 1+ pitting edema of the pretibial area.     Ankle: bilateral 1+ pitting edema of the ankle.     Feet: bilateral 1+ pitting edema of the feet.  Abdominal:      General: Bowel sounds are normal.      Palpations: Abdomen is soft.   Musculoskeletal: Normal range of motion.      Cervical back: Normal range of motion and neck supple. Skin:     General: Skin is warm.   Neurological:      Mental Status: Alert and oriented to person, place, and time.         Vital Signs  Vitals:    03/16/21 0628 03/16/21 0630 03/16/21 1050 03/16/21 1107  "  BP:   114/77 118/76   BP Location:    Right arm   Patient Position:    Sitting   Pulse: 70 71  69   Resp: 18 18  18   Temp:    98.1 °F (36.7 °C)   TempSrc:    Oral   SpO2: 96%   96%   Weight:   133 kg (294 lb)    Height:   177.8 cm (70\")        Weight  Flowsheet Rows      First Filed Value   Admission Height  177.8 cm (70\") Documented at 03/15/2021 1552   Admission Weight  132 kg (290 lb) Documented at 03/15/2021 1552              Results Review:  Lab Results (last 24 hours)     Procedure Component Value Units Date/Time    Basic Metabolic Panel [[id number]]  (Abnormal) Collected: 03/16/21 0521    Specimen: Blood Updated: 03/16/21 0602     Glucose 109 mg/dL      BUN 22 mg/dL      Creatinine 1.29 mg/dL      Sodium 138 mmol/L      Potassium 3.7 mmol/L      Chloride 96 mmol/L      CO2 35.0 mmol/L      Calcium 8.8 mg/dL      eGFR Non African Amer 57 mL/min/1.73      BUN/Creatinine Ratio 17.1     Anion Gap 7.0 mmol/L     Narrative:      GFR Normal >60  Chronic Kidney Disease <60  Kidney Failure <15      Troponin [[id number]]  (Normal) Collected: 03/16/21 0521    Specimen: Blood Updated: 03/16/21 0602     Troponin T 0.015 ng/mL     Narrative:      Troponin T Reference Range:  <= 0.03 ng/mL-   Negative for AMI  >0.03 ng/mL-     Abnormal for myocardial necrosis.  Clinicians would have to utilize clinical acumen, EKG, Troponin and serial changes to determine if it is an Acute Myocardial Infarction or myocardial injury due to an underlying chronic condition.       Results may be falsely decreased if patient taking Biotin.      Magnesium [[id number]]  (Normal) Collected: 03/16/21 0521    Specimen: Blood Updated: 03/16/21 0602     Magnesium 2.1 mg/dL     CBC Auto Differential [[id number]]  (Abnormal) Collected: 03/16/21 0521    Specimen: Blood Updated: 03/16/21 0557     WBC 11.80 10*3/mm3      RBC 5.13 10*6/mm3      Hemoglobin 14.1 g/dL      Hematocrit 43.4 %      MCV 84.5 fL      MCH 27.6 pg      MCHC 32.6 g/dL      RDW 17.1 %  "     RDW-SD 49.9 fl      MPV 9.4 fL      Platelets 247 10*3/mm3      Neutrophil % 64.2 %      Lymphocyte % 16.7 %      Monocyte % 15.4 %      Eosinophil % 2.5 %      Basophil % 1.2 %      Neutrophils, Absolute 7.60 10*3/mm3      Lymphocytes, Absolute 2.00 10*3/mm3      Monocytes, Absolute 1.80 10*3/mm3      Eosinophils, Absolute 0.30 10*3/mm3      Basophils, Absolute 0.10 10*3/mm3      nRBC 0.1 /100 WBC     Protime-INR [[id number]]  (Normal) Collected: 03/16/21 0521    Specimen: Blood Updated: 03/16/21 0555     Protime 11.4 Seconds      INR 1.04    COVID PRE-OP / PRE-PROCEDURE SCREENING ORDER (NO ISOLATION) - Swab, Nasopharynx [[id number]]  (Normal) Collected: 03/15/21 2134    Specimen: Swab from Nasopharynx Updated: 03/16/21 0045    Narrative:      The following orders were created for panel order COVID PRE-OP / PRE-PROCEDURE SCREENING ORDER (NO ISOLATION) - Swab, Nasopharynx.  Procedure                               Abnormality         Status                     ---------                               -----------         ------                     COVID-19,CEPHEID,COR/ISH...[[id number]]  Normal              Final result                 Please view results for these tests on the individual orders.    COVID-19,CEPHEID,COR/ISH/PAD IN-HOUSE(OR EMERGENT/ADD-ON),NP SWAB IN TRANSPORT MEDIA 3-4 HR TAT, RT-PCR - Swab, Nasopharynx [[id number]]  (Normal) Collected: 03/15/21 2134    Specimen: Swab from Nasopharynx Updated: 03/16/21 0045     COVID19 Not Detected    Narrative:      Fact sheet for providers: https://www.fda.gov/media/011138/download     Fact sheet for patients: https://www.fda.gov/media/080427/download    Troponin [[id number]]  (Normal) Collected: 03/16/21 0008    Specimen: Blood Updated: 03/16/21 0040     Troponin T 0.015 ng/mL     Narrative:      Troponin T Reference Range:  <= 0.03 ng/mL-   Negative for AMI  >0.03 ng/mL-     Abnormal for myocardial necrosis.  Clinicians would have to utilize clinical acumen, EKG,  Troponin and serial changes to determine if it is an Acute Myocardial Infarction or myocardial injury due to an underlying chronic condition.       Results may be falsely decreased if patient taking Biotin.      Magnesium [[id number]]  (Normal) Collected: 03/15/21 1839    Specimen: Blood Updated: 03/15/21 2100     Magnesium 2.0 mg/dL     CK Total & CKMB [[id number]]  (Normal) Collected: 03/15/21 1839    Specimen: Blood Updated: 03/15/21 2100     CKMB 4.06 ng/mL      Creatine Kinase 98 U/L     Narrative:      CKMB results may be falsely decreased if patient taking Biotin.    Phosphorus [[id number]]  (Normal) Collected: 03/15/21 1839    Specimen: Blood Updated: 03/15/21 2100     Phosphorus 3.6 mg/dL     Troponin [[id number]]  (Normal) Collected: 03/15/21 1839    Specimen: Blood Updated: 03/15/21 1902     Troponin T <0.010 ng/mL     Narrative:      Troponin T Reference Range:  <= 0.03 ng/mL-   Negative for AMI  >0.03 ng/mL-     Abnormal for myocardial necrosis.  Clinicians would have to utilize clinical acumen, EKG, Troponin and serial changes to determine if it is an Acute Myocardial Infarction or myocardial injury due to an underlying chronic condition.       Results may be falsely decreased if patient taking Biotin.      D-dimer, Quantitative [[id number]]  (Normal) Collected: 03/15/21 1601    Specimen: Blood from Arm, Left Updated: 03/15/21 1722     D-Dimer, Quantitative 0.31 mg/L (FEU)     Narrative:      Reference Range  --------------------------------------------------------------------     < 0.50   Negative Predictive Value  0.50-0.59   Indeterminate    >= 0.60   Probable VTE             A very low percentage of patients with DVT may yield D-Dimer results   below the cut-off of 0.50 mg/L FEU.  This is known to be more   prevalent in patients with distal DVT.             Results of this test should always be interpreted in conjunction with   the patient's medical history, clinical presentation and other   findings.   Clinical diagnosis should not be based on the result of   [place] D-Dimer alone.    Extra Tubes [[id number]] Collected: 03/15/21 1601    Specimen: Blood from Arm, Right Updated: 03/15/21 1716    Narrative:      The following orders were created for panel order Extra Tubes.  Procedure                               Abnormality         Status                     ---------                               -----------         ------                     Red Top[[id number]]                                          Final result               Green Top (Gel)[[id number]]                                  Final result                 Please view results for these tests on the individual orders.    Green Top (Gel) [[id number]] Collected: 03/15/21 1601    Specimen: Blood from Arm, Right Updated: 03/15/21 1716     Extra Tube Hold for add-ons.     Comment: Auto resulted.       Red Top [[id number]] Collected: 03/15/21 1601    Specimen: Blood from Arm, Right Updated: 03/15/21 1716     Extra Tube Hold for add-ons.     Comment: Auto resulted.       [place] Draw [[id number]] Collected: 03/15/21 1601    Specimen: Blood Updated: 03/15/21 1716    Narrative:      The following orders were created for panel order [place] Draw.  Procedure                               Abnormality         Status                     ---------                               -----------         ------                     Light Blue Top[[id number]]                                   Final result               Green Top (Gel)[[id number]]                                  Final result               Lavender Top[[id number]]                                     Final result               Gold Top - SST[[id number]]                                   Final result                 Please view results for these tests on the individual orders.    Green Top (Gel) [[id number]] Collected: 03/15/21 1601    Specimen: Blood from Arm, Right Updated: 03/15/21 1716     Extra Tube Hold for add-ons.        Comment: Auto resulted.       Gold Top - SST [[id number]] Collected: 03/15/21 1601    Specimen: Blood from Arm, Right Updated: 03/15/21 1716     Extra Tube Hold for add-ons.     Comment: Auto resulted.       Lavender Top [[id number]] Collected: 03/15/21 1601    Specimen: Blood from Arm, Right Updated: 03/15/21 1716     Extra Tube hold for add-on     Comment: Auto resulted       Light Blue Top [[id number]] Collected: 03/15/21 1601    Specimen: Blood from Arm, Left Updated: 03/15/21 1716     Extra Tube hold for add-on     Comment: Auto resulted       Comprehensive Metabolic Panel [[id number]]  (Abnormal) Collected: 03/15/21 1601    Specimen: Blood from Arm, Right Updated: 03/15/21 1637     Glucose 115 mg/dL      BUN 19 mg/dL      Creatinine 1.03 mg/dL      Sodium 137 mmol/L      Potassium 4.0 mmol/L      Chloride 96 mmol/L      CO2 29.0 mmol/L      Calcium 9.1 mg/dL      Total Protein 7.1 g/dL      Albumin 4.20 g/dL      ALT (SGPT) 13 U/L      AST (SGOT) 25 U/L      Alkaline Phosphatase 77 U/L      Total Bilirubin 0.6 mg/dL      eGFR Non African Amer 74 mL/min/1.73      Globulin 2.9 gm/dL      A/G Ratio 1.4 g/dL      BUN/Creatinine Ratio 18.4     Anion Gap 12.0 mmol/L     Narrative:      GFR Normal >60  Chronic Kidney Disease <60  Kidney Failure <15      Troponin [[id number]]  (Normal) Collected: 03/15/21 1601    Specimen: Blood from Arm, Right Updated: 03/15/21 1637     Troponin T 0.010 ng/mL     Narrative:      Troponin T Reference Range:  <= 0.03 ng/mL-   Negative for AMI  >0.03 ng/mL-     Abnormal for myocardial necrosis.  Clinicians would have to utilize clinical acumen, EKG, Troponin and serial changes to determine if it is an Acute Myocardial Infarction or myocardial injury due to an underlying chronic condition.       Results may be falsely decreased if patient taking Biotin.      BNP [[id number]]  (Abnormal) Collected: 03/15/21 1601    Specimen: Blood from Arm, Right Updated: 03/15/21 1636     proBNP 1,283.0  pg/mL     Narrative:      Among patients with dyspnea, NT-proBNP is highly sensitive for the detection of acute congestive heart failure. In addition NT-proBNP of <300 pg/ml effectively rules out acute congestive heart failure with 99% negative predictive value.    Results may be falsely decreased if patient taking Biotin.      Protime-INR [[id number]]  (Abnormal) Collected: 03/15/21 1601    Specimen: Blood from Arm, Left Updated: 03/15/21 1633     Protime 17.2 Seconds      INR 1.60        Imaging Results (Last 72 Hours)     Procedure Component Value Units Date/Time    XR Chest 1 View [[id number]] Collected: 03/15/21 1637     Updated: 03/15/21 1639    Narrative:      DATE OF EXAM:  3/15/2021 4:25 PM     PROCEDURE:  XR CHEST 1 VW-     INDICATIONS:  Chest pain protocol       COMPARISON:  AP portable chest 11/11/2020.     TECHNIQUE:   Single radiographic view of the chest was obtained.     FINDINGS:  Heart size is mildly enlarged but stable. Pacemaker device appears  unchanged in position. Pulmonary vascular distribution is normal. No  acute airspace disease, pleural effusion, pneumothorax, or acute osseous  abnormality.       Impression:      No acute chest finding.     Electronically Signed By-Sheba Thorne MD On:3/15/2021 4:37 PM  This report was finalized on [id number] by  Sheba Thorne MD.          Results for orders placed during the hospital encounter of 06/21/20    Adult Transthoracic Echo Limited W/ Cont if Necessary Per Protocol    Interpretation Summary  · Left ventricular wall thickness is consistent with moderate concentric hypertrophy.  · Estimated EF = 30%.  · The pericardium is normal. There is no evidence of pericardial effusion.      Medication Review  Scheduled Meds:atorvastatin, 80 mg, Oral, Daily  budesonide-formoterol, 2 puff, Inhalation, BID - RT  bumetanide, 1 mg, Oral, BID  cetirizine, 10 mg, Oral, Daily  clopidogrel, 75 mg, Oral, Daily  DULoxetine, 60 mg, Oral, Daily  enoxaparin, 40 mg,  Subcutaneous, Daily  gabapentin, 300 mg, Oral, Q12H  midodrine, 5 mg, Oral, Q8H  montelukast, 10 mg, Oral, Daily  ranolazine, 1,000 mg, Oral, BID  roflumilast, 500 mcg, Oral, Daily  sodium chloride, 10 mL, Intravenous, Q12H  spironolactone, 25 mg, Oral, Daily  tamsulosin, 0.4 mg, Oral, Daily      Continuous Infusions:nitroglycerin, 50 mcg, Last Rate: Stopped (03/16/21 0425)      PRN Meds:.•  acetaminophen **OR** acetaminophen **OR** acetaminophen  •  albuterol  •  aluminum-magnesium hydroxide-simethicone  •  bisacodyl  •  hydrOXYzine  •  ketorolac  •  magnesium sulfate **OR** magnesium sulfate in D5W 1g/100mL (PREMIX)  •  melatonin  •  nitroglycerin  •  ondansetron **OR** ondansetron  •  potassium chloride  •  sodium chloride  •  sodium chloride  •  sodium chloride    Assessment/Plan       Chest pain    Coronary artery disease involving native heart with angina pectoris (CMS/[facility])    Sleep apnea    Essential hypertension    Dyslipidemia    Anxiety    COPD with acute exacerbation (CMS/[facility])    Depression    ICD (implantable cardioverter-defibrillator) in place    Morbid obesity (CMS/[facility])    Ischemic cardiomyopathy    AICD discharge    BPH without obstruction/lower urinary tract symptoms    Peripheral neuropathy    Atrial fibrillation, chronic (CMS/[facility])    CHF (congestive heart failure) (CMS/[facility])      Patient Active Problem List   Diagnosis   • Coronary artery disease involving native heart with angina pectoris (CMS/[facility])   • Cardiomyopathy, dilated (CMS/[facility])   • Sleep apnea   • Essential hypertension   • Dyslipidemia   • Acute on chronic systolic CHF (congestive heart failure) (CMS/[facility])   • Anxiety   • Myocardial infarction (CMS/[facility])   • COPD with acute exacerbation (CMS/[facility])   • Depression   • Gastroesophageal reflux disease   • ICD (implantable cardioverter-defibrillator) in place   • Morbid obesity (CMS/[facility])   • Obesity   • S/P cardiac cath   • VT (ventricular tachycardia) (CMS/[facility])   • Ischemic cardiomyopathy   •  Coronary artery disease   • Cardiomyopathy (CMS/HCC)   • Chronic stable angina (CMS/HCC)   • Acute on chronic combined systolic and diastolic CHF (congestive heart failure) (CMS/HCC)   • Syncope   • AICD discharge   • BPH without obstruction/lower urinary tract symptoms   • Peripheral neuropathy   • Seasonal allergies   • Right knee pain   • Dysarthria   • Generalized weakness   • COPD exacerbation (CMS/HCC)   • Ventricular tachycardia (CMS/HCC)   • Atrial fibrillation, chronic (CMS/HCC)   • Coronary artery disease involving native coronary artery of native heart with unstable angina pectoris (CMS/HCC)   • Chest pain   • CHF (congestive heart failure) (CMS/HCC)           Silver Burger MD  03/16/21  12:27 EDT

## 2021-03-16 NOTE — PLAN OF CARE
Problem: Fall Injury Risk  Goal: Absence of Fall and Fall-Related Injury  Outcome: Ongoing, Progressing  Intervention: Identify and Manage Contributors to Fall Injury Risk  Recent Flowsheet Documentation  Taken 3/15/2021 2310 by Deilcia Nguyen RN  Self-Care Promotion:   BADL personal routines maintained   BADL personal objects within reach   independence encouraged  Taken 3/15/2021 2058 by Delicia Nguyen RN  Medication Review/Management: medications reviewed  Self-Care Promotion:   BADL personal routines maintained   BADL personal objects within reach   independence encouraged  Intervention: Promote Injury-Free Environment  Recent Flowsheet Documentation  Taken 3/15/2021 2310 by Delicia Nguyen RN  Safety Promotion/Fall Prevention:   safety round/check completed   room organization consistent   nonskid shoes/slippers when out of bed   lighting adjusted   clutter free environment maintained   assistive device/personal items within reach   activity supervised  Taken 3/15/2021 2058 by Delicia Nguyen RN  Safety Promotion/Fall Prevention:   safety round/check completed   room organization consistent   nonskid shoes/slippers when out of bed   lighting adjusted   clutter free environment maintained   assistive device/personal items within reach   activity supervised     Problem: Adult Inpatient Plan of Care  Goal: Plan of Care Review  Outcome: Ongoing, Progressing  Flowsheets (Taken 3/16/2021 0153)  Progress: no change  Plan of Care Reviewed With: patient  Outcome Summary: pt here with chest pain  currently on 35mcg of tridil.  VSS  Will continue to monitor  Goal: Patient-Specific Goal (Individualized)  Outcome: Ongoing, Progressing  Goal: Absence of Hospital-Acquired Illness or Injury  Outcome: Ongoing, Progressing  Intervention: Identify and Manage Fall Risk  Recent Flowsheet Documentation  Taken 3/15/2021 2310 by Delicia Nguyen RN  Safety Promotion/Fall Prevention:   safety round/check  completed   room organization consistent   nonskid shoes/slippers when out of bed   lighting adjusted   clutter free environment maintained   assistive device/personal items within reach   activity supervised  Taken 3/15/2021 2058 by Delicia Nguyen RN  Safety Promotion/Fall Prevention:   safety round/check completed   room organization consistent   nonskid shoes/slippers when out of bed   lighting adjusted   clutter free environment maintained   assistive device/personal items within reach   activity supervised  Intervention: Prevent Skin Injury  Recent Flowsheet Documentation  Taken 3/15/2021 2310 by Delicia Nguyen RN  Body Position: position changed independently  Taken 3/15/2021 2058 by Delicia Nguyen RN  Body Position: position changed independently  Intervention: Prevent Infection  Recent Flowsheet Documentation  Taken 3/15/2021 2310 by Delicia Nguyen RN  Infection Prevention:   visitors restricted/screened   single patient room provided   rest/sleep promoted   personal protective equipment utilized   hand hygiene promoted   equipment surfaces disinfected  Taken 3/15/2021 2058 by Delicia Nguyen RN  Infection Prevention:   visitors restricted/screened   rest/sleep promoted   single patient room provided   personal protective equipment utilized   hand hygiene promoted   equipment surfaces disinfected  Goal: Optimal Comfort and Wellbeing  Outcome: Ongoing, Progressing  Intervention: Provide Person-Centered Care  Recent Flowsheet Documentation  Taken 3/15/2021 2058 by Delicia Nguyen RN  Trust Relationship/Rapport:   care explained   questions answered   questions encouraged   reassurance provided   thoughts/feelings acknowledged  Goal: Readiness for Transition of Care  Outcome: Ongoing, Progressing  Intervention: Mutually Develop Transition Plan  Recent Flowsheet Documentation  Taken 3/15/2021 2219 by Delicia Nguyen RN  Transportation Anticipated: family or friend will  provide  Patient/Family Anticipated Services at Transition: none  Patient/Family Anticipates Transition to: home with family  Taken 3/15/2021 2101 by Delicia Nguyen, RN  Transportation Anticipated:   family or friend will provide   car, drives self  Patient/Family Anticipated Services at Transition: none  Patient/Family Anticipates Transition to: home with family  Taken 3/15/2021 2055 by Delicia Nguyen, RN  Equipment Currently Used at Home: (TRILOGY)   other (see comments)   oxygen   Goal Outcome Evaluation:  Plan of Care Reviewed With: patient  Progress: no change  Outcome Summary: pt here with chest pain  currently on 35mcg of tridil.  VSS  Will continue to monitor

## 2021-03-16 NOTE — CONSULTS
Consult requested by Dr. Giles  Reason for consultation recurrent ventricular fibrillation        Sub--60-year-old male patient presented to hospital because of recurrent ICD therapy and ICD interrogation revealed appropriate therapy for ventricular fibrillation and rapid monomorphic VT .he has chronic chest pain and chronic class III systolic heart failure symptoms .  Patient had recurrent ventricular arrhythmias in the past and responded to mexiletine and somehow he misunderstood and stopped his mexiletine in the last few weeks .  Patient had uncontrollable atrial fibrillation in the past and underwent AV node ablation with biventricular ICD upgrade--unfortunately coronary sinus lead was placed in anterobasal vein since there was no posterior and lateral veins for placement of coronary sinus lead--he was in refractory heart failure which has improved to class III chronic systolic heart failure .  Patient has known coronary artery disease with ischemic cardiomyopathy and has significant issues with obesity and prior history of COPD.  Patient had prior multiple ICD therapies for conducted atrial fibrillation needing AV node ablation .  He remains in functional class III .Patient has noticed increased number of palpitations associated with chest discomfort in the last few weeks  He continues to remain in class III functional class without any syncope  Chronic medical problems include ischemic cardio myopathy chronic class III systolic heart failure, persistent atrial fibrillation and morbid obesity   Was started on amiodarone with last visit--continues to have episodes of VT needing antitachycardia pacing  Back in 2017 patient underwent an unsuccessful VT ablation with PVC ablation coming from outflow tract which was felt to be possible epicardial origin--ablation from RVOT site was unsuccessful--VT during the time was inferior axis with left bundle and transition V3  Continued to feel poorly with class III systolic  heart failure with multiple episodes of VT in the past  Patient presented to the hospital in the past with chronic angina and chronic class III systolic heart failure symptoms and underwent LV epicardial lead placement for optimization of heart failure therapy--he has recurrent admission for heart failure and last admission patient had VT storm with multiple ICD shocks and underwent a VT ablation affecting the posterior scar and started have recurrent VT and started on Mexitil            Past Medical History:   Diagnosis Date   • Asthma    • Atrial fibrillation (CMS/[facility])    • CAD (coronary artery disease)    • CHF (congestive heart failure) (CMS/[facility])    • COPD (chronic obstructive pulmonary disease) (CMS/[facility])    • Depression    • Elevated cholesterol    • Hyperlipidemia    • Hypertension    • Myocardial infarction (CMS/[facility])    • Pacemaker 2019    [employer]    • Sleep apnea    • V tach (CMS/[facility])      Past Surgical History:   Procedure Laterality Date   • BACK SURGERY      Sciatica   • CARDIAC ABLATION  11/07/2017   • CARDIAC CATHETERIZATION      6-8 stents, last heart cath 2019   • CARDIAC CATHETERIZATION N/A 6/9/2020    Procedure: Coronary angiography;  Surgeon: Jose Lopez MD;  Location: [facility] CATH INVASIVE LOCATION;  Service: Cardiovascular;  Laterality: N/A;   • CARDIAC ELECTROPHYSIOLOGY PROCEDURE N/A 9/26/2019    Procedure: BIVENTRICULAR DEVICE UPGRADE;  Surgeon: Jose Lopez MD;  Location:  [facility] CATH INVASIVE LOCATION;  Service: Cardiovascular   • CARDIAC ELECTROPHYSIOLOGY PROCEDURE N/A 9/26/2019    Procedure: EP/Ablation AV Node ablation;  Surgeon: Jose Lopez MD;  Location:  [facility] CATH INVASIVE LOCATION;  Service: Cardiology   • CARDIAC ELECTROPHYSIOLOGY PROCEDURE N/A 6/9/2020    Procedure: EP/Ablation;  Surgeon: Jose Lopez MD;  Location:  [facility] CATH INVASIVE LOCATION;  Service: Cardiovascular;  Laterality: N/A;   • CARDIAC ELECTROPHYSIOLOGY PROCEDURE N/A 10/1/2020     Procedure: EP/Ablation;  Surgeon: Jose Lopez MD;  Location: [facility] CATH INVASIVE LOCATION;  Service: Cardiovascular;  Laterality: N/A;   • CARDIAC PACEMAKER PLACEMENT N/A 6/22/2020    Procedure: LEFT VENTRICULAR LEAD PLACEMENT;  Surgeon: Christiano Richmond MD;  Location: [facility] CVOR;  Service: Cardiothoracic;  Laterality: N/A;   • CHOLECYSTECTOMY     • COLONOSCOPY     • CORONARY ANGIOPLASTY WITH STENT PLACEMENT      2 stents   • HERNIA REPAIR      gallbladder    • PACEMAKER IMPLANTATION  07/08/2016    Pacemaker/ Defib implant - [place]   • SINUS SURGERY      sinus surgery x 9   • SKIN BIOPSY      benign     Family History   Problem Relation Age of Onset   • Diabetes Mother    • Heart disease Mother         MI age 46 - CHF   • Atrial fibrillation Mother    • COPD Mother    • Atrial fibrillation Father    • Heart disease Father         CHF     Social History     Tobacco Use   • Smoking status: Never Smoker   • Smokeless tobacco: Never Used   Vaping Use   • Vaping Use: Never used   Substance Use Topics   • Alcohol use: Never     Comment: socially   • Drug use: No     Medications Prior to Admission   Medication Sig Dispense Refill Last Dose   • albuterol sulfate  (90 Base) MCG/ACT inhaler Inhale 2 puffs Every 4 (Four) Hours As Needed for Wheezing.      • Budeson-Glycopyrrol-Formoterol (BREZTRI) 160-9-4.8 MCG/ACT aerosol inhaler Inhale 2 puffs 2 (Two) Times a Day.      • budesonide-formoterol (SYMBICORT) 160-4.5 MCG/ACT inhaler Inhale 2 puffs 2 (Two) Times a Day.      • ipratropium-albuterol (DUO-NEB) 0.5-2.5 mg/3 ml nebulizer Take 3 mL by nebulization Every 4 (Four) Hours As Needed for Wheezing.      • tadalafil (CIALIS) 5 MG tablet Take 5 mg by mouth Daily As Needed for Erectile Dysfunction.      • apixaban (ELIQUIS) 5 MG tablet tablet Take 1 tablet by mouth 2 (Two) Times a Day. 60 tablet 5    • atorvastatin (LIPITOR) 80 MG tablet Take 1 tablet by mouth Daily. 90 tablet 3    • bumetanide (BUMEX) 1 MG  tablet Take 1 mg by mouth 2 (Two) Times a Day.      • cetirizine (zyrTEC) 10 MG tablet Take 1 tablet by mouth Daily. 90 tablet 1    • clopidogrel (PLAVIX) 75 MG tablet Take 1 tablet by mouth Daily. 90 tablet 1    • divalproex (DEPAKOTE) 250 MG DR tablet Take 1 tablet by mouth 2 (two) times a day. 60 tablet 0    • DULoxetine (CYMBALTA) 60 MG capsule Take 60 mg by mouth Daily.      • fluticasone (FLONASE) 50 MCG/ACT nasal spray 2 sprays into the nostril(s) as directed by provider Daily. 1 bottle 11    • gabapentin (NEURONTIN) 300 MG capsule Take 300 mg by mouth 2 (two) times a day.      • hydrOXYzine (ATARAX) 50 MG tablet Take 1 tablet by mouth At Night As Needed for Anxiety. 10 tablet 0    • metOLazone (ZAROXOLYN) 2.5 MG tablet Take 1 tablet by mouth Daily As Needed (edema). 30 tablet 1    • metoprolol succinate XL (TOPROL-XL) 50 MG 24 hr tablet Take 1 tablet by mouth Daily. 30 tablet 0    • midodrine (PROAMATINE) 5 MG tablet Take 1 tablet by mouth Every 8 (Eight) Hours. 90 tablet 0    • montelukast (SINGULAIR) 10 MG tablet Take 10 mg by mouth Daily.      • nitroglycerin (NITROSTAT) 0.4 MG SL tablet Place 0.4 mg under the tongue Every 5 (Five) Minutes As Needed for Chest Pain. Take no more than 3 doses in 15 minutes.      • ranolazine (RANEXA) 1000 MG 12 hr tablet Take 1 tablet by mouth 2 (Two) Times a Day. 180 tablet 3    • roflumilast (DALIRESP) 500 MCG tablet tablet Take 500 mcg by mouth Daily.      • spironolactone (ALDACTONE) 25 MG tablet Take 1 tablet by mouth Daily.      • tamsulosin (FLOMAX) 0.4 MG capsule 24 hr capsule Take 1 capsule by mouth 2 (two) times a day.        Allergies:  Codeine and Tramadol    Review of Systems   General:  positive for fatigue and tiredness  Eyes: No redness  Cardiovascular: No chest pain, no palpitations  Respiratory:   positive for class 3 shortness of breath  Gastrointestinal: No nausea or vomiting, bleeding  Genitourinary: no hematuria or dysuria  Musculoskeletal: No  arthralgia or myalgia  Skin: No rash  Neurologic: No numbness, tingling, syncope  Hematologic/Lymphatic: No abnormal bleeding      Physical Exam  VITALS REVIEWED----blood pressure 125/75, pulse rate 70 paced rhythm patient is afebrile respiration 12 times a minute    General:      well developed, well nourished, in no acute distress.    Head:      normocephalic and atraumatic.    Eyes:      PERRL/EOM intact, conjunctiva and sclera clear with out nystagmus.    Neck:      no masses, thyromegaly,  trachea central with normal respiratory effort and PMI displaced laterally  Lungs:      clear bilaterally to auscultation.    Heart:       underlying paced rhythm  without any murmurs gallops or rubs    Pulses:      pulses normal in all 4 extremities.    Extremities:       no cyanosis or clubbing--trace left pedal edema and trace right pedal edema.    Neurologic:      no focal deficits.   alert oriented x3  Skin:      intact without lesions or rashes.    Psych:      alert and cooperative; normal mood and affect; normal attention span and concentration.          CBC    Results from last 7 days   Lab Units 03/16/21  0521 03/15/21  1601   WBC 10*3/mm3 11.80* 13.20*   HEMOGLOBIN g/dL 14.1 15.1   PLATELETS 10*3/mm3 247 262     BMP   Results from last 7 days   Lab Units 03/16/21  0521 03/15/21  1839 03/15/21  1601   SODIUM mmol/L 138  --  137   POTASSIUM mmol/L 3.7  --  4.0   CHLORIDE mmol/L 96*  --  96*   CO2 mmol/L 35.0*  --  29.0   BUN mg/dL 22*  --  19   CREATININE mg/dL 1.29*  --  1.03   GLUCOSE mg/dL 109*  --  115*   MAGNESIUM mg/dL 2.1 2.0  --    PHOSPHORUS mg/dL  --  3.6  --      CMP   Results from last 7 days   Lab Units 03/16/21  0521 03/15/21  1601   SODIUM mmol/L 138 137   POTASSIUM mmol/L 3.7 4.0   CHLORIDE mmol/L 96* 96*   CO2 mmol/L 35.0* 29.0   BUN mg/dL 22* 19   CREATININE mg/dL 1.29* 1.03   GLUCOSE mg/dL 109* 115*   ALBUMIN g/dL  --  4.20   BILIRUBIN mg/dL  --  0.6   ALK PHOS U/L  --  77   AST (SGOT) U/L  --  25      ALT (SGPT) U/L  --  13     Radiology(recent) XR Chest 1 View    Result Date: 3/15/2021  No acute chest finding.  Electronically Signed By-Sheba Thorne MD On:3/15/2021 4:37 PM This report was finalized on [id number] by  Sheba Thorne MD.              Assessment plan    Recurrent VT/VF--- restart mexiletine to 50 mg 3 times a day and nurse instructed  Chronic coronary artery disease  Dilated cardiomyopathy  Chronic class III systolic heart failure  Obesity with a BMI of 42.18  Chronic atrial fibrillation status post AV node ablation  Biventricular ICD in situ with appropriate function      Electronically signed by Jose Lopez MD, 03/16/21, 7:40 PM EDT.

## 2021-03-16 NOTE — PROGRESS NOTES
"      [facility] Medicine Services Daily Progress Note      Hospitalist Team  LOS 0 days      Patient Care Team:  Jaylen Moss MD as PCP - General  Wicho Sotelo MD as Consulting Physician (Nephrology)    Patient Location: 114/1      Subjective   Subjective     Chief Complaint / Subjective  Chief Complaint   Patient presents with   • Chest Pain         Brief Synopsis of Hospital Course/HPI      Date::    3/16/21: Stress test and TTE ordered.      Review of Systems   All other systems reviewed and are negative.        Objective   Objective      Vital Signs  Temp:  [97.6 °F (36.4 °C)-98.4 °F (36.9 °C)] 98.1 °F (36.7 °C)  Heart Rate:  [69-94] 69  Resp:  [18-20] 18  BP: (100-151)/() 118/76  Oxygen Therapy  SpO2: 96 %  Pulse Oximetry Type: Continuous  Device (Oxygen Therapy): room air  Flow (L/min): 2  Flowsheet Rows      First Filed Value   Admission Height  177.8 cm (70\") Documented at 03/15/2021 1552   Admission Weight  132 kg (290 lb) Documented at 03/15/2021 1552        Intake & Output (last 3 days)       03/13 0701 - 03/14 0700 03/14 0701 - 03/15 0700 03/15 0701 - 03/16 0700 03/16 0701 - 03/17 0700    P.O.   840 444    Total Intake(mL/kg)   840 (6.3) 444 (3.3)    Urine (mL/kg/hr)   400 400 (0.4)    Total Output   400 400    Net   +440 +44                Lines, Drains & Airways    Active LDAs     Name:   Placement date:   Placement time:   Site:   Days:    Peripheral IV 03/15/21 1533 Posterior;Right Hand   03/15/21    1533    Hand   less than 1    Peripheral IV 03/15/21 1626 Left Antecubital   03/15/21    1626    Antecubital   less than 1                  Physical Exam:    Physical Exam  HENT:      Head: Normocephalic.      Mouth/Throat:      Mouth: Mucous membranes are moist.   Eyes:      General: No scleral icterus.     Extraocular Movements: Extraocular movements intact.      Pupils: Pupils are equal, round, and reactive to light.   Cardiovascular:      Rate and Rhythm: Normal rate " and regular rhythm.   Pulmonary:      Effort: Pulmonary effort is normal.   Abdominal:      General: Bowel sounds are normal.      Palpations: Abdomen is soft.   Musculoskeletal:         General: Normal range of motion.      Cervical back: Normal range of motion.   Skin:     General: Skin is warm.   Neurological:      Mental Status: He is alert and oriented to person, place, and time. Mental status is at baseline.   Psychiatric:         Mood and Affect: Mood normal.         Procedures:      Results Review:     I reviewed the patient's new clinical results.      Lab Results (last 24 hours)     Procedure Component Value Units Date/Time    Basic Metabolic Panel [[id number]]  (Abnormal) Collected: 03/16/21 0521    Specimen: Blood Updated: 03/16/21 0602     Glucose 109 mg/dL      BUN 22 mg/dL      Creatinine 1.29 mg/dL      Sodium 138 mmol/L      Potassium 3.7 mmol/L      Chloride 96 mmol/L      CO2 35.0 mmol/L      Calcium 8.8 mg/dL      eGFR Non African Amer 57 mL/min/1.73      BUN/Creatinine Ratio 17.1     Anion Gap 7.0 mmol/L     Narrative:      GFR Normal >60  Chronic Kidney Disease <60  Kidney Failure <15      Troponin [[id number]]  (Normal) Collected: 03/16/21 0521    Specimen: Blood Updated: 03/16/21 0602     Troponin T 0.015 ng/mL     Narrative:      Troponin T Reference Range:  <= 0.03 ng/mL-   Negative for AMI  >0.03 ng/mL-     Abnormal for myocardial necrosis.  Clinicians would have to utilize clinical acumen, EKG, Troponin and serial changes to determine if it is an Acute Myocardial Infarction or myocardial injury due to an underlying chronic condition.       Results may be falsely decreased if patient taking Biotin.      Magnesium [[id number]]  (Normal) Collected: 03/16/21 0521    Specimen: Blood Updated: 03/16/21 0602     Magnesium 2.1 mg/dL     CBC Auto Differential [[id number]]  (Abnormal) Collected: 03/16/21 0521    Specimen: Blood Updated: 03/16/21 0557     WBC 11.80 10*3/mm3      RBC 5.13 10*6/mm3       Hemoglobin 14.1 g/dL      Hematocrit 43.4 %      MCV 84.5 fL      MCH 27.6 pg      MCHC 32.6 g/dL      RDW 17.1 %      RDW-SD 49.9 fl      MPV 9.4 fL      Platelets 247 10*3/mm3      Neutrophil % 64.2 %      Lymphocyte % 16.7 %      Monocyte % 15.4 %      Eosinophil % 2.5 %      Basophil % 1.2 %      Neutrophils, Absolute 7.60 10*3/mm3      Lymphocytes, Absolute 2.00 10*3/mm3      Monocytes, Absolute 1.80 10*3/mm3      Eosinophils, Absolute 0.30 10*3/mm3      Basophils, Absolute 0.10 10*3/mm3      nRBC 0.1 /100 WBC     Protime-INR [[id number]]  (Normal) Collected: 03/16/21 0521    Specimen: Blood Updated: 03/16/21 0555     Protime 11.4 Seconds      INR 1.04    COVID PRE-OP / PRE-PROCEDURE SCREENING ORDER (NO ISOLATION) - Swab, Nasopharynx [[id number]]  (Normal) Collected: 03/15/21 2134    Specimen: Swab from Nasopharynx Updated: 03/16/21 0045    Narrative:      The following orders were created for panel order COVID PRE-OP / PRE-PROCEDURE SCREENING ORDER (NO ISOLATION) - Swab, Nasopharynx.  Procedure                               Abnormality         Status                     ---------                               -----------         ------                     COVID-19,CEPHEID,COR/ISH...[[id number]]  Normal              Final result                 Please view results for these tests on the individual orders.    COVID-19,CEPHEID,COR/ISH/PAD IN-HOUSE(OR EMERGENT/ADD-ON),NP SWAB IN TRANSPORT MEDIA 3-4 HR TAT, RT-PCR - Swab, Nasopharynx [[id number]]  (Normal) Collected: 03/15/21 2134    Specimen: Swab from Nasopharynx Updated: 03/16/21 0045     COVID19 Not Detected    Narrative:      Fact sheet for providers: https://www.fda.gov/media/155306/download     Fact sheet for patients: https://www.fda.gov/media/711883/download    Troponin [[id number]]  (Normal) Collected: 03/16/21 0008    Specimen: Blood Updated: 03/16/21 0040     Troponin T 0.015 ng/mL     Narrative:      Troponin T Reference Range:  <= 0.03 ng/mL-   Negative  for AMI  >0.03 ng/mL-     Abnormal for myocardial necrosis.  Clinicians would have to utilize clinical acumen, EKG, Troponin and serial changes to determine if it is an Acute Myocardial Infarction or myocardial injury due to an underlying chronic condition.       Results may be falsely decreased if patient taking Biotin.      Magnesium [[id number]]  (Normal) Collected: 03/15/21 1839    Specimen: Blood Updated: 03/15/21 2100     Magnesium 2.0 mg/dL     CK Total & CKMB [[id number]]  (Normal) Collected: 03/15/21 1839    Specimen: Blood Updated: 03/15/21 2100     CKMB 4.06 ng/mL      Creatine Kinase 98 U/L     Narrative:      CKMB results may be falsely decreased if patient taking Biotin.    Phosphorus [[id number]]  (Normal) Collected: 03/15/21 1839    Specimen: Blood Updated: 03/15/21 2100     Phosphorus 3.6 mg/dL     Troponin [[id number]]  (Normal) Collected: 03/15/21 1839    Specimen: Blood Updated: 03/15/21 1902     Troponin T <0.010 ng/mL     Narrative:      Troponin T Reference Range:  <= 0.03 ng/mL-   Negative for AMI  >0.03 ng/mL-     Abnormal for myocardial necrosis.  Clinicians would have to utilize clinical acumen, EKG, Troponin and serial changes to determine if it is an Acute Myocardial Infarction or myocardial injury due to an underlying chronic condition.       Results may be falsely decreased if patient taking Biotin.          No results found for: HGBA1C  Results from last 7 days   Lab Units 03/16/21  0521 03/15/21  1601   INR  1.04 1.60*           Lab Results   Component Value Date    LIPASE 23 06/03/2020     Lab Results   Component Value Date    CHOL 152 11/12/2020    CHLPL 201 (H) 09/14/2018    TRIG 96 11/12/2020    HDL 37 (L) 11/12/2020    LDL 97 11/12/2020       No results found for: INTRAOP, PREDX, FINALDX, COMDX    Microbiology Results (last 10 days)     Procedure Component Value - Date/Time    COVID PRE-OP / PRE-PROCEDURE SCREENING ORDER (NO ISOLATION) - Swab, Nasopharynx [[id number]]  (Normal)  Collected: 03/15/21 2134    Lab Status: Final result Specimen: Swab from Nasopharynx Updated: 03/16/21 0045    Narrative:      The following orders were created for panel order COVID PRE-OP / PRE-PROCEDURE SCREENING ORDER (NO ISOLATION) - Swab, Nasopharynx.  Procedure                               Abnormality         Status                     ---------                               -----------         ------                     COVID-19,CEPHEID,COR/ISH...[[id number]]  Normal              Final result                 Please view results for these tests on the individual orders.    COVID-19,CEPHEID,COR/ISH/PAD IN-HOUSE(OR EMERGENT/ADD-ON),NP SWAB IN TRANSPORT MEDIA 3-4 HR TAT, RT-PCR - Swab, Nasopharynx [[id number]]  (Normal) Collected: 03/15/21 2134    Lab Status: Final result Specimen: Swab from Nasopharynx Updated: 03/16/21 0045     COVID19 Not Detected    Narrative:      Fact sheet for providers: https://www.fda.gov/media/768560/download     Fact sheet for patients: https://www.fda.gov/media/588686/download          ECG/EMG Results (most recent)     Procedure Component Value Units Date/Time    ECG 12 Lead [[id number]] Collected: 03/15/21 1546     Updated: 03/15/21 1553     QT Interval 444 ms     Narrative:      HEART RATE= 70  bpm  RR Interval= 856  ms  NM Interval= 34  ms  P Horizontal Axis=   deg  P Front Axis= 0  deg  QRSD Interval= 168  ms  QT Interval= 444  ms  QRS Axis= 131  deg  T Wave Axis= -63  deg  - ABNORMAL ECG -  Ventricular-paced rhythm  Biventricular paced rhythm  When compared with ECG of 12-Nov-2020 10:50:13,  No significant change  Electronically Signed By:   Date and Time of Study: 2021-03-15 15:46:44    ECG 12 Lead [[id number]]  (Abnormal) Resulted: 03/15/21 1829     Updated: 03/15/21 1829    ECG 12 Lead [[id number]] Collected: 03/15/21 1947     Updated: 03/15/21 1949     QT Interval 593 ms     Narrative:      HEART RATE= 89  bpm  RR Interval= 673  ms  NM Interval= 56  ms  P Horizontal Axis=    deg  P Front Axis= 0  deg  QRSD Interval= 186  ms  QT Interval= 593  ms  QRS Axis= 135  deg  T Wave Axis= 19  deg  - ABNORMAL ECG -  Ventricular-paced complexes  Biventricular paced rhythm  When compared with ECG of 15-Mar-2021 15:46:44,  No significant change  Electronically Signed By:   Date and Time of Study: 2021-03-15 19:47:04    ECG 12 Lead [[id number]] Collected: 03/16/21 0355     Updated: 03/16/21 0356     QT Interval 528 ms     Narrative:      HEART RATE= 73  bpm  RR Interval= 826  ms  VT Interval= 248  ms  P Horizontal Axis=   deg  P Front Axis= 0  deg  QRSD Interval= 205  ms  QT Interval= 528  ms  QRS Axis= -66  deg  T Wave Axis= 79  deg  - ABNORMAL ECG -  A-V dual-paced complexes w/ some inhibition  Biventricular paced rhythm  Electronically Signed By:   Date and Time of Study: 2021-03-16 03:55:02    Adult Transthoracic Echo Complete w/ Color, Spectral and Contrast if necessary per protocol [[id number]] Collected: 03/16/21 0957     Updated: 03/16/21 1053     BSA 2.5 m^2      RVIDd 3.5 cm      IVSd 1.5 cm      LVIDd 6.5 cm      LVIDs 6.2 cm      LVPWd 1.1 cm      IVS/LVPW 1.3     FS 3.9 %      EDV(Teich) 212.3 ml      ESV(Teich) 194.1 ml      EF(Teich) 8.6 %      EDV(cubed) 268.5 ml      ESV(cubed) 238.5 ml      EF(cubed) 11.2 %      LV mass(C)d 392.0 grams      LV mass(C)dI 159.5 grams/m^2      SV(Teich) 18.2 ml      SI(Teich) 7.4 ml/m^2      SV(cubed) 30.0 ml      SI(cubed) 12.2 ml/m^2      Ao root diam 3.0 cm      Ao root area 7.0 cm^2      ACS 2.3 cm      asc Aorta Diam 3.2 cm      LVOT diam 2.1 cm      LVOT area 3.6 cm^2      EDV(MOD-sp4) 123.1 ml      ESV(MOD-sp4) 74.3 ml      EF(MOD-sp4) 39.6 %      SV(MOD-sp4) 48.8 ml      SI(MOD-sp4) 19.9 ml/m^2      Ao root area (BSA corrected) 1.2     LV Goode Vol (BSA corrected) 50.1 ml/m^2      LV Sys Vol (BSA corrected) 30.2 ml/m^2      Aortic R-R 0.86 sec      Aortic HR 69.8 BPM      MV V2 max 256.5 cm/sec      MV max PG 35.5 mmHg      MV V2 mean 186.6 cm/sec       MV mean PG 23.5 mmHg      MV V2 VTI 75.8 cm      Ao pk raymond 93.8 cm/sec      Ao max PG 3.5 mmHg      Ao V2 mean 70.9 cm/sec      Ao mean PG 2.3 mmHg      Ao V2 VTI 15.6 cm      MR max raymond 379.1 cm/sec      MR max PG 57.8 mmHg      MR mean raymond 314.9 cm/sec      MR mean PG 46.7 mmHg      MR .4 cm      CO(Ao) 7.6 l/min      CI(Ao) 3.1 l/min/m^2      SV(Ao) 108.8 ml      SI(Ao) 44.3 ml/m^2      PA V2 max 65.0 cm/sec      PA max PG 1.7 mmHg      PA max PG (full) 0.9 mmHg      PA acc time 0.1 sec      RV V1 max PG 0.81 mmHg      RV V1 mean PG 0.41 mmHg      RV V1 max 45.0 cm/sec      RV V1 mean 29.9 cm/sec      RV V1 VTI 7.5 cm      PA pr(Accel) 36.2 mmHg       CV ECHO MAN - BZI_BMI 42.2 kilograms/m^2       CV ECHO MAN - BSA([place]AYCOCK) 2.6 m^2       CV ECHO MAN - BZI_METRIC_WEIGHT 133.4 kg       CV ECHO MAN - BZI_METRIC_HEIGHT 177.8 cm      Target HR (85%) 138 bpm      Max. Pred. HR (100%) 162 bpm      EF(MOD-bp) 40.0 %      LA dimension(2D) 5.2 cm               Results for orders placed during the hospital encounter of 06/21/20    Adult Transthoracic Echo Limited W/ Cont if Necessary Per Protocol    Interpretation Summary  · Left ventricular wall thickness is consistent with moderate concentric hypertrophy.  · Estimated EF = 30%.  · The pericardium is normal. There is no evidence of pericardial effusion.      XR Chest 1 View    Result Date: 3/15/2021  No acute chest finding.  Electronically Signed By-Sheba Thorne MD On:3/15/2021 4:37 PM This report was finalized on [id number] by  Sheba Thorne MD.          Xrays, labs reviewed personally by physician.    Medication Review:   I have reviewed the patient's current medication list      Scheduled Meds  atorvastatin, 80 mg, Oral, Daily  budesonide-formoterol, 2 puff, Inhalation, BID - RT  bumetanide, 1 mg, Oral, BID  cetirizine, 10 mg, Oral, Daily  clopidogrel, 75 mg, Oral, Daily  DULoxetine, 60 mg, Oral, Daily  enoxaparin, 40 mg, Subcutaneous,  Daily  gabapentin, 300 mg, Oral, Q12H  midodrine, 5 mg, Oral, Q8H  montelukast, 10 mg, Oral, Daily  ranolazine, 1,000 mg, Oral, BID  roflumilast, 500 mcg, Oral, Daily  sodium chloride, 10 mL, Intravenous, Q12H  spironolactone, 25 mg, Oral, Daily  tamsulosin, 0.4 mg, Oral, Daily        Meds Infusions  nitroglycerin, 50 mcg, Last Rate: Stopped (03/16/21 0425)        Meds PRN  •  acetaminophen **OR** acetaminophen **OR** acetaminophen  •  albuterol  •  aluminum-magnesium hydroxide-simethicone  •  bisacodyl  •  hydrOXYzine  •  ketorolac  •  magnesium sulfate **OR** magnesium sulfate in D5W 1g/100mL (PREMIX)  •  melatonin  •  nitroglycerin  •  ondansetron **OR** ondansetron  •  potassium chloride  •  sodium chloride  •  sodium chloride  •  sodium chloride        Assessment/Plan   Assessment/Plan     Active Hospital Problems    Diagnosis  POA   • **Chest pain [R07.9]  Yes   • CHF (congestive heart failure) (CMS/formerly [facility]) [I50.9]  Yes   • Atrial fibrillation, chronic (CMS/formerly [facility]) [I48.20]  Yes   • BPH without obstruction/lower urinary tract symptoms [N40.0]  Yes   • Peripheral neuropathy [G62.9]  Yes   • AICD discharge [Z45.02]  Not Applicable   • Ischemic cardiomyopathy [I25.5]  Yes   • Essential hypertension [I10]  Yes   • Coronary artery disease involving native heart with angina pectoris (CMS/formerly [facility]) [I25.119]  Yes   • Sleep apnea [G47.30]  Yes   • Dyslipidemia [E78.5]  Yes   • Depression [F32.9]  Yes   • Morbid obesity (CMS/formerly [facility]) [E66.01]  Yes   • Anxiety [F41.9]  Yes   • ICD (implantable cardioverter-defibrillator) in place [Z95.810]  Yes   • COPD with acute exacerbation (CMS/formerly [facility]) [J44.1]  Yes      Resolved Hospital Problems   No resolved problems to display.       MEDICAL DECISION MAKING COMPLEXITY BY PROBLEM:       Chest pain:  -Chest x-ray 3/5/2021 --> no acute cardiopulmonary pathology   -Stress test and TTE ordered    AICD discharge:  -Pacemaker interrogated in the ED which showed 4 episodes of ventricular fibrillation but  only 1   defibrillator shock at 7 AM at 41 J.  -Consider cardiology consult to evaluate AICD lead placement    Cardiomyopathy  -Continue Bumex and Aldactone     Hypertension:  -Metoprolol held for stress test in a.m.       Dyslipidemia:  -Continue home statin     Neuropathy:  -Continue home Neurontin  and Cymbalta     COPD:  -Continue bronchodilators     A. Fib/CAD:  -Continue rate control with metoprolol      Sleep apnea:  -Patient wears home trilogy  -BiPAP ordered per home settings     BPH:   -Continue home Flomax     Depression/anxiety:  -Continue Cymbalta and hydroxyzine  -Patient unsure if he takes Depakote at home          VTE Prophylaxis -   Mechanical Order History:     None      Pharmalogical Order History:      Ordered     Dose Route Frequency Stop    03/15/21 1941  enoxaparin (LOVENOX) syringe 40 mg      40 mg SC Daily --                  Code Status -   Code Status and Medical Interventions:   Ordered at: 03/15/21 1941     Level Of Support Discussed With:    Patient     Code Status:    CPR     Medical Interventions (Level of Support Prior to Arrest):    Full       This patient has been examined wearing appropriate Personal Protective Equipment and discussed with nursing. 03/16/21        Discharge Planning  defer        Electronically signed by Patricio Vitale DO, 03/16/21, 14:21 EDT.  [facility] Hospitalist Team

## 2021-03-16 NOTE — PLAN OF CARE
Goal Outcome Evaluation:  Plan of Care Reviewed With: patient  Progress: improving  Outcome Summary: Pt off tridil drip, stress myoview and 2D Echo today. Pt has had some runs of v-tach. Pt will stay overnight and further plan of care will be up to Dr. Frye. continue to monitor

## 2021-03-17 PROBLEM — R07.9 CHEST PAIN: Status: RESOLVED | Noted: 2021-01-01 | Resolved: 2021-01-01

## 2021-03-17 NOTE — PLAN OF CARE
Problem: Fall Injury Risk  Goal: Absence of Fall and Fall-Related Injury  Outcome: Ongoing, Progressing  Intervention: Identify and Manage Contributors to Fall Injury Risk  Recent Flowsheet Documentation  Taken 3/16/2021 1915 by Delicia Nguyen, RN  Medication Review/Management: medications reviewed  Intervention: Promote Injury-Free Environment  Recent Flowsheet Documentation  Taken 3/16/2021 2340 by Deliica Nguyen, RN  Safety Promotion/Fall Prevention:   safety round/check completed   room organization consistent   nonskid shoes/slippers when out of bed   lighting adjusted   clutter free environment maintained   assistive device/personal items within reach   activity supervised  Taken 3/16/2021 1915 by Delicia Nguyen, RN  Safety Promotion/Fall Prevention:   safety round/check completed   room organization consistent   nonskid shoes/slippers when out of bed   lighting adjusted   clutter free environment maintained   assistive device/personal items within reach   activity supervised     Problem: Adult Inpatient Plan of Care  Goal: Plan of Care Review  Outcome: Ongoing, Progressing  Flowsheets  Taken 3/17/2021 0128 by Delicia Nguyen, RN  Outcome Summary: Pt is not complaining of any pain at this time  VSS  Will continue to monitor  Taken 3/16/2021 1803 by Adelaide Martin RN  Plan of Care Reviewed With: patient  Goal: Patient-Specific Goal (Individualized)  Outcome: Ongoing, Progressing  Goal: Absence of Hospital-Acquired Illness or Injury  Outcome: Ongoing, Progressing  Intervention: Identify and Manage Fall Risk  Recent Flowsheet Documentation  Taken 3/16/2021 2340 by Delicia Nguyen, RN  Safety Promotion/Fall Prevention:   safety round/check completed   room organization consistent   nonskid shoes/slippers when out of bed   lighting adjusted   clutter free environment maintained   assistive device/personal items within reach   activity supervised  Taken 3/16/2021 1915 by Delicia Nguyen,  RN  Safety Promotion/Fall Prevention:   safety round/check completed   room organization consistent   nonskid shoes/slippers when out of bed   lighting adjusted   clutter free environment maintained   assistive device/personal items within reach   activity supervised  Intervention: Prevent Skin Injury  Recent Flowsheet Documentation  Taken 3/16/2021 2340 by Delicia Nguyen RN  Body Position: position changed independently  Taken 3/16/2021 1915 by Delicia Nguyen RN  Body Position: position changed independently  Intervention: Prevent Infection  Recent Flowsheet Documentation  Taken 3/16/2021 2340 by Delicia Nguyen RN  Infection Prevention:   visitors restricted/screened   single patient room provided   rest/sleep promoted   personal protective equipment utilized   hand hygiene promoted   equipment surfaces disinfected  Taken 3/16/2021 1915 by Delicia Nguyen RN  Infection Prevention:   visitors restricted/screened   single patient room provided   rest/sleep promoted   personal protective equipment utilized   hand hygiene promoted   equipment surfaces disinfected  Goal: Optimal Comfort and Wellbeing  Outcome: Ongoing, Progressing  Intervention: Provide Person-Centered Care  Recent Flowsheet Documentation  Taken 3/16/2021 1915 by Delicia Nguyen RN  Trust Relationship/Rapport:   care explained   questions answered   questions encouraged   reassurance provided   thoughts/feelings acknowledged  Goal: Readiness for Transition of Care  Outcome: Ongoing, Progressing   Goal Outcome Evaluation:  Plan of Care Reviewed With: patient  Progress: no change  Outcome Summary: Pt is not complaining of any pain at this time  VSS  Will continue to monitor

## 2021-03-17 NOTE — DISCHARGE SUMMARY
[facility]  DISCHARGE SUMMARY        Prepared For PCP:  Jaylen Moss MD    Patient Name: Jose Dixon Jr.  : 1960  MRN: [id number]      Date of Admission:   3/15/2021    Date of Discharge:  3/17/2021    Length of stay:  LOS: 0 days     Hospital Course     Presenting Problem:   Palpitations [R00.2]  Nausea [R11.0]  Chest pain, unspecified type [R07.9]      Active Hospital Problems    Diagnosis  POA   • AICD discharge [Z45.02]  Not Applicable     Priority: High   • CHF (congestive heart failure) (CMS/[facility]) [I50.9]  Yes     Priority: Medium   • Atrial fibrillation, chronic (CMS/[facility]) [I48.20]  Yes     Priority: Medium   • BPH without obstruction/lower urinary tract symptoms [N40.0]  Yes     Priority: Medium   • Peripheral neuropathy [G62.9]  Yes     Priority: Medium   • Ischemic cardiomyopathy [I25.5]  Yes     Priority: Medium   • Essential hypertension [I10]  Yes     Priority: Medium   • Coronary artery disease involving native heart with angina pectoris (CMS/[facility]) [I25.119]  Yes     Priority: Medium   • Sleep apnea [G47.30]  Yes     Priority: Medium   • Dyslipidemia [E78.5]  Yes     Priority: Medium   • Depression [F32.9]  Yes     Priority: Medium   • Morbid obesity (CMS/HCC) [E66.01]  Yes     Priority: Medium   • Anxiety [F41.9]  Yes     Priority: Medium   • ICD (implantable cardioverter-defibrillator) in place [Z95.810]  Yes     Priority: Medium   • COPD with acute exacerbation (CMS/[facility]) [J44.1]  Yes     Priority: Medium      Resolved Hospital Problems    Diagnosis Date Resolved POA   • **Chest pain [R07.9] 2021 Yes     Priority: High           Hospital Course:    The patient was placed on observation.  He was evaluated by cardiologist and EP cardiologist was consulted.  It was found that the patient had not been taking mexiletine thus was prescribed in addition to his usual metoprolol.  The patient was discharged home on 3/17/2021.        Problem list  addressed during hospitalization    Chest pain: Resolved  -Chest pain likely from AICD discharge  -Chest x-ray 3/5/2021 --> no acute cardiopulmonary pathology   -Stress test and TTE 3/16/2021--> ruled out reversible ischemia.     AICD discharge:  -Pacemaker interrogated in the ED which showed 4 episodes of ventricular fibrillation but only 1   defibrillator shock at 7 AM at 41 J.  -Consider cardiology consult to evaluate AICD lead placement  -Discharged on metoprolol and mexiletine     Cardiomyopathy  -Continue Bumex and Aldactone     Hypertension:  -Continue metoprolol     Dyslipidemia:  -Continue home statin     Neuropathy: Controlled.  -Continue home Neurontin  and Cymbalta      COPD: Not in exacerbation  -Continue bronchodilators     Atrial fibrillation  -Continue rate control with metoprolol and Eliquis        Sleep apnea:  -Patient wears home trilogy  -BiPAP ordered per home settings     BPH:   -Continue home Flomax     Depression/anxiety: Controlled.  -Continue Cymbalta and hydroxyzine  -Patient unsure if he takes Depakote at home             Recommendation for Outpatient Providers:             Reasons For Change In Medications and Indications for New Medications:        Day of Discharge     HPI:     The patient is a 60-year-old male with history of cardiomyopathy s/p AICD presented to the emergency room on 3/15/2021 planing of chest pain that radiated to his left shoulder.  The pain had woken him up from sleep and reoccurred few hours later with dull type of chest pain.  The patient took nitroglycerin without relief    In the ED, AICD was interrogated and revealed V. fib and rapid monomorphic VT. chest x-ray ruled out acute cardiopulmonary pathology.    Vital Signs:   Temp:  [97.5 °F (36.4 °C)-98.2 °F (36.8 °C)] 98.2 °F (36.8 °C)  Heart Rate:  [69-72] 69  Resp:  [16-18] 17  BP: (109-148)/(74-87) 109/74     Physical Exam:  Physical Exam  HENT:      Head: Normocephalic and atraumatic.      Nose: Nose normal.    Eyes:      General: No scleral icterus.     Extraocular Movements: Extraocular movements intact.      Pupils: Pupils are equal, round, and reactive to light.   Cardiovascular:      Rate and Rhythm: Normal rate and regular rhythm.   Pulmonary:      Effort: Pulmonary effort is normal.      Breath sounds: Normal breath sounds.   Abdominal:      General: Bowel sounds are normal.      Palpations: Abdomen is soft.      Comments: Obese abdomen.   Musculoskeletal:         General: Normal range of motion.      Cervical back: Normal range of motion.   Skin:     General: Skin is warm.      Findings: No rash.   Neurological:      General: No focal deficit present.      Mental Status: He is alert.   Psychiatric:         Mood and Affect: Mood normal.         Pertinent  and/or Most Recent Results     Results from last 7 days   Lab Units 03/17/21  0358 03/16/21  0521 03/15/21  1601   WBC 10*3/mm3 10.50 11.80* 13.20*   HEMOGLOBIN g/dL 14.3 14.1 15.1   HEMATOCRIT % 42.9 43.4 45.6   PLATELETS 10*3/mm3 236 247 262   SODIUM mmol/L 134* 138 137   POTASSIUM mmol/L 4.6 3.7 4.0   CHLORIDE mmol/L 93* 96* 96*   CO2 mmol/L 30.0* 35.0* 29.0   BUN mg/dL 28* 22* 19   CREATININE mg/dL 1.26 1.29* 1.03   GLUCOSE mg/dL 91 109* 115*   CALCIUM mg/dL 8.6 8.8 9.1     Results from last 7 days   Lab Units 03/17/21  0358 03/16/21  0521 03/15/21  1601   BILIRUBIN mg/dL  --   --  0.6   ALK PHOS U/L  --   --  77   ALT (SGPT) U/L  --   --  13   AST (SGOT) U/L  --   --  25   PROTIME Seconds 11.0 11.4 17.2*   INR  1.00 1.04 1.60*           Invalid input(s): TG, LDLCALC, LDLREALC  Results from last 7 days   Lab Units 03/17/21  0358 03/16/21  0521 03/16/21  0008 03/15/21  1839 03/15/21  1601   HEMOGLOBIN A1C % 5.8*  --   --   --   --    PROBNP pg/mL  --   --   --   --  1,283.0*   TROPONIN T ng/mL  --  0.015 0.015 <0.010 0.010       Brief Urine Lab Results  (Last result in the past 365 days)      Color   Clarity   Blood   Leuk Est   Nitrite   Protein   CREAT    Urine HCG        11/20/20 1711 Yellow Clear Trace Negative Negative Negative               Microbiology Results Abnormal     Procedure Component Value - Date/Time    COVID PRE-OP / PRE-PROCEDURE SCREENING ORDER (NO ISOLATION) - Swab, Nasopharynx [[id number]]  (Normal) Collected: 03/15/21 2134    Lab Status: Final result Specimen: Swab from Nasopharynx Updated: 03/16/21 0045    Narrative:      The following orders were created for panel order COVID PRE-OP / PRE-PROCEDURE SCREENING ORDER (NO ISOLATION) - Swab, Nasopharynx.  Procedure                               Abnormality         Status                     ---------                               -----------         ------                     COVID-19,CEPHEID,COR/ISH...[[id number]]  Normal              Final result                 Please view results for these tests on the individual orders.    COVID-19,CEPHEID,COR/ISH/PAD IN-HOUSE(OR EMERGENT/ADD-ON),NP SWAB IN TRANSPORT MEDIA 3-4 HR TAT, RT-PCR - Swab, Nasopharynx [[id number]]  (Normal) Collected: 03/15/21 2134    Lab Status: Final result Specimen: Swab from Nasopharynx Updated: 03/16/21 0045     COVID19 Not Detected    Narrative:      Fact sheet for providers: https://www.fda.gov/media/770749/download     Fact sheet for patients: https://www.fda.gov/media/747717/download          XR Chest 1 View    Result Date: 3/15/2021  Impression: No acute chest finding.  Electronically Signed By-Sheba Thorne MD On:3/15/2021 4:37 PM This report was finalized on [id number] by  Sheba Thorne MD.              Results for orders placed during the hospital encounter of 03/15/21    Adult Transthoracic Echo Complete w/ Color, Spectral and Contrast if necessary per protocol    Interpretation Summary  · The left ventricular cavity is moderate to severely dilated.  · Left ventricular wall thickness is consistent with mild concentric hypertrophy.  · The following left ventricular wall segments are hypokinetic: basal anterolateral,  mid anterolateral, apical lateral, basal inferolateral and mid inferolateral.  · Estimated left ventricular EF = 35% Estimated left ventricular EF was in agreement with the calculated left ventricular EF. Left ventricular systolic function is severely decreased.  · The right ventricular cavity is mildly dilated.  · Mildly reduced right ventricular systolic function noted.  · Left ventricular diastolic function is consistent with (grade II w/high LAP) pseudonormalization.  · Estimated right ventricular systolic pressure from tricuspid regurgitation is normal (<35 mmHg).              Test Results Pending at Discharge        Procedures Performed           Consults:   Consults     Date and Time Order Name Status Description    3/16/2021  5:28 PM Inpatient Cardiology Consult              Discharge Details        Discharge Medications      New Medications      Instructions Start Date   mexiletine 250 MG capsule  Commonly known as: MEXITIL   250 mg, Oral, 3 Times Daily         Continue These Medications      Instructions Start Date   albuterol sulfate  (90 Base) MCG/ACT inhaler  Commonly known as: PROVENTIL HFA;VENTOLIN HFA;PROAIR HFA   2 puffs, Inhalation, Every 4 Hours PRN      apixaban 5 MG tablet tablet  Commonly known as: Eliquis   5 mg, Oral, 2 Times Daily      atorvastatin 80 MG tablet  Commonly known as: LIPITOR   80 mg, Oral, Daily      Budeson-Glycopyrrol-Formoterol 160-9-4.8 MCG/ACT aerosol inhaler  Commonly known as: BREZTRI   2 puffs, Inhalation, 2 Times Daily      budesonide-formoterol 160-4.5 MCG/ACT inhaler  Commonly known as: SYMBICORT   2 puffs, Inhalation, 2 Times Daily - RT      bumetanide 1 MG tablet  Commonly known as: BUMEX   1 mg, Oral, 2 Times Daily      cetirizine 10 MG tablet  Commonly known as: zyrTEC   10 mg, Oral, Daily      clopidogrel 75 MG tablet  Commonly known as: PLAVIX   75 mg, Oral, Daily      divalproex 250 MG DR tablet  Commonly known as: DEPAKOTE   250 mg, Oral, 2 times  daily      DULoxetine 60 MG capsule  Commonly known as: CYMBALTA   60 mg, Oral, Daily      fluticasone 50 MCG/ACT nasal spray  Commonly known as: FLONASE   2 sprays, Nasal, Daily      gabapentin 300 MG capsule  Commonly known as: NEURONTIN   300 mg, Oral, 2 times daily      hydrOXYzine 50 MG tablet  Commonly known as: ATARAX   50 mg, Oral, Nightly PRN      ipratropium-albuterol 0.5-2.5 mg/3 ml nebulizer  Commonly known as: DUO-NEB   3 mL, Nebulization, Every 4 Hours PRN      metOLazone 2.5 MG tablet  Commonly known as: ZAROXOLYN   2.5 mg, Oral, Daily PRN      metoprolol succinate XL 50 MG 24 hr tablet  Commonly known as: TOPROL-XL   50 mg, Oral, Every 24 Hours Scheduled      midodrine 5 MG tablet  Commonly known as: PROAMATINE   5 mg, Oral, Every 8 Hours Scheduled      montelukast 10 MG tablet  Commonly known as: SINGULAIR   10 mg, Oral, Daily      nitroglycerin 0.4 MG SL tablet  Commonly known as: NITROSTAT   0.4 mg, Sublingual, Every 5 Minutes PRN, Take no more than 3 doses in 15 minutes.       ranolazine 1000 MG 12 hr tablet  Commonly known as: RANEXA   1,000 mg, Oral, 2 Times Daily      roflumilast 500 MCG tablet tablet  Commonly known as: DALIRESP   500 mcg, Oral, Daily      spironolactone 25 MG tablet  Commonly known as: ALDACTONE   25 mg, Oral, Daily      tadalafil 5 MG tablet  Commonly known as: CIALIS   5 mg, Oral, Daily PRN      tamsulosin 0.4 MG capsule 24 hr capsule  Commonly known as: FLOMAX   1 capsule, Oral, 2 times daily             Allergies   Allergen Reactions   • Codeine Anaphylaxis          • Tramadol Anaphylaxis and Hives         Discharge Disposition:  Home or Self Care    Diet:  Hospital:  Diet Order   Procedures   • Diet Cardiac; Healthy Heart         Discharge Activity: as tolertaed        CODE STATUS:    Code Status and Medical Interventions:   Ordered at: 03/15/21 1941     Level Of Support Discussed With:    Patient     Code Status:    CPR     Medical Interventions (Level of Support Prior  to Arrest):    Full         Follow-up Appointments  Future Appointments   Date Time Provider Department Center   3/24/2021  8:30 AM Silver Burger MD [facility] None   5/28/2021  9:00 AM Jose Lopez MD [facility] None       Additional Instructions for the Follow-ups that You Need to Schedule     Discharge Follow-up with PCP   As directed       Currently Documented PCP:    Jaylen Moss MD    PCP Phone Number:    [phone]     Follow Up Details: 2 weeks                 Condition on Discharge:      Stable      This patient has been examined wearing appropriate Personal Protective Equipment and discussed with nursing. 03/17/21      Electronically signed by Patricio Vitale DO, 03/17/21, 2:09 PM EDT.      Time: I spent 15 minutes on this discharge activity which included face-to-face encounter with the patient/reviewing the data in the system/coordination of the care with the nursing staff as well as consultants/documentation/entering orders.

## 2021-03-17 NOTE — PLAN OF CARE
Goal Outcome Evaluation:  Plan of Care Reviewed With: patient  Progress: improving  Outcome Summary: No complaints from pt at this time. Pt will be discharged home on mexitil 250mg. continue to monitor.

## 2021-03-17 NOTE — PROGRESS NOTES
Cardiology Progress  Note      Patient Care Team:  Jaylen Moss MD as PCP - General  Wicho Sotelo MD as Consulting Physician (Nephrology)    PATIENT IDENTIFICATION  Name: Jose Dixon Jr.  Age: 60 y.o.  Sex: male  :  1960  MRN: [id number]             REASON FOR FOLLOW-UP:  Recurrent ventricular tachycardia  ICD shock  Severe cardiomyopathy  Severe LV dysfunction  Known obstructive coronary artery disease with prior PCI and stenting  Last cardiac catheterization in  of last year showing moderate to severe obstructive coronary disease and then marginal branch of the circumflex  Congestive heart failure  chronic systolic heart failure  Congestive heart failure NYHA class Ill  Congestive heart failure secondary to ischemic cardiomyopathy'  History of VT ablation for recurrent VT  Known coronary artery disease with ischemic cardiomyopathy  Chronic anginal chest pain  History of atrial fibrillation  Ischemic cardiomyopathy with LV ejection fraction of 30%  Hypertension  Hyperlipidemia  History of multiple coronary interventions  Chest pain  Appropriate therapy for ventricular fibrillation and rapid monomorphic VT      No evidence of any acute coronary syndrome  Cardiogram this hospital admission with LV ejection fraction of 35%  No significant reversible ischemia on the stress testing  Device interrogation shows multiple episodes of ventricular tachycardia/ventricular fibrillation and appropriate shock for episode of ventricular fibrillation  Normal device function  Twelve-lead EKG shows biventricular paced rhythm  Patient is currently on antiplatelet therapy with Plavix  Patient is on anticoagulation therapy with Eliquis which is being held currently  Patient was on spironolactone 25 mg p.o. once a day  Patient is supposed to be on beta-blocker at home mild did not see it in the hospital  Resume beta-blocker therapy  Discussed with the EP about antiarrhythmic medical therapy and further  treatment options  Discussed with patient  Prior cath films reviewed and discussed  Continue current treatment with high-dose beta-blockers and also mexiletine  Advised patient to follow-up in office with all his home medications  Resume Eliquis  Okay to discharge from cardiac standpoint  Follow-up in office next week  Need for close monitoring and follow-up reviewed and discussed with patient  Labs reviewed  No further episodes of ventricular tachycardia overnight  Patient clinically feels better          SUBJECTIVE    Patient stated that he feels very well today.  He denies any further chest discomfort, shortness of breath.  No lower extremity edema, dizziness.  Patient does report some indigestion/reflux discomfort      REVIEW OF SYSTEMS:  Pertinent items are noted in HPI, all other systems reviewed and negative    OBJECTIVE   Sitting on side of bed and in no acute distress.    ASSESSMENT/PLAN  Recurrent ventricular tachycardia  ICD shock  Severe cardiomyopathy  Severe LV dysfunction  Known obstructive coronary artery disease with prior PCI and stenting  Last cardiac catheterization in June of last year showing moderate to severe obstructive coronary disease and then marginal branch of the circumflex  Congestive heart failure  chronic systolic heart failure      Recommendations  Patient was seen by electrophysiologist and mexiletine restarted at 50 mg 3 times daily  Patient feels well with no further ICD discharge per patient report  No further chest pain  No evidence of overt fluid overload  Nuclear stress testing was performed with no evidence of reversible ischemia  Echocardiogram with EF 35%, grade 2 diastolic dysfunction  No further CV work-up planned this admission  Likely can be discharged home after evaluation by Dr. Giles    Seen and examined patient agree with the note written by the nurse practitioner      Vital Signs  Visit Vitals  /74 (BP Location: Right arm, Patient Position: Sitting)   Pulse  "69   Temp 98.2 °F (36.8 °C) (Oral)   Resp 17   Ht 177.8 cm (70\")   Wt 134 kg (294 lb 12.1 oz)   SpO2 94%   BMI 42.29 kg/m²     Oxygen Therapy  SpO2: 94 %  Pulse Oximetry Type: Continuous  Device (Oxygen Therapy): room air  Flow (L/min): 2  Flowsheet Rows      First Filed Value   Admission Height  177.8 cm (70\") Documented at 03/15/2021 1552   Admission Weight  132 kg (290 lb) Documented at 03/15/2021 1552        Intake & Output (last 3 days)       03/14 0701 - 03/15 0700 03/15 0701 - 03/16 0700 03/16 0701 - 03/17 0700 03/17 0701 - 03/18 0700    P.O.  840 804     Total Intake(mL/kg)  840 (6.3) 804 (6)     Urine (mL/kg/hr)  400 400 (0.1)     Total Output  400 400     Net  +440 +404             Urine Unmeasured Occurrence   5 x     Stool Unmeasured Occurrence   1 x         Lines, Drains & Airways    Active LDAs     Name:   Placement date:   Placement time:   Site:   Days:    Peripheral IV 03/15/21 1626 Left Antecubital   03/15/21    1626    Antecubital   1                       /74 (BP Location: Right arm, Patient Position: Sitting)   Pulse 69   Temp 98.2 °F (36.8 °C) (Oral)   Resp 17   Ht 177.8 cm (70\")   Wt 134 kg (294 lb 12.1 oz)   SpO2 94%   BMI 42.29 kg/m²   Intake/Output last 3 shifts:  I/O last 3 completed shifts:  In: 1644 [P.O.:1644]  Out: 800 [Urine:800]  Intake/Output this shift:  No intake/output data recorded.    PHYSICAL EXAM:    General: Well-developed, well-nourished 60-year-old  male who is alert, cooperative, no distress, appears stated age  Head:  Normocephalic, atraumatic, mucous membranes moist  Eyes:  Conjunctiva/corneas clear, EOM's intact     Neck:  Supple,  no adenopathy; no JVD or bruit  Lungs: Clear to auscultation bilaterally, no wheezes rhonchi rales are noted  Chest wall: No tenderness  Heart::  Regular rate and rhythm, S1 and S2 normal, no murmur, rub or gallop  Abdomen: Soft, non-tender, nondistended bowel sounds active  Extremities: No cyanosis, clubbing, or edema "   Pulses: 2+ and symmetric all extremities  Skin:  No rashes or lesions  Neuro/psych: A&O x3. CN II through XII are grossly intact with appropriate affect      Scheduled Meds:      apixaban, 5 mg, Oral, Q12H  atorvastatin, 80 mg, Oral, Daily  budesonide-formoterol, 2 puff, Inhalation, BID - RT  bumetanide, 1 mg, Oral, BID  cetirizine, 10 mg, Oral, Daily  clopidogrel, 75 mg, Oral, Daily  DULoxetine, 60 mg, Oral, Daily  gabapentin, 300 mg, Oral, Q12H  metoprolol succinate XL, 50 mg, Oral, Q24H  mexiletine, 250 mg, Oral, Q8H  midodrine, 5 mg, Oral, Q8H  montelukast, 10 mg, Oral, Daily  ranolazine, 1,000 mg, Oral, BID  roflumilast, 500 mcg, Oral, Daily  sodium chloride, 10 mL, Intravenous, Q12H  spironolactone, 25 mg, Oral, Daily  tamsulosin, 0.4 mg, Oral, Daily        Continuous Infusions:    nitroglycerin, 50 mcg, Last Rate: Stopped (03/16/21 0425)        PRN Meds:    •  acetaminophen **OR** acetaminophen **OR** acetaminophen  •  albuterol  •  aluminum-magnesium hydroxide-simethicone  •  bisacodyl  •  hydrOXYzine  •  magnesium sulfate **OR** magnesium sulfate in D5W 1g/100mL (PREMIX)  •  melatonin  •  nitroglycerin  •  ondansetron **OR** ondansetron  •  potassium chloride  •  sodium chloride  •  sodium chloride  •  sodium chloride        Results Review:     I reviewed the patient's new clinical results.    CBC    Results from last 7 days   Lab Units 03/17/21  0358 03/16/21  0521 03/15/21  1601   WBC 10*3/mm3 10.50 11.80* 13.20*   HEMOGLOBIN g/dL 14.3 14.1 15.1   PLATELETS 10*3/mm3 236 247 262     Cr Clearance Estimated Creatinine Clearance: 88 mL/min (by C-G formula based on SCr of 1.26 mg/dL).  Coag   Results from last 7 days   Lab Units 03/17/21  0358 03/16/21  0521 03/15/21  1601   INR  1.00 1.04 1.60*     HbA1C   Lab Results   Component Value Date    HGBA1C 5.8 (H) 03/17/2021    HGBA1C 5.9 (H) 06/04/2020    HGBA1C 5.4 09/14/2018     Blood Glucose No results found for: POCGLU  Infection     CMP   Results from last  7 days   Lab Units 03/17/21  0358 03/16/21  0521 03/15/21  1601   SODIUM mmol/L 134* 138 137   POTASSIUM mmol/L 4.6 3.7 4.0   CHLORIDE mmol/L 93* 96* 96*   CO2 mmol/L 30.0* 35.0* 29.0   BUN mg/dL 28* 22* 19   CREATININE mg/dL 1.26 1.29* 1.03   GLUCOSE mg/dL 91 109* 115*   ALBUMIN g/dL  --   --  4.20   BILIRUBIN mg/dL  --   --  0.6   ALK PHOS U/L  --   --  77   AST (SGOT) U/L  --   --  25   ALT (SGPT) U/L  --   --  13     ABG      UA      TATUM  No results found for: POCMETH, POCAMPHET, POCBARBITUR, POCBENZO, POCCOCAINE, POCOPIATES, POCOXYCODO, POCPHENCYC, POCPROPOXY, POCTHC, POCTRICYC  Lysis Labs   Results from last 7 days   Lab Units 03/17/21  0358 03/16/21  0521 03/15/21  1601   INR  1.00 1.04 1.60*   HEMOGLOBIN g/dL 14.3 14.1 15.1   PLATELETS 10*3/mm3 236 247 262   CREATININE mg/dL 1.26 1.29* 1.03     Radiology(recent) XR Chest 1 View    Result Date: 3/15/2021  No acute chest finding.  Electronically Signed By-Sheba Thorne MD On:3/15/2021 4:37 PM This report was finalized on [id number] by  Sheba Thorne MD.        Results from last 7 days   Lab Units 03/16/21  0521 03/15/21  1839   CK TOTAL U/L  --  98   TROPONIN T ng/mL 0.015 <0.010       Xrays, labs reviewed personally by physician.    ECG/EMG Results (most recent)     Procedure Component Value Units Date/Time    ECG 12 Lead [[id number]]  (Abnormal) Resulted: 03/15/21 1829     Updated: 03/15/21 1829    ECG 12 Lead [[id number]] Collected: 03/15/21 1947     Updated: 03/15/21 1949     QT Interval 593 ms     Narrative:      HEART RATE= 89  bpm  RR Interval= 673  ms  NH Interval= 56  ms  P Horizontal Axis=   deg  P Front Axis= 0  deg  QRSD Interval= 186  ms  QT Interval= 593  ms  QRS Axis= 135  deg  T Wave Axis= 19  deg  - ABNORMAL ECG -  Ventricular-paced complexes  Biventricular paced rhythm  When compared with ECG of 15-Mar-2021 15:46:44,  No significant change  Electronically Signed By:   Date and Time of Study: 2021-03-15 19:47:04    ECG 12 Lead [[id number]]  Collected: 03/16/21 0355     Updated: 03/16/21 0356     QT Interval 528 ms     Narrative:      HEART RATE= 73  bpm  RR Interval= 826  ms  CT Interval= 248  ms  P Horizontal Axis=   deg  P Front Axis= 0  deg  QRSD Interval= 205  ms  QT Interval= 528  ms  QRS Axis= -66  deg  T Wave Axis= 79  deg  - ABNORMAL ECG -  A-V dual-paced complexes w/ some inhibition  Biventricular paced rhythm  Electronically Signed By:   Date and Time of Study: 2021-03-16 03:55:02    ECG 12 Lead [[id number]] Collected: 03/15/21 1546     Updated: 03/16/21 1502     QT Interval 444 ms     Narrative:      HEART RATE= 70  bpm  RR Interval= 856  ms  CT Interval= 34  ms  P Horizontal Axis=   deg  P Front Axis= 0  deg  QRSD Interval= 168  ms  QT Interval= 444  ms  QRS Axis= 131  deg  T Wave Axis= -63  deg  - ABNORMAL ECG -  Ventricular-paced rhythm  Biventricular paced rhythm  When compared with ECG of 12-Nov-2020 10:50:13,  No significant change  Electronically Signed By: Sarath Blood ([facility]) 16-Mar-2021 15:02:03  Date and Time of Study: 2021-03-15 15:46:44    Adult Transthoracic Echo Complete w/ Color, Spectral and Contrast if necessary per protocol [[id number]] Collected: 03/16/21 0957     Updated: 03/16/21 1604     BSA 2.5 m^2      RVIDd 3.5 cm      IVSd 1.5 cm      LVIDd 6.5 cm      LVIDs 6.2 cm      LVPWd 1.1 cm      IVS/LVPW 1.3     FS 3.9 %      EDV(Teich) 212.3 ml      ESV(Teich) 194.1 ml      EF(Teich) 8.6 %      EDV(cubed) 268.5 ml      ESV(cubed) 238.5 ml      EF(cubed) 11.2 %      LV mass(C)d 392.0 grams      LV mass(C)dI 159.5 grams/m^2      SV(Teich) 18.2 ml      SI(Teich) 7.4 ml/m^2      SV(cubed) 30.0 ml      SI(cubed) 12.2 ml/m^2      Ao root diam 3.0 cm      Ao root area 7.0 cm^2      ACS 2.3 cm      asc Aorta Diam 3.2 cm      LVOT diam 2.1 cm      LVOT area 3.6 cm^2      EDV(MOD-sp4) 123.1 ml      ESV(MOD-sp4) 74.3 ml      EF(MOD-sp4) 39.6 %      SV(MOD-sp4) 48.8 ml      SI(MOD-sp4) 19.9 ml/m^2      Ao root area (BSA corrected) 1.2      LV Goode Vol (BSA corrected) 50.1 ml/m^2      LV Sys Vol (BSA corrected) 30.2 ml/m^2      Aortic R-R 0.86 sec      Aortic HR 69.8 BPM      MV V2 max 256.5 cm/sec      MV max PG 35.5 mmHg      MV V2 mean 186.6 cm/sec      MV mean PG 23.5 mmHg      MV V2 VTI 75.8 cm      Ao pk raymond 93.8 cm/sec      Ao max PG 3.5 mmHg      Ao V2 mean 70.9 cm/sec      Ao mean PG 2.3 mmHg      Ao V2 VTI 15.6 cm      MR max raymond 379.1 cm/sec      MR max PG 57.8 mmHg      MR mean raymond 314.9 cm/sec      MR mean PG 46.7 mmHg      MR .4 cm      CO(Ao) 7.6 l/min      CI(Ao) 3.1 l/min/m^2      SV(Ao) 108.8 ml      SI(Ao) 44.3 ml/m^2      PA V2 max 65.0 cm/sec      PA max PG 1.7 mmHg      PA max PG (full) 0.9 mmHg      PA acc time 0.1 sec      RV V1 max PG 0.81 mmHg      RV V1 mean PG 0.41 mmHg      RV V1 max 45.0 cm/sec      RV V1 mean 29.9 cm/sec      RV V1 VTI 7.5 cm      PA pr(Accel) 36.2 mmHg       CV ECHO MAN - BZI_BMI 42.2 kilograms/m^2       CV ECHO MAN - BSA(HAYCOCK) 2.6 m^2       CV ECHO MAN - BZI_METRIC_WEIGHT 133.4 kg       CV ECHO MAN - BZI_METRIC_HEIGHT 177.8 cm      Target HR (85%) 138 bpm      Max. Pred. HR (100%) 162 bpm      EF(MOD-bp) 40.0 %      LA dimension(2D) 5.2 cm      Echo EF Estimated 35 %     Narrative:      · The left ventricular cavity is moderate to severely dilated.  · Left ventricular wall thickness is consistent with mild concentric   hypertrophy.  · The following left ventricular wall segments are hypokinetic: basal   anterolateral, mid anterolateral, apical lateral, basal inferolateral and   mid inferolateral.  · Estimated left ventricular EF = 35% Estimated left ventricular EF was in   agreement with the calculated left ventricular EF. Left ventricular   systolic function is severely decreased.  · The right ventricular cavity is mildly dilated.  · Mildly reduced right ventricular systolic function noted.  · Left ventricular diastolic function is consistent with (grade II w/high   LAP)  pseudonormalization.  · Estimated right ventricular systolic pressure from tricuspid   regurgitation is normal (<35 mmHg).               Medication Review:   I have reviewed the patient's current medication list  Scheduled Meds:apixaban, 5 mg, Oral, Q12H  atorvastatin, 80 mg, Oral, Daily  budesonide-formoterol, 2 puff, Inhalation, BID - RT  bumetanide, 1 mg, Oral, BID  cetirizine, 10 mg, Oral, Daily  clopidogrel, 75 mg, Oral, Daily  DULoxetine, 60 mg, Oral, Daily  gabapentin, 300 mg, Oral, Q12H  metoprolol succinate XL, 50 mg, Oral, Q24H  mexiletine, 250 mg, Oral, Q8H  midodrine, 5 mg, Oral, Q8H  montelukast, 10 mg, Oral, Daily  ranolazine, 1,000 mg, Oral, BID  roflumilast, 500 mcg, Oral, Daily  sodium chloride, 10 mL, Intravenous, Q12H  spironolactone, 25 mg, Oral, Daily  tamsulosin, 0.4 mg, Oral, Daily      Continuous Infusions:nitroglycerin, 50 mcg, Last Rate: Stopped (03/16/21 0425)      PRN Meds:.•  acetaminophen **OR** acetaminophen **OR** acetaminophen  •  albuterol  •  aluminum-magnesium hydroxide-simethicone  •  bisacodyl  •  hydrOXYzine  •  magnesium sulfate **OR** magnesium sulfate in D5W 1g/100mL (PREMIX)  •  melatonin  •  nitroglycerin  •  ondansetron **OR** ondansetron  •  potassium chloride  •  sodium chloride  •  sodium chloride  •  sodium chloride    Imaging:  Imaging Results (Last 72 Hours)     Procedure Component Value Units Date/Time    XR Chest 1 View [[id number]] Collected: 03/15/21 1637     Updated: 03/15/21 1639    Narrative:      DATE OF EXAM:  3/15/2021 4:25 PM     PROCEDURE:  XR CHEST 1 VW-     INDICATIONS:  Chest pain protocol       COMPARISON:  AP portable chest 11/11/2020.     TECHNIQUE:   Single radiographic view of the chest was obtained.     FINDINGS:  Heart size is mildly enlarged but stable. Pacemaker device appears  unchanged in position. Pulmonary vascular distribution is normal. No  acute airspace disease, pleural effusion, pneumothorax, or acute osseous  abnormality.        "Impression:      No acute chest finding.     Electronically Signed By-Sheba Thorne MD On:3/15/2021 4:37 PM  This report was finalized on [id number] by  Sheba Thorne MD.            ABHISHEK Peña  03/17/21  12:30 EDT       EMR Dragon/Transcription:   \"Dictated utilizing Dragon dictation\".       Electronically signed by ABHISHEK Peña, 03/17/21, 12:30 PM EDT.    "

## 2021-03-17 NOTE — PROGRESS NOTES
This Patient is well known to me from previous admissions.   We discussed Fluid restrictions and medications at home.  He actually looks better this admission than any other admission I have seen him.   No edema to feet and legs and on RA no distress noted

## 2021-03-18 NOTE — PROGRESS NOTES
Case Management Discharge Note                Selected Continued Care - Discharged on 3/17/2021 Admission date: 3/15/2021 - Discharge disposition: Home or Self Care                          Final Discharge Disposition Code: 01 - home or self-care

## 2021-03-18 NOTE — OUTREACH NOTE
Prep Survey      Responses   [facility] facility patient discharged from?  [facility]   Is LACE score < 7 ?  No   Emergency Room discharge w/ pulse ox?  No   Eligibility  Readm Mgmt   Discharge diagnosis  Palpitations, nausea, chest pain   Does the patient have one of the following disease processes/diagnoses(primary or secondary)?  Other   Does the patient have Home health ordered?  No   Is there a DME ordered?  No   Comments regarding appointments  Needs f/u scheduled   Medication alerts for this patient  Continue Eliquis and Plavix.  Meds per AVS.   Prep survey completed?  Yes          Enedina Cuevas RN

## 2021-03-22 NOTE — OUTREACH NOTE
Medical Week 1 Survey      Responses   [facility] patient discharged from?  [facility]   Does the patient have one of the following disease processes/diagnoses(primary or secondary)?  Other   Week 1 attempt successful?  Yes   Call start time  1610   Call end time  1615   Discharge diagnosis  Palpitations, nausea, chest pain   Meds reviewed with patient/caregiver?  Yes   Is the patient having any side effects they believe may be caused by any medication additions or changes?  No   Does the patient have all medications ordered at discharge?  Yes   Is the patient taking all medications as directed (includes completed medication regime)?  Yes   Does the patient have a primary care provider?   Yes   Does the patient have an appointment with their PCP within 7 days of discharge?  No   Comments regarding PCP  Pt plans to make appointment   What is preventing the patient from scheduling follow up appointments within 7 days of discharge?  Haven't had time   Nursing Interventions  Advised patient to make appointment   Psychosocial issues?  No   Did the patient receive a copy of their discharge instructions?  Yes   Nursing interventions  Reviewed instructions with patient   What is the patient's perception of their health status since discharge?  Improving   If the patient is a current smoker, are they able to teach back resources for cessation?  Not a smoker   Week 1 call completed?  Yes   Wrap up additional comments  Pt reports he is improving.  He denies needs during this call.          Shanna Rhodes RN

## 2021-03-24 NOTE — PROGRESS NOTES
Cardiology Office Visit      Encounter Date:  03/24/2021    Patient ID:   Jose Dixon Jr. is a 60 y.o. male.    Reason For Followup:  Cardiomyopathy  Coronary artery disease    Brief Clinical History:  Dear Dr. Moss, Jaylen OTTO MD    I had the pleasure of seeing Jose Dixon Jr. today. As you are well aware, this is a 60 y.o. male with past medical history there is significant for history of coronary artery disease history of ischemic cardiomyopathy history of percutaneous coronary intervention multiple times in the past history of ICD history of atrial fibrillation history of ventricular tachycardia history of ablation for ventricular tachycardia in the past with a residual significant cardiomyopathy and scar burden    Interval History:  Recent hospitalization for ICD shocks with the multiple episodes of ventricular tachycardia and ventricular fibrillation  Currently on medical therapy with stabilization of the symptoms  Denies any new cardiac symptoms    Assessment & Plan    Impressions:  Congestive heart failure  chronic systolic heart failure  Congestive heart failure NYHA class Ill  Congestive heart failure secondary to ischemic cardiomyopathy'  History of VT ablation for recurrent VT  Known coronary artery disease with ischemic cardiomyopathy  Chronic anginal chest pain  History of atrial fibrillation  Ischemic cardiomyopathy with LV ejection fraction of 30%  Hypertension  Hyperlipidemia  History of multiple coronary interventions  Prior history of VT ablation multiple times  Known residual coronary artery disease  History of surgical LV lead placement  Biventricular ICD in situ  History of AV node ablation  Chronic renal insufficiency  VT storm with ICD shock started on Mexitil   Recent echocardiogram with severe LV dysfunction with LV ejection fraction of 35%    Recommendations:  Continue current medical management  Recent labs and medical records from during the hospitalization reviewed and discussed with  "the patient  Follow-up with the EP for further ventricular arrhythmias  Continue Plavix as antiplatelet therapy  Continue anticoagulation therapy with Eliquis  Continue high-dose statin  Continue beta-blocker and mexiletine  Continue spironolactone  Continue Ranexa  Need for compliance with medical therapy and close monitoring reviewed and discussed with the patient  All the medications reviewed and discussed with the patient  Follow-up with the EP as scheduled  Follow-up in office in 3 months    Objective:    Vitals:  Vitals:    03/24/21 0831   BP: 150/75   BP Location: Left arm   Pulse: (!) 45   Resp: 18   SpO2: 98%   Weight: 133 kg (294 lb)   Height: 177.8 cm (70\")       Physical Exam:    General: Alert, cooperative, no distress, appears stated age  Head:  Normocephalic, atraumatic, mucous membranes moist  Eyes:  Conjunctiva/corneas clear, EOM's intact     Neck:  Supple,  no adenopathy;      Lungs: Clear to auscultation bilaterally, no wheezes rhonchi rales are noted  Chest wall: No tenderness  Heart::  Regular rate and rhythm, S1 and S2 normal, displaced LV apical impulse 3 x 6 diseases or murmur left sternal border  Abdomen: Soft, non-tender, nondistended bowel sounds active  Extremities: No cyanosis, clubbing, or edema  Pulses: 2+ and symmetric all extremities  Skin:  No rashes or lesions  Neuro/psych: A&O x3. CN II through XII are grossly intact with appropriate affect      Allergies:  Allergies   Allergen Reactions   • Codeine Anaphylaxis          • Tramadol Anaphylaxis and Hives       Medication Review:     Current Outpatient Medications:   •  albuterol sulfate  (90 Base) MCG/ACT inhaler, Inhale 2 puffs Every 4 (Four) Hours As Needed for Wheezing., Disp: , Rfl:   •  apixaban (ELIQUIS) 5 MG tablet tablet, Take 1 tablet by mouth 2 (Two) Times a Day., Disp: 60 tablet, Rfl: 5  •  atorvastatin (LIPITOR) 80 MG tablet, Take 1 tablet by mouth Daily., Disp: 90 tablet, Rfl: 3  •  Budeson-Glycopyrrol-Formoterol " (BREZTRI) 160-9-4.8 MCG/ACT aerosol inhaler, Inhale 2 puffs 2 (Two) Times a Day., Disp: , Rfl:   •  bumetanide (BUMEX) 1 MG tablet, Take 1 mg by mouth 2 (Two) Times a Day., Disp: , Rfl:   •  cetirizine (zyrTEC) 10 MG tablet, Take 1 tablet by mouth Daily., Disp: 90 tablet, Rfl: 1  •  clopidogrel (PLAVIX) 75 MG tablet, Take 1 tablet by mouth Daily., Disp: 90 tablet, Rfl: 1  •  DULoxetine (CYMBALTA) 60 MG capsule, Take 60 mg by mouth Daily., Disp: , Rfl:   •  Ergocalciferol (VITAMIN D2 PO), Take 50,000 Units by mouth 1 (One) Time Per Week., Disp: , Rfl:   •  fluticasone (FLONASE) 50 MCG/ACT nasal spray, 2 sprays into the nostril(s) as directed by provider Daily., Disp: 1 bottle, Rfl: 11  •  gabapentin (NEURONTIN) 300 MG capsule, Take 300 mg by mouth 2 (two) times a day., Disp: , Rfl:   •  ipratropium-albuterol (DUO-NEB) 0.5-2.5 mg/3 ml nebulizer, Take 3 mL by nebulization Every 4 (Four) Hours As Needed for Wheezing., Disp: , Rfl:   •  metOLazone (ZAROXOLYN) 2.5 MG tablet, Take 1 tablet by mouth Daily As Needed (edema)., Disp: 30 tablet, Rfl: 1  •  metoprolol succinate XL (TOPROL-XL) 50 MG 24 hr tablet, Take 1 tablet by mouth Daily., Disp: 30 tablet, Rfl: 0  •  mexiletine (MEXITIL) 250 MG capsule, Take 1 capsule by mouth 3 (Three) Times a Day., Disp: 90 capsule, Rfl: 2  •  midodrine (PROAMATINE) 5 MG tablet, Take 1 tablet by mouth Every 8 (Eight) Hours., Disp: 90 tablet, Rfl: 0  •  montelukast (SINGULAIR) 10 MG tablet, Take 10 mg by mouth Daily., Disp: , Rfl:   •  nitroglycerin (NITROSTAT) 0.4 MG SL tablet, Place 0.4 mg under the tongue Every 5 (Five) Minutes As Needed for Chest Pain. Take no more than 3 doses in 15 minutes., Disp: , Rfl:   •  ranolazine (RANEXA) 1000 MG 12 hr tablet, Take 1 tablet by mouth 2 (Two) Times a Day., Disp: 180 tablet, Rfl: 3  •  roflumilast (DALIRESP) 500 MCG tablet tablet, Take 500 mcg by mouth Daily., Disp: , Rfl:   •  spironolactone (ALDACTONE) 25 MG tablet, Take 1 tablet by mouth Daily.  (Patient taking differently: Take 25 mg by mouth 3 (Three) Times a Day.), Disp: , Rfl:   •  tadalafil (CIALIS) 5 MG tablet, Take 5 mg by mouth Daily As Needed for Erectile Dysfunction., Disp: , Rfl:   •  tamsulosin (FLOMAX) 0.4 MG capsule 24 hr capsule, Take 1 capsule by mouth 2 (two) times a day., Disp: , Rfl:     Family History:  Family History   Problem Relation Age of Onset   • Diabetes Mother    • Heart disease Mother         MI age 46 - CHF   • Atrial fibrillation Mother    • COPD Mother    • Atrial fibrillation Father    • Heart disease Father         CHF       Past Medical History:  Past Medical History:   Diagnosis Date   • Asthma    • Atrial fibrillation (CMS/[facility])    • CAD (coronary artery disease)    • CHF (congestive heart failure) (CMS/[facility])    • COPD (chronic obstructive pulmonary disease) (CMS/[facility])    • Depression    • Elevated cholesterol    • Hyperlipidemia    • Hypertension    • Myocardial infarction (CMS/[facility])    • Pacemaker 2019    [employer]    • Sleep apnea    • V tach (CMS/[facility])        Past surgical History:  Past Surgical History:   Procedure Laterality Date   • BACK SURGERY      Sciatica   • CARDIAC ABLATION  11/07/2017   • CARDIAC CATHETERIZATION      6-8 stents, last heart cath 2019   • CARDIAC CATHETERIZATION N/A 6/9/2020    Procedure: Coronary angiography;  Surgeon: Jose Lopez MD;  Location: [facility] CATH INVASIVE LOCATION;  Service: Cardiovascular;  Laterality: N/A;   • CARDIAC ELECTROPHYSIOLOGY PROCEDURE N/A 9/26/2019    Procedure: BIVENTRICULAR DEVICE UPGRADE;  Surgeon: Jose Lopez MD;  Location: [facility] CATH INVASIVE LOCATION;  Service: Cardiovascular   • CARDIAC ELECTROPHYSIOLOGY PROCEDURE N/A 9/26/2019    Procedure: EP/Ablation AV Node ablation;  Surgeon: Jose Lopez MD;  Location: [facility] CATH INVASIVE LOCATION;  Service: Cardiology   • CARDIAC ELECTROPHYSIOLOGY PROCEDURE N/A 6/9/2020    Procedure: EP/Ablation;  Surgeon: Jose Lopez MD;  Location:  [facility] CATH INVASIVE LOCATION;  Service: Cardiovascular;  Laterality: N/A;   • CARDIAC ELECTROPHYSIOLOGY PROCEDURE N/A 10/1/2020    Procedure: EP/Ablation;  Surgeon: Jose Lopez MD;  Location: [facility] CATH INVASIVE LOCATION;  Service: Cardiovascular;  Laterality: N/A;   • CARDIAC PACEMAKER PLACEMENT N/A 6/22/2020    Procedure: LEFT VENTRICULAR LEAD PLACEMENT;  Surgeon: Christiano Richmond MD;  Location: [facility] CVOR;  Service: Cardiothoracic;  Laterality: N/A;   • CHOLECYSTECTOMY     • COLONOSCOPY     • CORONARY ANGIOPLASTY WITH STENT PLACEMENT      2 stents   • HERNIA REPAIR      gallbladder    • PACEMAKER IMPLANTATION  07/08/2016    Pacemaker/ Defib implant - [place]   • SINUS SURGERY      sinus surgery x 9   • SKIN BIOPSY      benign       Social History:  Social History     Socioeconomic History   • Marital status:      Spouse name: Not on file   • Number of children: Not on file   • Years of education: Not on file   • Highest education level: Not on file   Tobacco Use   • Smoking status: Never Smoker   • Smokeless tobacco: Never Used   Vaping Use   • Vaping Use: Never used   Substance and Sexual Activity   • Alcohol use: Never     Comment: socially   • Drug use: No   • Sexual activity: Defer     Partners: Female       Review of Systems:  The following systems were reviewed as they relate to the cardiovascular system: Constitutional, Eyes, ENT, Cardiovascular, Respiratory, Gastrointestinal, Integumentary, Neurological, Psychiatric, Hematologic, Endocrine, Musculoskeletal, and Genitourinary. The pertinent cardiovascular findings are reported above with all other cardiovascular points within those systems being negative.    Diagnostic Study Review:     Current Electrocardiogram:  Procedures      NOTE: The following portions of the patient's history were reviewed and updated this visit as appropriate: allergies, current medications, past family history, past medical history, past social history, past  surgical history and problem list.

## 2021-04-01 NOTE — OUTREACH NOTE
Medical Week 2 Survey      Responses   [facility] patient discharged from?  [facility]   Does the patient have one of the following disease processes/diagnoses(primary or secondary)?  Other   Week 2 attempt successful?  Yes   Call start time  1544   Discharge diagnosis  Palpitations, nausea, chest pain   Call end time  1554   Meds reviewed with patient/caregiver?  Yes   Is the patient having any side effects they believe may be caused by any medication additions or changes?  No   Does the patient have all medications ordered at discharge?  Yes   Is the patient taking all medications as directed (includes completed medication regime)?  Yes   Does the patient have a primary care provider?   Yes   Has the patient kept scheduled appointments due by today?  Yes   Has home health visited the patient within 72 hours of discharge?  N/A   Psychosocial issues?  No   Did the patient receive a copy of their discharge instructions?  Yes   Nursing interventions  Reviewed instructions with patient   What is the patient's perception of their health status since discharge?  Improving   Is the patient/caregiver able to teach back signs and symptoms related to disease process for when to call PCP?  Yes   Is the patient/caregiver able to teach back signs and symptoms related to disease process for when to call 911?  Yes   Is the patient/caregiver able to teach back the hierarchy of who to call/visit for symptoms/problems? PCP, Specialist, Home health nurse, Urgent Care, ED, 911  Yes   If the patient is a current smoker, are they able to teach back resources for cessation?  Not a smoker   Additional teach back comments  PATIENT STATES HE MAY BE GOING TO [facility] TO GET EVALUATED FOR A HEART TRANSPLANT   Week 2 Call Completed?  Yes          Lisa Lopez LPN

## 2021-04-08 NOTE — OUTREACH NOTE
Medical Week 3 Survey      Responses   [facility] patient discharged from?  [facility]   Does the patient have one of the following disease processes/diagnoses(primary or secondary)?  Other   Week 3 attempt successful?  Yes   Call start time  1418   Call end time  1422   Meds reviewed with patient/caregiver?  Yes   Is the patient having any side effects they believe may be caused by any medication additions or changes?  No   Does the patient have all medications ordered at discharge?  Yes   Is the patient taking all medications as directed (includes completed medication regime)?  Yes   Does the patient have a primary care provider?   Yes   Comments regarding PCP  PATIENT HAS NOT YET SEEN HIS PCP OR MADE APPOINTMENT, BUT HE HAS BEEN FOLLOWING UP WITH HIS NEPHROLOGIST   Has the patient kept scheduled appointments due by today?  Yes   Has home health visited the patient within 72 hours of discharge?  N/A   Psychosocial issues?  No   Did the patient receive a copy of their discharge instructions?  Yes   Nursing interventions  Reviewed instructions with patient   What is the patient's perception of their health status since discharge?  Improving   Is the patient/caregiver able to teach back signs and symptoms related to disease process for when to call PCP?  Yes   Is the patient/caregiver able to teach back signs and symptoms related to disease process for when to call 911?  Yes   Is the patient/caregiver able to teach back the hierarchy of who to call/visit for symptoms/problems? PCP, Specialist, Home health nurse, Urgent Care, ED, 911  Yes   Week 3 Call Completed?  Yes          Lisa Lopez LPN

## 2021-04-15 NOTE — OUTREACH NOTE
Medical Week 4 Survey      Responses   [facility] patient discharged from?  [facility]   Does the patient have one of the following disease processes/diagnoses(primary or secondary)?  Other   Week 4 attempt successful?  Yes   Call start time  1349   Call end time  1350   Discharge diagnosis  Palpitations, nausea, chest pain   Meds reviewed with patient/caregiver?  Yes   Is the patient having any side effects they believe may be caused by any medication additions or changes?  No   Is the patient taking all medications as directed (includes completed medication regime)?  Yes   Has the patient kept scheduled appointments due by today?  Yes   Is the patient still receiving Home Health Services?  N/A   Psychosocial issues?  No   What is the patient's perception of their health status since discharge?  Improving   Is the patient/caregiver able to teach back signs and symptoms related to disease process for when to call PCP?  Yes   Is the patient/caregiver able to teach back signs and symptoms related to disease process for when to call 911?  Yes   Is the patient/caregiver able to teach back the hierarchy of who to call/visit for symptoms/problems? PCP, Specialist, Home health nurse, Urgent Care, ED, 911  Yes   If the patient is a current smoker, are they able to teach back resources for cessation?  Not a smoker   Week 4 Call Completed?  Yes   Would the patient like one additional call?  No   Graduated  Yes   Is the patient interested in additional calls from an ambulatory ?  NOTE:  applies to high risk patients requiring additional follow-up.  No   Did the patient feel the follow up calls were helpful during their recovery period?  Yes   Was the number of calls appropriate?  Yes          Lisa Lopez LPN

## 2021-04-26 PROBLEM — R07.9 CHEST PAIN: Status: ACTIVE | Noted: 2021-01-01

## 2021-04-29 NOTE — OUTREACH NOTE
Prep Survey      Responses   [facility]?  [facility]   Is LACE score < 7 ?  No   Emergency Room discharge w/ pulse ox?  No   Eligibility  Readm Mgmt   Discharge diagnosis  Chest pain,  serial troponin/EKG was negative, ACS ruled out   Does the patient have one of the following disease processes/diagnoses(primary or secondary)?  Other   Does the patient have Home health ordered?  No   Is there a DME ordered?  No   Prep survey completed?  Yes          Maite López RN

## 2021-05-03 NOTE — OUTREACH NOTE
Medical Week 1 Survey      Responses   [facility] [place] patient discharged from?  [facility]   Does the patient have one of the following disease processes/diagnoses(primary or secondary)?  Other   Week 1 attempt successful?  Yes   Call start time  0835   Call end time  0837   Meds reviewed with patient/caregiver?  Yes   Is the patient having any side effects they believe may be caused by any medication additions or changes?  No   Does the patient have all medications ordered at discharge?  N/A   Is the patient taking all medications as directed (includes completed medication regime)?  Yes   Does the patient have a primary care provider?   Yes   Does the patient have an appointment with their PCP within 7 days of discharge?  No   Comments regarding PCP  PATIENT STATES HE WILL CALL HIS PCP OFFICE TODAY TO MAKE A FOLLOW UP APPOINTMENT. PATIENT DECLINED ASSISTANCE GETTING THIS MADE.    What is preventing the patient from scheduling follow up appointments within 7 days of discharge?  Haven't had time   Nursing Interventions  Educated patient on importance of making appointment, Advised patient to make appointment   Has the patient kept scheduled appointments due by today?  N/A   Has home health visited the patient within 72 hours of discharge?  N/A   Psychosocial issues?  No   Did the patient receive a copy of their discharge instructions?  Yes   Nursing interventions  Reviewed instructions with patient   What is the patient's perception of their health status since discharge?  Improving   Is the patient/caregiver able to teach back signs and symptoms related to disease process for when to call PCP?  Yes   Is the patient/caregiver able to teach back signs and symptoms related to disease process for when to call 911?  Yes   Is the patient/caregiver able to teach back the hierarchy of who to call/visit for symptoms/problems? PCP, Specialist, Home health nurse, Urgent Care, ED, 911  Yes   Week 1 call completed?  Yes           Lisa Lopez LPN

## 2021-05-05 NOTE — ED PROVIDER NOTES
Subjective   Patient is a 60-year-old male with history of A. fib, CAD, CHF, COPD, hypertension, cholesterol, pacemaker presents to the ER with a chief complaint of intermittent severe chest pain with associated nausea, shortness of breath and intermittent diaphoresis starting several days ago and worsening today.  There are no exacerbating or alleviating factors for the pain.  The pain radiates at times through the chest to the back and the left arm.          Review of Systems   Constitutional: Positive for diaphoresis. Negative for chills and fever.   HENT: Negative for congestion and sore throat.    Respiratory: Positive for shortness of breath.    Cardiovascular: Positive for chest pain.   Gastrointestinal: Positive for nausea. Negative for abdominal pain and vomiting.   Genitourinary: Negative for decreased urine volume.   Musculoskeletal: Negative for back pain and neck pain.   Skin: Negative for rash.   Neurological: Negative for dizziness, weakness and headaches.       Past Medical History:   Diagnosis Date   • Asthma    • Atrial fibrillation (CMS/HCC)    • CAD (coronary artery disease)    • CHF (congestive heart failure) (CMS/HCC)    • COPD (chronic obstructive pulmonary disease) (CMS/HCC)    • Depression    • Elevated cholesterol    • Hyperlipidemia    • Hypertension    • Myocardial infarction (CMS/HCC)    • Pacemaker 2019    [employer]    • Sleep apnea    • V tach (CMS/HCC)        Allergies   Allergen Reactions   • Codeine Anaphylaxis          • Tramadol Anaphylaxis and Hives       Past Surgical History:   Procedure Laterality Date   • BACK SURGERY      Sciatica   • CARDIAC ABLATION  11/07/2017   • CARDIAC CATHETERIZATION      6-8 stents, last heart cath 2019   • CARDIAC CATHETERIZATION N/A 6/9/2020    Procedure: Coronary angiography;  Surgeon: Jose Lopez MD;  Location: [facility] INVASIVE LOCATION;  Service: Cardiovascular;  Laterality: N/A;   • CARDIAC ELECTROPHYSIOLOGY PROCEDURE N/A  9/26/2019    Procedure: BIVENTRICULAR DEVICE UPGRADE;  Surgeon: Jose Lopez MD;  Location:  [facility] CATH INVASIVE LOCATION;  Service: Cardiovascular   • CARDIAC ELECTROPHYSIOLOGY PROCEDURE N/A 9/26/2019    Procedure: EP/Ablation AV Node ablation;  Surgeon: Jose Lopez MD;  Location:  [facility] CATH INVASIVE LOCATION;  Service: Cardiology   • CARDIAC ELECTROPHYSIOLOGY PROCEDURE N/A 6/9/2020    Procedure: EP/Ablation;  Surgeon: Jose Lopez MD;  Location: [facility] CATH INVASIVE LOCATION;  Service: Cardiovascular;  Laterality: N/A;   • CARDIAC ELECTROPHYSIOLOGY PROCEDURE N/A 10/1/2020    Procedure: EP/Ablation;  Surgeon: Jose Lopez MD;  Location: [facility] CATH INVASIVE LOCATION;  Service: Cardiovascular;  Laterality: N/A;   • CARDIAC PACEMAKER PLACEMENT N/A 6/22/2020    Procedure: LEFT VENTRICULAR LEAD PLACEMENT;  Surgeon: Christiano Richmond MD;  Location: [facility] CVOR;  Service: Cardiothoracic;  Laterality: N/A;   • CHOLECYSTECTOMY     • COLONOSCOPY     • CORONARY ANGIOPLASTY WITH STENT PLACEMENT      2 stents   • HERNIA REPAIR      gallbladder    • PACEMAKER IMPLANTATION  07/08/2016    Pacemaker/ Defib implant - [place]   • SINUS SURGERY      sinus surgery x 9   • SKIN BIOPSY      benign       Family History   Problem Relation Age of Onset   • Diabetes Mother    • Heart disease Mother         MI age 46 - CHF   • Atrial fibrillation Mother    • COPD Mother    • Atrial fibrillation Father    • Heart disease Father         CHF       Social History     Socioeconomic History   • Marital status:      Spouse name: Not on file   • Number of children: Not on file   • Years of education: Not on file   • Highest education level: Not on file   Tobacco Use   • Smoking status: Never Smoker   • Smokeless tobacco: Never Used   Vaping Use   • Vaping Use: Never used   Substance and Sexual Activity   • Alcohol use: Never     Comment: socially   • Drug use: No   • Sexual activity: Defer     Partners: Female            Objective   Physical Exam  Vital signs and triage nurse note reviewed.  Constitutional: Awake, alert; well-developed and well-nourished. No acute distress is noted.  Appears mildly anxious.  HEENT: Normocephalic, atraumatic; pupils are PERRL with intact EOM; oropharynx is pink and moist without exudate or erythema.  No drooling or pooling of oral secretions.  Neck: Supple, full range of motion without pain; no cervical lymphadenopathy. Normal phonation.  Cardiovascular: Regular rate and rhythm, normal S1-S2.  No murmur noted.  Pulmonary: Respiratory effort regular nonlabored, breath sounds clear to auscultation all fields.  Abdomen: Soft, nontender, nondistended with normoactive bowel sounds; no rebound or guarding.  Musculoskeletal: Independent range of motion of all extremities with no palpable tenderness or edema.  Neuro: Alert oriented x3, speech is clear and appropriate, GCS 15.    Skin: Flesh tone, warm, dry, intact; no erythematous or petechial rash or lesion.      Procedures           ED Course      Labs Reviewed   COMPREHENSIVE METABOLIC PANEL - Abnormal; Notable for the following components:       Result Value    Glucose 100 (*)     All other components within normal limits    Narrative:     GFR Normal >60  Chronic Kidney Disease <60  Kidney Failure <15     BNP (IN-HOUSE) - Abnormal; Notable for the following components:    proBNP 2,149.0 (*)     All other components within normal limits    Narrative:     Among patients with dyspnea, NT-proBNP is highly sensitive for the detection of acute congestive heart failure. In addition NT-proBNP of <300 pg/ml effectively rules out acute congestive heart failure with 99% negative predictive value.    Results may be falsely decreased if patient taking Biotin.     CBC WITH AUTO DIFFERENTIAL - Abnormal; Notable for the following components:    WBC 11.20 (*)     RDW 16.3 (*)     All other components within normal limits    Narrative:     The previously  reported component NRBC is no longer being reported. Previous result was 0.1 /100 WBC (Reference Range: 0.0-0.2 /100 WBC) on 4/26/2021 at 1743 EDT.   MANUAL DIFFERENTIAL - Abnormal; Notable for the following components:    Lymphocyte % 9.0 (*)     Bands %  7.0 (*)     Neutrophils Absolute 8.62 (*)     Monocytes Absolute 1.23 (*)     All other components within normal limits   CBC WITH AUTO DIFFERENTIAL - Abnormal; Notable for the following components:    WBC 12.80 (*)     RDW 16.6 (*)     Lymphocyte % 12.1 (*)     Neutrophils, Absolute 9.40 (*)     Monocytes, Absolute 1.50 (*)     All other components within normal limits   BASIC METABOLIC PANEL - Abnormal; Notable for the following components:    Glucose 104 (*)     All other components within normal limits    Narrative:     GFR Normal >60  Chronic Kidney Disease <60  Kidney Failure <15     CBC (NO DIFF) - Abnormal; Notable for the following components:    WBC 11.60 (*)     RDW 16.3 (*)     All other components within normal limits   BASIC METABOLIC PANEL - Abnormal; Notable for the following components:    Glucose 119 (*)     BUN 26 (*)     Sodium 134 (*)     Chloride 95 (*)     eGFR Non  Amer 60 (*)     All other components within normal limits    Narrative:     GFR Normal >60  Chronic Kidney Disease <60  Kidney Failure <15     URINALYSIS W/ MICROSCOPIC IF INDICATED (NO CULTURE) - Abnormal; Notable for the following components:    Color, UA Dark Yellow (*)     Protein, UA Trace (*)     All other components within normal limits    Narrative:     Urine microscopic not indicated.   COVID-19,APTIMA JANICE AGUILARU IN-HOUSE,NP/OP SWAB IN UTM/VTM/SALINE TRANSPORT MEDIA,24 HR TAT - Normal    Narrative:     Fact sheet for providers: https://www.fda.gov/media/710559/download     Fact sheet for patients: https://www.fda.gov/media/152365/download    Test performed by RT PCR.   PROTIME-INR - Normal   APTT - Normal   TROPONIN (IN-HOUSE) - Normal    Narrative:     Troponin  T Reference Range:  <= 0.03 ng/mL-   Negative for AMI  >0.03 ng/mL-     Abnormal for myocardial necrosis.  Clinicians would have to utilize clinical acumen, EKG, Troponin and serial changes to determine if it is an Acute Myocardial Infarction or myocardial injury due to an underlying chronic condition.       Results may be falsely decreased if patient taking Biotin.     MAGNESIUM - Normal   TROPONIN (IN-HOUSE) - Normal    Narrative:     Troponin T Reference Range:  <= 0.03 ng/mL-   Negative for AMI  >0.03 ng/mL-     Abnormal for myocardial necrosis.  Clinicians would have to utilize clinical acumen, EKG, Troponin and serial changes to determine if it is an Acute Myocardial Infarction or myocardial injury due to an underlying chronic condition.       Results may be falsely decreased if patient taking Biotin.     LIPASE - Normal   TSH - Normal   COVID PRE-OP / PRE-PROCEDURE SCREENING ORDER (NO ISOLATION)    Narrative:     The following orders were created for panel order COVID PRE-OP / PRE-PROCEDURE SCREENING ORDER (NO ISOLATION) - Swab, Nasopharynx.  Procedure                               Abnormality         Status                     ---------                               -----------         ------                     COVID-19,APTIMA PANTHER,...[[id number]]  Normal              Final result                 Please view results for these tests on the individual orders.   SCAN SLIDE   CBC AND DIFFERENTIAL    Narrative:     The following orders were created for panel order CBC & Differential.  Procedure                               Abnormality         Status                     ---------                               -----------         ------                     Scan Slide[[id number]]                                       Final result               CBC Auto Differential[[id number]]        Abnormal            Final result                 Please view results for these tests on the individual orders.     No radiology  results for the last day  Medications   nitroglycerin (NITROSTAT) ointment 1 inch (1 inch Topical Given 4/26/21 1727)   aspirin tablet 325 mg (325 mg Oral Given 4/26/21 1727)   Morphine sulfate (PF) injection 4 mg (4 mg Intravenous Given 4/26/21 1728)   ondansetron (ZOFRAN) injection 4 mg (4 mg Intravenous Given 4/26/21 1727)   Morphine sulfate (PF) injection 4 mg (4 mg Intravenous Given 4/26/21 2122)   furosemide (LASIX) injection 40 mg (40 mg Intravenous Given 4/26/21 2133)   Morphine sulfate (PF) injection 2 mg (2 mg Intravenous Given 4/27/21 1302)   Morphine sulfate (PF) injection 2 mg (2 mg Intravenous Given 4/28/21 0514)                                          MDM  Number of Diagnoses or Management Options  Acute on chronic congestive heart failure, unspecified heart failure type (CMS/HCC)  Chest pain, unspecified type  Shortness of breath  Diagnosis management comments: Patient was placed on continuous cardiac monitor.  He had IV established.  He had labs, EKG chest x-ray obtained.  He was given aspirin.    Patient's work-up is consistent with acute on chronic congestive heart failure.  He was given 1 dose of Bumex.  He also displays some signs that are concerning for unstable angina.  He will be admitted to hospital for further evaluation and treatment.  He was discussed the hospitalist nurse porteritioner.       Amount and/or Complexity of Data Reviewed  Clinical lab tests: ordered and reviewed  Tests in the radiology section of CPT®: ordered and reviewed    Patient Progress  Patient progress: stable      Final diagnoses:   Chest pain, unspecified type   Shortness of breath   Acute on chronic congestive heart failure, unspecified heart failure type (CMS/HCC)       ED Disposition  ED Disposition     ED Disposition Condition Comment    Decision to Admit  Level of Care: Telemetry [5]   Diagnosis: Chest pain, unspecified type [[id number]]   Admitting Physician: JITENDRA AGUILAR [[id number]]   Attending Physician: LAUREN  JITENDRA BOOTH [[id number]]            Jaylne Moss MD  [address] [place] [id number] [phone]          Silver Burger MD  [address] [facility] [place] [id number] [phone]    Follow up in 1 week(s)           Medication List      Stop    tadalafil 5 MG tablet  Commonly known as: CIALIS     VITAMIN D2 Jaquelin Armstrong, ABHISHEK  05/05/21 0705

## 2021-05-10 NOTE — PROGRESS NOTES
CC--recurrent VT and congestive heart failure      Sub--60-year-old male patient came for follow-up after recent hospitalization for chronic symptoms of dyspnea and chronic heart failure  .  Patient had uncontrollable atrial fibrillation and underwent AV node ablation with biventricular ICD upgrade--unfortunately coronary sinus lead was placed in anterobasal vein since there was no posterior and lateral veins for placement of coronary sinus lead--he was in refractory heart failure which has improved to class III chronic systolic heart failure .  Patient has known coronary artery disease with ischemic cardiomyopathy and has significant issues with obesity and prior history of COPD.  Patient had prior multiple ICD therapies for conducted atrial fibrillation needing AV node ablation .  He remains in functional class III .Patient has noticed increased number of palpitations associated with chest discomfort in the last few weeks  He continues to remain in class III functional class without any syncope  Chronic medical problems include ischemic cardio myopathy chronic class III systolic heart failure, persistent atrial fibrillation and morbid obesity   Was started on amiodarone with last visit--continues to have episodes of VT needing antitachycardia pacing  Back in 2017 patient underwent an unsuccessful VT ablation with PVC ablation coming from outflow tract which was felt to be possible epicardial origin--ablation from RVOT site was unsuccessful--VT during the time was inferior axis with left bundle and transition V3  Continued to feel poorly with class III systolic heart failure with multiple episodes of VT   Patient presented to the hospital with chronic angina and chronic class III systolic heart failure symptoms and underwent LV epicardial lead placement for optimization of heart failure therapy--he has recurrent admission for heart failure and last admission patient had VT storm with multiple ICD shocks and  underwent a VT ablation affecting the posterior scar and started have recurrent VT and started on Mexitil  and comes in for  evaluation   He denies any syncope since last visit  Clinically patient has improved and well compensated and functional class III and recent brief hospitalization with similar symptoms without any sustained ventricular arrhythmias and comes in for follow-up   No syncope since last seen      Past Medical History:   Diagnosis Date   • Asthma    • Atrial fibrillation (CMS/[facility])    • CAD (coronary artery disease)    • CHF (congestive heart failure) (CMS/[facility])    • COPD (chronic obstructive pulmonary disease) (CMS/[facility])    • Depression    • Hyperlipidemia    • Hypertension    • Myocardial infarction (CMS/HCC)    • Pacemaker     pacemaker / defib   • V tach (CMS/[facility])      Past Surgical History:   Procedure Laterality Date   • CARDIAC ABLATION  11/07/2017   • CARDIAC CATHETERIZATION     • CARDIAC ELECTROPHYSIOLOGY PROCEDURE N/A 9/26/2019    Procedure: BIVENTRICULAR DEVICE UPGRADE;  Surgeon: Jose Lopez MD;  Location: [facility] CATH INVASIVE LOCATION;  Service: Cardiovascular   • CARDIAC ELECTROPHYSIOLOGY PROCEDURE N/A 9/26/2019    Procedure: EP/Ablation AV Node ablation;  Surgeon: Jose Lopez MD;  Location: [facility] CATH INVASIVE LOCATION;  Service: Cardiology   • CHOLECYSTECTOMY     • CORONARY ANGIOPLASTY WITH STENT PLACEMENT      2 stents   • PACEMAKER IMPLANTATION  07/08/2016    Pacemaker/ Defib implant - [employer]   • SINUS SURGERY      sinus surgery x 9         Physical Exam  VITALS REVIEWED    General:      well developed, well nourished, in no acute distress.    Head:      normocephalic and atraumatic.    Eyes:      PERRL/EOM intact, conjunctiva and sclera clear with out nystagmus.    Neck:    thyromegaly,  trachea central with normal respiratory effort    heart:       Paced rhythm     Lung exam shows reduced breath sounds in bilateral bases without wheezing    Assessment plan  Recurrent  and symptomatic  episodes of VT--status post VT storm --repeat VT ablation and this time VT was coming from posterior scar which was ablated 2 weeks ago --had short bursts of nonsustained VT and slightly increased PVCs since ablation --- started on Mexitil and no recurrent sustained VT--nonsustained ventricular arrhythmias noted on ICD interrogation  Clinically improved with intake of current medications including Mexitil  First VT ablation several months ago involving the basal scar  Second VT ablation  involving the posterior scar  Severe ischemic cardiomyopathy with EF less than 20%  Prior history of mild cardiorenal syndrome --most recent creatinine of 1.24  Chronic class III systolic heart failure  Chronic coronary artery disease and chronic chest pain in the past--no other further coronary intervention is warranted as per Dr. Frye  Permanent atrial fibrillation status post AV node ablation  Biventricular ICD in situ --post LV lead placement epicardial lead for optimization and EKG shows excellent biventricular pacing --ICD interrogation attached to chart which is appropriately functioning--RV threshold increased and reprogramming done which is attached to chart  Thoracic impedance evaluation ICD interrogation with normal fluid levels without any recent fluid accumulation  Metolazone prescription given to be taken as needed basis  Patient is getting hypertensive and midodrine can be stopped  Reevaluate in 4 weeks  Possible referral to transplant services educated to the patient    Electronically signed by Jose Lopez MD, 05/10/21, 9:18 AM EDT.

## 2021-05-11 NOTE — OUTREACH NOTE
Medical Week 2 Survey      Responses   [facility] patient discharged from?  [facility]   Does the patient have one of the following disease processes/diagnoses(primary or secondary)?  Other   Week 2 attempt successful?  Yes   Call start time  1444   Discharge diagnosis  Chest pain,  serial troponin/EKG was negative, ACS ruled out   Call end time  1455   Meds reviewed with patient/caregiver?  Yes   Is the patient having any side effects they believe may be caused by any medication additions or changes?  No   Is the patient taking all medications as directed (includes completed medication regime)?  Yes   Does the patient have a primary care provider?   Yes   Has the patient kept scheduled appointments due by today?  Yes [saw Dr Lopez on 5/10/21]   Has home health visited the patient within 72 hours of discharge?  N/A   Psychosocial issues?  No   Comments  states has been having discussions with MD's about possible heart transplant,    Did the patient receive a copy of their discharge instructions?  Yes   Nursing interventions  Reviewed instructions with patient   What is the patient's perception of their health status since discharge?  Improving   Is the patient/caregiver able to teach back signs and symptoms related to disease process for when to call PCP?  Yes   Is the patient/caregiver able to teach back signs and symptoms related to disease process for when to call 911?  Yes   Is the patient/caregiver able to teach back the hierarchy of who to call/visit for symptoms/problems? PCP, Specialist, Home health nurse, Urgent Care, ED, 911  Yes   Additional teach back comments  states still has baseline of SOB, has pain from neuropathy   Week 2 Call Completed?  Yes          Ida Hermosillo RN

## 2021-05-20 NOTE — OUTREACH NOTE
Medical Week 3 Survey      Responses   [facility] patient discharged from?  [facility]   Does the patient have one of the following disease processes/diagnoses(primary or secondary)?  Other   Week 3 attempt successful?  Yes   Call start time  1631   Call end time  1639   Meds reviewed with patient/caregiver?  Yes   Is the patient taking all medications as directed (includes completed medication regime)?  Yes   Has the patient kept scheduled appointments due by today?  Yes   Comments  Has seen Heart Dr. and Specialist and PCP and then will see about the Transplant list   Comments  More sob at times, fatigued, will be put on transplant list at some point soon   What is the patient's perception of their health status since discharge?  Same   Week 3 Call Completed?  Yes   Wrap up additional comments  He is staying about the same, seeing Drs. will be put on Transplant list sometime soon.           Sarah Steiner, RN

## 2021-06-24 PROBLEM — J44.9 COPD (CHRONIC OBSTRUCTIVE PULMONARY DISEASE) (HCC): Status: ACTIVE | Noted: 2020-09-30

## 2021-06-26 NOTE — OUTREACH NOTE
Prep Survey      Responses   [facility]   Is LACE score < 7 ?  No   Emergency Room discharge w/ pulse ox?  No   Eligibility  Readm Mgmt   Discharge diagnosis  CP   Does the patient have one of the following disease processes/diagnoses(primary or secondary)?  Other   Does the patient have Home health ordered?  No   Is there a DME ordered?  No   Comments regarding appointments  see avs   Prep survey completed?  Yes          Ruba Finney RN

## 2021-06-29 NOTE — OUTREACH NOTE
"Medical Week 1 Survey      Responses   [facility] patient discharged from?  [facility]   Does the patient have one of the following disease processes/diagnoses(primary or secondary)?  Other   Week 1 attempt successful?  Yes   Call start time  1620   Call end time  1628   Discharge diagnosis  CP   Meds reviewed with patient/caregiver?  Yes   Does the patient have all medications ordered at discharge?  Yes   Is the patient taking all medications as directed (includes completed medication regime)?  Yes   Medication comments  States he has always been on Eliquis and it is not a new medication   Does the patient have a primary care provider?   Yes   Does the patient have an appointment with their PCP within 7 days of discharge?  Yes   Has the patient kept scheduled appointments due by today?  No   Comments  Pt was unaware of appt on 6/28 with Dr. Moss and he made a new appt for 7/6.  Had appt today with Dr. Burger   Psychosocial issues?  No   What is the patient's perception of their health status since discharge?  Same   Is the patient/caregiver able to teach back signs and symptoms related to disease process for when to call PCP?  Yes   Is the patient/caregiver able to teach back signs and symptoms related to disease process for when to call 911?  Yes   Is the patient/caregiver able to teach back the hierarchy of who to call/visit for symptoms/problems? PCP, Specialist, Home health nurse, Urgent Care, ED, 911  Yes   Additional teach back comments  States he is doing \"so so\". Is waiting to hear from a dr at [facility] that is suppose to help wiith his heart failure.  He was taken of potassium and started on spironolactone.     Week 1 call completed?  Yes   Wrap up additional comments  Denies any quetions or needs at this time.  Just waiting to hear from [facility]          Shikha Braswell LPN  "

## 2021-06-29 NOTE — PROGRESS NOTES
Cardiology Office Visit      Encounter Date:  06/29/2021    Patient ID:   Jose Dixon Jr. is a 60 y.o. male.    Reason For Followup:  Cardiomyopathy  Coronary artery disease    Brief Clinical History:  Dear Jaylen Herrera MD    I had the pleasure of seeing Jose Dixon Jr. today. As you are well aware, this is a 60 y.o. male with past medical history there is significant for history of coronary artery disease history of ischemic cardiomyopathy history of percutaneous coronary intervention multiple times in the past history of ICD history of atrial fibrillation history of ventricular tachycardia history of ablation for ventricular tachycardia in the past with a residual significant cardiomyopathy and scar burden    Interval History:  Recent hospitalization for exacerbation and chest pain  Patient is clinically doing better   Plan with medical therapy  No new complaints    Assessment & Plan    Impressions:  Congestive heart failure  chronic systolic heart failure  Congestive heart failure NYHA class Ill  Congestive heart failure secondary to ischemic cardiomyopathy'  History of VT ablation for recurrent VT  Known coronary artery disease with ischemic cardiomyopathy  Chronic anginal chest pain  History of atrial fibrillation  Ischemic cardiomyopathy with LV ejection fraction of 30%  Hypertension  Hyperlipidemia  History of multiple coronary interventions  Prior history of VT ablation multiple times  Known residual coronary artery disease  History of surgical LV lead placement  Biventricular ICD in situ  History of AV node ablation  Chronic renal insufficiency  VT storm with ICD shock started on Mexitil   Recent echocardiogram with severe LV dysfunction with LV ejection fraction of 35%    Recommendations:  Continue current medical management  Recent labs and medical records from during the hospitalization reviewed and discussed with the patient  Follow-up with the EP for further ventricular arrhythmias  Continue  "Plavix as antiplatelet therapy  Continue anticoagulation therapy with Eliquis  Continue high-dose statin  Continue beta-blocker and mexiletine  Continue spironolactone  Continue Ranexa  Need for compliance with medical therapy and close monitoring reviewed and discussed with the patient  All the medications reviewed and discussed with the patient  Follow-up with the EP as scheduled  Add spironolactone 25 mg p.o. once a day  Discontinue potassium supplements  Consider adding Farxiga to help with the heart failure benefit next office visit  Referred patient to heart failure advanced heart failure service for further evaluation and treatment options  Follow-up in office in 1 month    Objective:    Vitals:  Vitals:    06/29/21 0831   BP: 128/82   BP Location: Left arm   Pulse: 69   Resp: 18   SpO2: 97%   Weight: (!) 141 kg (311 lb)   Height: 177.8 cm (70\")       Physical Exam:    General: Alert, cooperative, no distress, appears stated age  Head:  Normocephalic, atraumatic, mucous membranes moist  Eyes:  Conjunctiva/corneas clear, EOM's intact     Neck:  Supple,  no adenopathy;      Lungs: Clear to auscultation bilaterally, no wheezes rhonchi rales are noted  Chest wall: No tenderness  Heart::  Regular rate and rhythm, S1 and S2 normal, displaced LV apical impulse 2 x 6 pansystolic murmur left sternal border  Abdomen: Soft, non-tender, nondistended bowel sounds active  Extremities: No cyanosis, clubbing, or edema  Pulses: 2+ and symmetric all extremities  Skin:  No rashes or lesions  Neuro/psych: A&O x3. CN II through XII are grossly intact with appropriate affect      Allergies:  Allergies   Allergen Reactions   • Codeine Anaphylaxis     Tolerated morphine 6/24/21   • Tramadol Anaphylaxis and Hives       Medication Review:     Current Outpatient Medications:   •  albuterol sulfate  (90 Base) MCG/ACT inhaler, Inhale 2 puffs Every 4 (Four) Hours As Needed for Wheezing., Disp: , Rfl:   •  apixaban (ELIQUIS) 5 MG " tablet tablet, Take 1 tablet by mouth Every 12 (Twelve) Hours., Disp: 60 tablet, Rfl: 0  •  atorvastatin (LIPITOR) 80 MG tablet, Take 80 mg by mouth Daily., Disp: , Rfl:   •  Budeson-Glycopyrrol-Formoterol (BREZTRI) 160-9-4.8 MCG/ACT aerosol inhaler, Inhale 2 puffs 2 (Two) Times a Day., Disp: , Rfl:   •  cetirizine (zyrTEC) 10 MG tablet, Take 10 mg by mouth Daily., Disp: , Rfl:   •  clopidogrel (PLAVIX) 75 MG tablet, Take 1 tablet by mouth Daily., Disp: 90 tablet, Rfl: 1  •  furosemide (Lasix) 40 MG tablet, Take 1 tablet by mouth Daily., Disp: 20 tablet, Rfl: 0  •  gabapentin (NEURONTIN) 100 MG capsule, Take 400 mg by mouth Every Night. 2 hours before bedtime, Disp: , Rfl:   •  metoprolol succinate XL (TOPROL-XL) 50 MG 24 hr tablet, Take 1 tablet by mouth Daily., Disp: 30 tablet, Rfl: 0  •  mexiletine (MEXITIL) 250 MG capsule, Take 1 capsule by mouth 3 (Three) Times a Day., Disp: 90 capsule, Rfl: 2  •  montelukast (SINGULAIR) 10 MG tablet, Take 10 mg by mouth Daily., Disp: , Rfl:   •  nitroglycerin (NITROSTAT) 0.4 MG SL tablet, Place 0.4 mg under the tongue Every 5 (Five) Minutes As Needed for Chest Pain. Take no more than 3 doses in 15 minutes., Disp: , Rfl:   •  potassium chloride (K-DUR,KLOR-CON) 20 MEQ CR tablet, Take 1 tablet by mouth Daily., Disp: 20 tablet, Rfl: 0  •  ranolazine (RANEXA) 1000 MG 12 hr tablet, Take 1,000 mg by mouth 2 (Two) Times a Day., Disp: , Rfl:   •  roflumilast (DALIRESP) 500 MCG tablet tablet, Take 500 mcg by mouth Daily., Disp: , Rfl:   •  tamsulosin (FLOMAX) 0.4 MG capsule 24 hr capsule, Take 1 capsule by mouth 2 (two) times a day., Disp: , Rfl:     Family History:  Family History   Problem Relation Age of Onset   • Diabetes Mother    • Heart disease Mother         MI age 46 - CHF   • Atrial fibrillation Mother    • COPD Mother    • Atrial fibrillation Father    • Heart disease Father         CHF       Past Medical History:  Past Medical History:   Diagnosis Date   • Asthma    •  Atrial fibrillation (CMS/[facility])    • CAD (coronary artery disease)    • CHF (congestive heart failure) (CMS/[facility])    • COPD (chronic obstructive pulmonary disease) (CMS/[facility])    • Depression    • Elevated cholesterol    • Hyperlipidemia    • Hypertension    • Myocardial infarction (CMS/[facility])    • Pacemaker 2019    [employer]    • Sleep apnea    • V tach (CMS/[facility])        Past surgical History:  Past Surgical History:   Procedure Laterality Date   • BACK SURGERY      Sciatica   • CARDIAC ABLATION  11/07/2017   • CARDIAC CATHETERIZATION      6-8 stents, last heart cath 2019   • CARDIAC CATHETERIZATION N/A 6/9/2020    Procedure: Coronary angiography;  Surgeon: Jose Lopez MD;  Location:  [facility] CATH INVASIVE LOCATION;  Service: Cardiovascular;  Laterality: N/A;   • CARDIAC DEFIBRILLATOR PLACEMENT     • CARDIAC ELECTROPHYSIOLOGY PROCEDURE N/A 9/26/2019    Procedure: BIVENTRICULAR DEVICE UPGRADE;  Surgeon: Jose Lopez MD;  Location: [facility] CATH INVASIVE LOCATION;  Service: Cardiovascular   • CARDIAC ELECTROPHYSIOLOGY PROCEDURE N/A 9/26/2019    Procedure: EP/Ablation AV Node ablation;  Surgeon: Jose Lopez MD;  Location: [facility] CATH INVASIVE LOCATION;  Service: Cardiology   • CARDIAC ELECTROPHYSIOLOGY PROCEDURE N/A 6/9/2020    Procedure: EP/Ablation;  Surgeon: Jose Lopez MD;  Location: [facility] CATH INVASIVE LOCATION;  Service: Cardiovascular;  Laterality: N/A;   • CARDIAC ELECTROPHYSIOLOGY PROCEDURE N/A 10/1/2020    Procedure: EP/Ablation;  Surgeon: Jose Lopez MD;  Location: [facility] CATH INVASIVE LOCATION;  Service: Cardiovascular;  Laterality: N/A;   • CARDIAC PACEMAKER PLACEMENT N/A 6/22/2020    Procedure: LEFT VENTRICULAR LEAD PLACEMENT;  Surgeon: Christiano Richmond MD;  Location: [facility] CVOR;  Service: Cardiothoracic;  Laterality: N/A;   • CHOLECYSTECTOMY     • COLONOSCOPY     • CORONARY ANGIOPLASTY WITH STENT PLACEMENT      2 stents   • HERNIA REPAIR      gallbladder    • KNEE  ARTHROPLASTY     • PACEMAKER IMPLANTATION  07/08/2016    Pacemaker/ Defib implant - [employer]   • SINUS SURGERY      sinus surgery x 9   • SKIN BIOPSY      benign       Social History:  Social History     Socioeconomic History   • Marital status:      Spouse name: Not on file   • Number of children: Not on file   • Years of education: Not on file   • Highest education level: Not on file   Tobacco Use   • Smoking status: Never Smoker   • Smokeless tobacco: Never Used   Vaping Use   • Vaping Use: Never used   Substance and Sexual Activity   • Alcohol use: Never     Comment: socially   • Drug use: No   • Sexual activity: Defer     Partners: Female       Review of Systems:  The following systems were reviewed as they relate to the cardiovascular system: Constitutional, Eyes, ENT, Cardiovascular, Respiratory, Gastrointestinal, Integumentary, Neurological, Psychiatric, Hematologic, Endocrine, Musculoskeletal, and Genitourinary. The pertinent cardiovascular findings are reported above with all other cardiovascular points within those systems being negative.    Diagnostic Study Review:     Current Electrocardiogram:  Procedures      NOTE: The following portions of the patient's history were reviewed and updated this visit as appropriate: allergies, current medications, past family history, past medical history, past social history, past surgical history and problem list.

## 2021-07-05 PROBLEM — R11.0 NAUSEA: Status: ACTIVE | Noted: 2021-01-01

## 2021-07-05 PROBLEM — R19.7 DIARRHEA: Status: ACTIVE | Noted: 2021-01-01

## 2021-07-05 NOTE — ED PROVIDER NOTES
Subjective     Chief complaint: Shortness of breath chest pain diarrhea epigastric pain      Context: Patient is 60-year-old male who comes in complaining of decreased oral intake left-sided nonradiating chest pressure exertional dyspnea and diarrhea for the last 4 days.  He states he has not had anything to eat or drink due to decreased appetite in 4 days.  He denies any urinary complaints.  He states he has had black tarry stools.  He reports approximately 12-14 episodes of diarrhea per day for the last 2 days.  He states he is currently on spironolactone and Lasix and was taken off of his potassium.  He denies any fever cough congestion upper respiratory complaints and has been Covid vaccinated.  No history of gastric ulcers No alcohol or NSAID use.    Duration: 4 days    Timing: Waxes and wanes    Severity: Moderate    Associated symptoms: Worse with exertion          PCP:  White  pen          Review of Systems   Constitutional: Negative for fever.   HENT: Negative.    Eyes: Negative for visual disturbance.   Respiratory: Positive for shortness of breath. Negative for cough.    Cardiovascular: Positive for chest pain. Negative for palpitations and leg swelling.   Gastrointestinal: Positive for abdominal pain, blood in stool, diarrhea and nausea.   Genitourinary: Negative.    Musculoskeletal: Negative.    Skin: Negative.    Allergic/Immunologic: Negative for immunocompromised state.   Neurological: Positive for weakness and light-headedness.   Hematological: Does not bruise/bleed easily.   Psychiatric/Behavioral: Negative for confusion.       Past Medical History:   Diagnosis Date   • Asthma    • Atrial fibrillation (CMS/[facility])    • CAD (coronary artery disease)    • CHF (congestive heart failure) (CMS/[facility])    • COPD (chronic obstructive pulmonary disease) (CMS/[facility])    • Depression    • Elevated cholesterol    • Hyperlipidemia    • Hypertension    • Myocardial infarction (CMS/[facility])    • Pacemaker 2019    [employer]    • Sleep apnea    • V tach (CMS/[facility])        Allergies   Allergen Reactions   • Codeine Anaphylaxis     Tolerated morphine 6/24/21   • Tramadol Anaphylaxis and Hives       Past Surgical History:   Procedure Laterality Date   • BACK SURGERY      Sciatica   • CARDIAC ABLATION  11/07/2017   • CARDIAC CATHETERIZATION      6-8 stents, last heart cath 2019   • CARDIAC CATHETERIZATION N/A 6/9/2020    Procedure: Coronary angiography;  Surgeon: Jose Lopez MD;  Location:  [facility]ATH [facility];  Service: Cardiovascular;  Laterality: N/A;   • CARDIAC DEFIBRILLATOR PLACEMENT     • CARDIAC ELECTROPHYSIOLOGY PROCEDURE N/A 9/26/2019    Procedure: BIVENTRICULAR DEVICE UPGRADE;  Surgeon: Jose Lopez MD;  Location:  [facility];  Service: Cardiovascular   • CARDIAC ELECTROPHYSIOLOGY PROCEDURE N/A 9/26/2019    Procedure: EP/Ablation AV Node ablation;  Surgeon: Jose Lopez MD;  Location:  [facility];  Service: Cardiology   • CARDIAC ELECTROPHYSIOLOGY PROCEDURE N/A 6/9/2020    Procedure: EP/Ablation;  Surgeon: Jose Lopez MD;  Location:  [facility];  Service: Cardiovascular;  Laterality: N/A;   • CARDIAC ELECTROPHYSIOLOGY PROCEDURE N/A 10/1/2020    Procedure: EP/Ablation;  Surgeon: Jose Lopez MD;  Location:  [facility];  Service: Cardiovascular;  Laterality: N/A;   • CARDIAC PACEMAKER PLACEMENT N/A 6/22/2020    Procedure: LEFT VENTRICULAR LEAD PLACEMENT;  Surgeon: Christiano Richmond MD;  Location: [facility];  Service: Cardiothoracic;  Laterality: N/A;   • CHOLECYSTECTOMY     • COLONOSCOPY     • CORONARY ANGIOPLASTY WITH STENT PLACEMENT      2 stents   • HERNIA REPAIR      gallbladder    • KNEE ARTHROPLASTY     • PACEMAKER IMPLANTATION  07/08/2016    Pacemaker/ Defib implant - [facility]   • SINUS SURGERY      sinus surgery x 9   • SKIN BIOPSY      benign       Family History   Problem Relation Age of Onset   •  Diabetes Mother    • Heart disease Mother         MI age 46 - CHF   • Atrial fibrillation Mother    • COPD Mother    • Atrial fibrillation Father    • Heart disease Father         CHF       Social History     Socioeconomic History   • Marital status:      Spouse name: Not on file   • Number of children: Not on file   • Years of education: Not on file   • Highest education level: Not on file   Tobacco Use   • Smoking status: Never Smoker   • Smokeless tobacco: Never Used   Vaping Use   • Vaping Use: Never used   Substance and Sexual Activity   • Alcohol use: Never     Comment: socially   • Drug use: No   • Sexual activity: Defer     Partners: Female           Objective   Physical Exam     Vital signs and triage nurse note reviewed.   Constitutional: Awake, alert; anxious  HEENT: Normocephalic, atraumatic; pupils are PERRL with intact EOM; oropharynx is pink and moist without exudate or erythema.   Neck: Supple, full range of motion without pain;    Cardiovascular: Regular rate and rhythm, normal S1-S2.   Pulmonary: Respiratory effort regular nonlabored, breath sounds clear to auscultation all fields.   Abdomen: Soft, tender across the upper abdomen nondistended with normoactive bowel sounds; no rebound or guarding.   Musculoskeletal: Independent range of motion of all extremities with no palpable tenderness or edema.   Neuro: Alert oriented x3, speech is clear and appropriate, GCS 15   Skin:  Fleshtone warm, dry, intact; no erythematous or petechial rash or lesion       ECG 12 Lead      Date/Time: 7/5/2021 1:17 PM  Performed by: Ely Sears APRN  Authorized by: Ely Sears APRN   Interpreted by physician (roshni)  Comparison: compared with previous ECG from 6/24/2021  Comparison to previous ECG: AV paced  Rhythm: paced  BPM: 70                   ED Course  ED Course as of Jul 05 1630   Mon Jul 05, 2021   1506 Continues to have some chest pain and nitrates were ordered    [JW]      ED Course User  Index  [JW] Ely Sears, ABHISHEK      Labs Reviewed   COMPREHENSIVE METABOLIC PANEL - Abnormal; Notable for the following components:       Result Value    Glucose 108 (*)     Total Bilirubin 1.3 (*)     All other components within normal limits    Narrative:     GFR Normal >60  Chronic Kidney Disease <60  Kidney Failure <15     PROTIME-INR - Abnormal; Notable for the following components:    Protime 12.7 (*)     INR 1.16 (*)     All other components within normal limits   URINALYSIS W/ CULTURE IF INDICATED - Abnormal; Notable for the following components:    Color, UA Orange (*)     Appearance, UA Cloudy (*)     Ketones, UA Trace (*)     Bilirubin, UA Moderate (2+) (*)     Protein,  mg/dL (2+) (*)     Leuk Esterase, UA Small (1+) (*)     All other components within normal limits   BNP (IN-HOUSE) - Abnormal; Notable for the following components:    proBNP 3,242.0 (*)     All other components within normal limits    Narrative:     Among patients with dyspnea, NT-proBNP is highly sensitive for the detection of acute congestive heart failure. In addition NT-proBNP of <300 pg/ml effectively rules out acute congestive heart failure with 99% negative predictive value.    Results may be falsely decreased if patient taking Biotin.     CBC WITH AUTO DIFFERENTIAL - Abnormal; Notable for the following components:    RDW 15.6 (*)     Lymphocyte % 9.1 (*)     Monocyte % 12.3 (*)     Neutrophils, Absolute 7.50 (*)     Monocytes, Absolute 1.20 (*)     All other components within normal limits   URINALYSIS, MICROSCOPIC ONLY - Abnormal; Notable for the following components:    RBC, UA 0-2 (*)     WBC, UA 3-5 (*)     All other components within normal limits   APTT - Normal   LIPASE - Normal   TROPONIN (IN-HOUSE) - Normal    Narrative:     Troponin T Reference Range:  <= 0.03 ng/mL-   Negative for AMI  >0.03 ng/mL-     Abnormal for myocardial necrosis.  Clinicians would have to utilize clinical acumen, EKG, Troponin and  serial changes to determine if it is an Acute Myocardial Infarction or myocardial injury due to an underlying chronic condition.       Results may be falsely decreased if patient taking Biotin.     MAGNESIUM - Normal   GASTROINTESTINAL PANEL, PCR   CLOSTRIDIUM DIFFICILE TOXIN    Narrative:     The following orders were created for panel order Clostridium Difficile Toxin - Stool, Per Rectum.  Procedure                               Abnormality         Status                     ---------                               -----------         ------                     Clostridium Difficile EI...[[id number]]                                                   Please view results for these tests on the individual orders.   CLOSTRIDIUM DIFFICILE EIA   CBC AND DIFFERENTIAL    Narrative:     The following orders were created for panel order CBC & Differential.  Procedure                               Abnormality         Status                     ---------                               -----------         ------                     CBC Auto Differential[[id number]]        Abnormal            Final result                 Please view results for these tests on the individual orders.     Medications   sodium chloride 0.9 % flush 10 mL (has no administration in time range)   pantoprazole (PROTONIX) injection 40 mg (40 mg Intravenous Given 7/5/21 1411)   nitroglycerin (NITROSTAT) SL tablet 0.4 mg (0.4 mg Sublingual Not Given 7/5/21 1626)   nitroglycerin (NITROSTAT) ointment 1 inch (has no administration in time range)   ondansetron (ZOFRAN) injection 4 mg (has no administration in time range)   HYDROmorphone (DILAUDID) injection 0.5 mg (has no administration in time range)   GI cocktail ( Oral Given 7/5/21 1406)   lactated ringers bolus 500 mL (500 mL Intravenous New Bag 7/5/21 1419)   prochlorperazine (COMPAZINE) injection 5 mg (5 mg Intravenous Given 7/5/21 1411)   diphenhydrAMINE (BENADRYL) injection 25 mg (25 mg Intravenous Given  7/5/21 1411)     CT Abdomen Pelvis Without Contrast    Result Date: 7/5/2021   1. Mild hepatomegaly with probable mild steatosis. 2. No evidence of urolithiasis or hydronephrosis. 3. No evidence of bowel obstruction or inflammation. 4. Postsurgical changes of prior umbilical hernia repair. 5. Nonspecific skin thickening surrounding the umbilicus and within the skin of the pannus which could reflect a cellulitis.    Electronically Signed By-Demarcus Linares MD On:7/5/2021 3:40 PM This report was finalized on [id number] by  Demarcus Linares MD.    XR Chest 1 View    Result Date: 7/5/2021  1. Stable cardiomegaly with central vascular congestion and linear atelectasis or scarring within the left midlung. Overall similar findings to the prior radiograph from 06/23/2021  Electronically Signed By-Demarcus Linares MD On:7/5/2021 3:23 PM This report was finalized on [id number] by  Demarcus Linares MD.    Prior to Admission medications    Medication Sig Start Date End Date Taking? Authorizing Provider   albuterol sulfate  (90 Base) MCG/ACT inhaler Inhale 2 puffs Every 4 (Four) Hours As Needed for Wheezing.    Kian rKaus MD   apixaban (ELIQUIS) 5 MG tablet tablet Take 1 tablet by mouth Every 12 (Twelve) Hours. 6/25/21   Dunia Wilkerson MD   atorvastatin (LIPITOR) 80 MG tablet Take 80 mg by mouth Daily.    ProviderKian MD   Budeson-Glycopyrrol-Formoterol (BREZTRI) 160-9-4.8 MCG/ACT aerosol inhaler Inhale 2 puffs 2 (Two) Times a Day.    Kian Kraus MD   cetirizine (zyrTEC) 10 MG tablet Take 10 mg by mouth Daily.    Kian Kraus MD   clopidogrel (PLAVIX) 75 MG tablet Take 1 tablet by mouth Daily. 9/17/20   Jose Lopez MD   furosemide (Lasix) 40 MG tablet Take 1 tablet by mouth Daily. 6/25/21   Dunia Wilkerson MD   gabapentin (NEURONTIN) 100 MG capsule Take 400 mg by mouth Every Night. 2 hours before bedtime    Kian Kraus MD   metoprolol succinate XL  (TOPROL-XL) 50 MG 24 hr tablet Take 1 tablet by mouth Daily. 11/26/20   Rafael Mcneal DO   mexiletine (MEXITIL) 250 MG capsule Take 1 capsule by mouth 3 (Three) Times a Day. 3/17/21   Silver Burger MD   montelukast (SINGULAIR) 10 MG tablet Take 10 mg by mouth Daily.    ProviderKian MD   nitroglycerin (NITROSTAT) 0.4 MG SL tablet Place 0.4 mg under the tongue Every 5 (Five) Minutes As Needed for Chest Pain. Take no more than 3 doses in 15 minutes.    Kian Kraus MD   ranolazine (RANEXA) 1000 MG 12 hr tablet Take 1,000 mg by mouth 2 (Two) Times a Day.    Kian Kraus MD   roflumilast (DALIRESP) 500 MCG tablet tablet Take 500 mcg by mouth Daily.    Kian Kraus MD   spironolactone (ALDACTONE) 25 MG tablet Take 1 tablet by mouth Daily. 6/29/21   Silver Burger MD   tamsulosin (FLOMAX) 0.4 MG capsule 24 hr capsule Take 1 capsule by mouth 2 (two) times a day.    ProviderKian MD                                          MDM  Number of Diagnoses or Management Options  Chest pain, unspecified type  Diarrhea, unspecified type  Exertional dyspnea  Weakness  Diagnosis management comments: 6/29/ ischemic cardiomyopathy  CHF  hx VT  HTN  Recent echocardiogram with severe LV dysfunction with LV ejection fraction of 35%           Comorbidities:  has a past medical history of Asthma, Atrial fibrillation (CMS/[facility]), CAD (coronary artery disease), CHF (congestive heart failure) (CMS/HCC), COPD (chronic obstructive pulmonary disease) (CMS/HCC), Depression, Elevated cholesterol, Hyperlipidemia, Hypertension, Myocardial infarction (CMS/HCC), Pacemaker (2019), Sleep apnea, and V tach (CMS/HCC).  Differentials: CHF exacerbation STEMI non-STEMI virus, letter disease heart abnormalities dehydration not all inclusive of differentials considered  Discussion with provider: Genia  Radiology interpretation:  X-rays reviewed by me and interpreted by radiologist,   CT Abdomen  Pelvis Without Contrast    Result Date: 7/5/2021   1. Mild hepatomegaly with probable mild steatosis. 2. No evidence of urolithiasis or hydronephrosis. 3. No evidence of bowel obstruction or inflammation. 4. Postsurgical changes of prior umbilical hernia repair. 5. Nonspecific skin thickening surrounding the umbilicus and within the skin of the pannus which could reflect a cellulitis.    Electronically Signed By-Demarcus Linares MD On:7/5/2021 3:40 PM This report was finalized on [id number] by  Demarcus Linares MD.    XR Chest 1 View    Result Date: 7/5/2021  1. Stable cardiomegaly with central vascular congestion and linear atelectasis or scarring within the left midlung. Overall similar findings to the prior radiograph from 06/23/2021  Electronically Signed By-Demarcus Linares MD On:7/5/2021 3:23 PM This report was finalized on [id number] by  Demarcus Linares MD.    Lab interpretation:  Labs viewed by me significant for, trace ketones in urine, proBNP 3200    Appropriate PPE worn during exam.  Continues to have some lightheadedness with position changes and on exertion after IV fluids.  He feels like he is weak and needs to be admitted.    i discussed findings with patient who voices understanding of admission for IV fluids and further evaluation serial troponins      Final diagnoses:   Chest pain, unspecified type   Weakness   Diarrhea, unspecified type   Exertional dyspnea       ED Disposition  ED Disposition     ED Disposition Condition Comment    Decision to Admit            No follow-up provider specified.       Medication List      No changes were made to your prescriptions during this visit.          Ely Sears, APRN  07/05/21 3920

## 2021-07-05 NOTE — H&P
[facility] Medicine Services      Patient Name: Jose Dixon Jr.  : 1960  MRN: [id number]  Primary Care Physician: Jaylen Moss MD  Date of admission: 2021    Patient Care Team:  Jaylen Moss MD as PCP - Wicho Jones MD as Consulting Physician (Nephrology)          Subjective   History Present Illness     Chief Complaint:   Chief Complaint   Patient presents with   • Chest Pain     chest pain, stomach pain, diarrhea, pt was d/c from the floor 2 weeks ago for heart failure.           Mr. Dixon is a 60 y.o.  Male w/PMH of A. fib, ICM, DON, HTN, CHF, CAD, HLD, anxiety and depression, AICD, BPH, obesity, COPD, GERD presents to [facility] ED 21 with complaints of left sided chest pain with radiation into his arms and inbetween his shoulder blades. The pain is an achy pain with occasional sharpness. He has shortness of breath with only taking 10 steps. He feels weak and worn out. He has had diarrhea the last 3 days and noticed his stool was a dark, tarry color yesterday. He has had nausea without vomiting but has been unable to eat due to the nausea and upper abdominal pain. He denied any fevers. He is taking all his home medications including his eliquis.   Patient was admitted 21 with chest pain and evaluated by cardiology. During that admission cardiology Dr. Burger documented the patient had limited treatment options and planned to continue conservative management. He followed up with Dr. Frye on 21 and was referred to heart failure service in [place].     In the ED the patient had normal WBC, hgb normal, normal renal function. EKG without ST changes, normal troponin, proBNP 3242.  CT abdomen/pelvis showed mild hepatomegaly with probable mild steatosis, no evidence of urolithiasis or hydronephrosis, no evidence of bowel obstruction or inflammation.  He was given IV dilaudid, GI cocktail, IV Protonix, IV Zofran, IV Compazine, IV  Benadryl, Nitropaste, and 500 mL IV fluid. He was admitted for further work-up weakness, diarrhea, chest pain, and exertional dyspnea.      Review of Systems   HENT: Negative.    Eyes: Negative.    Cardiovascular: Positive for chest pain.   Respiratory: Positive for shortness of breath.    Endocrine: Negative.    Hematologic/Lymphatic: Negative.    Skin: Negative.    Musculoskeletal: Negative.    Gastrointestinal: Positive for diarrhea, melena and nausea.   Genitourinary: Negative.    Neurological: Positive for weakness.   Psychiatric/Behavioral: Negative.    Allergic/Immunologic: Negative.    All other systems reviewed and are negative.          Personal History     Past Medical History:   Past Medical History:   Diagnosis Date   • Asthma    • Atrial fibrillation (CMS/[facility])    • CAD (coronary artery disease)    • CHF (congestive heart failure) (CMS/[facility])    • COPD (chronic obstructive pulmonary disease) (CMS/[facility])    • Depression    • Elevated cholesterol    • Hyperlipidemia    • Hypertension    • Myocardial infarction (CMS/[facility])    • Pacemaker 2019    [employer]    • Sleep apnea    • V tach (CMS/[facility])        Surgical History:      Past Surgical History:   Procedure Laterality Date   • BACK SURGERY      Sciatica   • CARDIAC ABLATION  11/07/2017   • CARDIAC CATHETERIZATION      6-8 stents, last heart cath 2019   • CARDIAC CATHETERIZATION N/A 6/9/2020    Procedure: Coronary angiography;  Surgeon: Jose Lopez MD;  Location: [facility] CATH INVASIVE LOCATION;  Service: Cardiovascular;  Laterality: N/A;   • CARDIAC DEFIBRILLATOR PLACEMENT     • CARDIAC ELECTROPHYSIOLOGY PROCEDURE N/A 9/26/2019    Procedure: BIVENTRICULAR DEVICE UPGRADE;  Surgeon: Jose Lopez MD;  Location: [facility] CATH INVASIVE LOCATION;  Service: Cardiovascular   • CARDIAC ELECTROPHYSIOLOGY PROCEDURE N/A 9/26/2019    Procedure: EP/Ablation AV Node ablation;  Surgeon: Jose Lopez MD;  Location: [facility] CATH INVASIVE LOCATION;   Service: Cardiology   • CARDIAC ELECTROPHYSIOLOGY PROCEDURE N/A 6/9/2020    Procedure: EP/Ablation;  Surgeon: Jose Lopez MD;  Location: [facility] CATH INVASIVE LOCATION;  Service: Cardiovascular;  Laterality: N/A;   • CARDIAC ELECTROPHYSIOLOGY PROCEDURE N/A 10/1/2020    Procedure: EP/Ablation;  Surgeon: Jose Lopez MD;  Location: [facility] CATH INVASIVE LOCATION;  Service: Cardiovascular;  Laterality: N/A;   • CARDIAC PACEMAKER PLACEMENT N/A 6/22/2020    Procedure: LEFT VENTRICULAR LEAD PLACEMENT;  Surgeon: Christiano Richmond MD;  Location: [facility] CVOR;  Service: Cardiothoracic;  Laterality: N/A;   • CHOLECYSTECTOMY     • COLONOSCOPY     • CORONARY ANGIOPLASTY WITH STENT PLACEMENT      2 stents   • HERNIA REPAIR      gallbladder    • KNEE ARTHROPLASTY     • PACEMAKER IMPLANTATION  07/08/2016    Pacemaker/ Defib implant - [employer]   • SINUS SURGERY      sinus surgery x 9   • SKIN BIOPSY      benign           Family History: family history includes Atrial fibrillation in his father and mother; COPD in his mother; Diabetes in his mother; Heart disease in his father and mother. Family History was reviewed.     Social History:  reports that he has never smoked. He has never used smokeless tobacco. He reports that he does not drink alcohol and does not use drugs.      Medications:  Prior to Admission medications    Medication Sig Start Date End Date Taking? Authorizing Provider   apixaban (ELIQUIS) 5 MG tablet tablet Take 1 tablet by mouth Every 12 (Twelve) Hours. 6/25/21  Yes Dunia Wilkerson MD   atorvastatin (LIPITOR) 80 MG tablet Take 80 mg by mouth Daily.   Yes ProviderKian MD   Budeson-Glycopyrrol-Formoterol (BREZTRI) 160-9-4.8 MCG/ACT aerosol inhaler Inhale 2 puffs 2 (Two) Times a Day.   Yes ProviderKian MD   cetirizine (zyrTEC) 10 MG tablet Take 10 mg by mouth Daily.   Yes ProviderKian MD   clopidogrel (PLAVIX) 75 MG tablet Take 1 tablet by mouth Daily. 9/17/20  Yes John  Jose JOHNSON MD   furosemide (Lasix) 40 MG tablet Take 1 tablet by mouth Daily. 6/25/21  Yes Dunia Wilkerson MD   gabapentin (NEURONTIN) 100 MG capsule Take 400 mg by mouth Every Morning.   Yes Kian Kraus MD   gabapentin (NEURONTIN) 600 MG tablet Take 600 mg by mouth Every Night.   Yes Kian Kraus MD   metoprolol succinate XL (TOPROL-XL) 50 MG 24 hr tablet Take 1 tablet by mouth Daily. 11/26/20  Yes Rafael Mcneal DO   mexiletine (MEXITIL) 250 MG capsule Take 1 capsule by mouth 3 (Three) Times a Day. 3/17/21  Yes Silver Burger MD   montelukast (SINGULAIR) 10 MG tablet Take 10 mg by mouth Daily.   Yes Kian Kraus MD   ranolazine (RANEXA) 1000 MG 12 hr tablet Take 1,000 mg by mouth 2 (Two) Times a Day.   Yes Kian Kraus MD   roflumilast (DALIRESP) 500 MCG tablet tablet Take 500 mcg by mouth Daily.   Yes Kian Kraus MD   spironolactone (ALDACTONE) 25 MG tablet Take 1 tablet by mouth Daily. 6/29/21  Yes Silver Burger MD   tamsulosin (FLOMAX) 0.4 MG capsule 24 hr capsule Take 1 capsule by mouth 2 (two) times a day.   Yes Kian Kraus MD   albuterol sulfate  (90 Base) MCG/ACT inhaler Inhale 2 puffs Every 4 (Four) Hours As Needed for Wheezing.    Kian Kraus MD   nitroglycerin (NITROSTAT) 0.4 MG SL tablet Place 0.4 mg under the tongue Every 5 (Five) Minutes As Needed for Chest Pain. Take no more than 3 doses in 15 minutes.    Kian Kraus MD       Allergies:    Allergies   Allergen Reactions   • Codeine Anaphylaxis     Tolerated morphine 6/24/21   • Tramadol Anaphylaxis and Hives       Objective   Objective     Vital Signs  Temp:  [98 °F (36.7 °C)] 98 °F (36.7 °C)  Heart Rate:  [70-73] 71  Resp:  [20] 20  BP: (120-152)/(73-89) 133/73  SpO2:  [93 %-100 %] 94 %  on   ;      Body mass index is 42.99 kg/m².    Physical Exam  Vitals reviewed.   Constitutional:       Appearance: Normal appearance.   HENT:      Head:  Normocephalic.   Eyes:      Extraocular Movements: Extraocular movements intact.      Pupils: Pupils are equal, round, and reactive to light.   Cardiovascular:      Rate and Rhythm: Normal rate and regular rhythm.      Pulses: Normal pulses.      Heart sounds: Normal heart sounds.   Pulmonary:      Breath sounds: Wheezing present.   Abdominal:      General: Bowel sounds are normal.      Palpations: Abdomen is soft.      Tenderness: There is abdominal tenderness in the epigastric area.   Musculoskeletal:         General: Normal range of motion.      Cervical back: Normal range of motion.      Right lower leg: No edema.      Left lower leg: No edema.   Skin:     General: Skin is warm.   Neurological:      Mental Status: He is alert and oriented to person, place, and time.   Psychiatric:         Mood and Affect: Mood normal.           Results Review:  I have personally reviewed most recent cardiac tracings, lab results and radiology images and interpretations and agree with findings    Results from last 7 days   Lab Units 07/05/21  1339   WBC 10*3/mm3 9.90   HEMOGLOBIN g/dL 14.6   HEMATOCRIT % 44.0   PLATELETS 10*3/mm3 259   INR  1.16*     Results from last 7 days   Lab Units 07/05/21  1339   SODIUM mmol/L 142   POTASSIUM mmol/L 4.5   CHLORIDE mmol/L 102   CO2 mmol/L 27.0   BUN mg/dL 11   CREATININE mg/dL 1.13   GLUCOSE mg/dL 108*   CALCIUM mg/dL 8.8   ALT (SGPT) U/L 16   AST (SGOT) U/L 26   TROPONIN T ng/mL <0.010   PROBNP pg/mL 3,242.0*     Estimated Creatinine Clearance: 99 mL/min (by C-G formula based on SCr of 1.13 mg/dL).  Brief Urine Lab Results  (Last result in the past 365 days)      Color   Clarity   Blood   Leuk Est   Nitrite   Protein   CREAT   Urine HCG        07/05/21 1447 Orange  Comment:  Result checked  Cloudy  Comment:  Result checked  Negative Small (1+) Negative 100 mg/dL (2+)               Microbiology Results (last 10 days)     ** No results found for the last 240 hours. **          ECG/EMG  Results (most recent)     Procedure Component Value Units Date/Time    ECG 12 Lead [[id number]] Collected: 07/05/21 1317     Updated: 07/05/21 1319     QT Interval 493 ms     Narrative:      HEART RATE= 70  bpm  RR Interval= 829  ms  FL Interval= 47  ms  P Horizontal Axis= 260  deg  P Front Axis= 0  deg  QRSD Interval= 191  ms  QT Interval= 493  ms  QRS Axis= 147  deg  T Wave Axis= -59  deg  - ABNORMAL ECG -  Ventricular-paced complexes  Biventricular paced rhythm  When compared with ECG of 24-Jun-2021 5:30:37,  No significant change  Electronically Signed By:   Date and Time of Study: 2021-07-05 13:17:10    ECG 12 Lead [[id number]] Resulted: 07/05/21 1631     Updated: 07/05/21 1631              Results for orders placed during the hospital encounter of 03/15/21    Adult Transthoracic Echo Complete w/ Color, Spectral and Contrast if necessary per protocol    Interpretation Summary  · The left ventricular cavity is moderate to severely dilated.  · Left ventricular wall thickness is consistent with mild concentric hypertrophy.  · The following left ventricular wall segments are hypokinetic: basal anterolateral, mid anterolateral, apical lateral, basal inferolateral and mid inferolateral.  · Estimated left ventricular EF = 35% Estimated left ventricular EF was in agreement with the calculated left ventricular EF. Left ventricular systolic function is severely decreased.  · The right ventricular cavity is mildly dilated.  · Mildly reduced right ventricular systolic function noted.  · Left ventricular diastolic function is consistent with (grade II w/high LAP) pseudonormalization.  · Estimated right ventricular systolic pressure from tricuspid regurgitation is normal (<35 mmHg).      CT Abdomen Pelvis Without Contrast    Result Date: 7/5/2021   1. Mild hepatomegaly with probable mild steatosis. 2. No evidence of urolithiasis or hydronephrosis. 3. No evidence of bowel obstruction or inflammation. 4. Postsurgical changes of  prior umbilical hernia repair. 5. Nonspecific skin thickening surrounding the umbilicus and within the skin of the pannus which could reflect a cellulitis.    Electronically Signed By-Demarcus Linares MD On:7/5/2021 3:40 PM This report was finalized on [id number] by  Demarcus Linares MD.    XR Chest 1 View    Result Date: 7/5/2021  1. Stable cardiomegaly with central vascular congestion and linear atelectasis or scarring within the left midlung. Overall similar findings to the prior radiograph from 06/23/2021  Electronically Signed By-Demarcus Linares MD On:7/5/2021 3:23 PM This report was finalized on [id number] by  Demarcus Linares MD.        Estimated Creatinine Clearance: 99 mL/min (by C-G formula based on SCr of 1.13 mg/dL).    Assessment/Plan   Assessment/Plan       Active Hospital Problems    Diagnosis  POA   • **Chest pain [R07.9]  Yes     Priority: High   • Nausea [R11.0]  Yes     Priority: High   • Diarrhea [R19.7]  Yes     Priority: High   • Weakness [R53.1]  Unknown     Priority: High   • CHF (congestive heart failure) (CMS/[facility]) [I50.9]  Yes   • Atrial fibrillation, chronic (CMS/[facility]) [I48.20]  Yes   • BPH without obstruction/lower urinary tract symptoms [N40.0]  Yes   • COPD (chronic obstructive pulmonary disease) (CMS/[facility]) [J44.9]  Yes   • VT (ventricular tachycardia) (CMS/[facility]) [I47.2]  Yes   • Coronary artery disease involving native heart with angina pectoris (CMS/[facility]) [I25.119]  Yes   • Cardiomyopathy, dilated (CMS/[facility]) [I42.0]  Yes   • Essential hypertension [I10]  Yes   • Dyslipidemia [E78.5]  Yes   • Depression [F32.9]  Yes   • Gastroesophageal reflux disease [K21.9]  Yes   • Morbid obesity (CMS/[facility]) [E66.01]  Yes   • Anxiety [F41.9]  Yes   • ICD (implantable cardioverter-defibrillator) in place [Z95.810]  Yes      Resolved Hospital Problems   No resolved problems to display.       Chest pain  -EKG: V-paced rhythm  -troponin normal  -proBNP 3242 compared to 3/3/2008 on 6/23/2021  -Given  Nitropaste in ED  -Continue home Plavix, beta-blocker, Ranexa, statin, Eliquis    Diarrhea  Nausea  Reports of melena  -CT abdomen/pelvis showed mild hepatomegaly with probable mild steatosis, no evidence of urolithiasis or hydronephrosis, no evidence of bowel obstruction or inflammation  -WBC normal, renal function normal, lipase normal   -Occult stool sample poor specimen in ED, gastrointestinal panel pending, C. difficile pending  -Hemoglobin normal and no current signs of bleeding  -start full liquid diet, prn antiemetics, PPI BID, monitor stool, consider GI consult if patient is noted to have continued melena    Weakness  -Likely multifactorial from diarrhea, nausea, poor oral intake and combination with severe cardiomyopathy  -PT eval in a.m.    Chronic systolic heart failure w/ severe LV dysfunction 30%  Severe cardiomyopathy secondary to arrhythmias and CAD  s/p Biventricular ICD in situ  -cont home beta-blocker, Aldactone, Lasix  -Patient referred by cardiologist to outpatient heart failure clinic in [place]    CAD s/p PCI  -Continue home meds above Plavix, beta-blocker, Ranexa, statin, Eliquis    Chronic atrial fibrillation   -s/p AV node ablation  -s/p VT ablation of left ventricle  -Continue metoprolol, Eliquis    COPD  -Stable not in exacerbation  -Continue home inhalers    Peripheral neuropathy  -Continue home Neurontin    GERD  -PPI    BPH  -cont flomax        VTE Prophylaxis - on eliquis      CODE STATUS:    Code Status and Medical Interventions:   Ordered at: 07/05/21 1736     Level Of Support Discussed With:    Patient     Code Status:    CPR     Medical Interventions (Level of Support Prior to Arrest):    Full       This patient has been examined wearing appropriate Personal Protective Equipment. 07/05/21      I discussed the patient's findings and my recommendations with patient.      Signature: Electronically signed by ABHISHEK Avalos, 07/05/21, 5:56 PM EDT.    Nirav Gilliam  Hospitalist Team

## 2021-07-06 NOTE — CONSULTS
Cardiology Consult Note      REQUESTING PHYSICIAN    Rafael Mcneal DO    PATIENT IDENTIFICATION  Name: Jose Dixon Jr.  Age: 60 y.o.  Sex: male  :  1960  MRN: [id number]             REASON FOR CONSULTATION:  A. fib, ICM, DON, HTN, CHF, CAD, HLD, anxiety and depression, AICD, BPH, obesity, COPD, GERD    2D echocardiograms 3/16/2021: EF 35%, mild concentric LVH, grade 2 DD      CC:  Chest pain  Edema    HISTORY OF PRESENT ILLNESS:   Patient presented to [facility] 2021 with complaint of epigastric/substernal pain that radiates into his arms and upper back described as ache with occasional sharp pains.  He reports shortness of breath with exertion and becomes very winded with minimal exertion.  Patient also reported dark, tarry stools the day prior.  He reports nausea without vomiting..  Patient is currently on Eliquis for A. fib and reports has been taking this routinely.  Patient reports some mild lower extremity edema.  Troponin has been negative x2, proBNP 3242.  Chest x-ray with stable cardiomegaly and central vascular congestion-similar to prior findings 2021.  EKG with ventricular paced rhythm.    Upon my evaluation, patient sitting on side of the bed and reports that his chest/abdominal discomfort has resolved.  He denies any shortness of breath.  Nausea was improved with Compazine.  He denies dizziness, lightheadedness      REVIEW OF SYSTEMS:  Pertinent items are noted in HPI, all other systems reviewed and negative    OBJECTIVE       ASSESSMENT  Chest pain-resolved  Chronic heart failure with systolic and diastolic features  Chronically elevated BNP  Coronary artery disease  NYHA class III heart failure  History of VT ablation  Ischemic/dilated cardiomyopathy  Hypertension  History of atrial fibrillation  Dyslipidemia  History of surgical LV lead placement  History of AV node ablation-pacemaker dependent  History of VT storm      PLAN  Patient has ruled out for  "acute coronary syndrome with serial cardiac enzymes and no acute findings on EKG.  His chest pain has actually resolved.  Patient has appointment later this month with heart failure clinic at [facility].  Patient would like to go home if no further evaluation is indicated.  Patient is hemodynamically stable  Discussed with attending, CV status is stable and patient may be discharged home to follow-up outpatient as scheduled          Vital Signs  Visit Vitals  /93 (BP Location: Right arm, Patient Position: Lying)   Pulse 72   Temp 97.8 °F (36.6 °C) (Oral)   Resp 18   Ht 177.8 cm (70\")   Wt (!) 136 kg (300 lb 4.8 oz)   SpO2 96%   BMI 43.09 kg/m²     Oxygen Therapy  SpO2: 96 %  Pulse Oximetry Type: Intermittent  Device (Oxygen Therapy): nasal cannula  Flow (L/min): 3  Flowsheet Rows      First Filed Value   Admission Height  177.8 cm (70\") Documented at 07/05/2021 1304   Admission Weight  136 kg (299 lb 9.7 oz) Documented at 07/05/2021 1304        Intake & Output (last 3 days)       07/03 0701 - 07/04 0700 07/04 0701 - 07/05 0700 07/05 0701 - 07/06 0700 07/06 0701 - 07/07 0700    P.O.   240     IV Piggyback   500     Total Intake(mL/kg)   740 (5.4)     Net   +740             Urine Unmeasured Occurrence   2 x         Lines, Drains & Airways    Active LDAs     Name:   Placement date:   Placement time:   Site:   Days:    Peripheral IV 07/05/21 1331 Left Antecubital   07/05/21    1331    Antecubital   less than 1                MEDICAL HISTORY    Past Medical History:   Diagnosis Date   • Asthma    • Atrial fibrillation (CMS/[facility])    • CAD (coronary artery disease)    • CHF (congestive heart failure) (CMS/[facility])    • COPD (chronic obstructive pulmonary disease) (CMS/[facility])    • Depression    • Elevated cholesterol    • Hyperlipidemia    • Hypertension    • Myocardial infarction (CMS/HCC)    • Pacemaker 2019    [employer]    • Sleep apnea    • V tach (CMS/[facility])         SURGICAL HISTORY    Past Surgical History: "   Procedure Laterality Date   • BACK SURGERY      Sciatica   • CARDIAC ABLATION  11/07/2017   • CARDIAC CATHETERIZATION      6-8 stents, last heart cath 2019   • CARDIAC CATHETERIZATION N/A 6/9/2020    Procedure: Coronary angiography;  Surgeon: Jose Lopez MD;  Location: [facility] CATH INVASIVE LOCATION;  Service: Cardiovascular;  Laterality: N/A;   • CARDIAC DEFIBRILLATOR PLACEMENT     • CARDIAC ELECTROPHYSIOLOGY PROCEDURE N/A 9/26/2019    Procedure: BIVENTRICULAR DEVICE UPGRADE;  Surgeon: Jose Lopez MD;  Location:  [facility] CATH INVASIVE LOCATION;  Service: Cardiovascular   • CARDIAC ELECTROPHYSIOLOGY PROCEDURE N/A 9/26/2019    Procedure: EP/Ablation AV Node ablation;  Surgeon: Jose Lopez MD;  Location:  [facility] CATH INVASIVE LOCATION;  Service: Cardiology   • CARDIAC ELECTROPHYSIOLOGY PROCEDURE N/A 6/9/2020    Procedure: EP/Ablation;  Surgeon: Jose Lopez MD;  Location: [facility] CATH INVASIVE LOCATION;  Service: Cardiovascular;  Laterality: N/A;   • CARDIAC ELECTROPHYSIOLOGY PROCEDURE N/A 10/1/2020    Procedure: EP/Ablation;  Surgeon: Jose Lopez MD;  Location: [facility] CATH INVASIVE LOCATION;  Service: Cardiovascular;  Laterality: N/A;   • CARDIAC PACEMAKER PLACEMENT N/A 6/22/2020    Procedure: LEFT VENTRICULAR LEAD PLACEMENT;  Surgeon: Christiano Richmond MD;  Location: [facility];  Service: Cardiothoracic;  Laterality: N/A;   • CHOLECYSTECTOMY     • COLONOSCOPY     • CORONARY ANGIOPLASTY WITH STENT PLACEMENT      2 stents   • HERNIA REPAIR      gallbladder    • KNEE ARTHROPLASTY     • PACEMAKER IMPLANTATION  07/08/2016    Pacemaker/ Defib implant - [place]   • SINUS SURGERY      sinus surgery x 9   • SKIN BIOPSY      benign        FAMILY HISTORY    Family History   Problem Relation Age of Onset   • Diabetes Mother    • Heart disease Mother         MI age 46 - CHF   • Atrial fibrillation Mother    • COPD Mother    • Atrial fibrillation Father    • Heart disease Father         CHF  "      SOCIAL HISTORY    Social History     Tobacco Use   • Smoking status: Never Smoker   • Smokeless tobacco: Never Used   Substance Use Topics   • Alcohol use: Never     Comment: socially        ALLERGIES    Allergies   Allergen Reactions   • Codeine Anaphylaxis     Tolerated morphine 6/24/21   • Tramadol Anaphylaxis and Hives              /93 (BP Location: Right arm, Patient Position: Lying)   Pulse 72   Temp 97.8 °F (36.6 °C) (Oral)   Resp 18   Ht 177.8 cm (70\")   Wt (!) 136 kg (300 lb 4.8 oz)   SpO2 96%   BMI 43.09 kg/m²   Intake/Output last 3 shifts:  I/O last 3 completed shifts:  In: 740 [P.O.:240; IV Piggyback:500]  Out: -   Intake/Output this shift:  No intake/output data recorded.    PHYSICAL EXAM:    General: Well-developed, well-nourished 60-year-old male who is alert, cooperative, no distress, appears stated age  Head:  Normocephalic, atraumatic, mucous membranes moist  Eyes:  Conjunctiva/corneas clear, EOM's intact     Neck:  Supple,  no adenopathy; no JVD or bruit  Lungs: Clear to auscultation bilaterally, no wheezes rhonchi rales are noted  Chest wall: No tenderness  Heart::  Regular rate and rhythm, S1 and S2 normal, no murmur, rub or gallop  Abdomen: Soft, non-tender, nondistended bowel sounds active  Extremities: No cyanosis, clubbing, trace edema to lower extremities  Pulses: 2+ and symmetric all extremities  Skin:  No rashes or lesions  Neuro/psych: A&O x3. CN II through XII are grossly intact with appropriate affect, anxious      Scheduled Meds:      apixaban, 5 mg, Oral, Q12H  atorvastatin, 80 mg, Oral, Nightly  budesonide-formoterol, 2 puff, Inhalation, BID - RT  cetirizine, 10 mg, Oral, Daily  clopidogrel, 75 mg, Oral, Daily  furosemide, 40 mg, Oral, Daily  gabapentin, 400 mg, Oral, QAM  gabapentin, 600 mg, Oral, Nightly  metoprolol succinate XL, 50 mg, Oral, Q24H  mexiletine, 250 mg, Oral, TID  montelukast, 10 mg, Oral, Daily  pantoprazole, 40 mg, Intravenous, BID " AC  ranolazine, 1,000 mg, Oral, Q12H  roflumilast, 500 mcg, Oral, Daily  sodium chloride, 10 mL, Intravenous, Q12H  spironolactone, 25 mg, Oral, Daily  tamsulosin, 0.4 mg, Oral, BID        Continuous Infusions:         PRN Meds:    •  acetaminophen **OR** acetaminophen **OR** acetaminophen  •  albuterol sulfate HFA  •  aluminum-magnesium hydroxide-simethicone  •  diphenhydrAMINE  •  melatonin  •  Morphine  •  nitroglycerin  •  ondansetron **OR** ondansetron  •  [COMPLETED] Insert peripheral IV **AND** sodium chloride  •  sodium chloride        Results Review:     I reviewed the patient's new clinical results.    CBC    Results from last 7 days   Lab Units 07/06/21  0326 07/05/21  1339   WBC 10*3/mm3 9.00 9.90   HEMOGLOBIN g/dL 13.8 14.6   PLATELETS 10*3/mm3 244 259     Cr Clearance Estimated Creatinine Clearance: 94.8 mL/min (by C-G formula based on SCr of 1.18 mg/dL).  Coag   Results from last 7 days   Lab Units 07/05/21  1339   INR  1.16*   APTT seconds 25.4     HbA1C   Lab Results   Component Value Date    HGBA1C 5.8 (H) 03/17/2021    HGBA1C 5.9 (H) 06/04/2020    HGBA1C 5.4 09/14/2018     Blood Glucose No results found for: POCGLU  Infection     CMP   Results from last 7 days   Lab Units 07/06/21  0326 07/05/21  1339   SODIUM mmol/L 138 142   POTASSIUM mmol/L 4.0 4.5   CHLORIDE mmol/L 101 102   CO2 mmol/L 26.0 27.0   BUN mg/dL 12 11   CREATININE mg/dL 1.18 1.13   GLUCOSE mg/dL 96 108*   ALBUMIN g/dL  --  4.00   BILIRUBIN mg/dL  --  1.3*   ALK PHOS U/L  --  89   AST (SGOT) U/L  --  26   ALT (SGPT) U/L  --  16   LIPASE U/L  --  17     ABG      UA    Results from last 7 days   Lab Units 07/05/21  1447   NITRITE UA  Negative   WBC UA /HPF 3-5*   BACTERIA UA /HPF None Seen   SQUAM EPITHEL UA /HPF 0-2     TATUM  No results found for: POCMETH, POCAMPHET, POCBARBITUR, POCBENZO, POCCOCAINE, POCOPIATES, POCOXYCODO, POCPHENCYC, POCPROPOXY, POCTHC, POCTRICYC  Lysis Labs   Results from last 7 days   Lab Units 07/06/21  0325  07/05/21  1339   INR   --  1.16*   APTT seconds  --  25.4   HEMOGLOBIN g/dL 13.8 14.6   PLATELETS 10*3/mm3 244 259   CREATININE mg/dL 1.18 1.13     Radiology(recent) CT Abdomen Pelvis Without Contrast    Result Date: 7/5/2021   1. Mild hepatomegaly with probable mild steatosis. 2. No evidence of urolithiasis or hydronephrosis. 3. No evidence of bowel obstruction or inflammation. 4. Postsurgical changes of prior umbilical hernia repair. 5. Nonspecific skin thickening surrounding the umbilicus and within the skin of the pannus which could reflect a cellulitis.    Electronically Signed By-Demarcus Linares MD On:7/5/2021 3:40 PM This report was finalized on [id number] by  Demarcus Linares MD.    XR Chest 1 View    Result Date: 7/5/2021  1. Stable cardiomegaly with central vascular congestion and linear atelectasis or scarring within the left midlung. Overall similar findings to the prior radiograph from 06/23/2021  Electronically Signed By-Demarcus Linares MD On:7/5/2021 3:23 PM This report was finalized on [id number] by  Demarcus Linares MD.        Results from last 7 days   Lab Units 07/06/21  0326   TROPONIN T ng/mL <0.010       Xrays, labs reviewed personally by physician.    ECG/EMG Results (most recent)     Procedure Component Value Units Date/Time    ECG 12 Lead [[id number]] Collected: 07/05/21 1317     Updated: 07/05/21 1319     QT Interval 493 ms     Narrative:      HEART RATE= 70  bpm  RR Interval= 829  ms  MT Interval= 47  ms  P Horizontal Axis= 260  deg  P Front Axis= 0  deg  QRSD Interval= 191  ms  QT Interval= 493  ms  QRS Axis= 147  deg  T Wave Axis= -59  deg  - ABNORMAL ECG -  Ventricular-paced complexes  Biventricular paced rhythm  When compared with ECG of 24-Jun-2021 5:30:37,  No significant change  Electronically Signed By:   Date and Time of Study: 2021-07-05 13:17:10    ECG 12 Lead [[id number]] Resulted: 07/05/21 1631     Updated: 07/05/21 1631            Medication Review:   I have reviewed  the patient's current medication list  Scheduled Meds:apixaban, 5 mg, Oral, Q12H  atorvastatin, 80 mg, Oral, Nightly  budesonide-formoterol, 2 puff, Inhalation, BID - RT  cetirizine, 10 mg, Oral, Daily  clopidogrel, 75 mg, Oral, Daily  furosemide, 40 mg, Oral, Daily  gabapentin, 400 mg, Oral, QAM  gabapentin, 600 mg, Oral, Nightly  metoprolol succinate XL, 50 mg, Oral, Q24H  mexiletine, 250 mg, Oral, TID  montelukast, 10 mg, Oral, Daily  pantoprazole, 40 mg, Intravenous, BID AC  ranolazine, 1,000 mg, Oral, Q12H  roflumilast, 500 mcg, Oral, Daily  sodium chloride, 10 mL, Intravenous, Q12H  spironolactone, 25 mg, Oral, Daily  tamsulosin, 0.4 mg, Oral, BID      Continuous Infusions:   PRN Meds:.•  acetaminophen **OR** acetaminophen **OR** acetaminophen  •  albuterol sulfate HFA  •  aluminum-magnesium hydroxide-simethicone  •  diphenhydrAMINE  •  melatonin  •  Morphine  •  nitroglycerin  •  ondansetron **OR** ondansetron  •  [COMPLETED] Insert peripheral IV **AND** sodium chloride  •  sodium chloride    Imaging:  Imaging Results (Last 72 Hours)     Procedure Component Value Units Date/Time    CT Abdomen Pelvis Without Contrast [[id number]] Collected: 07/05/21 1532     Updated: 07/05/21 1542    Narrative:      EXAM: CT ABDOMEN PELVIS WO CONTRAST-     DATE OF EXAM: 7/5/2021 3:18 PM     INDICATION: RUQ pain.  Mid to lower abdominal pain, diarrhea, history of  kidney stones and prior cholecystectomy.     COMPARISON: Renal ultrasound dated 11/20/2020     TECHNIQUE: Contiguous axial CT images were obtained from the lung bases  to the pubic symphysis without contrast. Sagittal and coronal  reconstructions were performed.  Automated exposure control and  iterative reconstruction methods were used.     FINDINGS:  The lack of intravenous contrast limits the evaluation of visceral and  vascular structures.     There is partial visualization of pacemaker leads. There is minimal  atelectasis within the lingula and right middle lobe.  There is no  pericardial or pleural effusion. The liver is mildly enlarged measuring  18 cm in craniocaudal dimension. There is likely mild steatosis. There  is no focal liver lesion. The gallbladder surgically absent. There is no  intrahepatic or extrahepatic biliary ductal dilatation. The spleen,  adrenal glands, and pancreas appear within normal limits for noncontrast  technique.     The kidneys are symmetric in size. There is no evidence of urolithiasis  or hydronephrosis. The urinary bladder is underdistended which limits  evaluation. The prostate appears normal in size.     The stomach and duodenum are normal in caliber and configuration. There  are no abnormally dilated loops of small bowel to suggest small bowel  obstruction or small bowel inflammation. The appendix is well-visualized  and normal within the right lower quadrant. There is no evidence of  acute colitis or diverticulitis. There is no free fluid or free air.  There are postsurgical changes of prior ventral hernia repair. There is  some nonspecific skin thickening surrounding the umbilicus and pannus  which may reflect a cellulitis.     The aorta is normal in caliber without evidence of aneurysm formation.  There is no lymphadenopathy within the abdomen or pelvis. There are  minimal degenerative changes of the lumbar spine.       Impression:         1. Mild hepatomegaly with probable mild steatosis.  2. No evidence of urolithiasis or hydronephrosis.  3. No evidence of bowel obstruction or inflammation.  4. Postsurgical changes of prior umbilical hernia repair.  5. Nonspecific skin thickening surrounding the umbilicus and within the  skin of the pannus which could reflect a cellulitis.           Electronically Signed By-Demarcus Linares MD On:7/5/2021 3:40 PM  This report was finalized on [id number] by  Demarcus Linares MD.    XR Chest 1 View [[id number]] Collected: 07/05/21 1521     Updated: 07/05/21 1527    Narrative:      EXAMINATION: XR  "CHEST 1 VW-     DATE OF EXAM: 7/5/2021 2:58 PM     INDICATION: Shortness of air, history of CHF, CAD, COPD.     COMPARISON: Chest radiograph dated 06/23/2021     TECHNIQUE: Portable AP view of the chest was obtained.     FINDINGS:  There is a left chest wall pacemaker with leads in unchanged position.  There is stable cardiomegaly with central vascular congestion. There is  linear left midlung atelectasis or scarring which is unchanged from the  prior examination. There is no significant pleural effusion. There is no  evidence of pneumothorax. There are degenerative changes of the thoracic  spine.       Impression:      1. Stable cardiomegaly with central vascular congestion and linear  atelectasis or scarring within the left midlung. Overall similar  findings to the prior radiograph from 06/23/2021     Electronically Signed By-Demarcus Linares MD On:7/5/2021 3:23 PM  This report was finalized on [id number] by  Demarcus Linares MD.            ABHISHEK Peña  07/06/21  08:04 EDT       EMR Dragon/Transcription:   \"Dictated utilizing Dragon dictation\".                 Electronically signed by ABHISHEK Peña, 07/06/21, 8:05 AM EDT.    "

## 2021-07-06 NOTE — DISCHARGE SUMMARY
[facility] Medicine Services  DISCHARGE SUMMARY        Prepared For PCP:  Jaylen Moss MD    Patient Name: Jose Dixon Jr.  : 1960  MRN: [id number]      Date of Admission:   2021    Date of Discharge:  2021    Length of stay:  LOS: 0 days     Hospital Course     Presenting Problem:   Weakness [R53.1]  Exertional dyspnea [R06.00]  Chest pain, unspecified type [R07.9]  Diarrhea, unspecified type [R19.7]      Active Hospital Problems    Diagnosis  POA   • **Chest pain [R07.9]  Yes   • Nausea [R11.0]  Yes   • Diarrhea [R19.7]  Yes   • CHF (congestive heart failure) (CMS/[facility]) [I50.9]  Yes   • Atrial fibrillation, chronic (CMS/[facility]) [I48.20]  Yes   • BPH without obstruction/lower urinary tract symptoms [N40.0]  Yes   • COPD (chronic obstructive pulmonary disease) (CMS/[facility]) [J44.9]  Yes   • Weakness [R53.1]  Unknown   • VT (ventricular tachycardia) (CMS/[facility]) [I47.2]  Yes   • Coronary artery disease involving native heart with angina pectoris (CMS/[facility]) [I25.119]  Yes   • Cardiomyopathy, dilated (CMS/[facility]) [I42.0]  Yes   • Essential hypertension [I10]  Yes   • Dyslipidemia [E78.5]  Yes   • Depression [F32.9]  Yes   • Gastroesophageal reflux disease [K21.9]  Yes   • Morbid obesity (CMS/[facility]) [E66.01]  Yes   • Anxiety [F41.9]  Yes   • ICD (implantable cardioverter-defibrillator) in place [Z95.810]  Yes      Resolved Hospital Problems   No resolved problems to display.           Hospital Course:  Jose Dixon Jr. is a 60 y.o. male  w/PMH of A. fib, ICM, DON, HTN, CHF, CAD, HLD, anxiety and depression, AICD, BPH, obesity, COPD, GERD presents to [facility] ED 21 with complaints of left sided chest pain with radiation into his arms and inbetween his shoulder blades. The pain is an achy pain with occasional sharpness. He has shortness of breath with only taking 10 steps. He feels weak and worn out. He has had diarrhea the last 3 days and noticed his stool was a dark, tarry  color yesterday. He has had nausea without vomiting but has been unable to eat due to the nausea and upper abdominal pain. He denied any fevers. He is taking all his home medications including his eliquis.   Patient was admitted 6/24/21 with chest pain and evaluated by cardiology. During that admission cardiology Dr. Burger documented the patient had limited treatment options and planned to continue conservative management. He followed up with Dr. Frye on 6/29/21 and was referred to heart failure service in [place].   Work-up in the ED showed normal hemoglobin and CT abdomen pelvis did not show any new acute findings.  ACS was ruled out, troponins remain negative.  The next day cardiology did eval the patient and since patient's symptoms are from chronic heart failure there is no further medication regimen needed to be done.  Patient already has follow-up scheduled this month at [place] for heart failure clinic and he will follow-up there per cardiology.  No further inpatient cardiology work-up needed.  Patient is remained stable from cardiology standpoint.  His GI symptoms also resolved the next day, diarrhea and abdominal pain is completely resolved and he is tolerating diet well.  Patient wants to go home today.  Patient is stable to discharge home today with follow-up with PCP and cardiology as an outpatient.          Recommendation for Outpatient Providers:     Follow-up with PCP and cardiology        Reasons For Change In Medications and Indications for New Medications:    Pepcid and as needed Zofran added.  No other changes.    Day of Discharge     HPI:   Patient seen and examined the day of discharge.  Still feels short of breath but is same as his chronic symptoms from heart failure.  His abdominal pain and diarrhea has resolved.  Denies any other complaints.  Okay to discharge per cardiology with patient having follow-up in [place] for heart failure clinic.    Vital Signs:   Temp:  [97.4 °F (36.3  °C)-98 °F (36.7 °C)] 97.8 °F (36.6 °C)  Heart Rate:  [68-79] 78  Resp:  [18-20] 18  BP: (120-152)/(73-93) 134/88     Physical Exam:  General: Awake, alert, obese male, NAD  Eyes: PERRL, EOMI, conjunctive are clear  Cardiovascular: Regular rate and rhythm, no murmurs  Respiratory: Clear to auscultation bilaterally, no wheezing or rales, unlabored breathing  Abdomen: Soft, nontender, positive bowel sounds, no guarding  Neurologic: A&O, CN grossly intact, moves all extremities spontaneously  Musculoskeletal: Normal range of motion, no deformities  Skin: Warm, dry, intact      Pertinent  and/or Most Recent Results     Results from last 7 days   Lab Units 07/06/21  0326 07/05/21  1339   WBC 10*3/mm3 9.00 9.90   HEMOGLOBIN g/dL 13.8 14.6   HEMATOCRIT % 41.7 44.0   PLATELETS 10*3/mm3 244 259   SODIUM mmol/L 138 142   POTASSIUM mmol/L 4.0 4.5   CHLORIDE mmol/L 101 102   CO2 mmol/L 26.0 27.0   BUN mg/dL 12 11   CREATININE mg/dL 1.18 1.13   GLUCOSE mg/dL 96 108*   CALCIUM mg/dL 8.6 8.8     Results from last 7 days   Lab Units 07/05/21  1339   BILIRUBIN mg/dL 1.3*   ALK PHOS U/L 89   ALT (SGPT) U/L 16   AST (SGOT) U/L 26   PROTIME Seconds 12.7*   INR  1.16*   APTT seconds 25.4           Invalid input(s): TG, LDLCALC, LDLREALC  Results from last 7 days   Lab Units 07/06/21  0326 07/05/21  1339   PROBNP pg/mL  --  3,242.0*   TROPONIN T ng/mL <0.010 <0.010       Brief Urine Lab Results  (Last result in the past 365 days)      Color   Clarity   Blood   Leuk Est   Nitrite   Protein   CREAT   Urine HCG        07/05/21 1447 Orange  Comment:  Result checked  Cloudy  Comment:  Result checked  Negative Small (1+) Negative 100 mg/dL (2+)               Microbiology Results Abnormal     Procedure Component Value - Date/Time    COVID PRE-OP / PRE-PROCEDURE SCREENING ORDER (NO ISOLATION) - Swab, Nasopharynx [[id number]]  (Normal) Collected: 07/05/21 1814    Lab Status: Final result Specimen: Swab from Nasopharynx Updated: 07/05/21 1835     Narrative:      The following orders were created for panel order COVID PRE-OP / PRE-PROCEDURE SCREENING ORDER (NO ISOLATION) - Swab, Nasopharynx.  Procedure                               Abnormality         Status                     ---------                               -----------         ------                     COVID-19,CEPHEID,COR/ISH...[[id number]]  Normal              Final result                 Please view results for these tests on the individual orders.    COVID-19,CEPHEID,COR/ISH/PAD/[facility] IN-HOUSE(OR EMERGENT/ADD-ON),NP SWAB IN TRANSPORT MEDIA 3-4 HR TAT, RT-PCR - Swab, Nasopharynx [[id number]]  (Normal) Collected: 07/05/21 1814    Lab Status: Final result Specimen: Swab from Nasopharynx Updated: 07/05/21 1839     COVID19 Not Detected    Narrative:      Fact sheet for providers: https://www.fda.gov/media/401954/download     Fact sheet for patients: https://www.fda.gov/media/064490/download  Fact sheet for providers: https://www.fda.gov/media/911901/download    Fact sheet for patients: https://www.fda.gov/media/819933/download    Test performed by PCR.          CT Abdomen Pelvis Without Contrast    Result Date: 7/5/2021  Impression:  1. Mild hepatomegaly with probable mild steatosis. 2. No evidence of urolithiasis or hydronephrosis. 3. No evidence of bowel obstruction or inflammation. 4. Postsurgical changes of prior umbilical hernia repair. 5. Nonspecific skin thickening surrounding the umbilicus and within the skin of the pannus which could reflect a cellulitis.    Electronically Signed By-Demarcus Linares MD On:7/5/2021 3:40 PM This report was finalized on [id number] by  Demarcus Linares MD.    XR Chest 1 View    Result Date: 7/5/2021  Impression: 1. Stable cardiomegaly with central vascular congestion and linear atelectasis or scarring within the left midlung. Overall similar findings to the prior radiograph from 06/23/2021  Electronically Signed By-Demarcus Linares MD On:7/5/2021 3:23 PM This  report was finalized on [id number] by  Demarcus Linares MD.    XR Chest 1 View    Result Date: 6/23/2021  Impression: 1. Cardiomegaly. Minor basilar linear opacities are suggestive of a minor degree of pulmonary edema, very slightly increased from the prior. Very small pleural effusions are possible.  Electronically Signed By-Delicia Blakely MD On:6/23/2021 8:53 PM This report was finalized on [id number] by  Delicia Blakely MD.              Results for orders placed during the hospital encounter of 03/15/21    Adult Transthoracic Echo Complete w/ Color, Spectral and Contrast if necessary per protocol    Interpretation Summary  · The left ventricular cavity is moderate to severely dilated.  · Left ventricular wall thickness is consistent with mild concentric hypertrophy.  · The following left ventricular wall segments are hypokinetic: basal anterolateral, mid anterolateral, apical lateral, basal inferolateral and mid inferolateral.  · Estimated left ventricular EF = 35% Estimated left ventricular EF was in agreement with the calculated left ventricular EF. Left ventricular systolic function is severely decreased.  · The right ventricular cavity is mildly dilated.  · Mildly reduced right ventricular systolic function noted.  · Left ventricular diastolic function is consistent with (grade II w/high LAP) pseudonormalization.  · Estimated right ventricular systolic pressure from tricuspid regurgitation is normal (<35 mmHg).              Test Results Pending at Discharge        Procedures Performed           Consults:   Consults     Date and Time Order Name Status Description    7/5/2021  4:59 PM Inpatient Cardiology Consult      7/5/2021  4:15 PM Hospitalist (on-call MD unless specified)      6/23/2021 10:39 PM Inpatient Cardiology Consult Completed     6/23/2021 10:03 PM Hospitalist (on-call MD unless specified) Completed             Discharge Details        Discharge Medications      New Medications       Instructions Start Date   famotidine 20 MG tablet  Commonly known as: Pepcid   20 mg, Oral, 2 Times Daily      ondansetron 4 MG tablet  Commonly known as: ZOFRAN   4 mg, Oral, Every 6 Hours PRN         Continue These Medications      Instructions Start Date   albuterol sulfate  (90 Base) MCG/ACT inhaler  Commonly known as: PROVENTIL HFA;VENTOLIN HFA;PROAIR HFA   2 puffs, Inhalation, Every 4 Hours PRN      apixaban 5 MG tablet tablet  Commonly known as: ELIQUIS   5 mg, Oral, Every 12 Hours Scheduled      atorvastatin 80 MG tablet  Commonly known as: LIPITOR   80 mg, Oral, Daily      Budeson-Glycopyrrol-Formoterol 160-9-4.8 MCG/ACT aerosol inhaler  Commonly known as: BREZTRI   2 puffs, Inhalation, 2 Times Daily      cetirizine 10 MG tablet  Commonly known as: zyrTEC   10 mg, Oral, Daily      clopidogrel 75 MG tablet  Commonly known as: PLAVIX   75 mg, Oral, Daily      furosemide 40 MG tablet  Commonly known as: Lasix   40 mg, Oral, Daily      gabapentin 100 MG capsule  Commonly known as: NEURONTIN   400 mg, Oral, Every Morning      gabapentin 600 MG tablet  Commonly known as: NEURONTIN   600 mg, Oral, Nightly      metoprolol succinate XL 50 MG 24 hr tablet  Commonly known as: TOPROL-XL   50 mg, Oral, Every 24 Hours Scheduled      mexiletine 250 MG capsule  Commonly known as: MEXITIL   250 mg, Oral, 3 Times Daily      montelukast 10 MG tablet  Commonly known as: SINGULAIR   10 mg, Oral, Daily      nitroglycerin 0.4 MG SL tablet  Commonly known as: NITROSTAT   0.4 mg, Sublingual, Every 5 Minutes PRN, Take no more than 3 doses in 15 minutes.       ranolazine 1000 MG 12 hr tablet  Commonly known as: RANEXA   1,000 mg, Oral, 2 Times Daily      roflumilast 500 MCG tablet tablet  Commonly known as: DALIRESP   500 mcg, Oral, Daily      spironolactone 25 MG tablet  Commonly known as: ALDACTONE   25 mg, Oral, Daily      tamsulosin 0.4 MG capsule 24 hr capsule  Commonly known as: FLOMAX   1 capsule, Oral, 2 times daily              Allergies   Allergen Reactions   • Codeine Anaphylaxis     Tolerated morphine 6/24/21   • Tramadol Anaphylaxis and Hives         Discharge Disposition:  Home or Self Care    Diet:  Hospital:  Diet Order   Procedures   • Diet Liquids; Full Liquid         Discharge Activity:         CODE STATUS:    Code Status and Medical Interventions:   Ordered at: 07/05/21 0068     Level Of Support Discussed With:    Patient     Code Status:    CPR     Medical Interventions (Level of Support Prior to Arrest):    Full         Follow-up Appointments  Future Appointments   Date Time Provider Department Center   7/29/2021  9:45 AM Silver Burger MD [facility]   8/6/2021  8:45 AM Jose Lopez MD [facility]             Condition on Discharge:      Stable      This patient has been examined wearing appropriate Personal Protective Equipment on 07/06/21      Electronically signed by Rafael Mcneal DO, 07/06/21, 10:07 AM EDT.      Time: I spent 25 minutes on this discharge activity which included face-to-face encounter with the patient/reviewing the data in the system/coordination of the care with the nursing staff as well as consultants/documentation/entering orders.

## 2021-07-06 NOTE — PROGRESS NOTES
Heart Failure Program  Nurse Navigator  Discharge Planning: Follow-up Note    Patient Name:Jose Dixon Jr.  :1960  Current Admission Date: 2021       Last 3 Weights:  Wt Readings from Last 3 Encounters:   21 (!) 136 kg (300 lb 4.8 oz)   21 (!) 141 kg (311 lb)   21 (!) 140 kg (309 lb 11.9 oz)       Intake and Output totals: I/O last 3 completed shifts:  In: 740 [P.O.:240; IV Piggyback:500]  Out: -   I/O this shift:  In: 320 [P.O.:320]  Out: -           Patient Assessment:   Patient was sitting in the chair reports that he is awaiting d/c home today      Patient Education:   I have seen this patient on several admissions he reports that he remains complaint with all suggested care      Review HF Education provided to patient:  yes-Symptoms worsening  yes-Prescribed medications  yes-HF self-care  yes-Follow-up Appointments       Acceptance of learning: Patient accepted     Heart Failure education interactive teaching session time: 45 minutes      Identified needs/barriers:       Intervention follow-up:

## 2021-07-06 NOTE — THERAPY EVALUATION
Patient Name: Jose Dixon Jr.  : 1960    MRN: [id number]                              Today's Date: 2021       Admit Date: 2021    Visit Dx:     ICD-10-CM ICD-9-CM   1. Chest pain, unspecified type  R07.9 786.50   2. Weakness  R53.1 780.79   3. Diarrhea, unspecified type  R19.7 787.91   4. Exertional dyspnea  R06.00 786.09     Patient Active Problem List   Diagnosis   • Coronary artery disease involving native heart with angina pectoris (CMS/[facility])   • Cardiomyopathy, dilated (CMS/[facility])   • Sleep apnea   • Essential hypertension   • Dyslipidemia   • Acute on chronic systolic CHF (congestive heart failure) (CMS/[facility])   • Anxiety   • Myocardial infarction (CMS/[facility])   • COPD with acute exacerbation (CMS/[facility])   • Depression   • Gastroesophageal reflux disease   • ICD (implantable cardioverter-defibrillator) in place   • Morbid obesity (CMS/[facility])   • Obesity   • S/P cardiac cath   • VT (ventricular tachycardia) (CMS/[facility])   • Ischemic cardiomyopathy   • Coronary artery disease   • Cardiomyopathy (CMS/[facility])   • Chronic stable angina (CMS/[facility])   • Acute on chronic combined systolic and diastolic CHF (congestive heart failure) (CMS/[facility])   • Syncope   • AICD discharge   • BPH without obstruction/lower urinary tract symptoms   • Peripheral neuropathy   • Seasonal allergies   • Right knee pain   • Dysarthria   • Weakness   • COPD (chronic obstructive pulmonary disease) (CMS/[facility])   • Ventricular tachycardia (CMS/[facility])   • Atrial fibrillation, chronic (CMS/[facility])   • Coronary artery disease involving native coronary artery of native heart with unstable angina pectoris (CMS/[facility])   • CHF (congestive heart failure) (CMS/[facility])   • Chest pain   • Nausea   • Diarrhea     Past Medical History:   Diagnosis Date   • Asthma    • Atrial fibrillation (CMS/[facility])    • CAD (coronary artery disease)    • CHF (congestive heart failure) (CMS/[facility])    • COPD (chronic obstructive pulmonary disease) (CMS/[facility])    • Depression    • Elevated  cholesterol    • Hyperlipidemia    • Hypertension    • Myocardial infarction (CMS/[facility])    • Pacemaker 2019    [employer]    • Sleep apnea    • V tach (CMS/[facility])      Past Surgical History:   Procedure Laterality Date   • BACK SURGERY      Sciatica   • CARDIAC ABLATION  11/07/2017   • CARDIAC CATHETERIZATION      6-8 stents, last heart cath 2019   • CARDIAC CATHETERIZATION N/A 6/9/2020    Procedure: Coronary angiography;  Surgeon: Jose Lopez MD;  Location:  [facility];  Service: Cardiovascular;  Laterality: N/A;   • CARDIAC DEFIBRILLATOR PLACEMENT     • CARDIAC ELECTROPHYSIOLOGY PROCEDURE N/A 9/26/2019    Procedure: BIVENTRICULAR DEVICE UPGRADE;  Surgeon: Jose Lopez MD;  Location:  [facility];  Service: Cardiovascular   • CARDIAC ELECTROPHYSIOLOGY PROCEDURE N/A 9/26/2019    Procedure: EP/Ablation AV Node ablation;  Surgeon: Jose Lopez MD;  Location:  [facility];  Service: Cardiology   • CARDIAC ELECTROPHYSIOLOGY PROCEDURE N/A 6/9/2020    Procedure: EP/Ablation;  Surgeon: Jose Lopez MD;  Location:  [facility];  Service: Cardiovascular;  Laterality: N/A;   • CARDIAC ELECTROPHYSIOLOGY PROCEDURE N/A 10/1/2020    Procedure: EP/Ablation;  Surgeon: Jose Lopez MD;  Location:  [facility];  Service: Cardiovascular;  Laterality: N/A;   • CARDIAC PACEMAKER PLACEMENT N/A 6/22/2020    Procedure: LEFT VENTRICULAR LEAD PLACEMENT;  Surgeon: Christiano Richmond MD;  Location: [facility];  Service: Cardiothoracic;  Laterality: N/A;   • CHOLECYSTECTOMY     • COLONOSCOPY     • CORONARY ANGIOPLASTY WITH STENT PLACEMENT      2 stents   • HERNIA REPAIR      gallbladder    • KNEE ARTHROPLASTY     • PACEMAKER IMPLANTATION  07/08/2016    Pacemaker/ Defib implant - [employer]   • SINUS SURGERY      sinus surgery x 9   • SKIN BIOPSY      benign     General Information     Row Name 07/06/21 1117          Physical  Therapy Time and Intention    Document Type  evaluation  -     Mode of Treatment  physical therapy  -     Row Name 07/06/21 1117          General Information    Patient Profile Reviewed  yes  -     Prior Level of Function  independent:  -     Row Name 07/06/21 1117          Living Environment    Lives With  spouse  -[facility] Name 07/06/21 1117          Home Main Entrance    Number of Stairs, Main Entrance  two  -     Row Name 07/06/21 1117          Cognition    Orientation Status (Cognition)  oriented x 4  -     Row Name 07/06/21 1117          Safety Issues, Functional Mobility    Impairments Affecting Function (Mobility)  endurance/activity tolerance;shortness of breath  -       User Key  (r) = Recorded By, (t) = Taken By, (c) = Cosigned By    Initials Name Provider Type     Michelle Hinton, PT Physical Therapist        Mobility     Row Name 07/06/21 1118          Bed Mobility    Bed Mobility  -- up in chair  -[facility] Name 07/06/21 1118          Sit-Stand Transfer    Sit-Stand [place] (Transfers)  independent  -[facility] Name 07/06/21 1118          Gait/Stairs (Locomotion)    [place] Level (Gait)  independent  -     Distance in Feet (Gait)  600 ft  -     Deviations/Abnormal Patterns (Gait)  gait speed decreased  -     Comment (Gait/Stairs)  no signs of LOB or safety concerns noted, one standing rest break due to SOA  -       User Key  (r) = Recorded By, (t) = Taken By, (c) = Cosigned By    Initials Name Provider Type     Michelle Hinton, PT Physical Therapist        Obj/Interventions     [facility] Name 07/06/21 1119          Range of Motion Comprehensive    General Range of Motion  no range of motion deficits identified  -[facility] Name 07/06/21 1119          Strength Comprehensive (MMT)    General Manual Muscle Testing (MMT) Assessment  no strength deficits identified  -[facility] Name 07/06/21 1119          Balance    Balance Assessment  sitting static balance;sitting  dynamic balance;standing static balance;standing dynamic balance  -HC     Static Sitting Balance  WFL  -HC     Dynamic Sitting Balance  WFL  -HC     Static Standing Balance  WFL  -HC     Dynamic Standing Balance  WFL  -HC       User Key  (r) = Recorded By, (t) = Taken By, (c) = Cosigned By    Initials Name Provider Type     Michelle Hinton, PT Physical Therapist        Goals/Plan    No documentation.       Clinical Impression     Row Name 07/06/21 1119          Pain    Additional Documentation  Pain Scale: Numbers Pre/Post-Treatment (Group)  -     Row Name 07/06/21 1119          Pain Scale: Numbers Pre/Post-Treatment    Pretreatment Pain Rating  0/10 - no pain  -HC     Posttreatment Pain Rating  0/10 - no pain  -HC     Row Name 07/06/21 1119          Plan of Care Review    Outcome Summary  Pt is 59 yo male admitted for SOA, chest pain, diarrhea, HARDY, CHF, epigastric pain.  Troponin (-).CT abdomen (-).  PMH: Afib, DON, HTN, CHF, COPD, CAD, AICD.  -     Row Name 07/06/21 1119          Therapy Assessment/Plan (PT)    Patient/Family Therapy Goals Statement (PT)  return home with wife  -     Criteria for Skilled Interventions Met (PT)  no problems identified which require skilled intervention  -     Row Name 07/06/21 1119          Vital Signs    Intra SpO2 (%)  95  -HC     O2 Delivery Intra Treatment  room air  -HC     Post SpO2 (%)  96  -HC     O2 Delivery Post Treatment  room air  -HC     Row Name 07/06/21 1119          Positioning and Restraints    Pre-Treatment Position  sitting in chair/recliner  -     Post Treatment Position  chair  -HC     In Chair  notified nsg;call light within reach  -HC       User Key  (r) = Recorded By, (t) = Taken By, (c) = Cosigned By    Initials Name Provider Type     Michelle Hinton, PT Physical Therapist        Outcome Measures     Row Name 07/06/21 1121          How much help from another person do you currently need...    Turning from your back to your side while in  flat bed without using bedrails?  4  -HC     Moving from lying on back to sitting on the side of a flat bed without bedrails?  4  -HC     Moving to and from a bed to a chair (including a wheelchair)?  4  -HC     Standing up from a chair using your arms (e.g., wheelchair, bedside chair)?  4  -HC     Climbing 3-5 steps with a railing?  4  -HC     To walk in hospital room?  4  -HC     AM-PAC 6 Clicks Score (PT)  24  -     Row Name 07/06/21 1121          Functional Assessment    Outcome Measure Options  AM-PAC 6 Clicks Basic Mobility (PT)  -       User Key  (r) = Recorded By, (t) = Taken By, (c) = Cosigned By    Initials Name Provider Type     Michelle Hinton, PT Physical Therapist        Physical Therapy Education                 Title: PT OT SLP Therapies (Done)     Topic: Physical Therapy (Done)     Point: Mobility training (Done)     Learning Progress Summary           Patient Acceptance, E, VU by  at 7/6/2021 1122                   Point: Precautions (Done)     Learning Progress Summary           Patient Acceptance, E, VU by  at 7/6/2021 1122                               User Key     Initials Effective Dates Name Provider Type [facility] 06/16/21 -  Michelle Hinton, PT Physical Therapist PT              PT Recommendation and Plan     Plan of Care Reviewed With: patient  Progress: improving  Outcome Summary: Pt is 59 yo male admitted for SOA, chest pain, diarrhea, HARDY, CHF, epigastric pain.  Troponin (-).CT abdomen (-).  PMH: Afib, DON, HTN, CHF, COPD, CAD, AICD.  Pt able to complete transfers with indep and ambulate 600 ft with indep.  Pt utilizing one standing rest break for ambulation due to SOA.  Pt's O2 measured >90% during treatment session.  Pt educated on importance of mobility to assist with fluid movement and increase strength/endurance.  Pt appears at functional mobility baseline.  Plan home at d/c.  No further inpatient PT needs.     Time Calculation:   PT Charges     Row Name  07/06/21 1126             Time Calculation    Start Time  0940  -HC      Stop Time  0956  -HC      Time Calculation (min)  16 min  -HC      PT Received On  07/06/21  -HC         Time Calculation- PT    Total Timed Code Minutes- PT  0 minute(s)  -HC        User Key  (r) = Recorded By, (t) = Taken By, (c) = Cosigned By    Initials Name Provider Type     Michelle Hinton, PT Physical Therapist        Therapy Charges for Today     Code Description Service Date Service Provider Modifiers Qty    [id number]  PT EVAL LOW COMPLEXITY 3 7/6/2021 Michelle Hinton, PT GP 1          PT G-Codes  Outcome Measure Options: AM-PAC 6 Clicks Basic Mobility (PT)  AM-PAC 6 Clicks Score (PT): 24    Michelle Hinton, PT  7/6/2021

## 2021-07-06 NOTE — CASE MANAGEMENT/SOCIAL WORK
Discharge Planning Assessment  [facility]     Patient Name: Jose Dixon Jr.  MRN: [id number]  Today's Date: 7/6/2021    Admit Date: 7/5/2021    Discharge Needs Assessment     Row Name 07/06/21 1250       Living Environment    Lives With  spouse    Current Living Arrangements  home/apartment/condo    Primary Care Provided by  self    Provides Primary Care For  no one    Family Caregiver if Needed  spouse    Able to Return to Prior Arrangements  yes       Transition Planning    Patient/Family Anticipates Transition to  home with family       Discharge Needs Assessment    Readmission Within the Last 30 Days  no previous admission in last 30 days    Equipment Currently Used at Home  oxygen 02@2L prn, via [employer]    Concerns to be Addressed  no discharge needs identified    Equipment Needed After Discharge  none        Discharge Plan     Row Name 07/06/21 1252       Plan    Plan  DC Plan: Return home with spouse, denies needs.    Plan Comments  Spoke with cinda independent with ADL's prior to admit. PCP; Isa,Pharmacy:[facility].Denies needs      Expected Discharge Date and Time     Expected Discharge Date Expected Discharge Time    Jul 6, 2021         Demographic Summary     Row Name 07/06/21 1249       General Information    Admission Type  observation    Arrived From  emergency department    Reason for Consult  discharge planning    Preferred Language  English        Functional Status     Row Name 07/06/21 1249       Functional Status    Usual Activity Tolerance  good    Current Activity Tolerance  good       Functional Status, IADL    Medications  independent    Meal Preparation  independent       Mental Status    General Appearance WDL  WDL     Maci Renteria RN   Met with patient in room wearing PPE: mask, face shield/goggles, gloves, gown.      Maintained distance greater than six feet and spent less than 15 minutes in the room.

## 2021-07-06 NOTE — PLAN OF CARE
Problem: Adult Inpatient Plan of Care  Goal: Plan of Care Review  Outcome: Ongoing, Progressing  Flowsheets  Taken 7/6/2021 1122  Progress: improving  Plan of Care Reviewed With: patient  Outcome Summary: Pt is 61 yo male admitted for SOA, chest pain, diarrhea, HARDY, CHF, epigastric pain.  Troponin (-).CT abdomen (-).  PMH: Afib, DON, HTN, CHF, COPD, CAD, AICD.  Pt able to complete transfers with indep and ambulate 600 ft with indep.  Pt utilizing one standing rest break for ambulation due to SOA.  Pt's O2 measured >90% during treatment session.  Pt educated on importance of mobility to assist with fluid movement and increase strength/endurance.  Pt appears at functional mobility baseline.  Plan home at d/c.  No further inpatient PT needs.

## 2021-07-06 NOTE — OUTREACH NOTE
Medical Week 2 Survey      Responses   [facility] patient discharged from?  [facility]   Does the patient have one of the following disease processes/diagnoses(primary or secondary)?  Other   Week 2 attempt successful?  No   Revoke  Readmitted          Salome Padron RN

## 2021-07-06 NOTE — PLAN OF CARE
Problem: Adult Inpatient Plan of Care  Goal: Plan of Care Review  Outcome: Ongoing, Progressing  Goal: Patient-Specific Goal (Individualized)  Outcome: Ongoing, Progressing  Goal: Absence of Hospital-Acquired Illness or Injury  Outcome: Ongoing, Progressing  Goal: Optimal Comfort and Wellbeing  Outcome: Ongoing, Progressing  Goal: Readiness for Transition of Care  Outcome: Ongoing, Progressing     Problem: Breathing Pattern Ineffective  Goal: Effective Breathing Pattern  Outcome: Ongoing, Progressing     Problem: Diarrhea (Gastroenteritis)  Goal: Effective Diarrhea Management  Outcome: Ongoing, Progressing     Problem: Fluid Imbalance (Gastroenteritis)  Goal: Fluid Balance  Outcome: Ongoing, Progressing     Problem: Nausea and Vomiting (Gastroenteritis)  Goal: Nausea and Vomiting Relief  Outcome: Ongoing, Progressing     Problem: Chest Pain  Goal: Resolution of Chest Pain Symptoms  Outcome: Ongoing, Progressing     Problem: Balance Impairment (Functional Deficit)  Goal: Improved Balance and Postural Control  Outcome: Ongoing, Progressing     Problem: Cognitive Impairment (Functional Deficit)  Goal: Optimal Functional [place]  Outcome: Ongoing, Progressing     Problem: Coordination Impairment (Functional Deficit)  Goal: Optimal Coordination  Outcome: Ongoing, Progressing     Problem: Muscle Strength Impairment (Functional Deficit)  Goal: Improved Muscle Strength  Outcome: Ongoing, Progressing     Problem: Muscle Tone Impairment (Functional Deficit)  Goal: Improved Muscle Tone  Outcome: Ongoing, Progressing     Problem: Range of Motion Impairment (Functional Deficit)  Goal: Optimal Range of Motion  Outcome: Ongoing, Progressing     Problem: Sensory Impairment (Functional Deficit)  Goal: Compensation for Sensory Deficit  Outcome: Ongoing, Progressing     Problem: Heart Failure Comorbidity  Goal: Maintenance of Heart Failure Symptom Control  Outcome: Ongoing, Progressing     Problem: Hypertension  Comorbidity  Goal: Blood Pressure in Desired Range  Outcome: Ongoing, Progressing     Problem: Obstructive Sleep Apnea Risk or Actual (Comorbidity Management)  Goal: Unobstructed Breathing During Sleep  Outcome: Ongoing, Progressing   Goal Outcome Evaluation:  Discharge today

## 2021-07-07 NOTE — CASE MANAGEMENT/SOCIAL WORK
Continued Stay Note  FRANCK Gilliam     Patient Name: Jose Dixon Jr.  MRN: [id number]  Today's Date: 7/7/2021    Admit Date: 7/5/2021           Plan    Final Discharge Disposition Code  01 - home or self-care    Final Note  return home       Carol naegele rn  Case management  Office number [phone]  Cell phone [phone]

## 2021-07-07 NOTE — OUTREACH NOTE
Prep Survey      Responses   [facility] facility patient discharged from?  [facility]   Is LACE score < 7 ?  No   Emergency Room discharge w/ pulse ox?  No   Eligibility  Readm Mgmt   Discharge diagnosis  •CHF   Does the patient have one of the following disease processes/diagnoses(primary or secondary)?  CHF   Does the patient have Home health ordered?  No   Is there a DME ordered?  No   Prep survey completed?  Yes          Ruba Finney RN

## 2021-07-09 NOTE — OUTREACH NOTE
CHF Week 1 Survey      Responses   [facility] patient discharged from?  [facility]   Does the patient have one of the following disease processes/diagnoses(primary or secondary)?  CHF   CHF Week 1 attempt successful?  Yes   Call start time  1347   Call end time  1355   Is patient permission given to speak with other caregiver?  Yes   List who call center can speak with  spouse   Meds reviewed with patient/caregiver?  Yes   Is the patient having any side effects they believe may be caused by any medication additions or changes?  No   Does the patient have all medications ordered at discharge?  Yes   Is the patient taking all medications as directed (includes completed medication regime)?  Yes   Comments regarding appointments  Transplant team appt on 07/12/21. Nephrologist 07/13/21, pulmonologist 07/14/21. Waiting on return call from PCP office.   Does the patient have a primary care provider?   Yes   Does the patient have an appointment with their PCP within 7 days of discharge?  No   What is preventing the patient from scheduling follow up appointments within 7 days of discharge?  Waiting on return call   Nursing Interventions  Advised patient to make appointment, Educated patient on importance of making appointment   Has the patient kept scheduled appointments due by today?  N/A   Has home health visited the patient within 72 hours of discharge?  N/A   What DME was ordered?  Uses [employer] at home.   Pulse Ox monitoring  Intermittent   Pulse Ox device source  Patient   O2 Sat comments  97-98% on RA.   O2 Sat: education provided  Sat levels   O2 Sat education comments  States was advised to return to ER if O2 sats remain below 90%.   Psychosocial issues?  No   Did the patient receive a copy of their discharge instructions?  Yes   Nursing interventions  Reviewed instructions with patient   What is the patient's perception of their health status since discharge?  Improving   Nursing interventions  Nurse provided  patient education   Is the patient weighing daily?  Yes   Does the patient have scales?  Yes   Daily weight interventions  Education provided on importance of daily weight   Is the patient able to teach back Heart Failure diet management?  Yes   Is the patient able to teach back Heart Failure Zones?  Yes   Is the patient able to teach back signs and symptoms of worsening condition? (i.e. weight gain, shortness of air, etc.)  Yes   If the patient is a current smoker, are they able to teach back resources for cessation?  Not a smoker   Is the patient/caregiver able to teach back the hierarchy of who to call/visit for symptoms/problems? PCP, Specialist, Home health nurse, Urgent Care, ED, 911  Yes    CHF Week 1 call completed?  Yes   Wrap up additional comments  States is improving-decreased pedal/lower leg edema, weight steady. Denies any worsening SOA or chest pain. Denies any needs today.          Danna Raya RN

## 2021-07-19 NOTE — OUTREACH NOTE
CHF Week 2 Survey      Responses   [facility] patient discharged from?  [facility]   Does the patient have one of the following disease processes/diagnoses(primary or secondary)?  CHF   Week 2 attempt successful?  Yes   Call start time  0925   Revoke  Readmitted [pt at [facility] after an ablation]   Call end time  0926   Discharge diagnosis  •CHF          Deirdre Avilez RN

## (undated) DEVICE — 3M™ STERI-STRIP™ REINFORCED ADHESIVE SKIN CLOSURES, R1547, 1/2 IN X 4 IN (12 MM X 100 MM), 6 STRIPS/ENVELOPE: Brand: 3M™ STERI-STRIP™

## (undated) DEVICE — PK OPN HEART BRING BACK CUST

## (undated) DEVICE — Device: Brand: WEBSTER

## (undated) DEVICE — ST ACC MICROPUNCTURE STFF/CANN PLAT/TP 4F 21G 40CM

## (undated) DEVICE — GAUZE,SPONGE,4"X4",32PLY,XRAY,STRL,LF: Brand: MEDLINE

## (undated) DEVICE — Device: Brand: RFP-100A CONNECTOR CABLE

## (undated) DEVICE — 3M™ IOBAN™ 2 ANTIMICROBIAL INCISE DRAPE 6651EZ: Brand: IOBAN™ 2

## (undated) DEVICE — Device: Brand: CARTO 3

## (undated) DEVICE — SUT VIC 3/0 FS1 27IN J442H

## (undated) DEVICE — CATH DIAG IMPULSE FL4 6F 100CM

## (undated) DEVICE — PINNACLE INTRODUCER SHEATH: Brand: PINNACLE

## (undated) DEVICE — TOWEL,OR,DSP,ST,WHITE,DLX,4/PK,20PK/CS: Brand: MEDLINE

## (undated) DEVICE — Device: Brand: SOUNDSTAR

## (undated) DEVICE — GW CHOICE PT PLS .014 182CM

## (undated) DEVICE — ELECTRD DEFIB M/FUNC PROPADZ RADIOL 2PK

## (undated) DEVICE — PROVE COVER: Brand: UNBRANDED

## (undated) DEVICE — DRAPE SHEET ULTRAGARD: Brand: MEDLINE

## (undated) DEVICE — ELECTRD BLD EZ CLN STD 6.5IN

## (undated) DEVICE — TBG PRESS/MONITOR FIX M/F LL A/ 24IN STRL

## (undated) DEVICE — SUT ETHIB 0/0 MO6 I8IN CX45D

## (undated) DEVICE — Device: Brand: REFERENCE PATCH CARTO 3

## (undated) DEVICE — Device

## (undated) DEVICE — PRESSURE TUBING: Brand: TRUWAVE

## (undated) DEVICE — CATH GUIDE OUTR CPS DIRECT PL OC W/SHEATH 115DEG 7F/9F 47CM

## (undated) DEVICE — NDL PERC 1PRT THNWALL W/BASEPLT 18G 7CM

## (undated) DEVICE — IRRIGATOR BULB ASEPTO 60CC STRL

## (undated) DEVICE — PK TRY HEART CATH 50

## (undated) DEVICE — HI-TORQUE BALANCE MIDDLEWEIGHT UNIVERSAL II GUIDE WIRE STRAIGHT TIP PAK  190 CM: Brand: HI-TORQUE BALANCE MIDDLEWEIGHT UNIVERSAL II

## (undated) DEVICE — C-ARM: Brand: UNBRANDED

## (undated) DEVICE — Device: Brand: PENTARAY NAV

## (undated) DEVICE — Device: Brand: WEBSTER CS

## (undated) DEVICE — SKIN PREP TRAY W/CHG: Brand: MEDLINE INDUSTRIES, INC.

## (undated) DEVICE — 3M™ IOBAN™ 2 ANTIMICROBIAL INCISE DRAPE 6650EZ: Brand: IOBAN™ 2

## (undated) DEVICE — PREF GUIDING SHEATH:MULTI-SHRT: Brand: PREFACE

## (undated) DEVICE — 3M™ PATIENT PLATE, CORDED, SPLIT, LARGE, 40 PER CASE, 1179: Brand: 3M™

## (undated) DEVICE — TBG IV DRIP CHAMBER MACRO SGL 72IN

## (undated) DEVICE — Device: Brand: EZ STEER NAV DS

## (undated) DEVICE — Device: Brand: THERMOCOOL SMARTTOUCH SF

## (undated) DEVICE — PAD E/S GRND SGL/FOIL 9FT/CORD DISP

## (undated) DEVICE — PACEMAKER CDS: Brand: MEDLINE INDUSTRIES, INC.

## (undated) DEVICE — SUT PDS 0 CT-1 Z340H

## (undated) DEVICE — SUCTION CANISTER, 1500CC,SAFELINER: Brand: DEROYAL

## (undated) DEVICE — WOUND RETRACTOR AND PROTECTOR: Brand: ALEXIS O WOUND PROTECTOR-RETRACTOR

## (undated) DEVICE — VIOLET BRAIDED (POLYGLACTIN 910), SYNTHETIC ABSORBABLE SUTURE: Brand: COATED VICRYL

## (undated) DEVICE — SUT MNCRYL 2/0 CT1 36IN

## (undated) DEVICE — SUT SILK 0 CT1 CR8 18IN C021D

## (undated) DEVICE — ELECTRD DEFIB M/FUNC PROPADZ STRL 2PK

## (undated) DEVICE — GW DIAG EMERALD HEPCOAT MOVE JTIP STD .035 3MM 150CM

## (undated) DEVICE — ST INTRO PERFORMER W/GW J/TP .038IN 14FR

## (undated) DEVICE — SUT MNCRYL 3/0 SH 27 IN Y416H

## (undated) DEVICE — Device: Brand: NRG TRANSSEPTAL NEEDLE

## (undated) DEVICE — DRSNG WND BORDR/ADHS NONADHR/GZ LF 4X4IN STRL

## (undated) DEVICE — CABL BIPOL W/ALLGTR CLIP/SM 12FT

## (undated) DEVICE — ANTIBACTERIAL UNDYED BRAIDED (POLYGLACTIN 910), SYNTHETIC ABSORBABLE SUTURE: Brand: COATED VICRYL

## (undated) DEVICE — CATH CERBRL SELCT BRD TORQ .038 5F BERN 65CM

## (undated) DEVICE — SKIN AFFIX SURG ADHESIVE 72/CS 0.55ML: Brand: MEDLINE

## (undated) DEVICE — TUBING, SUCTION, 1/4" X 12', STRAIGHT: Brand: MEDLINE

## (undated) DEVICE — Device: Brand: SMARTABLATE